# Patient Record
Sex: MALE | Race: WHITE | NOT HISPANIC OR LATINO | Employment: OTHER | URBAN - METROPOLITAN AREA
[De-identification: names, ages, dates, MRNs, and addresses within clinical notes are randomized per-mention and may not be internally consistent; named-entity substitution may affect disease eponyms.]

---

## 2019-09-10 ENCOUNTER — OFFICE VISIT (OUTPATIENT)
Dept: INTERNAL MEDICINE CLINIC | Facility: CLINIC | Age: 74
End: 2019-09-10
Payer: MEDICARE

## 2019-09-10 VITALS
TEMPERATURE: 98.2 F | DIASTOLIC BLOOD PRESSURE: 82 MMHG | WEIGHT: 247.6 LBS | RESPIRATION RATE: 16 BRPM | SYSTOLIC BLOOD PRESSURE: 120 MMHG | BODY MASS INDEX: 33.54 KG/M2 | HEART RATE: 79 BPM | HEIGHT: 72 IN

## 2019-09-10 DIAGNOSIS — M25.552 CHRONIC ARTHRALGIAS OF KNEES AND HIPS: ICD-10-CM

## 2019-09-10 DIAGNOSIS — M25.562 CHRONIC ARTHRALGIAS OF KNEES AND HIPS: ICD-10-CM

## 2019-09-10 DIAGNOSIS — M25.551 CHRONIC ARTHRALGIAS OF KNEES AND HIPS: ICD-10-CM

## 2019-09-10 DIAGNOSIS — N40.0 BPH (BENIGN PROSTATIC HYPERPLASIA): ICD-10-CM

## 2019-09-10 DIAGNOSIS — M25.561 CHRONIC ARTHRALGIAS OF KNEES AND HIPS: ICD-10-CM

## 2019-09-10 DIAGNOSIS — E55.9 HYPOVITAMINOSIS D: Primary | ICD-10-CM

## 2019-09-10 DIAGNOSIS — G89.29 CHRONIC ARTHRALGIAS OF KNEES AND HIPS: ICD-10-CM

## 2019-09-10 DIAGNOSIS — I25.10 CAD (CORONARY ARTERY DISEASE): ICD-10-CM

## 2019-09-10 LAB
CHOLEST SERPL-MCNC: 301 MG/DL (ref 50–200)
CK MB SERPL-MCNC: 1.2 % (ref 0–2.5)
CK MB SERPL-MCNC: 2.9 NG/ML (ref 0–5)
CK SERPL-CCNC: 239 U/L (ref 39–308)
ERYTHROCYTE [SEDIMENTATION RATE] IN BLOOD: 12 MM/HOUR (ref 0–10)
HDLC SERPL-MCNC: 40 MG/DL (ref 40–60)
NONHDLC SERPL-MCNC: 261 MG/DL
TRIGL SERPL-MCNC: 408 MG/DL

## 2019-09-10 PROCEDURE — 82553 CREATINE MB FRACTION: CPT | Performed by: INTERNAL MEDICINE

## 2019-09-10 PROCEDURE — 36415 COLL VENOUS BLD VENIPUNCTURE: CPT | Performed by: HOSPITALIST

## 2019-09-10 PROCEDURE — 85652 RBC SED RATE AUTOMATED: CPT | Performed by: INTERNAL MEDICINE

## 2019-09-10 PROCEDURE — 99204 OFFICE O/P NEW MOD 45 MIN: CPT | Performed by: HOSPITALIST

## 2019-09-10 PROCEDURE — 80061 LIPID PANEL: CPT | Performed by: INTERNAL MEDICINE

## 2019-09-10 PROCEDURE — 82550 ASSAY OF CK (CPK): CPT | Performed by: INTERNAL MEDICINE

## 2019-09-10 RX ORDER — EZETIMIBE 10 MG/1
10 TABLET ORAL DAILY
COMMUNITY
End: 2020-07-10 | Stop reason: SDUPTHER

## 2019-09-10 RX ORDER — SIMVASTATIN 40 MG
40 TABLET ORAL DAILY
COMMUNITY
End: 2021-03-23 | Stop reason: SDUPTHER

## 2019-09-10 RX ORDER — FINASTERIDE 5 MG/1
5 TABLET, FILM COATED ORAL DAILY
Qty: 30 TABLET | Refills: 0 | Status: SHIPPED | OUTPATIENT
Start: 2019-09-10 | End: 2019-11-21 | Stop reason: SDUPTHER

## 2019-09-10 RX ORDER — FINASTERIDE 5 MG/1
5 TABLET, FILM COATED ORAL DAILY
COMMUNITY
End: 2019-09-10 | Stop reason: SDUPTHER

## 2019-09-10 RX ORDER — ASPIRIN 81 MG/1
81 TABLET, CHEWABLE ORAL EVERY MORNING
COMMUNITY
End: 2022-04-14 | Stop reason: HOSPADM

## 2019-09-10 RX ORDER — GABAPENTIN 300 MG/1
300 CAPSULE ORAL 3 TIMES DAILY
COMMUNITY
End: 2020-07-10 | Stop reason: SDUPTHER

## 2019-09-10 RX ORDER — TRAZODONE HYDROCHLORIDE 150 MG/1
150 TABLET ORAL
COMMUNITY
End: 2020-07-10 | Stop reason: SDUPTHER

## 2019-09-10 RX ORDER — CLOPIDOGREL BISULFATE 75 MG/1
75 TABLET ORAL DAILY
COMMUNITY
End: 2022-03-21 | Stop reason: ALTCHOICE

## 2019-09-10 RX ORDER — METOPROLOL SUCCINATE 50 MG/1
50 TABLET, EXTENDED RELEASE ORAL DAILY
COMMUNITY
End: 2019-11-21 | Stop reason: SDUPTHER

## 2019-09-10 NOTE — ASSESSMENT & PLAN NOTE
Otilio complains of "aches and pains" over his body for the past month  He has a history of osteoarthritis  He states he inconsistently  takes vitamin D 400  He is also taking Simvastatin as part of his treatment for CAD  This symptoms could be caused by hypovitaminosis D, statin use, and osteoarthritis  Vitamin D panel and ESR have been ordered as part of the workup  This was discussed with Wing Crain who agrees to this treatment plan  He was also advised to start taking Vitamin D 1000 IU  daily

## 2019-09-10 NOTE — ASSESSMENT & PLAN NOTE
Rolin Apgar is a 69year old male who came to the clinic to establish care with a primary care physician after moving from [place]  He states his previous doctor has retired  Medical records will be obtained from his previous PCP  Rolin Apgar has a past medical history of Sacroiliitis, Spondylosis without myelopathy, CAD, osteoarthritis, BPH, Hip fracture and recent Diverticulitis of intestine with perforation without bleeding  Today, Rolin Apgar states he has been feeling "aches all over his body", these are mainly localized on the shoulders, hips and knees  He also states his left leg has been swelling since the hip fracture last year  Otilio's arthralgias and myalgias could be caused by statin use, hypovitaminosis D or osteoarthritis  These possible diagnosis have been discussed with him as well as the lab workup and treatment plan  He understands and agrees
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
## 2019-09-10 NOTE — ASSESSMENT & PLAN NOTE
Tracy Carrasquillo has a history of CAD and stent placement  His previous PCP in [place] has recently retired and Tracy Carrasquillo just moved to the [facility] area 2 weeks ago  The medical records are not available at this moment  He was recently admitted to the hospital due to diverticulitis with perforation  Lab workup at the hospital is within normal limits  There is no lipid panel on record  This has been ordered today  Tracy Carrasquillo is currently taking Simvastatin, ezetimibe, aspirin, clopidogrel and metoprolol  He was advised to continue his regular medication regimen

## 2019-09-11 ENCOUNTER — TELEPHONE (OUTPATIENT)
Dept: INTERNAL MEDICINE CLINIC | Facility: CLINIC | Age: 74
End: 2019-09-11

## 2019-09-11 PROCEDURE — 82306 VITAMIN D 25 HYDROXY: CPT | Performed by: INTERNAL MEDICINE

## 2019-09-11 NOTE — TELEPHONE ENCOUNTER
I called the patient to tell him he will have to get another tube of blood for his vitamin D panel, the lab could not get the test done with the tube we sent  He should get it done before his next appointment, or soon  He can come here or go to a lab to have it done

## 2019-09-14 ENCOUNTER — OFFICE VISIT (OUTPATIENT)
Dept: URGENT CARE | Facility: CLINIC | Age: 74
End: 2019-09-14
Payer: MEDICARE

## 2019-09-14 VITALS
TEMPERATURE: 97.9 F | HEART RATE: 70 BPM | RESPIRATION RATE: 16 BRPM | DIASTOLIC BLOOD PRESSURE: 75 MMHG | WEIGHT: 254.6 LBS | HEIGHT: 72 IN | BODY MASS INDEX: 34.48 KG/M2 | SYSTOLIC BLOOD PRESSURE: 158 MMHG | OXYGEN SATURATION: 96 %

## 2019-09-14 DIAGNOSIS — B35.1 ONYCHOMYCOSIS OF TOENAIL: ICD-10-CM

## 2019-09-14 DIAGNOSIS — L25.9 CONTACT DERMATITIS, UNSPECIFIED CONTACT DERMATITIS TYPE, UNSPECIFIED TRIGGER: Primary | ICD-10-CM

## 2019-09-14 PROCEDURE — G0463 HOSPITAL OUTPT CLINIC VISIT: HCPCS | Performed by: PHYSICIAN ASSISTANT

## 2019-09-14 PROCEDURE — 99214 OFFICE O/P EST MOD 30 MIN: CPT | Performed by: PHYSICIAN ASSISTANT

## 2019-09-14 NOTE — PATIENT INSTRUCTIONS
Contact dermatitis:  Prescription sent to the pharmacy for hydrocortisone ointment-use twice daily as directed  May also take Benadryl as needed for itching and rash  Nail fungus:  Prescription sent to the pharmacy for [facility] as directed  Keep area clean and dry  Follow up with a dermatologist if symptoms persist   Proceed to  ER if symptoms worsen

## 2019-09-14 NOTE — PROGRESS NOTES
[address]        NAME: Marichuy Blair is a 69 y o  male  : 1945    MRN: [id number]  DATE: 2019  TIME: 12:08 PM    Assessment and Plan   Contact dermatitis, unspecified contact dermatitis type, unspecified trigger [L25 9]  1  Contact dermatitis, unspecified contact dermatitis type, unspecified trigger  hydrocortisone 2 5 % ointment   2  Onychomycosis of toenail  ciclopirox (PENLAC) 8 % solution         Patient Instructions   Contact dermatitis:  Prescription sent to the pharmacy for hydrocortisone ointment-use twice daily as directed  May also take Benadryl as needed for itching and rash  Nail fungus:  Prescription sent to the pharmacy for [facility] as directed  Keep area clean and dry  Follow up with a dermatologist if symptoms persist   Proceed to  ER if symptoms worsen  Chief Complaint     Chief Complaint   Patient presents with    Rash     Pt presents with an itchy rash on his lower extremities  He stated that he noticed it approx 2 weeks ago-          History of Present Illness   The patient is a 45-year-old male who presents with multiple skin complaints  He states that for approximately 2 weeks he has had itching and a rash/blistering that started on his ankles and feet  The rash is urticarial   Negative drainage  Negative fever or shaking chills  Negative open wound  He has recently moved into a new house and is unsure if the rash may be secondary to bug bites  He denies seeing any types of bugs  He denies any changes in his soaps, detergents, lotions  He also presents with discoloration of his toenails  He states that he has had a fungal infection of his nails in the past   Again negative open wound or drainage  HPI    Review of Systems   Review of Systems   Constitutional: Negative for chills and fever  HENT: Negative for sore throat and trouble swallowing  Gastrointestinal: Negative for abdominal pain, diarrhea, nausea and vomiting  Musculoskeletal: Negative for arthralgias and myalgias  Skin: Positive for color change and rash  Negative for pallor and wound  Allergic/Immunologic: Negative for environmental allergies, food allergies and immunocompromised state  Neurological: Negative for dizziness, weakness, light-headedness and headaches  Hematological: Negative for adenopathy  Does not bruise/bleed easily  All other systems reviewed and are negative  Current Medications       Current Outpatient Medications:     aspirin 81 mg chewable tablet, Chew 81 mg daily, Disp: , Rfl:     ciclopirox (PENLAC) 8 % solution, Apply topically daily at bedtime Maximum 48 weeks, Disp: 6 6 mL, Rfl: 0    clopidogrel (PLAVIX) 75 mg tablet, Take 75 mg by mouth daily, Disp: , Rfl:     ezetimibe (ZETIA) 10 mg tablet, Take 10 mg by mouth daily, Disp: , Rfl:     finasteride (PROSCAR) 5 mg tablet, Take 1 tablet (5 mg total) by mouth daily, Disp: 30 tablet, Rfl: 0    gabapentin (NEURONTIN) 300 mg capsule, Take 300 mg by mouth 3 (three) times a day, Disp: , Rfl:     hydrocortisone 2 5 % ointment, Apply topically 2 (two) times a day, Disp: 30 g, Rfl: 0    metoprolol succinate (TOPROL-XL) 50 mg 24 hr tablet, Take 50 mg by mouth daily, Disp: , Rfl:     sertraline (ZOLOFT) 50 mg tablet, Take 50 mg by mouth daily, Disp: , Rfl:     simvastatin (ZOCOR) 40 mg tablet, Take 40 mg by mouth daily, Disp: , Rfl:     traZODone (DESYREL) 150 mg tablet, Take 150 mg by mouth daily at bedtime, Disp: , Rfl:     Current Allergies     Allergies as of 09/14/2019    (No Known Allergies)            The following portions of the patient's history were reviewed and updated as appropriate: allergies, current medications, past family history, past medical history, past social history, past surgical history and problem list      Past Medical History:   Diagnosis Date    Celiac disease     Diverticulosis        History reviewed  No pertinent surgical history      Family History   Problem Relation Age of Onset    Diabetes Mother     Coronary artery disease Father     Thyroid disease Sister          Medications have been verified  Objective   /75   Pulse 70   Temp 97 9 °F (36 6 °C)   Resp 16   Ht 6' (1 829 m)   Wt 115 kg (254 lb 9 6 oz)   SpO2 96%   BMI 34 53 kg/m²        Physical Exam     Physical Exam   Constitutional: He is oriented to person, place, and time  He appears well-developed and well-nourished  No distress  HENT:   Head: Normocephalic and atraumatic  Negative oropharyngeal edema  Negative stridor  Pulmonary/Chest: Breath sounds normal  No respiratory distress  Neurological: He is alert and oriented to person, place, and time  Skin: Skin is warm and dry  Capillary refill takes less than 2 seconds  Rash noted  Rash is urticarial  He is not diaphoretic  No erythema  No pallor  Psychiatric: He has a normal mood and affect  His behavior is normal    Nursing note and vitals reviewed

## 2019-09-16 LAB
25(OH)D2 SERPL-MCNC: <1 NG/ML
25(OH)D3 SERPL-MCNC: 41 NG/ML
25(OH)D3+25(OH)D2 SERPL-MCNC: 41 NG/ML

## 2019-09-24 ENCOUNTER — TELEPHONE (OUTPATIENT)
Dept: INTERNAL MEDICINE CLINIC | Facility: CLINIC | Age: 74
End: 2019-09-24

## 2019-09-24 NOTE — TELEPHONE ENCOUNTER
Kenney Bautista called and wants his results from his bloodwork he had done  His phone number is [phone]

## 2019-11-05 ENCOUNTER — OFFICE VISIT (OUTPATIENT)
Dept: INTERNAL MEDICINE CLINIC | Facility: CLINIC | Age: 74
End: 2019-11-05
Payer: MEDICARE

## 2019-11-05 VITALS
SYSTOLIC BLOOD PRESSURE: 120 MMHG | DIASTOLIC BLOOD PRESSURE: 76 MMHG | HEART RATE: 75 BPM | BODY MASS INDEX: 33.88 KG/M2 | WEIGHT: 249.8 LBS | OXYGEN SATURATION: 99 % | TEMPERATURE: 97.5 F

## 2019-11-05 DIAGNOSIS — E78.5 HYPERLIPIDEMIA, UNSPECIFIED HYPERLIPIDEMIA TYPE: ICD-10-CM

## 2019-11-05 DIAGNOSIS — M79.10 MUSCLE PAIN: Primary | ICD-10-CM

## 2019-11-05 DIAGNOSIS — Z11.59 NEED FOR HEPATITIS C SCREENING TEST: ICD-10-CM

## 2019-11-05 PROCEDURE — 99215 OFFICE O/P EST HI 40 MIN: CPT | Performed by: INTERNAL MEDICINE

## 2019-11-05 RX ORDER — PREDNISONE 20 MG/1
20 TABLET ORAL DAILY
Qty: 14 TABLET | Refills: 0 | Status: SHIPPED | OUTPATIENT
Start: 2019-11-05 | End: 2019-11-18

## 2019-11-05 NOTE — ASSESSMENT & PLAN NOTE
Health maintenance screening for hepatitis C, patient understand and agree with the test  ·  hepatitis C antibody test

## 2019-11-05 NOTE — ASSESSMENT & PLAN NOTE
Patient reported that he usually have low vitamin-D due to celiac disease, vitamin-D level was 41,  And reports taking vitamin-D at home, does not remember the dosage  ·  talked to the patient and told him to continue current vitamin D intake that he takes at home

## 2019-11-05 NOTE — ASSESSMENT & PLAN NOTE
Patient was complaining of aches and pains which mostly on his legs, knees, shoulder described as muscle aches with a pain level of 7/10, continues and is worse when getting up and with tenderness to touch,  This pain is worsened with exercise,  And with knee stiffness in the morning  Previous provider checked for vitamin-D which was normal, ESR was slightly elevated of 12,  CK and CK-MB was unremarkable  Patient also stop taking his simvastatin and ezetimibe for 1 month and a half to see if the pain is from this medications, unfortunately patient saw no improvement with pain  Patient reported that her previous rheumatologist in 2007 prescribed him  on short coarse of  Prednisone  Which helped  ·  will place patient on prednisone 20 mg p o   Daily for 2 weeks and patient is supposed to call within a week if no improvement  ·  Voltaren gel  4 times a day for pain  ·  ambulatory referral for physical therapy,  Told the patient to call us if any problems with the referral  ·  ambulatory referral to Rheumatology

## 2019-11-05 NOTE — ASSESSMENT & PLAN NOTE
Patient cholesterol was elevated, cholesterol of 301, triglyceride 408, HDL 40, non   Patient reported  he stop taking  His cholesterol medications due to  association with muscle pain with medication,  After stopping it for 1 month and half, patient did not see any improvement  With pain  ·  explained to the patient to resume his medication, Zetia 10 mg tablet p o  Daily, simvastatin 40 mg p o  Daily    ·  will recheck lipid panel in 3 months

## 2019-11-05 NOTE — PROGRESS NOTES
Assessment/Plan:    Chronic arthralgias of knees and hips  Patient was complaining of aches and pains which mostly on his legs, knees, shoulder described as muscle aches with a pain level of 7/10, continues and is worse when getting up and with tenderness to touch,  This pain is worsened with exercise,  And with knee stiffness in the morning  Previous provider checked for vitamin-D which was normal, ESR was slightly elevated of 12,  CK and CK-MB was unremarkable  Patient also stop taking his simvastatin and ezetimibe for 1 month and a half to see if the pain is from this medications, unfortunately patient saw no improvement with pain  Patient reported that her previous rheumatologist in 2007 prescribed him  on short coarse of  Prednisone  Which helped  ·  will place patient on prednisone 20 mg p o  Daily for 2 weeks and patient is supposed to call within a week if no improvement  ·  Voltaren gel  4 times a day for pain  ·  ambulatory referral for physical therapy,  Told the patient to call us if any problems with the referral  ·  ambulatory referral to Rheumatology    Muscle pain  see assessment above    Hypovitaminosis D   Patient reported that he usually have low vitamin-D due to celiac disease, vitamin-D level was 41,  And reports taking vitamin-D at home, does not remember the dosage  ·  talked to the patient and told him to continue current vitamin D intake that he takes at home    Hyperlipidemia   Patient cholesterol was elevated, cholesterol of 301, triglyceride 408, HDL 40, non   Patient reported  he stop taking  His cholesterol medications due to  association with muscle pain with medication,  After stopping it for 1 month and half, patient did not see any improvement  With pain  ·  explained to the patient to resume his medication, Zetia 10 mg tablet p o  Daily, simvastatin 40 mg p o  Daily    ·  will recheck lipid panel in 3 months    Need for hepatitis C screening test   Health maintenance screening for hepatitis C, patient understand and agree with the test  ·  hepatitis C antibody test         Problem List Items Addressed This Visit        Other    Muscle pain - Primary     see assessment above         Relevant Medications    diclofenac sodium (VOLTAREN) 1 %    predniSONE 20 mg tablet    Other Relevant Orders    Ambulatory referral to Rheumatology    Ambulatory referral to Physical Therapy    Hyperlipidemia      Patient cholesterol was elevated, cholesterol of 301, triglyceride 408, HDL 40, non   Patient reported  he stop taking  His cholesterol medications due to  association with muscle pain with medication,  After stopping it for 1 month and half, patient did not see any improvement  With pain  ·  explained to the patient to resume his medication, Zetia 10 mg tablet p o  Daily, simvastatin 40 mg p o  Daily  ·  will recheck lipid panel in 3 months         Relevant Orders    Lipid Panel with Direct LDL reflex    Need for hepatitis C screening test      Health maintenance screening for hepatitis C, patient understand and agree with the test  ·  hepatitis C antibody test         Relevant Orders    Hepatitis C antibody      Other Visit Diagnoses     BMI 33 0-33 9,adult                  BMI Counseling: Body mass index is 33 88 kg/m²  The BMI is above normal  Nutrition recommendations include reducing portion sizes, decreasing overall calorie intake, 3-5 servings of fruits/vegetables daily, reducing fast food intake and consuming healthier snacks  Exercise recommendations include moderate aerobic physical activity for 150 minutes/week  Subjective:      Patient ID: Michael Mcconnell is a 69 y o  male  Patient came in for a follow-up visit regarding his  Aches, pain and myalgia  Of both legs, knees, shoulder described as 7/ 10 in intensity, continuous, worse when getting up, and worsen with exercise, with an associated knee stiffness  This has been happening for about a year now   Patient reported that he used to follow a rheumatology, and he remembers the doctor giving him a short course of prednisone to help with the pain when it happens  He also tried to stop his statins for 1 month and a half to see if it is the one causing the muscle aches, unfortunately the pain persists even when with holding the statin and  As consequence his cholesterol went up  Previous provider checked for vitamin D, CK-MB, CK  Which was unremarkable, ESR slightly elevated 12  The following portions of the patient's history were reviewed and updated as appropriate: allergies, current medications, past family history, past medical history, past social history, past surgical history and problem list          Review of Systems   Constitutional: Positive for activity change  Negative for appetite change, chills, diaphoresis, fatigue, fever and unexpected weight change  HENT: Negative  Eyes: Negative  Respiratory: Negative  Cardiovascular: Negative  Gastrointestinal: Negative  Endocrine: Negative  Genitourinary: Negative  Musculoskeletal: Positive for arthralgias, back pain and myalgias  Negative for gait problem and joint swelling  Allergic/Immunologic: Negative  Neurological: Negative  Hematological: Negative  Psychiatric/Behavioral: Negative  Objective:      /76 (BP Location: Right arm, Patient Position: Sitting, Cuff Size: Large)   Pulse 75   Temp 97 5 °F (36 4 °C)   Wt 113 kg (249 lb 12 8 oz)   SpO2 99%   BMI 33 88 kg/m²          Physical Exam   Constitutional: He is oriented to person, place, and time  He appears well-developed and well-nourished  HENT:   Head: Normocephalic and atraumatic  Right Ear: External ear normal    Left Ear: External ear normal    Eyes: Pupils are equal, round, and reactive to light  EOM are normal    Neck: Normal range of motion  Neck supple  Cardiovascular: Normal rate, regular rhythm, normal heart sounds and intact distal pulses  Pulmonary/Chest: Effort normal and breath sounds normal    Musculoskeletal: Normal range of motion  He exhibits tenderness ( muscle tenderness in anterior femoral area,  no pain with passive range of motion of the knees)  Neurological: He is alert and oriented to person, place, and time  Skin: Skin is warm and dry  Vitals reviewed

## 2019-11-18 DIAGNOSIS — M79.10 MYALGIA: Primary | ICD-10-CM

## 2019-11-18 RX ORDER — PREDNISONE 1 MG/1
15 TABLET ORAL DAILY
Qty: 21 TABLET | Refills: 0 | Status: SHIPPED | OUTPATIENT
Start: 2019-11-18 | End: 2019-11-25

## 2019-11-18 NOTE — PROGRESS NOTES
Patient states he improved a lot with prednisone 20mg daily  He is going to schedule his rheumatologist dagoberto today, and I will prescribe him one more week of prednisone, down from 20mg to 15 mg and follow up with him in 3-4 days to see how he is doing

## 2019-11-21 ENCOUNTER — TELEPHONE (OUTPATIENT)
Dept: INTERNAL MEDICINE CLINIC | Facility: CLINIC | Age: 74
End: 2019-11-21

## 2019-11-21 DIAGNOSIS — N40.0 BPH (BENIGN PROSTATIC HYPERPLASIA): ICD-10-CM

## 2019-11-21 DIAGNOSIS — I10 ESSENTIAL HYPERTENSION: Primary | ICD-10-CM

## 2019-11-21 NOTE — TELEPHONE ENCOUNTER
I called the patient today to follow up for his myalgias  Patient states that  He is still doing well, I prescribed prednisone 15 mg daily for him for 1 week, but he states that he is only taking 2 5 mg,  And he is okay on the dose  He was not able to make an appointment for rheumatologist until February 2020  We were able to reach the office and schedule appointment for him for tomorrow  We appreciate Rheumatology evaluation for him and will follow up consultation

## 2019-11-22 ENCOUNTER — APPOINTMENT (OUTPATIENT)
Dept: LAB | Facility: CLINIC | Age: 74
End: 2019-11-22
Payer: MEDICARE

## 2019-11-22 ENCOUNTER — TRANSCRIBE ORDERS (OUTPATIENT)
Dept: LAB | Facility: CLINIC | Age: 74
End: 2019-11-22

## 2019-11-22 ENCOUNTER — OFFICE VISIT (OUTPATIENT)
Dept: RHEUMATOLOGY | Facility: CLINIC | Age: 74
End: 2019-11-22
Payer: MEDICARE

## 2019-11-22 ENCOUNTER — HOSPITAL ENCOUNTER (OUTPATIENT)
Dept: RADIOLOGY | Facility: HOSPITAL | Age: 74
Discharge: HOME | End: 2019-11-22
Payer: MEDICARE

## 2019-11-22 VITALS
SYSTOLIC BLOOD PRESSURE: 140 MMHG | WEIGHT: 255 LBS | BODY MASS INDEX: 34.54 KG/M2 | HEIGHT: 72 IN | HEART RATE: 74 BPM | DIASTOLIC BLOOD PRESSURE: 82 MMHG

## 2019-11-22 DIAGNOSIS — M79.10 MUSCLE PAIN: ICD-10-CM

## 2019-11-22 DIAGNOSIS — Z11.59 ENCOUNTER FOR SCREENING FOR OTHER VIRAL DISEASES: ICD-10-CM

## 2019-11-22 DIAGNOSIS — M25.50 POLYARTHRALGIA: ICD-10-CM

## 2019-11-22 DIAGNOSIS — M79.10 MYALGIA: ICD-10-CM

## 2019-11-22 DIAGNOSIS — M79.10 MYALGIA: Primary | ICD-10-CM

## 2019-11-22 DIAGNOSIS — M17.0 BILATERAL PRIMARY OSTEOARTHRITIS OF KNEE: ICD-10-CM

## 2019-11-22 LAB
ALBUMIN SERPL BCP-MCNC: 3.6 G/DL (ref 3.5–5)
ALP SERPL-CCNC: 80 U/L (ref 46–116)
ALT SERPL W P-5'-P-CCNC: 50 U/L (ref 12–78)
ANION GAP SERPL CALCULATED.3IONS-SCNC: 12 MMOL/L (ref 4–13)
AST SERPL W P-5'-P-CCNC: 30 U/L (ref 5–45)
BASOPHILS # BLD AUTO: 0.03 THOUSANDS/ΜL (ref 0–0.1)
BASOPHILS NFR BLD AUTO: 1 % (ref 0–1)
BILIRUB SERPL-MCNC: 0.6 MG/DL (ref 0.2–1)
BUN SERPL-MCNC: 14 MG/DL (ref 5–25)
CALCIUM SERPL-MCNC: 9.5 MG/DL (ref 8.3–10.1)
CHLORIDE SERPL-SCNC: 105 MMOL/L (ref 100–108)
CO2 SERPL-SCNC: 25 MMOL/L (ref 21–32)
CREAT SERPL-MCNC: 0.9 MG/DL (ref 0.6–1.3)
CRP SERPL QL: <3 MG/L
CRYOGLOB RF SER-ACNC: ABNORMAL [IU]/ML
EOSINOPHIL # BLD AUTO: 0.19 THOUSAND/ΜL (ref 0–0.61)
EOSINOPHIL NFR BLD AUTO: 3 % (ref 0–6)
ERYTHROCYTE [DISTWIDTH] IN BLOOD BY AUTOMATED COUNT: 13.2 % (ref 11.6–15.1)
ERYTHROCYTE [SEDIMENTATION RATE] IN BLOOD: 5 MM/HOUR (ref 0–10)
GFR SERPL CREATININE-BSD FRML MDRD: 85 ML/MIN/1.73SQ M
GLUCOSE SERPL-MCNC: 98 MG/DL (ref 65–140)
HBV CORE AB SER QL: REACTIVE
HBV CORE IGM SER QL: ABNORMAL
HBV SURFACE AG SER QL: ABNORMAL
HCT VFR BLD AUTO: 45.2 % (ref 36.5–49.3)
HCV AB SER QL: ABNORMAL
HGB BLD-MCNC: 15.2 G/DL (ref 12–17)
IMM GRANULOCYTES # BLD AUTO: 0.04 THOUSAND/UL (ref 0–0.2)
IMM GRANULOCYTES NFR BLD AUTO: 1 % (ref 0–2)
LYMPHOCYTES # BLD AUTO: 1.19 THOUSANDS/ΜL (ref 0.6–4.47)
LYMPHOCYTES NFR BLD AUTO: 18 % (ref 14–44)
MCH RBC QN AUTO: 30.7 PG (ref 26.8–34.3)
MCHC RBC AUTO-ENTMCNC: 33.6 G/DL (ref 31.4–37.4)
MCV RBC AUTO: 91 FL (ref 82–98)
MONOCYTES # BLD AUTO: 0.46 THOUSAND/ΜL (ref 0.17–1.22)
MONOCYTES NFR BLD AUTO: 7 % (ref 4–12)
NEUTROPHILS # BLD AUTO: 4.62 THOUSANDS/ΜL (ref 1.85–7.62)
NEUTS SEG NFR BLD AUTO: 70 % (ref 43–75)
NRBC BLD AUTO-RTO: 0 /100 WBCS
PLATELET # BLD AUTO: 220 THOUSANDS/UL (ref 149–390)
PMV BLD AUTO: 9.5 FL (ref 8.9–12.7)
POTASSIUM SERPL-SCNC: 4.3 MMOL/L (ref 3.5–5.3)
PROT SERPL-MCNC: 7 G/DL (ref 6.4–8.2)
RBC # BLD AUTO: 4.95 MILLION/UL (ref 3.88–5.62)
RHEUMATOID FACT SER QL LA: POSITIVE
SODIUM SERPL-SCNC: 142 MMOL/L (ref 136–145)
TSH SERPL DL<=0.05 MIU/L-ACNC: 1.02 UIU/ML (ref 0.36–3.74)
WBC # BLD AUTO: 6.53 THOUSAND/UL (ref 4.31–10.16)

## 2019-11-22 PROCEDURE — 99204 OFFICE O/P NEW MOD 45 MIN: CPT | Performed by: INTERNAL MEDICINE

## 2019-11-22 PROCEDURE — 36415 COLL VENOUS BLD VENIPUNCTURE: CPT | Performed by: INTERNAL MEDICINE

## 2019-11-22 PROCEDURE — 85652 RBC SED RATE AUTOMATED: CPT | Performed by: INTERNAL MEDICINE

## 2019-11-22 PROCEDURE — 85025 COMPLETE CBC W/AUTO DIFF WBC: CPT | Performed by: INTERNAL MEDICINE

## 2019-11-22 PROCEDURE — 86140 C-REACTIVE PROTEIN: CPT | Performed by: INTERNAL MEDICINE

## 2019-11-22 PROCEDURE — 80053 COMPREHEN METABOLIC PANEL: CPT | Performed by: INTERNAL MEDICINE

## 2019-11-22 PROCEDURE — 86200 CCP ANTIBODY: CPT | Performed by: INTERNAL MEDICINE

## 2019-11-22 PROCEDURE — 73562 X-RAY EXAM OF KNEE 3: CPT

## 2019-11-22 PROCEDURE — 86704 HEP B CORE ANTIBODY TOTAL: CPT | Performed by: INTERNAL MEDICINE

## 2019-11-22 PROCEDURE — 86430 RHEUMATOID FACTOR TEST QUAL: CPT | Performed by: INTERNAL MEDICINE

## 2019-11-22 PROCEDURE — 86705 HEP B CORE ANTIBODY IGM: CPT | Performed by: INTERNAL MEDICINE

## 2019-11-22 PROCEDURE — 87340 HEPATITIS B SURFACE AG IA: CPT | Performed by: INTERNAL MEDICINE

## 2019-11-22 PROCEDURE — 86235 NUCLEAR ANTIGEN ANTIBODY: CPT | Performed by: INTERNAL MEDICINE

## 2019-11-22 PROCEDURE — 86431 RHEUMATOID FACTOR QUANT: CPT | Performed by: INTERNAL MEDICINE

## 2019-11-22 PROCEDURE — 84443 ASSAY THYROID STIM HORMONE: CPT | Performed by: INTERNAL MEDICINE

## 2019-11-22 PROCEDURE — 86803 HEPATITIS C AB TEST: CPT | Performed by: INTERNAL MEDICINE

## 2019-11-22 RX ORDER — METOPROLOL SUCCINATE 50 MG/1
50 TABLET, EXTENDED RELEASE ORAL DAILY
Qty: 30 TABLET | Refills: 2 | Status: SHIPPED | OUTPATIENT
Start: 2019-11-22 | End: 2020-03-16 | Stop reason: SDUPTHER

## 2019-11-22 RX ORDER — FINASTERIDE 5 MG/1
5 TABLET, FILM COATED ORAL DAILY
Qty: 30 TABLET | Refills: 0 | Status: SHIPPED | OUTPATIENT
Start: 2019-11-22 | End: 2020-01-06 | Stop reason: SDUPTHER
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
## 2019-11-23 LAB
ENA SS-A AB SER-ACNC: <0.2 AI (ref 0–0.9)
ENA SS-B AB SER-ACNC: <0.2 AI (ref 0–0.9)

## 2019-11-25 ENCOUNTER — APPOINTMENT (OUTPATIENT)
Dept: LAB | Facility: CLINIC | Age: 74
End: 2019-11-25
Payer: MEDICARE

## 2019-11-25 DIAGNOSIS — R76.8 HEPATITIS B CORE ANTIBODY POSITIVE: Primary | ICD-10-CM

## 2019-11-25 LAB — CCP IGA+IGG SERPL IA-ACNC: 10 UNITS (ref 0–19)

## 2019-11-25 PROCEDURE — 36415 COLL VENOUS BLD VENIPUNCTURE: CPT | Performed by: INTERNAL MEDICINE

## 2019-11-25 PROCEDURE — 87517 HEPATITIS B DNA QUANT: CPT | Performed by: INTERNAL MEDICINE

## 2019-11-26 ENCOUNTER — CONSULT (OUTPATIENT)
Dept: OBGYN CLINIC | Facility: CLINIC | Age: 74
End: 2019-11-26
Payer: MEDICARE

## 2019-11-26 VITALS
DIASTOLIC BLOOD PRESSURE: 97 MMHG | HEART RATE: 63 BPM | SYSTOLIC BLOOD PRESSURE: 155 MMHG | HEIGHT: 72 IN | WEIGHT: 250 LBS | BODY MASS INDEX: 33.86 KG/M2

## 2019-11-26 DIAGNOSIS — M17.0 BILATERAL PRIMARY OSTEOARTHRITIS OF KNEE: Primary | ICD-10-CM

## 2019-11-26 PROCEDURE — 99203 OFFICE O/P NEW LOW 30 MIN: CPT | Performed by: ORTHOPAEDIC SURGERY

## 2019-11-26 NOTE — LETTER
November 26, 2019     [address]  [address]  [facility]    Patient: Светлана Harmon   YOB: 1945   Date of Visit: 11/26/2019       Dear Dr Artis Foreman: Thank you for referring Светлана Harmon to me for evaluation  Below are my notes for this consultation  If you have questions, please do not hesitate to call me  I look forward to following your patient along with you           Sincerely,        Genna Kilpatrick MD        CC: No Recipients
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
## 2019-11-27 LAB
HBV DNA SERPL NAA+PROBE-ACNC: NORMAL IU/ML
HBV DNA SERPL NAA+PROBE-LOG IU: NORMAL LOG10 IU/ML
REF LAB TEST REF RANGE: NORMAL

## 2019-12-26 ENCOUNTER — EVALUATION (OUTPATIENT)
Dept: PHYSICAL THERAPY | Facility: REHABILITATION | Age: 74
End: 2019-12-26
Payer: MEDICARE

## 2019-12-26 DIAGNOSIS — M25.562 BILATERAL CHRONIC KNEE PAIN: Primary | ICD-10-CM

## 2019-12-26 DIAGNOSIS — G89.29 BILATERAL CHRONIC KNEE PAIN: Primary | ICD-10-CM

## 2019-12-26 DIAGNOSIS — M25.561 BILATERAL CHRONIC KNEE PAIN: Primary | ICD-10-CM

## 2019-12-26 DIAGNOSIS — M17.0 BILATERAL PRIMARY OSTEOARTHRITIS OF KNEE: ICD-10-CM

## 2019-12-26 PROCEDURE — 97161 PT EVAL LOW COMPLEX 20 MIN: CPT | Performed by: PHYSICAL THERAPIST

## 2019-12-26 PROCEDURE — 97110 THERAPEUTIC EXERCISES: CPT | Performed by: PHYSICAL THERAPIST
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
## 2019-12-31 ENCOUNTER — OFFICE VISIT (OUTPATIENT)
Dept: PHYSICAL THERAPY | Facility: REHABILITATION | Age: 74
End: 2019-12-31
Payer: MEDICARE

## 2019-12-31 DIAGNOSIS — G89.29 BILATERAL CHRONIC KNEE PAIN: ICD-10-CM

## 2019-12-31 DIAGNOSIS — M25.562 BILATERAL CHRONIC KNEE PAIN: ICD-10-CM

## 2019-12-31 DIAGNOSIS — M25.561 BILATERAL CHRONIC KNEE PAIN: ICD-10-CM

## 2019-12-31 DIAGNOSIS — M17.0 BILATERAL PRIMARY OSTEOARTHRITIS OF KNEE: Primary | ICD-10-CM

## 2019-12-31 PROCEDURE — 97110 THERAPEUTIC EXERCISES: CPT | Performed by: PHYSICAL THERAPIST

## 2019-12-31 PROCEDURE — 97530 THERAPEUTIC ACTIVITIES: CPT | Performed by: PHYSICAL THERAPIST

## 2019-12-31 NOTE — PROGRESS NOTES
Daily Note     Today's date: 2019  Patient name: Yuliet Rodriguez  : 1945  MRN: [id number]  Referring provider: Willi Victoria MD  Dx:   Encounter Diagnosis     ICD-10-CM    1  Bilateral primary osteoarthritis of knee M17 0    2  Bilateral chronic knee pain M25 561     M25 562     G89 29        Start Time: 1230  Stop Time: 1320  Total time in clinic (min): 50 minutes    Subjective: Otilio reports that his knees are feeling stiff and sore from helping with a move in [place] over the weekend  Objective: See treatment diary below      Assessment: Tolerated treatment well  Patient demonstrated fatigue post treatment, exhibited good technique with therapeutic exercises and would benefit from continued PT      Plan: Continue per plan of care           Precautions: pelvic fx September, bilateral knee OA    Daily Treatment Diary    Manual 2019         IASTM L quad  nv                               Exercises           Bike nv 5' lvl 1         SLR flx ea nv 1# 3x15 1 5# 3x12         SLR abd* ea 3x12 bw 3x12 1 5#         Clams  3x12 GTB         Bridges* 3x12 3x15                                                     Flexibility           HS stretch* 3x30" 3x30"         Gastroc stretch* 3x30"          Soleus Stretch           Quad stretch* 3x30" 3x30"                               TA/Closed Chain           Heel raises nv 3x10         TG Squats nv L 22 3x12         Band walks           Step ups (involved LE up, uninvolved down) 6" nv 6'' 3x10         Lateral Step ups           Step downs           Step overs                      Wall slides           STS from chair/low mat                      Goblet squats                      Balance           FTEO           Tandem           SL           Skaters           Sharkies                      * = on hep

## 2020-01-02 ENCOUNTER — TELEPHONE (OUTPATIENT)
Dept: OBGYN CLINIC | Facility: HOSPITAL | Age: 75
End: 2020-01-02

## 2020-01-02 ENCOUNTER — OFFICE VISIT (OUTPATIENT)
Dept: PHYSICAL THERAPY | Facility: REHABILITATION | Age: 75
End: 2020-01-02
Payer: COMMERCIAL

## 2020-01-02 DIAGNOSIS — M17.0 BILATERAL PRIMARY OSTEOARTHRITIS OF KNEE: Primary | ICD-10-CM

## 2020-01-02 DIAGNOSIS — G89.29 BILATERAL CHRONIC KNEE PAIN: ICD-10-CM

## 2020-01-02 DIAGNOSIS — M25.561 BILATERAL CHRONIC KNEE PAIN: ICD-10-CM

## 2020-01-02 DIAGNOSIS — M25.562 BILATERAL CHRONIC KNEE PAIN: ICD-10-CM

## 2020-01-02 PROCEDURE — 97112 NEUROMUSCULAR REEDUCATION: CPT

## 2020-01-02 PROCEDURE — 97110 THERAPEUTIC EXERCISES: CPT

## 2020-01-02 NOTE — PROGRESS NOTES
Daily Note     Today's date: 2020  Patient name: Sue Mccall  : 1945  MRN: [id number]  Referring provider: Flower Hale MD  Dx:   Encounter Diagnosis     ICD-10-CM    1  Bilateral primary osteoarthritis of knee M17 0    2  Bilateral chronic knee pain M25 561     M25 562     G89 29        Start Time: 1205  Stop Time: 1300  Total time in clinic (min): 55 minutes    Subjective: Patient reports no knee pain prior to session today stating "little sore, they are creaking, a lot of creaking " He reports no complaints after previous session and states "muscles feel looser "       Objective: See treatment diary below      Assessment: Tolerated treatment well  Patient demonstrated fatigue post treatment, exhibited good technique with therapeutic exercises and would benefit from continued PT Cues required for proper form with completion of SLR abduction  Plan: Continue per plan of care  Progress treatment as tolerated         Precautions: pelvic fx September, bilateral knee OA    Daily Treatment Diary    Manual [id number]        IASTM L quad  nv                               Exercises           Bike nv 5' lvl 1 5' lvl 1        SLR flx ea nv 1# 3x15 1 5# 3x12 1 5# 3x15        SLR abd* ea 3x12 bw 3x12 1 5# 1 5# 3x15        Clams  3x12 GTB 3x15 GTB        Bridges* 3x12 3x15 3x15                                                    Flexibility           HS stretch* 3x30" 3x30" 3x30''        Gastroc stretch* 3x30"          Soleus Stretch           Quad stretch* 3x30" 3x30" 3x30''                              TA/Closed Chain           Heel raises nv 3x10 3x12        TG Squats nv L 22 3x12 L22 3x15        Band walks           Step ups (involved LE up, uninvolved down) 6" nv 6'' 3x10 6'' 3x12        Lateral Step ups           Step downs           Step overs                      Wall slides           STS from chair/low mat                      Goblet squats                      Balance           FTEO Tandem           SL           Skaters           Sharkies                      * = on hep

## 2020-01-02 NOTE — TELEPHONE ENCOUNTER
Javier Rico  [phone]    Dr Maximilian Lindsey    PT would like to start process of next series of gel injections as discussed on 11/26/19

## 2020-01-03 NOTE — TELEPHONE ENCOUNTER
Can you please check on status of injection prior auth ordered on 11/26/2019 by Dr Harvey Krabbe and let the patient know an update?  thanks

## 2020-01-06 ENCOUNTER — APPOINTMENT (OUTPATIENT)
Dept: PHYSICAL THERAPY | Facility: REHABILITATION | Age: 75
End: 2020-01-06
Payer: COMMERCIAL

## 2020-01-06 DIAGNOSIS — N40.0 BPH (BENIGN PROSTATIC HYPERPLASIA): ICD-10-CM

## 2020-01-06 RX ORDER — FINASTERIDE 5 MG/1
5 TABLET, FILM COATED ORAL DAILY
Qty: 30 TABLET | Refills: 2 | Status: SHIPPED | OUTPATIENT
Start: 2020-01-06 | End: 2020-02-24

## 2020-01-06 NOTE — TELEPHONE ENCOUNTER
Patient needs his finasteride sent to [facility]   Thank you Problem: Respiratory Impairment - Respiratory Therapy 253  Intervention: Inhaled medication delivery  Intervention Status  Done

## 2020-01-07 ENCOUNTER — TELEPHONE (OUTPATIENT)
Dept: OBGYN CLINIC | Facility: HOSPITAL | Age: 75
End: 2020-01-07

## 2020-01-07 NOTE — TELEPHONE ENCOUNTER
TO BE COMPLETED BY CENTRAL AUTH TEAM:     Physician: DR Yara Yusuf    Medication: Ale Freedman 3    Number of Injections in Series (Appointments scheduled 1 week apart from one another): 3    Schedule after this date: FEBRUARY    Billing Info: Buy and Bill/Specialty Pharmacy-Patient Supply>> BUY&BILL    Appointment Message Line: BEV KNEE Ale Freedman 3 #1,2,3 BUY&BILL  (please copy and paste appointment message line when scheduling appointment)    Additional Comments: PATIENT LAST RECEIVED GEL INJECTIONS IN AUGUST FROM PREVIOUS PROVIDER IN [place] - NEED TO SCHEDULE APPT IN February

## 2020-01-07 NOTE — TELEPHONE ENCOUNTER
Patient is calling in stating that he has missed a call from someone relating his injections, not showing any notes in patients chart  Forwarded info to [facility] who handles this  Patient is requesting a call back with the status of this    Call back# [phone]

## 2020-01-09 ENCOUNTER — APPOINTMENT (OUTPATIENT)
Dept: PHYSICAL THERAPY | Facility: REHABILITATION | Age: 75
End: 2020-01-09
Payer: COMMERCIAL

## 2020-01-17 NOTE — PROGRESS NOTES
PT Discharge    Today's date: 2020  Patient name: Anabella Pritchett  : 1945  MRN: [id number]  Referring provider: Annette Fernández MD  Dx:   Encounter Diagnosis     ICD-10-CM    1  Bilateral primary osteoarthritis of knee M17 0    2  Bilateral chronic knee pain M25 561     M25 562     G89 29        Start Time: 1205  Stop Time: 1300  Total time in clinic (min): 55 minutes    Assessment/Plan  Anabella Pritchett has not returned since last appointment, unable to perform objective measures since then  It is assumed they have returned to prior level of function and do not desire additional physical therapy  At this time, Anabella Pritchett will be discharged from physical therapy services      Subjective    Objective

## 2020-01-27 ENCOUNTER — APPOINTMENT (OUTPATIENT)
Dept: LAB | Facility: CLINIC | Age: 75
End: 2020-01-27
Payer: COMMERCIAL

## 2020-01-27 DIAGNOSIS — Z11.59 NEED FOR HEPATITIS C SCREENING TEST: ICD-10-CM

## 2020-01-27 DIAGNOSIS — E78.5 HYPERLIPIDEMIA, UNSPECIFIED HYPERLIPIDEMIA TYPE: ICD-10-CM

## 2020-01-27 LAB
CHOLEST SERPL-MCNC: 145 MG/DL (ref 50–200)
HCV AB SER QL: NORMAL
HDLC SERPL-MCNC: 37 MG/DL
LDLC SERPL CALC-MCNC: 33 MG/DL (ref 0–100)
TRIGL SERPL-MCNC: 373 MG/DL

## 2020-01-27 PROCEDURE — 36415 COLL VENOUS BLD VENIPUNCTURE: CPT

## 2020-01-27 PROCEDURE — 86803 HEPATITIS C AB TEST: CPT

## 2020-01-27 PROCEDURE — 80061 LIPID PANEL: CPT

## 2020-01-28 NOTE — PROGRESS NOTES
Updated the patient regarding his lipid panel result, inform about elevated triglycerides, discussed diet modification, patient would likely need to have a repeat lipid panel in 6 months    Stressed the importance of low carb diet avoid sweets and foods high in cholesterol

## 2020-02-04 ENCOUNTER — OFFICE VISIT (OUTPATIENT)
Dept: RHEUMATOLOGY | Facility: CLINIC | Age: 75
End: 2020-02-04
Payer: COMMERCIAL

## 2020-02-04 VITALS
BODY MASS INDEX: 35.49 KG/M2 | SYSTOLIC BLOOD PRESSURE: 144 MMHG | HEART RATE: 84 BPM | DIASTOLIC BLOOD PRESSURE: 90 MMHG | HEIGHT: 72 IN | WEIGHT: 262 LBS

## 2020-02-04 DIAGNOSIS — R76.8 RHEUMATOID FACTOR POSITIVE: ICD-10-CM

## 2020-02-04 DIAGNOSIS — M79.10 MUSCLE PAIN: ICD-10-CM

## 2020-02-04 DIAGNOSIS — M17.0 BILATERAL PRIMARY OSTEOARTHRITIS OF KNEE: Primary | ICD-10-CM

## 2020-02-04 PROCEDURE — 3008F BODY MASS INDEX DOCD: CPT | Performed by: INTERNAL MEDICINE

## 2020-02-04 PROCEDURE — 4040F PNEUMOC VAC/ADMIN/RCVD: CPT | Performed by: INTERNAL MEDICINE

## 2020-02-04 PROCEDURE — 1036F TOBACCO NON-USER: CPT | Performed by: INTERNAL MEDICINE

## 2020-02-04 PROCEDURE — 3077F SYST BP >= 140 MM HG: CPT | Performed by: INTERNAL MEDICINE

## 2020-02-04 PROCEDURE — 1160F RVW MEDS BY RX/DR IN RCRD: CPT | Performed by: INTERNAL MEDICINE

## 2020-02-04 PROCEDURE — 99214 OFFICE O/P EST MOD 30 MIN: CPT | Performed by: INTERNAL MEDICINE

## 2020-02-04 PROCEDURE — 3080F DIAST BP >= 90 MM HG: CPT | Performed by: INTERNAL MEDICINE

## 2020-02-04 RX ORDER — OMEGA-3-ACID ETHYL ESTERS 1 G/1
CAPSULE, LIQUID FILLED ORAL
COMMUNITY
Start: 2019-11-27 | End: 2021-09-23 | Stop reason: ALTCHOICE

## 2020-02-04 RX ORDER — BACLOFEN 10 MG/1
TABLET ORAL
COMMUNITY
Start: 2020-01-20 | End: 2020-07-10 | Stop reason: SDUPTHER
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
## 2020-02-07 DIAGNOSIS — M17.0 PRIMARY OSTEOARTHRITIS OF BOTH KNEES: Primary | ICD-10-CM

## 2020-02-10 ENCOUNTER — APPOINTMENT (OUTPATIENT)
Dept: PHYSICAL THERAPY | Facility: REHABILITATION | Age: 75
End: 2020-02-10
Payer: COMMERCIAL

## 2020-02-11 ENCOUNTER — OFFICE VISIT (OUTPATIENT)
Dept: INTERNAL MEDICINE CLINIC | Facility: CLINIC | Age: 75
End: 2020-02-11
Payer: COMMERCIAL

## 2020-02-11 VITALS
HEIGHT: 72 IN | TEMPERATURE: 97.3 F | DIASTOLIC BLOOD PRESSURE: 84 MMHG | SYSTOLIC BLOOD PRESSURE: 128 MMHG | WEIGHT: 255.8 LBS | HEART RATE: 86 BPM | BODY MASS INDEX: 34.65 KG/M2 | OXYGEN SATURATION: 99 %

## 2020-02-11 DIAGNOSIS — M17.10 OSTEOARTHRITIS OF KNEE: ICD-10-CM

## 2020-02-11 DIAGNOSIS — Z12.11 ENCOUNTER FOR SCREENING COLONOSCOPY: ICD-10-CM

## 2020-02-11 DIAGNOSIS — E78.5 HYPERLIPIDEMIA: Primary | ICD-10-CM

## 2020-02-11 PROBLEM — M17.9 OSTEOARTHRITIS OF KNEE: Status: ACTIVE | Noted: 2020-02-11

## 2020-02-11 PROCEDURE — 99213 OFFICE O/P EST LOW 20 MIN: CPT | Performed by: INTERNAL MEDICINE

## 2020-02-11 PROCEDURE — 3079F DIAST BP 80-89 MM HG: CPT | Performed by: INTERNAL MEDICINE

## 2020-02-11 PROCEDURE — 3074F SYST BP LT 130 MM HG: CPT | Performed by: INTERNAL MEDICINE

## 2020-02-11 PROCEDURE — 3008F BODY MASS INDEX DOCD: CPT | Performed by: INTERNAL MEDICINE

## 2020-02-11 PROCEDURE — 1160F RVW MEDS BY RX/DR IN RCRD: CPT | Performed by: INTERNAL MEDICINE

## 2020-02-11 PROCEDURE — 4040F PNEUMOC VAC/ADMIN/RCVD: CPT | Performed by: INTERNAL MEDICINE

## 2020-02-11 PROCEDURE — 1036F TOBACCO NON-USER: CPT | Performed by: INTERNAL MEDICINE

## 2020-02-11 NOTE — PROGRESS NOTES
Assessment/Plan:    Hyperlipidemia  · Patient comes for a follow-up for hyperlipidemia  · Latest lipid panel shows cholesterol level at 145, triglycerides 373 (decreased from 408 in September), HDL 37    · Patient had discontinued his statin medication due to myalgias and arthralgias  He was evaluated by rheumatology and was found to have osteoarthritis of the knees  He resumed statin treatment a few months ago  · Patient states he recently started Intermittent fasting  · Plan:  · Continue treatment with Ezetimibe 10 mg daily, Simvastatin 40 mg daily, and Lovaza daily  · Diet low in fat has been discussed with the patient  · Repeat Lipid Panel in 6 months  Osteoarthritis of knee  · Patient is following with Rheumatology and Orthopedic Surgery  · He has primary osteoarthritis in his knees and shoulders  He was advised to take Tylenol for the pain and to continue to follow-up with orthopedic surgery for bilateral knee arthrocentesis  Diagnoses and all orders for this visit:    Hyperlipidemia  -     Lipid panel; Future    Encounter for screening colonoscopy  -     Ambulatory referral to Gastroenterology; Future    Osteoarthritis of knee        Subjective: Patient seen and examined today  He has no major complaints  Patient ID: Carol Casey is a 69 y o  male  Patient is a 69year old male with a past medical history of sacroiliitis, spondylosis without myelopathy, CAD s/p stent placement, osteoarthritis, BPH, who comes to the clinic for a follow-up visit  He denies any visits to the emergency department since his last appointment  He has been following up with rhematology and orthopedic surgery for bilateral knee osteoarthritis  He is currently awaiting insurance approval for knee arthrocentesis  Patient does not have any major complaints at this visit  He is concerned about his lipid panel showing elevated tryglycerides   He had discontinued his statin medications due to concerns that it could have been causing myalgia, however this was ruled out and he restarted it a few months ago  He also states he has recently started to do intermittent fasting  Patient Denies CP, SOB, headache, dizziness, vision changes, N/V, abdominal pain, weakness or numbness  The following portions of the patient's history were reviewed and updated as appropriate: allergies, current medications, past family history, past medical history, past social history, past surgical history and problem list     Review of Systems   Respiratory: Negative for shortness of breath and wheezing  Cardiovascular: Negative for chest pain and palpitations  Musculoskeletal: Positive for arthralgias, joint swelling and myalgias  All other systems reviewed and are negative  Objective:      /84 (BP Location: Left arm, Patient Position: Sitting, Cuff Size: Large)   Pulse 86   Temp (!) 97 3 °F (36 3 °C)   Ht 6' (1 829 m)   Wt 116 kg (255 lb 12 8 oz)   SpO2 99%   BMI 34 69 kg/m²          Physical Exam   Constitutional: He is oriented to person, place, and time  He appears well-developed and well-nourished  Non-toxic appearance  No distress  HENT:   Head: Normocephalic and atraumatic  Mouth/Throat: Oropharynx is clear and moist  No oropharyngeal exudate  Eyes: Pupils are equal, round, and reactive to light  Conjunctivae and EOM are normal  Right eye exhibits no discharge  Left eye exhibits no discharge  No scleral icterus  Neck: Normal range of motion  Neck supple  No JVD present  No tracheal deviation present  Cardiovascular: Normal rate, regular rhythm, normal heart sounds and intact distal pulses  Exam reveals no gallop and no friction rub  No murmur heard  Pulmonary/Chest: Effort normal and breath sounds normal  No stridor  No respiratory distress  He has no wheezes  He has no rales  He exhibits no tenderness  Abdominal: Soft  Bowel sounds are normal  He exhibits no distension   There is no tenderness  There is no guarding  Musculoskeletal: He exhibits edema (Trace pitting edema in bilateral lower extremities L>R) and tenderness  Lymphadenopathy:     He has no cervical adenopathy  Neurological: He is alert and oriented to person, place, and time  He displays normal reflexes  No cranial nerve deficit or sensory deficit  He exhibits normal muscle tone  Coordination normal    Skin: Skin is warm and dry  Capillary refill takes less than 2 seconds  No rash noted  He is not diaphoretic  No erythema  No pallor  Psychiatric: He has a normal mood and affect  Nursing note and vitals reviewed  BMI Counseling: Body mass index is 34 69 kg/m²  The BMI is above normal  Nutrition recommendations include reducing portion sizes, decreasing overall calorie intake, 3-5 servings of fruits/vegetables daily, reducing fast food intake and consuming healthier snacks  Exercise recommendations include exercising 3-5 times per week  Pharmacotherapy was ordered for patient to aid in weight loss

## 2020-02-11 NOTE — ASSESSMENT & PLAN NOTE
· Patient is following with Rheumatology and Orthopedic Surgery  · He has primary osteoarthritis in his knees and shoulders  He was advised to take Tylenol for the pain and to continue to follow-up with orthopedic surgery for bilateral knee arthrocentesis

## 2020-02-11 NOTE — PATIENT INSTRUCTIONS
· Follow up with orthopedic surgery for knee injections  · Take Tylenol for knee pain  · Repeat a lipid panel and return to the clinic in 6 months  · We have given you a referral for screening colonoscopy with a gastroenterologist       Low Fat Diet   AMBULATORY CARE:   A low-fat diet  is an eating plan that is low in total fat, unhealthy fat, and cholesterol  You may need to follow a low-fat diet if you have trouble digesting or absorbing fat  You may also need to follow this diet if you have high cholesterol  You can also lower your cholesterol by increasing the amount of fiber in your diet  Soluble fiber is a type of fiber that helps to decrease cholesterol levels  Different types of fat in food:   · Limit unhealthy fats  A diet that is high in cholesterol, saturated fat, and trans fat may cause unhealthy cholesterol levels  Unhealthy cholesterol levels increase your risk of heart disease  ¨ Cholesterol:  Limit intake of cholesterol to less than 200 mg per day  Cholesterol is found in meat, eggs, and dairy  ¨ Saturated fat:  Limit saturated fat to less than 7% of your total daily calories  Ask your dietitian how many calories you need each day  Saturated fat is found in butter, cheese, ice cream, whole milk, and palm oil  Saturated fat is also found in meat, such as beef, pork, chicken skin, and processed meats  Processed meats include sausage, hot dogs, and bologna  ¨ Trans fat:  Avoid trans fat as much as possible  Trans fat is used in fried and baked foods  Foods that say trans fat free on the label may still have up to 0 5 grams of trans fat per serving  · Include healthy fats  Replace foods that are high in saturated and trans fat with foods high in healthy fats  This may help to decrease high cholesterol levels  ¨ Monounsaturated fats: These are found in avocados, nuts, and vegetable oils, such as olive, canola, and sunflower oil  ¨ Polyunsaturated fats:   These can be found in vegetable oils, such as soybean or corn oil  Omega-3 fats can help to decrease the risk of heart disease  Omega-3 fats are found in fish, such as salmon, herring, trout, and tuna  Omega-3 fats can also be found in plant foods, such as walnuts, flaxseed, soybeans, and canola oil    Foods to limit or avoid:   · Grains:      ¨ Snacks that are made with partially hydrogenated oils, such as chips, regular crackers, and butter-flavored popcorn    ¨ High-fat baked goods, such as biscuits, croissants, doughnuts, pies, cookies, and pastries    · Dairy:      ¨ Whole milk, 2% milk, and yogurt and ice cream made with whole milk    ¨ Half and half creamer, heavy cream, and whipping cream    ¨ Cheese, cream cheese, and sour cream    · Meats and proteins:      ¨ High-fat cuts of meat (T-bone steak, regular hamburger, and ribs)    ¨ Fried meat, poultry (turkey and chicken), and fish    ¨ Poultry (chicken and turkey) with skin    ¨ Cold cuts (salami or bologna), hot dogs, angulo, and sausage    ¨ Whole eggs and egg yolks    · Vegetables and fruits with added fat:      ¨ Fried vegetables or vegetables in butter or high-fat sauces, such as cream or cheese sauces    ¨ Fried fruit or fruit served with butter or cream    · Fats:      ¨ Butter, stick margarine, and shortening    ¨ Coconut, palm oil, and palm kernel oil  Foods to include:   · Grains:      ¨ Whole-grain breads, cereals, pasta, and brown rice    ¨ Low-fat crackers and pretzels    · Vegetables and fruits:      ¨ Fresh, frozen, or canned vegetables (no salt or low-sodium)    ¨ Fresh, frozen, dried, or canned fruit (canned in light syrup or fruit juice)    ¨ Avocado    · Low-fat dairy products:      ¨ Nonfat (skim) or 1% milk    ¨ Nonfat or low-fat cheese, yogurt, and cottage cheese    · Meats and proteins:      ¨ Chicken or turkey with no skin    ¨ Baked or broiled fish    ¨ Lean beef and pork (loin, round, extra lean hamburger)    ¨ Beans and peas, unsalted nuts, soy products    ¨ Egg whites and substitutes    ¨ Seeds and nuts    · Fats:      ¨ Unsaturated oil, such as canola, olive, peanut, soybean, or sunflower oil    ¨ Soft or liquid margarine and vegetable oil spread    ¨ Low-fat salad dressing  Other ways to decrease fat:   · Read food labels before you buy foods  Choose foods that have less than 30% of calories from fat  Choose low-fat or fat-free dairy products  Remember that fat free does not mean calorie free  These foods still contain calories, and too many calories can lead to weight gain  · Trim fat from meat and avoid fried food  Trim all visible fat from meat before you cook it  Remove the skin from poultry  Do not carrizales meat, fish, or poultry  Bake, roast, boil, or broil these foods instead  Avoid fried foods  Eat a baked potato instead of Western Judy fries  Steam vegetables instead of sautéing them in butter  · Add less fat to foods  Use imitation angulo bits on salads and baked potatoes instead of regular angulo bits  Use fat-free or low-fat salad dressings instead of regular dressings  Use low-fat or nonfat butter-flavored topping instead of regular butter or margarine on popcorn and other foods  Ways to decrease fat in recipes:  Replace high-fat ingredients with low-fat or nonfat ones  This may cause baked goods to be drier than usual  You may need to use nonfat cooking spray on pans to prevent food from sticking  You also may need to change the amount of other ingredients, such as water, in the recipe  Try the following:  · Use low-fat or light margarine instead of regular margarine or shortening  · Use lean ground turkey breast or chicken, or lean ground beef (less than 5% fat) instead of hamburger  · Add 1 teaspoon of canola oil to 8 ounces of skim milk instead of using cream or half and half  · Use grated zucchini, carrots, or apples in breads instead of coconut      · Use blenderized, low-fat cottage cheese, plain tofu, or low-fat ricotta cheese instead of cream cheese  · Use 1 egg white and 1 teaspoon of canola oil, or use ¼ cup (2 ounces) of fat-free egg substitute instead of a whole egg  · Replace half of the oil that is called for in a recipe with applesauce when you bake  Use 3 tablespoons of cocoa powder and 1 tablespoon of canola oil instead of a square of baking chocolate  How to increase fiber:  Eat enough high-fiber foods to get 20 to 30 grams of fiber every day  Slowly increase your fiber intake to avoid stomach cramps, gas, and other problems  · Eat 3 ounces of whole-grain foods each day  An ounce is about 1 slice of bread  Eat whole-grain breads, such as whole-wheat bread  Whole wheat, whole-wheat flour, or other whole grains should be listed as the first ingredient on the food label  Replace white flour with whole-grain flour or use half of each in recipes  Whole-grain flour is heavier than white flour, so you may have to add more yeast or baking powder  · Eat a high-fiber cereal for breakfast   Oatmeal is a good source of soluble fiber  Look for cereals that have bran or fiber in the name  Choose whole-grain products, such as brown rice, barley, and whole-wheat pasta  · Eat more beans, peas, and lentils  For example, add beans to soups or salads  Eat at least 5 cups of fruits and vegetables each day  Eat fruits and vegetables with the peel because the peel is high in fiber  © 2017 [employer]  Information is for End User's use only and may not be sold, redistributed or otherwise used for commercial purposes  All illustrations and images included in CareNotes® are the copyrighted property of [employer]  or [employer]  The above information is an  only  It is not intended as medical advice for individual conditions or treatments  Talk to your doctor, nurse or pharmacist before following any medical regimen to see if it is safe and effective for you

## 2020-02-11 NOTE — ASSESSMENT & PLAN NOTE
· Patient comes for a follow-up for hyperlipidemia  · Latest lipid panel shows cholesterol level at 145, triglycerides 373 (decreased from 408 in September), HDL 37    · Patient had discontinued his statin medication due to myalgias and arthralgias  He was evaluated by rheumatology and was found to have osteoarthritis of the knees  He resumed statin treatment a few months ago  · Patient states he recently started Intermittent fasting  · Plan:  · Continue treatment with Ezetimibe 10 mg daily, Simvastatin 40 mg daily, and Lovaza daily  · Diet low in fat has been discussed with the patient  · Repeat Lipid Panel in 6 months

## 2020-02-13 ENCOUNTER — EVALUATION (OUTPATIENT)
Dept: PHYSICAL THERAPY | Facility: REHABILITATION | Age: 75
End: 2020-02-13
Payer: COMMERCIAL

## 2020-02-13 DIAGNOSIS — M17.0 PRIMARY OSTEOARTHRITIS OF BOTH KNEES: Primary | ICD-10-CM

## 2020-02-13 PROCEDURE — 97110 THERAPEUTIC EXERCISES: CPT | Performed by: PHYSICAL THERAPIST

## 2020-02-13 PROCEDURE — 97161 PT EVAL LOW COMPLEX 20 MIN: CPT | Performed by: PHYSICAL THERAPIST

## 2020-02-13 NOTE — PROGRESS NOTES
PT Evaluation     Today's date: 2020  Patient name: Romana Art  : 1945  MRN: [id number]  Referring provider: Emily Santoro MD  Dx:   Encounter Diagnosis     ICD-10-CM    1  Primary osteoarthritis of both knees M17 0                   Assessment  Assessment details: Romana Art is a 69 y o  male who presents with pain, decreased strength, and decreased ROM  Due to these impairments, patient has difficulty performing a/iadls and recreational activities  Patient's clinical presentation is consistent with their referring diagnosis of primary osteoarthritis of both knees  Patient would benefit from skilled physical therapy to address their aforementioned impairments, improve their level of function and to improve their overall quality of life  Impairments: abnormal or restricted ROM, impaired physical strength, lacks appropriate home exercise program and pain with function    Symptom irritability: moderateUnderstanding of Dx/Px/POC: good   Prognosis: good    Goals  Short term goals - to be achieved in 4 weeks:     Decrease pain 20-50%  Increase strength by 1/2 grade  Improve range of motion by 25%  Long term goals - to be achieved by discharge:    Ambulation is improved to maximal level of function  Squatting is improved to maximal level of function  Stair climbing is improved to maximal level of function  IADL performance in related activities is improved to maximal level of function  Performance in related household activities is improved to maximal level of function       Plan  Planned therapy interventions: joint mobilization, manual therapy, neuromuscular re-education, patient education, strengthening, stretching, therapeutic activities, therapeutic exercise and home exercise program  Frequency: 2x week  Duration in visits: 12  Duration in weeks: 6  Plan of Care beginning date: 2020  Plan of Care expiration date: 3/26/2020  Treatment plan discussed with: patient        Subjective Evaluation    History of Present Illness  Mechanism of injury: Patient refers to PT with c/o bilateral knee pain (right greater than the left) which began over five years previous of insidious onset  Patient received three visits of PT for his bilateral knees at this office with last visit on 20  Patient states he received injections in bilateral knees in 2019; states no significant relief in pain from last injection  Patient received X-rays of bilateral knees in 2019 which revealed moderate OA of bilateral knee joints  Patient states no instability of bilateral knee joints  Patient states he has not sustained any falls in the past year      Pain  Current pain ratin  At best pain ratin  At worst pain ratin  Quality: dull ache  Relieving factors: heat  Aggravating factors: stair climbing (Squatting, transferring from a low chair )          Objective     Active Range of Motion   Left Knee   Flexion: 130 degrees   Extension: -4 degrees     Right Knee   Flexion: 125 degrees   Extension: -10 degrees     Passive Range of Motion   Left Knee   Flexion: 135 degrees   Extension: 0 degrees     Right Knee   Flexion: 130 degrees   Extension: -6 degrees     Strength/Myotome Testing     Left Hip   Planes of Motion   Flexion: 4-  Extension: 4-  Abduction: 4-  Adduction: 4-    Right Hip   Planes of Motion   Flexion: 4-  Extension: 4-  Abduction: 4-  Adduction: 4- (pain)    Left Knee   Flexion: 4+  Extension: 4+    Right Knee   Flexion: 4+  Extension: 4+    Left Ankle/Foot   Dorsiflexion: 5  Plantar flexion: 5    Right Ankle/Foot   Dorsiflexion: 5  Plantar flexion: 5      Flowsheet Rows      Most Recent Value   PT/OT G-Codes   Current Score  48   Projected Score  59             Precautions: Celiac disease, diverticulosis, MI - , Cardiac stent placement - , Pelvic fracture - 2018      Manual              Right knee flex/ext PROM Exercise Diary  2/13            Bike             Mini squats HEP            TB TKE             Leg press             Step ups             Lateral step ups             Stand knee flex             LAQ             SAQ             Heel slides             Quad sets HEP            SLR flex HEP            SLR abd             Bridges             Sitting knee ext str  w/OP HEP                                                                                 Modalities

## 2020-02-20 ENCOUNTER — OFFICE VISIT (OUTPATIENT)
Dept: PHYSICAL THERAPY | Facility: REHABILITATION | Age: 75
End: 2020-02-20
Payer: COMMERCIAL

## 2020-02-20 DIAGNOSIS — M17.0 PRIMARY OSTEOARTHRITIS OF BOTH KNEES: Primary | ICD-10-CM

## 2020-02-20 PROCEDURE — 97110 THERAPEUTIC EXERCISES: CPT | Performed by: PHYSICAL THERAPIST

## 2020-02-20 PROCEDURE — 97530 THERAPEUTIC ACTIVITIES: CPT | Performed by: PHYSICAL THERAPIST

## 2020-02-20 PROCEDURE — 97140 MANUAL THERAPY 1/> REGIONS: CPT | Performed by: PHYSICAL THERAPIST

## 2020-02-20 NOTE — PROGRESS NOTES
Daily Note     Today's date: 2020  Patient name: Francie Waggoner  : 1945  MRN: [id number]  Referring provider: Albin Bonner MD  Dx:   Encounter Diagnosis     ICD-10-CM    1  Primary osteoarthritis of both knees M17 0        Start Time: 1005  Stop Time: 1050  Total time in clinic (min): 45 minutes    Subjective: Kartik Delong reports that his knees are more stiff today due to driving to [place] over the weekend  Objective: See treatment diary below      Assessment: Tolerated treatment well  Patient demonstrated fatigue post treatment, exhibited good technique with therapeutic exercises and would benefit from continued PT  Plan: Continue per plan of care           Precautions: Celiac disease, diverticulosis, MI - , Cardiac stent placement - , Pelvic fracture - 2018      Daily Treatment Diary    Manual           IASTM L quad                                 Exercises           Bike 5' lvl 1          SLR flx ea 3x12 1 5#          SLR abd* ea 3x12 1 5#           Clams GTB 3x12          Bridges* 3x15                                                      Flexibility           HS stretch* 3x30"          Gastroc stretch* 3x30"          Soleus Stretch           Quad stretch* 3x30"                                TA/Closed Chain           Heel raises 3x12 bw          Leg press           Band walks           Step ups (involved LE up, uninvolved down) 6" nv          Lateral Step ups           Step downs           Step overs                      Wall slides           STS from chair/low mat nv                     Goblet squats                      Balance           FTEO           Tandem           SL           Skaters           Sharkies                      * = on hep

## 2020-02-21 ENCOUNTER — OFFICE VISIT (OUTPATIENT)
Dept: OBGYN CLINIC | Facility: CLINIC | Age: 75
End: 2020-02-21
Payer: COMMERCIAL

## 2020-02-21 VITALS
WEIGHT: 255 LBS | SYSTOLIC BLOOD PRESSURE: 173 MMHG | BODY MASS INDEX: 34.54 KG/M2 | HEART RATE: 70 BPM | HEIGHT: 72 IN | DIASTOLIC BLOOD PRESSURE: 103 MMHG

## 2020-02-21 DIAGNOSIS — N40.0 BPH (BENIGN PROSTATIC HYPERPLASIA): ICD-10-CM

## 2020-02-21 DIAGNOSIS — M17.0 PRIMARY OSTEOARTHRITIS OF BOTH KNEES: Primary | ICD-10-CM

## 2020-02-21 PROCEDURE — 20610 DRAIN/INJ JOINT/BURSA W/O US: CPT | Performed by: ORTHOPAEDIC SURGERY

## 2020-02-21 NOTE — PROGRESS NOTES
Patient Name:  Светлана Harmon  MR#:  [id number]    The patient presents for a hyaluronic acid injection      Large joint arthrocentesis: bilateral knee  Date/Time: 2/21/2020 12:07 PM  Consent given by: patient  Site marked: site marked  Supporting Documentation  Indications: pain   Procedure Details  Location: knee - bilateral knee  Needle size: 22 G  Approach: anterolateral    Medications (Right): 2 mL sodium hyaluronate 16 8 MG/2MLMedications (Left): 2 mL sodium hyaluronate 16 8 MG/2ML   Patient tolerance: patient tolerated the procedure well with no immediate complications  Dressing:  Sterile dressing applied

## 2020-02-24 RX ORDER — FINASTERIDE 5 MG/1
TABLET, FILM COATED ORAL
Qty: 90 TABLET | Refills: 0 | Status: SHIPPED | OUTPATIENT
Start: 2020-02-24 | End: 2020-04-13

## 2020-02-25 ENCOUNTER — OFFICE VISIT (OUTPATIENT)
Dept: PHYSICAL THERAPY | Facility: REHABILITATION | Age: 75
End: 2020-02-25
Payer: COMMERCIAL

## 2020-02-25 DIAGNOSIS — M17.0 PRIMARY OSTEOARTHRITIS OF BOTH KNEES: Primary | ICD-10-CM

## 2020-02-25 PROCEDURE — 97110 THERAPEUTIC EXERCISES: CPT | Performed by: PHYSICAL THERAPIST

## 2020-02-25 PROCEDURE — 97530 THERAPEUTIC ACTIVITIES: CPT | Performed by: PHYSICAL THERAPIST

## 2020-02-25 NOTE — PROGRESS NOTES
Daily Note     Today's date: 2020  Patient name: Britta Benites  : 1945  MRN: [id number]  Referring provider: Lisa Sutton MD  Dx:   Encounter Diagnosis     ICD-10-CM    1  Primary osteoarthritis of both knees M17 0                   Subjective: Patient reports his knees feel "creaky " Patient received injections on 2020 and states no improvement in knee pain since  Objective: See treatment diary below      Assessment: Tolerated treatment well  Patient requires minimal tactile cues to maintain proper form for hip strengthening therex  Progressed exercises in terms of increased repetitions with patient tolerating well  Patient would benefit from continued PT  Provided patient with updated HEP  Plan: Continue per plan of care         Precautions: Celiac disease, diverticulosis, MI - , Cardiac stent placement - , Pelvic fracture - 2018      Daily Treatment Diary    Manual           IASTM L quad                                 Exercises           Bike 5' lvl 1 5' lvl 1         SLR flx ea 3x12 1 5# 3x12 2#         SLR abd* ea 3x12 1 5#  3x12 2#         Clams GTB 3x12 GTB 3x15 supine         Bridges* 3x15 3x15 20#                                                     Flexibility           HS stretch* 3x30" :30x3         Gastroc stretch* 3x30"          Soleus Stretch           Quad stretch* 3x30" :30x3                               TA/Closed Chain           Heel raises 3x12 bw 3x12 bw 5"         Leg press           Band walks           Step ups (involved LE up, uninvolved down) 6" nv 6" 3x10 ea leg         Lateral Step ups           Step downs           Step overs                      Wall slides           STS from chair/low mat nv 3x10 low chair no BUE                    Goblet squats                      Balance           FTEO           Tandem           SL           Skaters           Sharkies                      * = on hep

## 2020-02-26 ENCOUNTER — OFFICE VISIT (OUTPATIENT)
Dept: INTERNAL MEDICINE CLINIC | Facility: CLINIC | Age: 75
End: 2020-02-26
Payer: COMMERCIAL

## 2020-02-26 VITALS
DIASTOLIC BLOOD PRESSURE: 72 MMHG | OXYGEN SATURATION: 96 % | HEIGHT: 72 IN | WEIGHT: 257 LBS | TEMPERATURE: 97.9 F | SYSTOLIC BLOOD PRESSURE: 122 MMHG | HEART RATE: 82 BPM | BODY MASS INDEX: 34.81 KG/M2

## 2020-02-26 DIAGNOSIS — I25.10 CORONARY ARTERY DISEASE DUE TO CALCIFIED CORONARY LESION: ICD-10-CM

## 2020-02-26 DIAGNOSIS — Z00.00 MEDICARE ANNUAL WELLNESS VISIT, SUBSEQUENT: Primary | ICD-10-CM

## 2020-02-26 DIAGNOSIS — I25.84 CORONARY ARTERY DISEASE DUE TO CALCIFIED CORONARY LESION: ICD-10-CM

## 2020-02-26 DIAGNOSIS — M46.1 SACROILIITIS, NOT ELSEWHERE CLASSIFIED (HCC): ICD-10-CM

## 2020-02-26 DIAGNOSIS — E78.5 HYPERLIPIDEMIA, UNSPECIFIED HYPERLIPIDEMIA TYPE: ICD-10-CM

## 2020-02-26 DIAGNOSIS — Z12.11 SCREENING FOR COLON CANCER: ICD-10-CM

## 2020-02-26 PROCEDURE — 1170F FXNL STATUS ASSESSED: CPT | Performed by: INTERNAL MEDICINE

## 2020-02-26 PROCEDURE — 3074F SYST BP LT 130 MM HG: CPT | Performed by: INTERNAL MEDICINE

## 2020-02-26 PROCEDURE — 3078F DIAST BP <80 MM HG: CPT | Performed by: INTERNAL MEDICINE

## 2020-02-26 PROCEDURE — 1125F AMNT PAIN NOTED PAIN PRSNT: CPT | Performed by: INTERNAL MEDICINE

## 2020-02-26 PROCEDURE — 3008F BODY MASS INDEX DOCD: CPT | Performed by: INTERNAL MEDICINE

## 2020-02-26 PROCEDURE — 1036F TOBACCO NON-USER: CPT | Performed by: INTERNAL MEDICINE

## 2020-02-26 PROCEDURE — 1160F RVW MEDS BY RX/DR IN RCRD: CPT | Performed by: INTERNAL MEDICINE

## 2020-02-26 PROCEDURE — G0439 PPPS, SUBSEQ VISIT: HCPCS | Performed by: INTERNAL MEDICINE

## 2020-02-26 PROCEDURE — 4040F PNEUMOC VAC/ADMIN/RCVD: CPT | Performed by: INTERNAL MEDICINE

## 2020-02-26 NOTE — ASSESSMENT & PLAN NOTE
·   Patient denies any chest pain, shortness of breath, orthopnea or PND  Or lower extremity edemaand he is reported  Good  medication compliance  · Patient reported that he is following up with cardiology regularly  ·  patient reporting quitting smoking over 30 years ago     · Continue monitor clinically

## 2020-02-26 NOTE — ASSESSMENT & PLAN NOTE
· Currently on simvastatin 40 mg daily, ezetimibe daily, Lovaza 1 g capsule still  ·  most recent lipid panel  Showed cholesterol 145, triglyceride of 373, HDL 37, LDL 33  ·  patient reporting modifying his diet to low-fat diet  Patient also reporting that he is trying to be active  By riding bicycle  ·  were repeat lipid panel in 3 months

## 2020-02-26 NOTE — PROGRESS NOTES
Assessment and Plan:     Problem List Items Addressed This Visit        Cardiovascular and Mediastinum    CAD (coronary artery disease)     ·   Patient denies any chest pain, shortness of breath, orthopnea or PND and he is reported  Good medical compliance, no known extremity or other symptoms     ·   Patient reported that he is following up with cardiology regularly  ·   Continue monitor clinically            Other    Hyperlipidemia     · Currently on simvastatin 40 mg daily, ezetimibe daily, Lovaza 1 g capsule still  ·  most recent lipid panel  Showed cholesterol 145, triglyceride of 373, HDL 37, LDL 33  ·  patient reporting modifying his diet to low-fat diet  Patient also reporting that he is trying to be active  By riding bicycle  ·  were repeat lipid panel in 3 months  Other Visit Diagnoses     Medicare annual wellness visit, subsequent    -  Primary    Relevant Orders    Ambulatory referral to Gastroenterology           Preventive health issues were discussed with patient, and age appropriate screening tests were ordered as noted in patient's After Visit Summary  Personalized health advice and appropriate referrals for health education or preventive services given if needed, as noted in patient's After Visit Summary  History of Present Illness:     Patient presents for Medicare Annual Wellness visit    Patient Care Team:  Marge Limon MD as PCP - General (Internal Medicine)  Adi Akins MD as PCP - [address] [place] [address] (RTE)     Problem List:     Patient Active Problem List   Diagnosis    Hypovitaminosis D    CAD (coronary artery disease)    Chronic arthralgias of knees and hips    Muscle pain    Hyperlipidemia    Need for hepatitis C screening test    Encounter for screening colonoscopy    Osteoarthritis of knee      Past Medical and Surgical History:     Past Medical History:   Diagnosis Date    Celiac disease     Diverticulosis      History reviewed   No pertinent surgical history  Family History:     Family History   Problem Relation Age of Onset    Diabetes Mother     Coronary artery disease Father     Thyroid disease Sister       Social History:        Social History     Socioeconomic History    Marital status: /Civil Union     Spouse name: None    Number of children: 3    Years of education: None    Highest education level: None   Occupational History    None   Social Needs    Financial resource strain: Not hard at all   Kelly-Romi insecurity:     Worry: Never true     Inability: Never true    Transportation needs:     Medical: No     Non-medical: No   Tobacco Use    Smoking status: Former Smoker     Last attempt to quit:      Years since quittin 1    Smokeless tobacco: Former User     Quit date:    Substance and Sexual Activity    Alcohol use: Not Currently    Drug use: Not Currently    Sexual activity: Yes   Lifestyle    Physical activity:     Days per week: 3 days     Minutes per session: 60 min    Stress:  To some extent   Relationships    Social connections:     Talks on phone: Twice a week     Gets together: Once a week     Attends Rastafarian service: Never     Active member of club or organization: No     Attends meetings of clubs or organizations: Never     Relationship status:     Intimate partner violence:     Fear of current or ex partner: No     Emotionally abused: No     Physically abused: No     Forced sexual activity: No   Other Topics Concern    None   Social History Narrative    None      Medications and Allergies:     Current Outpatient Medications   Medication Sig Dispense Refill    aspirin 81 mg chewable tablet Chew 81 mg daily      baclofen 10 mg tablet       clopidogrel (PLAVIX) 75 mg tablet Take 75 mg by mouth daily      diclofenac sodium (VOLTAREN) 1 % Apply 2 g topically 4 (four) times a day 1 Tube 1    ezetimibe (ZETIA) 10 mg tablet Take 10 mg by mouth daily      finasteride (PROSCAR) 5 mg tablet TAKE 1 TABLET BY MOUTH  DAILY 90 tablet 0    gabapentin (NEURONTIN) 300 mg capsule Take 300 mg by mouth 3 (three) times a day      metoprolol succinate (TOPROL-XL) 50 mg 24 hr tablet Take 1 tablet (50 mg total) by mouth daily 30 tablet 2    omega-3-acid ethyl esters (LOVAZA) 1 g capsule       sertraline (ZOLOFT) 50 mg tablet Take 50 mg by mouth daily      simvastatin (ZOCOR) 40 mg tablet Take 40 mg by mouth daily      traZODone (DESYREL) 150 mg tablet Take 150 mg by mouth daily at bedtime      ciclopirox (PENLAC) 8 % solution Apply topically daily at bedtime Maximum 48 weeks (Patient not taking: Reported on 2/11/2020) 6 6 mL 0     No current facility-administered medications for this visit  No Known Allergies   Immunizations:     Immunization History   Administered Date(s) Administered     Influenza (IM) Preservative Free 09/10/2019    INFLUENZA 09/10/2019    Influenza Split High Dose Preservative Free IM 09/10/2019    Pneumococcal Polysaccharide PPV23 09/10/2019    Zoster Vaccine Recombinant 05/25/2019, 09/10/2019      Health Maintenance:         Topic Date Due    CRC Screening: Colonoscopy  08/12/2024    Hepatitis C Screening  Completed     There are no preventive care reminders to display for this patient  Medicare Health Risk Assessment:     /72 (BP Location: Left arm, Patient Position: Sitting, Cuff Size: Large)   Pulse 82   Temp 97 9 °F (36 6 °C)   Ht 6' (1 829 m)   Wt 117 kg (257 lb)   SpO2 96%   BMI 34 86 kg/m²      Melerodriguez Stephen is here for his Subsequent Wellness visit  Last Medicare Wellness visit information reviewed, patient interviewed and updates made to the record today  Health Risk Assessment:   Patient rates overall health as very good  Patient feels that their physical health rating is much better  Eyesight was rated as same  Hearing was rated as same  Patient feels that their emotional and mental health rating is same  Pain experienced in the last 7 days has been some  Patient's pain rating has been 3/10  Patient states that he has experienced no weight loss or gain in last 6 months  Fall Risk Screening: In the past year, patient has experienced: no history of falling in past year      Home Safety:  Patient has trouble with stairs inside or outside of their home  Patient has working smoke alarms and has working carbon monoxide detector  Home safety hazards include: none  Nutrition:   Current diet is Low Carb, Limited junk food and Regular  Medications:   Patient is currently taking over-the-counter supplements  OTC medications include: BABY ASPRIN  Patient is able to manage medications  Activities of Daily Living (ADLs)/Instrumental Activities of Daily Living (IADLs):   Walk and transfer into and out of bed and chair?: Yes  Dress and groom yourself?: Yes    Bathe or shower yourself?: Yes    Feed yourself?  Yes  Do your laundry/housekeeping?: Yes  Manage your money, pay your bills and track your expenses?: Yes  Make your own meals?: Yes    Do your own shopping?: Yes    Previous Hospitalizations:   Any hospitalizations or ED visits within the last 12 months?: Yes    How many hospitalizations have you had in the last year?: 1-2    Hospitalization Comments: [address]:   Living will: No    Durable POA for healthcare: No    Five wishes given: No      Cognitive Screening:   Provider or family/friend/caregiver concerned regarding cognition?: No    PREVENTIVE SCREENINGS      Cardiovascular Screening:    General: Screening Not Indicated, History Lipid Disorder, Screening Current and Risks and Benefits Discussed      Diabetes Screening:     General: Screening Current      Colorectal Cancer Screening:     General: Screening Current    Due for: Colonoscopy - Low Risk      Prostate Cancer Screening:    General: Risks and Benefits Discussed and Screening Current      Osteoporosis Screening:    General: Risks and Benefits Discussed and Screening Not Indicated      Abdominal Aortic Aneurysm (AAA) Screening:    Risk factors include: age between 73-69 yo and tobacco use        General: Risks and Benefits Discussed and Screening Not Indicated      Lung Cancer Screening:     General: Risks and Benefits Discussed and Screening Not Indicated      Hepatitis C Screening:    General: Screening Current and Risks and Benefits Discussed    Other Counseling Topics:   Alcohol use counseling, car/seat belt/driving safety, skin self-exam, sunscreen and calcium and vitamin D intake and regular weightbearing exercise         David Estrada MD

## 2020-02-26 NOTE — PATIENT INSTRUCTIONS

## 2020-02-27 ENCOUNTER — OFFICE VISIT (OUTPATIENT)
Dept: PHYSICAL THERAPY | Facility: REHABILITATION | Age: 75
End: 2020-02-27
Payer: COMMERCIAL

## 2020-02-27 DIAGNOSIS — M17.0 PRIMARY OSTEOARTHRITIS OF BOTH KNEES: Primary | ICD-10-CM

## 2020-02-27 PROCEDURE — 97530 THERAPEUTIC ACTIVITIES: CPT | Performed by: PHYSICAL THERAPIST

## 2020-02-27 PROCEDURE — 97110 THERAPEUTIC EXERCISES: CPT | Performed by: PHYSICAL THERAPIST

## 2020-02-27 PROCEDURE — 97112 NEUROMUSCULAR REEDUCATION: CPT | Performed by: PHYSICAL THERAPIST

## 2020-02-27 NOTE — PROGRESS NOTES
Daily Note     Today's date: 2020  Patient name: Carin Cisneros  : 1945  MRN: [id number]  Referring provider: Love Mims MD  Dx:   Encounter Diagnosis     ICD-10-CM    1  Primary osteoarthritis of both knees M17 0                   Subjective: Berna Boo reports that his knees have been feeling better, "I think it's working "       Objective: See treatment diary below      Assessment: Tolerated treatment well  Patient demonstrated fatigue post treatment, exhibited good technique with therapeutic exercises and would benefit from continued PT  Plan: Continue per plan of care           Precautions: Celiac disease, diverticulosis, MI - , Cardiac stent placement - , Pelvic fracture - 2018      Daily Treatment Diary    Manual         IASTM L quad                                 Exercises           Bike 5' lvl 1 5' lvl 1 5' lvl 1        SLR flx ea 3x12 1 5# 3x12 2# 3x15 2#         SLR abd* ea 3x12 1 5#  3x12 2# 3x15 2#        Clams GTB 3x12 GTB 3x15 supine GTB 3x15        Bridges* 3x15 3x15 20# 3x15 25#                                                    Flexibility           HS stretch* 3x30" :30x3 3x30"        Gastroc stretch* 3x30"          Soleus Stretch           Quad stretch* 3x30" :30x3 3x30"                              TA/Closed Chain           Heel raises 3x12 bw 3x12 bw 5" 3x15         Leg press           Band walks           Step ups (involved LE up, uninvolved down) 6" nv 6" 3x10 ea leg 6" 3x15        Lateral Step ups   6" 3x10        Step downs           Step overs                      Wall slides           STS from chair/low mat nv 3x10 low chair no BUE 3x10 10#                   Goblet squats                      Balance           FTEO           Tandem           SL   10"x5        Skaters           Sharkies                      * = on hep      Treated by AD 1130-12

## 2020-02-28 ENCOUNTER — OFFICE VISIT (OUTPATIENT)
Dept: OBGYN CLINIC | Facility: CLINIC | Age: 75
End: 2020-02-28
Payer: COMMERCIAL

## 2020-02-28 VITALS
WEIGHT: 257 LBS | DIASTOLIC BLOOD PRESSURE: 99 MMHG | BODY MASS INDEX: 34.81 KG/M2 | SYSTOLIC BLOOD PRESSURE: 153 MMHG | HEIGHT: 72 IN | HEART RATE: 78 BPM

## 2020-02-28 DIAGNOSIS — M17.0 PRIMARY OSTEOARTHRITIS OF BOTH KNEES: Primary | ICD-10-CM

## 2020-02-28 PROCEDURE — 20610 DRAIN/INJ JOINT/BURSA W/O US: CPT | Performed by: ORTHOPAEDIC SURGERY

## 2020-02-28 NOTE — PROGRESS NOTES
Patient Name:  Zac Ndiaye  MR#:  [id number]    The patient presents for a hyaluronic acid injection      Large joint arthrocentesis: bilateral knee  Date/Time: 2/28/2020 11:06 AM  Consent given by: patient  Site marked: site marked  Supporting Documentation  Indications: pain   Procedure Details  Location: knee - bilateral knee  Needle size: 22 G  Approach: anterolateral    Medications (Right): 2 mL sodium hyaluronate 16 8 MG/2MLMedications (Left): 2 mL sodium hyaluronate 16 8 MG/2ML   Patient tolerance: patient tolerated the procedure well with no immediate complications  Dressing:  Sterile dressing applied

## 2020-03-03 ENCOUNTER — OFFICE VISIT (OUTPATIENT)
Dept: PHYSICAL THERAPY | Facility: REHABILITATION | Age: 75
End: 2020-03-03
Payer: COMMERCIAL

## 2020-03-03 DIAGNOSIS — M17.0 PRIMARY OSTEOARTHRITIS OF BOTH KNEES: Primary | ICD-10-CM

## 2020-03-03 PROCEDURE — 97112 NEUROMUSCULAR REEDUCATION: CPT

## 2020-03-03 PROCEDURE — 97110 THERAPEUTIC EXERCISES: CPT | Performed by: PHYSICAL THERAPIST

## 2020-03-03 NOTE — PROGRESS NOTES
Daily Note     Today's date: 3/3/2020  Patient name: David Zuleta  : 1945  MRN: [id number]  Referring provider: Ofe Martell MD  Dx:   Encounter Diagnosis     ICD-10-CM    1  Primary osteoarthritis of both knees M17 0        Start Time: 163  Stop Time:   Total time in clinic (min): 57 minutes    Subjective: The patient arrived to treatment 15 minutes late  He notes that he had injections in his knees on Friday  He does not feel that they helped his symptoms at all  He went for a bike ride yesterday for an hour and a half outside with his wife  Objective: See treatment diary below      Assessment: Tolerated treatment well  Patient had no complaints of pain throughout treatment  He had significant instability with SLS and cues for proper form througout treatment session  Plan: Continue per plan of care        Precautions: Celiac disease, diverticulosis, MI - , Cardiac stent placement - , Pelvic fracture - 2018      Daily Treatment Diary    Manual 2/20 2/25 2/27 3/3       IASTM L quad                                 Exercises           Bike 5' lvl 1 5' lvl 1 5' lvl 1 5' lvl 1       SLR flx ea 3x12 1 5# 3x12 2# 3x15 2#  3x15 2#       SLR abd* ea 3x12 1 5#  3x12 2# 3x15 2# 3x15 2#       Clams GTB 3x12 GTB 3x15 supine GTB 3x15        Bridges* 3x15 3x15 20# 3x15 25# 3x15 25#                                                   Flexibility           HS stretch* 3x30" :30x3 3x30"        Gastroc stretch* 3x30"          Soleus Stretch           Quad stretch* 3x30" :30x3 3x30" 3x30''                             TA/Closed Chain           Heel raises 3x12 bw 3x12 bw 5" 3x15  3x15        Leg press           Band walks           Step ups (involved LE up, uninvolved down) 6" nv 6" 3x10 ea leg 6" 3x15 6'' 3x15       Lateral Step ups   6" 3x10 6'' 3x12       Step downs           Step overs                      Wall slides           STS from chair/low mat nv 3x10 low chair no BUE 3x10 10# 3x12 10# Goblet squats                      Balance           FTEO           Tandem           SL   10"x5 10''x5       Skaters           Sharkies                      * = on hep

## 2020-03-05 ENCOUNTER — OFFICE VISIT (OUTPATIENT)
Dept: PHYSICAL THERAPY | Facility: REHABILITATION | Age: 75
End: 2020-03-05
Payer: COMMERCIAL

## 2020-03-05 DIAGNOSIS — M17.0 PRIMARY OSTEOARTHRITIS OF BOTH KNEES: Primary | ICD-10-CM

## 2020-03-05 PROCEDURE — 97112 NEUROMUSCULAR REEDUCATION: CPT | Performed by: PHYSICAL THERAPIST

## 2020-03-05 PROCEDURE — 97110 THERAPEUTIC EXERCISES: CPT | Performed by: PHYSICAL THERAPIST

## 2020-03-05 NOTE — PROGRESS NOTES
Daily Note     Today's date: 3/5/2020  Patient name: Sue Mccall  : 1945  MRN: [id number]  Referring provider: Flower Hale MD  Dx:   Encounter Diagnosis     ICD-10-CM    1  Primary osteoarthritis of both knees M17 0                   Subjective: Patient reports "my knees feel creaky " Patient received third round of injections in knees on Friday without much relief  Patient reports his knees feel better when he leaves therapy and is compliant to HEP with bike riding on his off days  Objective: See treatment diary below      Assessment: Tolerated treatment well  Progressed patient in terms of increased resistance with patient tolerating well, however requires moderate verbal and tactile cues for appropriate form with s/l hip abduction  Patient will benefit from increased focus on closed chain therex and functional strengthening  Patient would benefit from continued PT  Plan: Continue per plan of care        Precautions: Celiac disease, diverticulosis, MI - , Cardiac stent placement - , Pelvic fracture - 2018      Daily Treatment Diary    Manual 2/20 2/25 2/27 3/3 3/5      IASTM L quad                                 Exercises           Bike 5' lvl 1 5' lvl 1 5' lvl 1 5' lvl 1 5' lvl 4      SLR flex ea 3x12 1 5# 3x12 2# 3x15 2#  3x15 2# 3x15 2 5#      SLR abd* ea 3x12 1 5#  3x12 2# 3x15 2# 3x15 2# 3x15 2#      Clams GTB 3x12 GTB 3x15 supine GTB 3x15  GTB 3x15      Bridges* 3x15 3x15 20# 3x15 25# 3x15 25# 3x15 25#                                                  Flexibility           HS stretch* 3x30" :30x3 3x30"  :15x3      Gastroc stretch* 3x30"    :30x2 ea      Soleus Stretch           Quad stretch* 3x30" :30x3 3x30" 3x30'' :30x3 ea                            TA/Closed Chain           Heel raises 3x12 bw 3x12 bw 5" 3x15  3x15  3x15       Leg press           Band walks           Step ups (involved LE up, uninvolved down) 6" nv 6" 3x10 ea leg 6" 3x15 6'' 3x15 6" 3x15      Lateral Step ups   6" 3x10 6'' 3x12 6" 3x15      Step downs           Step overs                      Wall slides           STS from chair/low mat nv 3x10 low chair no BUE 3x10 10# 3x12 10#  3x10 10#                 Goblet squats                      Balance           FTEO           Tandem           SL   10"x5 10''x5 np      Skaters           Sharkies                      * = on hep

## 2020-03-06 ENCOUNTER — OFFICE VISIT (OUTPATIENT)
Dept: OBGYN CLINIC | Facility: CLINIC | Age: 75
End: 2020-03-06
Payer: COMMERCIAL

## 2020-03-06 VITALS
WEIGHT: 257 LBS | BODY MASS INDEX: 34.81 KG/M2 | SYSTOLIC BLOOD PRESSURE: 155 MMHG | HEIGHT: 72 IN | HEART RATE: 72 BPM | DIASTOLIC BLOOD PRESSURE: 87 MMHG

## 2020-03-06 DIAGNOSIS — M17.0 PRIMARY OSTEOARTHRITIS OF BOTH KNEES: Primary | ICD-10-CM

## 2020-03-06 PROCEDURE — 20610 DRAIN/INJ JOINT/BURSA W/O US: CPT | Performed by: ORTHOPAEDIC SURGERY

## 2020-03-06 NOTE — PROGRESS NOTES
Patient Name:  Yuliet Rodriguez  MR#:  [id number]    The patient presents for a hyaluronic acid injection      Large joint arthrocentesis: bilateral knee  Date/Time: 3/6/2020 11:12 AM  Consent given by: patient  Supporting Documentation  Indications: pain   Procedure Details  Location: knee - bilateral knee  Needle size: 22 G  Approach: anterolateral    Patient tolerance: patient tolerated the procedure well with no immediate complications  Dressing:  Sterile dressing applied

## 2020-03-09 DIAGNOSIS — N40.0 BPH (BENIGN PROSTATIC HYPERPLASIA): ICD-10-CM

## 2020-03-10 ENCOUNTER — OFFICE VISIT (OUTPATIENT)
Dept: PHYSICAL THERAPY | Facility: REHABILITATION | Age: 75
End: 2020-03-10
Payer: COMMERCIAL

## 2020-03-10 DIAGNOSIS — M17.0 PRIMARY OSTEOARTHRITIS OF BOTH KNEES: Primary | ICD-10-CM

## 2020-03-10 PROCEDURE — 97112 NEUROMUSCULAR REEDUCATION: CPT | Performed by: PHYSICAL THERAPIST

## 2020-03-10 PROCEDURE — 97110 THERAPEUTIC EXERCISES: CPT | Performed by: PHYSICAL THERAPIST

## 2020-03-10 NOTE — PROGRESS NOTES
Daily Note     Today's date: 3/10/2020  Patient name: Devin Snowden  : 1945  MRN: [id number]  Referring provider: Demetria Dunn MD  Dx:   Encounter Diagnosis     ICD-10-CM    1  Primary osteoarthritis of both knees M17 0                   Subjective: Patient with no new complaints  Patient states "I think the injections might be kicking in because the knees feel better  I just have some pain on the outsides " Patient states he went on an hour bike ride two days in a row without limitation  Objective: See treatment diary below    During Exercise  BP: 158/90 mm Hg RUE   HR: 118 bpm  O2 saturation: 96 %    Post-Treatment  BP: 138/80 mm Hg    Assessment: Tolerated treatment well  Progressed therex today in terms of increased resistance and repetitions with patient tolerating well  Demonstrates increased muscular and cardiovascular fatigue today  Reports of mild lightheadedness post-STS therefore monitored vitals at end of session  Decreased reports of knee pain with functional strengthening  Patient would benefit from continued PT  Plan: Continue per plan of care        Precautions: Celiac disease, diverticulosis, MI - , Cardiac stent placement - , Pelvic fracture - 2018      Daily Treatment Diary    Manual 2/20 2/25 2/27 3/3 3/5 3/10     IASTM L quad                                 Exercises           Bike 5' lvl 1 5' lvl 1 5' lvl 1 5' lvl 1 5' lvl 4 5' lvl 5     SLR flex ea 3x12 1 5# 3x12 2# 3x15 2#  3x15 2# 3x15 2 5# 3x12 3#     SLR abd* ea 3x12 1 5#  3x12 2# 3x15 2# 3x15 2# 3x15 2# 3x15 2#     Clams GTB 3x12 GTB 3x15 supine GTB 3x15  GTB 3x15 BTB   3x12     Bridges* 3x15 3x15 20# 3x15 25# 3x15 25# 3x15 25# 3x15 25#                                                 Flexibility           HS stretch* 3x30" :30x3 3x30"  :15x3 :30x3 ea     Gastroc stretch* 3x30"    :30x2 ea      Soleus Stretch           Quad stretch* 3x30" :30x3 3x30" 3x30'' :30x3 ea 2' ea TA/Closed Chain           Heel raises 3x12 bw 3x12 bw 5" 3x15  3x15  3x15  3x20     Leg press           Band walks           Step ups (involved LE up, uninvolved down) 6" nv 6" 3x10 ea leg 6" 3x15 6'' 3x15 6" 3x15 6" 3x15     Lateral Step ups   6" 3x10 6'' 3x12 6" 3x15      Step downs           Step overs                      Wall slides           STS from chair/low mat nv 3x10 low chair no BUE 3x10 10# 3x12 10#  3x10 10# 3x12 12#                Goblet squats                      Balance           FTEO           Tandem           SL   10"x5 10''x5 np      Skaters           Sharkies                      * = on hep

## 2020-03-12 ENCOUNTER — APPOINTMENT (OUTPATIENT)
Dept: PHYSICAL THERAPY | Facility: REHABILITATION | Age: 75
End: 2020-03-12
Payer: COMMERCIAL

## 2020-03-12 RX ORDER — FINASTERIDE 5 MG/1
5 TABLET, FILM COATED ORAL DAILY
Qty: 90 TABLET | Refills: 0 | OUTPATIENT
Start: 2020-03-12

## 2020-03-16 DIAGNOSIS — I10 ESSENTIAL HYPERTENSION: ICD-10-CM

## 2020-03-16 RX ORDER — METOPROLOL SUCCINATE 50 MG/1
50 TABLET, EXTENDED RELEASE ORAL DAILY
Qty: 30 TABLET | Refills: 2 | Status: SHIPPED | OUTPATIENT
Start: 2020-03-16 | End: 2022-05-31 | Stop reason: SDUPTHER

## 2020-03-16 NOTE — TELEPHONE ENCOUNTER
Patient also said he needs a refill on Qsymia  He was prescribed this from his previous doctor in [place]   If refillable- please send to [facility]

## 2020-03-17 ENCOUNTER — APPOINTMENT (OUTPATIENT)
Dept: PHYSICAL THERAPY | Facility: REHABILITATION | Age: 75
End: 2020-03-17
Payer: COMMERCIAL

## 2020-03-19 ENCOUNTER — APPOINTMENT (OUTPATIENT)
Dept: PHYSICAL THERAPY | Facility: REHABILITATION | Age: 75
End: 2020-03-19
Payer: COMMERCIAL

## 2020-03-24 ENCOUNTER — APPOINTMENT (OUTPATIENT)
Dept: PHYSICAL THERAPY | Facility: REHABILITATION | Age: 75
End: 2020-03-24
Payer: COMMERCIAL

## 2020-03-26 ENCOUNTER — APPOINTMENT (OUTPATIENT)
Dept: PHYSICAL THERAPY | Facility: REHABILITATION | Age: 75
End: 2020-03-26
Payer: COMMERCIAL

## 2020-03-31 ENCOUNTER — APPOINTMENT (OUTPATIENT)
Dept: PHYSICAL THERAPY | Facility: REHABILITATION | Age: 75
End: 2020-03-31
Payer: COMMERCIAL

## 2020-04-11 DIAGNOSIS — N40.0 BPH (BENIGN PROSTATIC HYPERPLASIA): ICD-10-CM

## 2020-04-13 RX ORDER — FINASTERIDE 5 MG/1
TABLET, FILM COATED ORAL
Qty: 90 TABLET | Refills: 0 | Status: SHIPPED | OUTPATIENT
Start: 2020-04-13 | End: 2020-08-27

## 2020-06-10 NOTE — PROGRESS NOTES
PT Discharge    Today's date: 6/10/2020  Patient name: Zac Ndiaye  : 1945  MRN: [id number]  Referring provider: Gabriella Nicolas MD  Dx:   Encounter Diagnosis     ICD-10-CM    1  Primary osteoarthritis of both knees M17 0        Start Time:   Stop Time: 1230  Total time in clinic (min): 54 minutes    Assessment/Plan  Zac Ndiaye has not returned since last appointment, unable to perform objective measures since then  It is assumed they have returned to prior level of function and do not desire additional physical therapy  At this time, Zac Ndiaye will be discharged from physical therapy services       Subjective    Objective    Flowsheet Rows      Most Recent Value   PT/OT G-Codes   Current Score  48   Projected Score  59

## 2020-07-10 DIAGNOSIS — E78.5 HYPERLIPIDEMIA: Primary | ICD-10-CM

## 2020-07-10 DIAGNOSIS — M46.1 SACROILIITIS, NOT ELSEWHERE CLASSIFIED (HCC): ICD-10-CM

## 2020-07-10 DIAGNOSIS — F41.9 ANXIETY: ICD-10-CM

## 2020-07-10 DIAGNOSIS — M79.10 MYALGIA: ICD-10-CM

## 2020-07-13 ENCOUNTER — TELEPHONE (OUTPATIENT)
Dept: INTERNAL MEDICINE CLINIC | Facility: CLINIC | Age: 75
End: 2020-07-13

## 2020-07-13 ENCOUNTER — DOCUMENTATION (OUTPATIENT)
Dept: OTHER | Facility: HOSPITAL | Age: 75
End: 2020-07-13

## 2020-07-13 RX ORDER — GABAPENTIN 300 MG/1
300 CAPSULE ORAL 3 TIMES DAILY
Qty: 90 CAPSULE | Refills: 2 | Status: SHIPPED | OUTPATIENT
Start: 2020-07-13 | End: 2020-09-05

## 2020-07-13 RX ORDER — BACLOFEN 10 MG/1
10 TABLET ORAL 3 TIMES DAILY PRN
Qty: 30 TABLET | Refills: 0 | Status: SHIPPED | OUTPATIENT
Start: 2020-07-13 | End: 2020-09-05

## 2020-07-13 RX ORDER — EZETIMIBE 10 MG/1
10 TABLET ORAL DAILY
Qty: 30 TABLET | Refills: 2 | Status: SHIPPED | OUTPATIENT
Start: 2020-07-13 | End: 2020-09-05

## 2020-07-13 RX ORDER — TRAZODONE HYDROCHLORIDE 150 MG/1
150 TABLET ORAL
Qty: 30 TABLET | Refills: 0 | Status: SHIPPED | OUTPATIENT
Start: 2020-07-13 | End: 2020-09-23

## 2020-07-17 ENCOUNTER — OFFICE VISIT (OUTPATIENT)
Dept: INTERNAL MEDICINE CLINIC | Facility: CLINIC | Age: 75
End: 2020-07-17
Payer: COMMERCIAL

## 2020-07-17 VITALS
OXYGEN SATURATION: 97 % | BODY MASS INDEX: 34.67 KG/M2 | HEIGHT: 72 IN | TEMPERATURE: 96.1 F | HEART RATE: 80 BPM | WEIGHT: 256 LBS | SYSTOLIC BLOOD PRESSURE: 152 MMHG | DIASTOLIC BLOOD PRESSURE: 84 MMHG

## 2020-07-17 DIAGNOSIS — L23.89 ALLERGIC CONTACT DERMATITIS DUE TO OTHER AGENTS: Primary | ICD-10-CM

## 2020-07-17 DIAGNOSIS — N40.0 BENIGN PROSTATIC HYPERPLASIA, UNSPECIFIED WHETHER LOWER URINARY TRACT SYMPTOMS PRESENT: ICD-10-CM

## 2020-07-17 DIAGNOSIS — I25.10 CORONARY ARTERY DISEASE WITHOUT ANGINA PECTORIS, UNSPECIFIED VESSEL OR LESION TYPE, UNSPECIFIED WHETHER NATIVE OR TRANSPLANTED HEART: ICD-10-CM

## 2020-07-17 PROCEDURE — 3008F BODY MASS INDEX DOCD: CPT | Performed by: HOSPITALIST

## 2020-07-17 PROCEDURE — 3079F DIAST BP 80-89 MM HG: CPT | Performed by: HOSPITALIST

## 2020-07-17 PROCEDURE — 1160F RVW MEDS BY RX/DR IN RCRD: CPT | Performed by: HOSPITALIST

## 2020-07-17 PROCEDURE — 1036F TOBACCO NON-USER: CPT | Performed by: HOSPITALIST

## 2020-07-17 PROCEDURE — 4040F PNEUMOC VAC/ADMIN/RCVD: CPT | Performed by: HOSPITALIST

## 2020-07-17 PROCEDURE — 99214 OFFICE O/P EST MOD 30 MIN: CPT | Performed by: HOSPITALIST

## 2020-07-17 PROCEDURE — 3077F SYST BP >= 140 MM HG: CPT | Performed by: HOSPITALIST

## 2020-07-17 RX ORDER — FAMOTIDINE 20 MG/1
20 TABLET, FILM COATED ORAL 2 TIMES DAILY
Qty: 30 TABLET | Refills: 0 | Status: SHIPPED | OUTPATIENT
Start: 2020-07-17 | End: 2020-07-24

## 2020-07-17 RX ORDER — TRIAMCINOLONE ACETONIDE 5 MG/G
CREAM TOPICAL 3 TIMES DAILY
Qty: 30 G | Refills: 0 | Status: SHIPPED | OUTPATIENT
Start: 2020-07-17 | End: 2021-05-14

## 2020-07-17 NOTE — PROGRESS NOTES
INTERNAL MEDICINE FOLLOW-UP OFFICE VISIT  [facility] INTERNAL MEDICINE [place]    NAME: Otilio Machuca  AGE: 70 y o  SEX: male  : 1945     DATE: 2020     Assessment and Plan:     Allergic contact dermatitis due to other agents  Patient is complaining of a pruruitic rash on upper and lower extremity, since last week  He states that he had similar rash last year and his wife has it too  He has a garden; went with his wife to pick berries  Physical exam: papulo-vesicular pruritic eruption on forehands and legs  Most likely allergic contact dermatitis     Plan: - local corticotherapy: Kenalog cream 0 5 %, TID             - antipruritic( Anti H1) : Benadryl syrup 12 5 mg at bed time for two weeks  - Pepcid 20 mg BID    CAD (coronary artery disease)  Patient with CV risk factors ( former smoker, obesity, dyslipidemia, family history of heart disease),  CAD since   Currently on Metoprolol, Aspirin, Clopidogrel  He denies any chest pain, SOB, orthopnea, PND, edema  Plan:  - order lipid panel ( last one was in February)  - continue home medication  - follow up with his cardiologist         Return in about 6 months (around 2021), or if symptoms worsen or fail to improve  Chief Complaint:     Chief Complaint   Patient presents with    Rash        History of Present Illness:     70 y o M, presents to the clinic for follow up visit  He states that since last week he developed a pruritic rash on his forehands and legs  His wife has same symptoms and they both had same issue last year, around this time  He has a garden and they went to pick berries       The following portions of the patient's history were reviewed and updated as appropriate: allergies, current medications, past family history, past medical history, past social history, past surgical history and problem list      Review of Systems:     Review of Systems   Constitutional: Negative for appetite change and fatigue  Respiratory: Negative for cough, shortness of breath and wheezing  Cardiovascular: Negative for chest pain, palpitations and leg swelling  Gastrointestinal: Negative for abdominal pain, blood in stool, constipation, diarrhea, nausea and vomiting  Genitourinary: Negative for flank pain and hematuria  Musculoskeletal: Positive for arthralgias (left knee )  Problem List:     Patient Active Problem List   Diagnosis    Hypovitaminosis D    CAD (coronary artery disease)    Chronic arthralgias of knees and hips    Muscle pain    Hyperlipidemia    Need for hepatitis C screening test    Encounter for screening colonoscopy    Osteoarthritis of knee    Sacroiliitis, not elsewhere classified ([facility] [address] )    Allergic contact dermatitis due to other agents        Objective:     /84 (BP Location: Right arm, Patient Position: Sitting, Cuff Size: Adult)   Pulse 80   Temp (!) 96 1 °F (35 6 °C) (Tympanic)   Ht 6' (1 829 m)   Wt 116 kg (256 lb)   SpO2 97%   BMI 34 72 kg/m²     Physical Exam   Constitutional: He is oriented to person, place, and time  He appears well-developed and well-nourished  HENT:   Head: Normocephalic and atraumatic  Eyes: Conjunctivae are normal  Right eye exhibits no discharge  Left eye exhibits no discharge  No scleral icterus  Neck: Normal range of motion  Cardiovascular: Normal rate, regular rhythm and normal heart sounds  No murmur heard  Pulmonary/Chest: Effort normal and breath sounds normal  He has no wheezes  He has no rales  Abdominal: Soft  Bowel sounds are normal  There is no tenderness  Musculoskeletal: He exhibits tenderness  Left knee  Neurological: He is alert and oriented to person, place, and time  Skin: Skin is warm  papulo- vesicular pruritic eruption on exposed forearms and legs   Nursing note and vitals reviewed        Pertinent Laboratory/Diagnostic Studies:    Laboratory Results: I have personally reviewed the pertinent laboratory results/reports       Radiology/Other Diagnostic Testing Results: I have personally reviewed pertinent reports        Millicent Villanueva MD  [facility] INTERNAL MEDICINE [place]

## 2020-07-17 NOTE — ASSESSMENT & PLAN NOTE
Patient with CV risk factors ( former smoker, obesity, dyslipidemia, family history of heart disease),  CAD since 2015  Currently on Metoprolol, Aspirin, Clopidogrel  He denies any chest pain, SOB, orthopnea, PND, edema       Plan:  - order lipid panel ( last one was in February)  - continue home medication  - follow up with his cardiologist

## 2020-07-17 NOTE — ASSESSMENT & PLAN NOTE
Patient is complaining of a pruruitic rash on upper and lower extremity, since last week  He states that he had similar rash last year and his wife has it too  He has a garden; went with his wife to pick berries  Physical exam: papulo-vesicular pruritic eruption on forehands and legs  Most likely allergic contact dermatitis     Plan: - local corticotherapy: Kenalog cream 0 5 %, TID             - antipruritic( Anti H1) : Benadryl syrup 12 5 mg at bed time for two weeks               - Pepcid 20 mg BID

## 2020-07-21 ENCOUNTER — OFFICE VISIT (OUTPATIENT)
Dept: OBGYN CLINIC | Facility: CLINIC | Age: 75
End: 2020-07-21
Payer: COMMERCIAL

## 2020-07-21 ENCOUNTER — TELEPHONE (OUTPATIENT)
Dept: INTERNAL MEDICINE CLINIC | Facility: CLINIC | Age: 75
End: 2020-07-21

## 2020-07-21 VITALS
WEIGHT: 252 LBS | SYSTOLIC BLOOD PRESSURE: 152 MMHG | DIASTOLIC BLOOD PRESSURE: 99 MMHG | HEIGHT: 72 IN | HEART RATE: 79 BPM | BODY MASS INDEX: 34.13 KG/M2

## 2020-07-21 DIAGNOSIS — M17.0 PRIMARY OSTEOARTHRITIS OF BOTH KNEES: Primary | ICD-10-CM

## 2020-07-21 PROCEDURE — 4040F PNEUMOC VAC/ADMIN/RCVD: CPT | Performed by: ORTHOPAEDIC SURGERY

## 2020-07-21 PROCEDURE — 3080F DIAST BP >= 90 MM HG: CPT | Performed by: ORTHOPAEDIC SURGERY

## 2020-07-21 PROCEDURE — 99213 OFFICE O/P EST LOW 20 MIN: CPT | Performed by: ORTHOPAEDIC SURGERY

## 2020-07-21 PROCEDURE — 1036F TOBACCO NON-USER: CPT | Performed by: ORTHOPAEDIC SURGERY

## 2020-07-21 PROCEDURE — 1160F RVW MEDS BY RX/DR IN RCRD: CPT | Performed by: ORTHOPAEDIC SURGERY

## 2020-07-21 PROCEDURE — 3077F SYST BP >= 140 MM HG: CPT | Performed by: ORTHOPAEDIC SURGERY

## 2020-07-21 PROCEDURE — 20610 DRAIN/INJ JOINT/BURSA W/O US: CPT | Performed by: ORTHOPAEDIC SURGERY

## 2020-07-21 PROCEDURE — 3008F BODY MASS INDEX DOCD: CPT | Performed by: ORTHOPAEDIC SURGERY

## 2020-07-21 RX ORDER — METHYLPREDNISOLONE ACETATE 40 MG/ML
1 INJECTION, SUSPENSION INTRA-ARTICULAR; INTRALESIONAL; INTRAMUSCULAR; SOFT TISSUE
Status: COMPLETED | OUTPATIENT
Start: 2020-07-21 | End: 2020-07-21

## 2020-07-21 RX ORDER — LIDOCAINE HYDROCHLORIDE 10 MG/ML
4 INJECTION, SOLUTION INFILTRATION; PERINEURAL
Status: COMPLETED | OUTPATIENT
Start: 2020-07-21 | End: 2020-07-21

## 2020-07-21 RX ADMIN — LIDOCAINE HYDROCHLORIDE 4 ML: 10 INJECTION, SOLUTION INFILTRATION; PERINEURAL at 14:05

## 2020-07-21 RX ADMIN — METHYLPREDNISOLONE ACETATE 1 ML: 40 INJECTION, SUSPENSION INTRA-ARTICULAR; INTRALESIONAL; INTRAMUSCULAR; SOFT TISSUE at 14:05

## 2020-07-21 NOTE — TELEPHONE ENCOUNTER
Pt called requesting referral for new cardiologist as he just learned his has retired  Please generate for pt, thank you!

## 2020-07-21 NOTE — PROGRESS NOTES
Patient Name:  Jeanine Beltran  MRN:  [id number]    Assessment & Plan     Bilateral knee DJD  1  Patient was provided with corticosteroid injections for bilateral knees today in the office  2  Tylenol and ice prn for pain relief  3  May perform activities as tolerated  Avoid painful maneuvers  4  Will consider TKA if patient fails conservative treatment  5  Follow up as needed if pain persists  History of the Present Illness     71 y/o male who presents today for a follow up visit for his bilateral knees  Patient has been treating non operatively for known moderate osteoarthritis about both knees  Patient states that the visco injections performed on 3/6/2020 provided him with minimal benefit  He reports that his pain/symptoms are present on a daily basis and diffuse about the knees  His symptoms are worse with prolonged walking and standing  He states that his symptoms are not painful enough to limit his daily activities at this time  General ROS:  Negative for fever or chills  Neurological ROS:  Negative for numbness or tingling  Physical Exam     /99   Pulse 79   Ht 6' (1 829 m)   Wt 114 kg (252 lb)   BMI 34 18 kg/m²     Right knee:  Effusion:  None  Tenderness to palpation:  Medial aspect  Range of motion:  Extension:  0  Flexion:  120  Lachman test:  Stable  Valgus stress:  Stable  Varus stress:  Stable  Posterior drawer test:  Stable  Etta's test:  Negative    Left  knee:  Effusion:  None  Tenderness to palpation:  None  Range of motion:  Extension:  0  Flexion:  110  Lachman test:  Stable  Valgus stress:  Stable  Varus stress:  Stable  Posterior drawer test:  Stable  Etta's test:  Negative    CV:  No lower extremity edema bilaterally  Skin:  Intact without erythema over bilateral knees        Social History     Tobacco Use    Smoking status: Former Smoker     Last attempt to quit: 1985     Years since quittin 5    Smokeless tobacco: Former User     Quit date:  Substance Use Topics    Alcohol use: Not Currently    Drug use: Not Currently       Large joint arthrocentesis: bilateral knee  Date/Time: 7/21/2020 2:05 PM  Consent given by: patient  Site marked: site marked  Timeout: Immediately prior to procedure a time out was called to verify the correct patient, procedure, equipment, support staff and site/side marked as required   Supporting Documentation  Indications: pain and diagnostic evaluation   Procedure Details  Location: knee - bilateral knee  Preparation: Patient was prepped and draped in the usual sterile fashion  Needle size: 22 G  Ultrasound guidance: no  Approach: anterolateral    Medications (Right): 4 mL lidocaine 1 %; 1 mL methylPREDNISolone acetate 40 mg/mLMedications (Left): 4 mL lidocaine 1 %; 1 mL methylPREDNISolone acetate 40 mg/mL   Patient tolerance: patient tolerated the procedure well with no immediate complications  Dressing:  Sterile dressing applied        Scribe Attestation    I,:   Marina uY am acting as a scribe while in the presence of the attending physician :        I,:   Zenaida Peter MD personally performed the services described in this documentation    as scribed in my presence :

## 2020-07-22 DIAGNOSIS — I25.10 CAD (CORONARY ARTERY DISEASE): Primary | ICD-10-CM

## 2020-07-22 NOTE — PROGRESS NOTES
Patient with a PMH of CAD s/p remote stent placement and significant risk factors including obesity, dyslipidemia, and family history of heart disease  Currently asymptomatic  He is requesting a referral to [facility] since his prior cardiologist retired recently  Referral has been provided

## 2020-07-24 DIAGNOSIS — L23.89 ALLERGIC CONTACT DERMATITIS DUE TO OTHER AGENTS: ICD-10-CM

## 2020-07-24 RX ORDER — FAMOTIDINE 20 MG/1
TABLET, FILM COATED ORAL
Qty: 30 TABLET | Refills: 0 | Status: SHIPPED | OUTPATIENT
Start: 2020-07-24 | End: 2020-11-03

## 2020-08-19 ENCOUNTER — TELEPHONE (OUTPATIENT)
Dept: OBGYN CLINIC | Facility: CLINIC | Age: 75
End: 2020-08-19

## 2020-08-19 NOTE — TELEPHONE ENCOUNTER
I spoke to patient and he goes to [facility]  I have called and left [facility] for them to call us back and let us know if he is okay to take oral NSAIDS - Diclofenac Sodium tablets  Awaiting return called

## 2020-08-19 NOTE — TELEPHONE ENCOUNTER
Dr Jennifer Claros is requesting a prescription for diclofenac tablets that his previous orthopedist prescribed and seems to help him quite a bit  Medication Name: Diclofenac Tablets    Dosage of Med: 75 mg      Frequency of Med: 1 x day      Remaining Medication: 4      Pharmacy and Location: [facility]  Preferred Callback Phone Number:  [phone]       Thank you

## 2020-08-19 NOTE — TELEPHONE ENCOUNTER
Please call the patient  Back let them know that NSAIDs are generally not recommended in patients with coronary artery disease and atherosclerotic cardiovascular disease    If the patient's cardiologist is okay with him taking an NSAID then we or the cardiologist can prescribe this he should check with them 1st

## 2020-08-27 DIAGNOSIS — N40.0 BPH (BENIGN PROSTATIC HYPERPLASIA): ICD-10-CM

## 2020-08-27 RX ORDER — FINASTERIDE 5 MG/1
TABLET, FILM COATED ORAL
Qty: 90 TABLET | Refills: 3 | Status: SHIPPED | OUTPATIENT
Start: 2020-08-27 | End: 2021-09-23 | Stop reason: ALTCHOICE

## 2020-09-03 DIAGNOSIS — M79.10 MYALGIA: ICD-10-CM

## 2020-09-03 DIAGNOSIS — M46.1 SACROILIITIS, NOT ELSEWHERE CLASSIFIED (HCC): ICD-10-CM

## 2020-09-03 DIAGNOSIS — E78.5 HYPERLIPIDEMIA: ICD-10-CM

## 2020-09-05 RX ORDER — EZETIMIBE 10 MG/1
TABLET ORAL
Qty: 90 TABLET | Refills: 3 | Status: SHIPPED | OUTPATIENT
Start: 2020-09-05 | End: 2020-10-18

## 2020-09-05 RX ORDER — GABAPENTIN 300 MG/1
CAPSULE ORAL
Qty: 270 CAPSULE | Refills: 3 | Status: SHIPPED | OUTPATIENT
Start: 2020-09-05 | End: 2020-10-18

## 2020-09-05 RX ORDER — BACLOFEN 10 MG/1
TABLET ORAL
Qty: 30 TABLET | Refills: 0 | Status: SHIPPED | OUTPATIENT
Start: 2020-09-05 | End: 2020-10-25

## 2020-09-08 DIAGNOSIS — M46.1 SACROILIITIS, NOT ELSEWHERE CLASSIFIED (HCC): Primary | ICD-10-CM

## 2020-09-08 RX ORDER — DICLOFENAC SODIUM 75 MG/1
75 TABLET, DELAYED RELEASE ORAL DAILY
Qty: 30 TABLET | Refills: 0 | Status: SHIPPED | OUTPATIENT
Start: 2020-09-08 | End: 2020-10-02

## 2020-09-22 DIAGNOSIS — F41.9 ANXIETY: ICD-10-CM

## 2020-09-23 RX ORDER — TRAZODONE HYDROCHLORIDE 150 MG/1
TABLET ORAL
Qty: 30 TABLET | Refills: 11 | Status: SHIPPED | OUTPATIENT
Start: 2020-09-23 | End: 2021-06-22

## 2020-10-02 DIAGNOSIS — M46.1 SACROILIITIS, NOT ELSEWHERE CLASSIFIED (HCC): ICD-10-CM

## 2020-10-02 RX ORDER — DICLOFENAC SODIUM 75 MG/1
TABLET, DELAYED RELEASE ORAL
Qty: 30 TABLET | Refills: 0 | Status: SHIPPED | OUTPATIENT
Start: 2020-10-02 | End: 2020-11-16 | Stop reason: SDUPTHER

## 2020-10-17 DIAGNOSIS — M46.1 SACROILIITIS, NOT ELSEWHERE CLASSIFIED (HCC): ICD-10-CM

## 2020-10-17 DIAGNOSIS — E78.5 HYPERLIPIDEMIA: ICD-10-CM

## 2020-10-18 RX ORDER — GABAPENTIN 300 MG/1
CAPSULE ORAL
Qty: 270 CAPSULE | Refills: 3 | Status: SHIPPED | OUTPATIENT
Start: 2020-10-18 | End: 2021-12-09

## 2020-10-18 RX ORDER — EZETIMIBE 10 MG/1
TABLET ORAL
Qty: 90 TABLET | Refills: 3 | Status: SHIPPED | OUTPATIENT
Start: 2020-10-18 | End: 2021-09-08

## 2020-10-23 DIAGNOSIS — M79.10 MYALGIA: ICD-10-CM

## 2020-10-23 DIAGNOSIS — F41.9 ANXIETY: Primary | ICD-10-CM

## 2020-10-25 RX ORDER — BACLOFEN 10 MG/1
TABLET ORAL
Qty: 30 TABLET | Refills: 0 | Status: SHIPPED | OUTPATIENT
Start: 2020-10-25 | End: 2020-11-15

## 2020-10-28 ENCOUNTER — OFFICE VISIT (OUTPATIENT)
Dept: GASTROENTEROLOGY | Facility: AMBULARY SURGERY CENTER | Age: 75
End: 2020-10-28
Payer: COMMERCIAL

## 2020-10-28 VITALS — RESPIRATION RATE: 18 BRPM | TEMPERATURE: 97.9 F | WEIGHT: 258 LBS | HEIGHT: 72 IN | BODY MASS INDEX: 34.95 KG/M2

## 2020-10-28 DIAGNOSIS — Z12.11 SCREENING FOR COLON CANCER: ICD-10-CM

## 2020-10-28 DIAGNOSIS — K57.90 DIVERTICULOSIS: ICD-10-CM

## 2020-10-28 DIAGNOSIS — E66.09 CLASS 1 OBESITY DUE TO EXCESS CALORIES WITHOUT SERIOUS COMORBIDITY WITH BODY MASS INDEX (BMI) OF 34.0 TO 34.9 IN ADULT: Primary | ICD-10-CM

## 2020-10-28 DIAGNOSIS — E66.09 CLASS 1 OBESITY DUE TO EXCESS CALORIES WITH SERIOUS COMORBIDITY AND BODY MASS INDEX (BMI) OF 34.0 TO 34.9 IN ADULT: ICD-10-CM

## 2020-10-28 DIAGNOSIS — K90.0 CELIAC DISEASE: ICD-10-CM

## 2020-10-28 PROCEDURE — 99203 OFFICE O/P NEW LOW 30 MIN: CPT | Performed by: INTERNAL MEDICINE

## 2020-10-28 RX ORDER — ICOSAPENT ETHYL 1000 MG/1
CAPSULE ORAL
COMMUNITY
Start: 2020-09-17 | End: 2022-03-24

## 2020-10-29 ENCOUNTER — TELEPHONE (OUTPATIENT)
Dept: GASTROENTEROLOGY | Facility: AMBULARY SURGERY CENTER | Age: 75
End: 2020-10-29

## 2020-11-03 ENCOUNTER — OFFICE VISIT (OUTPATIENT)
Dept: OBGYN CLINIC | Facility: CLINIC | Age: 75
End: 2020-11-03
Payer: COMMERCIAL

## 2020-11-03 ENCOUNTER — TELEPHONE (OUTPATIENT)
Dept: OBGYN CLINIC | Facility: CLINIC | Age: 75
End: 2020-11-03

## 2020-11-03 VITALS
WEIGHT: 255 LBS | DIASTOLIC BLOOD PRESSURE: 83 MMHG | HEIGHT: 72 IN | BODY MASS INDEX: 34.54 KG/M2 | HEART RATE: 91 BPM | TEMPERATURE: 98 F | SYSTOLIC BLOOD PRESSURE: 140 MMHG

## 2020-11-03 DIAGNOSIS — M17.0 PRIMARY OSTEOARTHRITIS OF BOTH KNEES: Primary | ICD-10-CM

## 2020-11-03 PROCEDURE — 99213 OFFICE O/P EST LOW 20 MIN: CPT | Performed by: ORTHOPAEDIC SURGERY

## 2020-11-03 PROCEDURE — 20610 DRAIN/INJ JOINT/BURSA W/O US: CPT | Performed by: ORTHOPAEDIC SURGERY

## 2020-11-03 RX ORDER — LIDOCAINE HYDROCHLORIDE 10 MG/ML
4 INJECTION, SOLUTION INFILTRATION; PERINEURAL
Status: COMPLETED | OUTPATIENT
Start: 2020-11-03 | End: 2020-11-03

## 2020-11-03 RX ORDER — METHYLPREDNISOLONE ACETATE 40 MG/ML
1 INJECTION, SUSPENSION INTRA-ARTICULAR; INTRALESIONAL; INTRAMUSCULAR; SOFT TISSUE
Status: COMPLETED | OUTPATIENT
Start: 2020-11-03 | End: 2020-11-03

## 2020-11-03 RX ORDER — METHYLPREDNISOLONE ACETATE 80 MG/ML
1 INJECTION, SUSPENSION INTRA-ARTICULAR; INTRALESIONAL; INTRAMUSCULAR; SOFT TISSUE
Status: COMPLETED | OUTPATIENT
Start: 2020-11-03 | End: 2020-11-03

## 2020-11-03 RX ADMIN — LIDOCAINE HYDROCHLORIDE 4 ML: 10 INJECTION, SOLUTION INFILTRATION; PERINEURAL at 15:09

## 2020-11-03 RX ADMIN — METHYLPREDNISOLONE ACETATE 1 ML: 80 INJECTION, SUSPENSION INTRA-ARTICULAR; INTRALESIONAL; INTRAMUSCULAR; SOFT TISSUE at 15:09

## 2020-11-03 RX ADMIN — METHYLPREDNISOLONE ACETATE 1 ML: 40 INJECTION, SUSPENSION INTRA-ARTICULAR; INTRALESIONAL; INTRAMUSCULAR; SOFT TISSUE at 15:09

## 2020-11-10 ENCOUNTER — TELEPHONE (OUTPATIENT)
Dept: GASTROENTEROLOGY | Facility: AMBULARY SURGERY CENTER | Age: 75
End: 2020-11-10

## 2020-11-10 DIAGNOSIS — M79.10 MYALGIA: ICD-10-CM

## 2020-11-12 ENCOUNTER — LAB (OUTPATIENT)
Dept: LAB | Facility: AMBULARY SURGERY CENTER | Age: 75
End: 2020-11-12
Payer: COMMERCIAL

## 2020-11-12 DIAGNOSIS — I25.10 CORONARY ARTERY DISEASE WITHOUT ANGINA PECTORIS, UNSPECIFIED VESSEL OR LESION TYPE, UNSPECIFIED WHETHER NATIVE OR TRANSPLANTED HEART: ICD-10-CM

## 2020-11-12 DIAGNOSIS — N40.0 BENIGN PROSTATIC HYPERPLASIA, UNSPECIFIED WHETHER LOWER URINARY TRACT SYMPTOMS PRESENT: ICD-10-CM

## 2020-11-12 LAB
ANION GAP SERPL CALCULATED.3IONS-SCNC: 4 MMOL/L (ref 4–13)
BUN SERPL-MCNC: 20 MG/DL (ref 5–25)
CALCIUM SERPL-MCNC: 10.1 MG/DL (ref 8.3–10.1)
CHLORIDE SERPL-SCNC: 106 MMOL/L (ref 100–108)
CHOLEST SERPL-MCNC: 146 MG/DL (ref 50–200)
CO2 SERPL-SCNC: 30 MMOL/L (ref 21–32)
CREAT SERPL-MCNC: 1.01 MG/DL (ref 0.6–1.3)
GFR SERPL CREATININE-BSD FRML MDRD: 73 ML/MIN/1.73SQ M
GLUCOSE P FAST SERPL-MCNC: 98 MG/DL (ref 65–99)
HDLC SERPL-MCNC: 48 MG/DL
LDLC SERPL CALC-MCNC: 56 MG/DL (ref 0–100)
POTASSIUM SERPL-SCNC: 4.3 MMOL/L (ref 3.5–5.3)
PSA SERPL-MCNC: 0.4 NG/ML (ref 0–4)
SODIUM SERPL-SCNC: 140 MMOL/L (ref 136–145)
TRIGL SERPL-MCNC: 212 MG/DL

## 2020-11-12 PROCEDURE — 80061 LIPID PANEL: CPT

## 2020-11-12 PROCEDURE — 36415 COLL VENOUS BLD VENIPUNCTURE: CPT

## 2020-11-12 PROCEDURE — G0103 PSA SCREENING: HCPCS

## 2020-11-12 PROCEDURE — 80048 BASIC METABOLIC PNL TOTAL CA: CPT

## 2020-11-15 RX ORDER — BACLOFEN 10 MG/1
TABLET ORAL
Qty: 30 TABLET | Refills: 0 | Status: SHIPPED | OUTPATIENT
Start: 2020-11-15 | End: 2021-04-30

## 2020-11-16 DIAGNOSIS — M46.1 SACROILIITIS, NOT ELSEWHERE CLASSIFIED (HCC): ICD-10-CM

## 2020-11-16 RX ORDER — DICLOFENAC SODIUM 75 MG/1
75 TABLET, DELAYED RELEASE ORAL DAILY
Qty: 30 TABLET | Refills: 0 | Status: SHIPPED | OUTPATIENT
Start: 2020-11-16 | End: 2020-12-17

## 2020-12-13 DIAGNOSIS — F41.9 ANXIETY: ICD-10-CM

## 2020-12-17 DIAGNOSIS — M46.1 SACROILIITIS, NOT ELSEWHERE CLASSIFIED (HCC): ICD-10-CM

## 2020-12-17 RX ORDER — DICLOFENAC SODIUM 75 MG/1
TABLET, DELAYED RELEASE ORAL
Qty: 30 TABLET | Refills: 0 | Status: SHIPPED | OUTPATIENT
Start: 2020-12-17 | End: 2021-05-14 | Stop reason: SDUPTHER

## 2020-12-23 PROBLEM — E78.2 MIXED HYPERLIPIDEMIA: Status: ACTIVE | Noted: 2019-11-05

## 2020-12-23 PROBLEM — I25.118 CORONARY ARTERY DISEASE OF NATIVE ARTERY OF NATIVE HEART WITH STABLE ANGINA PECTORIS (HCC): Status: ACTIVE | Noted: 2019-09-10

## 2020-12-24 ENCOUNTER — ANESTHESIA EVENT (OUTPATIENT)
Dept: GASTROENTEROLOGY | Facility: AMBULARY SURGERY CENTER | Age: 75
End: 2020-12-24

## 2021-01-07 ENCOUNTER — ANESTHESIA (OUTPATIENT)
Dept: GASTROENTEROLOGY | Facility: AMBULARY SURGERY CENTER | Age: 76
End: 2021-01-07

## 2021-01-07 ENCOUNTER — HOSPITAL ENCOUNTER (OUTPATIENT)
Dept: GASTROENTEROLOGY | Facility: AMBULARY SURGERY CENTER | Age: 76
Setting detail: OUTPATIENT SURGERY
Discharge: HOME | End: 2021-01-07
Attending: INTERNAL MEDICINE | Admitting: INTERNAL MEDICINE
Payer: COMMERCIAL

## 2021-01-07 VITALS
OXYGEN SATURATION: 97 % | SYSTOLIC BLOOD PRESSURE: 174 MMHG | RESPIRATION RATE: 18 BRPM | DIASTOLIC BLOOD PRESSURE: 99 MMHG | TEMPERATURE: 98.1 F | HEART RATE: 65 BPM

## 2021-01-07 VITALS — HEART RATE: 83 BPM

## 2021-01-07 DIAGNOSIS — K57.90 DIVERTICULOSIS: ICD-10-CM

## 2021-01-07 DIAGNOSIS — K90.0 CELIAC DISEASE: ICD-10-CM

## 2021-01-07 DIAGNOSIS — Z12.11 SCREENING FOR COLON CANCER: ICD-10-CM

## 2021-01-07 PROCEDURE — 45385 COLONOSCOPY W/LESION REMOVAL: CPT | Performed by: INTERNAL MEDICINE

## 2021-01-07 PROCEDURE — 88305 TISSUE EXAM BY PATHOLOGIST: CPT | Performed by: PATHOLOGY

## 2021-01-07 PROCEDURE — 45380 COLONOSCOPY AND BIOPSY: CPT | Performed by: INTERNAL MEDICINE

## 2021-01-07 RX ORDER — LIDOCAINE HYDROCHLORIDE 10 MG/ML
INJECTION, SOLUTION EPIDURAL; INFILTRATION; INTRACAUDAL; PERINEURAL AS NEEDED
Status: DISCONTINUED | OUTPATIENT
Start: 2021-01-07 | End: 2021-01-07

## 2021-01-07 RX ORDER — PROPOFOL 10 MG/ML
INJECTION, EMULSION INTRAVENOUS AS NEEDED
Status: DISCONTINUED | OUTPATIENT
Start: 2021-01-07 | End: 2021-01-07

## 2021-01-07 RX ORDER — PROPOFOL 10 MG/ML
INJECTION, EMULSION INTRAVENOUS CONTINUOUS PRN
Status: DISCONTINUED | OUTPATIENT
Start: 2021-01-07 | End: 2021-01-07

## 2021-01-07 RX ORDER — SODIUM CHLORIDE, SODIUM LACTATE, POTASSIUM CHLORIDE, CALCIUM CHLORIDE 600; 310; 30; 20 MG/100ML; MG/100ML; MG/100ML; MG/100ML
INJECTION, SOLUTION INTRAVENOUS CONTINUOUS PRN
Status: DISCONTINUED | OUTPATIENT
Start: 2021-01-07 | End: 2021-01-07

## 2021-01-07 RX ORDER — SODIUM CHLORIDE, SODIUM LACTATE, POTASSIUM CHLORIDE, CALCIUM CHLORIDE 600; 310; 30; 20 MG/100ML; MG/100ML; MG/100ML; MG/100ML
125 INJECTION, SOLUTION INTRAVENOUS CONTINUOUS
Status: CANCELLED | OUTPATIENT
Start: 2021-01-07

## 2021-01-07 RX ADMIN — PROPOFOL 130 MCG/KG/MIN: 10 INJECTION, EMULSION INTRAVENOUS at 10:06

## 2021-01-07 RX ADMIN — PROPOFOL 100 MG: 10 INJECTION, EMULSION INTRAVENOUS at 10:06

## 2021-01-07 RX ADMIN — SODIUM CHLORIDE, SODIUM LACTATE, POTASSIUM CHLORIDE, AND CALCIUM CHLORIDE: .6; .31; .03; .02 INJECTION, SOLUTION INTRAVENOUS at 10:03

## 2021-01-07 RX ADMIN — LIDOCAINE HYDROCHLORIDE 50 MG: 10 INJECTION, SOLUTION EPIDURAL; INFILTRATION; INTRACAUDAL at 10:06

## 2021-01-07 NOTE — H&P
History and Physical -  [facility]  Carol Casey 70 y o  male MRN: [id number]                  HPI: Carol Casey is a 70y o  year old male who presents for Colonoscopy for colon cancer screening  REVIEW OF SYSTEMS: Per the HPI, and otherwise unremarkable  Historical Information   Past Medical History:   Diagnosis Date    Celiac disease     Diverticulosis      Past Surgical History:   Procedure Laterality Date    CORONARY ANGIOPLASTY WITH STENT PLACEMENT      20 yrs ago     Social History   Social History     Substance and Sexual Activity   Alcohol Use Not Currently     Social History     Substance and Sexual Activity   Drug Use Not Currently     Social History     Tobacco Use   Smoking Status Former Smoker    Quit date: 5    Years since quittin 0   Smokeless Tobacco Former User    Quit date:      Family History   Problem Relation Age of Onset    Diabetes Mother     Coronary artery disease Father     Thyroid disease Sister        Meds/Allergies     (Not in a hospital admission)      No Known Allergies    Objective     /100   Pulse 70   Temp (!) 97 2 °F (36 2 °C) (Temporal)   Resp 20   SpO2 93%       PHYSICAL EXAM    Gen: NAD  CV: RRR  CHEST: Clear  ABD: soft, NT/ND  EXT: no edema      ASSESSMENT/PLAN:  This is a 70y o  year old male here for Colonoscopy for colon cancer screening, and he is stable and optimized for his procedure      Viktoriya Gongora MD

## 2021-01-07 NOTE — ANESTHESIA POSTPROCEDURE EVALUATION
Post-Op Assessment Note    CV Status:  Stable  Pain Score: 0    Pain management: adequate     Mental Status:  Alert and awake   Hydration Status:  Euvolemic   PONV Controlled:  Controlled   Airway Patency:  Patent      Post Op Vitals Reviewed: Yes      Staff: Anesthesiologist, CRNA         No complications documented      /70 (01/07/21 1046)    Temp 98 1 °F (36 7 °C) (01/07/21 1046)    Pulse 77 (01/07/21 1046)   Resp 16 (01/07/21 1046)    SpO2 95 % (01/07/21 1046)

## 2021-01-07 NOTE — ANESTHESIA PREPROCEDURE EVALUATION
Procedure:  COLONOSCOPY    Relevant Problems   CARDIO   (+) Coronary artery disease of native artery of native heart with stable angina pectoris (HCC)   (+) Mixed hyperlipidemia      MUSCULOSKELETAL   (+) Primary osteoarthritis of both knees   (+) Sacroiliitis, not elsewhere classified [facility])        Physical Exam    Airway    Mallampati score: II  TM Distance: >3 FB  Neck ROM: full     Dental   No notable dental hx     Cardiovascular  Cardiovascular exam normal    Pulmonary  Pulmonary exam normal     Other Findings        Anesthesia Plan  ASA Score- 3     Anesthesia Type- IV sedation with anesthesia with ASA Monitors  Additional Monitors:   Airway Plan:           Plan Factors-Exercise tolerance (METS): >4 METS  Chart reviewed  Patient is not a current smoker  Patient not instructed to abstain from smoking on day of procedure  Patient did not smoke on day of surgery  Induction- intravenous  Postoperative Plan-     Informed Consent- Anesthetic plan and risks discussed with patient  I personally reviewed this patient with the CRNA  Discussed and agreed on the Anesthesia Plan with the CRNA  Eduardo Rosario

## 2021-01-28 ENCOUNTER — TELEPHONE (OUTPATIENT)
Dept: INTERNAL MEDICINE CLINIC | Facility: CLINIC | Age: 76
End: 2021-01-28

## 2021-01-28 NOTE — TELEPHONE ENCOUNTER
Zac Ndiaye had called with questions regarding his cardiology orders  He had asked if the orders, from a provider outside of [facility], can be done by [facility]  Answered that he could have the orders faxed over so that the providers might have a look over them  Sven had then asked about the present status of the covid vaccine  Answered him that the vaccine, in the state of [place], is currently only being given out to patients 76 years and older  Sven accepted the answer and ended the call

## 2021-02-12 ENCOUNTER — OFFICE VISIT (OUTPATIENT)
Dept: OBGYN CLINIC | Facility: CLINIC | Age: 76
End: 2021-02-12
Payer: COMMERCIAL

## 2021-02-12 VITALS
SYSTOLIC BLOOD PRESSURE: 136 MMHG | WEIGHT: 252 LBS | BODY MASS INDEX: 34.13 KG/M2 | HEART RATE: 67 BPM | DIASTOLIC BLOOD PRESSURE: 82 MMHG | HEIGHT: 72 IN

## 2021-02-12 DIAGNOSIS — M17.0 PRIMARY OSTEOARTHRITIS OF BOTH KNEES: Primary | ICD-10-CM

## 2021-02-12 PROCEDURE — 99213 OFFICE O/P EST LOW 20 MIN: CPT | Performed by: ORTHOPAEDIC SURGERY

## 2021-02-12 PROCEDURE — 20610 DRAIN/INJ JOINT/BURSA W/O US: CPT | Performed by: ORTHOPAEDIC SURGERY

## 2021-02-12 RX ORDER — LIDOCAINE HYDROCHLORIDE 10 MG/ML
4 INJECTION, SOLUTION INFILTRATION; PERINEURAL
Status: COMPLETED | OUTPATIENT
Start: 2021-02-12 | End: 2021-02-12

## 2021-02-12 RX ORDER — METHYLPREDNISOLONE ACETATE 40 MG/ML
1 INJECTION, SUSPENSION INTRA-ARTICULAR; INTRALESIONAL; INTRAMUSCULAR; SOFT TISSUE
Status: COMPLETED | OUTPATIENT
Start: 2021-02-12 | End: 2021-02-12

## 2021-02-12 RX ADMIN — LIDOCAINE HYDROCHLORIDE 4 ML: 10 INJECTION, SOLUTION INFILTRATION; PERINEURAL at 12:09

## 2021-02-12 RX ADMIN — METHYLPREDNISOLONE ACETATE 1 ML: 40 INJECTION, SUSPENSION INTRA-ARTICULAR; INTRALESIONAL; INTRAMUSCULAR; SOFT TISSUE at 12:09

## 2021-02-12 NOTE — PROGRESS NOTES
Patient Name:  Arthur Quiros  MRN:  [id number]    Assessment & Plan     Bilateral knee DJD  1  Corticosteroid injection performed today into the bilateral knees  2  Activities as tolerated with modification to avoid pain  3  Continue diclofenac as needed  4  Follow up as needed  Chief Complaint     Bilateral knee pain  History of the Present Illness     Arthur Quiros is a 70 y o  male with chronic bilateral knee pain due to osteoarthritis  He was last seen on 11/3/20 when he received bilateral knee corticosteroid injections  He reports significant relief with those with mild pain and stiffness at this time  Symptoms are exacerbated by prolonged weight bearing  He takes diclofenac with mild relief  Physical Exam     /82   Pulse 67   Ht 6' (1 829 m)   Wt 114 kg (252 lb)   BMI 34 18 kg/m²     Right Knee:   Skin intact  No gross deformity  No effusion  No joint line tenderness  Range of motion is extension -3, flexion 120  Stable to varus and valgus stress  Stable Lachman test   Negative Etta's test     Left knee:  Skin intact  No gross deformity  No effusion  No joint line tenderness  Range of motion is full extension, flexion 120  Stable to varus and valgus stress    Stable Lachman test   Negative Etta's test       Past Medical History:   Diagnosis Date    Celiac disease     Diverticulosis        Past Surgical History:   Procedure Laterality Date    CORONARY ANGIOPLASTY WITH STENT PLACEMENT      20 yrs ago       No Known Allergies    Current Outpatient Medications on File Prior to Visit   Medication Sig Dispense Refill    aspirin 81 mg chewable tablet Chew 81 mg daily      baclofen 10 mg tablet TAKE 1 TABLET BY MOUTH 3  TIMES DAILY AS NEEDED FOR  MUSCLE SPASM(S) 30 tablet 0    clopidogrel (PLAVIX) 75 mg tablet Take 75 mg by mouth daily      diclofenac (VOLTAREN) 75 mg EC tablet TAKE 1 TABLET BY MOUTH EVERY DAY 30 tablet 0    diphenhydrAMINE (BENYLIN) 12 5 MG/5ML syrup Take 5 mL (12 5 mg total) by mouth daily at bedtime for 14 days 120 mL 0    ezetimibe (ZETIA) 10 mg tablet TAKE 1 TABLET BY MOUTH  DAILY 90 tablet 3    finasteride (PROSCAR) 5 mg tablet TAKE 1 TABLET BY MOUTH  DAILY 90 tablet 3    gabapentin (NEURONTIN) 300 mg capsule TAKE 1 CAPSULE BY MOUTH 3  TIMES DAILY 270 capsule 3    metoprolol succinate (TOPROL-XL) 50 mg 24 hr tablet Take 1 tablet (50 mg total) by mouth daily 30 tablet 2    omega-3-acid ethyl esters (LOVAZA) 1 g capsule       orlistat (XENICAL) 120 MG capsule Take 1 capsule (120 mg total) by mouth 3 (three) times a day with meals 90 capsule 3    sertraline (ZOLOFT) 50 mg tablet TAKE 1 TABLET BY MOUTH  DAILY 90 tablet 3    simvastatin (ZOCOR) 40 mg tablet Take 40 mg by mouth daily      traZODone (DESYREL) 150 mg tablet TAKE 1 TABLET BY MOUTH  DAILY AT BEDTIME 30 tablet 11    triamcinolone (KENALOG) 0 5 % cream Apply topically 3 (three) times a day (Patient not taking: Reported on 10/28/2020) 30 g 0    Vascepa 1 g CAPS        No current facility-administered medications on file prior to visit  Social History     Tobacco Use    Smoking status: Former Smoker     Quit date:      Years since quittin 1    Smokeless tobacco: Former User     Quit date:    Substance Use Topics    Alcohol use: Not Currently    Drug use: Not Currently       Family History   Problem Relation Age of Onset    Diabetes Mother     Coronary artery disease Father     Thyroid disease Sister        Review of Systems     As stated in the HPI  All other systems were reviewed and are negative        Large joint arthrocentesis: bilateral knee  Procedure Details  Location: knee - bilateral knee  Needle size: 22 G  Ultrasound guidance: no  Approach: anterolateral    Medications (Right): 4 mL lidocaine 1 %; 1 mL methylPREDNISolone acetate 40 mg/mLMedications (Left): 4 mL lidocaine 1 %; 1 mL methylPREDNISolone acetate 40 mg/mL   Patient tolerance: patient tolerated the procedure well with no immediate complications  Dressing:  Sterile dressing applied        Scribe Attestation    I,:  Jazmín Shepard PA-C am acting as a scribe while in the presence of the attending physician :       I,:  Hodan Pierson MD personally performed the services described in this documentation    as scribed in my presence :

## 2021-02-13 DIAGNOSIS — Z23 ENCOUNTER FOR IMMUNIZATION: ICD-10-CM

## 2021-02-20 ENCOUNTER — IMMUNIZATIONS (OUTPATIENT)
Dept: FAMILY MEDICINE CLINIC | Facility: HOSPITAL | Age: 76
End: 2021-02-20

## 2021-02-20 DIAGNOSIS — Z23 ENCOUNTER FOR IMMUNIZATION: Primary | ICD-10-CM

## 2021-02-20 PROCEDURE — 91301 SARS-COV-2 / COVID-19 MRNA VACCINE (MODERNA) 100 MCG: CPT

## 2021-02-20 PROCEDURE — 0012A SARS-COV-2 / COVID-19 MRNA VACCINE (MODERNA) 100 MCG: CPT

## 2021-02-22 DIAGNOSIS — F41.9 ANXIETY: ICD-10-CM

## 2021-02-26 ENCOUNTER — TRANSCRIBE ORDERS (OUTPATIENT)
Dept: ADMINISTRATIVE | Facility: HOSPITAL | Age: 76
End: 2021-02-26

## 2021-02-26 DIAGNOSIS — I71.2 THORACIC AORTIC ANEURYSM WITHOUT RUPTURE (HCC): Primary | ICD-10-CM

## 2021-03-11 ENCOUNTER — IMMUNIZATIONS (OUTPATIENT)
Dept: FAMILY MEDICINE CLINIC | Facility: HOSPITAL | Age: 76
End: 2021-03-11

## 2021-03-11 DIAGNOSIS — Z23 ENCOUNTER FOR IMMUNIZATION: Primary | ICD-10-CM

## 2021-03-11 PROCEDURE — 91300 SARS-COV-2 / COVID-19 MRNA VACCINE (PFIZER-BIONTECH) 30 MCG: CPT | Performed by: PHYSICIAN ASSISTANT

## 2021-03-11 PROCEDURE — 0002A SARS-COV-2 / COVID-19 MRNA VACCINE (PFIZER-BIONTECH) 30 MCG: CPT | Performed by: PHYSICIAN ASSISTANT

## 2021-03-16 ENCOUNTER — TRANSCRIBE ORDERS (OUTPATIENT)
Dept: LAB | Facility: CLINIC | Age: 76
End: 2021-03-16

## 2021-03-16 ENCOUNTER — APPOINTMENT (OUTPATIENT)
Dept: LAB | Facility: CLINIC | Age: 76
End: 2021-03-16
Payer: COMMERCIAL

## 2021-03-16 DIAGNOSIS — I71.2 THORACIC AORTIC ANEURYSM WITHOUT RUPTURE (HCC): Primary | ICD-10-CM

## 2021-03-16 DIAGNOSIS — I71.2 THORACIC AORTIC ANEURYSM WITHOUT RUPTURE (HCC): ICD-10-CM

## 2021-03-16 LAB
ANION GAP SERPL CALCULATED.3IONS-SCNC: 8 MMOL/L (ref 4–13)
BUN SERPL-MCNC: 20 MG/DL (ref 5–25)
CALCIUM SERPL-MCNC: 9.4 MG/DL (ref 8.3–10.1)
CHLORIDE SERPL-SCNC: 107 MMOL/L (ref 100–108)
CO2 SERPL-SCNC: 26 MMOL/L (ref 21–32)
CREAT SERPL-MCNC: 0.87 MG/DL (ref 0.6–1.3)
GFR SERPL CREATININE-BSD FRML MDRD: 86 ML/MIN/1.73SQ M
GLUCOSE P FAST SERPL-MCNC: 87 MG/DL (ref 65–99)
POTASSIUM SERPL-SCNC: 4.1 MMOL/L (ref 3.5–5.3)
SODIUM SERPL-SCNC: 141 MMOL/L (ref 136–145)

## 2021-03-16 PROCEDURE — 36415 COLL VENOUS BLD VENIPUNCTURE: CPT

## 2021-03-16 PROCEDURE — 80048 BASIC METABOLIC PNL TOTAL CA: CPT

## 2021-03-17 ENCOUNTER — HOSPITAL ENCOUNTER (OUTPATIENT)
Dept: CT IMAGING | Facility: HOSPITAL | Age: 76
Discharge: HOME | End: 2021-03-17
Payer: COMMERCIAL

## 2021-03-17 DIAGNOSIS — I71.2 THORACIC AORTIC ANEURYSM WITHOUT RUPTURE (HCC): ICD-10-CM

## 2021-03-17 PROCEDURE — G1004 CDSM NDSC: HCPCS

## 2021-03-17 PROCEDURE — 71275 CT ANGIOGRAPHY CHEST: CPT

## 2021-03-17 RX ADMIN — IOHEXOL 85 ML: 350 INJECTION, SOLUTION INTRAVENOUS at 21:03

## 2021-03-23 DIAGNOSIS — E78.5 HYPERLIPIDEMIA, UNSPECIFIED HYPERLIPIDEMIA TYPE: Primary | ICD-10-CM

## 2021-03-23 RX ORDER — SIMVASTATIN 40 MG
40 TABLET ORAL DAILY
Qty: 90 TABLET | Refills: 1 | Status: SHIPPED | OUTPATIENT
Start: 2021-03-23 | End: 2021-10-12

## 2021-04-30 DIAGNOSIS — M79.10 MYALGIA: ICD-10-CM

## 2021-04-30 RX ORDER — BACLOFEN 10 MG/1
TABLET ORAL
Qty: 30 TABLET | Refills: 0 | Status: SHIPPED | OUTPATIENT
Start: 2021-04-30 | End: 2021-08-18 | Stop reason: SDUPTHER

## 2021-05-14 ENCOUNTER — OFFICE VISIT (OUTPATIENT)
Dept: OBGYN CLINIC | Facility: CLINIC | Age: 76
End: 2021-05-14
Payer: COMMERCIAL

## 2021-05-14 VITALS
DIASTOLIC BLOOD PRESSURE: 82 MMHG | WEIGHT: 247 LBS | HEIGHT: 72 IN | HEART RATE: 79 BPM | BODY MASS INDEX: 33.46 KG/M2 | SYSTOLIC BLOOD PRESSURE: 122 MMHG

## 2021-05-14 DIAGNOSIS — M17.0 PRIMARY OSTEOARTHRITIS OF BOTH KNEES: ICD-10-CM

## 2021-05-14 PROCEDURE — 99213 OFFICE O/P EST LOW 20 MIN: CPT | Performed by: ORTHOPAEDIC SURGERY

## 2021-05-14 PROCEDURE — 20610 DRAIN/INJ JOINT/BURSA W/O US: CPT | Performed by: ORTHOPAEDIC SURGERY

## 2021-05-14 RX ORDER — METHYLPREDNISOLONE ACETATE 40 MG/ML
1 INJECTION, SUSPENSION INTRA-ARTICULAR; INTRALESIONAL; INTRAMUSCULAR; SOFT TISSUE
Status: COMPLETED | OUTPATIENT
Start: 2021-05-14 | End: 2021-05-14

## 2021-05-14 RX ORDER — LIDOCAINE HYDROCHLORIDE 10 MG/ML
4 INJECTION, SOLUTION INFILTRATION; PERINEURAL
Status: COMPLETED | OUTPATIENT
Start: 2021-05-14 | End: 2021-05-14

## 2021-05-14 RX ORDER — DICLOFENAC SODIUM 75 MG/1
75 TABLET, DELAYED RELEASE ORAL DAILY
Qty: 30 TABLET | Refills: 2 | Status: SHIPPED | OUTPATIENT
Start: 2021-05-14 | End: 2021-08-06

## 2021-05-14 RX ADMIN — METHYLPREDNISOLONE ACETATE 1 ML: 40 INJECTION, SUSPENSION INTRA-ARTICULAR; INTRALESIONAL; INTRAMUSCULAR; SOFT TISSUE at 13:33

## 2021-05-14 RX ADMIN — METHYLPREDNISOLONE ACETATE 1 ML: 40 INJECTION, SUSPENSION INTRA-ARTICULAR; INTRALESIONAL; INTRAMUSCULAR; SOFT TISSUE at 13:32

## 2021-05-14 RX ADMIN — LIDOCAINE HYDROCHLORIDE 4 ML: 10 INJECTION, SOLUTION INFILTRATION; PERINEURAL at 13:33

## 2021-05-14 RX ADMIN — LIDOCAINE HYDROCHLORIDE 4 ML: 10 INJECTION, SOLUTION INFILTRATION; PERINEURAL at 13:32

## 2021-05-14 NOTE — PROGRESS NOTES
Patient Name:  Daphnie Dinh  MRN:  [id number]    Assessment & Plan     Primary osteoarthritis bilateral knees  1  Patient was given bilateral cortisone injections for primary osteoarthritis  He tolerated well  2  Diclofenac was refilled for the patient sent to his pharmacy   3  Patient was advised to continue activities as tolerated and modified to avoid pain   4  As per discussion, he would like to try 1 more cortisone injection for both of his knees and then proceed with total knee arthroplasty around November 5  He will follow up in 3 months for re-evaluation    History of the Present Illness     Patient is a 72-year-old male that presents today for 3 month follow-up in regard to bilateral primary osteoarthritis  Patient has been receiving cortisone injections bilaterally every 3 months as needed for the pain  He  thinks that come this winter time he would like to proceed with a total knee arthroplasty  He would like to proceed with 1 more round of cortisone injections at this time  He has been taking diclofenac mild relief of his symptoms  He offers no other complaints  General ROS:  Negative for fever or chills  Neurological ROS:  Negative for numbness or tingling      Physical Exam     /82   Pulse 79   Ht 6' (1 829 m)   Wt 112 kg (247 lb)   BMI 33 50 kg/m²     Right knee:  Effusion:  None  Tenderness:  Medial joint line  Range of motion:  Extension:  0  Flexion:  120  Lachman test:  Stable  Valgus stress:  Stable  Varus stress:  Stable  Posterior drawer test:  Stable  Etta's test:  Negative  CV:  No lower extremity edema    Left  knee:  Effusion:  None  Tenderness:  Medial joint line  Range of motion:  Extension:  0  Flexion:  120  Lachman test:  Stable  Valgus stress:  Stable  Varus stress:  Stable  Posterior drawer test:  Stable  Etta's test:  Negative  CV:  No lower extremity edema          Social History     Tobacco Use    Smoking status: Former Smoker     Quit date: 1985     Years since quittin 3    Smokeless tobacco: Former User     Quit date:    Substance Use Topics    Alcohol use: Not Currently    Drug use: Not Currently         Large joint arthrocentesis: bilateral knee  Universal Protocol:  Consent: Verbal consent obtained    Risks and benefits: risks, benefits and alternatives were discussed  Consent given by: patient  Patient understanding: patient states understanding of the procedure being performed  Patient consent: the patient's understanding of the procedure matches consent given  Site marked: the operative site was marked  Patient identity confirmed: verbally with patient    Supporting Documentation  Indications: pain   Procedure Details  Location: knee - bilateral knee  Preparation: Patient was prepped and draped in the usual sterile fashion  Needle size: 22 G  Ultrasound guidance: no  Approach: anterolateral    Medications (Right): 4 mL lidocaine 1 %; 1 mL methylPREDNISolone acetate 40 mg/mLMedications (Left): 4 mL lidocaine 1 %; 1 mL methylPREDNISolone acetate 40 mg/mL   Patient tolerance: patient tolerated the procedure well with no immediate complications  Dressing:  Sterile dressing applied          Scribe Attestation    I,:  Gillian Canada PA-C am acting as a scribe while in the presence of the attending physician :       I,:  Charleen Zeng MD personally performed the services described in this documentation    as scribed in my presence :

## 2021-06-05 DIAGNOSIS — E66.09 CLASS 1 OBESITY DUE TO EXCESS CALORIES WITHOUT SERIOUS COMORBIDITY WITH BODY MASS INDEX (BMI) OF 34.0 TO 34.9 IN ADULT: ICD-10-CM

## 2021-06-19 DIAGNOSIS — F41.9 ANXIETY: ICD-10-CM

## 2021-06-22 RX ORDER — TRAZODONE HYDROCHLORIDE 150 MG/1
TABLET ORAL
Qty: 30 TABLET | Refills: 11 | Status: SHIPPED | OUTPATIENT
Start: 2021-06-22 | End: 2022-03-14

## 2021-08-06 DIAGNOSIS — M17.0 PRIMARY OSTEOARTHRITIS OF BOTH KNEES: ICD-10-CM

## 2021-08-06 RX ORDER — DICLOFENAC SODIUM 75 MG/1
TABLET, DELAYED RELEASE ORAL
Qty: 30 TABLET | Refills: 2 | Status: SHIPPED | OUTPATIENT
Start: 2021-08-06 | End: 2021-09-03 | Stop reason: SDUPTHER

## 2021-08-17 ENCOUNTER — OFFICE VISIT (OUTPATIENT)
Dept: OBGYN CLINIC | Facility: CLINIC | Age: 76
End: 2021-08-17
Payer: COMMERCIAL

## 2021-08-17 VITALS
WEIGHT: 250 LBS | DIASTOLIC BLOOD PRESSURE: 86 MMHG | BODY MASS INDEX: 33.86 KG/M2 | SYSTOLIC BLOOD PRESSURE: 136 MMHG | HEIGHT: 72 IN | HEART RATE: 82 BPM

## 2021-08-17 DIAGNOSIS — M17.0 PRIMARY OSTEOARTHRITIS OF BOTH KNEES: Primary | ICD-10-CM

## 2021-08-17 PROCEDURE — 99213 OFFICE O/P EST LOW 20 MIN: CPT | Performed by: ORTHOPAEDIC SURGERY

## 2021-08-17 PROCEDURE — 20610 DRAIN/INJ JOINT/BURSA W/O US: CPT | Performed by: ORTHOPAEDIC SURGERY

## 2021-08-17 RX ORDER — LIDOCAINE HYDROCHLORIDE 10 MG/ML
4 INJECTION, SOLUTION INFILTRATION; PERINEURAL
Status: COMPLETED | OUTPATIENT
Start: 2021-08-17 | End: 2021-08-17

## 2021-08-17 RX ORDER — METHYLPREDNISOLONE ACETATE 40 MG/ML
1 INJECTION, SUSPENSION INTRA-ARTICULAR; INTRALESIONAL; INTRAMUSCULAR; SOFT TISSUE
Status: COMPLETED | OUTPATIENT
Start: 2021-08-17 | End: 2021-08-17

## 2021-08-17 RX ADMIN — METHYLPREDNISOLONE ACETATE 1 ML: 40 INJECTION, SUSPENSION INTRA-ARTICULAR; INTRALESIONAL; INTRAMUSCULAR; SOFT TISSUE at 13:36

## 2021-08-17 RX ADMIN — LIDOCAINE HYDROCHLORIDE 4 ML: 10 INJECTION, SOLUTION INFILTRATION; PERINEURAL at 13:36

## 2021-08-17 NOTE — PROGRESS NOTES
Patient Name:  Cesia Choi  MRN:  [id number]    Assessment & Plan     Bilateral Knee DJD  1  Corticosteroid injection performed today into the bilateral knees  2  Continue diclofenac and activity modification as needed  3  Follow-up in three months for repeat injection as indicated    History of the Present Illness     79-year-old male returns to the office today for follow-up regarding his bilateral knee DJD  He was last seen on 5/14/21  At that time he received intra-articular corticosteroid injections into the bilateral knees  He notes significant improvement after undergoing the injections  He still notes some persistent pain in the bilateral knees which is generalized in nature  Pain is worse with walking down an incline or down steps  He notes associated swelling and crepitus  He takes diclofenac with improvement as well  He is interested in repeat corticosteroid injections today  General ROS:  Negative for fever or chills  Neurological ROS:  Negative for numbness or tingling  Physical Exam     /86   Pulse 82   Ht 6' (1 829 m)   Wt 113 kg (250 lb)   BMI 33 91 kg/m²     Bilateral knees:  No gross deformity  Skin intact  No erythema ecchymosis or swelling  Trace effusions bilaterally  No joint line tenderness  ROM 0-120 bilaterally  Stable to varus and valgus stress  Sensation intact distally  Skin warm and well perfused    Appropriate mood and affect    Large joint arthrocentesis: bilateral knee  Procedure Details  Location: knee - bilateral knee  Needle size: 22 G  Approach: anterolateral    Medications (Right): 4 mL lidocaine 1 %; 1 mL methylPREDNISolone acetate 40 mg/mLMedications (Left): 4 mL lidocaine 1 %; 1 mL methylPREDNISolone acetate 40 mg/mL   Patient tolerance: patient tolerated the procedure well with no immediate complications  Dressing:  Sterile dressing applied            Scribe Attestation    I,:  Mao Perez PA-C am acting as a scribe while in the presence of the attending physician :       I,:  Sheila Victoria MD personally performed the services described in this documentation    as scribed in my presence :

## 2021-08-18 DIAGNOSIS — M79.10 MYALGIA: ICD-10-CM

## 2021-08-18 RX ORDER — BACLOFEN 10 MG/1
10 TABLET ORAL EVERY 8 HOURS PRN
Qty: 30 TABLET | Refills: 0 | Status: SHIPPED | OUTPATIENT
Start: 2021-08-18 | End: 2021-09-23 | Stop reason: SDUPTHER

## 2021-09-02 ENCOUNTER — TELEPHONE (OUTPATIENT)
Dept: OBGYN CLINIC | Facility: CLINIC | Age: 76
End: 2021-09-02

## 2021-09-03 DIAGNOSIS — M17.0 PRIMARY OSTEOARTHRITIS OF BOTH KNEES: ICD-10-CM

## 2021-09-03 RX ORDER — DICLOFENAC SODIUM 75 MG/1
75 TABLET, DELAYED RELEASE ORAL DAILY
Qty: 30 TABLET | Refills: 2 | Status: SHIPPED | OUTPATIENT
Start: 2021-09-03 | End: 2022-01-21

## 2021-09-13 ENCOUNTER — TELEPHONE (OUTPATIENT)
Dept: SURGERY | Facility: CLINIC | Age: 76
End: 2021-09-13

## 2021-09-13 DIAGNOSIS — E78.5 DYSLIPIDEMIA: Primary | ICD-10-CM

## 2021-09-13 NOTE — TELEPHONE ENCOUNTER
Pt called and would like a script to get blood work done including lipid panel prior to his appt on 9/23

## 2021-09-22 ENCOUNTER — APPOINTMENT (OUTPATIENT)
Dept: LAB | Facility: CLINIC | Age: 76
End: 2021-09-22
Payer: COMMERCIAL

## 2021-09-22 DIAGNOSIS — E78.5 DYSLIPIDEMIA: ICD-10-CM

## 2021-09-22 DIAGNOSIS — N40.0 BPH (BENIGN PROSTATIC HYPERPLASIA): ICD-10-CM

## 2021-09-22 LAB
CHOLEST SERPL-MCNC: 260 MG/DL (ref 50–200)
HDLC SERPL-MCNC: 41 MG/DL
LDLC SERPL CALC-MCNC: 144 MG/DL (ref 0–100)
TRIGL SERPL-MCNC: 375 MG/DL

## 2021-09-22 PROCEDURE — 80061 LIPID PANEL: CPT

## 2021-09-22 PROCEDURE — 36415 COLL VENOUS BLD VENIPUNCTURE: CPT

## 2021-09-22 RX ORDER — FINASTERIDE 5 MG/1
TABLET, FILM COATED ORAL
Qty: 90 TABLET | Refills: 3 | OUTPATIENT
Start: 2021-09-22

## 2021-09-22 NOTE — TELEPHONE ENCOUNTER
Pt needs an appointment with the IM office before refill can be given  Pt has an appointment tomorrow, 09/23/21, with Dr Christnia Solitario

## 2021-09-23 ENCOUNTER — OFFICE VISIT (OUTPATIENT)
Dept: INTERNAL MEDICINE CLINIC | Facility: CLINIC | Age: 76
End: 2021-09-23
Payer: COMMERCIAL

## 2021-09-23 VITALS
HEART RATE: 81 BPM | OXYGEN SATURATION: 95 % | HEIGHT: 72 IN | SYSTOLIC BLOOD PRESSURE: 128 MMHG | BODY MASS INDEX: 34.13 KG/M2 | TEMPERATURE: 97.5 F | DIASTOLIC BLOOD PRESSURE: 80 MMHG | WEIGHT: 252 LBS

## 2021-09-23 DIAGNOSIS — M25.552 CHRONIC ARTHRALGIAS OF KNEES AND HIPS: ICD-10-CM

## 2021-09-23 DIAGNOSIS — E55.9 HYPOVITAMINOSIS D: ICD-10-CM

## 2021-09-23 DIAGNOSIS — E66.09 CLASS 1 OBESITY DUE TO EXCESS CALORIES WITHOUT SERIOUS COMORBIDITY WITH BODY MASS INDEX (BMI) OF 34.0 TO 34.9 IN ADULT: ICD-10-CM

## 2021-09-23 DIAGNOSIS — Z23 NEED FOR INFLUENZA VACCINATION: ICD-10-CM

## 2021-09-23 DIAGNOSIS — Z00.00 MEDICARE ANNUAL WELLNESS VISIT, SUBSEQUENT: Primary | ICD-10-CM

## 2021-09-23 DIAGNOSIS — I25.118 CORONARY ARTERY DISEASE OF NATIVE ARTERY OF NATIVE HEART WITH STABLE ANGINA PECTORIS (HCC): ICD-10-CM

## 2021-09-23 DIAGNOSIS — L57.0 ACTINIC KERATOSIS: ICD-10-CM

## 2021-09-23 DIAGNOSIS — M25.562 CHRONIC ARTHRALGIAS OF KNEES AND HIPS: ICD-10-CM

## 2021-09-23 DIAGNOSIS — M79.10 MYALGIA: ICD-10-CM

## 2021-09-23 DIAGNOSIS — N40.1 BENIGN PROSTATIC HYPERPLASIA WITH LOWER URINARY TRACT SYMPTOMS, SYMPTOM DETAILS UNSPECIFIED: ICD-10-CM

## 2021-09-23 DIAGNOSIS — F41.9 ANXIETY: ICD-10-CM

## 2021-09-23 DIAGNOSIS — G89.29 CHRONIC ARTHRALGIAS OF KNEES AND HIPS: ICD-10-CM

## 2021-09-23 DIAGNOSIS — E78.2 MIXED HYPERLIPIDEMIA: ICD-10-CM

## 2021-09-23 DIAGNOSIS — M25.551 CHRONIC ARTHRALGIAS OF KNEES AND HIPS: ICD-10-CM

## 2021-09-23 DIAGNOSIS — M25.561 CHRONIC ARTHRALGIAS OF KNEES AND HIPS: ICD-10-CM

## 2021-09-23 PROCEDURE — G0439 PPPS, SUBSEQ VISIT: HCPCS | Performed by: HOSPITALIST

## 2021-09-23 PROCEDURE — G0008 ADMIN INFLUENZA VIRUS VAC: HCPCS | Performed by: HOSPITALIST

## 2021-09-23 PROCEDURE — 90662 IIV NO PRSV INCREASED AG IM: CPT | Performed by: HOSPITALIST

## 2021-09-23 RX ORDER — TAMSULOSIN HYDROCHLORIDE 0.4 MG/1
0.4 CAPSULE ORAL
Qty: 30 CAPSULE | Refills: 5 | Status: SHIPPED | OUTPATIENT
Start: 2021-09-23 | End: 2022-01-26 | Stop reason: SDUPTHER

## 2021-09-23 RX ORDER — BACLOFEN 10 MG/1
10 TABLET ORAL EVERY 8 HOURS PRN
Qty: 30 TABLET | Refills: 0 | Status: SHIPPED | OUTPATIENT
Start: 2021-09-23 | End: 2021-11-24

## 2021-09-23 RX ORDER — KETOCONAZOLE 20 MG/ML
10 SHAMPOO TOPICAL 2 TIMES WEEKLY
Qty: 120 ML | Refills: 0 | Status: SHIPPED | OUTPATIENT
Start: 2021-09-23 | End: 2021-10-06

## 2021-09-23 NOTE — ASSESSMENT & PLAN NOTE
· Up to date with screening except for osteoporosis  He would like to wait  · Up to date with immunizations, will be getting his influenza vaccination today  · Discussed about other preventative care including skin cancer screening, wearing seat belt, wearing sunscreen  · BMI Counseling: Body mass index is 34 18 kg/m²  The BMI is above normal  Nutrition recommendations include decreasing overall calorie intake, 3-5 servings of fruits/vegetables daily, reducing fast food intake and consuming healthier snacks  Pharmacotherapy was ordered for patient to aid in weight loss

## 2021-09-23 NOTE — PROGRESS NOTES
Assessment and Plan:     Problem List Items Addressed This Visit        Cardiovascular and Mediastinum    Coronary artery disease of native artery of native heart with stable angina pectoris (HCC)       Musculoskeletal and Integument    Actinic keratosis     · Noted on the scalp  · No evidence of malignant lesions  · Will trial Nizoral shampoo  · Advised on regular examination, and to inform if he notices rapid growth of lesions, bleeding, color change  · counseled on wearing sun protection         Relevant Medications    ketoconazole (NIZORAL) 2 % shampoo       Other    Hypovitaminosis D    Relevant Orders    Vitamin D 25 hydroxy    Chronic arthralgias of knees and hips    Mixed hyperlipidemia     · Most recent lipid panel was not fasting, therefore LDL increased to 144 with hypertryglyceridemia  · Did admit to having had poor dietary habits  · Will hold off on adjusting medications today  · Discussed about lifestyle modifications  · Orlistat should aid with this as well  · Continue Vascepa, Simvastatin, Ezetimibe         Relevant Orders    Lipid Panel with Direct LDL reflex    Medicare annual wellness visit, subsequent - Primary     · Up to date with screening except for osteoporosis  He would like to wait  · Up to date with immunizations, will be getting his influenza vaccination today  · Discussed about other preventative care including skin cancer screening, wearing seat belt, wearing sunscreen  · BMI Counseling: Body mass index is 34 18 kg/m²  The BMI is above normal  Nutrition recommendations include decreasing overall calorie intake, 3-5 servings of fruits/vegetables daily, reducing fast food intake and consuming healthier snacks  Pharmacotherapy was ordered for patient to aid in weight loss              Other Visit Diagnoses     Class 1 obesity due to excess calories without serious comorbidity with body mass index (BMI) of 34 0 to 34 9 in adult        Relevant Medications    orlistat (XENICAL) 120 MG capsule    Benign prostatic hyperplasia with lower urinary tract symptoms, symptom details unspecified        Relevant Medications    tamsulosin (FLOMAX) 0 4 mg    Myalgia        Relevant Medications    baclofen 10 mg tablet    Need for influenza vaccination        Relevant Orders    influenza vaccine, high-dose, PF 0 7 mL (FLUZONE HIGH-DOSE) (Completed)           Preventive health issues were discussed with patient, and age appropriate screening tests were ordered as noted in patient's After Visit Summary  Personalized health advice and appropriate referrals for health education or preventive services given if needed, as noted in patient's After Visit Summary  History of Present Illness:     Patient presents for Medicare Annual Wellness visit  He has no health concerns today  He is upto date with his screening tests except DEXA scan, and is up to date with his immunizations  ROS was unremarkable except for scaly scalp lesions and mild B/L knee pain for which he is awaiting knee replacement   See above for more details    Patient Care Team:  Nicholas Browning MD as PCP - General (Internal Medicine)  Gurdeep Julio MD as PCP - [address] [place] [address] (RTE)     Problem List:     Patient Active Problem List   Diagnosis    Hypovitaminosis D    Coronary artery disease of native artery of native heart with stable angina pectoris (HCC)    Chronic arthralgias of knees and hips    Muscle pain    Mixed hyperlipidemia    Need for hepatitis C screening test    Encounter for screening colonoscopy    Primary osteoarthritis of both knees    Sacroiliitis, not elsewhere classified ([facility] [address] )    Allergic contact dermatitis due to other agents    Medicare annual wellness visit, subsequent    Actinic keratosis      Past Medical and Surgical History:     Past Medical History:   Diagnosis Date    Celiac disease     Diverticulosis      Past Surgical History:   Procedure Laterality Date    CORONARY ANGIOPLASTY WITH STENT PLACEMENT      20 yrs ago      Family History:     Family History   Problem Relation Age of Onset    Diabetes Mother     Coronary artery disease Father     Thyroid disease Sister       Social History:     Social History     Socioeconomic History    Marital status: /Civil Union     Spouse name: None    Number of children: 3    Years of education: None    Highest education level: None   Occupational History    None   Tobacco Use    Smoking status: Former Smoker     Quit date:      Years since quittin 7    Smokeless tobacco: Former User     Quit date:    Vaping Use    Vaping Use: Never used   Substance and Sexual Activity    Alcohol use: Not Currently    Drug use: Not Currently    Sexual activity: Yes   Other Topics Concern    None   Social History Narrative    None     Social Determinants of Health     Financial Resource Strain:     Difficulty of Paying Living Expenses:    Food Insecurity:     Worried About Running Out of Food in the Last Year:     Ran Out of Food in the Last Year:    Transportation Needs:     Lack of Transportation (Medical):      Lack of Transportation (Non-Medical):    Physical Activity:     Days of Exercise per Week:     Minutes of Exercise per Session:    Stress:     Feeling of Stress :    Social Connections:     Frequency of Communication with Friends and Family:     Frequency of Social Gatherings with Friends and Family:     Attends Rastafari Services:     Active Member of Clubs or Organizations:     Attends Club or Organization Meetings:     Marital Status:    Intimate Partner Violence:     Fear of Current or Ex-Partner:     Emotionally Abused:     Physically Abused:     Sexually Abused:       Medications and Allergies:     Current Outpatient Medications   Medication Sig Dispense Refill    aspirin 81 mg chewable tablet Chew 81 mg daily      baclofen 10 mg tablet Take 1 tablet (10 mg total) by mouth every 8 (eight) hours as needed for muscle spasms 30 tablet 0    clopidogrel (PLAVIX) 75 mg tablet Take 75 mg by mouth daily      diclofenac (VOLTAREN) 75 mg EC tablet Take 1 tablet (75 mg total) by mouth daily 30 tablet 2    ezetimibe (ZETIA) 10 mg tablet TAKE 1 TABLET BY MOUTH  DAILY 30 tablet 0    gabapentin (NEURONTIN) 300 mg capsule TAKE 1 CAPSULE BY MOUTH 3  TIMES DAILY 270 capsule 3    metoprolol succinate (TOPROL-XL) 50 mg 24 hr tablet Take 1 tablet (50 mg total) by mouth daily 30 tablet 2    sertraline (ZOLOFT) 50 mg tablet TAKE 1 TABLET BY MOUTH EVERY DAY 30 tablet 0    simvastatin (ZOCOR) 40 mg tablet Take 1 tablet (40 mg total) by mouth daily 90 tablet 1    traZODone (DESYREL) 150 mg tablet TAKE 1 TABLET BY MOUTH  DAILY AT BEDTIME 30 tablet 11    Vascepa 1 g CAPS       ketoconazole (NIZORAL) 2 % shampoo Apply 10 application topically 2 (two) times a week 120 mL 0    orlistat (XENICAL) 120 MG capsule Take 1 capsule (120 mg total) by mouth 3 (three) times a day with meals 90 capsule 2    tamsulosin (FLOMAX) 0 4 mg Take 1 capsule (0 4 mg total) by mouth daily with dinner 30 capsule 5     No current facility-administered medications for this visit       No Known Allergies   Immunizations:     Immunization History   Administered Date(s) Administered    INFLUENZA 09/10/2019, 08/16/2020    Influenza Split High Dose Preservative Free IM 09/10/2019    Influenza, high dose seasonal 0 7 mL 08/16/2020, 09/23/2021    Influenza, seasonal, injectable, preservative free 09/10/2019    Pneumococcal Polysaccharide PPV23 09/10/2019    SARS-CoV-2 / COVID-19 mRNA IM (Moderna) 02/20/2021    SARS-CoV-2 / COVID-19 mRNA IM (Pfizer-BioNTech) 03/11/2021    Zoster Vaccine Recombinant 05/25/2019, 09/10/2019      Health Maintenance:         Topic Date Due    Colorectal Cancer Screening  01/07/2024    Hepatitis C Screening  Completed         Topic Date Due    DTaP,Tdap,and Td Vaccines (1 - Tdap) Never done      Medicare Health Risk Assessment: /80 (BP Location: Left arm, Patient Position: Sitting, Cuff Size: Large)   Pulse 81   Temp 97 5 °F (36 4 °C)   Ht 6' (1 829 m)   Wt 114 kg (252 lb)   SpO2 95%   BMI 34 18 kg/m²          Health Risk Assessment:   Patient rates overall health as good  Patient feels that their physical health rating is same  Patient is satisfied with their life  Eyesight was rated as slightly worse  Hearing was rated as same  Patient feels that their emotional and mental health rating is same  Patients states they are sometimes angry  Patient states they are never, rarely unusually tired/fatigued  Pain experienced in the last 7 days has been some  Patient's pain rating has been 6/10  Patient states that he has experienced no weight loss or gain in last 6 months  Depression Screening:   PHQ-2 Score: 0      Fall Risk Screening: In the past year, patient has experienced: no history of falling in past year      Home Safety:  Patient has trouble with stairs inside or outside of their home  Patient has working smoke alarms and has working carbon monoxide detector  Home safety hazards include: none  Nutrition:   Current diet is Regular  Medications:   Patient is currently taking over-the-counter supplements  OTC medications include: VitMin b complex  Vitamin 3, fish oil  Patient is able to manage medications  Activities of Daily Living (ADLs)/Instrumental Activities of Daily Living (IADLs):   Walk and transfer into and out of bed and chair?: Yes  Dress and groom yourself?: Yes    Bathe or shower yourself?: Yes    Feed yourself? Yes  Do your laundry/housekeeping?: Yes  Manage your money, pay your bills and track your expenses?: Yes  Make your own meals?: Yes    Do your own shopping?: Yes    Durable Medical Equipment Suppliers  None    Previous Hospitalizations:   Any hospitalizations or ED visits within the last 12 months?: No      Advance Care Planning:   Living will: No    Durable POA for healthcare:  No Advanced directive: No    Advanced directive counseling given: Yes    End of Life Decisions reviewed with patient: Yes      PREVENTIVE SCREENINGS      Cardiovascular Screening:    General: Screening Not Indicated and History Lipid Disorder      Diabetes Screening:     General: Screening Current      Colorectal Cancer Screening:     General: Screening Current      Prostate Cancer Screening:    General: Screening Current      Osteoporosis Screening:    General: Patient Declines      Abdominal Aortic Aneurysm (AAA) Screening:    Risk factors include: age between 73-67 yo and tobacco use        General: Screening Not Indicated      Lung Cancer Screening:     General: Screening Not Indicated      Hepatitis C Screening:    General: Screening Current    Screening, Brief Intervention, and Referral to Treatment (SBIRT)    Screening  Typical number of drinks in a day: 0  Typical number of drinks in a week: 0  Interpretation: Low risk drinking behavior  AUDIT-C Screenin) How often did you have a drink containing alcohol in the past year? never  2) How many drinks did you have on a typical day when you were drinking in the past year? never  3) How often did you have 6 or more drinks on one occasion in the past year? never    AUDIT-C Score: 0  Interpretation: Score 0-3 (male): Negative screen for alcohol misuse    Single Item Drug Screening:  How often have you used an illegal drug (including marijuana) or a prescription medication for non-medical reasons in the past year? never    Single Item Drug Screen Score: 0  Interpretation: Negative screen for possible drug use disorder    Other Counseling Topics:   Car/seat belt/driving safety, skin self-exam, sunscreen and regular weightbearing exercise         Tamar Fitzgerald MD

## 2021-09-23 NOTE — PATIENT INSTRUCTIONS

## 2021-09-23 NOTE — ASSESSMENT & PLAN NOTE
· Most recent lipid panel was not fasting, therefore LDL increased to 144 with hypertryglyceridemia  · Did admit to having had poor dietary habits  · Will hold off on adjusting medications today  · Discussed about lifestyle modifications  · Orlistat should aid with this as well  · Continue Vascepa, Simvastatin, Ezetimibe

## 2021-09-23 NOTE — ASSESSMENT & PLAN NOTE
· Noted on the scalp  · No evidence of malignant lesions  · Will trial Nizoral shampoo  · Advised on regular examination, and to inform if he notices rapid growth of lesions, bleeding, color change  · counseled on wearing sun protection

## 2021-09-28 DIAGNOSIS — L57.0 ACTINIC KERATOSIS: ICD-10-CM

## 2021-10-06 RX ORDER — KETOCONAZOLE 20 MG/ML
SHAMPOO TOPICAL
Qty: 120 ML | Refills: 0 | Status: SHIPPED | OUTPATIENT
Start: 2021-10-06 | End: 2021-11-10

## 2021-10-11 DIAGNOSIS — E78.5 HYPERLIPIDEMIA, UNSPECIFIED HYPERLIPIDEMIA TYPE: ICD-10-CM

## 2021-10-12 RX ORDER — SIMVASTATIN 40 MG
TABLET ORAL
Qty: 90 TABLET | Refills: 3 | Status: SHIPPED | OUTPATIENT
Start: 2021-10-12 | End: 2022-03-29 | Stop reason: ALTCHOICE

## 2021-10-16 ENCOUNTER — IMMUNIZATIONS (OUTPATIENT)
Dept: FAMILY MEDICINE CLINIC | Facility: HOSPITAL | Age: 76
End: 2021-10-16

## 2021-10-16 DIAGNOSIS — Z23 ENCOUNTER FOR IMMUNIZATION: Primary | ICD-10-CM

## 2021-10-16 PROCEDURE — 0001A SARS-COV-2 / COVID-19 MRNA VACCINE (PFIZER-BIONTECH) 30 MCG: CPT

## 2021-10-16 PROCEDURE — 91300 SARS-COV-2 / COVID-19 MRNA VACCINE (PFIZER-BIONTECH) 30 MCG: CPT

## 2021-11-07 DIAGNOSIS — L57.0 ACTINIC KERATOSIS: ICD-10-CM

## 2021-11-10 RX ORDER — KETOCONAZOLE 20 MG/ML
SHAMPOO TOPICAL
Qty: 120 ML | Refills: 0 | Status: SHIPPED | OUTPATIENT
Start: 2021-11-10 | End: 2022-02-03 | Stop reason: SDUPTHER

## 2021-11-17 ENCOUNTER — OFFICE VISIT (OUTPATIENT)
Dept: OBGYN CLINIC | Facility: CLINIC | Age: 76
End: 2021-11-17
Payer: COMMERCIAL

## 2021-11-17 VITALS
HEART RATE: 70 BPM | SYSTOLIC BLOOD PRESSURE: 153 MMHG | DIASTOLIC BLOOD PRESSURE: 82 MMHG | WEIGHT: 250 LBS | HEIGHT: 72 IN | BODY MASS INDEX: 33.86 KG/M2

## 2021-11-17 DIAGNOSIS — M17.0 PRIMARY OSTEOARTHRITIS OF BOTH KNEES: Primary | ICD-10-CM

## 2021-11-17 PROCEDURE — 99212 OFFICE O/P EST SF 10 MIN: CPT | Performed by: ORTHOPAEDIC SURGERY

## 2021-11-17 PROCEDURE — 20610 DRAIN/INJ JOINT/BURSA W/O US: CPT | Performed by: ORTHOPAEDIC SURGERY

## 2021-11-17 RX ORDER — METHYLPREDNISOLONE ACETATE 40 MG/ML
1 INJECTION, SUSPENSION INTRA-ARTICULAR; INTRALESIONAL; INTRAMUSCULAR; SOFT TISSUE
Status: COMPLETED | OUTPATIENT
Start: 2021-11-17 | End: 2021-11-17

## 2021-11-17 RX ORDER — LIDOCAINE HYDROCHLORIDE 10 MG/ML
4 INJECTION, SOLUTION INFILTRATION; PERINEURAL
Status: COMPLETED | OUTPATIENT
Start: 2021-11-17 | End: 2021-11-17

## 2021-11-17 RX ADMIN — METHYLPREDNISOLONE ACETATE 1 ML: 40 INJECTION, SUSPENSION INTRA-ARTICULAR; INTRALESIONAL; INTRAMUSCULAR; SOFT TISSUE at 12:56

## 2021-11-17 RX ADMIN — LIDOCAINE HYDROCHLORIDE 4 ML: 10 INJECTION, SOLUTION INFILTRATION; PERINEURAL at 12:56

## 2021-11-24 DIAGNOSIS — M79.10 MYALGIA: ICD-10-CM

## 2021-11-24 RX ORDER — BACLOFEN 10 MG/1
TABLET ORAL
Qty: 30 TABLET | Refills: 0 | Status: SHIPPED | OUTPATIENT
Start: 2021-11-24 | End: 2022-01-03

## 2021-12-06 ENCOUNTER — VBI (OUTPATIENT)
Dept: ADMINISTRATIVE | Facility: OTHER | Age: 76
End: 2021-12-06

## 2021-12-08 DIAGNOSIS — M46.1 SACROILIITIS, NOT ELSEWHERE CLASSIFIED (HCC): ICD-10-CM

## 2021-12-09 DIAGNOSIS — F41.9 ANXIETY: ICD-10-CM

## 2021-12-09 RX ORDER — GABAPENTIN 300 MG/1
CAPSULE ORAL
Qty: 270 CAPSULE | Refills: 3 | Status: SHIPPED | OUTPATIENT
Start: 2021-12-09

## 2021-12-21 DIAGNOSIS — M79.10 MYALGIA: ICD-10-CM

## 2022-01-03 RX ORDER — BACLOFEN 10 MG/1
TABLET ORAL
Qty: 30 TABLET | Refills: 0 | Status: SHIPPED | OUTPATIENT
Start: 2022-01-03 | End: 2022-02-28

## 2022-01-03 NOTE — TELEPHONE ENCOUNTER
Cathy Kennedy has requested a refill of baclofen 10 mg tablet  Pt does not meet the requirements placed by [facility]  Is a refill appropriate?

## 2022-01-11 DIAGNOSIS — F41.9 ANXIETY: ICD-10-CM

## 2022-01-19 DIAGNOSIS — M17.0 PRIMARY OSTEOARTHRITIS OF BOTH KNEES: ICD-10-CM

## 2022-01-21 ENCOUNTER — TELEPHONE (OUTPATIENT)
Dept: OBGYN CLINIC | Facility: HOSPITAL | Age: 77
End: 2022-01-21

## 2022-01-21 RX ORDER — DICLOFENAC SODIUM 75 MG/1
TABLET, DELAYED RELEASE ORAL
Qty: 30 TABLET | Refills: 2 | Status: SHIPPED | OUTPATIENT
Start: 2022-01-21 | End: 2022-01-25 | Stop reason: SDUPTHER

## 2022-01-21 NOTE — TELEPHONE ENCOUNTER
Pt contacted Call Center requested refill of their medication  Patient saw Dr Marcos Kaufman      Medication Name: diclofenac (VOLTAREN) diclofenac (VOLTAREN)       Dosage of Med: 75 mg EC tablet      Frequency of Med: Take 1 tablet (75 mg total) by mouth daily      Pharmacy and Location: [facility], [address] [facility], [address] [place] [id number]         Pt   Preferred Callback Phone Number:  [phone]

## 2022-01-25 NOTE — TELEPHONE ENCOUNTER
Are you willing to refill this medication for a former patient of Dr Nichelle Thornton? Patient is scheduled to see Dr Gara Hodgkins on Feb 18   Thanks

## 2022-01-26 DIAGNOSIS — L57.0 ACTINIC KERATOSIS: ICD-10-CM

## 2022-01-26 DIAGNOSIS — N40.1 BENIGN PROSTATIC HYPERPLASIA WITH LOWER URINARY TRACT SYMPTOMS, SYMPTOM DETAILS UNSPECIFIED: ICD-10-CM

## 2022-01-26 NOTE — TELEPHONE ENCOUNTER
[facility] has faxed a medication refill request for [employer]  Requesting refill of ketoconazole (NIZORAL) 2% shampoo and tamsulosin (FLOMAX) 0 4 mg  Refills to be sent to [facility] mail service

## 2022-01-27 RX ORDER — KETOCONAZOLE 20 MG/ML
SHAMPOO TOPICAL
Qty: 120 ML | Refills: 0 | OUTPATIENT
Start: 2022-01-27

## 2022-01-27 RX ORDER — TAMSULOSIN HYDROCHLORIDE 0.4 MG/1
0.4 CAPSULE ORAL
Qty: 90 CAPSULE | Refills: 2 | Status: SHIPPED | OUTPATIENT
Start: 2022-01-27

## 2022-02-03 DIAGNOSIS — L57.0 ACTINIC KERATOSIS: ICD-10-CM

## 2022-02-03 RX ORDER — KETOCONAZOLE 20 MG/ML
1 SHAMPOO TOPICAL 2 TIMES WEEKLY
Qty: 120 ML | Refills: 0 | Status: SHIPPED | OUTPATIENT
Start: 2022-02-03 | End: 2022-03-01 | Stop reason: SDUPTHER

## 2022-02-03 NOTE — TELEPHONE ENCOUNTER
[facility] has sent a fax requesting a refill on [place] ketocanzole (NIZORAL) 2% shampoo   Refill to be sent to the [facility] on [address] [facility] [facility] in [place], [place]

## 2022-02-22 ENCOUNTER — HOSPITAL ENCOUNTER (OUTPATIENT)
Dept: RADIOLOGY | Facility: HOSPITAL | Age: 77
Discharge: HOME | End: 2022-02-22
Attending: ORTHOPAEDIC SURGERY
Payer: COMMERCIAL

## 2022-02-22 ENCOUNTER — APPOINTMENT (OUTPATIENT)
Dept: LAB | Facility: CLINIC | Age: 77
End: 2022-02-22
Payer: COMMERCIAL

## 2022-02-22 ENCOUNTER — PREP FOR PROCEDURE (OUTPATIENT)
Dept: OBGYN CLINIC | Facility: CLINIC | Age: 77
End: 2022-02-22

## 2022-02-22 ENCOUNTER — OFFICE VISIT (OUTPATIENT)
Dept: OBGYN CLINIC | Facility: CLINIC | Age: 77
End: 2022-02-22
Payer: COMMERCIAL

## 2022-02-22 VITALS
HEIGHT: 72 IN | DIASTOLIC BLOOD PRESSURE: 80 MMHG | SYSTOLIC BLOOD PRESSURE: 134 MMHG | HEART RATE: 73 BPM | WEIGHT: 255 LBS | BODY MASS INDEX: 34.54 KG/M2

## 2022-02-22 DIAGNOSIS — Z01.818 PREOPERATIVE TESTING: ICD-10-CM

## 2022-02-22 DIAGNOSIS — M17.12 PRIMARY OSTEOARTHRITIS OF LEFT KNEE: ICD-10-CM

## 2022-02-22 DIAGNOSIS — M17.11 PRIMARY OSTEOARTHRITIS OF RIGHT KNEE: ICD-10-CM

## 2022-02-22 DIAGNOSIS — M17.0 PRIMARY OSTEOARTHRITIS OF BOTH KNEES: ICD-10-CM

## 2022-02-22 DIAGNOSIS — M17.12 PRIMARY OSTEOARTHRITIS OF LEFT KNEE: Primary | ICD-10-CM

## 2022-02-22 DIAGNOSIS — E78.00 PURE HYPERCHOLESTEROLEMIA: ICD-10-CM

## 2022-02-22 DIAGNOSIS — E78.2 MIXED HYPERLIPIDEMIA: ICD-10-CM

## 2022-02-22 DIAGNOSIS — E55.9 HYPOVITAMINOSIS D: ICD-10-CM

## 2022-02-22 LAB
25(OH)D3 SERPL-MCNC: 28.2 NG/ML (ref 30–100)
ALBUMIN SERPL BCP-MCNC: 3.6 G/DL (ref 3.5–5)
ALP SERPL-CCNC: 68 U/L (ref 46–116)
ALT SERPL W P-5'-P-CCNC: 50 U/L (ref 12–78)
AST SERPL W P-5'-P-CCNC: 41 U/L (ref 5–45)
BILIRUB DIRECT SERPL-MCNC: 0.05 MG/DL (ref 0–0.2)
BILIRUB SERPL-MCNC: 0.52 MG/DL (ref 0.2–1)
CHOLEST SERPL-MCNC: 168 MG/DL
HDLC SERPL-MCNC: 32 MG/DL
LDLC SERPL DIRECT ASSAY-MCNC: 36 MG/DL (ref 0–100)
PROT SERPL-MCNC: 6.8 G/DL (ref 6.4–8.2)
TRIGL SERPL-MCNC: 568 MG/DL

## 2022-02-22 PROCEDURE — 82306 VITAMIN D 25 HYDROXY: CPT

## 2022-02-22 PROCEDURE — 73562 X-RAY EXAM OF KNEE 3: CPT

## 2022-02-22 PROCEDURE — 36415 COLL VENOUS BLD VENIPUNCTURE: CPT

## 2022-02-22 PROCEDURE — 99214 OFFICE O/P EST MOD 30 MIN: CPT | Performed by: ORTHOPAEDIC SURGERY

## 2022-02-22 PROCEDURE — 80076 HEPATIC FUNCTION PANEL: CPT

## 2022-02-22 PROCEDURE — 80061 LIPID PANEL: CPT

## 2022-02-22 PROCEDURE — 83721 ASSAY OF BLOOD LIPOPROTEIN: CPT

## 2022-02-22 PROCEDURE — 20610 DRAIN/INJ JOINT/BURSA W/O US: CPT | Performed by: ORTHOPAEDIC SURGERY

## 2022-02-22 RX ORDER — ASCORBIC ACID 500 MG
500 TABLET ORAL 2 TIMES DAILY
Qty: 30 TABLET | Refills: 0 | Status: SHIPPED | OUTPATIENT
Start: 2022-02-22 | End: 2022-05-23 | Stop reason: ALTCHOICE

## 2022-02-22 RX ORDER — CHLORHEXIDINE GLUCONATE 0.12 MG/ML
15 RINSE ORAL ONCE
Status: CANCELLED | OUTPATIENT
Start: 2022-02-22 | End: 2022-02-22

## 2022-02-22 RX ORDER — BUPIVACAINE HYDROCHLORIDE 2.5 MG/ML
2 INJECTION, SOLUTION INFILTRATION; PERINEURAL
Status: COMPLETED | OUTPATIENT
Start: 2022-02-22 | End: 2022-02-22

## 2022-02-22 RX ORDER — CEFAZOLIN SODIUM 2 G/50ML
2000 SOLUTION INTRAVENOUS ONCE
Status: CANCELLED | OUTPATIENT
Start: 2022-04-13 | End: 2022-02-22

## 2022-02-22 RX ORDER — METHYLPREDNISOLONE ACETATE 80 MG/ML
1 INJECTION, SUSPENSION INTRA-ARTICULAR; INTRALESIONAL; INTRAMUSCULAR; SOFT TISSUE
Status: COMPLETED | OUTPATIENT
Start: 2022-02-22 | End: 2022-02-22

## 2022-02-22 RX ORDER — MULTIVIT-MIN/IRON FUM/FOLIC AC 7.5 MG-4
1 TABLET ORAL DAILY
Qty: 30 TABLET | Refills: 0 | Status: SHIPPED | OUTPATIENT
Start: 2022-02-22 | End: 2022-03-25

## 2022-02-22 RX ORDER — FOLIC ACID 1 MG/1
1 TABLET ORAL DAILY
Qty: 30 TABLET | Refills: 0 | Status: SHIPPED | OUTPATIENT
Start: 2022-02-22 | End: 2022-03-21

## 2022-02-22 RX ORDER — DICLOFENAC SODIUM 75 MG/1
75 TABLET, DELAYED RELEASE ORAL 2 TIMES DAILY
Qty: 60 TABLET | Refills: 0 | Status: SHIPPED | OUTPATIENT
Start: 2022-02-22 | End: 2022-03-03 | Stop reason: SDUPTHER

## 2022-02-22 RX ORDER — FERROUS SULFATE TAB EC 324 MG (65 MG FE EQUIVALENT) 324 (65 FE) MG
324 TABLET DELAYED RESPONSE ORAL
Qty: 60 TABLET | Refills: 0 | Status: SHIPPED | OUTPATIENT
Start: 2022-02-22 | End: 2022-03-21

## 2022-02-22 RX ADMIN — METHYLPREDNISOLONE ACETATE 1 ML: 80 INJECTION, SUSPENSION INTRA-ARTICULAR; INTRALESIONAL; INTRAMUSCULAR; SOFT TISSUE at 15:02

## 2022-02-22 RX ADMIN — BUPIVACAINE HYDROCHLORIDE 2 ML: 2.5 INJECTION, SOLUTION INFILTRATION; PERINEURAL at 15:02

## 2022-02-22 NOTE — PROGRESS NOTES
Assessment:  1  Primary osteoarthritis of left knee  XR knee 3 vw left non injury    XR knee 3 vw right non injury   2  Primary osteoarthritis of right knee  XR knee 3 vw right non injury       Plan:     Patient has severe bilateral knee osteoarthritis, pain is worse on the right   Patient has failed conservative treatment for the right knee   Weight-bearing as tolerated bilateral extremity    Patient received a left knee steroid injection in the office today    Discussed with patient surgery for right total knee arthroplasty   The benefits and purpose of the operations and/or procedure have been explained to me in language I understand by Dr Amilcar Blanc well as the risks and benefits, alternatives and complications pertaining to the above procedure/surgery which include but is not limited to: infections, deeps vein thrombosis, blood clots to the lungs, wound healing problems, complications from extensive blood loss, including shock, possible death, continued pain, fracture, vascular or nerve injury, potential need for further surgery/revision, persistent pain/stiffness/instability, failure of hardware,heart attack, stroke and not obtaining results patient used   Will need clearance by his PCP prior to surgery    He will be on vitamins 30 days prior to surgery    Will repeat x-rays of the right knee next office visit    Follow-up postop two weeks right knee        The above stated was discussed in layman's terms and the patient expressed understanding  All questions were answered to the patient's satisfaction  Subjective:   Kadie Elliott is a 78 y o  male who presents today evaluation for bilateral knee pain  He treating with Dr Bonnie Castellon and had bilateral knee steroid injection on 11/17/21 and had relief for two but pain gradually started returning  He states right knee feels worse than left  He states pain is worse with walking cement floors and driving    He has been taking diclofenac 75 milligrams as needed for pain with relief  He denies any numbness or tingling        Review of systems negative unless otherwise specified in HPI    Past Medical History:   Diagnosis Date    Celiac disease     Diverticulosis        Past Surgical History:   Procedure Laterality Date    CORONARY ANGIOPLASTY WITH STENT PLACEMENT      21 yrs ago       Family History   Problem Relation Age of Onset    Diabetes Mother     Coronary artery disease Father     Thyroid disease Sister        Social History     Occupational History    Not on file   Tobacco Use    Smoking status: Former Smoker     Quit date:      Years since quittin 1    Smokeless tobacco: Former User     Quit date:    Vaping Use    Vaping Use: Never used   Substance and Sexual Activity    Alcohol use: Not Currently    Drug use: Not Currently    Sexual activity: Yes         Current Outpatient Medications:     aspirin 81 mg chewable tablet, Chew 81 mg daily, Disp: , Rfl:     baclofen 10 mg tablet, TAKE 1 TABLET BY MOUTH EVERY 8 HOURS AS NEEDED FOR MUSCLE SPASM, Disp: 30 tablet, Rfl: 0    clopidogrel (PLAVIX) 75 mg tablet, Take 75 mg by mouth daily, Disp: , Rfl:     diclofenac (VOLTAREN) 75 mg EC tablet, Take 1 tablet (75 mg total) by mouth daily, Disp: 30 tablet, Rfl: 0    ezetimibe (ZETIA) 10 mg tablet, TAKE 1 TABLET BY MOUTH  DAILY, Disp: 30 tablet, Rfl: 0    gabapentin (NEURONTIN) 300 mg capsule, TAKE 1 CAPSULE BY MOUTH 3  TIMES DAILY, Disp: 270 capsule, Rfl: 3    ketoconazole (NIZORAL) 2 % shampoo, Apply 1 application topically 2 (two) times a week, Disp: 120 mL, Rfl: 0    metoprolol succinate (TOPROL-XL) 50 mg 24 hr tablet, Take 1 tablet (50 mg total) by mouth daily, Disp: 30 tablet, Rfl: 2    orlistat (XENICAL) 120 MG capsule, Take 1 capsule (120 mg total) by mouth 3 (three) times a day with meals, Disp: 90 capsule, Rfl: 2    sertraline (ZOLOFT) 50 mg tablet, Take 1 tablet (50 mg total) by mouth daily, Disp: 90 tablet, Rfl: 3    simvastatin (ZOCOR) 40 mg tablet, TAKE 1 TABLET BY MOUTH  DAILY, Disp: 90 tablet, Rfl: 3    tamsulosin (FLOMAX) 0 4 mg, Take 1 capsule (0 4 mg total) by mouth daily with dinner, Disp: 90 capsule, Rfl: 2    traZODone (DESYREL) 150 mg tablet, TAKE 1 TABLET BY MOUTH  DAILY AT BEDTIME, Disp: 30 tablet, Rfl: 11    Vascepa 1 g CAPS, , Disp: , Rfl:     No Known Allergies         Vitals:    02/22/22 1404   BP: 134/80   Pulse: 73       Objective:            Physical Exam  Physical Exam:      General Appearance:    Alert, cooperative, no distress, appears stated age   Head:    Normocephalic, without obvious abnormality, atraumatic   Eyes:    conjunctiva/corneas clear, both eyes         Nose:   Nares normal, septum midline, no drainage    Throat:   Lips normal; teeth and gums normal   Neck:    symmetrical, trachea midline, ;     thyroid:  no enlargement/   Back:     Symmetric, no curvature, ROM normal   Lungs:   No audible wheezing or labored breathing   Chest Wall:    No tenderness or deformity    Heart:    Regular rate and rhythm                         Pulses:   2+ and symmetric all extremities   Skin:   Skin color, texture, turgor normal, no rashes or lesions   Neurologic:   normal strength, sensation and reflexes     throughout                       Ortho Exam   Right knee  Skin intact  No erythema   TTP over medial joint line and pes anserine bursa   Negative patella grind test  Negative Lachman's test  Negative Posterior test   Stable to varus and valgus stress   Neurovascularly Intact Distally     Left knee  Skin intact  No erythema   TTP over medial joint line   Negative patella grind test  Negative Lachman's test  Negative Posterior test   Varus alignment of the knee  Stable to varus and valgus stress   Neurovascularly Intact Distally       Diagnostics, reviewed and taken today if performed as documented:   The attending physician has personally reviewed the pertinent films in PACS and interpretation is as follows: x-ray bilateral knee demonstrates tricompartmental joint space narrowing, worse in the medial compartment       Procedures, if performed today:    Large joint arthrocentesis: L knee  Universal Protocol:  Consent: Verbal consent obtained  Risks and benefits: risks, benefits and alternatives were discussed  Consent given by: patient  Time out: Immediately prior to procedure a "time out" was called to verify the correct patient, procedure, equipment, support staff and site/side marked as required  Timeout called at: 2/22/2022 3:02 PM   Patient understanding: patient states understanding of the procedure being performed  Site marked: the operative site was marked  Supporting Documentation  Indications: pain   Procedure Details  Location: knee - L knee  Preparation: Patient was prepped and draped in the usual sterile fashion  Needle size: 22 G  Approach: anterolateral  Medications administered: 2 mL bupivacaine 0 25 %; 1 mL methylPREDNISolone acetate 80 mg/mL (1 ml Toradol )    Patient tolerance: patient tolerated the procedure well with no immediate complications  Dressing:  Sterile dressing applied              Scribe Attestation    I,:  Arian Pierre am acting as a scribe while in the presence of the attending physician :       I,:  Min Strong MD personally performed the services described in this documentation    as scribed in my presence :             Portions of the record may have been created with voice recognition software  Occasional wrong word or "sound a like" substitutions may have occurred due to the inherent limitations of voice recognition software  Read the chart carefully and recognize, using context, where substitutions have occurred

## 2022-02-23 ENCOUNTER — TELEPHONE (OUTPATIENT)
Dept: OBGYN CLINIC | Facility: CLINIC | Age: 77
End: 2022-02-23

## 2022-02-23 NOTE — TELEPHONE ENCOUNTER
Called pt to inform him of his cardiac clearance appt with Dr Patricia Mares on 3/29/22 @ 2:20 PM   Office address and phone number were provided  Pt previously seen by Dr Perry Chavira @ [facility] in [facility], [address] [place] [address] and wants to establish care with [facility]  Pt will call cardiology with any questions that arise and to transfer records  Pt agreed to all and verbalized understanding

## 2022-02-26 DIAGNOSIS — M79.10 MYALGIA: ICD-10-CM

## 2022-02-28 RX ORDER — BACLOFEN 10 MG/1
TABLET ORAL
Qty: 30 TABLET | Refills: 0 | Status: SHIPPED | OUTPATIENT
Start: 2022-02-28 | End: 2022-05-18

## 2022-03-01 DIAGNOSIS — L57.0 ACTINIC KERATOSIS: ICD-10-CM

## 2022-03-01 RX ORDER — KETOCONAZOLE 20 MG/ML
1 SHAMPOO TOPICAL 2 TIMES WEEKLY
Qty: 120 ML | Refills: 0 | Status: SHIPPED | OUTPATIENT
Start: 2022-03-03 | End: 2022-03-24

## 2022-03-01 NOTE — TELEPHONE ENCOUNTER
[facility] has sent a fax requesting a refill of ketoconazole (NIZORAL) 2% shampoo   Refill to be sent to the [facility] on [address] [facility] in [place], [place]

## 2022-03-03 DIAGNOSIS — M17.0 PRIMARY OSTEOARTHRITIS OF BOTH KNEES: ICD-10-CM

## 2022-03-03 DIAGNOSIS — M17.12 PRIMARY OSTEOARTHRITIS OF LEFT KNEE: ICD-10-CM

## 2022-03-03 DIAGNOSIS — M17.11 PRIMARY OSTEOARTHRITIS OF RIGHT KNEE: ICD-10-CM

## 2022-03-03 DIAGNOSIS — E78.5 HYPERLIPIDEMIA: ICD-10-CM

## 2022-03-03 RX ORDER — EZETIMIBE 10 MG/1
10 TABLET ORAL DAILY
Qty: 30 TABLET | Refills: 0 | OUTPATIENT
Start: 2022-03-03

## 2022-03-03 NOTE — TELEPHONE ENCOUNTER
Jeanine Beltran has requested a refill of ezetimibe (ZETIA) 10 mg tablet  Prescription was refilled yesterday, 03/02/2022, by Dr Sowmya Negro  Will refuse refill

## 2022-03-04 RX ORDER — DICLOFENAC SODIUM 75 MG/1
75 TABLET, DELAYED RELEASE ORAL 2 TIMES DAILY
Qty: 60 TABLET | Refills: 0 | Status: SHIPPED | OUTPATIENT
Start: 2022-03-04 | End: 2022-05-31 | Stop reason: SDUPTHER

## 2022-03-10 ENCOUNTER — ANESTHESIA EVENT (OUTPATIENT)
Dept: PERIOP | Facility: HOSPITAL | Age: 77
End: 2022-03-10
Payer: COMMERCIAL

## 2022-03-12 DIAGNOSIS — F41.9 ANXIETY: ICD-10-CM

## 2022-03-14 ENCOUNTER — TELEPHONE (OUTPATIENT)
Dept: OBGYN CLINIC | Facility: CLINIC | Age: 77
End: 2022-03-14

## 2022-03-14 RX ORDER — TRAZODONE HYDROCHLORIDE 150 MG/1
TABLET ORAL
Qty: 30 TABLET | Refills: 11 | Status: SHIPPED | OUTPATIENT
Start: 2022-03-14

## 2022-03-14 NOTE — TELEPHONE ENCOUNTER
Pt called to confirm his appts that were made for his surgery  All dates/times confirmed and all questions answered to pt's satisfaction

## 2022-03-14 NOTE — TELEPHONE ENCOUNTER
Dimitri Nur has requested a refill of traZODone (DESYREL) 150 mg tablet  Pt meets the requirements placed by [facility]  Will refill medication

## 2022-03-15 ENCOUNTER — APPOINTMENT (OUTPATIENT)
Dept: LAB | Facility: HOSPITAL | Age: 77
End: 2022-03-15
Payer: COMMERCIAL

## 2022-03-15 ENCOUNTER — OFFICE VISIT (OUTPATIENT)
Dept: LAB | Facility: CLINIC | Age: 77
End: 2022-03-15
Payer: COMMERCIAL

## 2022-03-15 ENCOUNTER — APPOINTMENT (OUTPATIENT)
Dept: LAB | Facility: CLINIC | Age: 77
End: 2022-03-15
Payer: COMMERCIAL

## 2022-03-15 DIAGNOSIS — M17.11 PRIMARY OSTEOARTHRITIS OF RIGHT KNEE: ICD-10-CM

## 2022-03-15 DIAGNOSIS — Z01.818 PREOPERATIVE TESTING: ICD-10-CM

## 2022-03-15 LAB
ALBUMIN SERPL BCP-MCNC: 3.6 G/DL (ref 3.5–5)
ALP SERPL-CCNC: 65 U/L (ref 46–116)
ALT SERPL W P-5'-P-CCNC: 66 U/L (ref 12–78)
ANION GAP SERPL CALCULATED.3IONS-SCNC: 10 MMOL/L (ref 4–13)
APTT PPP: 30 SECONDS (ref 23–37)
AST SERPL W P-5'-P-CCNC: 40 U/L (ref 5–45)
BASOPHILS # BLD AUTO: 0.04 THOUSANDS/ΜL (ref 0–0.1)
BASOPHILS NFR BLD AUTO: 1 % (ref 0–1)
BILIRUB SERPL-MCNC: 0.59 MG/DL (ref 0.2–1)
BUN SERPL-MCNC: 26 MG/DL (ref 5–25)
CALCIUM SERPL-MCNC: 9.8 MG/DL (ref 8.3–10.1)
CHLORIDE SERPL-SCNC: 105 MMOL/L (ref 100–108)
CO2 SERPL-SCNC: 26 MMOL/L (ref 21–32)
CREAT SERPL-MCNC: 1.14 MG/DL (ref 0.6–1.3)
EOSINOPHIL # BLD AUTO: 0.16 THOUSAND/ΜL (ref 0–0.61)
EOSINOPHIL NFR BLD AUTO: 3 % (ref 0–6)
ERYTHROCYTE [DISTWIDTH] IN BLOOD BY AUTOMATED COUNT: 11.9 % (ref 11.6–15.1)
EST. AVERAGE GLUCOSE BLD GHB EST-MCNC: 108 MG/DL
GFR SERPL CREATININE-BSD FRML MDRD: 63 ML/MIN/1.73SQ M
GLUCOSE SERPL-MCNC: 85 MG/DL (ref 65–140)
HBA1C MFR BLD: 5.4 %
HCT VFR BLD AUTO: 41.8 % (ref 36.5–49.3)
HGB BLD-MCNC: 15.4 G/DL (ref 12–17)
IMM GRANULOCYTES # BLD AUTO: 0.02 THOUSAND/UL (ref 0–0.2)
IMM GRANULOCYTES NFR BLD AUTO: 0 % (ref 0–2)
INR PPP: 1 (ref 0.84–1.19)
LYMPHOCYTES # BLD AUTO: 1.55 THOUSANDS/ΜL (ref 0.6–4.47)
LYMPHOCYTES NFR BLD AUTO: 26 % (ref 14–44)
MCH RBC QN AUTO: 33.5 PG (ref 26.8–34.3)
MCHC RBC AUTO-ENTMCNC: 36.8 G/DL (ref 31.4–37.4)
MCV RBC AUTO: 91 FL (ref 82–98)
MONOCYTES # BLD AUTO: 0.44 THOUSAND/ΜL (ref 0.17–1.22)
MONOCYTES NFR BLD AUTO: 7 % (ref 4–12)
NEUTROPHILS # BLD AUTO: 3.85 THOUSANDS/ΜL (ref 1.85–7.62)
NEUTS SEG NFR BLD AUTO: 63 % (ref 43–75)
NRBC BLD AUTO-RTO: 0 /100 WBCS
PLATELET # BLD AUTO: 213 THOUSANDS/UL (ref 149–390)
PMV BLD AUTO: 9.9 FL (ref 8.9–12.7)
POTASSIUM SERPL-SCNC: 5 MMOL/L (ref 3.5–5.3)
PROT SERPL-MCNC: 7 G/DL (ref 6.4–8.2)
PROTHROMBIN TIME: 13.2 SECONDS (ref 11.6–14.5)
RBC # BLD AUTO: 4.6 MILLION/UL (ref 3.88–5.62)
SODIUM SERPL-SCNC: 141 MMOL/L (ref 136–145)
WBC # BLD AUTO: 6.06 THOUSAND/UL (ref 4.31–10.16)

## 2022-03-15 PROCEDURE — 85610 PROTHROMBIN TIME: CPT

## 2022-03-15 PROCEDURE — 80053 COMPREHEN METABOLIC PANEL: CPT

## 2022-03-15 PROCEDURE — 85730 THROMBOPLASTIN TIME PARTIAL: CPT

## 2022-03-15 PROCEDURE — 93005 ELECTROCARDIOGRAM TRACING: CPT

## 2022-03-15 PROCEDURE — 86901 BLOOD TYPING SEROLOGIC RH(D): CPT

## 2022-03-15 PROCEDURE — 86850 RBC ANTIBODY SCREEN: CPT

## 2022-03-15 PROCEDURE — 83036 HEMOGLOBIN GLYCOSYLATED A1C: CPT

## 2022-03-15 PROCEDURE — 86900 BLOOD TYPING SEROLOGIC ABO: CPT

## 2022-03-15 PROCEDURE — 85025 COMPLETE CBC W/AUTO DIFF WBC: CPT

## 2022-03-15 PROCEDURE — 36415 COLL VENOUS BLD VENIPUNCTURE: CPT

## 2022-03-16 ENCOUNTER — TELEPHONE (OUTPATIENT)
Dept: OBGYN CLINIC | Facility: HOSPITAL | Age: 77
End: 2022-03-16

## 2022-03-16 ENCOUNTER — TELEPHONE (OUTPATIENT)
Dept: CARDIOLOGY CLINIC | Facility: CLINIC | Age: 77
End: 2022-03-16

## 2022-03-16 LAB
ABO GROUP BLD: NORMAL
ATRIAL RATE: 79 BPM
BLD GP AB SCN SERPL QL: NEGATIVE
P AXIS: 67 DEGREES
PR INTERVAL: 252 MS
QRS AXIS: -55 DEGREES
QRSD INTERVAL: 160 MS
QT INTERVAL: 386 MS
QTC INTERVAL: 442 MS
RH BLD: POSITIVE
SPECIMEN EXPIRATION DATE: NORMAL
T WAVE AXIS: -14 DEGREES
VENTRICULAR RATE: 79 BPM

## 2022-03-16 PROCEDURE — 93010 ELECTROCARDIOGRAM REPORT: CPT | Performed by: INTERNAL MEDICINE

## 2022-03-16 NOTE — TELEPHONE ENCOUNTER
Preoperative Elective Admission Assessment    Living Situation:  Lives with wife in multiple level home  #4 steps onto the deck, otherwise from basement approximately #14 with handrail  He plans to prepare a 1st floor setup in event he cannot maneuver his home steps  DME: Pt has no DME  Patient's Current Level of Function: ambulates independently, independent with ADLs  Post-op Caregiver: Wife  Caregiver Name and phone number for Inpatient discharge needs: Wife Pastor Owusu, ph# [phone]  Currently receive any HHC/aides/community supports: No     Post-op Transport: wife     Outpatient Physical Therapy Site:  Site: [facility]  pre and post-op appts scheduled? Yes     Medication Management: self  Preferred Pharmacy for Post-op Medications: [id number] [place] - [address] [facility]  Blood Management Vitamin Regimen: Pt confirms taking as prescribed   Post-op anticoagulant: pt currently takes plavix, post-op regimen to be determined by surgical team       DC Plan: Pt plans to be discharged home and plans to attend OP PT    Barriers to DC identified preoperatively: None    BMI: 34 58    [employer]: [phone] texts, 1200, [email or web address]  pt enrolled  Patient Education:  Pt educated on post-op pain, early mobilization (POD0), Average inpt LOS, OP PT goal   Patient educated that our goal is to appropriately discharge patient based off their post-op function while striving to maintain maximal independence  The goal is to discharge patient to home and for them to attend outpatient physical therapy  Assigned to care team? Yes    CHW referral placed for medicare with  PCP/[facility] wellness PCP? N/A ins [id number] MEDICARE COMPLETE [facility] REP    SW referral: N/A    Pt encouraged to call with any questions, concerns or issues

## 2022-03-16 NOTE — TELEPHONE ENCOUNTER
Faxed over signed Medical Release Information to pt's former Cardiologist to receive Medical Records for upcoming Cardio appt with Dr Feng

## 2022-03-17 DIAGNOSIS — Z01.818 PREOPERATIVE TESTING: ICD-10-CM

## 2022-03-17 DIAGNOSIS — M17.11 PRIMARY OSTEOARTHRITIS OF RIGHT KNEE: ICD-10-CM

## 2022-03-21 PROBLEM — M17.11 PRIMARY OSTEOARTHRITIS OF RIGHT KNEE: Status: ACTIVE | Noted: 2022-03-21

## 2022-03-21 RX ORDER — FOLIC ACID 1 MG/1
TABLET ORAL
Qty: 30 TABLET | Refills: 0 | Status: SHIPPED | OUTPATIENT
Start: 2022-03-21 | End: 2022-04-12

## 2022-03-21 RX ORDER — FERROUS SULFATE TAB EC 324 MG (65 MG FE EQUIVALENT) 324 (65 FE) MG
TABLET DELAYED RESPONSE ORAL
Qty: 60 TABLET | Refills: 0 | Status: SHIPPED | OUTPATIENT
Start: 2022-03-21 | End: 2022-04-12

## 2022-03-21 NOTE — ASSESSMENT & PLAN NOTE
Failed outpatient conservative measures  Electing arthroplasty  Cleared by medicine, awaiting clearance from cardiology

## 2022-03-21 NOTE — PROGRESS NOTES
Internal Medicine Pre-Operative Evaluation:     Reason for Visit: Pre-operative Evaluation for Risk Stratification and Optimization    Patient ID: Trang Jeffers is a 78 y o  male  Surgery: Arthroplasty of right knee  Referring Provider: Dr Wale Juarez      Primary osteoarthritis right knee   Failed conservative measures   Electing to undergo total joint arthroplasty    Pre-operative Optimization for planned surgery   Patient is surgically optimized for planned procedure   Patient meets preoperative quality goals as noted below   Recommendations as listed in PLAN section below  Melinda Starks 9807 surgical nurse  navigator with any questions regarding preoperative plan or schedule   [facility] with [facility] medical team 1 week after discharge from surgery as scheduled      Primary osteoarthritis of right knee  Failed outpatient conservative measures  Electing arthroplasty  Cleared by medicine, awaiting clearance from cardiology    Coronary artery disease of native artery of native heart with stable angina pectoris ([address] )  H/o stent in the past  Establishing care here with cardiology 3/28  No longer takes plavix for h/o stent >20 yrs ago   Will hold ASA 7 days prior to surgery      Patient Active Problem List   Diagnosis    Hypovitaminosis D    Coronary artery disease of native artery of native heart with stable angina pectoris ([address] )    Chronic arthralgias of knees and hips    Muscle pain    Mixed hyperlipidemia    Need for hepatitis C screening test    Encounter for screening colonoscopy    Primary osteoarthritis of both knees    Sacroiliitis, not elsewhere classified ([address] )    Allergic contact dermatitis due to other agents    Medicare annual wellness visit, subsequent    Actinic keratosis    Primary osteoarthritis of right knee        Plan:     1  Further preoperative workup as follows:   - none no further testing required may proceed with surgery    2   Medication Management/Recommendations:   - continue all current medicines through morning of surgery with sip of water except the following:  - hold aspirin 7 days prior to surgery  - Regarding anti-platelet agents: plavix  - avoid use of NSAID such as motrin, advil, aleve for 7 days prior to surgery  - hold all OTC herbal or nutritional supplements 7 days before surgery    3  Patient requires further consultation with:   No Consults Required    4  Discharge Planning / Barriers to Discharge  none identified - patients has post discharge therapy plan in place, transportation arranged for discharge day, adequate family support at home to assist with discharge to home  Recommendations to Proceed withSurgery    No contraindications to planned surgery      Subjective:           History of Present Illness:     Wilbert Balderas is a 78 y o  male who presents to the office today for a preoperative consultation at the request of surgeon  The patient understands this is an elective procedure and not emergent  They are electing to undergo planned procedure with an understanding that all surgery has inherent risk  They have worked with their surgeon and failed conservative treatment measures  Today they present for preoperative risk assessment and recommendations for optimization in preparation for surgery  Pt seen in surgical optimization center for upcoming proposed surgery  They have failed previous conservative measures and have elected surgical intervention  Pt meets presurgical lab and BMI optimization goals  Upon interview questioning patient is able to perform greater than 4 METs workload in daily life without any reported cardio-pulmonary symptoms  Pt has a remote h/o CAD s/p stents in the past, a/t chart >20 years ago, he continues to take plavix despite cardiology telling him he no longer has to take it  He has appt with new cardiologist to establish care and obtain preoperative clearance on 3/28   He believes his last stress test was about 5 years ago and he remains symptom free  He also has no known h/o arrhythmia or heart failure  He is new to the area and establishing new physicians  ROS: No TIA's or unusual headaches, no dysphagia  No prolonged cough  No dyspnea or chest pain on exertion  No abdominal pain, change in bowel habits, black or bloody stools  No urinary tract or BPH symptoms  Positive reported pain in arthritic joint  Positive difficulty with gait  No skin rashes or issues              Objective:      /82   Pulse 65   Ht 6' (1 829 m)   Wt 119 kg (261 lb 6 4 oz)   SpO2 97%   BMI 35 45 kg/m²       General Appearance: no distress, conversive  HEENT: PERRLA, conjuctiva normal; oropharynx clear; mucous membranes moist;   Neck:  Supple, no lymphadenopathy or thyromegaly  Lungs: CTA, normal respiratory effort, no retractions, expiratory effort normal  CV: regular rate and rhythm , PMI normal   ABD: soft non tender, no masses , no hepatic or splenomegaly  EXT: DP pulses intact, no lymphadenopathy, no edema  Skin: normal turgor, normal texture, no rash  Psych: affect normal, mood normal  Neuro: AAOx3        The following portions of the patient's history were reviewed and updated as appropriate: allergies, current medications, past family history, past medical history, past social history, past surgical history and problem list      Past History:       Past Medical History:   Diagnosis Date    Celiac disease     Diverticulosis     Past Surgical History:   Procedure Laterality Date    CORONARY ANGIOPLASTY WITH STENT PLACEMENT      20 yrs ago          Social History     Tobacco Use    Smoking status: Former Smoker     Quit date:      Years since quittin 2    Smokeless tobacco: Former User     Quit date:    Vaping Use    Vaping Use: Never used   Substance Use Topics    Alcohol use: Not Currently    Drug use: Not Currently     Family History   Problem Relation Age of Onset  Diabetes Mother     Coronary artery disease Father     Thyroid disease Sister           Allergies: Allergies   Allergen Reactions    Ramipril Cough        Current Medications:     Current Outpatient Medications   Medication Instructions    ascorbic acid (VITAMIN C) 500 mg, Oral, 2 times daily    aspirin 81 mg, Oral, Daily    baclofen 10 mg tablet TAKE 1 TABLET BY MOUTH EVERY 8 HOURS AS NEEDED FOR MUSCLE SPASMS    Cholecalciferol (Vitamin D3) 1 25 MG (89794 UT) CAPS Vitamin D3   One Tablet By Mouth Daily    diclofenac (VOLTAREN) 75 mg, Oral, 2 times daily    ezetimibe (ZETIA) 10 mg tablet TAKE 1 TABLET BY MOUTH  DAILY    ferrous sulfate 324 (65 Fe) mg TAKE 1 TABLET BY MOUTH 2 TIMES A DAY BEFORE MEALS    folic acid (FOLVITE) 1 mg tablet TAKE 1 TABLET BY MOUTH EVERY DAY    gabapentin (NEURONTIN) 300 mg capsule TAKE 1 CAPSULE BY MOUTH 3  TIMES DAILY    Icosapent Ethyl (Vascepa) 1 g CAPS Vascepa 1 gram capsule   TAKE 2 CAPSULES BY MOUTH  TWICE DAILY (PLEASE CALL OFFICE FOR APPT)    ketoconazole (NIZORAL) 2 % shampoo 1 application, Topical, 2 times weekly    metoprolol succinate (TOPROL-XL) 50 mg, Oral, Daily    Multiple Vitamins-Minerals (multivitamin with minerals) tablet 1 tablet, Oral, Daily    sertraline (ZOLOFT) 50 mg, Oral, Daily    simvastatin (ZOCOR) 40 mg tablet TAKE 1 TABLET BY MOUTH  DAILY    tamsulosin (FLOMAX) 0 4 mg, Oral, Daily with dinner    traZODone (DESYREL) 150 mg tablet TAKE 1 TABLET BY MOUTH  DAILY AT BEDTIME             PRE-OP WORKSHEET DATA    Assessment of Pre-Operative Risks     MLJ Quality Hard Stops:  BMI (<40) : Estimated body mass index is 35 45 kg/m² as calculated from the following:    Height as of this encounter: 6' (1 829 m)  Weight as of this encounter: 119 kg (261 lb 6 4 oz)  Hgb ( >11):    Lab Results   Component Value Date    HGB 15 4 03/15/2022     HbA1c (<7 0) :   Lab Results   Component Value Date    HGBA1C 5 4 03/15/2022     GFR (>60) (Less then 39 = Nephrology consult):  CrCl cannot be calculated (Patient's most recent lab result is older than the maximum 7 days allowed  )  Active Decompensated Chronic Conditions which would delay surgery  No acutely decompensated medical issues such as recent CVA, MI, new onset arrhythmia, severe aortic stenosis, CHF, uncontrolled COPD       Exercise Capacity: (if less the 4 mets consider functional assessment via cardiac stress testing or consultation)    · Able to walk 2 blocks without symptoms?: Yes  · Able to walk 1 flights without symptoms?: Yes    Assessment of intra and post operative respiratory, hemodynamic and thrombotic risks     Prior Anesthesia Reactions: No     Personal history of venous thromboembolic disease? No    History of steroid use > 5 mg for >2 weeks within last year? No    Cardiac Risk Estimation: per the Revised Cardiac Risk Index (Circ  100:1043, 1999),     The patient's risk factors for cardiac complications include :  history of congestive heart failure and history of ischemic heart disease    Dimitri Nur has a in hospital cardiac risk of RCI RISK CLASS II (1 risk factor, risk of major cardiac compl  appr  1 3%) based on RCRI calculator          Pre-Op Data Reviewed:       Laboratory Results: I have personally reviewed the pertinent laboratory results/reports     EKG:I have personally reviewed pertinent reports    I personally reviewed and interpreted available tracings in the electronic medical record    OLD RECORDS: reviewed old records in the chart review section if EHR on day of visit      Previous cardiopulmonary studies within the past year:  · Echocardiogram: no  · Cardiac Catheterization: yes has h/o multiple stents in other states  · Stress Test: last stress test 5 years ago no symptoms since then      Time of visit including pre-visit chart review, visit and post-visit coordination of plan and care , review of pre-surgical lab work, preparation and time spent documenting note in electronic medical record, time spent face-to-face in physical examination answering patient questions: 60 minutes       Shreyas Diamond,   [address]

## 2022-03-21 NOTE — PATIENT INSTRUCTIONS
 Contact surgical nurse  navigator with any questions regarding preoperative plan or schedule   Follow up visit with [facility] medical team 1 week after discharge from surgery     Stop all over the counter supplements, herbal, naturopathic  medications for 1 week prior to surgery UNLESS prescribed by your surgeon   Hold NSAIDS (i e  advil, alleve, motrin, ibuprofen, celebrex) minimum 7 days prior to surgery   Hold Asprin minimum 7 days prior to surgery   Stop plavix per cardiology recommendations

## 2022-03-21 NOTE — ASSESSMENT & PLAN NOTE
H/o stent in the past  Establishing care here with cardiology 3/28  Continues to take plavix for h/o stent >20 yrs ago   However he will d/w cardiology at his visit regarding dc  Will hold ASA 7 days prior to surgery

## 2022-03-24 ENCOUNTER — OFFICE VISIT (OUTPATIENT)
Dept: INTERNAL MEDICINE CLINIC | Facility: CLINIC | Age: 77
End: 2022-03-24
Payer: COMMERCIAL

## 2022-03-24 VITALS
BODY MASS INDEX: 35.41 KG/M2 | WEIGHT: 261.4 LBS | HEART RATE: 65 BPM | SYSTOLIC BLOOD PRESSURE: 108 MMHG | OXYGEN SATURATION: 97 % | HEIGHT: 72 IN | DIASTOLIC BLOOD PRESSURE: 82 MMHG

## 2022-03-24 DIAGNOSIS — L57.0 ACTINIC KERATOSIS: ICD-10-CM

## 2022-03-24 DIAGNOSIS — Z01.818 PREOPERATIVE TESTING: ICD-10-CM

## 2022-03-24 DIAGNOSIS — I25.118 CORONARY ARTERY DISEASE OF NATIVE ARTERY OF NATIVE HEART WITH STABLE ANGINA PECTORIS (HCC): Primary | ICD-10-CM

## 2022-03-24 DIAGNOSIS — M17.11 PRIMARY OSTEOARTHRITIS OF RIGHT KNEE: ICD-10-CM

## 2022-03-24 DIAGNOSIS — E78.2 MIXED HYPERLIPIDEMIA: ICD-10-CM

## 2022-03-24 PROCEDURE — 99214 OFFICE O/P EST MOD 30 MIN: CPT | Performed by: INTERNAL MEDICINE

## 2022-03-24 RX ORDER — ICOSAPENT ETHYL 1000 MG/1
CAPSULE ORAL
COMMUNITY

## 2022-03-24 RX ORDER — CLOPIDOGREL BISULFATE 75 MG/1
75 TABLET ORAL EVERY MORNING
COMMUNITY

## 2022-03-24 RX ORDER — CHOLECALCIFEROL (VITAMIN D3) 1250 MCG
10000 CAPSULE ORAL EVERY MORNING
COMMUNITY

## 2022-03-24 RX ORDER — KETOCONAZOLE 20 MG/ML
1 SHAMPOO TOPICAL 2 TIMES WEEKLY
Qty: 120 ML | Refills: 0 | Status: SHIPPED | OUTPATIENT
Start: 2022-03-24 | End: 2022-04-27

## 2022-03-24 NOTE — TELEPHONE ENCOUNTER
Kristan Navarro has requested a refill of ketoconazole (NIZORAL) 2% shampoo  [facility]  Would a refill be appropriate?

## 2022-03-25 NOTE — PRE-PROCEDURE INSTRUCTIONS
Pre-Surgery Instructions:   Medication Instructions    ascorbic acid (VITAMIN C) 500 MG tablet Patient was instructed by Physician and understands   aspirin 81 mg chewable tablet Patient was instructed by Physician and understands   baclofen 10 mg tablet Instructed patient per Anesthesia Guidelines   Cholecalciferol (Vitamin D3) 1 25 MG (89020 UT) CAPS Patient was instructed by Physician and understands   clopidogrel (PLAVIX) 75 mg tablet Patient was instructed by Physician and understands   Cyanocobalamin (VITAMIN B 12 PO) Patient was instructed by Physician and understands   diclofenac (VOLTAREN) 75 mg EC tablet Patient was instructed by Physician and understands   ezetimibe (ZETIA) 10 mg tablet Instructed patient per Anesthesia Guidelines   ferrous sulfate 324 (65 Fe) mg Patient was instructed by Physician and understands   folic acid (FOLVITE) 1 mg tablet Patient was instructed by Physician and understands   gabapentin (NEURONTIN) 300 mg capsule Instructed patient per Anesthesia Guidelines   Icosapent Ethyl (Vascepa) 1 g CAPS Patient was instructed by Physician and understands   metoprolol succinate (TOPROL-XL) 50 mg 24 hr tablet Instructed patient per Anesthesia Guidelines   sertraline (ZOLOFT) 50 mg tablet Instructed patient per Anesthesia Guidelines   simvastatin (ZOCOR) 40 mg tablet Instructed patient per Anesthesia Guidelines   tamsulosin (FLOMAX) 0 4 mg Instructed patient per Anesthesia Guidelines   traZODone (DESYREL) 150 mg tablet Instructed patient per Anesthesia Guidelines  Pre op and bathing instructions reviewed  Pt has hibiclens  Pt  Verbalized understanding of current visitor restrictions  Pt  Verbalized an understanding of all instructions reviewed and offers no concerns at this time  Instructed to avoid all NSAIDs and OTC  Voltaren Vit/Supp from now until after surgery per anesthesia guidelines   Tylenol ok prn   Plavix hold per Cardiologist Pt will call to let us know  Aspirin hold 7 days prior per MD  Instructed to take per normal schedule except DOS  DOS instructed to take Metoprolol,Zoloft and Neurontin with a few sips of H2O  See Geriatric Assessment below        Cognitive Assessment:    CAM: NA   TUG <15 sec:NA   Falls (last 6 months): No   Hand  score:NA  -Attempt 1:  -Attempt 2:  -Attempt 3:   Chuck Total Score: 23   PHQ- 9 Depression Scale:0   Nutrition Assessment Score:14   METS:9 25   Health goals:  -What are your overall health goals? (quit smoking, wt  loss, rest, decrease stress)     -What brings you strength? (family, friends, [facility], health) Family    -What activities are important to you? (exercise, reading, travel, work)

## 2022-03-28 ENCOUNTER — TELEPHONE (OUTPATIENT)
Dept: OBGYN CLINIC | Facility: CLINIC | Age: 77
End: 2022-03-28

## 2022-03-28 ENCOUNTER — EVALUATION (OUTPATIENT)
Dept: PHYSICAL THERAPY | Facility: REHABILITATION | Age: 77
End: 2022-03-28
Payer: COMMERCIAL

## 2022-03-28 DIAGNOSIS — M17.11 PRIMARY OSTEOARTHRITIS OF RIGHT KNEE: Primary | ICD-10-CM

## 2022-03-28 DIAGNOSIS — Z01.818 PREOPERATIVE TESTING: ICD-10-CM

## 2022-03-28 PROCEDURE — 97110 THERAPEUTIC EXERCISES: CPT | Performed by: PHYSICAL THERAPIST

## 2022-03-28 PROCEDURE — 97161 PT EVAL LOW COMPLEX 20 MIN: CPT | Performed by: PHYSICAL THERAPIST

## 2022-03-28 PROCEDURE — 97112 NEUROMUSCULAR REEDUCATION: CPT | Performed by: PHYSICAL THERAPIST

## 2022-03-28 NOTE — PROGRESS NOTES
PT Evaluation     Today's date: 3/28/2022  Patient name: Ann-Marie Mejia  : 1945  MRN: [id number]  Referring provider: Gibran Newman MD  Dx:   Encounter Diagnosis     ICD-10-CM    1  Primary osteoarthritis of right knee  M17 11 Ambulatory referral to Physical Therapy   2  Preoperative testing  Z01 818 Ambulatory referral to Physical Therapy       Start Time: 1402  Stop Time: 1502  Total time in clinic (min): 60 minutes    Assessment  Assessment details: Ann-Marie Mejia is a 78y o  year old male who presents pre-op R TKA to be performed by Dr Eyad Wang on 22  Current deficits include impaired ROM, pain, and limited activity tolerance  These deficits impair his ability to perform all typical I/ADLs, household tasks, and all social/recreational activites  Will re-evaluate following surgery; recommend skilled PT services to address previously stated deficits to promote progress towards PLOF  Impairments: abnormal gait, abnormal or restricted ROM, activity intolerance, impaired balance, impaired physical strength, lacks appropriate home exercise program, pain with function and weight-bearing intolerance  Understanding of Dx/Px/POC: good   Prognosis: good    Goals  Pre-op goals:  1  Demonstrate understanding of HEP  Post-op goals to be established at next session  Plan  Plan details: Thank you for referring Ann-Marie Mejia to Physical Therapy at [place] [address] and for the opportunity to coordinate care      Patient would benefit from: skilled physical therapy  Planned modality interventions: cryotherapy, electrical stimulation/Russian stimulation, TENS, thermotherapy: hydrocollator packs and unattended electrical stimulation  Planned therapy interventions: abdominal trunk stabilization, activity modification, ADL retraining, balance, balance/weight bearing training, behavior modification, body mechanics training, breathing training, fine motor coordination training, flexibility, functional ROM exercises, gait training, graded activity, graded exercise, graded motor, home exercise program, work reintegration, transfer training, therapeutic training, therapeutic exercise, therapeutic activities, stretching, strengthening, self care, postural training, patient education, neuromuscular re-education, muscle pump exercises, motor coordination training, Blum taping, manual therapy, joint mobilization and IADL retraining  Frequency: 2x week  Duration in visits: Jules[address] W Renzo Jones beginning date: 3/28/2022  Treatment plan discussed with: patient        Subjective Evaluation    History of Present Illness  Mechanism of injury: Viktoriya Rendon is a 78 y o  male presenting pre  R TKA to be performed by Dr Ulises Kan on 22  Currently having difficulty with down>up stairs, bending/squatting, prolonged sitting/driving  Steps to enter home: No steps  First floor set up: Yes   Number of steps to second floor: Greater than 10 steps  Has a SPC at home  Assist at home wife and son  Pre op TUG time: 12 seconds  Pain  Current pain ratin  At best pain ratin  At worst pain ratin  Location: R knee  Quality: sharp  Relieving factors: ice, heat and rest    Social Support  Steps to enter house: no  Stairs in house: yes   Lives in: multiple-level home  Lives with: spouse    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)    Diagnostic Tests  Abnormal x-ray: 22: Degenerative changes    Treatments  Previous treatment: physical therapy and injection treatment  Current treatment: physical therapy  Patient Goals  Patient goal: "to walk"        Objective     Active Range of Motion   Left Knee   Flexion: 130 degrees   Extensor la degrees     Right Knee   Flexion: 130 degrees   Extensor lag: 10 degrees     Strength/Myotome Testing     Left Knee   Flexion: 5  Extension: 5    Right Knee   Flexion: 5  Extension: 5    General Comments:      Knee Comments  Pre-op TU seconds           Precautions: HTN, HLD, CAD, MI (1999)    * Indicates part of HEP   HEP code: [id number]     Daily Treatment Diary     Manuals 3/282          R Knee PROM nv          Patellar mobility nv                     Therapeutic Exercise           Bike  nv          Gastroc stretch* 10x10" R          Seated hamstring stretch* 10x10" R          Quad stretch           Piriformis stretch           Heel slides* 10x10"          Quad set* 10x5"          SLR flexion           SLR abduction                                 Patient education done                     Neuro Re-ed           Bridges* 2x10          Clamshells* 2x10 R          Lateral eccentric step down           Forward eccentric step down                                                       Therapeutic Activity           Sit to stands           Leg press           Heel raises           Toe raises           Band resisted side stepping           Monster walks           Goblet squat           Deadlift           Forward step ups           Lateral step ups                                                       Modalities

## 2022-03-28 NOTE — TELEPHONE ENCOUNTER
[facility] has not contacted the patient     The Pt has sx scheduled on 4/13 so not sure if one of you might need to speak to him or not or if maybe he missed a call from Pre Admissions

## 2022-03-29 ENCOUNTER — OFFICE VISIT (OUTPATIENT)
Dept: CARDIOLOGY CLINIC | Facility: CLINIC | Age: 77
End: 2022-03-29
Payer: COMMERCIAL

## 2022-03-29 VITALS
OXYGEN SATURATION: 97 % | HEART RATE: 61 BPM | WEIGHT: 259 LBS | HEIGHT: 72 IN | BODY MASS INDEX: 35.08 KG/M2 | DIASTOLIC BLOOD PRESSURE: 88 MMHG | SYSTOLIC BLOOD PRESSURE: 140 MMHG

## 2022-03-29 DIAGNOSIS — Z98.61 CAD S/P PERCUTANEOUS CORONARY ANGIOPLASTY: ICD-10-CM

## 2022-03-29 DIAGNOSIS — I25.10 CAD S/P PERCUTANEOUS CORONARY ANGIOPLASTY: ICD-10-CM

## 2022-03-29 DIAGNOSIS — Z01.818 PREOPERATIVE TESTING: ICD-10-CM

## 2022-03-29 DIAGNOSIS — E78.2 MIXED HYPERLIPIDEMIA: ICD-10-CM

## 2022-03-29 DIAGNOSIS — Z01.810 PREOP CARDIOVASCULAR EXAM: Primary | ICD-10-CM

## 2022-03-29 DIAGNOSIS — E66.09 CLASS 2 OBESITY DUE TO EXCESS CALORIES WITHOUT SERIOUS COMORBIDITY WITH BODY MASS INDEX (BMI) OF 35.0 TO 35.9 IN ADULT: ICD-10-CM

## 2022-03-29 PROCEDURE — 99204 OFFICE O/P NEW MOD 45 MIN: CPT | Performed by: INTERNAL MEDICINE

## 2022-03-29 PROCEDURE — 93000 ELECTROCARDIOGRAM COMPLETE: CPT | Performed by: INTERNAL MEDICINE

## 2022-03-29 RX ORDER — ROSUVASTATIN CALCIUM 20 MG/1
20 TABLET, COATED ORAL DAILY
Qty: 30 TABLET | Refills: 2 | Status: SHIPPED | OUTPATIENT
Start: 2022-03-29 | End: 2022-05-23 | Stop reason: SINTOL

## 2022-03-29 NOTE — PROGRESS NOTES
[facility] [address] [place]  [place] [address]  Phone#  [phone]  Fax#  [phone]                                               Cardiology Office Consult  Manjit 78 y o  male  YOB: 1945  MRN: [id number] Encounter: [id number]      PCP - Sasha Weir MD  Referring Provider - Cori Sousa MD    Chief Complaint   Patient presents with   Rudy Abt Consult     pt is here for cc for right knee replacement       Assessment  1  Pre-operative cardiovascular examination  2  CAD s/p multiple PCIs  · Originally POBA in early 1990s, LAD & RCA stent in 2003, and then multiple repeat stents to RCA for multiple episodes of in-stent stenosis, last stent in 2010  3  Mixed hyperlipidemia  4  RBBB  5  Obesity, Body mass index is 35 13 kg/m²  Prior cardiac testing (in [place])  · Nuclear Rx stress - 2/14/2019 ([address] [place] [address]) - moderate, predominantly fixed defect of entire inferior/inferolateral wall - some apical inferior reversibility - possible diaphragmatic attenuation +/- small ischemia, LVEF 81%  · PCI -  · LAD - Cypher LAD stent 2/03  · RCA - stents x6 (2/2003)   KRYSTAL to dRCA (8/2006)   DESx3 to RCA (restenosis) (2010)  · LHC - 9/2010 - patent LAD stent, D1 WNL, LCx WNL, OM1 - non obsructive, pRA 99%, mRCA in-stent stenosis, dRCA 90% in-stent stenosis, RPDA & RPLB - non-obstructive  S/p PCI with KRYSTAL to [address]  Pre-operative cardiovascular evaluation  · Planned procedure:  Right total knee arthoplasty (4/13/22, Shiv Pittman)  · Active cardiac complaints: None  · Cardiac co-morbidities: s/p multiple PCIs to LAD and RCA  But last PCI 10+ yrs ago  · Functional status:  Active, able to walk on level ground as well as go up 1-2 flights of stairs without any chest pain or shortness of breath  · Vitals - reviewed and stable  · ECG - NSR, LAD, RBBB  · No recent echocardiogram available for review    Will obtain an updated echocardiogram prior to proceeding with surgery  · As long as EF normal, and no severe valve abnormalities, okay to proceed with planned surgery as moderate cardiac risk  · he is medically as optimized as able from the cardiac standpoint  · Siddhartha-operative cardiac medication management  · Anticoagulation/anti-platelets  · He has been on aspirin + Plavix for 10+ years, due to his extensive stenting in his RCA and multiple restenoses in the past  · No recent stenting --> okay to hold plavix for 5 days preoperatively  · If necessary to hold aspirin as well, then can hold for up to 7 days preoperatively and resume postoperatively 6 optimal time  · Continue beta-blockers siddhartha-operatively    CAD s/p PCI  · No current symptoms of chest pain or shortness of breath  · Although he did have extensive stenting, his last PCI was in 2010 and he has been doing well over the last 10+ years without any further issues  · Continue aspirin, metoprolol succinate 50 mg daily  · Will switch simvastatin to rosuvastatin 20 mg daily  · Continue ezetimibe, vascepa/fish oil  · Need further optimization of triglycerides    Hyperlipidemia   9/10/2019 14:18 1/27/2020 11:10 11/12/2020 12:33 9/22/2021 15:30 2/22/2022 10:58   Cholesterol 301 (H) 145 146 260 (H) 168   Triglycerides 408 (H) 373 (H) 212 (H) 375 (H) 568 (H)   HDL 40 37 (L) 48 41 32 (L)   Non-HDL Cholesterol 261       LDL Calculated See Comment 33 56 144 (H) See Comment   LDL CHOLESTEROL DIRECT     36   · Triglyceride levels continue to be very elevated, but cholesterol and LDL levels are otherwise well controlled  · He is supposed to be on Vascepa as well, but cost seems to have been the issue previously, and he believes he is taking fish oil?   · Currently on simvastatin 40 mg, ezetimibe 10 mg daily  · Considering his extensive CAD and stenting, suggest at least switching to rosuvastatin 20 mg daily    RBBB  · Appears to be new, was not previously reported on ECG at cardiologist in [place]  · Remains asymptomatic were any dizziness  · Possibly age-related  · Follow-up echocardiogram and monitor clinically    No results found for this visit on 03/29/22  Orders Placed This Encounter   Procedures    POCT ECG    Echo complete w/ contrast if indicated     Return in about 2 months (around 5/29/2022), or if symptoms worsen or fail to improve  History of Present Illness   78 y o  male comes in as a new patient for preoperative consultation prior to upcoming knee replacement surgery in a few weeks  He is originally from [place], and moved to this area a few years ago  He has not had any major issues with chest pain, shortness of breath, palpitations, dizziness, near-syncope or syncope recently  He does have an extensive cardiac history and has had several stents dating back to early 1990s  Most of his stenting took place between 2003 to 2010, where he has had multiple episodes of repeat InStent stenosis to RCA needing stenting  But over the last decade he has been doing well from cardiac standpoint  He still needs to go to [place] for other things and has a result has been following with his cardiologist in [place], but was asked to set up care closer to home as well  As a result he is now here today to establish care with me        Historical Information   Past Medical History:   Diagnosis Date    Celiac disease     Coronary artery disease     Diverticulosis     Hyperlipidemia     Hypertension     Myocardial infarction ([facility] [address] ) 1999     Past Surgical History:   Procedure Laterality Date    COLONOSCOPY      CORONARY ANGIOPLASTY WITH STENT PLACEMENT      21 yrs ago     Family History   Problem Relation Age of Onset    Diabetes Mother     Coronary artery disease Father     Thyroid disease Sister      Current Outpatient Medications on File Prior to Visit   Medication Sig Dispense Refill    ascorbic acid (VITAMIN C) 500 MG tablet Take 1 tablet (500 mg total) by mouth 2 (two) times a day 30 tablet 0    aspirin 81 mg chewable tablet Chew 81 mg every morning        baclofen 10 mg tablet TAKE 1 TABLET BY MOUTH EVERY 8 HOURS AS NEEDED FOR MUSCLE SPASMS 30 tablet 0    Cholecalciferol (Vitamin D3) 1 25 MG (62949 UT) CAPS Take 10,000 Units by mouth every morning        clopidogrel (PLAVIX) 75 mg tablet Take 75 mg by mouth every morning        Cyanocobalamin (VITAMIN B 12 PO) Take by mouth      diclofenac (VOLTAREN) 75 mg EC tablet Take 1 tablet (75 mg total) by mouth 2 (two) times a day (Patient taking differently: Take 75 mg by mouth every morning  ) 60 tablet 0    ezetimibe (ZETIA) 10 mg tablet TAKE 1 TABLET BY MOUTH  DAILY (Patient taking differently: Take 10 mg by mouth daily at bedtime  ) 30 tablet 11    ferrous sulfate 324 (65 Fe) mg TAKE 1 TABLET BY MOUTH 2 TIMES A DAY BEFORE MEALS 60 tablet 0    folic acid (FOLVITE) 1 mg tablet TAKE 1 TABLET BY MOUTH EVERY DAY 30 tablet 0    gabapentin (NEURONTIN) 300 mg capsule TAKE 1 CAPSULE BY MOUTH 3  TIMES DAILY (Patient taking differently: Take 600 mg by mouth daily at bedtime  ) 270 capsule 3    Icosapent Ethyl (Vascepa) 1 g CAPS Vascepa 1 gram capsule   TAKE 2 CAPSULES BY MOUTH  TWICE DAILY (PLEASE CALL OFFICE FOR APPT)      ketoconazole (NIZORAL) 2 % shampoo APPLY 1 APPLICATION TOPICALLY 2 (TWO) TIMES A WEEK 120 mL 0    metoprolol succinate (TOPROL-XL) 50 mg 24 hr tablet Take 1 tablet (50 mg total) by mouth daily (Patient taking differently: Take 50 mg by mouth every morning  ) 30 tablet 2    sertraline (ZOLOFT) 50 mg tablet Take 1 tablet (50 mg total) by mouth daily (Patient taking differently: Take 50 mg by mouth every morning  ) 90 tablet 3    tamsulosin (FLOMAX) 0 4 mg Take 1 capsule (0 4 mg total) by mouth daily with dinner (Patient taking differently: Take 0 4 mg by mouth every morning  ) 90 capsule 2    traZODone (DESYREL) 150 mg tablet TAKE 1 TABLET BY MOUTH  DAILY AT BEDTIME 30 tablet 11    [DISCONTINUED] simvastatin (ZOCOR) 40 mg tablet TAKE 1 TABLET BY MOUTH  DAILY (Patient taking differently: Take 40 mg by mouth daily at bedtime  ) 90 tablet 3     No current facility-administered medications on file prior to visit  Allergies   Allergen Reactions    Ramipril Cough     Social History     Socioeconomic History    Marital status: /Civil Union     Spouse name: None    Number of children: 3    Years of education: None    Highest education level: None   Occupational History    None   Tobacco Use    Smoking status: Former Smoker     Quit date:      Years since quittin 2    Smokeless tobacco: Never Used   Vaping Use    Vaping Use: Never used   Substance and Sexual Activity    Alcohol use: Not Currently    Drug use: Never    Sexual activity: Yes   Other Topics Concern    None   Social History Narrative    None     Social Determinants of Health     Financial Resource Strain: Not on file   Food Insecurity: Not on file   Transportation Needs: Not on file   Physical Activity: Not on file   Stress: Not on file   Social Connections: Not on file   Intimate Partner Violence: Not on file   Housing Stability: Not on file        Review of Systems   All other systems reviewed and are negative  Vitals:  Vitals:    22 1422   BP: 140/88   BP Location: Left arm   Patient Position: Sitting   Cuff Size: Large   Pulse: 61   SpO2: 97%   Weight: 117 kg (259 lb)   Height: 6' (1 829 m)     BMI - Body mass index is 35 13 kg/m²  Wt Readings from Last 7 Encounters:   22 117 kg (259 lb)   22 119 kg (261 lb 6 4 oz)   22 116 kg (255 lb)   21 113 kg (250 lb)   21 114 kg (252 lb)   21 113 kg (250 lb)   21 112 kg (247 lb)       Physical Exam  Vitals and nursing note reviewed  Constitutional:       General: He is not in acute distress  Appearance: He is well-developed  HENT:      Head: Normocephalic and atraumatic  Nose: No congestion  Eyes:      General: No scleral icterus  Conjunctiva/sclera: Conjunctivae normal    Neck:      Vascular: No carotid bruit or JVD  Cardiovascular:      Rate and Rhythm: Normal rate and regular rhythm  Heart sounds: Normal heart sounds  No murmur heard  No friction rub  No gallop  Pulmonary:      Effort: Pulmonary effort is normal  No respiratory distress  Breath sounds: Normal breath sounds  No wheezing or rales  Chest:      Chest wall: No tenderness  Abdominal:      General: There is no distension  Palpations: Abdomen is soft  Tenderness: There is no abdominal tenderness  Musculoskeletal:         General: No swelling, tenderness or deformity  Cervical back: Neck supple  No muscular tenderness  Right lower leg: No edema  Left lower leg: No edema  Skin:     General: Skin is warm  Neurological:      General: No focal deficit present  Mental Status: He is alert and oriented to person, place, and time  Mental status is at baseline  Psychiatric:         Mood and Affect: Mood normal          Behavior: Behavior normal          Thought Content:  Thought content normal            Labs:  CBC:   Lab Results   Component Value Date    WBC 6 06 03/15/2022    RBC 4 60 03/15/2022    HGB 15 4 03/15/2022    HCT 41 8 03/15/2022    MCV 91 03/15/2022     03/15/2022    RDW 11 9 03/15/2022       CMP:   Lab Results   Component Value Date    K 5 0 03/15/2022     03/15/2022    CO2 26 03/15/2022    BUN 26 (H) 03/15/2022    CREATININE 1 14 03/15/2022    EGFR 63 03/15/2022    CALCIUM 9 8 03/15/2022    AST 40 03/15/2022    ALT 66 03/15/2022    ALKPHOS 65 03/15/2022       Magnesium:  No results found for: MG    Lipid Profile:   Lab Results   Component Value Date    HDL 32 (L) 02/22/2022    TRIG 568 (H) 02/22/2022    [facility]  02/22/2022      Comment:      Calculated LDL invalid, triglycerides >400 mg/dl       Thyroid Studies:   Lab Results   Component Value Date    [id number] 1 018 11/22/2019       A1c:  No components found for: HGA1C    INR:  Lab Results   Component Value Date    INR 1 00 03/15/2022   5    Imaging: No results found  Cardiac testing:   No results found for this or any previous visit  No results found for this or any previous visit  No results found for this or any previous visit  No results found for this or any previous visit  XR knee 3 vw left non injury  Narrative: LEFT KNEE    INDICATION:   M17 12: Unilateral primary osteoarthritis, left knee  COMPARISON:  11/22/2019    VIEWS:  XR KNEE 3 VW LEFT NON INJURY     FINDINGS:    There is no acute fracture or dislocation  There is a small joint effusion  Severe tricompartmental osteoarthritis as evidenced by joint space narrowing, osteophyte formation and subchondral sclerosis  No lytic or blastic osseous lesion  There are atherosclerotic calcifications  Soft tissues are otherwise unremarkable  Impression: No acute osseous abnormality  Degenerative changes as described  Workstation performed: [id number]  XR knee 3 vw right non injury  Narrative: RIGHT KNEE    INDICATION:   M17 12: Unilateral primary osteoarthritis, left knee  M17 11: Unilateral primary osteoarthritis, right knee  COMPARISON:  11/22/2019    VIEWS:  XR KNEE 3 VW RIGHT NON INJURY     FINDINGS:    There is no acute fracture or dislocation  There is no joint effusion  Severe tricompartmental osteoarthritis as evidenced by joint space narrowing, osteophyte formation and subchondral sclerosis  Intra-articular body formation noted  No lytic or blastic osseous lesion  There are atherosclerotic calcifications  Soft tissues are otherwise unremarkable  Impression: No acute osseous abnormality  Degenerative changes as described      Workstation performed: [id number]

## 2022-03-29 NOTE — H&P (VIEW-ONLY)
[facility] [place] [address] [place] [address]  Phone#  [phone]  Fax#  [phone]                                               Cardiology Office Consult  Manjit 78 y o  male  YOB: 1945  MRN: [id number] Encounter: [id number]      PCP - Miki Flores MD  Referring Provider - Ovi Muniz MD    Chief Complaint   Patient presents with   Jojo Splinter Consult     pt is here for cc for right knee replacement       Assessment  1  Pre-operative cardiovascular examination  2  CAD s/p multiple PCIs  · Originally POBA in early 1990s, LAD & RCA stent in 2003, and then multiple repeat stents to RCA for multiple episodes of in-stent stenosis, last stent in 2010  3  Mixed hyperlipidemia  4  RBBB  5  Obesity, Body mass index is 35 13 kg/m²  Prior cardiac testing (in [place])  · Nuclear Rx stress - 2/14/2019 ([facility] [address]) - moderate, predominantly fixed defect of entire inferior/inferolateral wall - some apical inferior reversibility - possible diaphragmatic attenuation +/- small ischemia, LVEF 81%  · PCI -  · LAD - Cypher LAD stent 2/03  · RCA - stents x6 (2/2003)   KRYSTAL to dRCA (8/2006)   DESx3 to RCA (restenosis) (2010)  · [facility] - 9/2010 - patent LAD stent, D1 WNL, LCx WNL, OM1 - non obsructive, pRA 99%, mRCA in-stent stenosis, dRCA 90% in-stent stenosis, RPDA & RPLB - non-obstructive  S/p PCI with KRYSTAL to [address]  Pre-operative cardiovascular evaluation  · Planned procedure:  Right total knee arthoplasty (4/13/22, Shiv Pittman)  · Active cardiac complaints: None  · Cardiac co-morbidities: s/p multiple PCIs to LAD and RCA  But last PCI 10+ yrs ago  · Functional status:  Active, able to walk on level ground as well as go up 1-2 flights of stairs without any chest pain or shortness of breath  · Vitals - reviewed and stable  · ECG - NSR, LAD, RBBB  · No recent echocardiogram available for review    Will obtain an updated echocardiogram prior to proceeding with surgery  · As long as EF normal, and no severe valve abnormalities, okay to proceed with planned surgery as moderate cardiac risk  · he is medically as optimized as able from the cardiac standpoint  · Siddhartha-operative cardiac medication management  · Anticoagulation/anti-platelets  · He has been on aspirin + Plavix for 10+ years, due to his extensive stenting in his RCA and multiple restenoses in the past  · No recent stenting --> okay to hold plavix for 5 days preoperatively  · If necessary to hold aspirin as well, then can hold for up to 7 days preoperatively and resume postoperatively 6 optimal time  · Continue beta-blockers siddhartha-operatively    CAD s/p PCI  · No current symptoms of chest pain or shortness of breath  · Although he did have extensive stenting, his last PCI was in 2010 and he has been doing well over the last 10+ years without any further issues  · Continue aspirin, metoprolol succinate 50 mg daily  · Will switch simvastatin to rosuvastatin 20 mg daily  · Continue ezetimibe, vascepa/fish oil  · Need further optimization of triglycerides    Hyperlipidemia   9/10/2019 14:18 1/27/2020 11:10 11/12/2020 12:33 9/22/2021 15:30 2/22/2022 10:58   Cholesterol 301 (H) 145 146 260 (H) 168   Triglycerides 408 (H) 373 (H) 212 (H) 375 (H) 568 (H)   HDL 40 37 (L) 48 41 32 (L)   Non-HDL Cholesterol 261       LDL Calculated See Comment 33 56 144 (H) See Comment   LDL CHOLESTEROL DIRECT     36   · Triglyceride levels continue to be very elevated, but cholesterol and LDL levels are otherwise well controlled  · He is supposed to be on Vascepa as well, but cost seems to have been the issue previously, and he believes he is taking fish oil?   · Currently on simvastatin 40 mg, ezetimibe 10 mg daily  · Considering his extensive CAD and stenting, suggest at least switching to rosuvastatin 20 mg daily    RBBB  · Appears to be new, was not previously reported on ECG at cardiologist in [place]  · Remains asymptomatic were any dizziness  · Possibly age-related  · Follow-up echocardiogram and monitor clinically    No results found for this visit on 03/29/22  Orders Placed This Encounter   Procedures    POCT ECG    Echo complete w/ contrast if indicated     Return in about 2 months (around 5/29/2022), or if symptoms worsen or fail to improve  History of Present Illness   78 y o  male comes in as a new patient for preoperative consultation prior to upcoming knee replacement surgery in a few weeks  He is originally from [place], and moved to this area a few years ago  He has not had any major issues with chest pain, shortness of breath, palpitations, dizziness, near-syncope or syncope recently  He does have an extensive cardiac history and has had several stents dating back to early 1990s  Most of his stenting took place between 2003 to 2010, where he has had multiple episodes of repeat InStent stenosis to RCA needing stenting  But over the last decade he has been doing well from cardiac standpoint  He still needs to go to [place] for other things and has a result has been following with his cardiologist in [place], but was asked to set up care closer to home as well  As a result he is now here today to establish care with me        Historical Information   Past Medical History:   Diagnosis Date    Celiac disease     Coronary artery disease     Diverticulosis     Hyperlipidemia     Hypertension     Myocardial infarction ([facility] [address] ) 1999     Past Surgical History:   Procedure Laterality Date    COLONOSCOPY      CORONARY ANGIOPLASTY WITH STENT PLACEMENT      21 yrs ago     Family History   Problem Relation Age of Onset    Diabetes Mother     Coronary artery disease Father     Thyroid disease Sister      Current Outpatient Medications on File Prior to Visit   Medication Sig Dispense Refill    ascorbic acid (VITAMIN C) 500 MG tablet Take 1 tablet (500 mg total) by mouth 2 (two) times a day 30 tablet 0    aspirin 81 mg chewable tablet Chew 81 mg every morning        baclofen 10 mg tablet TAKE 1 TABLET BY MOUTH EVERY 8 HOURS AS NEEDED FOR MUSCLE SPASMS 30 tablet 0    Cholecalciferol (Vitamin D3) 1 25 MG (37900 UT) CAPS Take 10,000 Units by mouth every morning        clopidogrel (PLAVIX) 75 mg tablet Take 75 mg by mouth every morning        Cyanocobalamin (VITAMIN B 12 PO) Take by mouth      diclofenac (VOLTAREN) 75 mg EC tablet Take 1 tablet (75 mg total) by mouth 2 (two) times a day (Patient taking differently: Take 75 mg by mouth every morning  ) 60 tablet 0    ezetimibe (ZETIA) 10 mg tablet TAKE 1 TABLET BY MOUTH  DAILY (Patient taking differently: Take 10 mg by mouth daily at bedtime  ) 30 tablet 11    ferrous sulfate 324 (65 Fe) mg TAKE 1 TABLET BY MOUTH 2 TIMES A DAY BEFORE MEALS 60 tablet 0    folic acid (FOLVITE) 1 mg tablet TAKE 1 TABLET BY MOUTH EVERY DAY 30 tablet 0    gabapentin (NEURONTIN) 300 mg capsule TAKE 1 CAPSULE BY MOUTH 3  TIMES DAILY (Patient taking differently: Take 600 mg by mouth daily at bedtime  ) 270 capsule 3    Icosapent Ethyl (Vascepa) 1 g CAPS Vascepa 1 gram capsule   TAKE 2 CAPSULES BY MOUTH  TWICE DAILY (PLEASE CALL OFFICE FOR APPT)      ketoconazole (NIZORAL) 2 % shampoo APPLY 1 APPLICATION TOPICALLY 2 (TWO) TIMES A WEEK 120 mL 0    metoprolol succinate (TOPROL-XL) 50 mg 24 hr tablet Take 1 tablet (50 mg total) by mouth daily (Patient taking differently: Take 50 mg by mouth every morning  ) 30 tablet 2    sertraline (ZOLOFT) 50 mg tablet Take 1 tablet (50 mg total) by mouth daily (Patient taking differently: Take 50 mg by mouth every morning  ) 90 tablet 3    tamsulosin (FLOMAX) 0 4 mg Take 1 capsule (0 4 mg total) by mouth daily with dinner (Patient taking differently: Take 0 4 mg by mouth every morning  ) 90 capsule 2    traZODone (DESYREL) 150 mg tablet TAKE 1 TABLET BY MOUTH  DAILY AT BEDTIME 30 tablet 11    [DISCONTINUED] simvastatin (ZOCOR) 40 mg tablet TAKE 1 TABLET BY MOUTH  DAILY (Patient taking differently: Take 40 mg by mouth daily at bedtime  ) 90 tablet 3     No current facility-administered medications on file prior to visit  Allergies   Allergen Reactions    Ramipril Cough     Social History     Socioeconomic History    Marital status: /Civil Union     Spouse name: None    Number of children: 3    Years of education: None    Highest education level: None   Occupational History    None   Tobacco Use    Smoking status: Former Smoker     Quit date:      Years since quittin 2    Smokeless tobacco: Never Used   Vaping Use    Vaping Use: Never used   Substance and Sexual Activity    Alcohol use: Not Currently    Drug use: Never    Sexual activity: Yes   Other Topics Concern    None   Social History Narrative    None     Social Determinants of Health     Financial Resource Strain: Not on file   Food Insecurity: Not on file   Transportation Needs: Not on file   Physical Activity: Not on file   Stress: Not on file   Social Connections: Not on file   Intimate Partner Violence: Not on file   Housing Stability: Not on file        Review of Systems   All other systems reviewed and are negative  Vitals:  Vitals:    22 1422   BP: 140/88   BP Location: Left arm   Patient Position: Sitting   Cuff Size: Large   Pulse: 61   SpO2: 97%   Weight: 117 kg (259 lb)   Height: 6' (1 829 m)     BMI - Body mass index is 35 13 kg/m²  Wt Readings from Last 7 Encounters:   22 117 kg (259 lb)   22 119 kg (261 lb 6 4 oz)   22 116 kg (255 lb)   21 113 kg (250 lb)   21 114 kg (252 lb)   21 113 kg (250 lb)   21 112 kg (247 lb)       Physical Exam  Vitals and nursing note reviewed  Constitutional:       General: He is not in acute distress  Appearance: He is well-developed  HENT:      Head: Normocephalic and atraumatic  Nose: No congestion  Eyes:      General: No scleral icterus  Conjunctiva/sclera: Conjunctivae normal    Neck:      Vascular: No carotid bruit or JVD  Cardiovascular:      Rate and Rhythm: Normal rate and regular rhythm  Heart sounds: Normal heart sounds  No murmur heard  No friction rub  No gallop  Pulmonary:      Effort: Pulmonary effort is normal  No respiratory distress  Breath sounds: Normal breath sounds  No wheezing or rales  Chest:      Chest wall: No tenderness  Abdominal:      General: There is no distension  Palpations: Abdomen is soft  Tenderness: There is no abdominal tenderness  Musculoskeletal:         General: No swelling, tenderness or deformity  Cervical back: Neck supple  No muscular tenderness  Right lower leg: No edema  Left lower leg: No edema  Skin:     General: Skin is warm  Neurological:      General: No focal deficit present  Mental Status: He is alert and oriented to person, place, and time  Mental status is at baseline  Psychiatric:         Mood and Affect: Mood normal          Behavior: Behavior normal          Thought Content:  Thought content normal            Labs:  CBC:   Lab Results   Component Value Date    WBC 6 06 03/15/2022    RBC 4 60 03/15/2022    HGB 15 4 03/15/2022    HCT 41 8 03/15/2022    MCV 91 03/15/2022     03/15/2022    RDW 11 9 03/15/2022       CMP:   Lab Results   Component Value Date    K 5 0 03/15/2022     03/15/2022    CO2 26 03/15/2022    BUN 26 (H) 03/15/2022    CREATININE 1 14 03/15/2022    EGFR 63 03/15/2022    CALCIUM 9 8 03/15/2022    AST 40 03/15/2022    ALT 66 03/15/2022    ALKPHOS 65 03/15/2022       Magnesium:  No results found for: MG    Lipid Profile:   Lab Results   Component Value Date    HDL 32 (L) 02/22/2022    TRIG 568 (H) 02/22/2022    [address]  02/22/2022      Comment:      Calculated LDL invalid, triglycerides >400 mg/dl       Thyroid Studies:   Lab Results   Component Value Date    [id number] 1 018 11/22/2019       A1c:  No components found for: HGA1C    INR:  Lab Results   Component Value Date    INR 1 00 03/15/2022   5    Imaging: No results found  Cardiac testing:   No results found for this or any previous visit  No results found for this or any previous visit  No results found for this or any previous visit  No results found for this or any previous visit  XR knee 3 vw left non injury  Narrative: LEFT KNEE    INDICATION:   M17 12: Unilateral primary osteoarthritis, left knee  COMPARISON:  11/22/2019    VIEWS:  XR KNEE 3 VW LEFT NON INJURY     FINDINGS:    There is no acute fracture or dislocation  There is a small joint effusion  Severe tricompartmental osteoarthritis as evidenced by joint space narrowing, osteophyte formation and subchondral sclerosis  No lytic or blastic osseous lesion  There are atherosclerotic calcifications  Soft tissues are otherwise unremarkable  Impression: No acute osseous abnormality  Degenerative changes as described  Workstation performed: [id number]  XR knee 3 vw right non injury  Narrative: RIGHT KNEE    INDICATION:   M17 12: Unilateral primary osteoarthritis, left knee  M17 11: Unilateral primary osteoarthritis, right knee  COMPARISON:  11/22/2019    VIEWS:  XR KNEE 3 VW RIGHT NON INJURY     FINDINGS:    There is no acute fracture or dislocation  There is no joint effusion  Severe tricompartmental osteoarthritis as evidenced by joint space narrowing, osteophyte formation and subchondral sclerosis  Intra-articular body formation noted  No lytic or blastic osseous lesion  There are atherosclerotic calcifications  Soft tissues are otherwise unremarkable  Impression: No acute osseous abnormality  Degenerative changes as described      Workstation performed: [id number]

## 2022-04-05 ENCOUNTER — HOSPITAL ENCOUNTER (OUTPATIENT)
Dept: NON INVASIVE DIAGNOSTICS | Age: 77
Discharge: HOME | End: 2022-04-05
Payer: COMMERCIAL

## 2022-04-05 VITALS
SYSTOLIC BLOOD PRESSURE: 140 MMHG | WEIGHT: 259 LBS | BODY MASS INDEX: 35.08 KG/M2 | HEIGHT: 72 IN | DIASTOLIC BLOOD PRESSURE: 88 MMHG | HEART RATE: 70 BPM

## 2022-04-05 DIAGNOSIS — Z01.810 PREOP CARDIOVASCULAR EXAM: ICD-10-CM

## 2022-04-05 DIAGNOSIS — Z98.61 CAD S/P PERCUTANEOUS CORONARY ANGIOPLASTY: ICD-10-CM

## 2022-04-05 DIAGNOSIS — I25.10 CAD S/P PERCUTANEOUS CORONARY ANGIOPLASTY: ICD-10-CM

## 2022-04-05 LAB
AORTIC ROOT: 4.5 CM
AORTIC VALVE MEAN VELOCITY: 13.4 M/S
APICAL FOUR CHAMBER EJECTION FRACTION: 66 %
ASCENDING AORTA: 3.7 CM (ref 2.26–3.38)
AV AREA BY CONTINUOUS VTI: 1.9 CM2
AV AREA PEAK VELOCITY: 1.8 CM2
AV LVOT MEAN GRADIENT: 1 MMHG
AV LVOT PEAK GRADIENT: 2 MMHG
AV MEAN GRADIENT: 8 MMHG
AV PEAK GRADIENT: 15 MMHG
AV VALVE AREA INDEX: 0.8 CM2/M2
AV VALVE AREA: 1.94 CM2
AV VELOCITY RATIO: 0.41
AVA (PLAN): 1.7 CM2
DOP CALC AO PEAK VEL: 1.93 M/S
DOP CALC AO VTI: 38.07 CM
DOP CALC LVOT AREA: 4.52 CM2
DOP CALC LVOT DIAMETER: 2.4 CM
DOP CALC LVOT PEAK VEL VTI: 16.34 CM
DOP CALC LVOT PEAK VEL: 0.8 M/S
DOP CALC LVOT STROKE INDEX: 30.7 ML/M2
DOP CALC LVOT STROKE VOLUME: 73.88 CM3
DOP CALC MV VTI: 20.03 CM
E WAVE DECELERATION TIME: 278 MS
FRACTIONAL SHORTENING: 43 % (ref 28–44)
INTERVENTRICULAR SEPTUM IN DIASTOLE (PARASTERNAL SHORT AXIS VIEW): 1 CM
INTERVENTRICULAR SEPTUM: 1 CM (ref 0.58–1.09)
LAAS-AP2: 30.4 CM2
LAAS-AP4: 28.5 CM2
LEFT ATRIUM SIZE: 4.6 CM
LEFT INTERNAL DIMENSION IN SYSTOLE: 2.8 CM (ref 6.39–9.68)
LEFT VENTRICLE DIASTOLIC VOLUME (MOD BIPLANE): 128 ML (ref 124.22–279.75)
LEFT VENTRICLE SYSTOLIC VOLUME (MOD BIPLANE): 47 ML
LEFT VENTRICULAR INTERNAL DIMENSION IN DIASTOLE: 4.9 CM (ref 10.83–16.15)
LEFT VENTRICULAR POSTERIOR WALL IN END DIASTOLE: 0.9 CM (ref 0.57–1.07)
LEFT VENTRICULAR STROKE VOLUME: 81 ML
LV EF: 63 %
LVSV (TEICH): 81 ML
MV E'TISSUE VEL-LAT: 14 CM/S
MV E'TISSUE VEL-SEP: 6 CM/S
MV MEAN GRADIENT: 1 MMHG
MV PEAK A VEL: 0.81 M/S
MV PEAK E VEL: 52 CM/S
MV PEAK GRADIENT: 4 MMHG
MV STENOSIS PRESSURE HALF TIME: 81 MS
MV VALVE AREA BY CONTINUITY EQUATION: 3.69 CM2
MV VALVE AREA P 1/2 METHOD: 2.72 CM2
RA PRESSURE ESTIMATED: 3 MMHG
RIGHT ATRIAL 2D VOLUME: 90 ML
RIGHT ATRIUM AREA SYSTOLE A4C: 25.8 CM2
RIGHT VENTRICLE ID DIMENSION: 4.1 CM
SL CV ECHO AV MEAN VELOCITY RETROGRADE: 8 M/S
SL CV LEFT ATRIUM LENGTH A2C: 7.1 CM
SL CV LV DIAS VOL ENDO Z SCORE: -1.9
SL CV LV EF: 65
SL CV PED ECHO LEFT VENTRICLE DIASTOLIC VOLUME (MOD BIPLANE) 2D: 111 ML
SL CV PED ECHO LEFT VENTRICLE SYSTOLIC VOLUME (MOD BIPLANE) 2D: 31 ML
Z-SCORE OF ASCENDING AORTA: 3.11
Z-SCORE OF INTERVENTRICULAR SEPTUM IN END DIASTOLE: 1.28
Z-SCORE OF LEFT VENTRICULAR DIMENSION IN END DIASTOLE: -9.93
Z-SCORE OF LEFT VENTRICULAR DIMENSION IN END SYSTOLE: -8.16
Z-SCORE OF LEFT VENTRICULAR POSTERIOR WALL IN END DIASTOLE: 0.62

## 2022-04-05 PROCEDURE — 93306 TTE W/DOPPLER COMPLETE: CPT

## 2022-04-05 PROCEDURE — 93306 TTE W/DOPPLER COMPLETE: CPT | Performed by: INTERNAL MEDICINE

## 2022-04-07 ENCOUNTER — TELEPHONE (OUTPATIENT)
Dept: CARDIOLOGY CLINIC | Facility: CLINIC | Age: 77
End: 2022-04-07

## 2022-04-07 NOTE — TELEPHONE ENCOUNTER
----- Message from Tobias Pritchett MD sent at 4/6/2022  1:34 PM EDT -----  Cardiac function is normal at 65%  He is okay to proceed with planned surgery  He does have additional mild abnormalities including mild aortic stenosis and mildly dilated aortic root  Both of these will need to be monitored and followed up outpatient

## 2022-04-07 NOTE — TELEPHONE ENCOUNTER
Called pt and left message re: Echo test (Cardiac function is normal at 65%   He is okay to proceed with planned surgery  He does have additional mild abnormalities including mild aortic stenosis and mildly dilated aortic root   Both of these will need to be monitored and followed up outpatient ) Left call back number to Nurse's station at [facility] Cardiology office if pt had any questions

## 2022-04-12 DIAGNOSIS — Z01.818 PREOPERATIVE TESTING: ICD-10-CM

## 2022-04-12 DIAGNOSIS — M17.11 PRIMARY OSTEOARTHRITIS OF RIGHT KNEE: ICD-10-CM

## 2022-04-12 RX ORDER — FOLIC ACID 1 MG/1
TABLET ORAL
Qty: 30 TABLET | Refills: 0 | Status: SHIPPED | OUTPATIENT
Start: 2022-04-12 | End: 2022-05-05

## 2022-04-12 RX ORDER — FERROUS SULFATE TAB EC 324 MG (65 MG FE EQUIVALENT) 324 (65 FE) MG
TABLET DELAYED RESPONSE ORAL
Qty: 60 TABLET | Refills: 0 | Status: SHIPPED | OUTPATIENT
Start: 2022-04-12 | End: 2022-04-29

## 2022-04-12 NOTE — ANESTHESIA PREPROCEDURE EVALUATION
Procedure:  ARTHROPLASTY KNEE TOTAL and all associated procedures (Right Knee)    CAD s/p multiple stents    EF 65%, DD1, mild AS    Relevant Problems   CARDIO   (+) Coronary artery disease of native artery of native heart with stable angina pectoris (HCC)   (+) Mixed hyperlipidemia      MUSCULOSKELETAL   (+) Primary osteoarthritis of both knees   (+) Primary osteoarthritis of right knee   (+) Sacroiliitis, not elsewhere classified [facility])        Physical Exam    Airway    Mallampati score: III  TM Distance: >3 FB  Neck ROM: full     Dental   No notable dental hx     Cardiovascular  Rhythm: regular, Rate: normal, Cardiovascular exam normal    Pulmonary  Pulmonary exam normal Breath sounds clear to auscultation,     Other Findings        Anesthesia Plan  ASA Score- 3     Anesthesia Type- spinal with ASA Monitors  Additional Monitors:   Airway Plan: ETT  Comment: Discussed the risks/benefits of neuraxial anesthesia, including risk of bleeding/infection/damage to underlying structures, the likely side effect of nausea, and unlikely complication of spinal headache  Plan to maintain native airway with EtCO2 monitoring under spinal anesthesia, general anesthesia with OETT discussed as backup          Plan Factors-Exercise tolerance (METS): >4 METS  Patient summary reviewed  Patient instructed to abstain from smoking on day of procedure  Patient did not smoke on day of surgery  Induction-     Postoperative Plan- Plan for postoperative opioid use  Informed Consent- Anesthetic plan and risks discussed with patient  I personally reviewed this patient with the CRNA  Discussed and agreed on the Anesthesia Plan with the CRNA  Tristen Mccarthy

## 2022-04-13 ENCOUNTER — HOSPITAL ENCOUNTER (OUTPATIENT)
Facility: HOSPITAL | Age: 77
Setting detail: OUTPATIENT SURGERY
Discharge: HOME | End: 2022-04-14
Attending: ORTHOPAEDIC SURGERY | Admitting: ORTHOPAEDIC SURGERY
Payer: COMMERCIAL

## 2022-04-13 ENCOUNTER — ANESTHESIA (OUTPATIENT)
Dept: PERIOP | Facility: HOSPITAL | Age: 77
End: 2022-04-13
Payer: COMMERCIAL

## 2022-04-13 DIAGNOSIS — Z96.651 STATUS POST TOTAL KNEE REPLACEMENT USING CEMENT, RIGHT: Primary | ICD-10-CM

## 2022-04-13 DIAGNOSIS — Z01.818 PREOPERATIVE TESTING: ICD-10-CM

## 2022-04-13 DIAGNOSIS — Z96.651 STATUS POST TOTAL RIGHT KNEE REPLACEMENT: ICD-10-CM

## 2022-04-13 DIAGNOSIS — M17.11 PRIMARY OSTEOARTHRITIS OF RIGHT KNEE: ICD-10-CM

## 2022-04-13 DIAGNOSIS — I25.118 CORONARY ARTERY DISEASE OF NATIVE ARTERY OF NATIVE HEART WITH STABLE ANGINA PECTORIS (HCC): ICD-10-CM

## 2022-04-13 LAB
ABO GROUP BLD: NORMAL
BLD GP AB SCN SERPL QL: NEGATIVE
RH BLD: POSITIVE
SPECIMEN EXPIRATION DATE: NORMAL

## 2022-04-13 PROCEDURE — 99214 OFFICE O/P EST MOD 30 MIN: CPT | Performed by: INTERNAL MEDICINE

## 2022-04-13 PROCEDURE — 27447 TOTAL KNEE ARTHROPLASTY: CPT | Performed by: PHYSICIAN ASSISTANT

## 2022-04-13 PROCEDURE — C1776 JOINT DEVICE (IMPLANTABLE): HCPCS | Performed by: ORTHOPAEDIC SURGERY

## 2022-04-13 PROCEDURE — 27447 TOTAL KNEE ARTHROPLASTY: CPT | Performed by: ORTHOPAEDIC SURGERY

## 2022-04-13 PROCEDURE — 86901 BLOOD TYPING SEROLOGIC RH(D): CPT | Performed by: ORTHOPAEDIC SURGERY

## 2022-04-13 PROCEDURE — 97110 THERAPEUTIC EXERCISES: CPT

## 2022-04-13 PROCEDURE — 86850 RBC ANTIBODY SCREEN: CPT | Performed by: ORTHOPAEDIC SURGERY

## 2022-04-13 PROCEDURE — 97116 GAIT TRAINING THERAPY: CPT

## 2022-04-13 PROCEDURE — 86900 BLOOD TYPING SEROLOGIC ABO: CPT | Performed by: ORTHOPAEDIC SURGERY

## 2022-04-13 PROCEDURE — 97163 PT EVAL HIGH COMPLEX 45 MIN: CPT

## 2022-04-13 PROCEDURE — C1713 ANCHOR/SCREW BN/BN,TIS/BN: HCPCS | Performed by: ORTHOPAEDIC SURGERY

## 2022-04-13 PROCEDURE — 97530 THERAPEUTIC ACTIVITIES: CPT

## 2022-04-13 PROCEDURE — C9290 INJ, BUPIVACAINE LIPOSOME: HCPCS | Performed by: STUDENT IN AN ORGANIZED HEALTH CARE EDUCATION/TRAINING PROGRAM

## 2022-04-13 DEVICE — ATTUNE KNEE SYSTEM TIBIAL INSERT ROTATING PLATFORM POSTERIOR STABILIZED 7 10MM AOX
Type: IMPLANTABLE DEVICE | Site: KNEE | Status: FUNCTIONAL
Brand: ATTUNE

## 2022-04-13 DEVICE — SMARTSET HV HIGH VISCOSITY BONE CEMENT 40G
Type: IMPLANTABLE DEVICE | Status: FUNCTIONAL
Brand: SMARTSET

## 2022-04-13 DEVICE — ATTUNE KNEE SYSTEM FEMORAL POSTERIOR STABILIZED SIZE 7 RIGHT CEMENTED
Type: IMPLANTABLE DEVICE | Site: KNEE | Status: FUNCTIONAL
Brand: ATTUNE

## 2022-04-13 DEVICE — ATTUNE PATELLA MEDIALIZED DOME 41MM CEMENTED AOX
Type: IMPLANTABLE DEVICE | Site: KNEE | Status: FUNCTIONAL
Brand: ATTUNE

## 2022-04-13 DEVICE — ATTUNE KNEE SYSTEM TIBIAL BASE ROTATING PLATFORM SIZE 8 CEMENTED
Type: IMPLANTABLE DEVICE | Site: KNEE | Status: FUNCTIONAL
Brand: ATTUNE

## 2022-04-13 RX ORDER — HYDROMORPHONE HCL/PF 1 MG/ML
SYRINGE (ML) INJECTION AS NEEDED
Status: DISCONTINUED | OUTPATIENT
Start: 2022-04-13 | End: 2022-04-13

## 2022-04-13 RX ORDER — LABETALOL 20 MG/4 ML (5 MG/ML) INTRAVENOUS SYRINGE
10
Status: CANCELLED | OUTPATIENT
Start: 2022-04-13

## 2022-04-13 RX ORDER — CEFAZOLIN SODIUM 2 G/50ML
2000 SOLUTION INTRAVENOUS ONCE
Status: DISCONTINUED | OUTPATIENT
Start: 2022-04-13 | End: 2022-04-13

## 2022-04-13 RX ORDER — ASPIRIN 81 MG/1
81 TABLET, CHEWABLE ORAL EVERY MORNING
Status: DISCONTINUED | OUTPATIENT
Start: 2022-04-14 | End: 2022-04-14 | Stop reason: HOSPADM

## 2022-04-13 RX ORDER — MAGNESIUM HYDROXIDE 1200 MG/15ML
LIQUID ORAL AS NEEDED
Status: DISCONTINUED | OUTPATIENT
Start: 2022-04-13 | End: 2022-04-13 | Stop reason: HOSPADM

## 2022-04-13 RX ORDER — SODIUM CHLORIDE, SODIUM LACTATE, POTASSIUM CHLORIDE, CALCIUM CHLORIDE 600; 310; 30; 20 MG/100ML; MG/100ML; MG/100ML; MG/100ML
INJECTION, SOLUTION INTRAVENOUS CONTINUOUS PRN
Status: DISCONTINUED | OUTPATIENT
Start: 2022-04-13 | End: 2022-04-13

## 2022-04-13 RX ORDER — GLYCOPYRROLATE 0.2 MG/ML
INJECTION INTRAMUSCULAR; INTRAVENOUS AS NEEDED
Status: DISCONTINUED | OUTPATIENT
Start: 2022-04-13 | End: 2022-04-13

## 2022-04-13 RX ORDER — PROPOFOL 10 MG/ML
INJECTION, EMULSION INTRAVENOUS AS NEEDED
Status: DISCONTINUED | OUTPATIENT
Start: 2022-04-13 | End: 2022-04-13

## 2022-04-13 RX ORDER — ALBUTEROL SULFATE 2.5 MG/3ML
2.5 SOLUTION RESPIRATORY (INHALATION) ONCE AS NEEDED
Status: DISCONTINUED | OUTPATIENT
Start: 2022-04-13 | End: 2022-04-13 | Stop reason: HOSPADM

## 2022-04-13 RX ORDER — FENTANYL CITRATE/PF 50 MCG/ML
25 SYRINGE (ML) INJECTION
Status: DISCONTINUED | OUTPATIENT
Start: 2022-04-13 | End: 2022-04-13 | Stop reason: HOSPADM

## 2022-04-13 RX ORDER — OXYCODONE HYDROCHLORIDE 5 MG/1
TABLET ORAL
Qty: 30 TABLET | Refills: 0 | Status: SHIPPED | OUTPATIENT
Start: 2022-04-13 | End: 2022-05-23 | Stop reason: ALTCHOICE

## 2022-04-13 RX ORDER — ROPIVACAINE HYDROCHLORIDE 2 MG/ML
INJECTION, SOLUTION EPIDURAL; INFILTRATION; PERINEURAL
Status: COMPLETED | OUTPATIENT
Start: 2022-04-13 | End: 2022-04-13

## 2022-04-13 RX ORDER — ROCURONIUM BROMIDE 10 MG/ML
INJECTION, SOLUTION INTRAVENOUS AS NEEDED
Status: DISCONTINUED | OUTPATIENT
Start: 2022-04-13 | End: 2022-04-13

## 2022-04-13 RX ORDER — CLOPIDOGREL BISULFATE 75 MG/1
75 TABLET ORAL EVERY MORNING
Status: DISCONTINUED | OUTPATIENT
Start: 2022-04-14 | End: 2022-04-14 | Stop reason: HOSPADM

## 2022-04-13 RX ORDER — HYDROMORPHONE HCL/PF 1 MG/ML
0.25 SYRINGE (ML) INJECTION
Status: DISCONTINUED | OUTPATIENT
Start: 2022-04-13 | End: 2022-04-13 | Stop reason: HOSPADM

## 2022-04-13 RX ORDER — KETAMINE HYDROCHLORIDE 50 MG/ML
INJECTION, SOLUTION, CONCENTRATE INTRAMUSCULAR; INTRAVENOUS AS NEEDED
Status: DISCONTINUED | OUTPATIENT
Start: 2022-04-13 | End: 2022-04-13

## 2022-04-13 RX ORDER — EPHEDRINE SULFATE 50 MG/ML
INJECTION INTRAVENOUS AS NEEDED
Status: DISCONTINUED | OUTPATIENT
Start: 2022-04-13 | End: 2022-04-13

## 2022-04-13 RX ORDER — DOCUSATE SODIUM 100 MG/1
100 CAPSULE, LIQUID FILLED ORAL 2 TIMES DAILY
Status: DISCONTINUED | OUTPATIENT
Start: 2022-04-13 | End: 2022-04-14 | Stop reason: HOSPADM

## 2022-04-13 RX ORDER — FENTANYL CITRATE 50 UG/ML
INJECTION, SOLUTION INTRAMUSCULAR; INTRAVENOUS AS NEEDED
Status: DISCONTINUED | OUTPATIENT
Start: 2022-04-13 | End: 2022-04-13

## 2022-04-13 RX ORDER — GABAPENTIN 300 MG/1
600 CAPSULE ORAL
Status: DISCONTINUED | OUTPATIENT
Start: 2022-04-13 | End: 2022-04-14 | Stop reason: HOSPADM

## 2022-04-13 RX ORDER — BUPIVACAINE HYDROCHLORIDE 5 MG/ML
INJECTION, SOLUTION PERINEURAL
Status: COMPLETED | OUTPATIENT
Start: 2022-04-13 | End: 2022-04-13

## 2022-04-13 RX ORDER — OXYCODONE HYDROCHLORIDE 5 MG/1
5 TABLET ORAL EVERY 4 HOURS PRN
Status: DISCONTINUED | OUTPATIENT
Start: 2022-04-13 | End: 2022-04-14 | Stop reason: HOSPADM

## 2022-04-13 RX ORDER — DOCUSATE SODIUM 100 MG/1
100 CAPSULE, LIQUID FILLED ORAL 2 TIMES DAILY
Qty: 10 CAPSULE | Refills: 0 | Status: SHIPPED | OUTPATIENT
Start: 2022-04-13 | End: 2022-05-23 | Stop reason: ALTCHOICE

## 2022-04-13 RX ORDER — CALCIUM CARBONATE 200(500)MG
1000 TABLET,CHEWABLE ORAL DAILY PRN
Status: DISCONTINUED | OUTPATIENT
Start: 2022-04-13 | End: 2022-04-14 | Stop reason: HOSPADM

## 2022-04-13 RX ORDER — ONDANSETRON 2 MG/ML
4 INJECTION INTRAMUSCULAR; INTRAVENOUS ONCE AS NEEDED
Status: DISCONTINUED | OUTPATIENT
Start: 2022-04-13 | End: 2022-04-13 | Stop reason: HOSPADM

## 2022-04-13 RX ORDER — CHLORHEXIDINE GLUCONATE 0.12 MG/ML
15 RINSE ORAL ONCE
Status: COMPLETED | OUTPATIENT
Start: 2022-04-13 | End: 2022-04-13

## 2022-04-13 RX ORDER — SODIUM CHLORIDE, SODIUM LACTATE, POTASSIUM CHLORIDE, CALCIUM CHLORIDE 600; 310; 30; 20 MG/100ML; MG/100ML; MG/100ML; MG/100ML
75 INJECTION, SOLUTION INTRAVENOUS CONTINUOUS
Status: DISCONTINUED | OUTPATIENT
Start: 2022-04-13 | End: 2022-04-14 | Stop reason: HOSPADM

## 2022-04-13 RX ORDER — DEXAMETHASONE SODIUM PHOSPHATE 10 MG/ML
INJECTION, SOLUTION INTRAMUSCULAR; INTRAVENOUS AS NEEDED
Status: DISCONTINUED | OUTPATIENT
Start: 2022-04-13 | End: 2022-04-13

## 2022-04-13 RX ORDER — CEFAZOLIN SODIUM 1 G/50ML
1000 SOLUTION INTRAVENOUS EVERY 8 HOURS
Status: COMPLETED | OUTPATIENT
Start: 2022-04-13 | End: 2022-04-14

## 2022-04-13 RX ORDER — CEFAZOLIN SODIUM 2 G/50ML
SOLUTION INTRAVENOUS AS NEEDED
Status: DISCONTINUED | OUTPATIENT
Start: 2022-04-13 | End: 2022-04-13

## 2022-04-13 RX ORDER — ASCORBIC ACID 500 MG
500 TABLET ORAL 2 TIMES DAILY
Status: DISCONTINUED | OUTPATIENT
Start: 2022-04-13 | End: 2022-04-14 | Stop reason: HOSPADM

## 2022-04-13 RX ORDER — FOLIC ACID 1 MG/1
1000 TABLET ORAL DAILY
Status: DISCONTINUED | OUTPATIENT
Start: 2022-04-13 | End: 2022-04-14 | Stop reason: HOSPADM

## 2022-04-13 RX ORDER — MIDAZOLAM HYDROCHLORIDE 2 MG/2ML
INJECTION, SOLUTION INTRAMUSCULAR; INTRAVENOUS AS NEEDED
Status: DISCONTINUED | OUTPATIENT
Start: 2022-04-13 | End: 2022-04-13

## 2022-04-13 RX ORDER — BUPIVACAINE HYDROCHLORIDE AND EPINEPHRINE 5; 5 MG/ML; UG/ML
INJECTION, SOLUTION PERINEURAL AS NEEDED
Status: DISCONTINUED | OUTPATIENT
Start: 2022-04-13 | End: 2022-04-13 | Stop reason: HOSPADM

## 2022-04-13 RX ORDER — NEOSTIGMINE METHYLSULFATE 1 MG/ML
INJECTION INTRAVENOUS AS NEEDED
Status: DISCONTINUED | OUTPATIENT
Start: 2022-04-13 | End: 2022-04-13

## 2022-04-13 RX ORDER — HYDROMORPHONE HCL/PF 1 MG/ML
0.5 SYRINGE (ML) INJECTION EVERY 2 HOUR PRN
Status: DISCONTINUED | OUTPATIENT
Start: 2022-04-13 | End: 2022-04-14 | Stop reason: HOSPADM

## 2022-04-13 RX ORDER — TAMSULOSIN HYDROCHLORIDE 0.4 MG/1
0.4 CAPSULE ORAL
Status: DISCONTINUED | OUTPATIENT
Start: 2022-04-13 | End: 2022-04-14 | Stop reason: HOSPADM

## 2022-04-13 RX ORDER — ONDANSETRON 2 MG/ML
INJECTION INTRAMUSCULAR; INTRAVENOUS AS NEEDED
Status: DISCONTINUED | OUTPATIENT
Start: 2022-04-13 | End: 2022-04-13

## 2022-04-13 RX ORDER — METOPROLOL SUCCINATE 50 MG/1
50 TABLET, EXTENDED RELEASE ORAL DAILY
Status: DISCONTINUED | OUTPATIENT
Start: 2022-04-13 | End: 2022-04-14 | Stop reason: HOSPADM

## 2022-04-13 RX ORDER — ASPIRIN 81 MG/1
81 TABLET ORAL 2 TIMES DAILY
Qty: 70 TABLET | Refills: 0 | Status: SHIPPED | OUTPATIENT
Start: 2022-04-13 | End: 2022-05-23

## 2022-04-13 RX ORDER — ACETAMINOPHEN 325 MG/1
975 TABLET ORAL EVERY 8 HOURS
Status: DISCONTINUED | OUTPATIENT
Start: 2022-04-13 | End: 2022-04-14 | Stop reason: HOSPADM

## 2022-04-13 RX ORDER — SENNOSIDES 8.6 MG
650 CAPSULE ORAL EVERY 8 HOURS PRN
Qty: 30 TABLET | Refills: 0 | Status: SHIPPED | OUTPATIENT
Start: 2022-04-13 | End: 2022-05-13

## 2022-04-13 RX ORDER — SODIUM CHLORIDE, SODIUM LACTATE, POTASSIUM CHLORIDE, CALCIUM CHLORIDE 600; 310; 30; 20 MG/100ML; MG/100ML; MG/100ML; MG/100ML
125 INJECTION, SOLUTION INTRAVENOUS CONTINUOUS
Status: DISCONTINUED | OUTPATIENT
Start: 2022-04-13 | End: 2022-04-14 | Stop reason: HOSPADM

## 2022-04-13 RX ORDER — SIMVASTATIN 80 MG
75 TABLET ORAL
COMMUNITY
End: 2022-05-23 | Stop reason: SDUPTHER

## 2022-04-13 RX ORDER — OXYCODONE HYDROCHLORIDE 5 MG/1
10 TABLET ORAL EVERY 4 HOURS PRN
Status: DISCONTINUED | OUTPATIENT
Start: 2022-04-13 | End: 2022-04-14 | Stop reason: HOSPADM

## 2022-04-13 RX ORDER — TRANEXAMIC ACID 100 MG/ML
INJECTION, SOLUTION INTRAVENOUS AS NEEDED
Status: DISCONTINUED | OUTPATIENT
Start: 2022-04-13 | End: 2022-04-13 | Stop reason: HOSPADM

## 2022-04-13 RX ORDER — KETOROLAC TROMETHAMINE 30 MG/ML
INJECTION, SOLUTION INTRAMUSCULAR; INTRAVENOUS AS NEEDED
Status: DISCONTINUED | OUTPATIENT
Start: 2022-04-13 | End: 2022-04-13 | Stop reason: HOSPADM

## 2022-04-13 RX ADMIN — ROCURONIUM BROMIDE 10 MG: 10 INJECTION, SOLUTION INTRAVENOUS at 09:12

## 2022-04-13 RX ADMIN — DOCUSATE SODIUM 100 MG: 100 CAPSULE ORAL at 12:21

## 2022-04-13 RX ADMIN — ACETAMINOPHEN 975 MG: 325 TABLET ORAL at 20:01

## 2022-04-13 RX ADMIN — CHLORHEXIDINE GLUCONATE 0.12% ORAL RINSE 15 ML: 1.2 LIQUID ORAL at 07:16

## 2022-04-13 RX ADMIN — DEXAMETHASONE SODIUM PHOSPHATE 10 MG: 10 INJECTION, SOLUTION INTRAMUSCULAR; INTRAVENOUS at 08:59

## 2022-04-13 RX ADMIN — SERTRALINE HYDROCHLORIDE 50 MG: 50 TABLET ORAL at 12:21

## 2022-04-13 RX ADMIN — EPHEDRINE SULFATE 10 MG: 50 INJECTION, SOLUTION INTRAVENOUS at 09:06

## 2022-04-13 RX ADMIN — CEFAZOLIN SODIUM 2000 MG: 2 SOLUTION INTRAVENOUS at 08:23

## 2022-04-13 RX ADMIN — BUPIVACAINE 20 ML: 13.3 INJECTION, SUSPENSION, LIPOSOMAL INFILTRATION at 08:19

## 2022-04-13 RX ADMIN — OXYCODONE HYDROCHLORIDE AND ACETAMINOPHEN 500 MG: 500 TABLET ORAL at 18:03

## 2022-04-13 RX ADMIN — FENTANYL CITRATE 25 MCG: 50 INJECTION, SOLUTION INTRAMUSCULAR; INTRAVENOUS at 07:55

## 2022-04-13 RX ADMIN — GABAPENTIN 600 MG: 300 CAPSULE ORAL at 22:02

## 2022-04-13 RX ADMIN — HYDROMORPHONE HYDROCHLORIDE 1 MG: 1 INJECTION, SOLUTION INTRAMUSCULAR; INTRAVENOUS; SUBCUTANEOUS at 09:29

## 2022-04-13 RX ADMIN — KETAMINE HYDROCHLORIDE 10 MG: 50 INJECTION INTRAMUSCULAR; INTRAVENOUS at 08:52

## 2022-04-13 RX ADMIN — ENOXAPARIN SODIUM 40 MG: 40 INJECTION SUBCUTANEOUS at 22:02

## 2022-04-13 RX ADMIN — PHENYLEPHRINE HYDROCHLORIDE 20 MCG/MIN: 10 INJECTION INTRAVENOUS at 08:49

## 2022-04-13 RX ADMIN — CEFAZOLIN SODIUM 1000 MG: 1 SOLUTION INTRAVENOUS at 23:12

## 2022-04-13 RX ADMIN — GLYCOPYRROLATE 0.4 MG: 0.2 INJECTION, SOLUTION INTRAMUSCULAR; INTRAVENOUS at 09:52

## 2022-04-13 RX ADMIN — BUPIVACAINE HYDROCHLORIDE 10 ML: 5 INJECTION, SOLUTION PERINEURAL at 08:19

## 2022-04-13 RX ADMIN — CEFAZOLIN SODIUM 1000 MG: 1 SOLUTION INTRAVENOUS at 15:16

## 2022-04-13 RX ADMIN — ROPIVACAINE HYDROCHLORIDE 20 ML: 2 INJECTION, SOLUTION EPIDURAL; INFILTRATION at 08:14

## 2022-04-13 RX ADMIN — MIDAZOLAM HYDROCHLORIDE 2 MG: 1 INJECTION, SOLUTION INTRAMUSCULAR; INTRAVENOUS at 07:55

## 2022-04-13 RX ADMIN — FOLIC ACID 1000 MCG: 1 TABLET ORAL at 18:03

## 2022-04-13 RX ADMIN — TRANEXAMIC ACID 1000 MG: 1 INJECTION, SOLUTION INTRAVENOUS at 08:40

## 2022-04-13 RX ADMIN — SODIUM CHLORIDE, SODIUM LACTATE, POTASSIUM CHLORIDE, AND CALCIUM CHLORIDE 75 ML/HR: .6; .31; .03; .02 INJECTION, SOLUTION INTRAVENOUS at 13:39

## 2022-04-13 RX ADMIN — SODIUM CHLORIDE, SODIUM LACTATE, POTASSIUM CHLORIDE, AND CALCIUM CHLORIDE 125 ML/HR: .6; .31; .03; .02 INJECTION, SOLUTION INTRAVENOUS at 12:01

## 2022-04-13 RX ADMIN — METOPROLOL SUCCINATE 50 MG: 50 TABLET, EXTENDED RELEASE ORAL at 12:21

## 2022-04-13 RX ADMIN — DOCUSATE SODIUM 100 MG: 100 CAPSULE ORAL at 18:03

## 2022-04-13 RX ADMIN — TAMSULOSIN HYDROCHLORIDE 0.4 MG: 0.4 CAPSULE ORAL at 15:16

## 2022-04-13 RX ADMIN — ROCURONIUM BROMIDE 40 MG: 10 INJECTION, SOLUTION INTRAVENOUS at 08:29

## 2022-04-13 RX ADMIN — ACETAMINOPHEN 975 MG: 325 TABLET ORAL at 12:21

## 2022-04-13 RX ADMIN — ONDANSETRON 4 MG: 2 INJECTION INTRAMUSCULAR; INTRAVENOUS at 09:55

## 2022-04-13 RX ADMIN — TRAZODONE HYDROCHLORIDE 150 MG: 100 TABLET ORAL at 22:02

## 2022-04-13 RX ADMIN — NEOSTIGMINE METHYLSULFATE 3 MG: 1 INJECTION, SOLUTION INTRAVENOUS at 09:52

## 2022-04-13 RX ADMIN — SODIUM CHLORIDE, SODIUM LACTATE, POTASSIUM CHLORIDE, AND CALCIUM CHLORIDE: .6; .31; .03; .02 INJECTION, SOLUTION INTRAVENOUS at 07:42

## 2022-04-13 RX ADMIN — FENTANYL CITRATE 75 MCG: 50 INJECTION, SOLUTION INTRAMUSCULAR; INTRAVENOUS at 08:29

## 2022-04-13 RX ADMIN — HYDROMORPHONE HYDROCHLORIDE 1 MG: 1 INJECTION, SOLUTION INTRAMUSCULAR; INTRAVENOUS; SUBCUTANEOUS at 08:56

## 2022-04-13 RX ADMIN — PROPOFOL 200 MG: 10 INJECTION, EMULSION INTRAVENOUS at 08:29

## 2022-04-13 RX ADMIN — KETAMINE HYDROCHLORIDE 10 MG: 50 INJECTION INTRAMUSCULAR; INTRAVENOUS at 09:12

## 2022-04-13 NOTE — INTERVAL H&P NOTE
H&P reviewed  After examining the patient I find no changes in the patients condition since the H&P had been written      Vitals:    04/13/22 0716   BP: 153/69   Pulse: 81   Resp: 15   Temp: (!) 97 °F (36 1 °C)   SpO2: 93%   Patient seen and examined  To the OR for right TKA   Pros and cons of op and non-op intervention discussed with patient  We will follow up postoperatively

## 2022-04-13 NOTE — ANESTHESIA PROCEDURE NOTES
Peripheral Block    Patient location during procedure: holding area  Start time: 4/13/2022 8:14 AM  Reason for block: at surgeon's request and post-op pain management  Staffing  Performed: Anesthesiologist   Anesthesiologist: Billy Dickinson MD  Preanesthetic Checklist  Completed: patient identified, IV checked, site marked, risks and benefits discussed, surgical consent, monitors and equipment checked, pre-op evaluation and timeout performed  Peripheral Block  Patient position: sitting  Prep: ChloraPrep  Patient monitoring: continuous pulse ox and frequent blood pressure checks  Block type: ipack block  Laterality: right  Injection technique: single-shot  Procedures: ultrasound guided, Ultrasound guidance required for the procedure to increase accuracy and safety of medication placement and decrease risk of complications  Ultrasound permanent image savedropivacaine (NAROPIN) 0 2% perineural infiltration, 20 mL  Needle  Needle type: pencil-tip   Needle gauge: 22 G  Needle length: 10 cm  Needle localization: ultrasound guidance  Test dose: negative  Assessment  Injection assessment: incremental injection, local visualized surrounding nerve on ultrasound, no paresthesia on injection and negative aspiration for heme  Paresthesia pain: none  Heart rate change: no  Slow fractionated injection: yes  Post-procedure:  site cleaned  patient tolerated the procedure well with no immediate complications  Additional Notes  Very challenging IPACK block  Deep target >6cm  Initially unable to get proper needle pathing 2/2 acute ankle required for needle to pass between popliteal artery and knee capsule  New area of skin localized more anteriorly and slightly better needle visualization, but similar difficulty with needle path  Proper spread in the space between popliteal artery and knee visualized under ultrasound after much difficulty

## 2022-04-13 NOTE — PLAN OF CARE
Problem: PHYSICAL THERAPY ADULT  Goal: Performs mobility at highest level of function for planned discharge setting  See evaluation for individualized goals  Description: Treatment/Interventions: Functional transfer training,LE strengthening/ROM,Elevations,Therapeutic exercise,Endurance training,Patient/family training,Equipment eval/education,Bed mobility,Gait training,Spoke to nursing,Spoke to case management,OT  Equipment Recommended: Amy Mcconnell     See flowsheet documentation for full assessment, interventions and recommendations  Outcome: Progressing  Note: Prognosis: Good  Problem List: Decreased strength,Decreased range of motion,Impaired balance,Decreased mobility,Decreased skin integrity,Orthopedic restrictions,Pain  Assessment: Pt seen for PT evaluation POD #0 s/p elective R TKA by Dr Lino Mendez on 4/13/2022  Pt cleared by SHE Mcclain for evaluation  Comorbidities affecting pt's fnxl performance include: arthritis, CAD, HLD, celiac disease, diverticulosis, MI, HTN  PTA, pt was independent with functional mobility without assistive device, independent with ADLS, independent with IADLS, living with his spouse in a 2 level home with 4 steps to enter  Pt demonstrates impairments in R LE strength & ROM, pain, gait  deviations, endurance, edema  Pt was educated on importance of frequent mobility & exercises, paper handout provided  Pt verbalizes good understanding, will continue to assess carry over  Pt scores 18/24 on AM-PAC objective tool w/ a standardized score of 41 05, indicating pt demonstrates functional capacity for return to home self care vs with increased supports upon d/c  Barthel Index outcome tool was used & pt scores 60 indicating severe limitations of fnxl mobility/ADLS  Pt is at risk of falls d/t multiple comorbidities, impaired balance, use of ambulatory aid, varying levels of pain , acuity of medical illness and ongoing medical treatment of primary dx   Pt will benefit from continued BID PT treatment in order to address impairments, decrease risk of falls, maximize independence w/ fnxl mobility, & ensure safety w/ mobility for transition to next level of care  Pt would most appropriately benefit from outpatient PT services  Barriers to Discharge: None        PT Discharge Recommendation: Home with outpatient rehabilitation          See flowsheet documentation for full assessment

## 2022-04-13 NOTE — PERIOPERATIVE NURSING NOTE
1030  In PACU, report given  VSS  Sleepy and arousable to sternal rub and name  Left leg cooling device hooked up as well as SCD  PEERLA  Dressing dry and intact  Pt spontaneously crosses leg  Repositioned and ice pack also placed posterior left knee  Dr Deya Mclaughlin to bedside to see and examine patient  1045 Pt awake and alert  HOB up and sat adequate  Denies SOB  With sharp stimulation pt states right leg with more feeling than prior  Left intact

## 2022-04-13 NOTE — PLAN OF CARE
Problem: PAIN - ADULT  Goal: Verbalizes/displays adequate comfort level or baseline comfort level  Description: Interventions:  - Encourage patient to monitor pain and request assistance  - Assess pain using appropriate pain scale  - Administer analgesics based on type and severity of pain and evaluate response  - Implement non-pharmacological measures as appropriate and evaluate response  - Consider cultural and social influences on pain and pain management  - Notify physician/advanced practitioner if interventions unsuccessful or patient reports new pain  Outcome: Progressing     Problem: INFECTION - ADULT  Goal: Absence or prevention of progression during hospitalization  Description: INTERVENTIONS:  - Assess and monitor for signs and symptoms of infection  - Monitor lab/diagnostic results  - Monitor all insertion sites, i e  indwelling lines, tubes, and drains  - Monitor endotracheal if appropriate and nasal secretions for changes in amount and color  - [place]bins appropriate cooling/warming therapies per order  - Administer medications as ordered  - Instruct and encourage patient and family to use good hand hygiene technique  - Identify and instruct in appropriate isolation precautions for identified infection/condition  Outcome: Progressing  Goal: Absence of fever/infection during neutropenic period  Description: INTERVENTIONS:  - Monitor WBC    Outcome: Progressing     Problem: SAFETY ADULT  Goal: Patient will remain free of falls  Description: INTERVENTIONS:  - Educate patient/family on patient safety including physical limitations  - Instruct patient to call for assistance with activity   - Consult OT/PT to assist with strengthening/mobility   - Keep Call bell within reach  - Keep bed low and locked with side rails adjusted as appropriate  - Keep care items and personal belongings within reach  - Initiate and maintain comfort rounds  - Make Fall Risk Sign visible to staff  - Apply yellow socks and bracelet for high fall risk patients  - Consider moving patient to room near nurses station  Outcome: Progressing  Goal: Maintain or return to baseline ADL function  Description: INTERVENTIONS:  -  Assess patient's ability to carry out ADLs; assess patient's baseline for ADL function and identify physical deficits which impact ability to perform ADLs (bathing, care of mouth/teeth, toileting, grooming, dressing, etc )  - Assess/evaluate cause of self-care deficits   - Assess range of motion  - Assess patient's mobility; develop plan if impaired  - Assess patient's need for assistive devices and provide as appropriate  - Encourage maximum independence but intervene and supervise when necessary  - Involve family in performance of ADLs  - Assess for home care needs following discharge   - Consider OT consult to assist with ADL evaluation and planning for discharge  - Provide patient education as appropriate  Outcome: Progressing  Goal: Maintains/Returns to pre admission functional level  Description: INTERVENTIONS:  - Perform BMAT or MOVE assessment daily    - Set and communicate daily mobility goal to care team and patient/family/caregiver     - Collaborate with rehabilitation services on mobility goals if consulted  - Out of bed for toileting  - Record patient progress and toleration of activity level   Outcome: Progressing     Problem: DISCHARGE PLANNING  Goal: Discharge to home or other facility with appropriate resources  Description: INTERVENTIONS:  - Identify barriers to discharge w/patient and caregiver  - Arrange for needed discharge resources and transportation as appropriate  - Identify discharge learning needs (meds, wound care, etc )  - Arrange for interpretive services to assist at discharge as needed  - Refer to Case Management Department for coordinating discharge planning if the patient needs post-hospital services based on physician/advanced practitioner order or complex needs related to functional status, cognitive ability, or social support system  Outcome: Progressing     Problem: Knowledge Deficit  Goal: Patient/family/caregiver demonstrates understanding of disease process, treatment plan, medications, and discharge instructions  Description: Complete learning assessment and assess knowledge base    Interventions:  - Provide teaching at level of understanding  - Provide teaching via preferred learning methods  Outcome: Progressing

## 2022-04-13 NOTE — CONSULTS
[address] 1945, 78 y o  male MRN: [id number]  Unit/Bed#: -01 Encounter: [id number]  Primary Care Provider: Maximus Boyd MD   Date and time admitted to hospital: 4/13/2022  6:29 AM    Consults    Mixed hyperlipidemia  Assessment & Plan  Not on statin due to muscle pain  Coronary artery disease of native artery of native heart with stable angina pectoris [facility])  Assessment & Plan  Patient recently had preoperative cardiology evaluation with history of CAD status post multiple PCIs in the early 90s and LAD and RCA stent in 2003 and multiple repeat stents to RCA for multiple episodes of InStent stenosis in 2010  Patient has been on aspirin and Plavix for more than 10 years due to extensive stenting in the RCA and multiple restenoses in the past   Will resume aspirin and Plavix in a m  Abena Arbour Continue beta-blocker  * Status post total right knee replacement  Assessment & Plan  Continue pain management and PT/OT as tolerated    the plan is per orthopedic team        VTE Prophylaxis: VTE Score: 7 Moderate Risk (Score 3-4) - Pharmacological DVT Prophylaxis Ordered: enoxaparin (Lovenox)  Recommendations for Discharge:  · Resume aspirin and Plavix in a m   · DVT prophylaxis with Lovenox to be started this evening  · Continue PT/OT and pain management    Counseling / Coordination of Care Time: 45 minutes Greater than 50% of total time spent on patient counseling and coordination of care  Collaboration of Care: Were Recommendations Directly Discussed with Primary Treatment Team? Yes    History of Present Illness:  Devin Snowden is a 78 y o  male who is originally admitted to the orthopedic service due to right total knee arthroplasty    We are being consulted for medical management, patient has history of CAD status post multiple stents in the past with InStent stenosis history, right knee osteoarthritis underwent right total knee arthroplasty today  Patient denies of any chest pain or shortness of breath  Patient denies of any headache or nausea or vomiting  Patient denies of any fever chills or cough  Patient has minimal pain in the right knee  Review of Systems:  Review of Systems   Constitutional: Negative  HENT: Negative  Eyes: Negative  Respiratory: Negative  Cardiovascular: Negative  Gastrointestinal: Negative  Endocrine: Negative  Genitourinary: Negative  Musculoskeletal: Negative  Skin: Negative  Allergic/Immunologic: Negative  Neurological: Negative  Hematological: Negative  Psychiatric/Behavioral: Negative  Past Medical and Surgical History:   Past Medical History:   Diagnosis Date    Celiac disease     Coronary artery disease     Diverticulosis     Hyperlipidemia     Hypertension     Myocardial infarction ([facility] [address] )        Past Surgical History:   Procedure Laterality Date    COLONOSCOPY      CORONARY ANGIOPLASTY WITH STENT PLACEMENT      20 yrs ago       Meds/Allergies:  all medications and allergies reviewed    Allergies:    Allergies   Allergen Reactions    Crestor [Rosuvastatin] Myalgia    Ramipril Cough       Social History:  Marital Status: /Civil Union  Substance Use History:   Social History     Substance and Sexual Activity   Alcohol Use Not Currently     Social History     Tobacco Use   Smoking Status Former Smoker    Quit date: 5    Years since quittin 3   Smokeless Tobacco Never Used   Tobacco Comment    Per pt, quit over 36 years ago     Social History     Substance and Sexual Activity   Drug Use Never       Family History:  Family History   Problem Relation Age of Onset    Diabetes Mother     Coronary artery disease Father     Thyroid disease Sister        Physical Exam:   Vitals:   Blood Pressure: 113/58 (22 1459)  Pulse: 61 (22 1459)  Temperature: 99 2 °F (37 3 °C) (22 1459)  Temp Source: Tympanic (22 1459)  Respirations: 18 (04/13/22 3948)  Height: 6' (182 9 cm) (04/13/22 1134)  Weight - Scale: 114 kg (251 lb) (04/13/22 5316)  SpO2: 90 % (04/13/22 1459)    Physical Exam   HEENT-PERRLA, moist oral mucosa  Neck-supple, no JVD elevation   Respiratory-equal air entry bilaterally, no rales or rhonchi  Cardiovascular system-S1, S2 heard, no murmur or gallops or rubs  Abdomen-soft, nontender, no guarding or rigidity, bowel sounds heard  Extremities-no pedal edema  Peripheral pulses palpable  Musculoskeletal-no contractures, right knee is bandaged  Central nervous system-no acute focal neurological deficit ,no sensory or motor deficit noted  Skin-no rash noted        Additional Data:   Lab Results:                    Lab Results   Component Value Date/Time    HGBA1C 5 4 03/15/2022 03:57 PM               Imaging: Reviewed radiology reports from this admission including: xray(s)  No orders to display       EKG, Pathology, and Other Studies Reviewed on Admission:   · EKG: No EKG obtained  ** Please Note: This note may have been constructed using a voice recognition system   **

## 2022-04-13 NOTE — ASSESSMENT & PLAN NOTE
Patient recently had preoperative cardiology evaluation with history of CAD status post multiple PCIs in the early 90s and LAD and RCA stent in 2003 and multiple repeat stents to RCA for multiple episodes of InStent stenosis in 2010  Patient has been on aspirin and Plavix for more than 10 years due to extensive stenting in the RCA and multiple restenoses in the past   Will resume aspirin and Plavix in a [facility] Continue beta-blocker

## 2022-04-13 NOTE — PHYSICAL THERAPY NOTE
PHYSICAL THERAPY EVALUATION & TREATMENT    NAME:  Светлана Harmon  DATE: 04/13/22    AGE:   78 y o  Mrn:   [id number]  Length Of Stay: 0    ADMIT DX:  Primary osteoarthritis of right knee [M17 11]  Preoperative testing [Z01 818]    Past Medical History:   Diagnosis Date    Celiac disease     Coronary artery disease     Diverticulosis     Hyperlipidemia     Hypertension     Myocardial infarction ([facility] [address] ) 1999     Past Surgical History:   Procedure Laterality Date    COLONOSCOPY      CORONARY ANGIOPLASTY WITH STENT PLACEMENT      20 yrs ago       Performed at least 2 patient identifiers during session: Name, Dani Belts and ID bracelet        04/13/22 1245   PT Last Visit   PT Visit Date 04/13/22   Note Type   Note type Evaluation  (& treatment)   Pain Assessment   Pain Assessment Tool 0-10   Pain Score 2   Pain Location/Orientation Orientation: Right;Location: Knee   Pain Radiating Towards R calf   Pain Onset/Description Onset: Ongoing; Descriptor: Sore   Effect of Pain on Daily Activities limits tolerance of functional mobility, limits gait mechanics   Patient's Stated Pain Goal No pain   Hospital Pain Intervention(s) Cold applied;Repositioned; Ambulation/increased activity; Elevated; Emotional support   Multiple Pain Sites No   Restrictions/Precautions   Weight Bearing Precautions Per Order Yes   RLE Weight Bearing Per Order WBAT   LLE Weight Bearing Per Order WBAT   Other Precautions WBS; Multiple lines; Fall Risk;Pain  (R TKA)   Home Living   Type of [address] [place] [address] Two level;Stairs to enter with rails  (3-4 JULIANA w/ HR from deck, then FFSU w/ full bathroom)   Bathroom Shower/Tub Tub/shower unit   Bathroom Toilet Standard   Bathroom Equipment Commode;Grab bars in shower;Hand-held shower  (commode over toilet)   [address] [facility] Can;Crutches  (does not use at baseline)   Prior Function   Level of [place] Independent with ADLs and functional mobility; Needs assistance with IADLs   Lives With Spouse   Receives Help From Family   ADL Assistance Independent   IADLs Independent   Falls in the last 6 months 0   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)   Comments Pt reports ability to have [address] after 4 JULIANA w/ HR  Pt ambulates at baseline with no device, is fully independent with all ADLs and IADLs  +drives  Denies h/o falls  General   Additional Pertinent History Pt presents POD0 s/p R TKA by Dr Lino Mendez  WBAT BLE  Family/Caregiver Present No   Cognition   Overall Cognitive Status WFL   Arousal/Participation Cooperative   Orientation Level Oriented X4   Memory Within functional limits   Following Commands Follows all commands and directions without difficulty   Subjective   Subjective "Now that I'm moving I don't feel the pain as much "    RUE Assessment   RUE Assessment WFL   LUE Assessment   LUE Assessment WFL   RLE Assessment   RLE Assessment X   Strength RLE   RLE Overall Strength 3/5   LLE Assessment   LLE Assessment WFL   Vision-Basic Assessment   Current Vision Wears glasses all the time   Coordination   Movements are Fluid and Coordinated 1   Sensation WFL   Light Touch   RLE Light Touch Grossly intact  (slight numbness from nerve block remains)   LLE Light Touch Grossly intact   Proprioception   RLE Proprioception Grossly intact   LLE Proprioception Grossly Intact   Bed Mobility   Supine to Sit 5  Supervision   Additional items Assist x 1;HOB elevated; Bedrails; Increased time required;Verbal cues   Sit to Supine Unable to assess   Additional Comments Pt was left seated OOB in recliner chair at end of session, needs within reach, lines intact, wife in room  Transfers   Sit to Stand 4  Minimal assistance   Additional items Assist x 1; Increased time required;Verbal cues   Stand to Sit 5  Supervision   Additional items Assist x 1; Armrests; Increased time required;Verbal cues   Ambulation/Elevation   Gait pattern Antalgic; Wide RAMONE; Decreased foot clearance;Decreased R stance; Short stride; Inconsistent ashish   Gait Assistance 5  Supervision  (intermittent CGA to supervision)   Additional items Assist x 1;Verbal cues   Assistive Device Rolling walker   Distance 80ft x1   Stair Management Assistance Not tested   Balance   Static Sitting Good   Dynamic Sitting Fair +   Static Standing Fair +  (w/ UE support on RW)   Dynamic Standing Fair +  (w/ UE support on RW)   Ambulatory Fair +  (w/ UE support on RW)   Activity Tolerance   Activity Tolerance Patient tolerated treatment well;Patient limited by pain   Medical Staff Made Aware Spoke with CM, OT   Nurse Made Aware Spoke with SHE Acuña pre/post session   Assessment   Prognosis Good   Problem List Decreased strength;Decreased range of motion; Impaired balance;Decreased mobility; Decreased skin integrity;Orthopedic restrictions;Pain   Assessment Pt seen for PT evaluation POD #0 s/p elective R TKA by Dr Jesus Alberto Lanza on 4/13/2022  Pt cleared by SHE Acuña for evaluation  Comorbidities affecting pt's fnxl performance include: arthritis, CAD, HLD, celiac disease, diverticulosis, MI, HTN  PTA, pt was independent with functional mobility without assistive device, independent with ADLS, independent with IADLS, living with his spouse in a 2 level home with 4 steps to enter  Pt demonstrates impairments in R LE strength & ROM, pain, gait  deviations, endurance, edema  Pt was educated on importance of frequent mobility & exercises, paper handout provided  Pt verbalizes good understanding, will continue to assess carry over  Pt scores 18/24 on AM-PAC objective tool w/ a standardized score of 41 05, indicating pt demonstrates functional capacity for return to home self care vs with increased supports upon d/c  Barthel Index outcome tool was used & pt scores 60 indicating severe limitations of fnxl mobility/ADLS   Pt is at risk of falls d/t multiple comorbidities, impaired balance, use of ambulatory aid, varying levels of pain , acuity of medical illness and ongoing medical treatment of primary dx  Pt will benefit from continued BID PT treatment in order to address impairments, decrease risk of falls, maximize independence w/ fnxl mobility, & ensure safety w/ mobility for transition to next level of care  Pt would most appropriately benefit from outpatient PT services  Barriers to Discharge None   Goals   Patient Goals "to first walk normally, then to get back on my bicycle"   [facility] Expiration Date 04/18/22   Short Term Goal #1 Patient PT goals established in order to maximize mobility & safety, and progress to next level of PT care  Pt will: complete all bed mobility independently in order to promote independence & simulate a home environment; complete all transfers w/ RW at [facility] level in order to increase safety &  independence; ambulate >150ft with RW at [facility] level in order to increase safety with household and short community functional mobility; negotiate 4 stairs with HR assist, SPC, and S in order to access his home; improve R LE strength to >/= 4/5 MMT t/o in order to increase safety & decrease risk of falls; demonstrate independence w/ 3-5 LE strengthening exercises to facilitate independence w/ HEP; improve AM-PAC score to >/= 23/24 in order to increase independence with mobility and decrease burden of care; improve Barthel Index score to >/= 70/100 in order to increase independence and decrease risk of falls  PT Treatment Day 1   Plan   Treatment/Interventions Functional transfer training;LE strengthening/ROM; Elevations; Therapeutic exercise; Endurance training;Patient/family training;Equipment eval/education; Bed mobility;Gait training;Spoke to nursing;Spoke to case management;OT   PT Frequency Twice a day   Recommendation   PT Discharge Recommendation Home with outpatient rehabilitation   Equipment Recommended [address] [facility] Recommended Wheeled walker   Change/add to [employer]?  No   AM-PAC Basic Mobility Inpatient   Turning in Bed Without Bedrails 3   Lying on Back to Sitting on Edge of Flat Bed 3   Moving Bed to Chair 3   Standing Up From Chair 3   Walk in Room 3   Climb 3-5 Stairs 3   Basic Mobility Inpatient Raw Score 18   Basic Mobility Standardized Score 41 05   Highest Level Of Mobility   JH-HL Goal 6: Walk 10 steps or more   JH-HLM Highest Level of Mobility 7: Walk 25 feet or more   JH-HLM Goal Achieved Yes   Modified Sidney Scale   Modified Vitaliy Scale 4   Barthel Index   Feeding 10   Bathing 0   Grooming Score 5   Dressing Score 5   Bladder Score 10   Bowels Score 10   Toilet Use Score 5   Transfers (Bed/Chair) Score 10   Mobility (Level Surface) Score 0   Stairs Score 5   Barthel Index Score 60   Additional Treatment Session   Start Time 1300   End Time 1345   Treatment Assessment Pt is agreeable to participate in additional mobility and LE exercise training post IE  Pt completes transfers with S, cues for hand and foot placement; then ambulates another 75ft with RW and Supervision (impulsive at times, cues for safe turning w/ RW)  Upon return to room, pt completes below outlined R LE exercises with verbal & tactile cues from therapist, paper handout provided  Vitals monitored and stable pre/post session  At end of session pt was left seated in bedside recliner chair with all needs in reach and lines intact, RN aware of pt status, spouse in room  Continue to recommend outpatient PT services once medically cleared for d/c from the acute care setting  Will continue skilled PT POC as able and appropriate to address functional impairments and progress towards therapy goals  Equipment Use Recommend pt use of RW for all aspects of functional mobility during remainder of his stay  Additional Treatment Day 1   Exercises   Quad Sets 10 reps;AROM; Right  (reclined position)   Heelslides 10 reps;AROM; Right  (reclined position)   Glute Sets 10 reps;AROM; Right  (reclined position)   Hip Flexion 10 reps;AROM; Right  (reclined position (SLR))   Knee AROM Long Arc Quad Sitting;10 reps;AROM; Right   End of Consult   Patient Position at End of Consult Bedside chair; All needs within reach  (spouse in room)   End of Consult Comments Based on patient's [facility] Highest Level of Mobility scores today, patient currently has a goal of -[facility] Levels: 7: WALK 25 FEET OR MORE, to be completed with RN staffing each shift, in order to improve overall activity tolerance and mobility, combat hospital related deconditioning, and maximize outcomes for d/c from the acute care setting  The patient's AM-PAC Basic Mobility Inpatient Short Form Raw Score is 18  A Raw score of greater than 16 suggests the patient may benefit from discharge to home  Please also refer to the recommendation of the Physical Therapist for safe discharge planning          Enriqueta Miller PT, DPT   Available via Sling[employer]  NPI # [id number]  [place] License - [id number]  7/19/6032

## 2022-04-13 NOTE — PERIOPERATIVE NURSING NOTE
1100 Right ankle and foot with more feeling as reported by patient  Pain remains at a 2   Report given to Catrina -1

## 2022-04-13 NOTE — ANESTHESIA PROCEDURE NOTES
Peripheral Block    Patient location during procedure: holding area  Start time: 4/13/2022 8:19 AM  Reason for block: at surgeon's request and post-op pain management  Staffing  Performed: Anesthesiologist   Anesthesiologist: Khai Thomas MD  Preanesthetic Checklist  Completed: patient identified, IV checked, site marked, risks and benefits discussed, surgical consent, monitors and equipment checked, pre-op evaluation and timeout performed  Peripheral Block  Patient position: sitting  Prep: ChloraPrep  Patient monitoring: continuous pulse ox and frequent blood pressure checks  Block type: adductor canal block  Laterality: right  Injection technique: single-shot  Procedures: ultrasound guided, Ultrasound guidance required for the procedure to increase accuracy and safety of medication placement and decrease risk of complications    Ultrasound permanent image savedbupivacaine (MARCAINE) 0 5 % perineural infiltration, 10 mL  Needle  Needle type: pencil-tip   Needle gauge: 22 G  Needle length: 10 cm  Test dose: negative  Assessment  Injection assessment: incremental injection, local visualized surrounding nerve on ultrasound and no paresthesia on injection  Paresthesia pain: none  Heart rate change: no  Slow fractionated injection: yes  patient tolerated the procedure well with no immediate complications  Additional Notes  Unremarkable adductor canal nerve block

## 2022-04-13 NOTE — OP NOTE
Brief Op Note  David Zuleta  MRN: [id number]      Unit/Bed#:  OR MAIN    PreOp Dx: right knee DJD      Postop Dx: right knee DJD      Procedure: right total knee arthroplasty      Surgeon: Kia Najera MD      Assistants:Clint Chen PA-C    No Qualified Resident Available for this Case  The physician assistant was needed for all portions of this case including prepping and draping the patient as well as prepping and final implantation of the femoral tibial and patellar components    Fluids:       EBL:       Drains:  None      Specimens: No       Complications: No       Condition: stable transferred to postanesthesia care unit      Implants: Depuy Attune RP  Femoral, size: 7  Tibial tray: 8  Polyethylene: 10  Patellar button: 69      41 y o male, presents at this time, secondary to treatment of right knee DJD with varus deformity and flexion contracture of about 15 to 20° degrees which has failed conservative treatment  The patient was told of all the pros and cons of operative and nonoperative intervention  Some of the complications of operative intervention include infection, bleeding, scarring, nerve injury, vascular injury, fracture, continued pain, decreased range of motion, DVT, PE death, loss of limb, need for further surgery, not obtain an results  The patient wished  Patient did consent for operative intervention for this pathology  Patient was brought to the operating room  Patient was anesthetized as anesthesia team  Patient was prepped and draped in normal sterile fashion  After this was done, we did conduct a time out to make sure correct  Patient was in the room, prepped marked and draped  Implants were in the room, Rep  For the implants were present, DVT prophylaxis and antibiotics were addressed  Midline incision was made over the anterior knee after going through skin, fatty tissue, fascia was identified  Flaps were created both medially and laterally   Medial parapatellar incision was made  Excision of Hoffa fat pad was conducted  At this time because this was a varus knee, we did release over the medial aspect including medial osteophytes and deep MCL  We're able to excise the fatty tissue from the anterior aspect of the distal femur  We were able to at this time, flex the knee with the patella everted  Intramedullary reamer was used  Intramedullary guide for the femur was then placed to determine how much of the distal femur to cut and also, valgus cut angle  Pins were then placed  Intramedullary guide was removed  Distal femoral cut was made  Attention was now to the proximal tibia  Extramedullary guide was used  After making sure that we took the appropriate amount from the medial and lateral side with the aid of a measuring device, the jig was held in place with pins  Protection of the medial and lateral ligaments, as well as the popliteal region, was done  Proximal tibial cut was made  Extension gaps were evaluated  Size of the femur was then determined with the aid of a guide  After this was done, we placed the appropriate sized block  These were held down with pins  Anterior and posterior cuts were made  Anterior, posterior, chamfer cuts were made  We did check our flexion gap and was noted to be appropriate  This was a PCL sacrificing device being used Therefore a box cut was made  Tibial tray size was determined  This was held down with 2 small screws  The reamer and the punch was then used with the appropriate guide to make sure that these were all done, the appropriate manner  Guide was removed  Trial femoral component was placed  Trial tibial polyethylene was placed  Patient was noted to be stable  On coronal and sagittal planes  Patellar button size was determined after the articular surface of the patella was excised  The patella button was placed on the medial aspect of the patella  Holes were drilled for our true patella button to be cemented on   We tracked the knee, and we noted that the patella was tracking appropriately over the trochlear of the trial implants  After this was done we did drill holes in our trial femoral component  We then removed  All trial components  Copious irrigation was used at the operative site  We did place some bone within the intramedullary canal of the femur  High viscocity cement was used and all components were placed, including the femur, tibia, and patella button  A trial tibial Polyethylene was placed  After cement had dried, removed the trial polyethylene and removed any excess cement that was in the popliteal region  Copious irrigation was used  The tibial polyethylene was then placed  Patient was placed in the appropriate ranges of motion and patient's Knee was noted to be stable  Tourniquet was discontinued  Hemostasis was obtained  #1 Vicryl sutures were used to reapproximate the parapatellar incision  2-0 Vicryl sutures for the subcutaneous closure  This was reinforced with staples  Wounds were cleaned and dried  Acticoat, 4 x 4, ABDs and web roll was placed prior to Ace bandage be placed from the foot  All the way to the mid thigh region    Patient was awakened as anesthesia team noted to be a stable condition and transferred to postanesthesia care unit

## 2022-04-13 NOTE — ANESTHESIA POSTPROCEDURE EVALUATION
Post-Op Assessment Note    CV Status:  Stable  Pain Score: 0    Pain management: adequate     Mental Status:  Alert and awake   Hydration Status:  Euvolemic   PONV Controlled:  Controlled   Airway Patency:  Patent      Post Op Vitals Reviewed: Yes      Staff: CRNA, Anesthesiologist   Comments: vss        No complications documented      BP   132/69   Temp  98   Pulse  67   Resp   15   SpO2   99

## 2022-04-14 VITALS
HEIGHT: 72 IN | BODY MASS INDEX: 34 KG/M2 | WEIGHT: 251 LBS | DIASTOLIC BLOOD PRESSURE: 71 MMHG | SYSTOLIC BLOOD PRESSURE: 117 MMHG | TEMPERATURE: 98.3 F | RESPIRATION RATE: 20 BRPM | OXYGEN SATURATION: 91 % | HEART RATE: 77 BPM

## 2022-04-14 LAB
ANION GAP SERPL CALCULATED.3IONS-SCNC: 9 MMOL/L (ref 4–13)
BUN SERPL-MCNC: 22 MG/DL (ref 5–25)
CALCIUM SERPL-MCNC: 9.4 MG/DL (ref 8.4–10.2)
CHLORIDE SERPL-SCNC: 98 MMOL/L (ref 96–108)
CO2 SERPL-SCNC: 25 MMOL/L (ref 21–32)
CREAT SERPL-MCNC: 1.16 MG/DL (ref 0.6–1.3)
ERYTHROCYTE [DISTWIDTH] IN BLOOD BY AUTOMATED COUNT: 12.4 % (ref 11.6–15.1)
GFR SERPL CREATININE-BSD FRML MDRD: 61 ML/MIN/1.73SQ M
GLUCOSE SERPL-MCNC: 106 MG/DL (ref 65–140)
HCT VFR BLD AUTO: 37.8 % (ref 36.5–49.3)
HGB BLD-MCNC: 12.9 G/DL (ref 12–17)
MCH RBC QN AUTO: 32.8 PG (ref 26.8–34.3)
MCHC RBC AUTO-ENTMCNC: 34.1 G/DL (ref 31.4–37.4)
MCV RBC AUTO: 96 FL (ref 82–98)
PLATELET # BLD AUTO: 204 THOUSANDS/UL (ref 149–390)
PMV BLD AUTO: 10.6 FL (ref 8.9–12.7)
POTASSIUM SERPL-SCNC: 4.7 MMOL/L (ref 3.5–5.3)
RBC # BLD AUTO: 3.93 MILLION/UL (ref 3.88–5.62)
SODIUM SERPL-SCNC: 132 MMOL/L (ref 135–147)
WBC # BLD AUTO: 12.63 THOUSAND/UL (ref 4.31–10.16)

## 2022-04-14 PROCEDURE — 97530 THERAPEUTIC ACTIVITIES: CPT

## 2022-04-14 PROCEDURE — 80048 BASIC METABOLIC PNL TOTAL CA: CPT | Performed by: ORTHOPAEDIC SURGERY

## 2022-04-14 PROCEDURE — 97166 OT EVAL MOD COMPLEX 45 MIN: CPT

## 2022-04-14 PROCEDURE — 97116 GAIT TRAINING THERAPY: CPT

## 2022-04-14 PROCEDURE — 85027 COMPLETE CBC AUTOMATED: CPT | Performed by: ORTHOPAEDIC SURGERY

## 2022-04-14 PROCEDURE — 99225 PR SBSQ OBSERVATION CARE/DAY 25 MINUTES: CPT | Performed by: PHYSICIAN ASSISTANT

## 2022-04-14 PROCEDURE — 99024 POSTOP FOLLOW-UP VISIT: CPT | Performed by: PHYSICIAN ASSISTANT

## 2022-04-14 RX ADMIN — METOPROLOL SUCCINATE 50 MG: 50 TABLET, EXTENDED RELEASE ORAL at 08:25

## 2022-04-14 RX ADMIN — ACETAMINOPHEN 975 MG: 325 TABLET ORAL at 04:08

## 2022-04-14 RX ADMIN — OXYCODONE HYDROCHLORIDE AND ACETAMINOPHEN 500 MG: 500 TABLET ORAL at 08:25

## 2022-04-14 RX ADMIN — CLOPIDOGREL BISULFATE 75 MG: 75 TABLET ORAL at 08:25

## 2022-04-14 RX ADMIN — FOLIC ACID 1000 MCG: 1 TABLET ORAL at 08:25

## 2022-04-14 RX ADMIN — ASPIRIN 81 MG: 81 TABLET, CHEWABLE ORAL at 08:25

## 2022-04-14 RX ADMIN — DOCUSATE SODIUM 100 MG: 100 CAPSULE ORAL at 08:25

## 2022-04-14 RX ADMIN — ACETAMINOPHEN 975 MG: 325 TABLET ORAL at 11:33

## 2022-04-14 RX ADMIN — SERTRALINE HYDROCHLORIDE 50 MG: 50 TABLET ORAL at 08:25

## 2022-04-14 NOTE — DISCHARGE SUMMARY
ORTHOPEDICS DISCHARGE SUMMARY  Demetria Dunn 78 y o  male MRN: [id number]  Unit/Bed#: EA PACU    Attending Physician: Birgit Morrow    Admitting diagnosis: Primary osteoarthritis of right knee [M17 11]  Preoperative testing [Z01 818]    Discharge diagnosis: Primary osteoarthritis of right knee [M17 11]  Preoperative testing [Z01 818]    Date of admission: 4/13/2022    Date of discharge: 04/14/22         Procedure:  Right total knee arthroplasty    HPI:  This is a 78y o  year old male that presented to the office with signs and symptoms of right knee osteoarthritis  They tried and failed conservative treatment measures and wished to proceed with surgical intervention  The risks, benefits, and complications of the procedure were discussed with the patient and informed consent was obtained  Hospital Course: The patient was admitted to the hospital on 4/13/2022 and underwent an uncomplicated right total knee arthroplasty  They were transferred to the floor after a brief stay in the post-anesthesia care unit  Their pain was well managed with IV and oral pain medications  They began therapy on post operative day #1  Lovenox was also started for DVT prophylaxis 12 hours post operatively  Hemovac drain was removed on POD1  On discharge date pt was cleared by PT and the medicine team and determined to be safe for discharge  Daily discussion was had with the patient, nursing staff, orthopaedic team, and family members if present  All questions were answered to the patients satisifaction  0   Lab Value Date/Time    HGB 12 9 04/14/2022 0432    HGB 15 4 03/15/2022 1557    HGB 15 2 11/22/2019 0905       Greater than 2 gram drop which qualifies for diagnosis of acute blood loss anemia  Vital signs remained stable and pt was resuscitated with IVF as needed   Body mass index is 34 04 kg/m²  mildly obese  Recommend behavior modifications, nutrition and physical activity  Discharge Instructions:   The patient was discharged weight bearing as tolerated to the right lower extremity  Aspirin will be continued for 35 days  Continue PT/OT  Take pain medications as instructed  Discharge Medications: For the complete list of discharge medications, please refer to the patient's medication reconciliation

## 2022-04-14 NOTE — PLAN OF CARE
Problem: PHYSICAL THERAPY ADULT  Goal: Performs mobility at highest level of function for planned discharge setting  See evaluation for individualized goals  Description: Treatment/Interventions: Functional transfer training,LE strengthening/ROM,Elevations,Therapeutic exercise,Endurance training,Patient/family training,Equipment eval/education,Bed mobility,Gait training,Spoke to nursing,Spoke to case management,OT  Equipment Recommended: Melina Arzate     See flowsheet documentation for full assessment, interventions and recommendations  Outcome: Adequate for Discharge  Note: Prognosis: Good  Problem List: Decreased strength,Decreased range of motion,Decreased skin integrity,Orthopedic restrictions,Pain,Impaired balance,Decreased mobility  Assessment: Pt seen for PT treatment today with focus on gait training, elevations training, and education for planned d/c today  Pt presents seated OOB in recliner chair, reports ongoing 2/10 pain and is motivated for d/c today  Pt requests re-ace wrapping of R LE as he reports it feels too loose  Therapist educated pt on fluctuation of edema at joint and within leg post op  Pt displays ongoing impulsivity with aspects of functional mobility however is able to complete all transfers, ambulation, and stair negotiation training without physical assistance  Pt questions therapist on methods to achieve standing position after a fall  Therapist educated pt on extensive methods for fall risk reduction, and demonstrates appropriate method for fall recovery, pt verbalizes understanding  Pt demonstrates understanding of LE exercises and incentive spirometry  Pt denies additional questions at this time  Pt cleared for d/c home on this date, recommend use of RW for all level surface mobility and SPC on stairs  At end of session, pt was left seated in bedside recliner chair with cold pack to R knee, needs within reach, RN aware of pt status   Continue to recommend return to home with OPPT upon d/c    Barriers to Discharge: None        PT Discharge Recommendation: Home with outpatient rehabilitation          See flowsheet documentation for full assessment

## 2022-04-14 NOTE — ASSESSMENT & PLAN NOTE
· Patient recently had preoperative cardiology evaluation with history of CAD status post multiple PCIs in the early 90s and LAD and RCA stent in 2003 and multiple repeat stents to RCA for multiple episodes of InStent stenosis in 2010  · Patient has been on aspirin and Plavix for more than 10 years due to extensive stenting in the RCA and multiple restenoses in the past    · ASA/Plavix initially held, resumed today   Continue DAPT and Toprol on discharge

## 2022-04-14 NOTE — OCCUPATIONAL THERAPY NOTE
Occupational Therapy Evaluation     Patient Name: Tom Goins  JJESSICA Date: 4/14/2022  Problem List  Principal Problem:    Status post total right knee replacement  Active Problems:    Coronary artery disease of native artery of native heart with stable angina pectoris (HCC)    Mixed hyperlipidemia    Primary osteoarthritis of right knee    Past Medical History  Past Medical History:   Diagnosis Date    Celiac disease     Coronary artery disease     Diverticulosis     Hyperlipidemia     Hypertension     Myocardial infarction ([address] ) 1999     Past Surgical History  Past Surgical History:   Procedure Laterality Date    COLONOSCOPY      CORONARY ANGIOPLASTY WITH STENT PLACEMENT      20 yrs ago        04/14/22 0811   OT Last Visit   OT Visit Date 04/14/22  (Thursday)   Note Type   Note type Evaluation   Restrictions/Precautions   Weight Bearing Precautions Per Order Yes   RLE Weight Bearing Per Order WBAT   Braces or Orthoses   (ace bandage R LE)   Other Precautions WBS;Pain   Pain Assessment   Pain Assessment Tool 0-10   Pain Score No Pain   Home Living   Type of [address] [place] [address] Two level; Other (Comment)  (4 JULIANA from deck w/ hand rail)   Bathroom Shower/Tub Tub/shower unit   Bathroom Toilet Standard   Bathroom Equipment Grab bars in shower; Toilet raiser;Grab bars around toilet   [address] Cane;Crutches;Grab bars   Additional Comments Pt reports living w/ wife in [facility] w/ 4 [address] from deck PTA  Son comes and goes between here and [employer]   Prior Function   Level of [address] with ADLs and functional mobility   Lives With Spouse; Other (Comment)  (son assist w/ heavy IADL as needed)   Receives Help From Family   ADL Assistance Independent   IADLs Independent  (+ drive)   Falls in the last 6 months 0   Vocational Full time employment  (works from home)   Comments Pt reports I w/ ADL/ IADL PTA including driving and active lifestyle   Pt able to stay on first floor at [facility] if unable to manage stairs   Lifestyle   Autonomy Pt reports I w/ ADL PTA    Reciprocal Relationships Pt reports living w/ wife in [address] [place] [address] and son assists as needed w/ IADLs   Service to Others Pt reports working from home and goes to [employer] in [place] few times a year   Intrinsic Gratification Pt reports enjoying their dog and bike riding  Subjective   Subjective "I slept a few hours w/ my eye mask on"   ADL   Where Assessed Chair   Eating Assistance 7  Independent  (OOB In chair post eval w/ eating breakfast)   Eating Deficit Setup   Grooming Assistance 6  Modified Independent   Grooming Deficit Standing with assistive device; Setup   UB Bathing Assistance Unable to assess  (anticipate mod I standing for set- up)   LB Bathing Assistance Unable to assess  (anticipate mod I for + time)   UB Dressing Assistance 6  Modified independent   UB Dressing Deficit Setup; Increased time to complete  (due to L UE IV)   LB Dressing Assistance 6  Modified independent   LB Dressing Deficit Verbal cueing;Setup; Increased time to complete  (educated pt on threading R LE first)   Toileting Assistance  6  Modified independent   Toileting Deficit Use of bedpan/urinal setup   Additional Comments Educated pt on tech to complete LBD, tub transfer and car transfer  Verbalized understanding   Bed Mobility   Supine to Sit Unable to assess   Sit to Supine Unable to assess   Additional Comments Pt seated OOB in chair upon arrival and post eval w/ needs met, call bell in reach   Transfers   Sit to Stand 5  Supervision   Additional items Armrests; Increased time required;Assist x 1   Stand to Sit 6  Modified independent   Additional items Armrests;Assist x 1   Functional Mobility   Functional Mobility 6  Modified independent   Additional Comments w/ in room   Additional items Rolling walker   Balance   Static Sitting Good   Dynamic Sitting Fair +   Static Standing Fair +   Ambulatory Fair +   Activity Tolerance   Activity Tolerance Patient tolerated treatment well   Medical Staff Made Aware spoke to PT, Malu Shore   Nurse Made Aware per RN, Lexus Lott appropriate to see pt   RUE Assessment   RUE Assessment WFL   RUE Strength   RUE Overall Strength Within Functional Limits - able to perform ADL tasks with strength   LUE Assessment   LUE Assessment WFL   LUE Strength   LUE Overall Strength Within Functional Limits - able to perform ADL tasks with strength   Hand Function   Gross Motor Coordination Functional   Fine Motor Coordination Functional   Sensation   Light Touch Not tested   Sharp/Dull Not tested   Vision-Basic Assessment   Current Vision Wears glasses all the time   Cognition   Overall Cognitive Status [facility]   Arousal/Participation Alert; Cooperative   Attention Within functional limits   Orientation Level Oriented X4   Memory Within functional limits   Following Commands Follows all commands and directions without difficulty   Comments Identified pt by full name and birthdate  Alert and oriented  Able to follow directions and communicate wants / needs  Motivated and agreeable to participate   Assessment   Limitation Decreased ADL status; Decreased high-level ADLs; Decreased endurance   Assessment Pt is a 68yo male admitted to [facility] on 4/13/22  Pt presents s/p elective R TKA On 4/13/22 and per orthopedics is WBAT R LE  Significant PMH Impacting his occupational performance includes R knee osteoarthritis, CAD s/p multiple stents, HTN, hx MI, Celiac disease  Pt w/ active OR Orders and activity orders  Personal factors impacting performance includes multi level home environment, difficulty managing stairs  Pt reports living w/ wife in [address] [place] w/ 4 [address] from deck  Pt reports I w/ ADL/ IADL w/ out use of AD or DME  Pt w/ active OT orders and activity orders  Upon eval, pt alert and oriented  Able to follow directions and communicate wants / needs   Pt required set- up and cues to complete UBD/LBD w/ mod I  Pt complete sit to stand w/ S and engaged in functional mobility w/ mod I  Therapist educated pt on tech to complete tub transfer using grab bars and car transfer  Pt presents w/ exepcted R LE ROM / strength deficits, increased pain impacting his I w/ dressing, bathing, activity engagement, community mobility, functional mobility, and functional transfers  Recommend DC home w/ OPPT services when medically stable using RW, commode vs toilet riser, long shoe horn and shower chair when medically stable for DC from acute care  No additional acute OT Needs at this time  DC OT   Goals   Patient Goals Pt stated that he would like to get back on his bike   Recommendation   OT Discharge Recommendation Home with outpatient rehabilitation  (OPPT; no additional OT Needs)   Equipment Recommended Bedside commode; Shower/Tub chair with back ($)  (pt reports having long shoehorn)   Commode Type Standard  (pt reports having toilet riser w/ hand rails)   AM-PAC Daily Activity Inpatient   Lower Body Dressing 3   Bathing 3   Toileting 4   Upper Body Dressing 4   Grooming 4   Eating 4   Daily Activity Raw Score 22   Daily Activity Standardized Score (Calc for Raw Score >=11) 47  1   AM-PAC Applied Cognition Inpatient   Following a Speech/Presentation 4   Understanding Ordinary Conversation 4   Taking Medications 4   Remembering Where Things Are Placed or Put Away 4   Remembering List of 4-5 Errands 4   Taking Care of Complicated Tasks 4   Applied Cognition Raw Score 24   Applied Cognition Standardized Score 62 21   Barthel Index   Feeding 10   Bathing 5   Grooming Score 5   Dressing Score 10   Bladder Score 10   Bowels Score 10   Toilet Use Score 5   Transfers (Bed/Chair) Score 10   Mobility (Level Surface) Score 0   Stairs Score 5   Barthel Index Score 70   Modified N[place] Scale   Modified Nyssa Scale 3   The patient's raw score on the AM-PAC Daily Activity inpatient short form is 22, standardized score is 47 1, greater than 39 4   Patients at this level are likely to benefit from discharge to home  Please refer to the recommendation of the Occupational Therapist for safe discharge planning         Radha Cancino, OTR/L

## 2022-04-14 NOTE — PROGRESS NOTES
Orthopedics   Michael Less 78 y o  male MRN: [id number]  Unit/Bed#: EA PACU      Subjective:  76 y o male post operative day 1 right total knee arthroplasty  Pt doing well  Pain controlled with Tylenol  Patient denies any chest pain shortness of breath numbness tingling or calf pain    Patient states he has been able to ambulate comfortably denies any issues with right lower extremity at this time    Labs:  0   Lab Value Date/Time    HCT 37 8 04/14/2022 0432    HCT 41 8 03/15/2022 1557    HCT 45 2 11/22/2019 0905    HGB 12 9 04/14/2022 0432    HGB 15 4 03/15/2022 1557    HGB 15 2 11/22/2019 0905    INR 1 00 03/15/2022 1557    WBC 12 63 (H) 04/14/2022 0432    WBC 6 06 03/15/2022 1557    WBC 6 53 11/22/2019 0905    ESR 5 11/22/2019 0905    CRP <3 0 11/22/2019 0905       Meds:    Current Facility-Administered Medications:     acetaminophen (TYLENOL) tablet 975 mg, 975 mg, Oral, Q8H, Neeta Chaney MD, 975 mg at 04/14/22 0408    ascorbic acid (VITAMIN C) tablet 500 mg, 500 mg, Oral, BID, Keira Camarillo MD, 500 mg at 04/13/22 1803    aspirin chewable tablet 81 mg, 81 mg, Oral, Keira BAUTISTA MD    calcium carbonate (TUMS) chewable tablet 1,000 mg, 1,000 mg, Oral, Daily PRN, Sandra Carrion MD    clopidogrel (PLAVIX) tablet 75 mg, 75 mg, Oral, Keira BAUTISTA MD    docusate sodium (COLACE) capsule 100 mg, 100 mg, Oral, BID, Neeta Chaney MD, 100 mg at 04/13/22 1803    enoxaparin (LOVENOX) subcutaneous injection 40 mg, 40 mg, Subcutaneous, Daily, Neeta Chaney MD, 40 mg at 84/93/51 1601    folic acid (FOLVITE) tablet 1,000 mcg, 1,000 mcg, Oral, Daily, Keira Camarillo MD, 1,000 mcg at 04/13/22 1803    gabapentin (NEURONTIN) capsule 600 mg, 600 mg, Oral, HS, Anil Mcdaniel PA-C, 600 mg at 04/13/22 2202    HYDROmorphone (DILAUDID) injection 0 5 mg, 0 5 mg, Intravenous, Q2H PRN, Sandra Carrion MD    lactated ringers bolus 1,000 mL, 1,000 mL, Intravenous, Once PRN **AND** lactated ringers bolus 1,000 mL, 1,000 mL, Intravenous, Once PRN, Mary Oates MD    lactated ringers infusion, 125 mL/hr, Intravenous, Continuous, Alexys Agosto MD, Last Rate: 125 mL/hr at 04/13/22 1201, 125 mL/hr at 04/13/22 1201    lactated ringers infusion, 75 mL/hr, Intravenous, Continuous, Neeta Chaney MD, Last Rate: 75 mL/hr at 04/13/22 1339, 75 mL/hr at 04/13/22 1339    metoprolol succinate (TOPROL-XL) 24 hr tablet 50 mg, 50 mg, Oral, Daily, Neeta Chaney MD, 50 mg at 04/13/22 1221    oxyCODONE (ROXICODONE) IR tablet 10 mg, 10 mg, Oral, Q4H PRN, Mary Oates MD    oxyCODONE (ROXICODONE) IR tablet 5 mg, 5 mg, Oral, Q4H PRN, Mary Oates MD    sertraline (ZOLOFT) tablet 50 mg, 50 mg, Oral, QAM, Neeta Chaney MD, 50 mg at 04/13/22 1221    sodium chloride 0 9 % bolus 1,000 mL, 1,000 mL, Intravenous, Once PRN **AND** sodium chloride 0 9 % bolus 1,000 mL, 1,000 mL, Intravenous, Once PRN, Mary Oates MD    tamsulosin (FLOMAX) capsule 0 4 mg, 0 4 mg, Oral, Daily With Dinner, Mary Oates MD, 0 4 mg at 04/13/22 1516    traZODone (DESYREL) tablet 150 mg, 150 mg, Oral, HS, Seven Christianson PA-C, 150 mg at 04/13/22 2202    Blood Culture:   No results found for: BLOODCX    Wound Culture:   No results found for: WOUNDCULT    Ins and Outs:  I/O last 24 hours: In: 3080 [P O :480; I V :2600]  Out: 770 [Urine:750; Blood:20]          Physical:  Vitals:    04/14/22 0701   BP: 117/71   Pulse: 77   Resp: 20   Temp: 98 3 °F (36 8 °C)   SpO2: 91%     right lower extremity:  · Dressings C/D/I  · Toes warm and well perfused  · Active ankle dorsi plantar flexion calf nontender palpation sensation intact distal pulses present    _*_*_*_*_*_*_*_*_*_*_*_*_*_*_*_*_*_*_*_*_*_*_*_*_*_*_*_*_*_*_*_*_*_*_*_*_*_*_*_*_*    Assessment: 76 y o male post operative day 1 right total knee arthroplasty   Doing well    Plan:  · Weight Bearing as tolerated  · Up and out of bed  · DVT prophylaxis  · Analgesics  · PT/OT  · Patient noted to have acute blood loss anemia due to a drop in Hbg of > 2 0g from preop levels, will monitor vital signs and resuscitate with IV fluids as needed    Reyes Craft PA-C

## 2022-04-14 NOTE — CASE MANAGEMENT
Case Management Discharge Planning Note    Patient name Thor Calvin  Location /-01 MRN [id number]  : 1945 Date 2022       Current Admission Date: 2022  Current Admission Diagnosis:Status post total right knee replacement   Patient Active Problem List    Diagnosis Date Noted    Status post total right knee replacement 2022    Primary osteoarthritis of right knee 2022    Medicare annual wellness visit, subsequent 2021    Actinic keratosis 2021    Allergic contact dermatitis due to other agents 2020    Sacroiliitis, not elsewhere classified [facility] ) 2020    Encounter for screening colonoscopy 2020    Primary osteoarthritis of both knees 2020    Muscle pain 2019    Mixed hyperlipidemia 2019    Need for hepatitis C screening test 2019    Hypovitaminosis D 09/10/2019    Coronary artery disease of native artery of native heart with stable angina pectoris ([facility] ) 09/10/2019    Chronic arthralgias of knees and hips 09/10/2019      LOS (days): 0  Geometric Mean LOS (GMLOS) (days):   Days to GMLOS:     OBJECTIVE:            Current admission status: Outpatient Surgery   Preferred Pharmacy:   [facility]/pharmacy #[id number]- [place] - [address] [place] [address]  Phone: [phone] Fax: [phone] [address]  [address] [facility] [address]  [address] [address]  Phone: [phone] Fax: [phone]    Primary Care Provider: Jimmy Cast MD    Primary Insurance: [facility]  Secondary Insurance:     DISCHARGE DETAILS:    Patient cleared for discharge  CM made aware patient requiring SPC and RW for D/C  RW ordered via parachute, SPC obtained via supply, both provided bedside  No additional needs at this time  CM available for any additional DCP needs

## 2022-04-14 NOTE — PROGRESS NOTES
[address]  Progress Note Shannan [facility] 1945, 78 y o  male MRN: [id number]  Unit/Bed#: -Blanca Encounter: [id number]  Primary Care Provider: Sarath Ruiz MD   Date and time admitted to hospital: 4/13/2022  6:29 AM    * Status post total right knee replacement  Assessment & Plan  · Right total knee replacement on 04/13  POD 1  · Continue pain management and PT/OT as tolerated, pain managed well  · Mild leukocytosis and hyponatremia likely reactive after procedure, encourage hydration discharge  No medical concerns for discharge today  · See further plan per primary team from Orthopedics    Mixed hyperlipidemia  Assessment & Plan  · Not on statin due to muscle pain  Coronary artery disease of native artery of native heart with stable angina pectoris [facility])  Assessment & Plan  · Patient recently had preoperative cardiology evaluation with history of CAD status post multiple PCIs in the early 90s and LAD and RCA stent in 2003 and multiple repeat stents to RCA for multiple episodes of InStent stenosis in 2010  · Patient has been on aspirin and Plavix for more than 10 years due to extensive stenting in the RCA and multiple restenoses in the past    · ASA/Plavix initially held, resumed today  Continue DAPT and Toprol on discharge      VTE Pharmacologic Prophylaxis: VTE Score: 7 High Risk (Score >/= 5) - Pharmacological DVT Prophylaxis Ordered: enoxaparin (Lovenox)  Sequential Compression Devices Ordered  Patient Centered Rounds: I performed bedside rounds with nursing staff today  Discussions with Specialists or Other Care Team Provider:  Case management    Education and Discussions with Family / Patient: Patient declined call to   Time Spent for Care: 30 minutes  More than 50% of total time spent on counseling and coordination of care as described above      Current Length of Stay: 0 day(s)  Current Patient Status: Outpatient Surgery   Certification Statement: Patient is seen as a consult, primary team under Orthopedic surgery  Discharge Plan: Anticipate discharge later today or tomorrow to home  Code Status: Level 1 - Full Code    Subjective:   Patient is seen at bedside this a m , reports that he has very minimal pain in right lower extremity, has been able to ambulate well  States he feels ready for discharge home  Objective:     Vitals:   Temp (24hrs), Av °F (36 7 °C), Min:96 8 °F (36 °C), Max:99 2 °F (37 3 °C)    Temp:  [96 8 °F (36 °C)-99 2 °F (37 3 °C)] 98 3 °F (36 8 °C)  HR:  [61-87] 77  Resp:  [16-20] 20  BP: ()/(55-76) 117/71  SpO2:  [90 %-96 %] 91 %  Body mass index is 34 04 kg/m²  Input and Output Summary (last 24 hours): Intake/Output Summary (Last 24 hours) at 2022 1128  Last data filed at 2022 0901  Gross per 24 hour   Intake 2740 ml   Output 750 ml   Net 1990 ml       Physical Exam:   Physical Exam  Constitutional:       General: He is not in acute distress  Appearance: Normal appearance  He is not ill-appearing, toxic-appearing or diaphoretic  Cardiovascular:      Rate and Rhythm: Normal rate and regular rhythm  Pulses: Normal pulses  Heart sounds: Normal heart sounds  Pulmonary:      Effort: Pulmonary effort is normal  No respiratory distress  Breath sounds: Normal breath sounds  Abdominal:      General: Bowel sounds are normal  There is no distension  Palpations: Abdomen is soft  Tenderness: There is no abdominal tenderness  Musculoskeletal:         General: No swelling or tenderness  Comments: Ace bandage on right lower extremity clean, dry, intact  Incision under gauze/Ace without surrounding erythema/edema or active bleeding, staples in place  Skin:     General: Skin is warm  Coloration: Skin is not pale  Findings: No erythema  Neurological:      General: No focal deficit present        Mental Status: He is alert and oriented to person, place, and time    Psychiatric:         Mood and Affect: Mood normal          Behavior: Behavior normal          Additional Data:     Labs:  Results from last 7 days   Lab Units 04/14/22  0432   WBC Thousand/uL 12 63*   HEMOGLOBIN g/dL 12 9   HEMATOCRIT % 37 8   PLATELETS Thousands/uL 204     Results from last 7 days   Lab Units 04/14/22  0432   SODIUM mmol/L 132*   POTASSIUM mmol/L 4 7   CHLORIDE mmol/L 98   CO2 mmol/L 25   BUN mg/dL 22   CREATININE mg/dL 1 16   ANION GAP mmol/L 9   CALCIUM mg/dL 9 4   GLUCOSE RANDOM mg/dL 106                       Lines/Drains:  Invasive Devices  Report    None                       Imaging: No pertinent imaging reviewed      Recent Cultures (last 7 days):         Last 24 Hours Medication List:   Current Facility-Administered Medications   Medication Dose Route Frequency Provider Last Rate    acetaminophen  975 mg Oral Q8H Neeta Chaney MD      ascorbic acid  500 mg Oral BID Bhavani Stahl MD      aspirin  81 mg Oral QAM Bhavani Stahl MD      calcium carbonate  1,000 mg Oral Daily PRN Max Blanca MD      clopidogrel  75 mg Oral QALYLY Stahl MD      docusate sodium  100 mg Oral BID Max Blanca MD      enoxaparin  40 mg Subcutaneous Daily Max Blanca MD      folic acid  3,722 mcg Oral Daily Bhavani Stahl MD      gabapentin  600 mg Oral HS Elena Garcia PA-C      HYDROmorphone  0 5 mg Intravenous Q2H PRN Max Blanca MD      lactated ringers  125 mL/hr Intravenous Continuous Nasreen Murrell MD Stopped (04/14/22 0827)    lactated ringers  75 mL/hr Intravenous Continuous Max Blanca MD Stopped (04/14/22 0827)    metoprolol succinate  50 mg Oral Daily Max Blanca MD      oxyCODONE  10 mg Oral Q4H PRN Max Blanca MD      oxyCODONE  5 mg Oral Q4H PRN Max Blanca MD      sertraline  50 mg Oral QALYLY Blanca MD      tamsulosin  0 4 mg Oral Daily With Dinner MD Iza Harrison traZODone  150 mg Oral HS Ness Strickland PA-C          Today, Patient Was Seen By: Beka Bonilla PA-C    **Please Note: This note may have been constructed using a voice recognition system  **

## 2022-04-14 NOTE — ASSESSMENT & PLAN NOTE
· Right total knee replacement on 04/13  POD 1  · Continue pain management and PT/OT as tolerated, pain managed well  · Mild leukocytosis and hyponatremia likely reactive after procedure, encourage hydration discharge  No medical concerns for discharge today    · See further plan per primary team from Orthopedics

## 2022-04-14 NOTE — PLAN OF CARE
Problem: PAIN - ADULT  Goal: Verbalizes/displays adequate comfort level or baseline comfort level  Description: Interventions:  - Encourage patient to monitor pain and request assistance  - Assess pain using appropriate pain scale  - Administer analgesics based on type and severity of pain and evaluate response  - Implement non-pharmacological measures as appropriate and evaluate response  - Consider cultural and social influences on pain and pain management  - Notify physician/advanced practitioner if interventions unsuccessful or patient reports new pain  Outcome: Not Progressing     Problem: SAFETY ADULT  Goal: Patient will remain free of falls  Description: INTERVENTIONS:  - Educate patient/family on patient safety including physical limitations  - Instruct patient to call for assistance with activity   - Consult OT/PT to assist with strengthening/mobility   - Keep Call bell within reach  - Keep bed low and locked with side rails adjusted as appropriate  - Keep care items and personal belongings within reach  - Initiate and maintain comfort rounds  - Make Fall Risk Sign visible to staff  - Offer Toileting every 2 Hours, in advance of need  - Initiate/Maintain  bed alarm  - Obtain necessary fall risk management equipment: n/a  - Apply yellow socks and bracelet for high fall risk patients  - Consider moving patient to room near nurses station  Outcome: Not Progressing  Goal: Maintain or return to baseline ADL function  Description: INTERVENTIONS:  -  Assess patient's ability to carry out ADLs; assess patient's baseline for ADL function and identify physical deficits which impact ability to perform ADLs (bathing, care of mouth/teeth, toileting, grooming, dressing, etc )  - Assess/evaluate cause of self-care deficits   - Assess range of motion  - Assess patient's mobility; develop plan if impaired  - Assess patient's need for assistive devices and provide as appropriate  - Encourage maximum independence but intervene and supervise when necessary  - Involve family in performance of ADLs  - Assess for home care needs following discharge   - Consider OT consult to assist with ADL evaluation and planning for discharge  - Provide patient education as appropriate  Outcome: Not Progressing  Goal: Maintains/Returns to pre admission functional level  Description: INTERVENTIONS:  - Perform BMAT or MOVE assessment daily    - Set and communicate daily mobility goal to care team and patient/family/caregiver  - Collaborate with rehabilitation services on mobility goals if consulted  - Perform Range of Motion prn times a day  - Reposition patient every self hours  - Dangle patient prn times a day  - Stand patient prn times a day  - Ambulate patient prn times a day  - Out of bed to chair prn times a day   - Out of bed for meals prn times a day  - Out of bed for toileting  - Record patient progress and toleration of activity level   Outcome: Not Progressing     Problem: DISCHARGE PLANNING  Goal: Discharge to home or other facility with appropriate resources  Description: INTERVENTIONS:  - Identify barriers to discharge w/patient and caregiver  - Arrange for needed discharge resources and transportation as appropriate  - Identify discharge learning needs (meds, wound care, etc )  - Arrange for interpretive services to assist at discharge as needed  - Refer to Case Management Department for coordinating discharge planning if the patient needs post-hospital services based on physician/advanced practitioner order or complex needs related to functional status, cognitive ability, or social support system  Outcome: Not Progressing     Problem: Knowledge Deficit  Goal: Patient/family/caregiver demonstrates understanding of disease process, treatment plan, medications, and discharge instructions  Description: Complete learning assessment and assess knowledge base    Interventions:  - Provide teaching at level of understanding  - Provide teaching via preferred learning methods  Outcome: Not Progressing     Problem: MOBILITY - ADULT  Goal: Maintain or return to baseline ADL function  Description: INTERVENTIONS:  -  Assess patient's ability to carry out ADLs; assess patient's baseline for ADL function and identify physical deficits which impact ability to perform ADLs (bathing, care of mouth/teeth, toileting, grooming, dressing, etc )  - Assess/evaluate cause of self-care deficits   - Assess range of motion  - Assess patient's mobility; develop plan if impaired  - Assess patient's need for assistive devices and provide as appropriate  - Encourage maximum independence but intervene and supervise when necessary  - Involve family in performance of ADLs  - Assess for home care needs following discharge   - Consider OT consult to assist with ADL evaluation and planning for discharge  - Provide patient education as appropriate  Outcome: Not Progressing  Goal: Maintains/Returns to pre admission functional level  Description: INTERVENTIONS:  - Perform BMAT or MOVE assessment daily    - Set and communicate daily mobility goal to care team and patient/family/caregiver  - Collaborate with rehabilitation services on mobility goals if consulted  - Perform Range of Motion prn times a day  - Reposition patient every self hours    - Dangle patient prn times a day  - Stand patient prn times a day  - Ambulate patient prn times a day  - Out of bed to chair prn times a day   - Out of bed for meals prn times a day  - Out of bed for toileting  - Record patient progress and toleration of activity level   Outcome: Not Progressing

## 2022-04-14 NOTE — PLAN OF CARE
Problem: PAIN - ADULT  Goal: Verbalizes/displays adequate comfort level or baseline comfort level  Description: Interventions:  - Encourage patient to monitor pain and request assistance  - Assess pain using appropriate pain scale  - Administer analgesics based on type and severity of pain and evaluate response  - Implement non-pharmacological measures as appropriate and evaluate response  - Consider cultural and social influences on pain and pain management  - Notify physician/advanced practitioner if interventions unsuccessful or patient reports new pain  Outcome: Adequate for Discharge     Problem: SAFETY ADULT  Goal: Maintain or return to baseline ADL function  Description: INTERVENTIONS:  -  Assess patient's ability to carry out ADLs; assess patient's baseline for ADL function and identify physical deficits which impact ability to perform ADLs (bathing, care of mouth/teeth, toileting, grooming, dressing, etc )  - Assess/evaluate cause of self-care deficits   - Assess range of motion  - Assess patient's mobility; develop plan if impaired  - Assess patient's need for assistive devices and provide as appropriate  - Encourage maximum independence but intervene and supervise when necessary  - Involve family in performance of ADLs  - Assess for home care needs following discharge   - Consider OT consult to assist with ADL evaluation and planning for discharge  - Provide patient education as appropriate  -wbat as ordered  Outcome: Adequate for Discharge     Problem: DISCHARGE PLANNING  Goal: Discharge to home or other facility with appropriate resources  Description: INTERVENTIONS:  - Identify barriers to discharge w/patient and caregiver  - Arrange for needed discharge resources and transportation as appropriate  - Identify discharge learning needs (meds, wound care, etc )  - Refer to Case Management Department for coordinating discharge planning if the patient needs post-hospital services based on physician/advanced practitioner order or complex needs related to functional status, cognitive ability, or social support system  Outcome: Adequate for Discharge

## 2022-04-15 ENCOUNTER — TELEPHONE (OUTPATIENT)
Dept: OBGYN CLINIC | Facility: HOSPITAL | Age: 77
End: 2022-04-15

## 2022-04-18 ENCOUNTER — OFFICE VISIT (OUTPATIENT)
Dept: PHYSICAL THERAPY | Facility: REHABILITATION | Age: 77
End: 2022-04-18
Payer: COMMERCIAL

## 2022-04-18 DIAGNOSIS — Z96.651 STATUS POST RIGHT KNEE REPLACEMENT: ICD-10-CM

## 2022-04-18 DIAGNOSIS — M17.11 PRIMARY OSTEOARTHRITIS OF RIGHT KNEE: Primary | ICD-10-CM

## 2022-04-18 PROCEDURE — 97110 THERAPEUTIC EXERCISES: CPT | Performed by: PHYSICAL THERAPIST

## 2022-04-18 PROCEDURE — 97164 PT RE-EVAL EST PLAN CARE: CPT | Performed by: PHYSICAL THERAPIST

## 2022-04-18 NOTE — PROGRESS NOTES
PT Re-Evaluation     Today's date: 2022  Patient name: Chilo Wiggins  : 1945  MRN: [id number]  Referring provider: Haroon Molina MD  Dx:   Encounter Diagnosis     ICD-10-CM    1  Primary osteoarthritis of right knee  M17 11    2  Status post right knee replacement  [id number]        Start Time: 1017  Stop Time: 1114  Total time in clinic (min): 57 minutes    Assessment  Assessment details: Chilo Wiggins is a 78y o  year old male who presents with s/p R TKA performed by Dr Belen Emerson on 22  Current deficits include impaired P/AROM, impaired LE strength, edema, impaired gait, and pain  These deficits impair his ability to perform all typical I/ADLs, household tasks, and social/recreational activities  Signs and symptoms are as anticipated following surgery  Recommend skilled PT services to address previously stated deficits to promote progress towards PLOF  Redressed incision with camilla and ACE wrap prior to end of session  Reviewed proper ambulation with SPC on L however, patient preferred to use SPC on R  Discussed biomechanics of off loading involved side by placing AD on contralateral side however, patient continued to prefer SPC use on R  Impairments: abnormal gait, abnormal or restricted ROM, activity intolerance, impaired balance, impaired physical strength, lacks appropriate home exercise program, pain with function and weight-bearing intolerance  Understanding of Dx/Px/POC: good   Prognosis: good    Goals  STG (4 weeks):  1  R knee extension ROM WFL to promote improved gait mechanics  2  Increase R knee extension strength to at least 4/5 for improved activity tolerance  3  Able to don shoes without increased R knee pain for improved ADL performance  LTG (8 weeks):  1  Increased global R knee strength to at least 4+/5 to promote improved activity tolerance    2  Able to sit for at least 1 hour without symptom exacerbation to promote improved positional tolerance for activities such as driving  3  Able to walk for at least 30 minutes without symptom exacerbation to promote improved community ambulation  4  Able to sleep through the night without waking secondary to pain to promote improved sleep quality  5  Able to ascend/descend 1 flight of stairs without compensation to promote improved household and community ambulation  6  Independent with HEP  Plan  Plan details: Thank you for referring Chilo Wiggins to Physical Therapy at [place] [address] and for the opportunity to coordinate care  Patient would benefit from: skilled physical therapy  Planned modality interventions: cryotherapy, electrical stimulation/Russian stimulation, TENS, thermotherapy: hydrocollator packs and unattended electrical stimulation  Planned therapy interventions: abdominal trunk stabilization, activity modification, ADL retraining, balance, balance/weight bearing training, behavior modification, body mechanics training, breathing training, fine motor coordination training, flexibility, functional ROM exercises, gait training, graded activity, graded exercise, graded motor, home exercise program, work reintegration, transfer training, therapeutic training, therapeutic exercise, therapeutic activities, stretching, strengthening, self care, postural training, patient education, neuromuscular re-education, muscle pump exercises, motor coordination training, Blum taping, manual therapy, joint mobilization and IADL retraining  Frequency: 2x week  Duration in visits: 10  Plan of Care beginning date: 4/18/2022  Treatment plan discussed with: patient        Subjective Evaluation    History of Present Illness  Date of surgery: 4/13/2022  Mechanism of injury: surgery  Mechanism of injury:   04/18/22:   Chilo Wiggins is a 78 y o  male patient presenting with s/p r TKA performed by Dr Belen Emerson on 4/13/22   Cjtaj Wall currently having difficulty with donning shoes, walking, lifting the R leg, laying in bed, sleeping  Pain is relieved or reduced with Tylenol, prescription pain medication and ice  Pt would like to return to being able to riding his bike, walking, and all typical activities  States he was doing leg lifts when his leg was still numb and feels that his quad muscle is strained  Next follow up with the physician is 4/20/22       3/28/22:  Carin Cisneros is a 78 y o  male presenting pre  R TKA to be performed by Dr Abi Ramsey on 22  Currently having difficulty with down>up stairs, bending/squatting, prolonged sitting/driving  Steps to enter home: No steps  First floor set up: Yes   Number of steps to second floor: Greater than 10 steps  Has a SPC at home  Assist at home wife and son  Pre op TUG time: 12 seconds  Pain  Current pain ratin  At best pain ratin  At worst pain ratin  Location: R knee  Quality: sharp  Relieving factors: ice, heat and rest    Social Support  Steps to enter house: no  Stairs in house: yes   Lives in: multiple-level home  Lives with: spouse    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)    Diagnostic Tests  Abnormal x-ray: 22: Degenerative changes  Treatments  Previous treatment: physical therapy and injection treatment  Current treatment: physical therapy  Patient Goals  Patient goal: "to walk"        Objective     Observations     Right Knee   Positive for edema and incision  Additional Observation Details  Post-op dressings removed secondary to dressings sliding down the leg leaving the proximal quarter of incision exposed under the ACE wrap  Incision appears to be healing well with no bleeding or drainage  Small dried yellow/green scabs along with dried blood around staples  No excessive redness or warmth  Reviewed signs and symptoms of infection and DVT with patient who verbalized understanding  Edema around R knee and distal to where ACE wrap was sitting       Gait: ambulates with RW, decreased stance time on R compared to L, decreased heel strike and toe off on R, decreased knee flexion during swing phase on R           Active Range of Motion   Left Knee   Flexion: 130 degrees   Extensor la degrees     Right Knee   Flexion: 100 degrees   Extensor lag: 10 degrees     Passive Range of Motion     Right Knee   Flexion: 113 degrees with pain    Mobility   Patellar Mobility:     Right Knee   WFL: medial, lateral, superior and inferior    Strength/Myotome Testing     Left Knee   Flexion: 5  Extension: 5    Right Knee   Flexion: 3-  Extension: 3-  Quadriceps contraction: fair    General Comments:      Knee Comments  22:  Post-op TU seconds with use of arm rests on chair and RW    3/28/22:  Pre-op TU seconds           Precautions: HTN, HLD, CAD, MI ()    * Indicates part of HEP   HEP code: [facility]     Daily Treatment Diary     Manuals 3/282 4/18         R Knee PROM nv nv         Patellar mobility nv Done- assessment                    Therapeutic Exercise           Bike  nv 5' lvl 0         Gastroc stretch* 10x10" R 15x10" R         Seated hamstring stretch* 10x10" R 15x10" R         Quad stretch           Piriformis stretch           Heel slides* 10x10" 20x5"         Quad set* 10x5" 20x5         SLR flexion           SLR abduction                                 Patient education done done                    Neuro Re-ed           Bridges* 2x10 nv         Clamshells* 2x10 R nv         Lateral eccentric step down           Forward eccentric step down                                                       Therapeutic Activity           Sit to stands  nv         Leg press           Heel raises  nv         Toe raises  nv         Band resisted side stepping           Monster walks           Goblet squat           Deadlift           Forward step ups           Lateral step ups                                                       Modalities

## 2022-04-18 NOTE — TELEPHONE ENCOUNTER
Pt contacted this AM for a postoperative follow up call assessment  He reports doing "ok" and current 1/10 pain  He has outpatient PT this AM, and reports swelling is "a bit" and he is icing  He reports his dressing is dry with no drainage  We reviewed his AVS for dressing instructions  We also reviewed AVS medication list  He reports taking tylenol 650 TID, and oxycodone 5mg at bedtime for pain control  He is also taking ASA 81mg BID, and colace 100mg BID (having had a BM)  He denies any chest pain, SOB, dizziness, fever or calf pain, or any other concerning symptoms  He denies any questions or issues  pt encouraged to call me with questions, concerns or issues

## 2022-04-20 ENCOUNTER — OFFICE VISIT (OUTPATIENT)
Dept: INTERNAL MEDICINE CLINIC | Facility: CLINIC | Age: 77
End: 2022-04-20
Payer: COMMERCIAL

## 2022-04-20 VITALS
HEIGHT: 72 IN | SYSTOLIC BLOOD PRESSURE: 120 MMHG | BODY MASS INDEX: 34 KG/M2 | WEIGHT: 251 LBS | DIASTOLIC BLOOD PRESSURE: 82 MMHG

## 2022-04-20 DIAGNOSIS — I10 PRIMARY HYPERTENSION: ICD-10-CM

## 2022-04-20 DIAGNOSIS — E78.2 MIXED HYPERLIPIDEMIA: ICD-10-CM

## 2022-04-20 DIAGNOSIS — Z96.651 STATUS POST TOTAL RIGHT KNEE REPLACEMENT: Primary | ICD-10-CM

## 2022-04-20 PROCEDURE — 99214 OFFICE O/P EST MOD 30 MIN: CPT | Performed by: INTERNAL MEDICINE

## 2022-04-20 NOTE — PROGRESS NOTES
SOC-INTERNAL MEDICINE POST-OPERATIVE VISIT      NAME: Otilio Machuca  AGE: 78 y o  SEX: male  : 1945     DATE: 2022     Internal Medicine Pre-Operative Evaluation:     Reason for Visit: Post-operative follow up SOC-IM     Surgery: Arthroplasty of right knee  Surgeon: Dr Birgit Morrow  Primary Care  Provider: Daren Cruz MD    Interval History     Patient seen in medical clinic in 1 week follow-up of right knee arthroplasty  Doing well  Adequate pain control  Following postoperative DVT prophylaxis as recommended by surgery  Patient eating and drinking without issue  No constipation issues  Patient denies any lightheadedness or other concerns  From a medical standpoint patient doing well and they follow up with primary care physician moving forward however we remain available for any assistance as necessary  The incision site was observed it is clean clear without discharge or erythema    Patient will have follow-up next week with her surgeon for staple removal            Assessment  Plan:     Status Post Total Joint Arthroplasty  · doing well post right knee  arthroplasty  · Following surgeon recommended DVT prophylaxis  · attending physical therapy sessions and progressing  · current pain scale reported 3/10  · follow up appointment scheduled for incision check and surgical follow up with orthopedics  · medication reconciliation performed  · patient to resume follow up care with their PCP for ongoing care of their pre-surgical co-morbidities    Hyperlipidemia   Stressed importance of following low cholesterol diet   Continue current medical therapy   Follow up with PCP for ongoing monitoring and management    Essential Hypertension   BP has been well controlled post surgery   Continue current medicines   Follow up with PCP for ongoing monitoring and management    CAD Hx  · No reported angina or equivalent post op  · Doing well; follow up with cards as scheduled    Patient's additional co morbidities as below which will have ongoing medical management per the patients PCP:    Patient Active Problem List   Diagnosis    Hypovitaminosis D    Coronary artery disease of native artery of native heart with stable angina pectoris (HCC)    Chronic arthralgias of knees and hips    Muscle pain    Mixed hyperlipidemia    Need for hepatitis C screening test    Encounter for screening colonoscopy    Primary osteoarthritis of both knees    Sacroiliitis, not elsewhere classified ([facility] [address] )    Allergic contact dermatitis due to other agents    Medicare annual wellness visit, subsequent    Actinic keratosis    Primary osteoarthritis of right knee    Status post total right knee replacement         Review of Systems:      No TIA's or unusual headaches, no dysphagia  No prolonged cough  No dyspnea or chest pain on exertion  No abdominal pain, change in bowel habits, black or bloody stools  No urinary tract or BPH symptoms  Pt with mild pain in surgical joint  Current Medications:     Current Outpatient Medications   Medication Instructions    acetaminophen (TYLENOL) 650 mg, Oral, Every 8 hours PRN    ascorbic acid (VITAMIN C) 500 mg, Oral, 2 times daily    aspirin (ECOTRIN LOW STRENGTH) 81 mg, Oral, 2 times daily, Please do not start taking until after total joint arthroplasty    baclofen 10 mg tablet TAKE 1 TABLET BY MOUTH EVERY 8 HOURS AS NEEDED FOR MUSCLE SPASMS    clopidogrel (PLAVIX) 75 mg, Oral, Every morning    Cyanocobalamin (VITAMIN B 12 PO) Oral    diclofenac (VOLTAREN) 75 mg, Oral, 2 times daily    docusate sodium (COLACE) 100 mg, Oral, 2 times daily    ezetimibe (ZETIA) 10 mg tablet TAKE 1 TABLET BY MOUTH  DAILY    ferrous sulfate 324 (65 Fe) mg TAKE 1 TABLET BY MOUTH 2 TIMES A DAY BEFORE MEALS      folic acid (FOLVITE) 1 mg tablet TAKE 1 TABLET BY MOUTH EVERY DAY    gabapentin (NEURONTIN) 300 mg capsule TAKE 1 CAPSULE BY MOUTH 3  TIMES DAILY    Icosapent Ethyl (Vascepa) 1 g CAPS Vascepa 1 gram capsule   TAKE 2 CAPSULES BY MOUTH  TWICE DAILY (PLEASE CALL OFFICE FOR APPT)    ketoconazole (NIZORAL) 2 % shampoo 1 application, Topical, 2 times weekly    metoprolol succinate (TOPROL-XL) 50 mg, Oral, Daily    oxyCODONE (ROXICODONE) 5 immediate release tablet Take 1 tablets every 6 hrs as needed for pain control    rosuvastatin (CRESTOR) 20 mg, Oral, Daily    sertraline (ZOLOFT) 50 mg, Oral, Daily    simvastatin (ZOCOR) 75 mg, Oral, Daily at bedtime    tamsulosin (FLOMAX) 0 4 mg, Oral, Daily with dinner    traZODone (DESYREL) 150 mg tablet TAKE 1 TABLET BY MOUTH  DAILY AT BEDTIME    Vitamin D3 10,000 Units, Oral, Every morning          Past History:       Past Medical History:   Diagnosis Date    Celiac disease     Coronary artery disease     Diverticulosis     Hyperlipidemia     Hypertension     Myocardial infarction ([facility] [address] ) 1999    Past Surgical History:   Procedure Laterality Date    COLONOSCOPY      CORONARY ANGIOPLASTY WITH STENT PLACEMENT      20 yrs ago    WA TOTAL KNEE ARTHROPLASTY Right 4/13/2022    Procedure: ARTHROPLASTY KNEE TOTAL and all associated procedures;  Surgeon: Stephanie Vallejo MD;  Location: [facility];  Service: Orthopedics         Physical Exam:      There were no vitals taken for this visit      General Appearance: no distress, conversive  HEENT: PERRLA, conjuctiva normal; oropharynx clear; mucous membranes moist;   Neck:  Supple, no lymphadenopathy or thyromegaly  Lungs: CTA, normal respiratory effort, no retractions, expiratory effort normal  CV: regular rate and rhythm , PMI normal   ABD: soft non tender, no masses , no hepatic or splenomegaly  EXT: DP pulses intact, no lymphadenopathy, no edema ;  right KNEE dressing in place  Skin: normal turgor, normal texture, no rash  Psych: affect normal, mood normal  Neuro: AAOx3      Time of visit including pre-visit chart review, visit and post-visit coordination of plan and care , review of pre-surgical lab work, preparation and time spent documenting note in [id number] [facility] record, time spent face-to-face in physical examination answering patient questions: 30 minutes       Jesus Alberto Matt, [address]

## 2022-04-22 ENCOUNTER — OFFICE VISIT (OUTPATIENT)
Dept: PHYSICAL THERAPY | Facility: REHABILITATION | Age: 77
End: 2022-04-22
Payer: COMMERCIAL

## 2022-04-22 DIAGNOSIS — Z96.651 STATUS POST RIGHT KNEE REPLACEMENT: ICD-10-CM

## 2022-04-22 DIAGNOSIS — M17.11 PRIMARY OSTEOARTHRITIS OF RIGHT KNEE: Primary | ICD-10-CM

## 2022-04-22 PROCEDURE — 97110 THERAPEUTIC EXERCISES: CPT | Performed by: PHYSICAL THERAPIST

## 2022-04-22 PROCEDURE — 97112 NEUROMUSCULAR REEDUCATION: CPT | Performed by: PHYSICAL THERAPIST

## 2022-04-22 PROCEDURE — 97530 THERAPEUTIC ACTIVITIES: CPT | Performed by: PHYSICAL THERAPIST

## 2022-04-22 NOTE — PROGRESS NOTES
Daily Note     Today's date: 2022  Patient name: Mima Elizabeth  : 1945  MRN: [id number]  Referring provider: Shin Courtney MD  Dx:   Encounter Diagnosis     ICD-10-CM    1  Primary osteoarthritis of right knee  M17 11    2  Status post right knee replacement  Z96 [id number]        Start Time: 1106  Stop Time: 1200  Total time in clinic (min): 54 minutes    Subjective: Otilio reports soreness is slowly dissipating from the R quad  Notes some medial R knee pain at start of session  Objective: See treatment diary below      Assessment: Tolerated treatment well  Demonstrates good PROM of the R knee with manual this session  Demonstrates good ability to engage R quads and able to perform flexion SLR without quad lag however, does not yet have full knee extension available  Patient demonstrated appropriate level of challenge and muscular fatigue throughout session and noted good status at end of visit  Patient demonstrated fatigue post treatment, exhibited good technique with therapeutic exercises and would benefit from continued PT  Plan: Continue per plan of care  Progress treatment as tolerated         Precautions: HTN, HLD, CAD, MI ()    * Indicates part of HEP   HEP code: [facility]     Daily Treatment Diary     Manuals 3/282 4/18 4/22        R Knee PROM nv nv Done supine and EOB        Patellar mobility nv Done- assessment                    Therapeutic Exercise           Bike  nv 5' lvl 0 5' lvl 0        Gastroc stretch* 10x10" R 15x10" R         Seated hamstring stretch* 10x10" R 15x10" R         Quad stretch           Piriformis stretch           Heel slides* 10x10" 20x5" 20x5"        Quad set* 10x5" 20x5 20x5"        SLR flexion   2x8        SLR abduction                                 Patient education done done                    Neuro Re-ed           Bridges* 2x10 nv 3x10        Clamshells* 2x10 R nv 3x10        Lateral eccentric step down           Forward eccentric step down Therapeutic Activity           Sit to stands  nv nv        Leg press           Heel raises  nv 2x10        Toe raises  nv 2x10        Band resisted side stepping           Monster walks           Goblet squat           Deadlift           Forward step ups   4" step up/back 2x10 R        Lateral step ups                                                       Modalities

## 2022-04-24 DIAGNOSIS — L57.0 ACTINIC KERATOSIS: ICD-10-CM

## 2022-04-26 ENCOUNTER — OFFICE VISIT (OUTPATIENT)
Dept: PHYSICAL THERAPY | Facility: REHABILITATION | Age: 77
End: 2022-04-26
Payer: COMMERCIAL

## 2022-04-26 DIAGNOSIS — Z96.651 STATUS POST RIGHT KNEE REPLACEMENT: ICD-10-CM

## 2022-04-26 DIAGNOSIS — M17.11 PRIMARY OSTEOARTHRITIS OF RIGHT KNEE: Primary | ICD-10-CM

## 2022-04-26 PROCEDURE — 97110 THERAPEUTIC EXERCISES: CPT | Performed by: PHYSICAL THERAPIST

## 2022-04-26 PROCEDURE — 97140 MANUAL THERAPY 1/> REGIONS: CPT | Performed by: PHYSICAL THERAPIST

## 2022-04-26 PROCEDURE — 97112 NEUROMUSCULAR REEDUCATION: CPT | Performed by: PHYSICAL THERAPIST

## 2022-04-26 NOTE — PROGRESS NOTES
Daily Note     Today's date: 2022  Patient name: Chilo Wiggins  : 1945  MRN: [id number]  Referring provider: Alisha Batres MD  Dx:   Encounter Diagnosis     ICD-10-CM    1  Primary osteoarthritis of right knee  M17 11    2  Status post right knee replacement  [id number] [id number]        Start Time: 1102  Stop Time: 1200  Total time in clinic (min): 58 minutes    Subjective: Otilio reports he's doing well at start of session  States he does a lot of bending his knee at home  Objective: See treatment diary below       Assessment: Tolerated treatment well  Demonstrates good R knee flexion A/PROM but does lack approx  10* R TKE; fair tolerance to extension overpressure but some guarding noted  Challenged with sit to stands demonstrating compensatory weight shift to L  Patient demonstrated appropriate level of challenge and muscular fatigue throughout session and noted good status at end of visit  Patient demonstrated fatigue post treatment, exhibited good technique with therapeutic exercises and would benefit from continued PT  Plan: Continue per plan of care  Progress treatment as tolerated         Precautions: HTN, HLD, CAD, MI ()    * Indicates part of HEP   HEP code: [facility]     Daily Treatment Diary     Manuals 3/282 4/18 4/22 4/26       R Knee PROM nv nv Done supine and EOB Done supine       Patellar mobility nv Done- assessment                    Therapeutic Exercise           Bike  nv 5' lvl 0 5' lvl 0 5' lvl 2       Gastroc stretch* 10x10" R 15x10" R         Seated hamstring stretch* 10x10" R 15x10" R  20x5" R       Quad stretch           Piriformis stretch           Heel slides* 10x10" 20x5" 20x5" 20x5"R        Quad set* 10x5" 20x5 20x5" 2x10x5"       SLR flexion   2x8 3x10 R       SLR abduction                                 Patient education done done                    Neuro Re-ed           Bridges* 2x10 nv 3x10 3x10       Clamshells* 2x10 R nv 3x10 3x10 R       Lateral eccentric step down           Forward eccentric step down                                                       Therapeutic Activity           Sit to stands  nv nv 2x10 from 24 5" high low table       Leg press           Heel raises  nv 2x10 3x12       Toe raises  nv 2x10 3x12       Band resisted side stepping           Monster walks           Goblet squat           Deadlift           Forward step ups   4" step up/back 2x10 R        Lateral step ups                                                       Modalities           CP R knee    5' long sit, post

## 2022-04-27 RX ORDER — KETOCONAZOLE 20 MG/ML
1 SHAMPOO TOPICAL 2 TIMES WEEKLY
Qty: 120 ML | Refills: 0 | Status: SHIPPED | OUTPATIENT
Start: 2022-04-28 | End: 2022-06-27

## 2022-04-28 DIAGNOSIS — Z01.818 PREOPERATIVE TESTING: ICD-10-CM

## 2022-04-28 DIAGNOSIS — M17.11 PRIMARY OSTEOARTHRITIS OF RIGHT KNEE: ICD-10-CM

## 2022-04-29 ENCOUNTER — APPOINTMENT (OUTPATIENT)
Dept: RADIOLOGY | Facility: AMBULARY SURGERY CENTER | Age: 77
End: 2022-04-29
Attending: ORTHOPAEDIC SURGERY
Payer: COMMERCIAL

## 2022-04-29 ENCOUNTER — OFFICE VISIT (OUTPATIENT)
Dept: OBGYN CLINIC | Facility: CLINIC | Age: 77
End: 2022-04-29

## 2022-04-29 ENCOUNTER — OFFICE VISIT (OUTPATIENT)
Dept: PHYSICAL THERAPY | Facility: REHABILITATION | Age: 77
End: 2022-04-29
Payer: COMMERCIAL

## 2022-04-29 VITALS
WEIGHT: 251 LBS | HEART RATE: 72 BPM | SYSTOLIC BLOOD PRESSURE: 120 MMHG | HEIGHT: 72 IN | BODY MASS INDEX: 34 KG/M2 | DIASTOLIC BLOOD PRESSURE: 74 MMHG

## 2022-04-29 DIAGNOSIS — Z96.651 S/P TOTAL KNEE REPLACEMENT, RIGHT: Primary | ICD-10-CM

## 2022-04-29 DIAGNOSIS — Z96.651 STATUS POST RIGHT KNEE REPLACEMENT: ICD-10-CM

## 2022-04-29 DIAGNOSIS — Z96.651 S/P TOTAL KNEE REPLACEMENT, RIGHT: ICD-10-CM

## 2022-04-29 DIAGNOSIS — M17.11 PRIMARY OSTEOARTHRITIS OF RIGHT KNEE: Primary | ICD-10-CM

## 2022-04-29 PROCEDURE — 97110 THERAPEUTIC EXERCISES: CPT | Performed by: PHYSICAL THERAPIST

## 2022-04-29 PROCEDURE — 99024 POSTOP FOLLOW-UP VISIT: CPT | Performed by: ORTHOPAEDIC SURGERY

## 2022-04-29 PROCEDURE — 73562 X-RAY EXAM OF KNEE 3: CPT

## 2022-04-29 PROCEDURE — 97530 THERAPEUTIC ACTIVITIES: CPT | Performed by: PHYSICAL THERAPIST

## 2022-04-29 RX ORDER — FERROUS SULFATE TAB EC 324 MG (65 MG FE EQUIVALENT) 324 (65 FE) MG
TABLET DELAYED RESPONSE ORAL
Qty: 180 TABLET | Refills: 1 | Status: SHIPPED | OUTPATIENT
Start: 2022-04-29 | End: 2022-05-23 | Stop reason: ALTCHOICE

## 2022-04-29 RX ORDER — METHOCARBAMOL 500 MG/1
500 TABLET, FILM COATED ORAL 4 TIMES DAILY
Qty: 30 TABLET | Refills: 1 | Status: SHIPPED | OUTPATIENT
Start: 2022-04-29 | End: 2022-05-23 | Stop reason: ALTCHOICE

## 2022-04-29 RX ORDER — MELOXICAM 15 MG/1
15 TABLET ORAL DAILY
Qty: 30 TABLET | Refills: 1 | Status: SHIPPED | OUTPATIENT
Start: 2022-04-29 | End: 2022-05-23 | Stop reason: ALTCHOICE

## 2022-04-29 NOTE — PROGRESS NOTES
Assessment/Plan:   Diagnoses and all orders for this visit:    S/P total knee replacement, right  -     XR knee 3 vw right non injury; Future    Reviewed today's physical exam findings and x-ray findings with patient at time of visit  Discussed that current postoperatively effusion will resolve with time  He can continue formal physical therapy at home exercise program as outlined by protocol  He is being provided with prescriptions for meloxicam as an anti-inflammatory, and methocarbamol to help with thigh pain and quadriceps spasms  He can return to driving activities when he feels confident in applying heavy pressure to the break  He will be seen for follow-up in 9 weeks for re-evaluation, strength, and range of motion check  Patient expresses understanding is in agreement with treatment plan  Subjective:   Patient ID: Sue Mccall  1945     HPI  Patient is a 78 y o  male who presents for 2 week postop evaluation s/p right TKA performed 4/13/2022  Patient states that he feels he is progressing well postoperatively  He has initiated formal PT as per protocol  He complains of pain, soreness, and bruising of the thigh  He states that he has already transitioned to using a cane for weight-bearing  He has difficulty going up and down stairs, but he has started a try  He denies any new onset bruising, numbness, tingling, feelings of instability  He is currently taking Tylenol as needed for pain and soreness      The following portions of the patient's history were reviewed and updated as appropriate:  Past medical history, past surgical history, Family history, social history, current medications and allergies    Past Medical History:   Diagnosis Date    Celiac disease     Coronary artery disease     Diverticulosis     Hyperlipidemia     Hypertension     Myocardial infarction ([facility] [address] ) 1999       Past Surgical History:   Procedure Laterality Date    COLONOSCOPY      CORONARY ANGIOPLASTY WITH STENT PLACEMENT      20 yrs ago    OR TOTAL KNEE ARTHROPLASTY Right 2022    Procedure: ARTHROPLASTY KNEE TOTAL and all associated procedures;  Surgeon: Cyndy Jung MD;  Location:  MAIN OR;  Service: Orthopedics       Family History   Problem Relation Age of Onset    Diabetes Mother     Coronary artery disease Father     Thyroid disease Sister        Social History     Socioeconomic History    Marital status: /Civil Union     Spouse name: None    Number of children: 3    Years of education: None    Highest education level: None   Occupational History    None   Tobacco Use    Smoking status: Former Smoker     Quit date:      Years since quittin 3    Smokeless tobacco: Never Used    Tobacco comment: Per pt, quit over 39 years ago   Vaping Use    Vaping Use: Never used   Substance and Sexual Activity    Alcohol use: Not Currently    Drug use: Never    Sexual activity: Yes   Other Topics Concern    None   Social History Narrative    None     Social Determinants of Health     Financial Resource Strain: Not on file   Food Insecurity: Not on file   Transportation Needs: Not on file   Physical Activity: Not on file   Stress: Not on file   Social Connections: Not on file   Intimate Partner Violence: Not on file   Housing Stability: Not on file         Current Outpatient Medications:     ascorbic acid (VITAMIN C) 500 MG tablet, Take 1 tablet (500 mg total) by mouth 2 (two) times a day, Disp: 30 tablet, Rfl: 0    aspirin (ECOTRIN LOW STRENGTH) 81 mg EC tablet, Take 1 tablet (81 mg total) by mouth 2 (two) times a day Please do not start taking until after total joint arthroplasty, Disp: 70 tablet, Rfl: 0    baclofen 10 mg tablet, TAKE 1 TABLET BY MOUTH EVERY 8 HOURS AS NEEDED FOR MUSCLE SPASMS, Disp: 30 tablet, Rfl: 0    Cholecalciferol (Vitamin D3) 1 25 MG (05248 UT) CAPS, Take 10,000 Units by mouth every morning  , Disp: , Rfl:     clopidogrel (PLAVIX) 75 mg tablet, Take 75 mg by mouth every morning  , Disp: , Rfl:     Cyanocobalamin (VITAMIN B 12 PO), Take by mouth, Disp: , Rfl:     diclofenac (VOLTAREN) 75 mg EC tablet, Take 1 tablet (75 mg total) by mouth 2 (two) times a day (Patient taking differently: Take 75 mg by mouth every morning  ), Disp: 60 tablet, Rfl: 0    docusate sodium (COLACE) 100 mg capsule, Take 1 capsule (100 mg total) by mouth 2 (two) times a day, Disp: 10 capsule, Rfl: 0    ezetimibe (ZETIA) 10 mg tablet, TAKE 1 TABLET BY MOUTH  DAILY (Patient taking differently: Take 10 mg by mouth daily at bedtime  ), Disp: 30 tablet, Rfl: 11    folic acid (FOLVITE) 1 mg tablet, TAKE 1 TABLET BY MOUTH EVERY DAY, Disp: 30 tablet, Rfl: 0    gabapentin (NEURONTIN) 300 mg capsule, TAKE 1 CAPSULE BY MOUTH 3  TIMES DAILY (Patient taking differently: Take 600 mg by mouth daily at bedtime  ), Disp: 270 capsule, Rfl: 3    Icosapent Ethyl (Vascepa) 1 g CAPS, Vascepa 1 gram capsule  TAKE 2 CAPSULES BY MOUTH  TWICE DAILY (PLEASE CALL OFFICE FOR APPT), Disp: , Rfl:     ketoconazole (NIZORAL) 2 % shampoo, APPLY 1 APPLICATION TOPICALLY 2 (TWO) TIMES A WEEK, Disp: 120 mL, Rfl: 0    metoprolol succinate (TOPROL-XL) 50 mg 24 hr tablet, Take 1 tablet (50 mg total) by mouth daily (Patient taking differently: Take 50 mg by mouth every morning  ), Disp: 30 tablet, Rfl: 2    sertraline (ZOLOFT) 50 mg tablet, Take 1 tablet (50 mg total) by mouth daily (Patient taking differently: Take 50 mg by mouth every morning  ), Disp: 90 tablet, Rfl: 3    simvastatin (ZOCOR) 80 mg tablet, Take 75 mg by mouth daily at bedtime, Disp: , Rfl:     tamsulosin (FLOMAX) 0 4 mg, Take 1 capsule (0 4 mg total) by mouth daily with dinner (Patient taking differently: Take 0 4 mg by mouth every morning  ), Disp: 90 capsule, Rfl: 2    traZODone (DESYREL) 150 mg tablet, TAKE 1 TABLET BY MOUTH  DAILY AT BEDTIME, Disp: 30 tablet, Rfl: 11    acetaminophen (TYLENOL) 650 mg CR tablet, Take 1 tablet (650 mg total) by mouth every 8 (eight) hours as needed for mild pain (Patient not taking: Reported on 4/29/2022 ), Disp: 30 tablet, Rfl: 0    ferrous sulfate 324 (65 Fe) mg, TAKE 1 TABLET BY MOUTH 2 TIMES A DAY BEFORE MEALS  (Patient not taking: Reported on 4/29/2022), Disp: 60 tablet, Rfl: 0    oxyCODONE (ROXICODONE) 5 immediate release tablet, Take 1 tablets every 6 hrs as needed for pain control (Patient not taking: Reported on 4/29/2022 ), Disp: 30 tablet, Rfl: 0    rosuvastatin (CRESTOR) 20 MG tablet, Take 1 tablet (20 mg total) by mouth daily (Patient not taking: Reported on 4/18/2022 ), Disp: 30 tablet, Rfl: 2    Allergies   Allergen Reactions    Crestor [Rosuvastatin] Myalgia       Review of Systems   Constitutional: Negative for chills, fever and unexpected weight change  HENT: Negative for hearing loss, nosebleeds and sore throat  Eyes: Negative for pain, redness and visual disturbance  Respiratory: Negative for cough, shortness of breath and wheezing  Cardiovascular: Negative for chest pain, palpitations and leg swelling  Gastrointestinal: Negative for abdominal pain, nausea and vomiting  Endocrine: Negative for polydipsia and polyuria  Genitourinary: Negative for dysuria and hematuria  Musculoskeletal:        As noted in HPI   Skin: Negative for rash and wound  Neurological: Negative for dizziness, numbness and headaches  Psychiatric/Behavioral: Negative for decreased concentration and suicidal ideas  The patient is not nervous/anxious           Objective:  /74   Pulse 72   Ht 6' (1 829 m)   Wt 114 kg (251 lb)   BMI 34 04 kg/m²     Ortho Exam  Right knee -   Weight-bearing status:  Full WBAT, uses cane as assistive device  Incision(s):  Clean, dry, edges well approximated with sutures - sutures removed at time of visit without difficulty - no signs of drainage or dehiscence  Skin is warm and dry with no signs of erythema or infection  Mild soft tissue swelling of the thigh with associated ecchymosis, moderate to large effusion press  ROM:  0°-125°  Strength:  5-/5 throughout  Knee is stable to varus and valgus stress  Calf compartments are soft  - Kati's sign  2+ TP and DP pulses with brisk capillary refill to the toes  Sural, saphenous, tibial, superficial and deep peroneal motor and sensory distributions intact  Sensation light touch intact distally    Physical Exam  Vitals reviewed  Constitutional:       Appearance: He is well-developed  HENT:      Head: Normocephalic and atraumatic  Nose: Nose normal    Eyes:      Conjunctiva/sclera: Conjunctivae normal    Cardiovascular:      Rate and Rhythm: Normal rate  Pulmonary:      Effort: Pulmonary effort is normal    Musculoskeletal:      Cervical back: Neck supple  Skin:     General: Skin is warm and dry  Capillary Refill: Capillary refill takes less than 2 seconds  Neurological:      Mental Status: He is alert and oriented to person, place, and time     Psychiatric:         Mood and Affect: Mood normal          Behavior: Behavior normal           Diagnostic Test Review:  Attending Physician has personally reviewed pertinent imaging in PACS, impression is as follows:    Review of radiographic series taken 4/29/2022 of the right knee shows well-seated total knee prosthesis with maintained alignment and no sign of loosening    Procedures   Sutures out -  Steri-Strips applied    Scribe Attestation    I,:   am acting as a scribe while in the presence of the attending physician :       I,:   personally performed the services described in this documentation    as scribed in my presence :

## 2022-04-29 NOTE — PROGRESS NOTES
Daily Note     Today's date: 2022  Patient name: Arlene Reyna  : 1945  MRN: [id number]  Referring provider: Radhames Borges MD  Dx:   Encounter Diagnosis     ICD-10-CM    1  Primary osteoarthritis of right knee  M17 11    2  Status post right knee replacement  Z96 651        Start Time: 1100  Stop Time: 1155  Total time in clinic (min): 55 minutes    Subjective: Marene Goodell reports his follow up appointment with the surgeon went well earlier today; had staples removed at [address] [place] [address] appointment  Feels he's doing well overall  Objective: See treatment diary below      Assessment: Tolerated treatment well  Replace most distal steristrip at start of session secondary to steristrip being peeled up when patient presented to session  Serosanguious drainage from distal 1/4 of incision; unable to determine if drainage was from incision or from puncture wounds from staple removal  Discussed with patient to monitor drainage from incision and notify surgeon's office if amount of drainage does not decrease  Able to perform forward step ups with 8" step this session without compensation but fatigue with increased reps  Patient demonstrated appropriate level of challenge and muscular fatigue throughout session and noted good status at end of visit  Patient demonstrated fatigue post treatment, exhibited good technique with therapeutic exercises and would benefit from continued PT  Plan: Continue per plan of care  Progress treatment as tolerated         Precautions: HTN, HLD, CAD, MI ()    * Indicates part of HEP   HEP code: [facility]     Daily Treatment Diary     Manuals 3/282 4/18 4/22 4/26 4/29      R Knee PROM nv nv Done supine and EOB Done supine Done supine      Patellar mobility nv Done- assessment                    Therapeutic Exercise           Bike  nv 5' lvl 0 5' lvl 0 5' lvl 2 5' lvl 0      Gastroc stretch* 10x10" R 15x10" R         Seated hamstring stretch* 10x10" R 15x10" R  20x5" R Quad stretch           Piriformis stretch           Heel slides* 10x10" 20x5" 20x5" 20x5" R        Quad set* 10x5" 20x5 20x5" 2x10x5" 3x10x5"      SLR flexion   2x8 3x10 R 3x10 R      SLR abduction                                 Patient education done done                    Neuro Re-ed           Bridges* 2x10 nv 3x10 3x10 3x12      Clamshells* 2x10 R nv 3x10 3x10 R       Lateral eccentric step down           Forward eccentric step down                                                       Therapeutic Activity           Sit to stands  nv nv 2x10 from 24 5" high low table Chair + 2 foam boost 3x10      Leg press           Heel raises  nv 2x10 3x12       Toe raises  nv 2x10 3x12       Band resisted side stepping           Monster walks           Goblet squat           Deadlift           Forward step ups   4" step up/back 2x10 R  8" step up/back 3x8 R w/ CGA      Lateral step ups                                                       Modalities           CP R knee    5' long sit, post Seated, 5' post

## 2022-05-03 ENCOUNTER — OFFICE VISIT (OUTPATIENT)
Dept: PHYSICAL THERAPY | Facility: REHABILITATION | Age: 77
End: 2022-05-03
Payer: COMMERCIAL

## 2022-05-03 DIAGNOSIS — M17.11 PRIMARY OSTEOARTHRITIS OF RIGHT KNEE: Primary | ICD-10-CM

## 2022-05-03 DIAGNOSIS — Z96.651 STATUS POST RIGHT KNEE REPLACEMENT: ICD-10-CM

## 2022-05-03 PROCEDURE — 97110 THERAPEUTIC EXERCISES: CPT

## 2022-05-03 PROCEDURE — 97530 THERAPEUTIC ACTIVITIES: CPT

## 2022-05-03 NOTE — PROGRESS NOTES
Daily Note     Today's date: 5/3/2022  Patient name: Jesús Recinos  : 1945  MRN: [id number]  Referring provider: Odell Balbuena MD  Dx:   Encounter Diagnosis     ICD-10-CM    1  Primary osteoarthritis of right knee  M17 11    2  Status post right knee replacement  Z96 651        Start Time: 1100  Stop Time: 1155  Total time in clinic (min): 55 minutes    Subjective: Patient reporting improvements with drainage, stating he has not had any since Friday  He reports knee as "good" reporting more discomfort with left knee versus right  He does report some low back soreness as well  Objective: See treatment diary below      Assessment: Tolerated treatment well  Patient demonstrated fatigue post treatment, exhibited good technique with therapeutic exercises and would benefit from continued PT Posterior LOB noted with significant antalgic gait  with completion of step ups on 8'', trialed 6'' with improved form and challenge noted  Plan: Continue per plan of care  Progress treatment as tolerated         Precautions: HTN, HLD, CAD, MI ()    * Indicates part of HEP   HEP code: [facility]     Daily Treatment Diary     Manuals 3/282 4/18 4/22 4/26 4/29 5/3     R Knee PROM nv nv Done supine and EOB Done supine Done supine Done supine     Patellar mobility nv Done- assessment                    Therapeutic Exercise           Bike  nv 5' lvl 0 5' lvl 0 5' lvl 2 5' lvl 0 5' lvl 1      Gastroc stretch* 10x10" R 15x10" R         Seated hamstring stretch* 10x10" R 15x10" R  20x5" R  20x5'' R     Quad stretch           Piriformis stretch           Heel slides* 10x10" 20x5" 20x5" 20x5" R   20x5''     Quad set* 10x5" 20x5 20x5" 2x10x5" 3x10x5" 3x10x5''     SLR flexion   2x8 3x10 R 3x10 R 3x10 R      SLR abduction                                 Patient education done done                    Neuro Re-ed           Bridges* 2x10 nv 3x10 3x10 3x12 3x12     Clamshells* 2x10 R nv 3x10 3x10 R       Lateral eccentric step down           Forward eccentric step down                                                       Therapeutic Activity           Sit to stands  nv nv 2x10 from 24 5" high low table Chair + 2 foam boost 3x10 Chair + foam boost 3x10     Leg press           Heel raises  nv 2x10 3x12       Toe raises  nv 2x10 3x12       Band resisted side stepping           Monster walks           Goblet squat           Deadlift           Forward step ups   4" step up/back 2x10 R  8" step up/back 3x8 R w/ CGA 8'' form  6'' 3x10      Lateral step ups                                                       Modalities           CP R knee    5' long sit, post Seated, 5' post 10' post

## 2022-05-05 DIAGNOSIS — Z01.818 PREOPERATIVE TESTING: ICD-10-CM

## 2022-05-05 DIAGNOSIS — M17.11 PRIMARY OSTEOARTHRITIS OF RIGHT KNEE: ICD-10-CM

## 2022-05-05 RX ORDER — FOLIC ACID 1 MG/1
TABLET ORAL
Qty: 30 TABLET | Refills: 0 | Status: SHIPPED | OUTPATIENT
Start: 2022-05-05 | End: 2022-05-23 | Stop reason: ALTCHOICE

## 2022-05-06 ENCOUNTER — OFFICE VISIT (OUTPATIENT)
Dept: PHYSICAL THERAPY | Facility: REHABILITATION | Age: 77
End: 2022-05-06
Payer: COMMERCIAL

## 2022-05-06 DIAGNOSIS — M17.11 PRIMARY OSTEOARTHRITIS OF RIGHT KNEE: Primary | ICD-10-CM

## 2022-05-06 DIAGNOSIS — Z96.651 STATUS POST RIGHT KNEE REPLACEMENT: ICD-10-CM

## 2022-05-06 PROCEDURE — 97112 NEUROMUSCULAR REEDUCATION: CPT

## 2022-05-06 PROCEDURE — 97530 THERAPEUTIC ACTIVITIES: CPT

## 2022-05-06 PROCEDURE — 97110 THERAPEUTIC EXERCISES: CPT

## 2022-05-06 NOTE — PROGRESS NOTES
Daily Note     Today's date: 2022  Patient name: Mima Elizabeth  : 1945  MRN: [id number]  Referring provider: Shin Courtney MD  Dx:   Encounter Diagnosis     ICD-10-CM    1  Primary osteoarthritis of right knee  M17 11    2  Status post right knee replacement  Z96 651        Start Time: 1058  Stop Time: 1144  Total time in clinic (min): 46 minutes    Subjective: Patient reporting knee as "good" at start of session, he reports "some clicking " He reports clicking sensation is not painful  He reports no complaints after previous session  Objective: See treatment diary below      Assessment: Tolerated treatment well  Patient demonstrated fatigue post treatment, exhibited good technique with therapeutic exercises and would benefit from continued PTTrialed leg extension machine this session with patient tolerating well  No knee pain reported with any TE performed, only reports of muscle fatigue  Plan: Continue per plan of care  Progress treatment as tolerated         Precautions: HTN, HLD, CAD, MI ()    * Indicates part of HEP   HEP code: [facility]     Daily Treatment Diary     Manuals 3/282 4/18 4/22 4/26 4/29 5/3 56    R Knee PROM nv nv Done supine and EOB Done supine Done supine Done supine Done supine    Patellar mobility nv Done- assessment                    Therapeutic Exercise           Bike  nv 5' lvl 0 5' lvl 0 5' lvl 2 5' lvl 0 5' lvl 1  5' lvl 1    Gastroc stretch* 10x10" R 15x10" R         Seated hamstring stretch* 10x10" R 15x10" R  20x5" R  20x5'' R     Quad stretch           Piriformis stretch           Heel slides* 10x10" 20x5" 20x5" 20x5" R   20x5'' 20x5''    Quad set* 10x5" 20x5 20x5" 2x10x5" 3x10x5" 3x10x5'' 3x10x5''     SLR flexion   2x8 3x10 R 3x10 R 3x10 R  3x12 R    SLR abduction                                 Patient education done done                    Neuro Re-ed           Bridges* 2x10 nv 3x10 3x10 3x12 3x12 3x15    Clamshells* 2x10 R nv 3x10 3x10 R   OTB 3x10    Lateral eccentric step down           Forward eccentric step down                                                       Therapeutic Activity           Sit to stands  nv nv 2x10 from 24 5" high low table Chair + 2 foam boost 3x10 Chair + foam boost 3x10 Chair + foam boost 3x10     Leg press           Heel raises  nv 2x10 3x12       Toe raises  nv 2x10 3x12       Band resisted side stepping           Monster walks           Goblet squat           Deadlift           Forward step ups   4" step up/back 2x10 R  8" step up/back 3x8 R w/ CGA 8'' form  6'' 3x10  6'' 3x10     Lateral step ups           Leg extension       45# 3x10                                      Modalities           CP R knee    5' long sit, post Seated, 5' post 10' post

## 2022-05-10 ENCOUNTER — OFFICE VISIT (OUTPATIENT)
Dept: PHYSICAL THERAPY | Facility: REHABILITATION | Age: 77
End: 2022-05-10
Payer: COMMERCIAL

## 2022-05-10 DIAGNOSIS — M17.11 PRIMARY OSTEOARTHRITIS OF RIGHT KNEE: Primary | ICD-10-CM

## 2022-05-10 DIAGNOSIS — Z96.651 STATUS POST RIGHT KNEE REPLACEMENT: ICD-10-CM

## 2022-05-10 PROCEDURE — 97110 THERAPEUTIC EXERCISES: CPT | Performed by: PHYSICAL THERAPIST

## 2022-05-10 PROCEDURE — 97530 THERAPEUTIC ACTIVITIES: CPT | Performed by: PHYSICAL THERAPIST

## 2022-05-10 NOTE — PROGRESS NOTES
Daily Note     Today's date: 5/10/2022  Patient name: Obinna Rosas  : 1945  MRN: [id number]  Referring provider: Becki Quiles MD  Dx:   Encounter Diagnosis     ICD-10-CM    1  Primary osteoarthritis of right knee  M17 11    2  Status post right knee replacement  Z96 651        Start Time: 1105  Stop Time: 1210  Total time in clinic (min): 65 minutes    Subjective: Jessica Runner reports he's doing well at start of session  Notes good compliance with HEP  Objective: See treatment diary below      Assessment: Tolerated treatment well  Introduced leg press and band resisted side stepping for improved proximal strength with good tolerance  Verbal cuing required to minimize compensatory hip ER during side stepping with good ability to incorporate feedback into performance  Noted improvement in sit to stands this session with ability to perform without foam boost; improved ability to control last few inches of eccentric phase with increased reps  Patient demonstrated appropriate level of challenge and muscular fatigue throughout session and noted good status at end of visit  Patient demonstrated fatigue post treatment, exhibited good technique with therapeutic exercises and would benefit from continued PT  Plan: Continue per plan of care  Progress treatment as tolerated         Precautions: HTN, HLD, CAD, MI ()    * Indicates part of HEP   HEP code: [facility]     Daily Treatment Diary     Manuals 3/282 4/18 4/22 4/26 4/29 5/3 5/6 5/10   R Knee PROM nv nv Done supine and EOB Done supine Done supine Done supine Done supine Ext overpressure supine   Patellar mobility nv Done- assessment                    Therapeutic Exercise           Bike  nv 5' lvl 0 5' lvl 0 5' lvl 2 5' lvl 0 5' lvl 1  5' lvl 1 5' lvl 2   Gastroc stretch* 10x10" R 15x10" R      standing 15x5" R   Seated hamstring stretch* 10x10" R 15x10" R  20x5" R  20x5'' R  20x5" R   Quad stretch           Piriformis stretch           Heel slides* 10x10" 20x5" 20x5" 20x5" R   20x5'' 20x5''    Quad set* 10x5" 20x5 20x5" 2x10x5" 3x10x5" 3x10x5'' 3x10x5''  [id number]"   SLR flexion   2x8 3x10 R 3x10 R 3x10 R  3x12 R 3x12 R   SLR abduction        3x8 R                         Patient education done done                    Neuro Re-ed           Bridges* 2x10 nv 3x10 3x10 3x12 3x12 3x15 3x15   Clamshells* 2x10 R nv 3x10 3x10 R   OTB 3x10    Lateral eccentric step down           Forward eccentric step down                                                       Therapeutic Activity           Sit to stands  nv nv 2x10 from 24 5" high low table Chair + 2 foam boost 3x10 Chair + foam boost 3x10 Chair + foam boost 3x10  No boost 3x8   Leg press        125# 3x10 P9S3   Heel raises  nv 2x10 3x12    nv   Toe raises  nv 2x10 3x12    nv   Band resisted side stepping        OTB 2 laps   Monster walks           Goblet squat           Deadlift           Forward step ups   4" step up/back 2x10 R  8" step up/back 3x8 R w/ CGA 8'' form  6'' 3x10  6'' 3x10     Lateral step ups           Leg extension       45# 3x10  45# 3x12                                    Modalities           CP R knee    5' long sit, post Seated, 5' post 10' post  6' post long sit

## 2022-05-13 ENCOUNTER — OFFICE VISIT (OUTPATIENT)
Dept: PHYSICAL THERAPY | Facility: REHABILITATION | Age: 77
End: 2022-05-13
Payer: COMMERCIAL

## 2022-05-13 DIAGNOSIS — M17.11 PRIMARY OSTEOARTHRITIS OF RIGHT KNEE: Primary | ICD-10-CM

## 2022-05-13 DIAGNOSIS — Z96.651 STATUS POST RIGHT KNEE REPLACEMENT: ICD-10-CM

## 2022-05-13 PROCEDURE — 97112 NEUROMUSCULAR REEDUCATION: CPT

## 2022-05-13 PROCEDURE — 97110 THERAPEUTIC EXERCISES: CPT

## 2022-05-13 NOTE — PROGRESS NOTES
Daily Note     Today's date: 2022  Patient name: Joseph Dodson  : 1945  MRN: [id number]  Referring provider: Mark Haque MD  Dx:   Encounter Diagnosis     ICD-10-CM    1  Primary osteoarthritis of right knee  M17 11    2  Status post right knee replacement  Z96 651                   Subjective:Pt states his back is bothering him today form sitting on the couch for the past 4 weeks  Pt states he has no new complaints since his last PT session about his knee  Objective: See treatment diary below      Assessment: Tolerated treatment well with no increase in pain  Pt was able to increase step height today with good form and no increase in pain  Pt continues to be limited with ext seen with self stretching and manual PT  Pt required VC with STS to decrease forward flexion of his lumbar  Patient would benefit from continued PT  Plan: Continue per plan of care  Progress treatment as tolerated         Precautions: HTN, HLD, CAD, MI ()    * Indicates part of HEP   HEP code: [facility]     Daily Treatment Diary     Manuals 4/26 4/29 5/3 5/6 5/10 5/13   R Knee PROM Done supine Done supine Done supine Done supine Ext overpressure supine CF   Patellar mobility                  Therapeutic Exercise         Bike  5' lvl 2 5' lvl 0 5' lvl 1  5' lvl 1 5' lvl 2 5' lvl 2    Gastroc stretch*     standing 15x5" R Standing 15 x 5" R   Seated hamstring stretch* 20x5" R  20x5'' R  20x5" R 20 x 5" R    Quad stretch         Piriformis stretch         Heel slides* 20x5" R   20x5'' 20x5''     Quad set* 2x10x5" 3x10x5" 3x10x5'' 3x10x5''  [id number]" 2  x10 x 10"    SLR flexion 3x10 R 3x10 R 3x10 R  3x12 R 3x12 R 3 x 12 R    SLR abduction     3x8 R 3 x 8 R                     Patient education                  Neuro Re-ed         Bridges* 3x10 3x12 3x12 3x15 3x15 3 x 15    Clamshells* 3x10 R   OTB 3x10     Lateral eccentric step down         Forward eccentric step down Therapeutic Activity         Sit to stands 2x10 from 24 5" high low table Chair + 2 foam boost 3x10 Chair + foam boost 3x10 Chair + foam boost 3x10  No boost 3x8 No boost 3x8   Leg press     125# 3x10 P9S3 125# 3x10 P9S3   Heel raises 3x12    nv 3 x 12    Toe raises 3x12    nv 3 x 12    Band resisted side stepping     OTB 2 laps OTB 2 laps    Monster walks         Goblet squat         Deadlift         Forward step ups  8" step up/back 3x8 R w/ CGA 8'' form  6'' 3x10  6'' 3x10   8" 3 x 10    Lateral step ups         Leg extension    45# 3x10  45# 3x12 45# 3 x 10                               Modalities         CP R knee 5' long sit, post Seated, 5' post 10' post  6' post long sit 5' supine heel prop

## 2022-05-16 LAB
DME PARACHUTE DELIVERY DATE ACTUAL: NORMAL
DME PARACHUTE DELIVERY DATE REQUESTED: NORMAL
DME PARACHUTE ITEM DESCRIPTION: NORMAL
DME PARACHUTE ORDER STATUS: NORMAL
DME PARACHUTE SUPPLIER NAME: NORMAL
DME PARACHUTE SUPPLIER PHONE: NORMAL

## 2022-05-17 ENCOUNTER — EVALUATION (OUTPATIENT)
Dept: PHYSICAL THERAPY | Facility: REHABILITATION | Age: 77
End: 2022-05-17
Payer: COMMERCIAL

## 2022-05-17 DIAGNOSIS — M17.11 PRIMARY OSTEOARTHRITIS OF RIGHT KNEE: Primary | ICD-10-CM

## 2022-05-17 DIAGNOSIS — Z96.651 STATUS POST RIGHT KNEE REPLACEMENT: ICD-10-CM

## 2022-05-17 PROCEDURE — 97110 THERAPEUTIC EXERCISES: CPT | Performed by: PHYSICAL THERAPIST

## 2022-05-17 PROCEDURE — 97530 THERAPEUTIC ACTIVITIES: CPT | Performed by: PHYSICAL THERAPIST

## 2022-05-17 PROCEDURE — 97140 MANUAL THERAPY 1/> REGIONS: CPT | Performed by: PHYSICAL THERAPIST

## 2022-05-17 NOTE — PROGRESS NOTES
PT Re-Evaluation     Today's date: 2022  Patient name: Malik Edwards  : 1945  MRN: [id number]  Referring provider: Audrey Chapman MD  Dx:   Encounter Diagnosis     ICD-10-CM    1  Primary osteoarthritis of right knee  M17 11    2  Status post right knee replacement  Z96 651        Start Time: 5  Stop Time: 1150  Total time in clinic (min): 48 minutes    Assessment  Assessment details: Per patient report and clinical findings, Malik Edwards has made good progress since starting skilled physical therapy services  Malik Edwards has been compliant with PT POC and HEP since initial evaluation  To this date, Malik Edwards has completed 9 visits of skilled physical therapy post-operatively  Malik Edwards demonstrates improvements in objective data however, continues to be limited compared to his prior level of function  Remaining deficits include decreased R knee extension ROM and decreased RLE strength  These deficits continue to impair gait pattern and stair negotiation  Recommend continued skilled physical therapy services to address remaining deficits to promote progress towards his prior level of function  Impairments: abnormal gait, abnormal or restricted ROM, activity intolerance, impaired balance, impaired physical strength, lacks appropriate home exercise program, pain with function and weight-bearing intolerance  Understanding of Dx/Px/POC: good   Prognosis: good    Goals  STG (4 weeks):  1  R knee extension ROM WFL to promote improved gait mechanics  - progressing  2  Increase R knee extension strength to at least 4/5 for improved activity tolerance  - met  3  Able to don shoes without increased R knee pain for improved ADL performance  - met    LTG (8 weeks):  1  Increased global R knee strength to at least 4+/5 to promote improved activity tolerance  - met  2   Able to sit for at least 1 hour without symptom exacerbation to promote improved positional tolerance for activities such as driving  - met   3  Able to walk for at least 30 minutes without symptom exacerbation to promote improved community ambulation  - progressing  4  Able to sleep through the night without waking secondary to pain to promote improved sleep quality  - progressing  5  Able to ascend/descend 1 flight of stairs without compensation to promote improved household and community ambulation  - met  6  Independent with HEP  - progressing      Plan  Plan details: Thank you for referring Jesús Recinos to Physical Therapy at [place] [address] and for the opportunity to coordinate care  Patient would benefit from: skilled physical therapy  Planned modality interventions: cryotherapy, electrical stimulation/Russian stimulation, TENS, thermotherapy: hydrocollator packs and unattended electrical stimulation  Planned therapy interventions: abdominal trunk stabilization, activity modification, ADL retraining, balance, balance/weight bearing training, behavior modification, body mechanics training, breathing training, fine motor coordination training, flexibility, functional ROM exercises, gait training, graded activity, graded exercise, graded motor, home exercise program, work reintegration, transfer training, therapeutic training, therapeutic exercise, therapeutic activities, stretching, strengthening, self care, postural training, patient education, neuromuscular re-education, muscle pump exercises, motor coordination training, Blum taping, manual therapy, joint mobilization and IADL retraining  Frequency: 2x week  Duration in visits: 10  Plan of Care beginning date: 5/17/2022  Treatment plan discussed with: patient        Subjective Evaluation    History of Present Illness  Date of surgery: 4/13/2022  Mechanism of injury: surgery  Mechanism of injury:   05/17/22:  Jesús Recinos reports feeling he has made 80% progress since surgery  Reports improvement in pain and swelling, ability to WB   Reports improvement in stair negotiation but does have some shakiness when going down the steps  Feels he needs to continue to build strength in the legs to progress closer to 100%  22:   Thong Briscoe is a 78 y o  male patient presenting with s/p r TKA performed by Dr James Rogers on 22  Gisela Boyer currently having difficulty with donning shoes, walking, lifting the R leg, laying in bed, sleeping  Pain is relieved or reduced with Tylenol, prescription pain medication and ice  Pt would like to return to being able to riding his bike, walking, and all typical activities  States he was doing leg lifts when his leg was still numb and feels that his quad muscle is strained  Next follow up with the physician is 4/20/22       3/28/22:  Thong Briscoe is a 78 y o  male presenting pre  R TKA to be performed by Dr James Rogers on 22  Currently having difficulty with down>up stairs, bending/squatting, prolonged sitting/driving  Steps to enter home: No steps  First floor set up: Yes   Number of steps to second floor: Greater than 10 steps  Has a SPC at home  Assist at home wife and son  Pre op TUG time: 12 seconds  Pain  Current pain ratin  At best pain ratin  At worst pain ratin  Location: R knee  Quality: sharp  Relieving factors: ice, heat and rest    Social Support  Steps to enter house: no  Stairs in house: yes   Lives in: multiple-level home  Lives with: spouse    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)    Diagnostic Tests  Abnormal x-ray: 22: Degenerative changes  Treatments  Previous treatment: physical therapy and injection treatment  Current treatment: physical therapy  Patient Goals  Patient goal: "to walk"        Objective     Observations     Right Knee   Positive for edema and incision       Additional Observation Details  Gait: ambulates without AD, lacks TKE during R stance phase    Active Range of Motion   Left Knee   Flexion: 130 degrees Extensor la degrees     Right Knee   Flexion: 130 degrees   Extensor la degrees     Mobility   Patellar Mobility:     Right Knee   WFL: medial, lateral, superior and inferior    Strength/Myotome Testing     Left Knee   Flexion: 5  Extension: 5    Right Knee   Flexion: 4+  Extension: 4+  Quadriceps contraction: fair    General Comments:      Knee Comments  22:  Post-op TU seconds    22:  Post-op TU seconds with use of arm rests on chair and RW    3/28/22:  Pre-op TU seconds      Flowsheet Rows    Flowsheet Row Most Recent Value   PT/OT G-Codes    Current Score 82   Projected Score 78           Precautions: HTN, HLD, CAD, MI ()    * Indicates part of HEP   HEP code: [facility]     Daily Treatment Diary     Manuals 4/26 4/29 5/3 5/6 5/10 5/13 5/17    R Knee PROM Done supine Done supine Done supine Done supine Ext overpressure supine CF Ext overpressure in supine    Patellar mobility                      Therapeutic Exercise           Bike  5' lvl 2 5' lvl 0 5' lvl 1  5' lvl 1 5' lvl 2 5' lvl 2  5' lvl 3    Gastroc stretch*     standing 15x5" R Standing 15 x 5" R     Seated hamstring stretch* 20x5" R  20x5'' R  20x5" R 20 x 5" R      Quad stretch           Piriformis stretch           Heel slides* 20x5" R   20x5'' 20x5''       Quad set* 2x10x5" 3x10x5" 3x10x5'' 3x10x5''  [id number]" 2  x10 x 10"      SLR flexion 3x10 R 3x10 R 3x10 R  3x12 R 3x12 R 3 x 12 R  3x15 R    SLR abduction     3x8 R 3 x 8 R 3x10 R                          Patient education                      Neuro Re-ed           Bridges* 3x10 3x12 3x12 3x15 3x15 3 x 15  3x12 12#    Clamshells* 3x10 R   OTB 3x10       Lateral eccentric step down           Forward eccentric step down                                                       Therapeutic Activity           Sit to stands 2x10 from 24 5" high low table Chair + 2 foam boost 3x10 Chair + foam boost 3x10 Chair + foam boost 3x10  No boost 3x8 No boost 3x8     Leg press 125# 3x10 P9S3 125# 3x10 P9S3 155# 3x10 P9S3    Heel raises 3x12    nv 3 x 12      Toe raises 3x12    nv 3 x 12      Band resisted side stepping     OTB 2 laps OTB 2 laps      Monster walks           Goblet squat           Deadlift           Forward step ups  8" step up/back 3x8 R w/ CGA 8'' form  6'' 3x10  6'' 3x10   8" 3 x 10  8" step up/back 3x10 ea    Lateral step ups           Leg extension    45# 3x10  45# 3x12 45# 3 x 10  45# 3x15    Leg curl       55# 3x12                          Modalities           CP R knee 5' long sit, post Seated, 5' post 10' post  6' post long sit 5' supine heel prop

## 2022-05-20 ENCOUNTER — OFFICE VISIT (OUTPATIENT)
Dept: PHYSICAL THERAPY | Facility: REHABILITATION | Age: 77
End: 2022-05-20
Payer: COMMERCIAL

## 2022-05-20 DIAGNOSIS — Z96.651 STATUS POST RIGHT KNEE REPLACEMENT: ICD-10-CM

## 2022-05-20 DIAGNOSIS — M17.11 PRIMARY OSTEOARTHRITIS OF RIGHT KNEE: Primary | ICD-10-CM

## 2022-05-20 PROCEDURE — 97140 MANUAL THERAPY 1/> REGIONS: CPT | Performed by: PHYSICAL THERAPIST

## 2022-05-20 PROCEDURE — 97110 THERAPEUTIC EXERCISES: CPT | Performed by: PHYSICAL THERAPIST

## 2022-05-20 PROCEDURE — 97530 THERAPEUTIC ACTIVITIES: CPT | Performed by: PHYSICAL THERAPIST

## 2022-05-20 NOTE — PROGRESS NOTES
Daily Note     Today's date: 2022  Patient name: Obinna Rosas  : 1945  MRN: [id number]  Referring provider: Delmy Graves MD  Dx:   Encounter Diagnosis     ICD-10-CM    1  Primary osteoarthritis of right knee  M17 11    2  Status post right knee replacement  Z96 651        Start Time: 1105  Stop Time: 1155  Total time in clinic (min): 50 minutes    Subjective: Jessica Runner reports his R knee has been doing well  States there's times he forgets about his R knee  Objective: See treatment diary below      Assessment: Tolerated treatment well  Good tolerance to progressions as indicated below  Discusses performing gentle scar massage as part of HEP with patient verbalizing and demonstrating understanding  Patient demonstrated appropriate level of challenge and muscular fatigue throughout session and noted good status at end of visit  Patient demonstrated fatigue post treatment, exhibited good technique with therapeutic exercises and would benefit from continued PT  Plan: Continue per plan of care  Progress treatment as tolerated         Precautions: HTN, HLD, CAD, MI ()    * Indicates part of HEP   HEP code: [facility]     Daily Treatment Diary     Manuals 4/26 4/29 5/3 5/6 5/10 5/13 5/17 5/20   R Knee PROM Done supine Done supine Done supine Done supine Ext overpressure supine CF Ext overpressure in supine Ext overpressure in supine   Gentle scar mobilization        Done - edu for  HEP                         Therapeutic Exercise           Bike  5' lvl 2 5' lvl 0 5' lvl 1  5' lvl 1 5' lvl 2 5' lvl 2  5' lvl 3 5' lvl 3   Gastroc stretch*     standing 15x5" R Standing 15 x 5" R  Standing 10x5" ea   Seated hamstring stretch* 20x5" R  20x5'' R  20x5" R 20 x 5" R   10x10" R   Quad stretch           Piriformis stretch           Heel slides* 20x5" R   20x5'' 20x5''       Quad set* 2x10x5" 3x10x5" 3x10x5'' 3x10x5''  [id number]" 2  x10 x 10"      SLR flexion 3x10 R 3x10 R 3x10 R  3x12 R 3x12 R 3 x 12 R  3x15 R 3x15 R   SLR abduction     3x8 R 3 x 8 R 3x10 R 3x10 R                         Patient education                      Neuro Re-ed           Bridges* 3x10 3x12 3x12 3x15 3x15 3 x 15  3x12 12# 3x12 12#   Clamshells* 3x10 R   OTB 3x10       Lateral eccentric step down           Forward eccentric step down                                                       Therapeutic Activity           Sit to stands 2x10 from 24 5" high low table Chair + 2 foam boost 3x10 Chair + foam boost 3x10 Chair + foam boost 3x10  No boost 3x8 No boost 3x8  3x10   Leg press     125# 3x10 P9S3 125# 3x10 P9S3 155# 3x10 P9S3 165# 3x10 P9S3   Heel raises 3x12    nv 3 x 12      Toe raises 3x12    nv 3 x 12      Band resisted side stepping     OTB 2 laps OTB 2 laps      Monster walks           Goblet squat           Deadlift           Forward step ups  8" step up/back 3x8 R w/ CGA 8'' form  6'' 3x10  6'' 3x10   8" 3 x 10  8" step up/back 3x10 ea    Lateral step ups           Leg extension    45# 3x10  45# 3x12 45# 3 x 10  45# 3x15 55# 3x10   Leg curl       55# 3x12 65# 3x12                         Modalities           CP R knee 5' long sit, post Seated, 5' post 10' post  6' post long sit 5' supine heel prop

## 2022-05-23 ENCOUNTER — OFFICE VISIT (OUTPATIENT)
Dept: PHYSICAL THERAPY | Facility: REHABILITATION | Age: 77
End: 2022-05-23
Payer: COMMERCIAL

## 2022-05-23 ENCOUNTER — OFFICE VISIT (OUTPATIENT)
Dept: CARDIOLOGY CLINIC | Facility: CLINIC | Age: 77
End: 2022-05-23
Payer: COMMERCIAL

## 2022-05-23 VITALS
DIASTOLIC BLOOD PRESSURE: 80 MMHG | RESPIRATION RATE: 18 BRPM | OXYGEN SATURATION: 96 % | BODY MASS INDEX: 34 KG/M2 | WEIGHT: 251 LBS | SYSTOLIC BLOOD PRESSURE: 124 MMHG | HEIGHT: 72 IN | HEART RATE: 64 BPM

## 2022-05-23 DIAGNOSIS — E78.2 MIXED HYPERLIPIDEMIA: ICD-10-CM

## 2022-05-23 DIAGNOSIS — I25.10 CAD S/P PERCUTANEOUS CORONARY ANGIOPLASTY: Primary | ICD-10-CM

## 2022-05-23 DIAGNOSIS — M17.11 PRIMARY OSTEOARTHRITIS OF RIGHT KNEE: Primary | ICD-10-CM

## 2022-05-23 DIAGNOSIS — I35.0 NONRHEUMATIC AORTIC VALVE STENOSIS: ICD-10-CM

## 2022-05-23 DIAGNOSIS — I71.2 THORACIC AORTIC ANEURYSM WITHOUT RUPTURE (HCC): ICD-10-CM

## 2022-05-23 DIAGNOSIS — Z96.651 STATUS POST RIGHT KNEE REPLACEMENT: ICD-10-CM

## 2022-05-23 DIAGNOSIS — Z98.61 CAD S/P PERCUTANEOUS CORONARY ANGIOPLASTY: Primary | ICD-10-CM

## 2022-05-23 PROBLEM — I71.20 THORACIC AORTIC ANEURYSM WITHOUT RUPTURE: Status: ACTIVE | Noted: 2022-05-23

## 2022-05-23 PROCEDURE — 97112 NEUROMUSCULAR REEDUCATION: CPT | Performed by: PHYSICAL THERAPIST

## 2022-05-23 PROCEDURE — 97110 THERAPEUTIC EXERCISES: CPT | Performed by: PHYSICAL THERAPIST

## 2022-05-23 PROCEDURE — 97530 THERAPEUTIC ACTIVITIES: CPT | Performed by: PHYSICAL THERAPIST

## 2022-05-23 PROCEDURE — 99213 OFFICE O/P EST LOW 20 MIN: CPT | Performed by: INTERNAL MEDICINE

## 2022-05-23 RX ORDER — SIMVASTATIN 40 MG
40 TABLET ORAL
Qty: 90 TABLET | Refills: 1 | Status: SHIPPED | OUTPATIENT
Start: 2022-05-23

## 2022-05-23 NOTE — PROGRESS NOTES
[facility] [address] [facility] [address]  Phone#  [phone]  Fax#  [phone]                                               Cardiology Office Follow up  Dagoberto Lorenzana, 78 y o  male  YOB: 1945  MRN: [id number] Encounter: [id number]      PCP - Fartun Vaughan MD  Referring Provider - No ref  provider found    Chief Complaint   Patient presents with    Follow-up       Assessment  CAD s/p multiple PCIs  Originally POBA in early 1990s, LAD & RCA stent in 2003, and then multiple repeat stents to RCA for multiple episodes of in-stent stenosis, last stent in 2010  Mixed hyperlipidemia  Dilated aortic root, 4 5 cm  Aortic stenosis, mild  Echo 04/05/2022 showed LVEF 77%, grade 1 diastolic dysfunction, mildly dilated RV, moderate related LA/RD, mild aortic stenosis, mildly dilated aortic root  RBBB  Obesity, Body mass index is 34 04 kg/m²  Prior cardiac testing (in [place])  Nuclear Rx stress - 2/14/2019 ([address] [place] [address]) - moderate, predominantly fixed defect of entire inferior/inferolateral wall - some apical inferior reversibility - possible diaphragmatic attenuation +/- small ischemia, LVEF 81%  PCI -  LAD - Cypher LAD stent 2/03  RCA - stents x6 (2/2003)   KRYSTAL to dRCA (8/2006)   DESx3 to RCA (restenosis) (2010)  [facility] - 9/2010 - patent LAD stent, D1 WNL, LCx WNL, OM1 - non obsructive, pRA 99%, mRCA in-stent stenosis, dRCA 90% in-stent stenosis, RPDA & RPLB - non-obstructive   S/p PCI with KRYSTAL to [address]  CAD s/p multiple PCI  Multiple prior PCIs with to LAD and extensive stenting to RCA, but last stenting was in 2010  No current symptoms of chest pain or shortness of breath  Continue aspirin, metoprolol succinate 50 mg daily  We attempted to switch from simvastatin to rosuvastatin, but he reports having had a lot of side effects with it and as a result is back on simvastatin  Continue simvastatin 40 mg daily, ezetimibe 10 mg daily, Vascepa 2 g b i d  Hyperlipidemia   9/10/2019 14:18 1/27/2020 11:10 11/12/2020 12:33 9/22/2021 15:30 2/22/2022 10:58   Cholesterol 301 (H) 145 146 260 (H) 168   Triglycerides 408 (H) 373 (H) 212 (H) 375 (H) 568 (H)   HDL 40 37 (L) 48 41 32 (L)   Non-HDL Cholesterol 261       LDL Calculated See Comment 33 56 144 (H) See Comment   LDL CHOLESTEROL DIRECT     36   On simvastatin 40 mg, ezetimibe 10 mg daily, Vascepa 2 g bid  I attempted to switch him from simvastatin to rosuvastatin, but he reported having a lot of side effects, and as a result switched back to simvastatin  He believes that his drinking a lot of apple juice seems to be contributing to it  Repeat lipid panel prior to next office visit    Aortic root dilation, Aortic stenosis  Echo 04/05/2022 showed LVEF 02%, grade 1 diastolic dysfunction, mildly dilated RV, moderate related LA/RD, mild aortic stenosis, mildly dilated aortic root  No current symptoms of shortness of breath or heart failure  Will monitor on follow-up echocardiogram at 1 year    RBBB  Appears to be new, was not previously reported on ECG at cardiologist in [address] [place] [address] asymptomatic  Possibly age-related    No results found for this visit on 05/23/22  Orders Placed This Encounter   Procedures    Lipid Panel with Direct LDL reflex     No follow-ups on file  History of Present Illness   78 y o  male comes in as a new patient for preoperative consultation prior to upcoming knee replacement surgery in a few weeks  He is originally from [place], and moved to this area a few years ago  He has not had any major issues with chest pain, shortness of breath, palpitations, dizziness, near-syncope or syncope recently  He does have an extensive cardiac history and has had several stents dating back to early 1990s  Most of his stenting took place between 2003 to 2010, where he has had multiple episodes of repeat InStent stenosis to RCA needing stenting    But over the last decade he has been doing well from cardiac standpoint  He still needs to go to [place] for other things and has a result has been following with his cardiologist in [place], but was asked to set up care closer to home as well  As a result he is now here today to establish care with me  Interval history - 5/23/2022  He comes back for follow-up after 2 months to discuss the results of his echocardiogram   He has no active complains of chest pain or shortness of breath  He continues to do well without any major problems  He tried to make the switch from simvastatin to rosuvastatin, but reports having had a lot of side effects with it and as a result is now back on his old simvastatin          Historical Information   Past Medical History:   Diagnosis Date    Celiac disease     Coronary artery disease     Diverticulosis     Hyperlipidemia     Hypertension     Myocardial infarction ([facility] [address] ) 1999     Past Surgical History:   Procedure Laterality Date    COLONOSCOPY      CORONARY ANGIOPLASTY WITH STENT PLACEMENT      20 yrs ago    DE TOTAL KNEE ARTHROPLASTY Right 4/13/2022    Procedure: ARTHROPLASTY KNEE TOTAL and all associated procedures;  Surgeon: Cinthia Guzman MD;  Location:  MAIN OR;  Service: Orthopedics     Family History   Problem Relation Age of Onset    Diabetes Mother     Coronary artery disease Father     Thyroid disease Sister      Current Outpatient Medications on File Prior to Visit   Medication Sig Dispense Refill    aspirin (ECOTRIN LOW STRENGTH) 81 mg EC tablet Take 1 tablet (81 mg total) by mouth 2 (two) times a day Please do not start taking until after total joint arthroplasty 70 tablet 0    baclofen 10 mg tablet TAKE 1 TABLET BY MOUTH EVERY 8 HOURS AS NEEDED FOR MUSCLE SPASM 90 tablet 0    Cholecalciferol (Vitamin D3) 1 25 MG (47208 UT) CAPS Take 10,000 Units by mouth every morning        clopidogrel (PLAVIX) 75 mg tablet Take 75 mg by mouth every morning        Cyanocobalamin (VITAMIN B 12 PO) Take by mouth      diclofenac (VOLTAREN) 75 mg EC tablet Take 1 tablet (75 mg total) by mouth 2 (two) times a day (Patient taking differently: Take 75 mg by mouth every morning) 60 tablet 0    ezetimibe (ZETIA) 10 mg tablet TAKE 1 TABLET BY MOUTH  DAILY (Patient taking differently: Take 10 mg by mouth daily at bedtime) 30 tablet 11    gabapentin (NEURONTIN) 300 mg capsule TAKE 1 CAPSULE BY MOUTH 3  TIMES DAILY (Patient taking differently: Take 600 mg by mouth daily at bedtime) 270 capsule 3    Icosapent Ethyl 1 g CAPS Vascepa 1 gram capsule   TAKE 2 CAPSULES BY MOUTH  TWICE DAILY (PLEASE CALL OFFICE FOR APPT)      ketoconazole (NIZORAL) 2 % shampoo APPLY 1 APPLICATION TOPICALLY 2 (TWO) TIMES A WEEK 120 mL 0    tamsulosin (FLOMAX) 0 4 mg Take 1 capsule (0 4 mg total) by mouth daily with dinner (Patient taking differently: Take 0 4 mg by mouth every morning) 90 capsule 2    traZODone (DESYREL) 150 mg tablet TAKE 1 TABLET BY MOUTH  DAILY AT BEDTIME 30 tablet 11    [DISCONTINUED] simvastatin (ZOCOR) 80 mg tablet Take 75 mg by mouth daily at bedtime      metoprolol succinate (TOPROL-XL) 50 mg 24 hr tablet Take 1 tablet (50 mg total) by mouth daily (Patient taking differently: Take 50 mg by mouth every morning  ) 30 tablet 2    sertraline (ZOLOFT) 50 mg tablet Take 1 tablet (50 mg total) by mouth daily (Patient taking differently: Take 50 mg by mouth every morning  ) 90 tablet 3    [DISCONTINUED] ascorbic acid (VITAMIN C) 500 MG tablet Take 1 tablet (500 mg total) by mouth 2 (two) times a day (Patient not taking: Reported on 5/23/2022) 30 tablet 0    [DISCONTINUED] docusate sodium (COLACE) 100 mg capsule Take 1 capsule (100 mg total) by mouth 2 (two) times a day (Patient not taking: Reported on 5/23/2022) 10 capsule 0    [DISCONTINUED] ferrous sulfate 324 (65 Fe) mg TAKE 1 TABLET BY MOUTH 2 TIMES A DAY BEFORE MEALS   (Patient not taking: Reported on 5/23/2022) 180 tablet 1    [DISCONTINUED] folic acid (FOLVITE) 1 mg tablet TAKE 1 TABLET BY MOUTH EVERY DAY (Patient not taking: Reported on 2022) 30 tablet 0    [DISCONTINUED] meloxicam (Mobic) 15 mg tablet Take 1 tablet (15 mg total) by mouth daily (Patient not taking: Reported on 2022) 30 tablet 1    [DISCONTINUED] methocarbamol (ROBAXIN) 500 mg tablet Take 1 tablet (500 mg total) by mouth 4 (four) times a day (Patient not taking: Reported on 2022) 30 tablet 1    [DISCONTINUED] oxyCODONE (ROXICODONE) 5 immediate release tablet Take 1 tablets every 6 hrs as needed for pain control (Patient not taking: Reported on 2022 ) 30 tablet 0    [DISCONTINUED] rosuvastatin (CRESTOR) 20 MG tablet Take 1 tablet (20 mg total) by mouth daily (Patient not taking: No sig reported) 30 tablet 2     No current facility-administered medications on file prior to visit  Allergies   Allergen Reactions    Crestor [Rosuvastatin] Myalgia     Social History     Socioeconomic History    Marital status: /Civil Union     Spouse name: None    Number of children: 3    Years of education: None    Highest education level: None   Occupational History    None   Tobacco Use    Smoking status: Former Smoker     Quit date:      Years since quittin 4    Smokeless tobacco: Never Used    Tobacco comment: Per pt, quit over 39 years ago   Vaping Use    Vaping Use: Never used   Substance and Sexual Activity    Alcohol use: Not Currently    Drug use: Never    Sexual activity: Yes   Other Topics Concern    None   Social History Narrative    None     Social Determinants of Health     Financial Resource Strain: Not on file   Food Insecurity: Not on file   Transportation Needs: Not on file   Physical Activity: Not on file   Stress: Not on file   Social Connections: Not on file   Intimate Partner Violence: Not on file   Housing Stability: Not on file        Review of Systems   All other systems reviewed and are negative        Vitals:  Vitals: 05/23/22 1046   BP: 124/80   BP Location: Left arm   Patient Position: Sitting   Cuff Size: Standard   Pulse: 64   Resp: 18   SpO2: 96%   Weight: 114 kg (251 lb)   Height: 6' (1 829 m)     BMI - Body mass index is 34 04 kg/m²  Wt Readings from Last 7 Encounters:   05/23/22 114 kg (251 lb)   04/29/22 114 kg (251 lb)   04/20/22 114 kg (251 lb)   04/13/22 114 kg (251 lb)   04/05/22 117 kg (259 lb)   03/29/22 117 kg (259 lb)   03/24/22 119 kg (261 lb 6 4 oz)       Physical Exam  Vitals and nursing note reviewed  Constitutional:       General: He is not in acute distress  Appearance: He is well-developed  HENT:      Head: Normocephalic and atraumatic  Nose: No congestion  Eyes:      General: No scleral icterus  Conjunctiva/sclera: Conjunctivae normal    Neck:      Vascular: No carotid bruit or JVD  Cardiovascular:      Rate and Rhythm: Normal rate and regular rhythm  Heart sounds: Normal heart sounds  No murmur heard  No friction rub  No gallop  Pulmonary:      Effort: Pulmonary effort is normal  No respiratory distress  Breath sounds: Normal breath sounds  No wheezing or rales  Chest:      Chest wall: No tenderness  Abdominal:      General: There is no distension  Palpations: Abdomen is soft  Tenderness: There is no abdominal tenderness  Musculoskeletal:         General: No swelling, tenderness or deformity  Cervical back: Neck supple  No muscular tenderness  Right lower leg: No edema  Left lower leg: No edema  Skin:     General: Skin is warm  Neurological:      General: No focal deficit present  Mental Status: He is alert and oriented to person, place, and time  Mental status is at baseline  Psychiatric:         Mood and Affect: Mood normal          Behavior: Behavior normal          Thought Content:  Thought content normal            Labs:  CBC:   Lab Results   Component Value Date    WBC 12 63 (H) 04/14/2022    RBC 3 93 04/14/2022    HGB 12 9 04/14/2022    HCT 37 8 04/14/2022    MCV 96 04/14/2022     04/14/2022    RDW 12 4 04/14/2022       CMP:   Lab Results   Component Value Date    K 4 7 04/14/2022    CL 98 04/14/2022    CO2 25 04/14/2022    BUN 22 04/14/2022    CREATININE 1 16 04/14/2022    EGFR 61 04/14/2022    CALCIUM 9 4 04/14/2022    AST 40 03/15/2022    ALT 66 03/15/2022    ALKPHOS 65 03/15/2022       Magnesium:  No results found for: MG    Lipid Profile:   Lab Results   Component Value Date    HDL 32 (L) 02/22/2022    TRIG 568 (H) 02/22/2022    [facility]  02/22/2022      Comment:      Calculated LDL invalid, triglycerides >400 mg/dl       Thyroid Studies:   Lab Results   Component Value Date    [id number] 1 018 11/22/2019       A1c:  No components found for: HGA1C    INR:  Lab Results   Component Value Date    INR 1 00 03/15/2022   5    Imaging: No results found  Cardiac testing:   No results found for this or any previous visit  No results found for this or any previous visit  No results found for this or any previous visit  No results found for this or any previous visit  XR knee 3 vw right non injury  Narrative: RIGHT KNEE    INDICATION:   Z96 651: Presence of right artificial knee joint  COMPARISON:  Plain films of the right knee February 2022    VIEWS:  XR KNEE 3 VW RIGHT NON INJURY   Images: 2    FINDINGS:    Patient is status post total right knee replacement  Suprapatellar joint effusion is seen  No significant degenerative changes  No lytic or blastic osseous lesion  Soft tissue swelling is demonstrated  Impression: Total knee replacement, soft tissue swelling and joint effusion      Workstation performed: [id number]

## 2022-05-23 NOTE — PROGRESS NOTES
Daily Note     Today's date: 2022  Patient name: Bonnie Parnell  : 1945  MRN: [id number]  Referring provider: Warren Boeck, MD  Dx:   Encounter Diagnosis     ICD-10-CM    1  Primary osteoarthritis of right knee  M17 11    2  Status post right knee replacement  Z96 651        Start Time: 935  Stop Time: 1018  Total time in clinic (min): 43 minutes    Subjective: States his R knee is doing well at start of session  Objective: See treatment diary below       Assessment: Tolerated treatment well  Good tolerance to all progressions as indicated below with no reports of pain or discomfort  Occasional verbal cuing required during first few reps of forward eccentric step down with good ability to incorporate feedback into performance  Patient demonstrated appropriate level of challenge and muscular fatigue throughout session and noted good status at end of visit  Patient demonstrated fatigue post treatment, exhibited good technique with therapeutic exercises and would benefit from continued PT  Plan: Continue per plan of care  Progress treatment as tolerated         Precautions: HTN, HLD, CAD, MI ()    * Indicates part of HEP   HEP code: [facility]     Daily Treatment Diary     Manuals 5/23  5/3 5/6 5/10 5/13 5/17 5/20   R Knee PROM   Done supine Done supine Ext overpressure supine CF Ext overpressure in supine Ext overpressure in supine   Gentle scar mobilization        Done - edu for  HEP                         Therapeutic Exercise           Bike  5' lvl 3  5' lvl 1  5' lvl 1 5' lvl 2 5' lvl 2  5' lvl 3 5' lvl 3   Gastroc stretch*     standing 15x5" R Standing 15 x 5" R  Standing 10x5" ea   Seated hamstring stretch* 10x10" R  20x5'' R  20x5" R 20 x 5" R   10x10" R   Quad stretch           Piriformis stretch           Heel slides*   20x5'' 20x5''       Quad set*   3x10x5'' 3x10x5''  [id number]" 2  x10 x 10"      SLR flexion 3x10 1#  3x10 R  3x12 R 3x12 R 3 x 12 R  3x15 R 3x15 R   SLR abduction 3x12    3x8 R 3 x 8 R 3x10 R 3x10 R                         Patient education                      Neuro Re-ed           Bridges* 3x12 15#  3x12 3x15 3x15 3 x 15  3x12 12# 3x12 12#   Clamshells*    OTB 3x10       Lateral eccentric step down           Forward eccentric step down 6" step 2x10                                                      Therapeutic Activity           Sit to stands 3x10  Chair + foam boost 3x10 Chair + foam boost 3x10  No boost 3x8 No boost 3x8  3x10   Leg press 165# 3x12 P9S3    125# 3x10 P9S3 125# 3x10 P9S3 155# 3x10 P9S3 165# 3x10 P9S3   Heel raises 3x15    nv 3 x 12      Toe raises 3x15    nv 3 x 12      Band resisted side stepping     OTB 2 laps OTB 2 laps      Monster walks           Goblet squat           Deadlift           Forward step ups   8'' form  6'' 3x10  6'' 3x10   8" 3 x 10  8" step up/back 3x10 ea    Lateral step ups           Leg extension 55# 3x15   45# 3x10  45# 3x12 45# 3 x 10  45# 3x15 55# 3x10   Leg curl 65# 3x12      55# 3x12 65# 3x12                         Modalities           CP R knee   10' post  6' post long sit 5' supine heel prop

## 2022-05-26 ENCOUNTER — OFFICE VISIT (OUTPATIENT)
Dept: PHYSICAL THERAPY | Facility: REHABILITATION | Age: 77
End: 2022-05-26
Payer: COMMERCIAL

## 2022-05-26 DIAGNOSIS — Z96.651 STATUS POST RIGHT KNEE REPLACEMENT: ICD-10-CM

## 2022-05-26 DIAGNOSIS — M17.11 PRIMARY OSTEOARTHRITIS OF RIGHT KNEE: Primary | ICD-10-CM

## 2022-05-26 PROCEDURE — 97112 NEUROMUSCULAR REEDUCATION: CPT | Performed by: PHYSICAL THERAPIST

## 2022-05-26 PROCEDURE — 97530 THERAPEUTIC ACTIVITIES: CPT | Performed by: PHYSICAL THERAPIST

## 2022-05-26 PROCEDURE — 97110 THERAPEUTIC EXERCISES: CPT | Performed by: PHYSICAL THERAPIST

## 2022-05-26 NOTE — PROGRESS NOTES
Daily Note     Today's date: 2022  Patient name: Sue Mccall  : 1945  MRN: [id number]  Referring provider: Dianna Hanley MD  Dx:   Encounter Diagnosis     ICD-10-CM    1  Primary osteoarthritis of right knee  M17 11    2  Status post right knee replacement  Z96 651        Start Time: 1021  Stop Time: 1125  Total time in clinic (min): 64 minutes    Subjective: Juve Smith reports his back is bothersome at start of session; denies complaints regarding his R knee  Objective: See treatment diary below      Assessment: Tolerated treatment well  Able to achieve full extension ROM with manual overpressure  Verbal cuing and demonstration required to minimize contralateral hip drop during forward eccentric step downs with good ability to incorporate feedback into performance; evident fatigue with increased sets of eccentric forward step downs  Patient demonstrated appropriate level of challenge and muscular fatigue throughout session and noted good status at end of visit  Patient demonstrated fatigue post treatment, exhibited good technique with therapeutic exercises and would benefit from continued PT  Plan: Continue per plan of care  Progress treatment as tolerated         Precautions: HTN, HLD, CAD, MI ()    * Indicates part of HEP   HEP code: [facility]     Daily Treatment Diary     Manuals 5/23 5/26  5/6 5/10 5/13 5/17 5/20   R Knee PROM  Ext overpressure in supine  Done supine Ext overpressure supine CF Ext overpressure in supine Ext overpressure in supine   Gentle scar mobilization        Done - edu for  HEP                         Therapeutic Exercise           Bike  5' lvl 3 5' lvl 2  5' lvl 1 5' lvl 2 5' lvl 2  5' lvl 3 5' lvl 3   Gastroc stretch*     standing 15x5" R Standing 15 x 5" R  Standing 10x5" ea   Seated hamstring stretch* 10x10" R 10x10" R   20x5" R 20 x 5" R   10x10" R   Quad stretch           Piriformis stretch           Heel slides*    20x5''       Quad set* 3x10x5''  [id number]" 2  x10 x 10"      SLR flexion 3x10 1# 3x10 1#  3x12 R 3x12 R 3 x 12 R  3x15 R 3x15 R   SLR abduction 3x12 3x10 1#   3x8 R 3 x 8 R 3x10 R 3x10 R                         Patient education                      Neuro Re-ed           Bridges* 3x12 15# 3x15 15#  3x15 3x15 3 x 15  3x12 12# 3x12 12#   Clamshells*    OTB 3x10       Lateral eccentric step down           Forward eccentric step down 6" step 2x10 6" step 3x10                                                     Therapeutic Activity           Sit to stands 3x10 3x12  Chair + foam boost 3x10  No boost 3x8 No boost 3x8  3x10   Leg press 165# 3x12 P9S3 165# 3x12 P9S3   125# 3x10 P9S3 125# 3x10 P9S3 155# 3x10 P9S3 165# 3x10 P9S3   Heel raises 3x15 3x15   nv 3 x 12      Toe raises 3x15 3x15   nv 3 x 12      Band resisted side stepping     OTB 2 laps OTB 2 laps      Monster walks           Goblet squat           Deadlift           Forward step ups    6'' 3x10   8" 3 x 10  8" step up/back 3x10 ea    Lateral step ups           Leg extension 55# 3x15 55# 3x15  45# 3x10  45# 3x12 45# 3 x 10  45# 3x15 55# 3x10   Leg curl 65# 3x12 65# 3x15     55# 3x12 65# 3x12                         Modalities           CP R knee     6' post long sit 5' supine heel prop

## 2022-05-28 NOTE — TELEPHONE ENCOUNTER
LM on VM advising 
Landy Caicedo,         Patient called in requesting a call  He sated he picked up a prescription yesterday of diclofenac (VOLTAREN) 75 mg but he lost the medication  He would like to know if  can send in a new prescription?        Please adviseFelipe#: [phone]
Refill sent electronically
No

## 2022-05-31 ENCOUNTER — TELEPHONE (OUTPATIENT)
Dept: OBGYN CLINIC | Facility: MEDICAL CENTER | Age: 77
End: 2022-05-31

## 2022-05-31 DIAGNOSIS — M79.10 MYALGIA: ICD-10-CM

## 2022-05-31 DIAGNOSIS — M17.0 PRIMARY OSTEOARTHRITIS OF BOTH KNEES: ICD-10-CM

## 2022-05-31 DIAGNOSIS — I10 ESSENTIAL HYPERTENSION: ICD-10-CM

## 2022-05-31 DIAGNOSIS — M17.11 PRIMARY OSTEOARTHRITIS OF RIGHT KNEE: ICD-10-CM

## 2022-05-31 DIAGNOSIS — M17.12 PRIMARY OSTEOARTHRITIS OF LEFT KNEE: ICD-10-CM

## 2022-05-31 RX ORDER — BACLOFEN 10 MG/1
TABLET ORAL
Qty: 90 TABLET | Refills: 0 | OUTPATIENT
Start: 2022-05-31

## 2022-05-31 RX ORDER — DICLOFENAC SODIUM 75 MG/1
75 TABLET, DELAYED RELEASE ORAL EVERY MORNING
Qty: 30 TABLET | Refills: 0 | Status: SHIPPED | OUTPATIENT
Start: 2022-05-31 | End: 2022-06-27

## 2022-05-31 NOTE — TELEPHONE ENCOUNTER
Patient sees Dr Lowery  Patient requesting refill on Diclofenac 75 mg, uses [facility] in [place] on file        CB # [phone]

## 2022-05-31 NOTE — TELEPHONE ENCOUNTER
Pt called stating he needs Metoprolol sent to [facility]- Pt previous cardiologist Dr Lizz Whitaker Rx'd this   Please Advise

## 2022-05-31 NOTE — TELEPHONE ENCOUNTER
Jane Sheriff has requested a refill of baclofen 10 mg tablet  Pt has requested refill too soon  Last refilled 1 week ago by Dr Solomon Swain   Will refuse refill request

## 2022-06-01 ENCOUNTER — OFFICE VISIT (OUTPATIENT)
Dept: PHYSICAL THERAPY | Facility: REHABILITATION | Age: 77
End: 2022-06-01
Payer: COMMERCIAL

## 2022-06-01 DIAGNOSIS — M17.11 PRIMARY OSTEOARTHRITIS OF RIGHT KNEE: Primary | ICD-10-CM

## 2022-06-01 DIAGNOSIS — Z96.651 STATUS POST RIGHT KNEE REPLACEMENT: ICD-10-CM

## 2022-06-01 PROCEDURE — 97110 THERAPEUTIC EXERCISES: CPT | Performed by: PHYSICAL THERAPIST

## 2022-06-01 PROCEDURE — 97530 THERAPEUTIC ACTIVITIES: CPT | Performed by: PHYSICAL THERAPIST

## 2022-06-01 NOTE — PROGRESS NOTES
Daily Note     Today's date: 2022  Patient name: Romana Art  : 1945  MRN: [id number]  Referring provider: Emily Santoro MD  Dx:   Encounter Diagnosis     ICD-10-CM    1  Primary osteoarthritis of right knee  M17 11    2  Status post right knee replacement  [id number]        Start Time: 1530  Stop Time: 1615  Total time in clinic (min): 45 minutes    Subjective: Olive Corado reports he was hiking over the weekend and did not have any trouble due to his knee  States he did fall when his dog pulled him unexpectedly and he landed on the L hip; reports bruising and sciatic pain but no other injuries  Objective: See treatment diary below      Assessment: Tolerated treatment well  Challenged with increased weight for flexion and abduction SLR but able to compelte with good form  Held steps this session secondary to reports of sciatic pain  Patient demonstrated appropriate level of challenge and muscular fatigue throughout session and noted good status at end of visit  Patient demonstrated fatigue post treatment, exhibited good technique with therapeutic exercises and would benefit from continued PT  Plan: Continue per plan of care  Progress treatment as tolerated         Precautions: HTN, HLD, CAD, MI ()    * Indicates part of HEP   HEP code: [facility]     Daily Treatment Diary     Manuals 5/23 5/26 6/1  5/10 5/13 5/17 5/20   R Knee PROM  Ext overpressure in supine   Ext overpressure supine CF Ext overpressure in supine Ext overpressure in supine   Gentle scar mobilization        Done - edu for  HEP                         Therapeutic Exercise           Bike  5' lvl 3 5' lvl 2 5' lvl 3  5' lvl 2 5' lvl 2  5' lvl 3 5' lvl 3   Gastroc stretch*     standing 15x5" R Standing 15 x 5" R  Standing 10x5" ea   Seated hamstring stretch* 10x10" R 10x10" R 10x10" R  20x5" R 20 x 5" R   10x10" R   Quad stretch           Piriformis stretch           Heel slides*           Quad set*     [id number]" 2  x10 x 10" SLR flexion 3x10 1# 3x10 1# 3x10 1 5#  3x12 R 3 x 12 R  3x15 R 3x15 R   SLR abduction 3x12 3x10 1# 3x10 1 5#  3x8 R 3 x 8 R 3x10 R 3x10 R                         Patient education                      Neuro Re-ed           Bridges* 3x12 15# 3x15 15# 3x15 15#  3x15 3 x 15  3x12 12# 3x12 12#   Clamshells*           Lateral eccentric step down           Forward eccentric step down 6" step 2x10 6" step 3x10                                                     Therapeutic Activity           Sit to stands 3x10 3x12 3x12  No boost 3x8 No boost 3x8  3x10   Leg press 165# 3x12 P9S3 165# 3x12 P9S3 165# 3x15 P9S3  125# 3x10 P9S3 125# 3x10 P9S3 155# 3x10 P9S3 165# 3x10 P9S3   Heel raises 3x15 3x15 3x15  nv 3 x 12      Toe raises 3x15 3x15 3x15  nv 3 x 12      Band resisted side stepping     OTB 2 laps OTB 2 laps      Monster walks           Goblet squat           Deadlift           Forward step ups      8" 3 x 10  8" step up/back 3x10 ea    Lateral step ups           Leg extension 55# 3x15 55# 3x15 65# 3x10  45# 3x12 45# 3 x 10  45# 3x15 55# 3x10   Leg curl 65# 3x12 65# 3x15 75# 2x10, 65# 1x15    55# 3x12 65# 3x12                         Modalities           CP R knee     6' post long sit 5' supine heel prop

## 2022-06-03 ENCOUNTER — OFFICE VISIT (OUTPATIENT)
Dept: PHYSICAL THERAPY | Facility: REHABILITATION | Age: 77
End: 2022-06-03
Payer: COMMERCIAL

## 2022-06-03 DIAGNOSIS — Z96.651 STATUS POST RIGHT KNEE REPLACEMENT: ICD-10-CM

## 2022-06-03 DIAGNOSIS — M17.11 PRIMARY OSTEOARTHRITIS OF RIGHT KNEE: Primary | ICD-10-CM

## 2022-06-03 PROCEDURE — 97530 THERAPEUTIC ACTIVITIES: CPT | Performed by: PHYSICAL THERAPIST

## 2022-06-03 PROCEDURE — 97110 THERAPEUTIC EXERCISES: CPT | Performed by: PHYSICAL THERAPIST

## 2022-06-03 RX ORDER — BACLOFEN 10 MG/1
10 TABLET ORAL 3 TIMES DAILY PRN
Qty: 90 TABLET | Refills: 0 | Status: SHIPPED | OUTPATIENT
Start: 2022-06-03

## 2022-06-03 NOTE — PROGRESS NOTES
PT Re-Evaluation  and PT Discharge    Today's date: 6/3/2022  Patient name: Gini Lorenzana  : 1945  MRN: [id number]  Referring provider: Linn Espinal MD  Dx:   Encounter Diagnosis     ICD-10-CM    1  Primary osteoarthritis of right knee  M17 11    2  Status post right knee replacement  [id number]        Start Time: 1220  Stop Time: 1304  Total time in clinic (min): 44 minutes    Assessment  Assessment details: Per patient report and clinical findings, Gini Lorenzana has made good progress since starting skilled physical therapy services  To this date, Gini Lorenzana has completed 15 visits of skilled physical therapy  Noted improvements include improved ROM, improved LE strength, ability to perform all I/ADLs, and return to PLOF  After discussion and mutual agreement with patient, recommend discharge to independent HEP at this time secondary to continued good status, return to PLOF, and achieving goals set at IE  Updated and reviewed HEP with patient; patient verbalized and demonstrated understanding of all exercises  Understanding of Dx/Px/POC: good   Prognosis: good    Goals  STG (4 weeks):  1  R knee extension ROM WFL to promote improved gait mechanics  - met  2  Increase R knee extension strength to at least 4/5 for improved activity tolerance  - met  3  Able to don shoes without increased R knee pain for improved ADL performance  - met    LTG (8 weeks):  1  Increased global R knee strength to at least 4+/5 to promote improved activity tolerance  - met  2  Able to sit for at least 1 hour without symptom exacerbation to promote improved positional tolerance for activities such as driving  - met   3  Able to walk for at least 30 minutes without symptom exacerbation to promote improved community ambulation  - met  4  Able to sleep through the night without waking secondary to pain to promote improved sleep quality  - met  5   Able to ascend/descend 1 flight of stairs without compensation to promote improved household and community ambulation  - met  6  Independent with HEP  - met      Plan  Plan details: Thank you for referring Malik Edwards to Physical Therapy at [place] [address] and for the opportunity to coordinate care  Planned therapy interventions: home exercise program  Duration in visits: 1  Plan of Care beginning date: 6/3/2022  Treatment plan discussed with: patient        Subjective Evaluation    History of Present Illness  Date of surgery: 4/13/2022  Mechanism of injury: surgery  Mechanism of injury:   06/03/22:  Malik Edwards reports feeling he has made at least 95% improvement since IE  Notes improvement in ability to go up/down stairs, walking, bending, getting up/down from a chair  Reports confidence in maintaining HEP      05/17/22:  Malik Edwards reports feeling he has made 80% progress since surgery  Reports improvement in pain and swelling, ability to WB  Reports improvement in stair negotiation but does have some shakiness when going down the steps  Feels he needs to continue to build strength in the legs to progress closer to 100%  04/18/22:   Malik Edwards is a 78 y o  male patient presenting with s/p r TKA performed by Dr Xu Narvaez on 4/13/22  Almer Poles currently having difficulty with donning shoes, walking, lifting the R leg, laying in bed, sleeping  Pain is relieved or reduced with Tylenol, prescription pain medication and ice  Pt would like to return to being able to riding his bike, walking, and all typical activities  States he was doing leg lifts when his leg was still numb and feels that his quad muscle is strained  Next follow up with the physician is 4/20/22       3/28/22:  Malik Edwards is a 78 y o  male presenting pre  R TKA to be performed by Dr Xu Narvaez on 4/13/22  Currently having difficulty with down>up stairs, bending/squatting, prolonged sitting/driving  Steps to enter home: No steps  First floor set up: Yes      Number of steps to second floor: Greater than 10 steps  Has a SPC at home  Assist at home wife and son  Pre op TUG time: 12 seconds  Pain  Current pain ratin  At best pain ratin  At worst pain ratin  Location: R knee  Quality: sharp  Relieving factors: ice, heat and rest    Social Support  Steps to enter house: no  Stairs in house: yes   Lives in: multiple-level home  Lives with: spouse    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)    Diagnostic Tests  Abnormal x-ray: 22: Degenerative changes  Treatments  Previous treatment: physical therapy and injection treatment  Current treatment: physical therapy  Patient Goals  Patient goal: "to walk"        Objective     Observations     Right Knee   Positive for edema and incision       Active Range of Motion   Left Knee   Flexion: 130 degrees   Extensor la degrees     Right Knee   Flexion: 130 degrees   Extensor la degrees     Mobility   Patellar Mobility:     Right Knee   WFL: medial, lateral, superior and inferior    Strength/Myotome Testing     Left Knee   Flexion: 5  Extension: 5    Right Knee   Flexion: 5  Extension: 5    General Comments:      Knee Comments  22:  Post-op TU seconds    22:  Post-op TU seconds    22:  Post-op TU seconds with use of arm rests on chair and RW    3/28/22:  Pre-op TU seconds      Flowsheet Rows    Flowsheet Row Most Recent Value   PT/OT G-Codes    Current Score 85   Projected Score 78           Precautions: HTN, HLD, CAD, MI ()    * Indicates part of HEP   HEP code: [id number]     Daily Treatment Diary     Manuals  6 6/3  5/13 5/17 5/20   R Knee PROM  Ext overpressure in supine    CF Ext overpressure in supine Ext overpressure in supine   Gentle scar mobilization        Done - edu for  HEP                         Therapeutic Exercise           Bike  5' lvl 3 5' lvl 2 5' lvl 3 5' lvl 3  5' lvl 2  5' lvl 3 5' lvl 3   Gastroc stretch      Standing 15 x 5" R  Standing 10x5" ea   Seated hamstring stretch* 10x10" R 10x10" R 10x10" R 10x10" R  20 x 5" R   10x10" R   Quad stretch           Piriformis stretch           Heel slides*           Quad set*      2  x10 x 10"      SLR flexion 3x10 1# 3x10 1# 3x10 1 5# 3x10 1 5#  3 x 12 R  3x15 R 3x15 R   SLR abduction 3x12 3x10 1# 3x10 1 5# 3x10 1 5#  3 x 8 R 3x10 R 3x10 R                         Patient education                      Neuro Re-ed           Bridges* 3x12 15# 3x15 15# 3x15 15# 3x12 20#  3 x 15  3x12 12# 3x12 12#   Clamshells*           Lateral eccentric step down           Forward eccentric step down 6" step 2x10 6" step 3x10                                                     Therapeutic Activity           Sit to stands* 3x10 3x12 3x12 3x12  No boost 3x8  3x10   Leg press 165# 3x12 P9S3 165# 3x12 P9S3 165# 3x15 P9S3   125# 3x10 P9S3 155# 3x10 P9S3 165# 3x10 P9S3   Heel raises* 3x15 3x15 3x15 3x15  3 x 12      Toe raises* 3x15 3x15 3x15 3x15  3 x 12      Band resisted side stepping      OTB 2 laps      Monster walks           Goblet squat           Deadlift           Forward step ups      8" 3 x 10  8" step up/back 3x10 ea    Lateral step ups           Leg extension 55# 3x15 55# 3x15 65# 3x10   45# 3 x 10  45# 3x15 55# 3x10   Leg curl 65# 3x12 65# 3x15 75# 2x10, 65# 1x15    55# 3x12 65# 3x12                         Modalities           CP R knee      5' supine heel prop

## 2022-06-06 RX ORDER — METOPROLOL SUCCINATE 50 MG/1
50 TABLET, EXTENDED RELEASE ORAL DAILY
Qty: 90 TABLET | Refills: 3 | Status: SHIPPED | OUTPATIENT
Start: 2022-06-06

## 2022-06-26 DIAGNOSIS — M17.12 PRIMARY OSTEOARTHRITIS OF LEFT KNEE: ICD-10-CM

## 2022-06-26 DIAGNOSIS — M17.11 PRIMARY OSTEOARTHRITIS OF RIGHT KNEE: ICD-10-CM

## 2022-06-26 DIAGNOSIS — M17.0 PRIMARY OSTEOARTHRITIS OF BOTH KNEES: ICD-10-CM

## 2022-06-26 DIAGNOSIS — L57.0 ACTINIC KERATOSIS: ICD-10-CM

## 2022-06-27 RX ORDER — DICLOFENAC SODIUM 75 MG/1
TABLET, DELAYED RELEASE ORAL
Qty: 30 TABLET | Refills: 0 | Status: SHIPPED | OUTPATIENT
Start: 2022-06-27

## 2022-06-27 RX ORDER — KETOCONAZOLE 20 MG/ML
SHAMPOO TOPICAL
Qty: 120 ML | Refills: 0 | Status: SHIPPED | OUTPATIENT
Start: 2022-06-27 | End: 2022-08-04

## 2022-07-01 ENCOUNTER — APPOINTMENT (OUTPATIENT)
Dept: RADIOLOGY | Facility: AMBULARY SURGERY CENTER | Age: 77
End: 2022-07-01
Attending: ORTHOPAEDIC SURGERY
Payer: COMMERCIAL

## 2022-07-01 ENCOUNTER — OFFICE VISIT (OUTPATIENT)
Dept: OBGYN CLINIC | Facility: CLINIC | Age: 77
End: 2022-07-01
Payer: COMMERCIAL

## 2022-07-01 VITALS
SYSTOLIC BLOOD PRESSURE: 119 MMHG | HEIGHT: 72 IN | DIASTOLIC BLOOD PRESSURE: 80 MMHG | BODY MASS INDEX: 33.26 KG/M2 | HEART RATE: 73 BPM | WEIGHT: 245.6 LBS

## 2022-07-01 DIAGNOSIS — M17.12 ARTHRITIS OF LEFT KNEE: ICD-10-CM

## 2022-07-01 DIAGNOSIS — M46.1 SACROILIITIS, NOT ELSEWHERE CLASSIFIED (HCC): ICD-10-CM

## 2022-07-01 DIAGNOSIS — Z01.818 PRE-OP TESTING: ICD-10-CM

## 2022-07-01 DIAGNOSIS — Z01.818 PREOP TESTING: ICD-10-CM

## 2022-07-01 DIAGNOSIS — Z96.651 S/P TOTAL KNEE REPLACEMENT, RIGHT: Primary | ICD-10-CM

## 2022-07-01 DIAGNOSIS — Z96.651 S/P TOTAL KNEE REPLACEMENT, RIGHT: ICD-10-CM

## 2022-07-01 PROCEDURE — 99024 POSTOP FOLLOW-UP VISIT: CPT | Performed by: PHYSICIAN ASSISTANT

## 2022-07-01 PROCEDURE — 20610 DRAIN/INJ JOINT/BURSA W/O US: CPT | Performed by: PHYSICIAN ASSISTANT

## 2022-07-01 PROCEDURE — 73562 X-RAY EXAM OF KNEE 3: CPT

## 2022-07-01 RX ORDER — KETOROLAC TROMETHAMINE 30 MG/ML
30 INJECTION, SOLUTION INTRAMUSCULAR; INTRAVENOUS
Status: COMPLETED | OUTPATIENT
Start: 2022-07-01 | End: 2022-07-01

## 2022-07-01 RX ORDER — CHLORHEXIDINE GLUCONATE 0.12 MG/ML
15 RINSE ORAL ONCE
Status: CANCELLED | OUTPATIENT
Start: 2022-07-01 | End: 2022-07-01

## 2022-07-01 RX ORDER — SODIUM CHLORIDE 9 MG/ML
125 INJECTION, SOLUTION INTRAVENOUS CONTINUOUS
Status: CANCELLED | OUTPATIENT
Start: 2022-07-01

## 2022-07-01 RX ORDER — METHYLPREDNISOLONE ACETATE 40 MG/ML
2 INJECTION, SUSPENSION INTRA-ARTICULAR; INTRALESIONAL; INTRAMUSCULAR; SOFT TISSUE
Status: COMPLETED | OUTPATIENT
Start: 2022-07-01 | End: 2022-07-01

## 2022-07-01 RX ORDER — ACETAMINOPHEN 325 MG/1
975 TABLET ORAL ONCE
Status: CANCELLED | OUTPATIENT
Start: 2022-07-01 | End: 2022-07-01

## 2022-07-01 RX ORDER — BUPIVACAINE HYDROCHLORIDE 2.5 MG/ML
2 INJECTION, SOLUTION INFILTRATION; PERINEURAL
Status: COMPLETED | OUTPATIENT
Start: 2022-07-01 | End: 2022-07-01

## 2022-07-01 RX ORDER — CHLORHEXIDINE GLUCONATE 4 G/100ML
SOLUTION TOPICAL DAILY PRN
Status: CANCELLED | OUTPATIENT
Start: 2022-07-01

## 2022-07-01 RX ADMIN — METHYLPREDNISOLONE ACETATE 2 ML: 40 INJECTION, SUSPENSION INTRA-ARTICULAR; INTRALESIONAL; INTRAMUSCULAR; SOFT TISSUE at 13:02

## 2022-07-01 RX ADMIN — KETOROLAC TROMETHAMINE 30 MG: 30 INJECTION, SOLUTION INTRAMUSCULAR; INTRAVENOUS at 13:02

## 2022-07-01 RX ADMIN — BUPIVACAINE HYDROCHLORIDE 2 ML: 2.5 INJECTION, SOLUTION INFILTRATION; PERINEURAL at 13:02

## 2022-07-01 NOTE — PROGRESS NOTES
78 y o male presents for 11 weeks postoperative visit status post right TKA  Patient states he is doing very well regarding his right total knee denies any issues with his right knee states he is doing very well and has completed formal physical therapy  He has no issues with regarding his right knee  Patient has been diagnosed with left knee pain due to osteoarthritis he has had injections the past significant pain relief however pain since returned  Patient states over-the-counter anti-inflammatories did not significantly reduce his pain is left knee  Patient states the pain is aching nature worse weight-bearing mildly relieved with rest   He denies any instability of his left knee  Patient complains of pain in his right lower back he has been diagnosed with SI joint pain in the past   Patient has not had any injections or therapy for this issue at this time denies any numbness tingling right lower extremity      Review of Systems  Review of systems negative unless otherwise specified in HPI    Past Medical History  Past Medical History:   Diagnosis Date    Celiac disease     Coronary artery disease     Diverticulosis     Hyperlipidemia     Hypertension     Myocardial infarction ([facility] [address] ) 1999       Past Surgical History  Past Surgical History:   Procedure Laterality Date    COLONOSCOPY      CORONARY ANGIOPLASTY WITH STENT PLACEMENT      20 yrs ago    GA TOTAL KNEE ARTHROPLASTY Right 4/13/2022    Procedure: ARTHROPLASTY KNEE TOTAL and all associated procedures;  Surgeon: Buck Kim MD;  Location:  MAIN OR;  Service: Orthopedics       Current Medications  Current Outpatient Medications on File Prior to Visit   Medication Sig Dispense Refill    baclofen 10 mg tablet Take 1 tablet (10 mg total) by mouth 3 (three) times a day as needed for muscle spasms 90 tablet 0    Cholecalciferol (Vitamin D3) 1 25 MG (04301 UT) CAPS Take 10,000 Units by mouth every morning        clopidogrel (PLAVIX) 75 mg tablet Take 75 mg by mouth every morning        Cyanocobalamin (VITAMIN B 12 PO) Take by mouth      diclofenac (VOLTAREN) 75 mg EC tablet TAKE 1 TABLET BY MOUTH EVERY MORNING  30 tablet 0    ezetimibe (ZETIA) 10 mg tablet TAKE 1 TABLET BY MOUTH  DAILY (Patient taking differently: Take 10 mg by mouth daily at bedtime) 30 tablet 11    gabapentin (NEURONTIN) 300 mg capsule TAKE 1 CAPSULE BY MOUTH 3  TIMES DAILY (Patient taking differently: Take 600 mg by mouth daily at bedtime) 270 capsule 3    Icosapent Ethyl 1 g CAPS Vascepa 1 gram capsule   TAKE 2 CAPSULES BY MOUTH  TWICE DAILY (PLEASE CALL OFFICE FOR APPT)      ketoconazole (NIZORAL) 2 % shampoo APPLY TOPICALLY 2 (TWO) TIMES A WEEK 120 mL 0    metoprolol succinate (TOPROL-XL) 50 mg 24 hr tablet Take 1 tablet (50 mg total) by mouth daily 90 tablet 3    sertraline (ZOLOFT) 50 mg tablet Take 1 tablet (50 mg total) by mouth daily (Patient taking differently: Take 50 mg by mouth every morning) 90 tablet 3    simvastatin (ZOCOR) 40 mg tablet Take 1 tablet (40 mg total) by mouth daily at bedtime 90 tablet 1    tamsulosin (FLOMAX) 0 4 mg Take 1 capsule (0 4 mg total) by mouth daily with dinner (Patient taking differently: Take 0 4 mg by mouth every morning) 90 capsule 2    traZODone (DESYREL) 150 mg tablet TAKE 1 TABLET BY MOUTH  DAILY AT BEDTIME 30 tablet 11    aspirin (ECOTRIN LOW STRENGTH) 81 mg EC tablet Take 1 tablet (81 mg total) by mouth 2 (two) times a day Please do not start taking until after total joint arthroplasty 70 tablet 0     No current facility-administered medications on file prior to visit         Recent Labs [facility])  0   Lab Value Date/Time    HCT 37 8 04/14/2022 0432    HGB 12 9 04/14/2022 0432    WBC 12 63 (H) 04/14/2022 0432    INR 1 00 03/15/2022 1557    ESR 5 11/22/2019 0905    CRP <3 0 11/22/2019 0905    HGBA1C 5 4 03/15/2022 1557         Physical exam  · General: Awake, Alert, Oriented  · Eyes: Pupils equal, round and reactive to light  · Heart: regular rate and rhythm  · Lungs: No audible wheezing    bilateral Knee exam  · On examination of the left knee there is no effusion, no erythema  Range of motion to full active extension and flexion to greater than 120°  Pain on palpation medial and lateral joint lines  There is crepitus with range of motion, no warmth to palpation, bony enlargement noted  No pain on palpation pes anserine bursa region or distal iliotibial band  Stable to varus and valgus stress without pain or gapping  Negative anterior and posterior drawer testing  Sensation intact distal pulses present  · Examination patient's right knee well-healed anterior incision full active extension flexion greater than 120° stable varus valgus stress full extension mid flexion quadriceps hamstring strength 5/5 active ankle dorsi plantar flexion sensation intact distal pulses present    · Examination patient's lower back no pain palpation midline lumbar spine no pain palpation right si joint pain palpation of the leftt SI joint positive Logan's test on the right, positive Gaenslen's test, positive SI compression test negative on the right negative straight leg raise sensation intact pulses present bilateral lower extremities    Imaging  X-ray imaging left knee reviewed x-rays reveal 'bone on bone" arthritis with significant medial joint space narrowing osteophyte formation subchondral sclerosis and deformity of the left knee  X-rays of the right knee to include review excellent right total knee arthroplasty without evidence of loosening  Large joint arthrocentesis: L knee  Universal Protocol:  Consent given by: patient  Patient understanding: patient states understanding of the procedure being performed  Site marked: the operative site was marked  Patient identity confirmed: verbally with patient    Supporting Documentation  Indications: pain   Procedure Details  Location: knee - L knee  Needle size: 22 G  Approach: superior  Medications administered: 2 mL bupivacaine 0 25 %; 2 mL methylPREDNISolone acetate 40 mg/mL; 30 mg ketorolac 30 mg/mL    Patient tolerance: patient tolerated the procedure well with no immediate complications          Assessment:     Status post 11 weeks right total knee arthroplasty doing well   Left knee pain due to osteoarthritis she has failed conservative management    Plan:     Weightbearing as tolerated bilateral lower extremities  Left knee intra-articular corticosteroid Toradol injection  administered as noted above    Patient advised should they develop any increasing pain, redness, drainage, numbness, tingling or swelling surrounding the injection sight, they are to contact our office or present to the emergency department  Patient is a candidate for right CT-guided SI joint injection    Patient is a candidate for left total knee arthroplasty  The benefits and purpose of the operations and/or procedure have been explained to me in language I understand by Dr Zi Kaufman well as the risks and benefits, alternatives and complications pertaining to the above procedure/surgery which include but is not limited to: infections, deeps vein thrombosis, blood clots to the lungs, wound healing problems, complications from extensive blood loss, including shock, possible death, continued pain, fracture, vascular or nerve injury, potential need for further surgery/revision, persistent pain/stiffness/instability, failure of hardware,heart attack, stroke and not obtaining results patient used    Aspirin to be prescribed for DVT prophylaxis postoperatively

## 2022-07-11 ENCOUNTER — EVALUATION (OUTPATIENT)
Dept: PHYSICAL THERAPY | Facility: REHABILITATION | Age: 77
End: 2022-07-11
Payer: COMMERCIAL

## 2022-07-11 DIAGNOSIS — M17.12 ARTHRITIS OF LEFT KNEE: Primary | ICD-10-CM

## 2022-07-11 DIAGNOSIS — G89.29 CHRONIC PAIN OF LEFT KNEE: ICD-10-CM

## 2022-07-11 DIAGNOSIS — M25.562 CHRONIC PAIN OF LEFT KNEE: ICD-10-CM

## 2022-07-11 DIAGNOSIS — Z01.818 PREOP TESTING: ICD-10-CM

## 2022-07-11 PROCEDURE — 97112 NEUROMUSCULAR REEDUCATION: CPT | Performed by: PHYSICAL THERAPIST

## 2022-07-11 PROCEDURE — 97161 PT EVAL LOW COMPLEX 20 MIN: CPT | Performed by: PHYSICAL THERAPIST

## 2022-07-11 PROCEDURE — 97110 THERAPEUTIC EXERCISES: CPT | Performed by: PHYSICAL THERAPIST

## 2022-07-11 NOTE — PROGRESS NOTES
PT Evaluation     Today's date: 2022  Patient name: Adelina Reaves  : 1945  MRN: [id number]  Referring provider: Abhijeet Harris PA-C  Dx:   Encounter Diagnosis     ICD-10-CM    1  Arthritis of left knee  M17 12 Ambulatory referral to Physical Therapy   2  Chronic pain of left knee  M25 562     G89 29    3  Preop testing  Z01 818 Ambulatory referral to Physical Therapy       Start Time: 4939  Stop Time: 1525  Total time in clinic (min): 40 minutes    Assessment  Assessment details: Adelina Reaves is a 78y o  year old male who presents to PT today for a L knee pre-op evaluation  Current deficits include pain when descending stairs, difficulty standing/walking for extended period of time, and discomfort with standing and sitting  These deficits impair his ability to perform all typical daily activities, household activities, and recreational tasks without pain  Recommend skilled PT services to address previously stated deficits to promote progress towards PLOF  Impairments: abnormal or restricted ROM, activity intolerance, lacks appropriate home exercise program, pain with function and poor body mechanics     Prognosis: good    Goals  STG:  Independent with pre-op HEP  LTG:  Will be determined at first post-op appointment  Plan  Plan details: Thank you for referring Adelina Reaves to Physical Therapy at [place] [address] and for the opportunity to coordinate care      Patient would benefit from: skilled physical therapy  Planned modality interventions: electrical stimulation/Russian stimulation, TENS, unattended electrical stimulation and thermotherapy: hydrocollator packs  Planned therapy interventions: abdominal trunk stabilization, activity modification, ADL training, ADL retraining, balance, body mechanics training, breathing training, fine motor coordination training, flexibility, functional ROM exercises, gait training, graded activity, graded exercise, graded motor, home exercise program, IADL retraining, Blum taping, motor coordination training, muscle pump exercises, patient education, postural training, self care, strengthening, stretching, therapeutic activities, therapeutic exercise, therapeutic training, transfer training, balance/weight bearing training, sensory integrative techniques, neuromuscular re-education, manual therapy and joint mobilization  Frequency: 1 visit preop; postop frequency TBD next visit  Duration in visits: 10  Plan of Care beginning date: 2022  Treatment plan discussed with: patient        Subjective Evaluation    History of Present Illness  Mechanism of injury: Dimitri Nur is a 78 y o  male presenting with L knee pain and is pre TKA to be performed by Dr Chung Carroll on 22  Currently, Gilma Quan is having difficulty with descending stairs, discomfort with standing/sitting, and pain with extended walking/standing  Steps to enter home: 4 steps  First floor set up: Yes  Number of steps to second floor: Greater than 10 steps  Assistive devices available: Rolling [employer] and [employer]  Assist at home wife and son (son is close by)  Pre op TUG time: 8"  Quality of life: good    Pain  Current pain ratin  At best pain ratin  At worst pain ratin  Location: L knee  Quality: grinding  Aggravating factors: stair climbing and walking    Social Support  Steps to enter house: yes  Stairs in house: yes   Lives in: multiple-level home  Lives with: spouse    Employment status: not working  Treatments  Previous treatment: physical therapy  Current treatment: physical therapy  Patient Goals  Patient goals for therapy: decreased pain, increased motion, increased strength and independence with ADLs/IADLs          Objective     Active Range of Motion   Left Hip   Flexion: [facility]  External rotation (90/90): [facility]  Internal rotation (90/90): WFL    Right Hip   Flexion: [facility] [place]  External rotation (90/90):  [facility] [place]  Internal rotation (90/90): WFL  Left Knee   Flexion: 130 degrees   Extensor lag: 3 degrees     Right Knee   Flexion: 125 degrees   Extensor lag: 10 degrees     Strength/Myotome Testing     Left Hip   Planes of Motion   Flexion: 5  External rotation: 4+  Internal rotation: 5    Right Hip   Planes of Motion   Flexion: 5  External rotation: 5  Internal rotation: 5    Left Knee   Flexion: 5  Extension: 5    Right Knee   Flexion: 5  Extension: 5           Precautions: HTN, HLD, CAD, MI (1999), R TKA 4/13/22    * Indicates part of HEP  HEP code: [id number]     Daily Treatment Diary     Manuals 7/11          L Knee PROM nv          L patellar mobility nv                     Therapeutic Exercise           Bike            Heel slides* 5x5"          Hamstring stretch           Quad stretch           Piriformis stretch           Quad set* 5x10"          SLR flexion* 1x12          SLR abduction                                                       Neuro Re-ed           Bridges* 3x10          Clamshells* 3x10          Lateral eccentric step down           Forward eccentric step down                                                       Therapeutic Activity           Sit to stands           Leg press           Heel raises* 3x10          Toe raises* 3x10          Band resisted side stepping           Monster walks           Goblet squat           Deadlift           Forward step ups           Lateral step ups                                                       Modalities

## 2022-07-12 NOTE — NURSING NOTE
Call placed to patient to discuss upcoming appointment at [facility] radiology department and complete consultation with patient  Patient is having SI CSI  utilizing  CT guidance  Reviewed patient's allergies, current anticoagulant medication of ASA 81 mg and Plavix present per patient but not required to stop per periprocedural management of coagulation status and hemostasis risk in percutaneous image guided procedure guidelines, also discussed the pre and post procedure expectations  Reminded patient of location and time expected for procedure, Patient expressed understanding by verbalizing and repeating instructions

## 2022-07-22 ENCOUNTER — HOSPITAL ENCOUNTER (OUTPATIENT)
Dept: RADIOLOGY | Facility: HOSPITAL | Age: 77
Discharge: HOME | End: 2022-07-22
Attending: PHYSICIAN ASSISTANT
Payer: COMMERCIAL

## 2022-07-22 DIAGNOSIS — M46.1 SACROILIITIS, NOT ELSEWHERE CLASSIFIED (HCC): ICD-10-CM

## 2022-07-22 PROCEDURE — G0260 INJ FOR SACROILIAC JT ANESTH: HCPCS

## 2022-07-22 RX ORDER — BUPIVACAINE HYDROCHLORIDE 2.5 MG/ML
10 INJECTION, SOLUTION EPIDURAL; INFILTRATION; INTRACAUDAL
Status: COMPLETED | OUTPATIENT
Start: 2022-07-22 | End: 2022-07-22

## 2022-07-22 RX ORDER — LIDOCAINE HYDROCHLORIDE 10 MG/ML
5 INJECTION, SOLUTION EPIDURAL; INFILTRATION; INTRACAUDAL; PERINEURAL ONCE
Status: DISCONTINUED | OUTPATIENT
Start: 2022-07-22 | End: 2022-07-22

## 2022-07-22 RX ORDER — METHYLPREDNISOLONE ACETATE 80 MG/ML
80 INJECTION, SUSPENSION INTRA-ARTICULAR; INTRALESIONAL; INTRAMUSCULAR; SOFT TISSUE ONCE
Status: COMPLETED | OUTPATIENT
Start: 2022-07-22 | End: 2022-07-22

## 2022-07-22 RX ORDER — BUPIVACAINE HYDROCHLORIDE 2.5 MG/ML
3 INJECTION, SOLUTION EPIDURAL; INFILTRATION; INTRACAUDAL ONCE
Status: DISCONTINUED | OUTPATIENT
Start: 2022-07-22 | End: 2022-07-22

## 2022-07-22 RX ADMIN — METHYLPREDNISOLONE ACETATE 80 MG: 80 INJECTION, SUSPENSION INTRA-ARTICULAR; INTRALESIONAL; INTRAMUSCULAR; SOFT TISSUE at 09:17

## 2022-07-22 RX ADMIN — BUPIVACAINE HYDROCHLORIDE 10 ML: 2.5 INJECTION, SOLUTION EPIDURAL; INFILTRATION; INTRACAUDAL; PERINEURAL at 09:17

## 2022-07-25 ENCOUNTER — TELEPHONE (OUTPATIENT)
Dept: CARDIOLOGY CLINIC | Facility: CLINIC | Age: 77
End: 2022-07-25

## 2022-08-01 DIAGNOSIS — L57.0 ACTINIC KERATOSIS: ICD-10-CM

## 2022-08-04 RX ORDER — KETOCONAZOLE 20 MG/ML
SHAMPOO TOPICAL
Qty: 120 ML | Refills: 0 | Status: SHIPPED | OUTPATIENT
Start: 2022-08-04

## 2022-08-11 ENCOUNTER — APPOINTMENT (OUTPATIENT)
Dept: LAB | Facility: CLINIC | Age: 77
End: 2022-08-11
Payer: COMMERCIAL

## 2022-08-11 DIAGNOSIS — E78.2 MIXED HYPERLIPIDEMIA: ICD-10-CM

## 2022-08-11 LAB
CHOLEST SERPL-MCNC: 200 MG/DL
HDLC SERPL-MCNC: 29 MG/DL
LDLC SERPL CALC-MCNC: 104 MG/DL (ref 0–100)
TRIGL SERPL-MCNC: 333 MG/DL

## 2022-08-11 PROCEDURE — 36415 COLL VENOUS BLD VENIPUNCTURE: CPT

## 2022-08-11 PROCEDURE — 80061 LIPID PANEL: CPT

## 2022-08-12 ENCOUNTER — TELEPHONE (OUTPATIENT)
Dept: OBGYN CLINIC | Facility: CLINIC | Age: 77
End: 2022-08-12

## 2022-08-12 ENCOUNTER — OFFICE VISIT (OUTPATIENT)
Dept: INTERNAL MEDICINE CLINIC | Facility: CLINIC | Age: 77
End: 2022-08-12
Payer: COMMERCIAL

## 2022-08-12 VITALS
BODY MASS INDEX: 31.53 KG/M2 | HEIGHT: 72 IN | OXYGEN SATURATION: 95 % | DIASTOLIC BLOOD PRESSURE: 80 MMHG | HEART RATE: 70 BPM | SYSTOLIC BLOOD PRESSURE: 128 MMHG | WEIGHT: 232.8 LBS | TEMPERATURE: 98.2 F

## 2022-08-12 DIAGNOSIS — M17.0 PRIMARY OSTEOARTHRITIS OF BOTH KNEES: ICD-10-CM

## 2022-08-12 DIAGNOSIS — E78.5 DYSLIPIDEMIA: ICD-10-CM

## 2022-08-12 DIAGNOSIS — M25.562 CHRONIC ARTHRALGIAS OF KNEES AND HIPS: ICD-10-CM

## 2022-08-12 DIAGNOSIS — M25.561 CHRONIC ARTHRALGIAS OF KNEES AND HIPS: ICD-10-CM

## 2022-08-12 DIAGNOSIS — G89.29 CHRONIC ARTHRALGIAS OF KNEES AND HIPS: ICD-10-CM

## 2022-08-12 DIAGNOSIS — Z23 NEED FOR PNEUMOCOCCAL VACCINATION: Primary | ICD-10-CM

## 2022-08-12 DIAGNOSIS — M25.552 CHRONIC ARTHRALGIAS OF KNEES AND HIPS: ICD-10-CM

## 2022-08-12 DIAGNOSIS — M25.551 CHRONIC ARTHRALGIAS OF KNEES AND HIPS: ICD-10-CM

## 2022-08-12 DIAGNOSIS — Z00.00 ENCOUNTER FOR ANNUAL WELLNESS VISIT (AWV) IN MEDICARE PATIENT: ICD-10-CM

## 2022-08-12 PROCEDURE — G0009 ADMIN PNEUMOCOCCAL VACCINE: HCPCS | Performed by: HOSPITALIST

## 2022-08-12 PROCEDURE — 99214 OFFICE O/P EST MOD 30 MIN: CPT | Performed by: HOSPITALIST

## 2022-08-12 PROCEDURE — 90677 PCV20 VACCINE IM: CPT | Performed by: HOSPITALIST

## 2022-08-12 NOTE — PROGRESS NOTES
INTERNAL MEDICINE HEALTH MAINTENANCE OFFICE VISIT  [facility] - [facility] INTERNAL MEDICINE DIPTI    NAME: Otilio Machuca  AGE: 78 y o  SEX: male  : 1945     DATE: 2022     Assessment and Plan:     1  Encounter for annual wellness visit (AWV) in Medicare patient      2  Need for pneumococcal vaccination    - Pneumococcal Conjugate Vaccine 20-valent (Pcv20)    3  Chronic arthralgias of knees and hips  Complaining of right-sided sacral pain with radiation down the right leg  He status post TKR right knee which is doing well  He will have a left TKA later this month  Requesting PT eval    4  Primary osteoarthritis of left knee    5-dyslipidemia-elevated cholesterol, triglycerides  Patient on Zetia and Vascepa  Has an appointment with cardiology later this month for adjustment of his medications P      1  Patient Counseling:  · Nutrition: Stressed importance of moderation in sodium/caffeine intake, saturated fat, trans fat and cholesterol  Discussed portion control as well as increasing intake of fruits, vegetables, lean protein, and fish  · Exercise: Stressed the importance of regular exercise at least 150 mins/week  · Sexuality: Discussed sexually transmitted diseases, partner selection, use of condoms, avoidance of unintended pregnancy  and contraceptive alternatives  · Injury prevention: Discussed safety belts, safety helmets, smoke detector, smoking near bedding or upholstery  · Dental health: Discussed importance of regular tooth brushing, flossing, and dental visits  · Immunizations reviewed  Will give PCV 20 today  · Discussed benefits of screening up-to-date on screening  · Education was printed for patient    2  Discussed the patient's BMI with him    The BMI is above average; BMI management plan is completed         Chief Complaint:     Chief Complaint   Patient presents with    Annual Exam     Pneumonia vaccine, pt referral        History of Present Illness: Well Adult Physical   Patient here for a comprehensive physical exam The patient reports no problems    Diet and Physical Activity  Diet: well balanced diet  Weight concerns: Patient has class 1 obesity (BMI 30-34  9)  Exercise: frequently      Depression Screen  Negative for depression with PHQ2 score of 0  General Health  Hearing: Normal:  bilateral  Vision: vision problems:  Decreased vision in both eyes  Will eventually need cataract surgery      The following portions of the patient's history were reviewed and updated as appropriate: allergies, current medications, past family history, past medical history, past social history, past surgical history and problem list      Review of Systems:     Review of Systems of a right sacral pain with some radiation down the thigh    All other review of symptoms negative unless specified otherwise     Past Medical History:     Past Medical History:   Diagnosis Date    Celiac disease     Coronary artery disease     Diverticulosis     Hyperlipidemia     Hypertension     Myocardial infarction ([facility] [address] )         Past Surgical History:     Past Surgical History:   Procedure Laterality Date    COLONOSCOPY      CORONARY ANGIOPLASTY WITH STENT PLACEMENT      20 yrs ago    DC TOTAL KNEE ARTHROPLASTY Right 2022    Procedure: ARTHROPLASTY KNEE TOTAL and all associated procedures;  Surgeon: Janis Campbell MD;  Location: [facility];  Service: Orthopedics        Social History:     Social History     Socioeconomic History    Marital status: /Civil Union     Spouse name: None    Number of children: 3    Years of education: None    Highest education level: None   Occupational History    None   Tobacco Use    Smoking status: Former Smoker     Quit date:      Years since quittin 6    Smokeless tobacco: Never Used    Tobacco comment: Per pt, quit over 36 years ago   Vaping Use    Vaping Use: Never used   Substance and Sexual Activity    Alcohol use: Not Currently    Drug use: Never    Sexual activity: Yes   Other Topics Concern    None   Social History Narrative    None     Social Determinants of Health     Financial Resource Strain: Not on file   Food Insecurity: Not on file   Transportation Needs: Not on file   Physical Activity: Not on file   Stress: Not on file   Social Connections: Not on file   Intimate Partner Violence: Not on file   Housing Stability: Not on file        Family History:     Family History   Problem Relation Age of Onset    Diabetes Mother     Coronary artery disease Father     Thyroid disease Sister         Current Medications:     Outpatient Medications Prior to Visit   Medication Sig Dispense Refill    baclofen 10 mg tablet Take 1 tablet (10 mg total) by mouth 3 (three) times a day as needed for muscle spasms 90 tablet 0    Cholecalciferol (Vitamin D3) 1 25 MG (43892 UT) CAPS Take 10,000 Units by mouth every morning        clopidogrel (PLAVIX) 75 mg tablet Take 75 mg by mouth every morning        Cyanocobalamin (VITAMIN B 12 PO) Take by mouth      diclofenac (VOLTAREN) 75 mg EC tablet TAKE 1 TABLET BY MOUTH EVERY MORNING   30 tablet 0    ezetimibe (ZETIA) 10 mg tablet TAKE 1 TABLET BY MOUTH  DAILY (Patient taking differently: Take 10 mg by mouth daily at bedtime) 30 tablet 11    gabapentin (NEURONTIN) 300 mg capsule TAKE 1 CAPSULE BY MOUTH 3  TIMES DAILY (Patient taking differently: Take 600 mg by mouth daily at bedtime) 270 capsule 3    Icosapent Ethyl 1 g CAPS       ketoconazole (NIZORAL) 2 % shampoo APPLY TOPICALLY 2 TIMES A WEEK 120 mL 0    metoprolol succinate (TOPROL-XL) 50 mg 24 hr tablet Take 1 tablet (50 mg total) by mouth daily 90 tablet 3    sertraline (ZOLOFT) 50 mg tablet Take 1 tablet (50 mg total) by mouth daily (Patient taking differently: Take 50 mg by mouth every morning) 90 tablet 3    simvastatin (ZOCOR) 40 mg tablet Take 1 tablet (40 mg total) by mouth daily at bedtime 90 tablet 1    tamsulosin (FLOMAX) 0 4 mg Take 1 capsule (0 4 mg total) by mouth daily with dinner (Patient taking differently: Take 0 4 mg by mouth every morning) 90 capsule 2    traZODone (DESYREL) 150 mg tablet TAKE 1 TABLET BY MOUTH  DAILY AT BEDTIME 30 tablet 11    aspirin (ECOTRIN LOW STRENGTH) 81 mg EC tablet Take 1 tablet (81 mg total) by mouth 2 (two) times a day Please do not start taking until after total joint arthroplasty 70 tablet 0     No facility-administered medications prior to visit  Allergies:      Allergies   Allergen Reactions    Crestor [Rosuvastatin] Myalgia        Objective:     Physical Exam:  /80 (BP Location: Left arm, Patient Position: Sitting, Cuff Size: Standard)   Pulse 70   Temp 98 2 °F (36 8 °C) (Tympanic)   Ht 6' (1 829 m)   Wt 106 kg (232 lb 12 8 oz)   SpO2 95%   BMI 31 57 kg/m²     General Appearance:    Alert, cooperative, no distress, appears stated age   Head:    Normocephalic, without obvious abnormality, atraumatic   Eyes:    PERRL, conjunctiva/corneas clear, EOM's intact, fundi     benign, both eyes        Ears:    Normal TM's and external ear canals, both ears   Nose:   Nares normal, septum midline, mucosa normal, no drainage    or sinus tenderness   Throat:   Lips, mucosa, and tongue normal; teeth and gums normal   Neck:   Supple, symmetrical, trachea midline, no adenopathy;        thyroid:  No enlargement/tenderness/nodules; no carotid    bruit or JVD   Back:     Symmetric, no curvature, ROM normal, no CVA tenderness   Lungs:     Clear to auscultation bilaterally, respirations unlabored   Chest wall:    No tenderness or deformity   Heart:    Regular rate and rhythm, S1 and S2 normal, no murmur, rub   or gallop   Abdomen:     Soft, non-tender, bowel sounds active all four quadrants,     no masses, no organomegaly           Extremities:   Extremities normal, atraumatic, no cyanosis or edema   Pulses:   2+ and symmetric all extremities   Skin:   Skin color, texture, turgor normal, no rashes or lesions   Lymph nodes:   Cervical, supraclavicular, and axillary nodes normal   Neurologic:   CNII-XII intact  Normal strength, sensation and reflexes       throughout       Data:    Laboratory Results: I have personally reviewed the pertinent laboratory results/reports   Radiology/Other Diagnostic Testing Results: I have personally reviewed pertinent reports         Health Maintenance:     Health Maintenance   Topic Date Due    Pneumococcal Vaccine: 65+ Years (2 - PCV) 09/10/2020    COVID-19 Vaccine (4 - Booster) 02/16/2022    PT PLAN OF CARE  08/10/2022    Influenza Vaccine (1) 09/01/2022    Medicare Annual Wellness Visit (AWV)  09/23/2022    BMI: Followup Plan  09/23/2022    Depression Screening  03/25/2023    Fall Risk  07/11/2023    BMI: Adult  08/12/2023    Colorectal Cancer Screening  01/07/2024    Hepatitis C Screening  Completed    HIB Vaccine  Aged Out    Hepatitis B Vaccine  Aged Out    IPV Vaccine  Aged Out    Hepatitis A Vaccine  Aged Out    Meningococcal ACWY Vaccine  Aged Out    HPV Vaccine  Aged Out     Immunization History   Administered Date(s) Administered    COVID-19 MODERNA VACC 0 5 ML IM 02/20/2021    COVID-19 PFIZER VACCINE 0 3 ML IM 03/11/2021, 10/16/2021    INFLUENZA 09/10/2019, 08/16/2020    Influenza Split High Dose Preservative Free IM 09/10/2019    Influenza, high dose seasonal 0 7 mL 08/16/2020, 09/23/2021    Influenza, seasonal, injectable, preservative free 09/10/2019    Pneumococcal Polysaccharide PPV23 09/10/2019    Zoster Vaccine Recombinant 05/25/2019, 09/10/2019       Cristi Meng MD  [facility] INTERNAL MEDICINE [place]

## 2022-08-12 NOTE — TELEPHONE ENCOUNTER
Pt contacted Call Center requested refill of their medication  Medication Name: Diclofenac       Dosage of Med: 75 mg       Frequency of Med: once a day       Remaining Medication: 1 week       Pharmacy and Location:  [facility]/pharmacy #[id number] [address]  [address] [address]        Pt  Preferred Callback Phone Number: [phone]      Thank you

## 2022-08-15 ENCOUNTER — EVALUATION (OUTPATIENT)
Dept: PHYSICAL THERAPY | Facility: REHABILITATION | Age: 77
End: 2022-08-15
Payer: COMMERCIAL

## 2022-08-15 DIAGNOSIS — M25.562 CHRONIC ARTHRALGIAS OF KNEES AND HIPS: ICD-10-CM

## 2022-08-15 DIAGNOSIS — M25.551 CHRONIC ARTHRALGIAS OF KNEES AND HIPS: ICD-10-CM

## 2022-08-15 DIAGNOSIS — M17.0 PRIMARY OSTEOARTHRITIS OF BOTH KNEES: ICD-10-CM

## 2022-08-15 DIAGNOSIS — M17.11 PRIMARY OSTEOARTHRITIS OF RIGHT KNEE: ICD-10-CM

## 2022-08-15 DIAGNOSIS — M25.561 CHRONIC ARTHRALGIAS OF KNEES AND HIPS: ICD-10-CM

## 2022-08-15 DIAGNOSIS — M25.552 CHRONIC ARTHRALGIAS OF KNEES AND HIPS: ICD-10-CM

## 2022-08-15 DIAGNOSIS — G89.29 CHRONIC ARTHRALGIAS OF KNEES AND HIPS: ICD-10-CM

## 2022-08-15 DIAGNOSIS — M54.50 LUMBOSACRAL PAIN: Primary | ICD-10-CM

## 2022-08-15 DIAGNOSIS — M54.16 LUMBAR RADICULOPATHY: ICD-10-CM

## 2022-08-15 DIAGNOSIS — M17.12 PRIMARY OSTEOARTHRITIS OF LEFT KNEE: ICD-10-CM

## 2022-08-15 PROCEDURE — 97140 MANUAL THERAPY 1/> REGIONS: CPT | Performed by: PHYSICAL THERAPIST

## 2022-08-15 PROCEDURE — 97161 PT EVAL LOW COMPLEX 20 MIN: CPT | Performed by: PHYSICAL THERAPIST

## 2022-08-15 RX ORDER — DICLOFENAC SODIUM 75 MG/1
75 TABLET, DELAYED RELEASE ORAL EVERY MORNING
Qty: 30 TABLET | Refills: 0 | Status: SHIPPED | OUTPATIENT
Start: 2022-08-15 | End: 2022-09-13

## 2022-08-15 NOTE — PROGRESS NOTES
PT Evaluation     Today's date: 8/15/2022  Patient name: Kadie Elliott  : 1945  MRN: [id number]  Referring provider: Suzanne Villagran MD  Dx:   Encounter Diagnosis     ICD-10-CM    1  Lumbosacral pain  M54 50    2  Lumbar radiculopathy  M54 16    3  Chronic arthralgias of knees and hips  M25 551     G89 29     M25 561     M25 552     M25 562    4  Primary osteoarthritis of both knees  M17 0        Start Time: 1620  Stop Time: 1705  Total time in clinic (min): 45 minutes    Assessment  Assessment details: Kadie Elliott is a 78y o  year old male who presents with chronic L sided low back pain with neural symptoms into the LLE  Current deficits include impaired ROM, decreased joint mobility, pelvic malalignment, and pain  These deficits impair his ability to ambulate and perform all typical I/ADLs, household tasks, social/recreational activities  Signs and symptoms are consistent with a L innominate upslip  Recommend skilled PT services to address previously stated deficits to promote progress towards PLOF  Ruthann De La Rosa reported improvement in symptoms when ambulating after session compared to start of session  Impairments: abnormal muscle firing, abnormal or restricted ROM, activity intolerance, impaired physical strength, lacks appropriate home exercise program, pain with function and weight-bearing intolerance  Understanding of Dx/Px/POC: good   Prognosis: good    Goals  STG (3 weeks):  1  Increase L hamstring strength to at least 4+/5 for improved activity tolerance  2  Decrease pain at worst from 5/10 to 3/10 or less for improved QoL  LTG (6 weeks):  1  Increased global hip girdle strength to at least 4+/5 to promote improved activity tolerance  2  Able to walk at least 30 minutes without symptom exacerbation to promote improved community ambulation  3  Able to ascend/descend 1 flight of stairs without compensation to promote improved household and community ambulation    4  Independent with HEP  Plan  Plan details: Thank you for referring Grayson Oliver to Physical Therapy at [place] [address] and for the opportunity to coordinate care  Patient would benefit from: skilled physical therapy  Planned modality interventions: cryotherapy, electrical stimulation/Russian stimulation, TENS, thermotherapy: hydrocollator packs and unattended electrical stimulation  Planned therapy interventions: abdominal trunk stabilization, activity modification, ADL retraining, balance, balance/weight bearing training, behavior modification, body mechanics training, breathing training, fine motor coordination training, flexibility, functional ROM exercises, gait training, graded activity, graded exercise, graded motor, home exercise program, work reintegration, transfer training, therapeutic training, therapeutic exercise, therapeutic activities, stretching, strengthening, self care, postural training, patient education, neuromuscular re-education, muscle pump exercises, motor coordination training, Blum taping, manual therapy, IADL retraining and joint mobilization  Frequency: 2x week  Duration in visits: 10  Plan of Care beginning date: 8/15/2022  Treatment plan discussed with: patient        Subjective Evaluation    History of Present Illness  Mechanism of injury:   08/15/22:  Grayson Oliver presents to skilled physical therapy with complaints of L sided low back pain with L sided radicular symptoms about 2-3 months ago  Rekha Fernandez does recall falling while hiking when his dog pulled him and landed on his L hip  Rekha Fernandez reports pain located in the L side of the low back that radiates down the L posterior thigh, stopping about mid thigh, with numbness/tingling in the L toes  Since onset, symptoms have stayed the same  Currently, Rekha Fernandez is having difficulty with walking on flat surfaces, going up stairs, and WB on L leg  Patient denies difficulty sleeping secondary to L sided low back and radicular pain  States he had an injection in the L SIJ but denies relief  States he will be seeing a chiropractor on 22  Pain  Current pain ratin  At best pain ratin  At worst pain ratin  Location: L low back and leg  Quality: sharp (numb/tingle, constant)    Social Support  Steps to enter house: no  Stairs in house: yes   Lives in: multiple-level home  Lives with: spouse and adult children    Employment status: working    Diagnostic Tests  No diagnostic tests performed  Abnormal CT scan: CT for injection  Treatments  Current treatment: chiropractic and physical therapy  Patient Goals  Patient goals for therapy: decreased pain          Objective     Palpation     Additional Palpation Details  TTP through L glute min and piriformis    Tenderness     Lumbar Spine  No tenderness in the spinous process, left transverse process and right transverse process  Left Hip   Tenderness in the PSIS  No tenderness in the ASIS  Right Hip   No tenderness in the ASIS and PSIS  Active Range of Motion     Lumbar   Flexion:  WFL  Extension:  Restriction level: moderate  Left lateral flexion:  WFL  Right lateral flexion:  WFL and with pain  Left Hip   Flexion: [facility]  External rotation (90/90): [facility]  Internal rotation (90/90): [facility]    Strength/Myotome Testing     Left Hip   Planes of Motion   Flexion: 4+  External rotation: 5  Internal rotation: 5    Isolated Muscles   Gluteus vinod: 4+    Right Hip   Planes of Motion   Flexion: 4+  External rotation: 5  Internal rotation: 5    Isolated Muscles   Gluteus maximums: 4+    Additional Strength Details  L hamstring MMT: 4/5  R hamstring MMT: 4+/5    Tests     Lumbar     Left   Positive slump test    Negative passive SLR  Right   Positive slump test      Left Pelvic Girdle/Sacrum   Negative: active SLR test      Left Hip   Positive scour  Negative [place]       Additional Tests Details  RLE functionally longer than LLE  L ASIS elevated compared to R ASIS  L PSIS elevated compared to R PSIS    No symptom provocation with PA glides or UPA glides bilaterally throughout the lumbar spine  No symptom provocation with sacral PA glides                Precautions: HTN, HLD, CAD, MI (1999), R TKA 4/13/22    * Indicates part of HEP     Daily Treatment Diary     Manuals 8/15          Prone LAD in CPP L done                                Therapeutic Exercise           Bike  nv          Hamstring stretch           Quad stretch           Seated ball roll out           Clamshells           SLR flexion           SLR abduction           Paloff press           Supine hip extension bridge on ball           Supine lateral ball roll                      Patient education done                     Neuro Re-ed           Slump slider nv          ADIM* 2x10x5"          ADIM with marches           ADIM with BKFO           Bridges* 5x          Supine ball crush           Standing ball crush           Standing ball crush marches           Bird dogs           Bear holds           Dead bug           Plank shoulder taps                                                       Therapeutic Activity           Sit to stands           Leg press           Band resisted side stepping           Monster walks           Goblet squat           Bent over row           Deadlift           Farmer's carry                                            Modalities

## 2022-08-18 ENCOUNTER — OFFICE VISIT (OUTPATIENT)
Dept: PHYSICAL THERAPY | Facility: REHABILITATION | Age: 77
End: 2022-08-18
Payer: COMMERCIAL

## 2022-08-18 DIAGNOSIS — M54.50 LUMBOSACRAL PAIN: Primary | ICD-10-CM

## 2022-08-18 DIAGNOSIS — M54.16 LUMBAR RADICULOPATHY: ICD-10-CM

## 2022-08-18 DIAGNOSIS — M25.561 CHRONIC ARTHRALGIAS OF KNEES AND HIPS: ICD-10-CM

## 2022-08-18 DIAGNOSIS — M25.552 CHRONIC ARTHRALGIAS OF KNEES AND HIPS: ICD-10-CM

## 2022-08-18 DIAGNOSIS — M25.562 CHRONIC ARTHRALGIAS OF KNEES AND HIPS: ICD-10-CM

## 2022-08-18 DIAGNOSIS — G89.29 CHRONIC ARTHRALGIAS OF KNEES AND HIPS: ICD-10-CM

## 2022-08-18 DIAGNOSIS — M25.551 CHRONIC ARTHRALGIAS OF KNEES AND HIPS: ICD-10-CM

## 2022-08-18 PROCEDURE — 97112 NEUROMUSCULAR REEDUCATION: CPT | Performed by: PHYSICAL THERAPIST

## 2022-08-18 PROCEDURE — 97140 MANUAL THERAPY 1/> REGIONS: CPT | Performed by: PHYSICAL THERAPIST

## 2022-08-18 NOTE — PROGRESS NOTES
Daily Note     Today's date: 2022  Patient name: Leroy Gill  : 1945  MRN: [id number]  Referring provider: Trent Hawley MD  Dx:   Encounter Diagnosis     ICD-10-CM    1  Lumbosacral pain  M54 50    2  Lumbar radiculopathy  M54 16    3  Chronic arthralgias of knees and hips  M25 551     G89 29     M25 561     M25 552     M25 562        Start Time: 1147  Stop Time: 1230  Total time in clinic (min): 43 minutes    Subjective: Virginia Paul reports not being able to determine if the pain has gotten better or stayed the same but he does say it is "definitely not worse"  Reports seeing chiropractic yesterday but no improvement noted so far  Objective: See treatment diary below      Assessment: Tolerated treatment well  Otilio admitted to some relief with increased reps bridges indicating improved neuromuscular control  He denies any increase in pain with the exercises performed today  Patient demonstrated appropriate level of challenge and muscular fatigue throughout session and noted good status at end of visit  Patient demonstrated fatigue post treatment, exhibited good technique with therapeutic exercises and would benefit from continued PT  Plan: Continue per plan of care  Progress treatment as tolerated         Precautions: HTN, HLD, CAD, MI (), R TKA 22    * Indicates part of HEP     Daily Treatment Diary     Manuals 8/15 8/18         Prone LAD in CPP L done Done grade III-V L                               Therapeutic Exercise           Bike  nv 5' lvl 2         Hamstring stretch           Quad stretch           Seated ball roll out           SLR flexion           SLR abduction           Paloff press           Supine hip extension bridge on ball           Supine lateral ball roll                      Patient education done                     Neuro Re-ed           Slump slider nv 15x3" ea         ADIM* 2x10x5" 2x10x5"         ADIM with rk           ADIM with BKFO  2x10 Bridges* 5x 3x10         Clamshells  hooklying GTB 3x10         Supine ball crush           Standing ball crush           Standing ball crush marches           Bird dogs           Bear holds           Dead bug           Plank shoulder taps                                                       Therapeutic Activity           Sit to stands           Leg press  3x10 135# P8S3         Band resisted side stepping           Monster walks           Goblet squat           Bent over row           Deadlift           Farmer's carry                                            Modalities

## 2022-08-22 ENCOUNTER — APPOINTMENT (OUTPATIENT)
Dept: PHYSICAL THERAPY | Facility: REHABILITATION | Age: 77
End: 2022-08-22
Payer: COMMERCIAL

## 2022-08-23 ENCOUNTER — ANESTHESIA EVENT (OUTPATIENT)
Dept: PERIOP | Facility: HOSPITAL | Age: 77
End: 2022-08-23
Payer: COMMERCIAL

## 2022-08-25 ENCOUNTER — APPOINTMENT (OUTPATIENT)
Dept: PHYSICAL THERAPY | Facility: REHABILITATION | Age: 77
End: 2022-08-25
Payer: COMMERCIAL

## 2022-08-26 ENCOUNTER — OFFICE VISIT (OUTPATIENT)
Dept: PHYSICAL THERAPY | Facility: REHABILITATION | Age: 77
End: 2022-08-26
Payer: COMMERCIAL

## 2022-08-26 DIAGNOSIS — G89.29 CHRONIC ARTHRALGIAS OF KNEES AND HIPS: ICD-10-CM

## 2022-08-26 DIAGNOSIS — M25.562 CHRONIC ARTHRALGIAS OF KNEES AND HIPS: ICD-10-CM

## 2022-08-26 DIAGNOSIS — M25.552 CHRONIC ARTHRALGIAS OF KNEES AND HIPS: ICD-10-CM

## 2022-08-26 DIAGNOSIS — M25.561 CHRONIC ARTHRALGIAS OF KNEES AND HIPS: ICD-10-CM

## 2022-08-26 DIAGNOSIS — M25.551 CHRONIC ARTHRALGIAS OF KNEES AND HIPS: ICD-10-CM

## 2022-08-26 DIAGNOSIS — M17.0 PRIMARY OSTEOARTHRITIS OF BOTH KNEES: ICD-10-CM

## 2022-08-26 DIAGNOSIS — M54.16 LUMBAR RADICULOPATHY: ICD-10-CM

## 2022-08-26 DIAGNOSIS — M54.50 LUMBOSACRAL PAIN: Primary | ICD-10-CM

## 2022-08-26 PROCEDURE — 97140 MANUAL THERAPY 1/> REGIONS: CPT | Performed by: PHYSICAL THERAPIST

## 2022-08-26 PROCEDURE — 97112 NEUROMUSCULAR REEDUCATION: CPT | Performed by: PHYSICAL THERAPIST

## 2022-08-26 NOTE — PROGRESS NOTES
Daily Note     Today's date: 2022  Patient name: Qasim Wynne  : 1945  MRN: [id number]  Referring provider: Luz Velarde MD  Dx:   Encounter Diagnosis     ICD-10-CM    1  Lumbosacral pain  M54 50    2  Lumbar radiculopathy  M54 16    3  Chronic arthralgias of knees and hips  M25 551     G89 29     M25 561     M25 552     M25 562    4  Primary osteoarthritis of both knees  M17 0        Start Time: 1106  Stop Time: 1150  Total time in clinic (min): 44 minutes    Subjective: Otilio reports improvement in pain overall since starting PT and going to chiropractor  States he put an extra insole in his shoe which also makes his back feel better  Notes he gets less tingling in the leg and the pain in his back seems slightly higher now compared to when IE was done  Objective: See treatment diary below      Assessment: Tolerated treatment well  Good tolerance to all exercises preformed during session with no reports of pain  Noted cavitation with manually resisted hip adduction; continued to demonstrate L upslip compared to R following manual therapy  Did report improvement in discomfort as session progressed and improved status at end of session  Patient demonstrated fatigue post treatment, exhibited good technique with therapeutic exercises and would benefit from continued PT  Plan: Continue per plan of care  Progress treatment as tolerated         Precautions: HTN, HLD, CAD, MI (), R TKA 22    * Indicates part of HEP     Daily Treatment Diary     Manuals 8/15 8/18 8/26        Prone LAD in CPP L done Done grade III-V L Done grade III-V L        Manual resisted hip ab/dduction   done                   Therapeutic Exercise           Bike  nv 5' lvl 2 5' lvl 3        Hamstring stretch           Quad stretch           Seated ball roll out           SLR flexion           SLR abduction           Paloff press           Supine hip extension bridge on ball           Supine lateral ball roll                      Patient education done                     Neuro Re-ed           Slump slider nv 15x3" ea 15x3" ea        ADIM* 2x10x5" 2x10x5" 3x10x5"        ADIM with rk           ADIM with BKFO  2x10 2x10        Bridges* 5x 3x10 3x12x3"        Clamshells  hooklying GTB 3x10 hooklying GTN 3x12        Supine ball crush           Standing ball crush           Standing ball crush marches           Bird dogs           Bear holds           Dead bug           Plank shoulder taps                                                       Therapeutic Activity           Sit to stands           Leg press  3x10 135# P8S3 3x10 125# P8S3        Band resisted side stepping           Monster walks           Goblet squat           Bent over row           Deadlift           Farmer's carry                                            Modalities

## 2022-08-29 ENCOUNTER — APPOINTMENT (OUTPATIENT)
Dept: PHYSICAL THERAPY | Facility: REHABILITATION | Age: 77
End: 2022-08-29
Payer: COMMERCIAL

## 2022-08-30 ENCOUNTER — OFFICE VISIT (OUTPATIENT)
Dept: PHYSICAL THERAPY | Facility: REHABILITATION | Age: 77
End: 2022-08-30
Payer: COMMERCIAL

## 2022-08-30 ENCOUNTER — APPOINTMENT (OUTPATIENT)
Dept: LAB | Facility: CLINIC | Age: 77
End: 2022-08-30
Payer: COMMERCIAL

## 2022-08-30 ENCOUNTER — TELEPHONE (OUTPATIENT)
Dept: OBGYN CLINIC | Facility: HOSPITAL | Age: 77
End: 2022-08-30

## 2022-08-30 DIAGNOSIS — M17.12 PRIMARY OSTEOARTHRITIS OF LEFT KNEE: Primary | ICD-10-CM

## 2022-08-30 DIAGNOSIS — G89.29 CHRONIC ARTHRALGIAS OF KNEES AND HIPS: ICD-10-CM

## 2022-08-30 DIAGNOSIS — M25.552 CHRONIC ARTHRALGIAS OF KNEES AND HIPS: ICD-10-CM

## 2022-08-30 DIAGNOSIS — M17.11 PRIMARY OSTEOARTHRITIS OF RIGHT KNEE: ICD-10-CM

## 2022-08-30 DIAGNOSIS — M54.16 LUMBAR RADICULOPATHY: ICD-10-CM

## 2022-08-30 DIAGNOSIS — M54.50 LUMBOSACRAL PAIN: Primary | ICD-10-CM

## 2022-08-30 DIAGNOSIS — M25.551 CHRONIC ARTHRALGIAS OF KNEES AND HIPS: ICD-10-CM

## 2022-08-30 DIAGNOSIS — Z01.818 PREOP TESTING: ICD-10-CM

## 2022-08-30 DIAGNOSIS — M17.0 PRIMARY OSTEOARTHRITIS OF BOTH KNEES: ICD-10-CM

## 2022-08-30 DIAGNOSIS — M17.12 ARTHRITIS OF LEFT KNEE: ICD-10-CM

## 2022-08-30 DIAGNOSIS — M25.561 CHRONIC ARTHRALGIAS OF KNEES AND HIPS: ICD-10-CM

## 2022-08-30 DIAGNOSIS — M25.562 CHRONIC ARTHRALGIAS OF KNEES AND HIPS: ICD-10-CM

## 2022-08-30 LAB
ALBUMIN SERPL BCP-MCNC: 4.1 G/DL (ref 3.5–5)
ALP SERPL-CCNC: 74 U/L (ref 34–104)
ALT SERPL W P-5'-P-CCNC: 26 U/L (ref 7–52)
ANION GAP SERPL CALCULATED.3IONS-SCNC: 6 MMOL/L (ref 4–13)
APTT PPP: 30 SECONDS (ref 23–37)
AST SERPL W P-5'-P-CCNC: 30 U/L (ref 13–39)
BASOPHILS # BLD AUTO: 0.04 THOUSANDS/ΜL (ref 0–0.1)
BASOPHILS NFR BLD AUTO: 1 % (ref 0–1)
BILIRUB SERPL-MCNC: 0.94 MG/DL (ref 0.2–1)
BUN SERPL-MCNC: 20 MG/DL (ref 5–25)
CALCIUM SERPL-MCNC: 10.2 MG/DL (ref 8.4–10.2)
CHLORIDE SERPL-SCNC: 106 MMOL/L (ref 96–108)
CO2 SERPL-SCNC: 28 MMOL/L (ref 21–32)
CREAT SERPL-MCNC: 1.05 MG/DL (ref 0.6–1.3)
CRP SERPL QL: <1 MG/L
EOSINOPHIL # BLD AUTO: 0.27 THOUSAND/ΜL (ref 0–0.61)
EOSINOPHIL NFR BLD AUTO: 5 % (ref 0–6)
ERYTHROCYTE [DISTWIDTH] IN BLOOD BY AUTOMATED COUNT: 13 % (ref 11.6–15.1)
EST. AVERAGE GLUCOSE BLD GHB EST-MCNC: 108 MG/DL
FERRITIN SERPL-MCNC: 57 NG/ML (ref 8–388)
GFR SERPL CREATININE-BSD FRML MDRD: 69 ML/MIN/1.73SQ M
GLUCOSE SERPL-MCNC: 78 MG/DL (ref 65–140)
HBA1C MFR BLD: 5.4 %
HCT VFR BLD AUTO: 43.1 % (ref 36.5–49.3)
HGB BLD-MCNC: 15 G/DL (ref 12–17)
IMM GRANULOCYTES # BLD AUTO: 0.02 THOUSAND/UL (ref 0–0.2)
IMM GRANULOCYTES NFR BLD AUTO: 0 % (ref 0–2)
INR PPP: 1.07 (ref 0.84–1.19)
IRON SATN MFR SERPL: 48 % (ref 20–50)
IRON SERPL-MCNC: 161 UG/DL (ref 65–175)
LYMPHOCYTES # BLD AUTO: 1.73 THOUSANDS/ΜL (ref 0.6–4.47)
LYMPHOCYTES NFR BLD AUTO: 30 % (ref 14–44)
MCH RBC QN AUTO: 32.3 PG (ref 26.8–34.3)
MCHC RBC AUTO-ENTMCNC: 34.8 G/DL (ref 31.4–37.4)
MCV RBC AUTO: 93 FL (ref 82–98)
MONOCYTES # BLD AUTO: 0.43 THOUSAND/ΜL (ref 0.17–1.22)
MONOCYTES NFR BLD AUTO: 8 % (ref 4–12)
NEUTROPHILS # BLD AUTO: 3.28 THOUSANDS/ΜL (ref 1.85–7.62)
NEUTS SEG NFR BLD AUTO: 56 % (ref 43–75)
NRBC BLD AUTO-RTO: 0 /100 WBCS
PLATELET # BLD AUTO: 249 THOUSANDS/UL (ref 149–390)
PMV BLD AUTO: 10.6 FL (ref 8.9–12.7)
POTASSIUM SERPL-SCNC: 4.9 MMOL/L (ref 3.5–5.3)
PROT SERPL-MCNC: 6.7 G/DL (ref 6.4–8.4)
PROTHROMBIN TIME: 14.1 SECONDS (ref 11.6–14.5)
RBC # BLD AUTO: 4.64 MILLION/UL (ref 3.88–5.62)
SODIUM SERPL-SCNC: 140 MMOL/L (ref 135–147)
TIBC SERPL-MCNC: 332 UG/DL (ref 250–450)
WBC # BLD AUTO: 5.77 THOUSAND/UL (ref 4.31–10.16)

## 2022-08-30 PROCEDURE — 85025 COMPLETE CBC W/AUTO DIFF WBC: CPT

## 2022-08-30 PROCEDURE — 83540 ASSAY OF IRON: CPT

## 2022-08-30 PROCEDURE — 80053 COMPREHEN METABOLIC PANEL: CPT

## 2022-08-30 PROCEDURE — 86140 C-REACTIVE PROTEIN: CPT

## 2022-08-30 PROCEDURE — 86850 RBC ANTIBODY SCREEN: CPT

## 2022-08-30 PROCEDURE — 83550 IRON BINDING TEST: CPT

## 2022-08-30 PROCEDURE — 82728 ASSAY OF FERRITIN: CPT

## 2022-08-30 PROCEDURE — 85610 PROTHROMBIN TIME: CPT

## 2022-08-30 PROCEDURE — 36415 COLL VENOUS BLD VENIPUNCTURE: CPT

## 2022-08-30 PROCEDURE — 97112 NEUROMUSCULAR REEDUCATION: CPT | Performed by: PHYSICAL THERAPIST

## 2022-08-30 PROCEDURE — 86901 BLOOD TYPING SEROLOGIC RH(D): CPT

## 2022-08-30 PROCEDURE — 85730 THROMBOPLASTIN TIME PARTIAL: CPT

## 2022-08-30 PROCEDURE — 86900 BLOOD TYPING SEROLOGIC ABO: CPT

## 2022-08-30 PROCEDURE — 83036 HEMOGLOBIN GLYCOSYLATED A1C: CPT

## 2022-08-30 RX ORDER — FERROUS SULFATE TAB EC 324 MG (65 MG FE EQUIVALENT) 324 (65 FE) MG
324 TABLET DELAYED RESPONSE ORAL
Qty: 60 TABLET | Refills: 0 | Status: SHIPPED | OUTPATIENT
Start: 2022-08-30 | End: 2022-11-02 | Stop reason: ALTCHOICE

## 2022-08-30 RX ORDER — ASCORBIC ACID 500 MG
500 TABLET ORAL DAILY
Qty: 60 TABLET | Refills: 0 | Status: SHIPPED | OUTPATIENT
Start: 2022-08-30 | End: 2023-04-20

## 2022-08-30 RX ORDER — FOLIC ACID 1 MG/1
1 TABLET ORAL DAILY
Qty: 30 TABLET | Refills: 0 | Status: SHIPPED | OUTPATIENT
Start: 2022-08-30 | End: 2022-09-22

## 2022-08-30 NOTE — PROGRESS NOTES
Daily Note     Today's date: 2022  Patient name: Isaac Pelaez  : 1945  MRN: [id number]  Referring provider: Malu Magdaleno MD  Dx:   Encounter Diagnosis     ICD-10-CM    1  Lumbosacral pain  M54 50    2  Lumbar radiculopathy  M54 16    3  Chronic arthralgias of knees and hips  M25 551     G89 29     M25 561     M25 552     M25 562    4  Primary osteoarthritis of both knees  M17 0        Start Time: 1405  Stop Time: 1450  Total time in clinic (min): 45 minutes    Subjective: Elda Romo reports he continues to use an extra insole in his shoe but reports improvements overall  Objective: See treatment diary below      Assessment: Tolerated treatment well  Held manual therapy this session in favor of increased inner unit and hip girdle strengthening with good tolerance  Elda Romo reported improvement in pain throughout and at end of session however, did take additional insole out of shoe and noted slight increase in discomfort  Updated and reviewed HEP with patient; patient verbalized and demonstrated understanding of all exercises  Patient demonstrated appropriate level of challenge and muscular fatigue throughout session and noted good status at end of visit  Patient demonstrated fatigue post treatment, exhibited good technique with therapeutic exercises and would benefit from continued PT  Plan: Continue per plan of care  Progress treatment as tolerated         Precautions: HTN, HLD, CAD, MI (), R TKA 22    * Indicates part of HEP   HEP code: [facility]     Daily Treatment Diary     Manuals 8/15 8/18 8/26 8/30       Prone LAD in CPP L done Done grade III-V L Done grade III-V L        Manual resisted hip ab/dduction   done                   Therapeutic Exercise           Bike  nv 5' lvl 2 5' lvl 3 5' lvl 3       Hamstring stretch           Quad stretch           Seated ball roll out           SLR flexion           SLR abduction           Paloff press           Supine hip extension bridge on ball           Supine lateral ball roll                      Patient education done                     Neuro Re-ed           Slump slider nv 15x3" ea 15x3" ea 15x3" ea       ADIM* 2x10x5" 2x10x5" 3x10x5" 2x10x5"       ADIM with marches    10x       ADIM with BKFO  2x10 2x10 2x10       ADIM heel taps           Bridges* 5x 3x10 3x12x3" 3x15x3"       Clamshells  hooklying GTB 3x10 hooklying GTN 3x12 hooklying BTB 3x10       Supine ball crush           Standing ball crush    10x10"       Standing ball crush marches           Bird dogs           Bear holds           Dead bug           Plank shoulder taps                                                       Therapeutic Activity           Sit to stands           Leg press  3x10 135# P8S3 3x10 125# P8S3 3x10 135# P8S3       Band resisted side stepping           Monster walks           Goblet squat           Bent over row           Deadlift           Farmer's carry                                            Modalities

## 2022-08-30 NOTE — TELEPHONE ENCOUNTER
Preoperative Elective Admission Assessment- s/w pt  Pt is willing to have a same day surgery if safe and able to  Carly Vazquez      Needs MC- sent msg to surg coordinator as not currently noted un Epic  Per surg coordinator , [facility] not ordered  NN advised all MLJs need MC, pt should be scheduled for pre-op [facility] with SOC IM team      Living Situation:  Lives with wife in multiple level home  #4 steps onto the deck, otherwise from basement approximately #14 with handrail  He plans to prepare a 1st floor setup in event he cannot maneuver his home steps  DME: Pt has a RW and cane  Patient's Current Level of Function: ambulates independently,is biking often  Independent with ADLs     Post-op Caregiver: Wife  Caregiver Name and phone number for Inpatient discharge needs: Wife Nick Davis, ph# [phone]  Currently receive any HHC/aides/community supports: No     Post-op Transport: wife     Outpatient Physical Therapy Site:  Site: [facility]  pre and post-op appts scheduled? Yes     Medication Management: self  Preferred Pharmacy for Post-op Medications: [id number]- [facility] - [address] [place] [address]  Blood Management Vitamin Regimen: Needs rxs for vits, [facility] [address]; addendum 8/31- vits sent by surgeon's team 8/30  Left VM that RXs were [id number] [id number]   left NN direct call back # to discuss   pharmacy added to sticky  Note  Post-op anticoagulant:post-op regimen to be determined by surgical team       DC Plan: Pt plans to be discharged home and plans to attend OP PT     Barriers to DC identified preoperatively: None     BMI: 31 57     [employer]: [phone] texts, 1200, [email or web address]  pt enrolled      Patient Education:  Pt educated on post-op pain, early mobilization (POD0), potential same day DC, OP PT goal   Patient educated that our goal is to appropriately discharge patient based off their post-op function while striving to maintain maximal independence  The goal is to discharge patient to home and for them to attend outpatient physical therapy      Assigned to care team? Yes     CHW referral placed for medicare with [facility] PCP/[place] [facility] PCP? N/A ins MEDICARE COMPLETE MC REP     SW referral: N/A     Pt encouraged to call with any questions, concerns or issues

## 2022-09-01 ENCOUNTER — OFFICE VISIT (OUTPATIENT)
Dept: PHYSICAL THERAPY | Facility: REHABILITATION | Age: 77
End: 2022-09-01
Payer: COMMERCIAL

## 2022-09-01 ENCOUNTER — OFFICE VISIT (OUTPATIENT)
Dept: LAB | Facility: CLINIC | Age: 77
End: 2022-09-01
Payer: COMMERCIAL

## 2022-09-01 DIAGNOSIS — M54.50 LUMBOSACRAL PAIN: Primary | ICD-10-CM

## 2022-09-01 DIAGNOSIS — M17.0 PRIMARY OSTEOARTHRITIS OF BOTH KNEES: ICD-10-CM

## 2022-09-01 DIAGNOSIS — M54.16 LUMBAR RADICULOPATHY: ICD-10-CM

## 2022-09-01 DIAGNOSIS — G89.29 CHRONIC ARTHRALGIAS OF KNEES AND HIPS: ICD-10-CM

## 2022-09-01 DIAGNOSIS — M25.561 CHRONIC ARTHRALGIAS OF KNEES AND HIPS: ICD-10-CM

## 2022-09-01 DIAGNOSIS — M25.551 CHRONIC ARTHRALGIAS OF KNEES AND HIPS: ICD-10-CM

## 2022-09-01 DIAGNOSIS — M25.562 CHRONIC ARTHRALGIAS OF KNEES AND HIPS: ICD-10-CM

## 2022-09-01 DIAGNOSIS — M25.552 CHRONIC ARTHRALGIAS OF KNEES AND HIPS: ICD-10-CM

## 2022-09-01 DIAGNOSIS — Z01.818 PREOP EXAMINATION: ICD-10-CM

## 2022-09-01 PROCEDURE — 97112 NEUROMUSCULAR REEDUCATION: CPT | Performed by: PHYSICAL THERAPIST

## 2022-09-01 PROCEDURE — 93005 ELECTROCARDIOGRAM TRACING: CPT

## 2022-09-01 NOTE — PROGRESS NOTES
Daily Note     Today's date: 2022  Patient name: Lokesh Gerard  : 1945  MRN: [id number]  Referring provider: Baron Juanjo MD  Dx:   Encounter Diagnosis     ICD-10-CM    1  Lumbosacral pain  M54 50    2  Lumbar radiculopathy  M54 16    3  Chronic arthralgias of knees and hips  M25 551     G89 29     M25 561     M25 552     M25 562    4  Primary osteoarthritis of both knees  M17 0        Start Time:   Stop Time:   Total time in clinic (min): 51 minutes    Subjective: Kumar Ty reports he's been trying to spend more time without the additional insole in his shoe since last session  Objective: See treatment diary below      Assessment: Tolerated treatment well  Session continuing to focus on improved inner unit engagement and strength with good tolerance  Occasional verbal cuing required to maintain TA engagement between leg movements with good ability to incorporate feedback into performance  Patient demonstrated appropriate level of challenge and muscular fatigue throughout session and noted good status at end of visit  Patient demonstrated fatigue post treatment, exhibited good technique with therapeutic exercises and would benefit from continued PT  Plan: Continue per plan of care  Progress treatment as tolerated         Precautions: HTN, HLD, CAD, MI (), R TKA 22    * Indicates part of HEP   HEP code: [facility]     Daily Treatment Diary     Manuals 8/15 8/18 8/26 8/30 9/1      Prone LAD in CPP L done Done grade III-V L Done grade III-V L        Manual resisted hip ab/dduction   done                   Therapeutic Exercise           Bike  nv 5' lvl 2 5' lvl 3 5' lvl 3 5' lvl 3      Hamstring stretch           Quad stretch           Seated ball roll out           SLR flexion           SLR abduction           Paloff press           Supine hip extension bridge on ball           Supine lateral ball roll                      Patient education done                     Neuro Re-ed           Slump slider nv 15x3" ea 15x3" ea 15x3" ea 30x3" ea      ADIM* 2x10x5" 2x10x5" 3x10x5" 2x10x5" 10x10"      ADIM with marches    10x 10x      ADIM with BKFO  2x10 2x10 2x10 10x      ADIM heel taps           Bridges* 5x 3x10 3x12x3" 3x15x3" 3x12x3" 10#      Clamshells  hooklying GTB 3x10 hooklying GTN 3x12 hooklying BTB 3x10 hooklying BTB 3x12x3"      Supine ball crush           Standing ball crush    10x10" 20x10"      Standing ball crush marches           Bird dogs           Bear holds           Dead bug           Plank shoulder taps                                                       Therapeutic Activity           Sit to stands           Leg press  3x10 135# P8S3 3x10 125# P8S3 3x10 135# P8S3 3x12 135# P8S3      Band resisted side stepping           Monster walks           Goblet squat           Bent over row           Deadlift           Farmer's carry                                            Modalities

## 2022-09-02 LAB
ATRIAL RATE: 71 BPM
P AXIS: 52 DEGREES
PR INTERVAL: 236 MS
QRS AXIS: -53 DEGREES
QRSD INTERVAL: 104 MS
QT INTERVAL: 388 MS
QTC INTERVAL: 421 MS
T WAVE AXIS: -5 DEGREES
VENTRICULAR RATE: 71 BPM

## 2022-09-02 PROCEDURE — 93010 ELECTROCARDIOGRAM REPORT: CPT | Performed by: INTERNAL MEDICINE

## 2022-09-06 ENCOUNTER — OFFICE VISIT (OUTPATIENT)
Dept: PHYSICAL THERAPY | Facility: REHABILITATION | Age: 77
End: 2022-09-06
Payer: COMMERCIAL

## 2022-09-06 DIAGNOSIS — M25.551 CHRONIC ARTHRALGIAS OF KNEES AND HIPS: ICD-10-CM

## 2022-09-06 DIAGNOSIS — G89.29 CHRONIC ARTHRALGIAS OF KNEES AND HIPS: ICD-10-CM

## 2022-09-06 DIAGNOSIS — M25.552 CHRONIC ARTHRALGIAS OF KNEES AND HIPS: ICD-10-CM

## 2022-09-06 DIAGNOSIS — M25.561 CHRONIC ARTHRALGIAS OF KNEES AND HIPS: ICD-10-CM

## 2022-09-06 DIAGNOSIS — M54.50 LUMBOSACRAL PAIN: Primary | ICD-10-CM

## 2022-09-06 DIAGNOSIS — M54.16 LUMBAR RADICULOPATHY: ICD-10-CM

## 2022-09-06 DIAGNOSIS — M25.562 CHRONIC ARTHRALGIAS OF KNEES AND HIPS: ICD-10-CM

## 2022-09-06 DIAGNOSIS — M17.0 PRIMARY OSTEOARTHRITIS OF BOTH KNEES: ICD-10-CM

## 2022-09-06 PROCEDURE — 97112 NEUROMUSCULAR REEDUCATION: CPT | Performed by: PHYSICAL THERAPIST

## 2022-09-06 PROCEDURE — 97110 THERAPEUTIC EXERCISES: CPT | Performed by: PHYSICAL THERAPIST

## 2022-09-06 NOTE — PROGRESS NOTES
Daily Note     Today's date: 2022  Patient name: Willi Bermudez  : 1945  MRN: [id number]  Referring provider: Maureen Pizarro MD  Dx:   Encounter Diagnosis     ICD-10-CM    1  Lumbosacral pain  M54 50    2  Lumbar radiculopathy  M54 16    3  Chronic arthralgias of knees and hips  M25 551     G89 29     M25 561     M25 552     M25 562    4  Primary osteoarthritis of both knees  M17 0        Start Time:   Stop Time: 1533  Total time in clinic (min): 48 minutes    Subjective: Tl Vogt reports he did some gardening over the weekend with no significant changes in his back pain  Objective: See treatment diary below      Assessment: Tolerated treatment well  Challenged with introduction of ADIM heel taps this session with evident fatigue by end or sets  Introduced [employer] press this session for improved inner unit stability however, noted increased L sided low back pain after first set; some improvement in pain during second set after cuing to decrease tension on band  Tl Vogt reported improvement in pain following an additional set of slump sliders followed by standing ball crush marches  Tl Vogt reported improved status at end of session compared to start of session  Patient demonstrated appropriate level of challenge and muscular fatigue throughout session and noted good status at end of visit  Patient demonstrated fatigue post treatment, exhibited good technique with therapeutic exercises and would benefit from continued PT  Plan: Continue per plan of care  Progress treatment as tolerated         Precautions: HTN, HLD, CAD, MI (), R TKA 22    * Indicates part of HEP   HEP code: [facility]     Daily Treatment Diary     Manuals 8/15 8/18 8/26 8/30 9/1 9/6     Prone LAD in CPP L done Done grade III-V L Done grade III-V L        Manual resisted hip ab/dduction   done                   Therapeutic Exercise           Bike  nv 5' lvl 2 5' lvl 3 5' lvl 3 5' lvl 3 5' lvl 2     Hamstring stretch           Quad stretch           Seated ball roll out           SLR flexion           SLR abduction           Paloff press      2x10 GTB     Supine hip extension bridge on ball           Supine lateral ball roll                      Patient education done                     Neuro Re-ed           Slump slider nv 15x3" ea 15x3" ea 15x3" ea 30x3" ea 2x15x3" ea     ADIM* 2x10x5" 2x10x5" 3x10x5" 2x10x5" 10x10"      ADIM with marches    10x 10x 2x10     ADIM with BKFO  2x10 2x10 2x10 10x 2x10     ADIM heel taps      2x6     Bridges* 5x 3x10 3x12x3" 3x15x3" 3x12x3" 10# 3x15x3" 10#     Clamshells  hooklying GTB 3x10 hooklying GTN 3x12 hooklying BTB 3x10 hooklying BTB 3x12x3" hooklying BTB 3x12x3"     Supine ball crush           Standing ball crush    10x10" 20x10" 3x10x5"     Standing ball crush marches           Bird dogs           Bear holds           Dead bug           Plank shoulder taps                                                       Therapeutic Activity           Sit to stands           Leg press  3x10 135# P8S3 3x10 125# P8S3 3x10 135# P8S3 3x12 135# P8S3 3x10 145# P6S3     Band resisted side stepping           Monster walks           Goblet squat           Bent over row           Deadlift           Farmer's carry                                            Modalities

## 2022-09-08 ENCOUNTER — TELEPHONE (OUTPATIENT)
Dept: OBGYN CLINIC | Facility: HOSPITAL | Age: 77
End: 2022-09-08

## 2022-09-08 ENCOUNTER — OFFICE VISIT (OUTPATIENT)
Dept: PHYSICAL THERAPY | Facility: REHABILITATION | Age: 77
End: 2022-09-08
Payer: COMMERCIAL

## 2022-09-08 DIAGNOSIS — M25.561 CHRONIC ARTHRALGIAS OF KNEES AND HIPS: ICD-10-CM

## 2022-09-08 DIAGNOSIS — M17.0 PRIMARY OSTEOARTHRITIS OF BOTH KNEES: ICD-10-CM

## 2022-09-08 DIAGNOSIS — M25.552 CHRONIC ARTHRALGIAS OF KNEES AND HIPS: ICD-10-CM

## 2022-09-08 DIAGNOSIS — M54.16 LUMBAR RADICULOPATHY: ICD-10-CM

## 2022-09-08 DIAGNOSIS — M25.551 CHRONIC ARTHRALGIAS OF KNEES AND HIPS: ICD-10-CM

## 2022-09-08 DIAGNOSIS — M25.562 CHRONIC ARTHRALGIAS OF KNEES AND HIPS: ICD-10-CM

## 2022-09-08 DIAGNOSIS — M54.50 LUMBOSACRAL PAIN: Primary | ICD-10-CM

## 2022-09-08 DIAGNOSIS — G89.29 CHRONIC ARTHRALGIAS OF KNEES AND HIPS: ICD-10-CM

## 2022-09-08 PROCEDURE — 97140 MANUAL THERAPY 1/> REGIONS: CPT | Performed by: PHYSICAL THERAPIST

## 2022-09-08 PROCEDURE — 97112 NEUROMUSCULAR REEDUCATION: CPT | Performed by: PHYSICAL THERAPIST

## 2022-09-08 NOTE — PROGRESS NOTES
Daily Note     Today's date: 2022  Patient name: Grayson Oliver  : 1945  MRN: [id number]  Referring provider: Jax Bradford MD  Dx:   Encounter Diagnosis     ICD-10-CM    1  Lumbosacral pain  M54 50    2  Lumbar radiculopathy  M54 16    3  Chronic arthralgias of knees and hips  M25 551     G89 29     M25 561     M25 552     M25 562    4  Primary osteoarthritis of both knees  M17 0        Start Time: 1450  Stop Time: 1548  Total time in clinic (min): 58 minutes    Subjective: Rekha Fernandez reports his back was "terrible" yesterday but is feeling better today  Objective: See treatment diary below      Assessment: Tolerated treatment well  Challenged with introduction of dead bugs this session but able to maintain good form after cuing for initial reps  Patient demonstrated appropriate level of challenge and muscular fatigue throughout session and noted imiproved status at end of visit compared to start of visit  Patient demonstrated fatigue post treatment, exhibited good technique with therapeutic exercises and would benefit from continued PT  Plan: Continue per plan of care  Progress treatment as tolerated         Precautions: HTN, HLD, CAD, MI (), R TKA 22    * Indicates part of HEP   HEP code: [facility]     Daily Treatment Diary     Manuals 8/15 8/18 8/26 8/30 9/1 9/6 9/8    Prone LAD in CPP L done Done grade III-V L Done grade III-V L    Done grade IV-V    Manual resisted hip ab/dduction   done                   Therapeutic Exercise           Bike  nv 5' lvl 2 5' lvl 3 5' lvl 3 5' lvl 3 5' lvl 2 5' lvl 3    Supine piriformis stretch       10x10" L    Hamstring stretch           Quad stretch           Seated ball roll out           SLR flexion           SLR abduction           Paloff press      2x10 GTB np    Supine hip extension bridge on ball           Supine lateral ball roll                      Patient education done                     Neuro Re-ed           Slump slider nv 15x3" ea 15x3" ea 15x3" ea 30x3" ea 2x15x3" ea 15x3" ea    ADIM* 2x10x5" 2x10x5" 3x10x5" 2x10x5" 10x10"      ADIM with marches    10x 10x 2x10 2x12    ADIM with BKFO  2x10 2x10 2x10 10x 2x10 2x12    ADIM heel taps      2x6     Bridges* 5x 3x10 3x12x3" 3x15x3" 3x12x3" 10# 3x15x3" 10# 3x15x3" 10#    Clamshells  hooklying GTB 3x10 hooklying GTN 3x12 hooklying BTB 3x10 hooklying BTB 3x12x3" hooklying BTB 3x12x3" hooklying BTB 3x12x5"    Supine ball crush           Standing ball crush    10x10" 20x10" 3x10x5"     Standing ball crush marches           Bird dogs           Bear holds           Dead bug       W/ ball, single UE/LE lift 2x5    Plank shoulder taps                                                       Therapeutic Activity           Sit to stands           Leg press  3x10 135# P8S3 3x10 125# P8S3 3x10 135# P8S3 3x12 135# P8S3 3x10 145# P6S3 3x12 145# P6S3    Band resisted side stepping           Monster walks           Goblet squat           Bent over row           Deadlift           Farmer's carry                                            Modalities

## 2022-09-08 NOTE — TELEPHONE ENCOUNTER
Patient calling in stating that he cannot tolerate the iron  He states it is giving him diarrhea  Please advise  Also, the patient has a pre surgical survey he is trying to complete and it will not let him submit his answers  Please advise       cb # [phone]

## 2022-09-09 ENCOUNTER — TELEPHONE (OUTPATIENT)
Dept: OBGYN CLINIC | Facility: HOSPITAL | Age: 77
End: 2022-09-09

## 2022-09-12 ENCOUNTER — OFFICE VISIT (OUTPATIENT)
Dept: PHYSICAL THERAPY | Facility: REHABILITATION | Age: 77
End: 2022-09-12
Payer: COMMERCIAL

## 2022-09-12 DIAGNOSIS — M54.50 LUMBOSACRAL PAIN: Primary | ICD-10-CM

## 2022-09-12 DIAGNOSIS — G89.29 CHRONIC ARTHRALGIAS OF KNEES AND HIPS: ICD-10-CM

## 2022-09-12 DIAGNOSIS — M25.562 CHRONIC ARTHRALGIAS OF KNEES AND HIPS: ICD-10-CM

## 2022-09-12 DIAGNOSIS — M25.551 CHRONIC ARTHRALGIAS OF KNEES AND HIPS: ICD-10-CM

## 2022-09-12 DIAGNOSIS — M54.16 LUMBAR RADICULOPATHY: ICD-10-CM

## 2022-09-12 DIAGNOSIS — M25.561 CHRONIC ARTHRALGIAS OF KNEES AND HIPS: ICD-10-CM

## 2022-09-12 DIAGNOSIS — M17.0 PRIMARY OSTEOARTHRITIS OF BOTH KNEES: ICD-10-CM

## 2022-09-12 DIAGNOSIS — M25.552 CHRONIC ARTHRALGIAS OF KNEES AND HIPS: ICD-10-CM

## 2022-09-12 PROBLEM — M17.12 PRIMARY OSTEOARTHRITIS OF LEFT KNEE: Status: ACTIVE | Noted: 2022-09-12

## 2022-09-12 PROCEDURE — 97112 NEUROMUSCULAR REEDUCATION: CPT

## 2022-09-12 PROCEDURE — 97110 THERAPEUTIC EXERCISES: CPT

## 2022-09-12 NOTE — H&P (VIEW-ONLY)
Internal Medicine Pre-Operative Evaluation:     Reason for Visit: Pre-operative Evaluation for Risk Stratification and Optimization    Patient ID: Yelitza Yun is a 78 y o  male  Surgery: Arthroplasty of left knee  Referring Provider: Dr Shadi Vallejo      Primary osteoarthritis left knee   Failed conservative measures   Electing to undergo total joint arthroplasty    Pre-operative Optimization for planned surgery   Patient is surgically optimized for planned procedure   Patient meets preoperative quality goals as noted below   Recommendations as listed in PLAN section below  Melinda Starks 6419 surgical nurse  navigator with any questions regarding preoperative plan or schedule   Follow up visit with [facility] medical team 1 week after discharge from surgery as scheduled    HTN   Stable   Cont current medications as directed   Monitor post operative BP     CAD s/p multiple stents  · Cardiology cleared patient for his surgery back in April  He did well with that surgery with no issues   Nothing has substantially changed with his cardiac history/condition since that time  · Hold plavix 5 days prior to surgery  · Hold ASA 3 days prior to surgery    Previous R TKA  · 4/13/22  · No issues        Patient Active Problem List   Diagnosis    Hypovitaminosis D    Coronary artery disease of native artery of native heart with stable angina pectoris (HCC)    Chronic arthralgias of knees and hips    Muscle pain    Mixed hyperlipidemia    Need for hepatitis C screening test    Encounter for screening colonoscopy    Primary osteoarthritis of both knees    Sacroiliitis, not elsewhere classified ([facility] [address]    Allergic contact dermatitis due to other agents    Medicare annual wellness visit, subsequent    Actinic keratosis    Primary osteoarthritis of right knee    Status post total right knee replacement    Thoracic aortic aneurysm without rupture ([facility] [address] [facility]    Primary osteoarthritis of left knee        Plan: 1  Further preoperative workup as follows:   - none no further testing required may proceed with surgery    2  Medication Management/Recommendations:   - continue all current medicines through morning of surgery with sip of water except the following:  - hold aspirin 7 days prior to surgery  - avoid use of NSAID such as motrin, advil, aleve for 7 days prior to surgery  - hold all OTC herbal or nutritional supplements 7 days before surgery    3  Patient requires further consultation with:   No Consults Required    4  Discharge Planning / Barriers to Discharge  none identified - patients has post discharge therapy plan in place, transportation arranged for discharge day, adequate family support at home to assist with discharge to home  Recommendations to Proceed withSurgery    No contraindications to planned surgery      Subjective:           History of Present Illness:     Cathy Kennedy is a 78 y o  male who presents to the office today for a preoperative consultation at the request of surgeon  The patient understands this is an elective procedure and not emergent  They are electing to undergo planned procedure with an understanding that all surgery has inherent risk  They have worked with their surgeon and failed conservative treatment measures  Today they present for preoperative risk assessment and recommendations for optimization in preparation for surgery  Pt seen in surgical optimization center for upcoming proposed surgery  They have failed previous conservative measures and have elected surgical intervention  Pt meets presurgical lab and BMI optimization goals  Upon interview questioning patient is able to perform greater than 4 METs workload in daily life without any reported cardio-pulmonary symptoms  Pt had R TKA 4/22 without any post operative issues  He has a h/o CAD in the past, he received cardiac clearance prior to his other knee back in March           ROS: No TIA's or unusual headaches, no dysphagia  No prolonged cough  No dyspnea or chest pain on exertion  No abdominal pain, change in bowel habits, black or bloody stools  No urinary tract or BPH symptoms  Positive reported pain in arthritic joint  Positive difficulty with gait  No skin rashes or issues  Objective: There were no vitals taken for this visit        General Appearance: no distress, conversive  HEENT: PERRLA, conjuctiva normal; oropharynx clear; mucous membranes moist;   Neck:  Supple, no lymphadenopathy or thyromegaly  Lungs: CTA, normal respiratory effort, no retractions, expiratory effort normal  CV: regular rate and rhythm , PMI normal   ABD: soft non tender, no masses , no hepatic or splenomegaly  EXT: DP pulses intact, no lymphadenopathy, no edema  Skin: normal turgor, normal texture, no rash  Psych: affect normal, mood normal  Neuro: AAOx3        The following portions of the patient's history were reviewed and updated as appropriate: allergies, current medications, past family history, past medical history, past social history, past surgical history and problem list      Past History:       Past Medical History:   Diagnosis Date    Celiac disease     Coronary artery disease     Diverticulosis     Hyperlipidemia     Hypertension     Myocardial infarction ([facility] [address] )     Past Surgical History:   Procedure Laterality Date    COLONOSCOPY      CORONARY ANGIOPLASTY WITH STENT PLACEMENT      20 yrs ago    TX TOTAL KNEE ARTHROPLASTY Right 2022    Procedure: ARTHROPLASTY KNEE TOTAL and all associated procedures;  Surgeon: Robbin Lopez MD;  Location:  MAIN OR;  Service: Orthopedics          Social History     Tobacco Use    Smoking status: Former Smoker     Quit date:      Years since quittin 7    Smokeless tobacco: Never Used    Tobacco comment: Per pt, quit over 36 years ago   Vaping Use    Vaping Use: Never used   Substance Use Topics    Alcohol use: Not Currently    Drug use: Never     Family History   Problem Relation Age of Onset    Diabetes Mother     Coronary artery disease Father     Thyroid disease Sister           Allergies: Allergies   Allergen Reactions    Crestor [Rosuvastatin] Myalgia        Current Medications:     Current Outpatient Medications   Medication Instructions    ascorbic acid (VITAMIN C) 500 mg, Oral, Daily    aspirin (ECOTRIN LOW STRENGTH) 81 mg, Oral, 2 times daily, Please do not start taking until after total joint arthroplasty    baclofen 10 mg, Oral, 3 times daily PRN    clopidogrel (PLAVIX) 75 mg, Oral, Every morning    Cyanocobalamin (VITAMIN B 12 PO) Oral    diclofenac (VOLTAREN) 75 mg, Oral, Every morning    ezetimibe (ZETIA) 10 mg tablet TAKE 1 TABLET BY MOUTH  DAILY    ferrous sulfate 324 mg, Oral, 2 times daily before meals    folic acid (FOLVITE) 1 mg, Oral, Daily    gabapentin (NEURONTIN) 300 mg capsule TAKE 1 CAPSULE BY MOUTH 3  TIMES DAILY    Icosapent Ethyl 1 g CAPS     ketoconazole (NIZORAL) 2 % shampoo APPLY TOPICALLY 2 TIMES A WEEK    metoprolol succinate (TOPROL-XL) 50 mg, Oral, Daily    sertraline (ZOLOFT) 50 mg, Oral, Daily    simvastatin (ZOCOR) 40 mg, Oral, Daily at bedtime    tamsulosin (FLOMAX) 0 4 mg, Oral, Daily with dinner    traZODone (DESYREL) 150 mg tablet TAKE 1 TABLET BY MOUTH  DAILY AT BEDTIME    Vitamin D3 10,000 Units, Oral, Every morning             PRE-OP WORKSHEET DATA    Assessment of Pre-Operative Risks     MLJ Quality Hard Stops:  BMI (<40) : Estimated body mass index is 31 57 kg/m² as calculated from the following:    Height as of 8/12/22: 6' (1 829 m)  Weight as of 8/12/22: 106 kg (232 lb 12 8 oz)  Hgb ( >11):    Lab Results   Component Value Date    HGB 15 0 08/30/2022     HbA1c (<7 0) :   Lab Results   Component Value Date    HGBA1C 5 4 08/30/2022     GFR (>60) (Less then 45 = Nephrology consult):  CrCl cannot be calculated (Patient's most recent lab result is older than the maximum 7 days allowed  )  Active Decompensated Chronic Conditions which would delay surgery  No acutely decompensated medical issues such as recent CVA, MI, new onset arrhythmia, severe aortic stenosis, CHF, uncontrolled COPD       Exercise Capacity: (if less the 4 mets consider functional assessment via cardiac stress testing or consultation)    · Able to walk 2 blocks without symptoms?: Yes  · Able to walk 1 flights without symptoms?: Yes    Assessment of intra and post operative respiratory, hemodynamic and thrombotic risks     Prior Anesthesia Reactions: No     Personal history of venous thromboembolic disease? No    History of steroid use > 5 mg for >2 weeks within last year? No    Cardiac Risk Estimation: per the Revised Cardiac Risk Index (Circ  100:1043, 1999),     The patient's risk factors for cardiac complications include :  history of ischemic heart disease    Ashley Neff has a in hospital cardiac risk of RCI RISK CLASS II (1 risk factor, risk of major cardiac compl  appr  1 3%) based on RCRI calculator          Pre-Op Data Reviewed:       Laboratory Results: I have personally reviewed the pertinent laboratory results/reports     EKG:I have personally reviewed pertinent reports    I personally reviewed and interpreted available tracings in the electronic medical record    OLD RECORDS: reviewed old records in the chart review section if EHR on day of visit      Previous cardiopulmonary studies within the past year:  · Echocardiogram: yes, ef 66% on 4/2022  · Cardiac Catheterization: yes  · Stress Test: no      Time of visit including pre-visit chart review, visit and post-visit coordination of plan and care , review of pre-surgical lab work, preparation and time spent documenting note in electronic medical record, time spent face-to-face in physical examination answering patient questions: 60 minutes             [address]

## 2022-09-12 NOTE — PROGRESS NOTES
Internal Medicine Pre-Operative Evaluation:     Reason for Visit: Pre-operative Evaluation for Risk Stratification and Optimization    Patient ID: Oralia Smith is a 78 y o  male  Surgery: Arthroplasty of left knee  Referring Provider: Dr Gabe Yeboah      Primary osteoarthritis left knee   Failed conservative measures   Electing to undergo total joint arthroplasty    Pre-operative Optimization for planned surgery   Patient is surgically optimized for planned procedure   Patient meets preoperative quality goals as noted below   Recommendations as listed in PLAN section below  Melinda Starks 0599 surgical nurse  navigator with any questions regarding preoperative plan or schedule   Follow up visit with [facility] medical team 1 week after discharge from surgery as scheduled    HTN   Stable   Cont current medications as directed   Monitor post operative BP     CAD s/p multiple stents  · Cardiology cleared patient for his surgery back in April  He did well with that surgery with no issues   Nothing has substantially changed with his cardiac history/condition since that time  · Hold plavix 5 days prior to surgery  · Hold ASA 3 days prior to surgery    Previous R TKA  · 4/13/22  · No issues        Patient Active Problem List   Diagnosis    Hypovitaminosis D    Coronary artery disease of native artery of native heart with stable angina pectoris (HCC)    Chronic arthralgias of knees and hips    Muscle pain    Mixed hyperlipidemia    Need for hepatitis C screening test    Encounter for screening colonoscopy    Primary osteoarthritis of both knees    Sacroiliitis, not elsewhere classified ([facility] [address] )    Allergic contact dermatitis due to other agents    Medicare annual wellness visit, subsequent    Actinic keratosis    Primary osteoarthritis of right knee    Status post total right knee replacement    Thoracic aortic aneurysm without rupture ([facility] [address] )    Primary osteoarthritis of left knee        Plan: 1  Further preoperative workup as follows:   - none no further testing required may proceed with surgery    2  Medication Management/Recommendations:   - continue all current medicines through morning of surgery with sip of water except the following:  - hold aspirin 7 days prior to surgery  - avoid use of NSAID such as motrin, advil, aleve for 7 days prior to surgery  - hold all OTC herbal or nutritional supplements 7 days before surgery    3  Patient requires further consultation with:   No Consults Required    4  Discharge Planning / Barriers to Discharge  none identified - patients has post discharge therapy plan in place, transportation arranged for discharge day, adequate family support at home to assist with discharge to home  Recommendations to Proceed withSurgery    No contraindications to planned surgery      Subjective:           History of Present Illness:     Willi Bermudez is a 78 y o  male who presents to the office today for a preoperative consultation at the request of surgeon  The patient understands this is an elective procedure and not emergent  They are electing to undergo planned procedure with an understanding that all surgery has inherent risk  They have worked with their surgeon and failed conservative treatment measures  Today they present for preoperative risk assessment and recommendations for optimization in preparation for surgery  Pt seen in surgical optimization center for upcoming proposed surgery  They have failed previous conservative measures and have elected surgical intervention  Pt meets presurgical lab and BMI optimization goals  Upon interview questioning patient is able to perform greater than 4 METs workload in daily life without any reported cardio-pulmonary symptoms  Pt had R TKA 4/22 without any post operative issues  He has a h/o CAD in the past, he received cardiac clearance prior to his other knee back in March           ROS: No TIA's or unusual headaches, no dysphagia  No prolonged cough  No dyspnea or chest pain on exertion  No abdominal pain, change in bowel habits, black or bloody stools  No urinary tract or BPH symptoms  Positive reported pain in arthritic joint  Positive difficulty with gait  No skin rashes or issues  Objective: There were no vitals taken for this visit        General Appearance: no distress, conversive  HEENT: PERRLA, conjuctiva normal; oropharynx clear; mucous membranes moist;   Neck:  Supple, no lymphadenopathy or thyromegaly  Lungs: CTA, normal respiratory effort, no retractions, expiratory effort normal  CV: regular rate and rhythm , PMI normal   ABD: soft non tender, no masses , no hepatic or splenomegaly  EXT: DP pulses intact, no lymphadenopathy, no edema  Skin: normal turgor, normal texture, no rash  Psych: affect normal, mood normal  Neuro: AAOx3        The following portions of the patient's history were reviewed and updated as appropriate: allergies, current medications, past family history, past medical history, past social history, past surgical history and problem list      Past History:       Past Medical History:   Diagnosis Date    Celiac disease     Coronary artery disease     Diverticulosis     Hyperlipidemia     Hypertension     Myocardial infarction ([facility] [address] )     Past Surgical History:   Procedure Laterality Date    COLONOSCOPY      CORONARY ANGIOPLASTY WITH STENT PLACEMENT      20 yrs ago    AR TOTAL KNEE ARTHROPLASTY Right 2022    Procedure: ARTHROPLASTY KNEE TOTAL and all associated procedures;  Surgeon: Randy Marks MD;  Location:  MAIN OR;  Service: Orthopedics          Social History     Tobacco Use    Smoking status: Former Smoker     Quit date:      Years since quittin 7    Smokeless tobacco: Never Used    Tobacco comment: Per pt, quit over 36 years ago   Vaping Use    Vaping Use: Never used   Substance Use Topics    Alcohol use: Not Currently    Drug use: Never     Family History   Problem Relation Age of Onset    Diabetes Mother     Coronary artery disease Father     Thyroid disease Sister           Allergies: Allergies   Allergen Reactions    Crestor [Rosuvastatin] Myalgia        Current Medications:     Current Outpatient Medications   Medication Instructions    ascorbic acid (VITAMIN C) 500 mg, Oral, Daily    aspirin (ECOTRIN LOW STRENGTH) 81 mg, Oral, 2 times daily, Please do not start taking until after total joint arthroplasty    baclofen 10 mg, Oral, 3 times daily PRN    clopidogrel (PLAVIX) 75 mg, Oral, Every morning    Cyanocobalamin (VITAMIN B 12 PO) Oral    diclofenac (VOLTAREN) 75 mg, Oral, Every morning    ezetimibe (ZETIA) 10 mg tablet TAKE 1 TABLET BY MOUTH  DAILY    ferrous sulfate 324 mg, Oral, 2 times daily before meals    folic acid (FOLVITE) 1 mg, Oral, Daily    gabapentin (NEURONTIN) 300 mg capsule TAKE 1 CAPSULE BY MOUTH 3  TIMES DAILY    Icosapent Ethyl 1 g CAPS     ketoconazole (NIZORAL) 2 % shampoo APPLY TOPICALLY 2 TIMES A WEEK    metoprolol succinate (TOPROL-XL) 50 mg, Oral, Daily    sertraline (ZOLOFT) 50 mg, Oral, Daily    simvastatin (ZOCOR) 40 mg, Oral, Daily at bedtime    tamsulosin (FLOMAX) 0 4 mg, Oral, Daily with dinner    traZODone (DESYREL) 150 mg tablet TAKE 1 TABLET BY MOUTH  DAILY AT BEDTIME    Vitamin D3 10,000 Units, Oral, Every morning             PRE-OP WORKSHEET DATA    Assessment of Pre-Operative Risks     MLJ Quality Hard Stops:  BMI (<40) : Estimated body mass index is 31 57 kg/m² as calculated from the following:    Height as of 8/12/22: 6' (1 829 m)  Weight as of 8/12/22: 106 kg (232 lb 12 8 oz)  Hgb ( >11):    Lab Results   Component Value Date    HGB 15 0 08/30/2022     HbA1c (<7 0) :   Lab Results   Component Value Date    HGBA1C 5 4 08/30/2022     GFR (>60) (Less then 45 = Nephrology consult):  CrCl cannot be calculated (Patient's most recent lab result is older than the maximum 7 days allowed  )  Active Decompensated Chronic Conditions which would delay surgery  No acutely decompensated medical issues such as recent CVA, MI, new onset arrhythmia, severe aortic stenosis, CHF, uncontrolled COPD       Exercise Capacity: (if less the 4 mets consider functional assessment via cardiac stress testing or consultation)    · Able to walk 2 blocks without symptoms?: Yes  · Able to walk 1 flights without symptoms?: Yes    Assessment of intra and post operative respiratory, hemodynamic and thrombotic risks     Prior Anesthesia Reactions: No     Personal history of venous thromboembolic disease? No    History of steroid use > 5 mg for >2 weeks within last year? No    Cardiac Risk Estimation: per the Revised Cardiac Risk Index (Circ  100:1043, 1999),     The patient's risk factors for cardiac complications include :  history of ischemic heart disease    Adama Krishnamurthy has a in hospital cardiac risk of RCI RISK CLASS II (1 risk factor, risk of major cardiac compl  appr  1 3%) based on RCRI calculator          Pre-Op Data Reviewed:       Laboratory Results: I have personally reviewed the pertinent laboratory results/reports     EKG:I have personally reviewed pertinent reports    I personally reviewed and interpreted available tracings in the electronic medical record    OLD RECORDS: reviewed old records in the chart review section if EHR on day of visit      Previous cardiopulmonary studies within the past year:  · Echocardiogram: yes, ef 66% on 4/2022  · Cardiac Catheterization: yes  · Stress Test: no      Time of visit including pre-visit chart review, visit and post-visit coordination of plan and care , review of pre-surgical lab work, preparation and time spent documenting note in electronic medical record, time spent face-to-face in physical examination answering patient questions: 60 minutes             [address] [place] [address]

## 2022-09-12 NOTE — PATIENT INSTRUCTIONS
Contact surgical nurse  navigator with any questions regarding preoperative plan or schedule  Follow up visit with [facility] medical team 1 week after discharge from surgery    Stop all over the counter supplements, herbal, naturopathic  medications for 1 week prior to surgery UNLESS prescribed by your surgeon  Hold NSAIDS (i e  advil, alleve, motrin, ibuprofen, celebrex) minimum 7 days prior to surgery  Hold Asprin minimum 7 days prior to surgery  Recommend using Tylenol ( acetaminophen ) 500mg every eight hours during the first week post discharge in conjunction with any additional pain medicine prescribed by your surgeon  Use bowel medicines prescribed by your surgeon ( colace) daily post op during the first 1-2 weeks to avoid post operative constipation issues  Call [phone] with any post discharge concerns or medical issues  Stop plavix  and Asprin 5 days prior to surgery last day to take is 9/24/2022  Stop fish oil  1 week prior to surgery  Morning of surgery take only metoprolol with small sip of water

## 2022-09-12 NOTE — PROGRESS NOTES
Daily Note     Today's date: 2022  Patient name: Wilbert Balderas  : 1945  MRN: [id number]  Referring provider: Cristino Thomas MD  Dx:   Encounter Diagnosis     ICD-10-CM    1  Lumbosacral pain  M54 50    2  Lumbar radiculopathy  M54 16    3  Primary osteoarthritis of both knees  M17 0    4  Chronic arthralgias of knees and hips  M25 551     G89 29     M25 561     M25 552     M25 562                   Subjective: Patient stated the his back and knees (L>R) are bothering him from long walks in the hospital, and from the car to the car<>hospital        Objective: See treatment diary below      Assessment: Tolerated treatment well  Patient is responding well to piriformis stretching and slump sliders  Good core stability noted, but he's challenged with coordinating maintaining brace and breathing  Dead bugs continue to be challenging, minimal improvement with reps  Patient was limited only by his L knee today  Continued PT would be beneficial to improve function           Plan: Continue per plan of care         Precautions: HTN, HLD, CAD, MI (), R TKA 22    * Indicates part of HEP   HEP code: [facility]     Daily Treatment Diary     Manuals 8/15 8/18 8/26 8/30 9/1 9/6 9/8 9/12   Prone LAD in CPP L done Done grade III-V L Done grade III-V L    Done grade IV-V    Manual resisted hip ab/dduction   done                   Therapeutic Exercise           Bike  nv 5' lvl 2 5' lvl 3 5' lvl 3 5' lvl 3 5' lvl 2 5' lvl 3 5' lvl 3   Supine piriformis stretch       10x10" L 10x10" L   Hamstring stretch           Quad stretch           Seated ball roll out           SLR flexion           SLR abduction           Paloff press      2x10 GTB np    Supine hip extension bridge on ball           Supine lateral ball roll                      Patient education done                     Neuro Re-ed           Slump slider nv 15x3" ea 15x3" ea 15x3" ea 30x3" ea 2x15x3" ea 15x3" ea 15x3"   ADIM* 2x10x5" 2x10x5" 3x10x5" 2x10x5" 10x10"      ADIM with marches    10x 10x 2x10 2x12 2x12   ADIM with BKFO  2x10 2x10 2x10 10x 2x10 2x12 2x12   ADIM heel taps      2x6     Bridges* 5x 3x10 3x12x3" 3x15x3" 3x12x3" 10# 3x15x3" 10# 3x15x3" 10# 3x15x3" 10#   Clamshells  hooklying GTB 3x10 hooklying GTN 3x12 hooklying BTB 3x10 hooklying BTB 3x12x3" hooklying BTB 3x12x3" hooklying BTB 3x12x5" hooklying BTB 3x12x5"   Supine ball crush           Standing ball crush    10x10" 20x10" 3x10x5"     Standing ball crush marches           Bird dogs           Bear holds           Dead bug       W/ ball, single UE/LE lift 2x5 W/ ball, single UE/LE lift 2x5   Plank shoulder taps                                                       Therapeutic Activity           Sit to stands           Leg press  3x10 135# P8S3 3x10 125# P8S3 3x10 135# P8S3 3x12 135# P8S3 3x10 145# P6S3 3x12 145# P6S3 3x12 145# P6S3   Band resisted side stepping           Monster walks           Goblet squat           Bent over row           Deadlift           Farmer's carry                                            Modalities

## 2022-09-13 DIAGNOSIS — M17.11 PRIMARY OSTEOARTHRITIS OF RIGHT KNEE: ICD-10-CM

## 2022-09-13 DIAGNOSIS — M17.0 PRIMARY OSTEOARTHRITIS OF BOTH KNEES: ICD-10-CM

## 2022-09-13 DIAGNOSIS — M17.12 PRIMARY OSTEOARTHRITIS OF LEFT KNEE: ICD-10-CM

## 2022-09-13 RX ORDER — DICLOFENAC SODIUM 75 MG/1
TABLET, DELAYED RELEASE ORAL
Qty: 30 TABLET | Refills: 0 | Status: SHIPPED | OUTPATIENT
Start: 2022-09-13 | End: 2022-10-11

## 2022-09-14 ENCOUNTER — OFFICE VISIT (OUTPATIENT)
Dept: INTERNAL MEDICINE CLINIC | Facility: CLINIC | Age: 77
End: 2022-09-14
Payer: COMMERCIAL

## 2022-09-14 VITALS
DIASTOLIC BLOOD PRESSURE: 80 MMHG | HEIGHT: 72 IN | SYSTOLIC BLOOD PRESSURE: 110 MMHG | BODY MASS INDEX: 31.18 KG/M2 | HEART RATE: 66 BPM | OXYGEN SATURATION: 94 % | WEIGHT: 230.2 LBS

## 2022-09-14 DIAGNOSIS — Z96.651 STATUS POST TOTAL RIGHT KNEE REPLACEMENT: ICD-10-CM

## 2022-09-14 DIAGNOSIS — I25.118 CORONARY ARTERY DISEASE OF NATIVE ARTERY OF NATIVE HEART WITH STABLE ANGINA PECTORIS (HCC): Primary | ICD-10-CM

## 2022-09-14 DIAGNOSIS — M17.12 PRIMARY OSTEOARTHRITIS OF LEFT KNEE: ICD-10-CM

## 2022-09-14 DIAGNOSIS — E78.2 MIXED HYPERLIPIDEMIA: ICD-10-CM

## 2022-09-14 PROCEDURE — 99215 OFFICE O/P EST HI 40 MIN: CPT | Performed by: INTERNAL MEDICINE

## 2022-09-15 ENCOUNTER — OFFICE VISIT (OUTPATIENT)
Dept: PHYSICAL THERAPY | Facility: REHABILITATION | Age: 77
End: 2022-09-15
Payer: COMMERCIAL

## 2022-09-15 DIAGNOSIS — M25.551 CHRONIC ARTHRALGIAS OF KNEES AND HIPS: ICD-10-CM

## 2022-09-15 DIAGNOSIS — M25.561 CHRONIC ARTHRALGIAS OF KNEES AND HIPS: ICD-10-CM

## 2022-09-15 DIAGNOSIS — M25.562 CHRONIC ARTHRALGIAS OF KNEES AND HIPS: ICD-10-CM

## 2022-09-15 DIAGNOSIS — G89.29 CHRONIC ARTHRALGIAS OF KNEES AND HIPS: ICD-10-CM

## 2022-09-15 DIAGNOSIS — M54.50 LUMBOSACRAL PAIN: Primary | ICD-10-CM

## 2022-09-15 DIAGNOSIS — M25.552 CHRONIC ARTHRALGIAS OF KNEES AND HIPS: ICD-10-CM

## 2022-09-15 DIAGNOSIS — M54.16 LUMBAR RADICULOPATHY: ICD-10-CM

## 2022-09-15 DIAGNOSIS — M17.0 PRIMARY OSTEOARTHRITIS OF BOTH KNEES: ICD-10-CM

## 2022-09-15 PROCEDURE — 97112 NEUROMUSCULAR REEDUCATION: CPT | Performed by: PHYSICAL THERAPIST

## 2022-09-15 PROCEDURE — 97530 THERAPEUTIC ACTIVITIES: CPT | Performed by: PHYSICAL THERAPIST

## 2022-09-15 NOTE — PROGRESS NOTES
Daily Note     Today's date: 9/15/2022  Patient name: Kadie Elliott  : 1945  MRN: [id number]  Referring provider: Suzanne Villagran MD  Dx:   Encounter Diagnosis     ICD-10-CM    1  Lumbosacral pain  M54 50    2  Lumbar radiculopathy  M54 16    3  Primary osteoarthritis of both knees  M17 0    4  Chronic arthralgias of knees and hips  M25 551     G89 29     M25 561     M25 552     M25 562        Start Time: 1448  Stop Time: 1531  Total time in clinic (min): 43 minutes    Subjective: Ruthann De La Rosa reports having a lot of LBP Tuesday and yesterday but some improvement today  Objective: See treatment diary below      Assessment: Tolerated treatment well  Good response to all exercises performed this session  Session continued to focus on hip girdle and inner unit stability  Occasional cuing required for proper for during bent over rows and for ADIM heel taps with fair ability to incorporate feedback into performance  Patient demonstrated fatigue post treatment, exhibited good technique with therapeutic exercises and would benefit from continued PT  Plan: Continue per plan of care  Progress treatment as tolerated         Precautions: HTN, HLD, CAD, MI (), R TKA 22    * Indicates part of HEP   HEP code: [facility]     Daily Treatment Diary     Manuals 9/15  8/26 8/30 9/1 9/6 9/8 9/12   Prone LAD in CPP   Done grade III-V L    Done grade IV-V    Manual resisted hip ab/dduction   done                   Therapeutic Exercise           Bike  5' lvl 2  5' lvl 3 5' lvl 3 5' lvl 3 5' lvl 2 5' lvl 3 5' lvl 3   Supine piriformis stretch 10x10" ea      10x10" L 10x10" L   Hamstring stretch           Quad stretch           Seated ball roll out           SLR flexion           SLR abduction           Paloff press      2x10 GTB np    Supine hip extension bridge on ball           Supine lateral ball roll                      Patient education                      Neuro Re-ed           Slump slider 15x3"  15x3" ea 15x3" ea 30x3" ea 2x15x3" ea 15x3" ea 15x3"   ADIM*   3x10x5" 2x10x5" 10x10"      ADIM with marches    10x 10x 2x10 2x12 2x12   ADIM with BKFO   2x10 2x10 10x 2x10 2x12 2x12   ADIM heel taps 3x6     2x6     Bridges* 15# 3x12  3x12x3" 3x15x3" 3x12x3" 10# 3x15x3" 10# 3x15x3" 10# 3x15x3" 10#   Clamshells   hooklying GTN 3x12 hooklying BTB 3x10 hooklying BTB 3x12x3" hooklying BTB 3x12x3" hooklying BTB 3x12x5" hooklying BTB 3x12x5"   Supine ball crush           Standing ball crush    10x10" 20x10" 3x10x5"     Standing ball crush with alternating marches 2x10x3"          Bird dogs           Bear holds           Dead bug       W/ ball, single UE/LE lift 2x5 W/ ball, single UE/LE lift 2x5   Plank shoulder taps 2x10 leaning on table                                                      Therapeutic Activity           Sit to stands           Leg press 3x12 155# P8S3  3x10 125# P8S3 3x10 135# P8S3 3x12 135# P8S3 3x10 145# P6S3 3x12 145# P6S3 3x12 145# P6S3   Band resisted side stepping           Monster walks           Goblet squat           Bent over row GTB 3x10          Deadlift           Farmer's carry                                            Modalities

## 2022-09-16 DIAGNOSIS — N40.1 BENIGN PROSTATIC HYPERPLASIA WITH LOWER URINARY TRACT SYMPTOMS, SYMPTOM DETAILS UNSPECIFIED: ICD-10-CM

## 2022-09-16 RX ORDER — TAMSULOSIN HYDROCHLORIDE 0.4 MG/1
CAPSULE ORAL
Qty: 90 CAPSULE | Refills: 3 | Status: SHIPPED | OUTPATIENT
Start: 2022-09-16

## 2022-09-16 NOTE — TELEPHONE ENCOUNTER
[facility] a [facility] (FLOMAX) 0 4 mg tablet  Pt meets the requirements placed by [facility]  Will refill medication

## 2022-09-16 NOTE — ASSESSMENT & PLAN NOTE
58 YO M with PMHx of former smoker (2 "black and mild" cigars daily for 8-9 years and quit 6 months ago) and s/p right open thoracotomy for cardiac mass removal 20 years ago who presented to [facility] on 9/7 with SOB, Orthopnea, BL LE edema and unintentional weight loss and found with large left pleural effusion and RLL subsegmental/ segmental PE. s/p thoracentesis (9/7) and course complicated left hydropneumothorax with rapid reaccumulation Now transferred to [facility] 9/10 for further management.     # NEUROLOGY  - AOx3 with no current issues.   - Left lateral chest wall pain and continued on lidoderm patch.     # CARDIOVASCULAR  - Concern noted for RH strain at [facility] second to cardiomegaly on CTA CHEST (9/7).   - ECHO (9/7) with normal LVSF, moderate LVH, but poorly visualized right side.   - BP remains stable with no concerns for right strain.  - Complaining of chest pain on left, likely second to PTX, EKG/Trop negative  - Monitor HR/ BP     # RESPIRATORY  - Presented to [facility] with SOB, orthopnea and BL LE edema and found with AHRF likely second to large L pleural effusion c/b left hydroPTX and RLL subsegmental/ segmental PE  - CTA CHEST (9/7) with segmental and subsegmental RLL PE, no central PEs noted, SAULO mass like opacity (3.3 x 2 cm) and RUL nodule (1.4 x 1.3 cm) and LARGE L pleural effusion extending from apex with associated complete LLL and partial SAULO atelectasis  - s/p Thoracentesis (9/7) at [facility] and (+) lights criteria.   - Continue on Heparin GTT   - Continue RA and NC PRN   - Continue on Albuterol PRN   - Cytopathology of pleural fluid showing atypical cells (9/7)  - S/P Pleuroscopy w/ pleurovac and biopsy-> f.u results    # GI  - Continue on PO diet   - Constipated and Miralax/ Senna started.     # RENAL  - No acute renal issues.   - Monitor renal function and UOP.     # INFECTIOUS DISEASE  - COVID PCR (9/7) NGTD   - MRSA PCR pending   - No acute infectious concerns.     # HEME  - CTA CHEST with RLL segmental and subsegmental PEs   - Continue on FULL DOSE Heparin GTT  - Pt still deciding on oral AC due to insurance and high deductible. Per patient him and sister are working on obtaining medicaid to help pay for medication.    # ONC   - Hx of open right thoracotomy for posterior cardiac mass removal greater than 20 years ago. Patient reports noted to be benign.  - May need CT C/A/P (+/-) repeat thora pending cytopath results  - Holding off on CTAP for now due to uptrending creat.    # ENDOCRINE  - No acute issues noted.     # ETHICS/ GOC    - FULL CODE     # DISPO - TBD   · Not on statin due to muscle pain  58 YO M with PMHx of former smoker (2 "black and mild" cigars daily for 8-9 years and quit 6 months ago) and s/p right open thoracotomy for cardiac mass removal 20 years ago who presented to [facility] on 9/7 with SOB, Orthopnea, BL LE edema and unintentional weight loss and found with large left pleural effusion and RLL subsegmental/ segmental PE. s/p thoracentesis (9/7) and course complicated left hydropneumothorax with rapid reaccumulation Now transferred to [facility] 9/10 for further management.     # NEUROLOGY  - AOx3 with no current issues.   - Left lateral chest wall pain and continued on lidoderm patch.   Tylenol prn     # CARDIOVASCULAR  - Concern noted for RH strain at [facility] second to cardiomegaly on CTA CHEST (9/7).   - ECHO (9/7) with normal LVSF, moderate LVH, but poorly visualized right side.   - BP remains stable with no concerns for right strain.  - Complaining of chest pain on left, likely second to PTX, EKG/Trop negative  - Monitor HR/ BP     # RESPIRATORY  - Presented to [facility] with SOB, orthopnea and BL LE edema and found with AHRF likely second to large L pleural effusion c/b left hydroPTX and RLL subsegmental/ segmental PE  - CTA CHEST (9/7) with segmental and subsegmental RLL PE, no central PEs noted, SAULO mass like opacity (3.3 x 2 cm) and RUL nodule (1.4 x 1.3 cm) and LARGE L pleural effusion extending from apex with associated complete LLL and partial SAULO atelectasis  - s/p Thoracentesis (9/7) at [facility] and (+) lights criteria.   - Continue on Heparin GTT   - Continue RA and NC PRN   - Continue on Albuterol PRN   - Cytopathology of pleural fluid showing atypical cells (9/7)  - S/P Pleuroscopy w/ pleurovac and biopsy-> f.u results  - Pt has fu with Dr Ortega 10/3   - VNS to follow patient     # GI  - Continue on PO diet   - Constipated and Miralax/ Senna started.     # RENAL  - No acute renal issues.   - Monitor renal function and UOP.     # INFECTIOUS DISEASE  - COVID PCR (9/7) NGTD   - MRSA PCR pending   - No acute infectious concerns.     # HEME  - CTA CHEST with RLL segmental and subsegmental PEs   - Continue on FULL DOSE Heparin GTT  - Pt still deciding on oral AC due to insurance and high deductible. Per patient him and sister are working on obtaining medicaid to help pay for medication.  - PT qualified for [employer] program with $10 copay card Instructions given to patient     # ONC   - Hx of open right thoracotomy for posterior cardiac mass removal greater than 20 years ago. Patient reports noted to be benign.  - May need CT C/A/P (+/-) repeat thora pending cytopath results  - Holding off on CTAP for now due to uptrending creat.    # ENDOCRINE  - No acute issues noted.     # ETHICS/ GOC    - FULL CODE     # DISPO - Home with VNS

## 2022-09-19 ENCOUNTER — EVALUATION (OUTPATIENT)
Dept: PHYSICAL THERAPY | Facility: REHABILITATION | Age: 77
End: 2022-09-19
Payer: COMMERCIAL

## 2022-09-19 DIAGNOSIS — M25.561 CHRONIC ARTHRALGIAS OF KNEES AND HIPS: ICD-10-CM

## 2022-09-19 DIAGNOSIS — M25.562 CHRONIC ARTHRALGIAS OF KNEES AND HIPS: ICD-10-CM

## 2022-09-19 DIAGNOSIS — G89.29 CHRONIC ARTHRALGIAS OF KNEES AND HIPS: ICD-10-CM

## 2022-09-19 DIAGNOSIS — M25.552 CHRONIC ARTHRALGIAS OF KNEES AND HIPS: ICD-10-CM

## 2022-09-19 DIAGNOSIS — M54.50 LUMBOSACRAL PAIN: Primary | ICD-10-CM

## 2022-09-19 DIAGNOSIS — M54.16 LUMBAR RADICULOPATHY: ICD-10-CM

## 2022-09-19 DIAGNOSIS — M17.0 PRIMARY OSTEOARTHRITIS OF BOTH KNEES: ICD-10-CM

## 2022-09-19 DIAGNOSIS — M25.551 CHRONIC ARTHRALGIAS OF KNEES AND HIPS: ICD-10-CM

## 2022-09-19 PROCEDURE — 97530 THERAPEUTIC ACTIVITIES: CPT | Performed by: PHYSICAL THERAPIST

## 2022-09-19 PROCEDURE — 97112 NEUROMUSCULAR REEDUCATION: CPT | Performed by: PHYSICAL THERAPIST

## 2022-09-19 NOTE — PROGRESS NOTES
PT Re-Evaluation     Today's date: 2022  Patient name: Kadie Elliott  : 1945  MRN: [id number]  Referring provider: Suzanne Villagran MD  Dx:   Encounter Diagnosis     ICD-10-CM    1  Lumbosacral pain  M54 50    2  Lumbar radiculopathy  M54 16    3  Primary osteoarthritis of both knees  M17 0    4  Chronic arthralgias of knees and hips  M25 551     G89 29     M25 561     M25 552     M25 562        Start Time: 1449  Stop Time: 1535  Total time in clinic (min): 46 minutes    Assessment  Assessment details: Per patient report and clinical findings, Kadie Elliott has made good progress since starting skilled physical therapy services  Kadie Elliott has been compliant with PT POC and HEP since initial evaluation  To this date, Kadie Elliott has completed 10 visits of skilled physical therapy  Kadie Elliott demonstrates improvements in objective data however, continues to be limited compared to his prior level of function  Remaining deficits include pain while walking, navigating stairs, standing, and lifting/reaching overhead  Recommend continued skilled physical therapy services to address remaining deficits to promote progress towards his prior level of function  Impairments: abnormal muscle firing, abnormal or restricted ROM, activity intolerance, impaired physical strength, lacks appropriate home exercise program, pain with function and weight-bearing intolerance  Understanding of Dx/Px/POC: good   Prognosis: good    Goals  STG (3 weeks):  1  Increase L hamstring strength to at least 4+/5 for improved activity tolerance  - progressing  2  Decrease pain at worst from 5/10 to 3/10 or less for improved QoL  - progressing    LTG (6 weeks):  1  Increased global hip girdle strength to at least 4+/5 to promote improved activity tolerance  - progressing  2  Able to walk at least 30 minutes without symptom exacerbation to promote improved community ambulation  - progressing  3   Able to ascend/descend 1 flight of stairs without compensation to promote improved household and community ambulation  - progressing  4  Independent with HEP  - progressing    Plan  Plan details: Thank you for referring Trang Jeffers to Physical Therapy at [place] [address] and for the opportunity to coordinate care  Patient would benefit from: skilled physical therapy  Planned modality interventions: cryotherapy, electrical stimulation/Russian stimulation, TENS, thermotherapy: hydrocollator packs and unattended electrical stimulation  Planned therapy interventions: abdominal trunk stabilization, activity modification, ADL retraining, balance, balance/weight bearing training, behavior modification, body mechanics training, breathing training, fine motor coordination training, flexibility, functional ROM exercises, gait training, graded activity, graded exercise, graded motor, home exercise program, work reintegration, transfer training, therapeutic training, therapeutic exercise, therapeutic activities, stretching, strengthening, self care, postural training, patient education, neuromuscular re-education, muscle pump exercises, motor coordination training, Blum taping, manual therapy, IADL retraining and joint mobilization  Frequency: 2x week  Duration in weeks: 2  Plan of Care beginning date: 9/19/2022  Plan of Care expiration date: 10/3/2022  Treatment plan discussed with: patient        Subjective Evaluation    History of Present Illness  Mechanism of injury:   09/19/22:  Trang Jeffers reports feeling he has made 70% progress since IE  Adele Jenkins reports improvements in pain frequency and intensity, walking, and going up stairs  Despite improvements, Adele Jenkins reports continued difficulty with overhead reaching/lifting, walking on flat surfaces, and prolonged standing   Adele Jenkins states his back felt very good over the weekend and was able to bend to do some yard work without problems but did have some pain later in the evening  08/15/22:  Antoine Jama presents to skilled physical therapy with complaints of L sided low back pain with L sided radicular symptoms about 2-3 months ago  Gian Lopez does recall falling while hiking when his dog pulled him and landed on his L hip  Gian Lopez reports pain located in the L side of the low back that radiates down the L posterior thigh, stopping about mid thigh, with numbness/tingling in the L toes  Since onset, symptoms have stayed the same  Currently, Gian Lopez is having difficulty with walking on flat surfaces, going up stairs, and WB on L leg  Patient denies difficulty sleeping secondary to L sided low back and radicular pain  States he had an injection in the L SIJ but denies relief  States he will be seeing a chiropractor on 22  Pain  Current pain ratin  At best pain ratin  At worst pain ratin  Location: L low back and leg  Quality: sharp (numb/tingle, constant)    Social Support  Steps to enter house: no  Stairs in house: yes   Lives in: multiple-level home  Lives with: spouse and adult children    Employment status: working    Diagnostic Tests  No diagnostic tests performed  Abnormal CT scan: CT for injection  Treatments  Current treatment: chiropractic and physical therapy  Patient Goals  Patient goals for therapy: decreased pain          Objective     Palpation     Additional Palpation Details  TTP through L glute min and piriformis    Tenderness     Lumbar Spine  No tenderness in the spinous process, left transverse process and right transverse process  Left Hip   Tenderness in the PSIS  No tenderness in the ASIS  Right Hip   No tenderness in the ASIS and PSIS  Active Range of Motion     Lumbar   Flexion:  WFL  Extension:  WFL  Left lateral flexion:  WFL and with pain  Right lateral flexion:  WFL  Left Hip   Flexion: [facility]  External rotation (90/90): [facility]  Internal rotation (90/90):  WFL    Right Hip   Flexion: [facility]  External rotation (90/90): [facility]  [facility]/90): [facility]    Strength/Myotome Testing     Left Hip   Planes of Motion   Flexion: 4+  External rotation: 5  Internal rotation: 5    Right Hip   Planes of Motion   Flexion: 4+  External rotation: 5  Internal rotation: 5    Tests     Lumbar     Left   Positive slump test    Negative passive SLR  Right   Positive slump test      Left Pelvic Girdle/Sacrum   Negative: active SLR test      Left Hip   Positive scour  Negative [place]  Additional Tests Details  From IE:  RLE functionally longer than LLE  L ASIS elevated compared to R ASIS  L PSIS elevated compared to R PSIS    No symptom provocation with PA glides or UPA glides bilaterally throughout the lumbar spine  No symptom provocation with sacral PA glides              Precautions: HTN, HLD, CAD, MI (1999), R TKA 4/13/22    * Indicates part of HEP   HEP code: [facility]     Daily Treatment Diary     Manuals 9/15 9/19  8/30 9/1 9/6 9/8 9/12   Prone LAD in CPP       Done grade IV-V    Manual resisted hip ab/dduction                      Therapeutic Exercise           Bike  5' lvl 2 5' lvl 3  5' lvl 3 5' lvl 3 5' lvl 2 5' lvl 3 5' lvl 3   Supine piriformis stretch 10x10" ea 10x10" ea     10x10" L 10x10" L   Hamstring stretch           Quad stretch           Seated ball roll out           SLR flexion           SLR abduction           Paloff press      2x10 GTB np    Supine hip extension bridge on ball           Supine lateral ball roll                      Patient education                      Neuro Re-ed           Slump slider 15x3" 15x3" ea  15x3" ea 30x3" ea 2x15x3" ea 15x3" ea 15x3"   ADIM*    2x10x5" 10x10"      ADIM with marches    10x 10x 2x10 2x12 2x12   ADIM with BKFO    2x10 10x 2x10 2x12 2x12   ADIM heel taps 3x6 3x8    2x6     Bridges* 15# 3x12 15# 3x15  3x15x3" 3x12x3" 10# 3x15x3" 10# 3x15x3" 10# 3x15x3" 10#   Clamshells    hooklying BTB 3x10 hooklying BTB 3x12x3" hooklying BTB 3x12x3" hooklying BTB 3x12x5" hooklying BTB 3x12x5"   Supine ball crush           Standing ball crush    10x10" 20x10" 3x10x5"     Standing ball crush with alternating marches 2x10x3"          Bird dogs           Bear holds           Dead bug       W/ ball, single UE/LE lift 2x5 W/ ball, single UE/LE lift 2x5   Plank shoulder taps 2x10 leaning on table 3x10 leaning on table                                                     Therapeutic Activity           Sit to stands           Leg press 3x12 155# P8S3 3x10 165# P8S3  3x10 135# P8S3 3x12 135# P8S3 3x10 145# P6S3 3x12 145# P6S3 3x12 145# P6S3   Band resisted side stepping           Monster walks           Goblet squat           Bent over row GTB 3x10          Deadlift           Farmer's carry                                            Modalities

## 2022-09-22 ENCOUNTER — APPOINTMENT (OUTPATIENT)
Dept: PHYSICAL THERAPY | Facility: REHABILITATION | Age: 77
End: 2022-09-22
Payer: COMMERCIAL

## 2022-09-22 DIAGNOSIS — M17.11 PRIMARY OSTEOARTHRITIS OF RIGHT KNEE: ICD-10-CM

## 2022-09-22 DIAGNOSIS — M17.12 PRIMARY OSTEOARTHRITIS OF LEFT KNEE: ICD-10-CM

## 2022-09-22 RX ORDER — FOLIC ACID 1 MG/1
TABLET ORAL
Qty: 90 TABLET | Refills: 1 | Status: SHIPPED | OUTPATIENT
Start: 2022-09-22

## 2022-09-23 ENCOUNTER — OFFICE VISIT (OUTPATIENT)
Dept: PHYSICAL THERAPY | Facility: REHABILITATION | Age: 77
End: 2022-09-23
Payer: COMMERCIAL

## 2022-09-23 DIAGNOSIS — M54.16 LUMBAR RADICULOPATHY: ICD-10-CM

## 2022-09-23 DIAGNOSIS — M25.562 CHRONIC ARTHRALGIAS OF KNEES AND HIPS: ICD-10-CM

## 2022-09-23 DIAGNOSIS — M25.552 CHRONIC ARTHRALGIAS OF KNEES AND HIPS: ICD-10-CM

## 2022-09-23 DIAGNOSIS — M17.0 PRIMARY OSTEOARTHRITIS OF BOTH KNEES: ICD-10-CM

## 2022-09-23 DIAGNOSIS — M25.551 CHRONIC ARTHRALGIAS OF KNEES AND HIPS: ICD-10-CM

## 2022-09-23 DIAGNOSIS — M25.561 CHRONIC ARTHRALGIAS OF KNEES AND HIPS: ICD-10-CM

## 2022-09-23 DIAGNOSIS — M54.50 LUMBOSACRAL PAIN: Primary | ICD-10-CM

## 2022-09-23 DIAGNOSIS — G89.29 CHRONIC ARTHRALGIAS OF KNEES AND HIPS: ICD-10-CM

## 2022-09-23 PROCEDURE — 97530 THERAPEUTIC ACTIVITIES: CPT | Performed by: PHYSICAL THERAPIST

## 2022-09-23 PROCEDURE — 97112 NEUROMUSCULAR REEDUCATION: CPT | Performed by: PHYSICAL THERAPIST

## 2022-09-23 PROCEDURE — 97110 THERAPEUTIC EXERCISES: CPT | Performed by: PHYSICAL THERAPIST

## 2022-09-23 RX ORDER — MULTIVITAMIN
1 CAPSULE ORAL DAILY
COMMUNITY

## 2022-09-23 NOTE — PROGRESS NOTES
Daily Note     Today's date: 2022  Patient name: Dean Witt  : 1945  MRN: [id number]  Referring provider: Karuna Aragon MD  Dx:   Encounter Diagnosis     ICD-10-CM    1  Lumbosacral pain  M54 50    2  Lumbar radiculopathy  M54 16    3  Primary osteoarthritis of both knees  M17 0    4  Chronic arthralgias of knees and hips  M25 551     G89 29     M25 561     M25 552     M25 562        Start Time: 851  Stop Time: 936  Total time in clinic (min): 45 minutes    Subjective: Otilio reports walking has been feeling okay but does shill have pain when reaching/working overhead  Objective: See treatment diary below      Assessment: Tolerated treatment well  Increased fatigue with increased reps and weight for exercises as indicated below  Patient demonstrated appropriate level of challenge and muscular fatigue throughout session and noted good status at end of visit  Patient demonstrated fatigue post treatment, exhibited good technique with therapeutic exercises and would benefit from continued PT  Plan: Continue per plan of care  Potential discharge next visit  Progress treatment as tolerated         Precautions: HTN, HLD, CAD, MI (), R TKA 22    * Indicates part of HEP   HEP code: [facility]     Daily Treatment Diary     Manuals 9/15 9/19 9/23  9/1 9/6 9/8 9/12   Prone LAD in CPP       Done grade IV-V    Manual resisted hip ab/dduction                      Therapeutic Exercise           Bike  5' lvl 2 5' lvl 3 5' lvl 2  5' lvl 3 5' lvl 2 5' lvl 3 5' lvl 3   Supine piriformis stretch 10x10" ea 10x10" ea 10x10" ea    10x10" L 10x10" L   Hamstring stretch           Quad stretch           Seated ball roll out           SLR flexion           SLR abduction           Paloff press      2x10 GTB np    Supine hip extension bridge on ball           Supine lateral ball roll           Hamstring curl   75# 3x10                   Patient education                      Neuro Re-ed Slump slider 15x3" 15x3" ea 15x3" ea  30x3" ea 2x15x3" ea 15x3" ea 15x3"   ADIM*     10x10"      ADIM with marches     10x 2x10 2x12 2x12   ADIM with BKFO     10x 2x10 2x12 2x12   ADIM heel taps 3x6 3x8 3x10   2x6     Bridges* 15# 3x12 15# 3x15 30# 3x10  3x12x3" 10# 3x15x3" 10# 3x15x3" 10# 3x15x3" 10#   Clamshells     hooklying BTB 3x12x3" hooklying BTB 3x12x3" hooklying BTB 3x12x5" hooklying BTB 3x12x5"   Supine ball crush           Standing ball crush     20x10" 3x10x5"     Standing ball crush with alternating marches 2x10x3"  2x10x3"        Bird dogs           Bear holds           Dead bug       W/ ball, single UE/LE lift 2x5 W/ ball, single UE/LE lift 2x5   Plank shoulder taps 2x10 leaning on table 3x10 leaning on table 3x10 leaning on table                                                    Therapeutic Activity           Sit to stands           Leg press 3x12 155# P8S3 3x10 165# P8S3 3x12 165# P8S3   3x12 135# P8S3 3x10 145# P6S3 3x12 145# P6S3 3x12 145# P6S3   Band resisted side stepping           Monster walks           Goblet squat           Bent over row GTB 3x10          Deadlift           Farmer's carry           Forward step ups   8" step up/back 2x10 ea                              Modalities

## 2022-09-23 NOTE — PRE-PROCEDURE INSTRUCTIONS
Pre-Surgery Instructions:   Medication Instructions    ascorbic acid (VITAMIN C) 500 MG tablet Stop taking 7 days prior to surgery   aspirin (ECOTRIN LOW STRENGTH) 81 mg EC tablet Stop taking 7 days prior to surgery   baclofen 10 mg tablet Hold day of surgery   Cholecalciferol (Vitamin D3) 1 25 MG (82603 UT) CAPS Stop taking 7 days prior to surgery   clopidogrel (PLAVIX) 75 mg tablet Stop taking 5 days prior to surgery   Cyanocobalamin (VITAMIN B 12 PO) Stop taking 7 days prior to surgery   diclofenac (VOLTAREN) 75 mg EC tablet Stop taking 7 days prior to surgery   folic acid (FOLVITE) 1 mg tablet Stop taking 7 days prior to surgery   gabapentin (NEURONTIN) 300 mg capsule Take night before surgery    Icosapent Ethyl 1 g CAPS Instructions provided by MD   [address] [place] [address] ketoconazole (NIZORAL) 2 % shampoo Hold day of surgery   metoprolol succinate (TOPROL-XL) 50 mg 24 hr tablet Take day of surgery   Multiple Vitamin (multivitamin) capsule Stop taking 7 days prior to surgery   sertraline (ZOLOFT) 50 mg tablet Take day of surgery   simvastatin (ZOCOR) 40 mg tablet Hold day of surgery   tamsulosin (FLOMAX) 0 4 mg Hold day of surgery   traZODone (DESYREL) 150 mg tablet Take night before surgery        NPO after midnight, meds in am with sips of water  Understands when to expect preop phone call  Remove all jewelry  Advised of visitation policy  Before your operation, you play an important role in decreasing your risk for infection by washing with special antiseptic soap  This is an effective way to reduce bacteria on the skin which may help to prevent infections at the surgical site  Please read the following directions in advance  1  In the week before your operation purchase a 4 ounce bottle of antiseptic soap containing chlorhexidine gluconate 4%  Some brand names include: Aplicare, Endure, and Hibiclens    The cost is usually less than $5 00  · For your convenience, the [place]  [place] carries the soap  · It may also be available at your doctor's office or pre-admission testing center, and at most retail pharmacies  · If you are allergic or sensitive to soaps containing chlorhexidine gluconate (CHG), please let your doctor know so another antiseptic soap can be suggested  · CHG antiseptic soap is for external use only  2      The day before your operation follow these directions carefully to get ready  · Place clean lines (sheets) on your bed; you should sleep on clean sheets after your evening shower  · Get clean towels and washcloths ready - you need enough for 2 showers  · Set aside clean underwear, pajamas, and clothing to wear after the shower  Reminders:  · DO NOT use any other soap or body rinse on your skin during or after the antiseptic showers  · DO NOT use lotion , powder, deodorant, or perfume/aftershave of any kind on your skin after your antiseptic shower  · DO NOT shave any body parts in the 24 hours/the day before your operation  · DO NOT get the antiseptic soap in your eyes, ears, nose, mouth, or vaginal area  3      You will need to shower the night before AND the morning of your Surgery  Shower 1:  · The evening before your operation, take the fist shower  · First, shampoo your hair with regular shampoo and rinse it completely before you use the anitseptic soap  After washing and rinsing your hair, rinse your body  · Next, use a clean wash cloth to apply the antiseptic soap and wash your body from the neck down to your toes using 1/2 bottle of the antiseptic soap  You will use the other 1/2 bottle for the second shower  · Clean the area where your incision will be; later this area well for about 2 minutes  · If you ar having head or neck surgery, wash areas with the antiseptic soap  · Rinse yourself completely with running water  · Use a clean towel to dry off  · Wear clean underwear and clothing/pajamas    Shower 2:  · The Morning of your operation, take the second shower following the same steps as Shower 1 using the second 1/2 of the bottle of antiseptic soap  · Use clean cloths and towels to was and dry yourself off  · Wear clean underwear and clothing  See Geriatric Assessment below        Cognitive Assessment:      CAM: 3 for recall      TUG <15 sec: yes     Falls (last 6 months): yes     Hand  score: N/A  -Attempt 1:  -Attempt 2:  -Attempt 3:     Chuck Total Score: 21     PHQ- 9 Depression Scale: 0     Nutrition Assessment Score: 11     METS: 9 8     Health goals:  -What are your overall health goals? (quit smoking, wt  loss, rest, decrease stress)  For my knee to feel better    -What brings you strength? (family, friends, Yarsanism, health)  Family    -What activities are important to you? (exercise, reading, travel, work)  Exercise, walking the dog, vacation house

## 2022-09-26 ENCOUNTER — OFFICE VISIT (OUTPATIENT)
Dept: PHYSICAL THERAPY | Facility: REHABILITATION | Age: 77
End: 2022-09-26
Payer: COMMERCIAL

## 2022-09-26 DIAGNOSIS — M25.552 CHRONIC ARTHRALGIAS OF KNEES AND HIPS: ICD-10-CM

## 2022-09-26 DIAGNOSIS — M25.561 CHRONIC ARTHRALGIAS OF KNEES AND HIPS: ICD-10-CM

## 2022-09-26 DIAGNOSIS — M25.551 CHRONIC ARTHRALGIAS OF KNEES AND HIPS: ICD-10-CM

## 2022-09-26 DIAGNOSIS — M17.0 PRIMARY OSTEOARTHRITIS OF BOTH KNEES: ICD-10-CM

## 2022-09-26 DIAGNOSIS — G89.29 CHRONIC ARTHRALGIAS OF KNEES AND HIPS: ICD-10-CM

## 2022-09-26 DIAGNOSIS — M54.50 LUMBOSACRAL PAIN: Primary | ICD-10-CM

## 2022-09-26 DIAGNOSIS — M25.562 CHRONIC ARTHRALGIAS OF KNEES AND HIPS: ICD-10-CM

## 2022-09-26 DIAGNOSIS — M54.16 LUMBAR RADICULOPATHY: ICD-10-CM

## 2022-09-26 PROCEDURE — 97110 THERAPEUTIC EXERCISES: CPT | Performed by: PHYSICAL THERAPIST

## 2022-09-26 PROCEDURE — 97112 NEUROMUSCULAR REEDUCATION: CPT | Performed by: PHYSICAL THERAPIST

## 2022-09-26 NOTE — PROGRESS NOTES
Daily Note     Today's date: 2022  Patient name: Dimitri Nur  : 1945  MRN: [id number]  Referring provider: Enrique Doll MD  Dx:   Encounter Diagnosis     ICD-10-CM    1  Lumbosacral pain  M54 50    2  Lumbar radiculopathy  M54 16    3  Primary osteoarthritis of both knees  M17 0    4  Chronic arthralgias of knees and hips  M25 551     G89 29     M25 561     M25 552     M25 562        Start Time: 1455  Stop Time: 1531  Total time in clinic (min): 36 minutes    Subjective: Otilio reports getting relief from stretching out his back/L hip over the weekend  Objective: See treatment diary below      Assessment: Tolerated treatment well  Demonstrates good understanding of exercises performed today and included in HEP  Patient demonstrated fatigue post treatment and exhibited good technique with therapeutic exercises  Plan: Discharge to independent HEP  Updated and reviewed HEP with patient; patient verbalized and demonstrated understanding of all exercises       Precautions: HTN, HLD, CAD, MI (), R TKA 22    * Indicates part of HEP   HEP code: [facility]     Daily Treatment Diary     Manuals 9/15 9/19 9/23 9/26  9/6 9/8 9/12   Prone LAD in CPP       Done grade IV-V    Manual resisted hip ab/dduction                      Therapeutic Exercise           Bike  5' lvl 2 5' lvl 3 5' lvl 2 np  5' lvl 2 5' lvl 3 5' lvl 3   Supine piriformis stretch 10x10" ea 10x10" ea 10x10" ea 10x10" ea   10x10" L 10x10" L   Hamstring stretch    10x5"       Quad stretch           Seated ball roll out    L and R 10x5"       SLR flexion           SLR abduction           Paloff press      2x10 GTB np    Supine hip extension bridge on ball           Supine lateral ball roll           Hamstring curl   75# 3x10                   Patient education                      Neuro Re-ed           Slump slider 15x3" 15x3" ea 15x3" ea 15x3" ea  2x15x3" ea 15x3" ea 15x3"   ADIM*           ADIM with marches      2x10 2x12 2x12   ADIM with BKFO      2x10 2x12 2x12   ADIM heel taps 3x6 3x8 3x10 3x10  2x6     Bridges* 15# 3x12 15# 3x15 30# 3x10 30# 3x10  3x15x3" 10# 3x15x3" 10# 3x15x3" 10#   Clamshells      hooklying BTB 3x12x3" hooklying BTB 3x12x5" hooklying BTB 3x12x5"   Supine ball crush           Standing ball crush      3x10x5"     Standing ball crush with alternating marches 2x10x3"  2x10x3" 3x10x5"       Bird dogs           Bear holds           Dead bug       W/ ball, single UE/LE lift 2x5 W/ ball, single UE/LE lift 2x5   Plank shoulder taps 2x10 leaning on table 3x10 leaning on table 3x10 leaning on table                                                    Therapeutic Activity           Sit to stands           Leg press 3x12 155# P8S3 3x10 165# P8S3 3x12 165# P8S3    3x10 145# P6S3 3x12 145# P6S3 3x12 145# P6S3   Band resisted side stepping           Monster walks           Goblet squat           Bent over row GTB 3x10          Deadlift           Farmer's carry           Forward step ups   8" step up/back 2x10 ea                              Modalities

## 2022-09-29 ENCOUNTER — ANESTHESIA (OUTPATIENT)
Dept: PERIOP | Facility: HOSPITAL | Age: 77
End: 2022-09-29
Payer: COMMERCIAL

## 2022-09-29 ENCOUNTER — HOSPITAL ENCOUNTER (OUTPATIENT)
Facility: HOSPITAL | Age: 77
Setting detail: OUTPATIENT SURGERY
Discharge: HOME | End: 2022-09-30
Attending: ORTHOPAEDIC SURGERY | Admitting: ORTHOPAEDIC SURGERY
Payer: COMMERCIAL

## 2022-09-29 DIAGNOSIS — Z96.652 S/P TOTAL KNEE ARTHROPLASTY, LEFT: ICD-10-CM

## 2022-09-29 DIAGNOSIS — Z96.652 STATUS POST TOTAL KNEE REPLACEMENT, LEFT: Primary | ICD-10-CM

## 2022-09-29 PROCEDURE — C9290 INJ, BUPIVACAINE LIPOSOME: HCPCS | Performed by: STUDENT IN AN ORGANIZED HEALTH CARE EDUCATION/TRAINING PROGRAM

## 2022-09-29 PROCEDURE — 27447 TOTAL KNEE ARTHROPLASTY: CPT | Performed by: PHYSICIAN ASSISTANT

## 2022-09-29 PROCEDURE — 27447 TOTAL KNEE ARTHROPLASTY: CPT | Performed by: ORTHOPAEDIC SURGERY

## 2022-09-29 PROCEDURE — C1776 JOINT DEVICE (IMPLANTABLE): HCPCS | Performed by: ORTHOPAEDIC SURGERY

## 2022-09-29 PROCEDURE — 86900 BLOOD TYPING SEROLOGIC ABO: CPT | Performed by: ORTHOPAEDIC SURGERY

## 2022-09-29 PROCEDURE — 97167 OT EVAL HIGH COMPLEX 60 MIN: CPT

## 2022-09-29 PROCEDURE — 86850 RBC ANTIBODY SCREEN: CPT | Performed by: ORTHOPAEDIC SURGERY

## 2022-09-29 PROCEDURE — C1713 ANCHOR/SCREW BN/BN,TIS/BN: HCPCS | Performed by: ORTHOPAEDIC SURGERY

## 2022-09-29 PROCEDURE — 86901 BLOOD TYPING SEROLOGIC RH(D): CPT | Performed by: ORTHOPAEDIC SURGERY

## 2022-09-29 PROCEDURE — 97163 PT EVAL HIGH COMPLEX 45 MIN: CPT

## 2022-09-29 PROCEDURE — 51702 INSERT TEMP BLADDER CATH: CPT | Performed by: UROLOGY

## 2022-09-29 DEVICE — SMARTSET HV HIGH VISCOSITY BONE CEMENT 40G
Type: IMPLANTABLE DEVICE | Site: KNEE | Status: FUNCTIONAL
Brand: SMARTSET

## 2022-09-29 DEVICE — ATTUNE PATELLA MEDIALIZED DOME 41MM CEMENTED AOX
Type: IMPLANTABLE DEVICE | Site: KNEE | Status: FUNCTIONAL
Brand: ATTUNE

## 2022-09-29 DEVICE — ATTUNE KNEE SYSTEM FEMORAL POSTERIOR STABILIZED SIZE 8 LEFT CEMENTED
Type: IMPLANTABLE DEVICE | Site: KNEE | Status: FUNCTIONAL
Brand: ATTUNE

## 2022-09-29 DEVICE — ATTUNE KNEE SYSTEM TIBIAL INSERT ROTATING PLATFORM POSTERIOR STABILIZED 8 7MM AOX
Type: IMPLANTABLE DEVICE | Site: KNEE | Status: FUNCTIONAL
Brand: ATTUNE

## 2022-09-29 DEVICE — ATTUNE KNEE SYSTEM TIBIAL BASE ROTATING PLATFORM SIZE 8 CEMENTED
Type: IMPLANTABLE DEVICE | Site: KNEE | Status: FUNCTIONAL
Brand: ATTUNE

## 2022-09-29 RX ORDER — OXYCODONE HYDROCHLORIDE 5 MG/1
TABLET ORAL
Qty: 30 TABLET | Refills: 0 | Status: SHIPPED | OUTPATIENT
Start: 2022-09-29

## 2022-09-29 RX ORDER — PROMETHAZINE HYDROCHLORIDE 25 MG/ML
25 INJECTION, SOLUTION INTRAMUSCULAR; INTRAVENOUS ONCE AS NEEDED
Status: DISCONTINUED | OUTPATIENT
Start: 2022-09-29 | End: 2022-09-29 | Stop reason: HOSPADM

## 2022-09-29 RX ORDER — MAGNESIUM HYDROXIDE 1200 MG/15ML
LIQUID ORAL AS NEEDED
Status: DISCONTINUED | OUTPATIENT
Start: 2022-09-29 | End: 2022-09-29 | Stop reason: HOSPADM

## 2022-09-29 RX ORDER — BUPIVACAINE HYDROCHLORIDE 7.5 MG/ML
INJECTION, SOLUTION INTRASPINAL AS NEEDED
Status: DISCONTINUED | OUTPATIENT
Start: 2022-09-29 | End: 2022-09-29

## 2022-09-29 RX ORDER — HYDROMORPHONE HCL/PF 1 MG/ML
0.5 SYRINGE (ML) INJECTION
Status: DISCONTINUED | OUTPATIENT
Start: 2022-09-29 | End: 2022-09-29 | Stop reason: HOSPADM

## 2022-09-29 RX ORDER — CLOPIDOGREL BISULFATE 75 MG/1
75 TABLET ORAL EVERY MORNING
Status: DISCONTINUED | OUTPATIENT
Start: 2022-09-30 | End: 2022-09-30 | Stop reason: HOSPADM

## 2022-09-29 RX ORDER — CEFAZOLIN SODIUM 2 G/50ML
2000 SOLUTION INTRAVENOUS ONCE
Status: COMPLETED | OUTPATIENT
Start: 2022-09-29 | End: 2022-09-29

## 2022-09-29 RX ORDER — BUPIVACAINE HYDROCHLORIDE AND EPINEPHRINE 5; 5 MG/ML; UG/ML
INJECTION, SOLUTION PERINEURAL AS NEEDED
Status: DISCONTINUED | OUTPATIENT
Start: 2022-09-29 | End: 2022-09-29 | Stop reason: HOSPADM

## 2022-09-29 RX ORDER — DOCUSATE SODIUM 100 MG/1
100 CAPSULE, LIQUID FILLED ORAL 2 TIMES DAILY
Status: DISCONTINUED | OUTPATIENT
Start: 2022-09-29 | End: 2022-09-30 | Stop reason: HOSPADM

## 2022-09-29 RX ORDER — CHLORHEXIDINE GLUCONATE 0.12 MG/ML
15 RINSE ORAL ONCE
Status: COMPLETED | OUTPATIENT
Start: 2022-09-29 | End: 2022-09-29

## 2022-09-29 RX ORDER — HYDROMORPHONE HCL/PF 1 MG/ML
0.5 SYRINGE (ML) INJECTION EVERY 2 HOUR PRN
Status: DISCONTINUED | OUTPATIENT
Start: 2022-09-29 | End: 2022-09-30 | Stop reason: HOSPADM

## 2022-09-29 RX ORDER — ONDANSETRON 2 MG/ML
INJECTION INTRAMUSCULAR; INTRAVENOUS AS NEEDED
Status: DISCONTINUED | OUTPATIENT
Start: 2022-09-29 | End: 2022-09-29

## 2022-09-29 RX ORDER — SODIUM CHLORIDE 9 MG/ML
125 INJECTION, SOLUTION INTRAVENOUS CONTINUOUS
Status: DISCONTINUED | OUTPATIENT
Start: 2022-09-29 | End: 2022-09-30 | Stop reason: HOSPADM

## 2022-09-29 RX ORDER — CALCIUM CARBONATE 200(500)MG
1000 TABLET,CHEWABLE ORAL DAILY PRN
Status: DISCONTINUED | OUTPATIENT
Start: 2022-09-29 | End: 2022-09-30 | Stop reason: HOSPADM

## 2022-09-29 RX ORDER — BUPIVACAINE HYDROCHLORIDE 2.5 MG/ML
INJECTION, SOLUTION EPIDURAL; INFILTRATION; INTRACAUDAL AS NEEDED
Status: DISCONTINUED | OUTPATIENT
Start: 2022-09-29 | End: 2022-09-29

## 2022-09-29 RX ORDER — FENTANYL CITRATE 50 UG/ML
INJECTION, SOLUTION INTRAMUSCULAR; INTRAVENOUS AS NEEDED
Status: DISCONTINUED | OUTPATIENT
Start: 2022-09-29 | End: 2022-09-29

## 2022-09-29 RX ORDER — SIMVASTATIN 40 MG
40 TABLET ORAL
Status: DISCONTINUED | OUTPATIENT
Start: 2022-09-29 | End: 2022-09-29

## 2022-09-29 RX ORDER — LIDOCAINE HYDROCHLORIDE 10 MG/ML
INJECTION, SOLUTION EPIDURAL; INFILTRATION; INTRACAUDAL; PERINEURAL AS NEEDED
Status: DISCONTINUED | OUTPATIENT
Start: 2022-09-29 | End: 2022-09-29

## 2022-09-29 RX ORDER — DEXAMETHASONE SODIUM PHOSPHATE 10 MG/ML
INJECTION, SOLUTION INTRAMUSCULAR; INTRAVENOUS AS NEEDED
Status: DISCONTINUED | OUTPATIENT
Start: 2022-09-29 | End: 2022-09-29

## 2022-09-29 RX ORDER — PROPOFOL 10 MG/ML
INJECTION, EMULSION INTRAVENOUS CONTINUOUS PRN
Status: DISCONTINUED | OUTPATIENT
Start: 2022-09-29 | End: 2022-09-29

## 2022-09-29 RX ORDER — OXYCODONE HYDROCHLORIDE 5 MG/1
5 TABLET ORAL EVERY 4 HOURS PRN
Status: DISCONTINUED | OUTPATIENT
Start: 2022-09-29 | End: 2022-09-30 | Stop reason: HOSPADM

## 2022-09-29 RX ORDER — KETOROLAC TROMETHAMINE 30 MG/ML
INJECTION, SOLUTION INTRAMUSCULAR; INTRAVENOUS AS NEEDED
Status: DISCONTINUED | OUTPATIENT
Start: 2022-09-29 | End: 2022-09-29 | Stop reason: HOSPADM

## 2022-09-29 RX ORDER — ALBUTEROL SULFATE 2.5 MG/3ML
2.5 SOLUTION RESPIRATORY (INHALATION) ONCE AS NEEDED
Status: DISCONTINUED | OUTPATIENT
Start: 2022-09-29 | End: 2022-09-29 | Stop reason: HOSPADM

## 2022-09-29 RX ORDER — SODIUM CHLORIDE, SODIUM LACTATE, POTASSIUM CHLORIDE, CALCIUM CHLORIDE 600; 310; 30; 20 MG/100ML; MG/100ML; MG/100ML; MG/100ML
75 INJECTION, SOLUTION INTRAVENOUS CONTINUOUS
Status: DISCONTINUED | OUTPATIENT
Start: 2022-09-29 | End: 2022-09-30 | Stop reason: HOSPADM

## 2022-09-29 RX ORDER — CHLORHEXIDINE GLUCONATE 4 G/100ML
SOLUTION TOPICAL DAILY PRN
Status: DISCONTINUED | OUTPATIENT
Start: 2022-09-29 | End: 2022-09-29

## 2022-09-29 RX ORDER — CEFAZOLIN SODIUM 1 G/50ML
1000 SOLUTION INTRAVENOUS EVERY 8 HOURS
Status: COMPLETED | OUTPATIENT
Start: 2022-09-29 | End: 2022-09-30

## 2022-09-29 RX ORDER — ENOXAPARIN SODIUM 100 MG/ML
40 INJECTION SUBCUTANEOUS DAILY
Status: DISCONTINUED | OUTPATIENT
Start: 2022-09-30 | End: 2022-09-30 | Stop reason: HOSPADM

## 2022-09-29 RX ORDER — ACETAMINOPHEN 325 MG/1
975 TABLET ORAL EVERY 8 HOURS
Status: DISCONTINUED | OUTPATIENT
Start: 2022-09-29 | End: 2022-09-30 | Stop reason: HOSPADM

## 2022-09-29 RX ORDER — EZETIMIBE 10 MG/1
10 TABLET ORAL DAILY
Status: DISCONTINUED | OUTPATIENT
Start: 2022-09-29 | End: 2022-09-30 | Stop reason: HOSPADM

## 2022-09-29 RX ORDER — SODIUM CHLORIDE, SODIUM LACTATE, POTASSIUM CHLORIDE, CALCIUM CHLORIDE 600; 310; 30; 20 MG/100ML; MG/100ML; MG/100ML; MG/100ML
INJECTION, SOLUTION INTRAVENOUS CONTINUOUS PRN
Status: DISCONTINUED | OUTPATIENT
Start: 2022-09-29 | End: 2022-09-29

## 2022-09-29 RX ORDER — METOPROLOL SUCCINATE 50 MG/1
50 TABLET, EXTENDED RELEASE ORAL DAILY
Status: DISCONTINUED | OUTPATIENT
Start: 2022-09-29 | End: 2022-09-30 | Stop reason: HOSPADM

## 2022-09-29 RX ORDER — METOCLOPRAMIDE HYDROCHLORIDE 5 MG/ML
10 INJECTION INTRAMUSCULAR; INTRAVENOUS ONCE AS NEEDED
Status: DISCONTINUED | OUTPATIENT
Start: 2022-09-29 | End: 2022-09-29 | Stop reason: HOSPADM

## 2022-09-29 RX ORDER — ACETAMINOPHEN 325 MG/1
975 TABLET ORAL ONCE
Status: COMPLETED | OUTPATIENT
Start: 2022-09-29 | End: 2022-09-29

## 2022-09-29 RX ORDER — FENTANYL CITRATE/PF 50 MCG/ML
25 SYRINGE (ML) INJECTION
Status: DISCONTINUED | OUTPATIENT
Start: 2022-09-29 | End: 2022-09-29 | Stop reason: HOSPADM

## 2022-09-29 RX ORDER — TRANEXAMIC ACID 100 MG/ML
INJECTION, SOLUTION INTRAVENOUS AS NEEDED
Status: DISCONTINUED | OUTPATIENT
Start: 2022-09-29 | End: 2022-09-29 | Stop reason: HOSPADM

## 2022-09-29 RX ORDER — OXYCODONE HYDROCHLORIDE 10 MG/1
10 TABLET ORAL EVERY 4 HOURS PRN
Status: DISCONTINUED | OUTPATIENT
Start: 2022-09-29 | End: 2022-09-30 | Stop reason: HOSPADM

## 2022-09-29 RX ORDER — SENNOSIDES 8.6 MG
650 CAPSULE ORAL EVERY 8 HOURS PRN
Qty: 30 TABLET | Refills: 0 | Status: SHIPPED | OUTPATIENT
Start: 2022-09-29 | End: 2022-10-29

## 2022-09-29 RX ORDER — PROPOFOL 10 MG/ML
INJECTION, EMULSION INTRAVENOUS AS NEEDED
Status: DISCONTINUED | OUTPATIENT
Start: 2022-09-29 | End: 2022-09-29

## 2022-09-29 RX ORDER — TAMSULOSIN HYDROCHLORIDE 0.4 MG/1
0.4 CAPSULE ORAL
Status: DISCONTINUED | OUTPATIENT
Start: 2022-09-29 | End: 2022-09-30 | Stop reason: HOSPADM

## 2022-09-29 RX ADMIN — DEXAMETHASONE SODIUM PHOSPHATE 10 MG: 10 INJECTION, SOLUTION INTRAMUSCULAR; INTRAVENOUS at 12:40

## 2022-09-29 RX ADMIN — SERTRALINE HYDROCHLORIDE 50 MG: 50 TABLET ORAL at 16:52

## 2022-09-29 RX ADMIN — TAMSULOSIN HYDROCHLORIDE 0.4 MG: 0.4 CAPSULE ORAL at 16:52

## 2022-09-29 RX ADMIN — BUPIVACAINE HYDROCHLORIDE 10 ML: 2.5 INJECTION, SOLUTION EPIDURAL; INFILTRATION; INTRACAUDAL at 11:30

## 2022-09-29 RX ADMIN — TRANEXAMIC ACID 1000 MG: 1 INJECTION, SOLUTION INTRAVENOUS at 12:30

## 2022-09-29 RX ADMIN — CHLORHEXIDINE GLUCONATE 0.12% ORAL RINSE 15 ML: 1.2 LIQUID ORAL at 11:11

## 2022-09-29 RX ADMIN — FENTANYL CITRATE 12.5 MCG: 50 INJECTION, SOLUTION INTRAMUSCULAR; INTRAVENOUS at 13:51

## 2022-09-29 RX ADMIN — FENTANYL CITRATE 12.5 MCG: 50 INJECTION, SOLUTION INTRAMUSCULAR; INTRAVENOUS at 13:00

## 2022-09-29 RX ADMIN — FENTANYL CITRATE 12.5 MCG: 50 INJECTION, SOLUTION INTRAMUSCULAR; INTRAVENOUS at 13:09

## 2022-09-29 RX ADMIN — DOCUSATE SODIUM 100 MG: 100 CAPSULE, LIQUID FILLED ORAL at 17:56

## 2022-09-29 RX ADMIN — FENTANYL CITRATE 25 MCG: 50 INJECTION, SOLUTION INTRAMUSCULAR; INTRAVENOUS at 12:50

## 2022-09-29 RX ADMIN — LIDOCAINE HYDROCHLORIDE 50 ML: 10 INJECTION, SOLUTION EPIDURAL; INFILTRATION; INTRACAUDAL at 12:21

## 2022-09-29 RX ADMIN — PROPOFOL 70 MCG/KG/MIN: 10 INJECTION, EMULSION INTRAVENOUS at 12:21

## 2022-09-29 RX ADMIN — ACETAMINOPHEN 975 MG: 325 TABLET ORAL at 11:11

## 2022-09-29 RX ADMIN — EZETIMIBE 10 MG: 10 TABLET ORAL at 16:52

## 2022-09-29 RX ADMIN — SODIUM CHLORIDE, SODIUM LACTATE, POTASSIUM CHLORIDE, AND CALCIUM CHLORIDE: .6; .31; .03; .02 INJECTION, SOLUTION INTRAVENOUS at 12:10

## 2022-09-29 RX ADMIN — SODIUM CHLORIDE, SODIUM LACTATE, POTASSIUM CHLORIDE, AND CALCIUM CHLORIDE 75 ML/HR: .6; .31; .03; .02 INJECTION, SOLUTION INTRAVENOUS at 16:52

## 2022-09-29 RX ADMIN — TRAZODONE HYDROCHLORIDE 150 MG: 100 TABLET ORAL at 23:49

## 2022-09-29 RX ADMIN — OXYCODONE HYDROCHLORIDE 10 MG: 10 TABLET ORAL at 23:49

## 2022-09-29 RX ADMIN — ACETAMINOPHEN 975 MG: 325 TABLET, FILM COATED ORAL at 16:52

## 2022-09-29 RX ADMIN — ONDANSETRON 4 MG: 2 INJECTION INTRAMUSCULAR; INTRAVENOUS at 13:49

## 2022-09-29 RX ADMIN — BUPIVACAINE 20 ML: 13.3 INJECTION, SUSPENSION, LIPOSOMAL INFILTRATION at 11:30

## 2022-09-29 RX ADMIN — BUPIVACAINE HYDROCHLORIDE IN DEXTROSE 1.6 ML: 7.5 INJECTION, SOLUTION SUBARACHNOID at 12:20

## 2022-09-29 RX ADMIN — FENTANYL CITRATE 25 MCG: 50 INJECTION, SOLUTION INTRAMUSCULAR; INTRAVENOUS at 12:44

## 2022-09-29 RX ADMIN — FENTANYL CITRATE 12.5 MCG: 50 INJECTION, SOLUTION INTRAMUSCULAR; INTRAVENOUS at 13:14

## 2022-09-29 RX ADMIN — CEFAZOLIN SODIUM 2000 MG: 2 SOLUTION INTRAVENOUS at 12:20

## 2022-09-29 RX ADMIN — BUPIVACAINE HYDROCHLORIDE 20 ML: 2.5 INJECTION, SOLUTION EPIDURAL; INFILTRATION; INTRACAUDAL at 11:35

## 2022-09-29 RX ADMIN — PROPOFOL 50 MG: 10 INJECTION, EMULSION INTRAVENOUS at 12:21

## 2022-09-29 RX ADMIN — CEFAZOLIN SODIUM 1000 MG: 1 SOLUTION INTRAVENOUS at 23:50

## 2022-09-29 RX ADMIN — CEFAZOLIN SODIUM 1000 MG: 1 SOLUTION INTRAVENOUS at 16:51

## 2022-09-29 NOTE — OCCUPATIONAL THERAPY NOTE
Occupational Therapy Evaluation     Patient Name: Wilbert Balderas  Today's Date: 9/29/2022  Problem List  Principal Problem:    Status post total knee replacement, left  Active Problems:    Hypovitaminosis D    Coronary artery disease of native artery of native heart with stable angina pectoris (HCC)    Chronic arthralgias of knees and hips    Mixed hyperlipidemia    Primary osteoarthritis of both knees    Primary osteoarthritis of left knee    Past Medical History  Past Medical History:   Diagnosis Date    Celiac disease     Coronary artery disease     Diverticulitis     Diverticulosis     Hyperlipidemia     Hypertension     Myocardial infarction ([address] ) 1990     Past Surgical History  Past Surgical History:   Procedure Laterality Date    COLONOSCOPY      CORONARY ANGIOPLASTY WITH STENT PLACEMENT      Multiple times    AL TOTAL KNEE ARTHROPLASTY Right 04/13/2022    Procedure: ARTHROPLASTY KNEE TOTAL and all associated procedures;  Surgeon: Trang Guzman MD;  Location:  MAIN OR;  Service: Orthopedics        09/29/22 1716   OT Last Visit   OT Visit Date 09/29/22  (Thursday)   Note Type   Note type Evaluation   Restrictions/Precautions   Weight Bearing Precautions Per Order Yes   LLE Weight Bearing Per Order WBAT  (s/p L TKA)   Braces or Orthoses   (L ace bandage; electricool)   Other Precautions WBS; Fall Risk;Pain;Multiple lines  (IV and tijerina catheter)   Pain Assessment   Pain Assessment Tool 0-10   Pain Score 4   Pain Location/Orientation Orientation: Left; Location: Knee; Location: Leg   Effect of Pain on Daily Activities limits activity tolerance   Patient's Stated Pain Goal No pain   Home Living   Type of Home House   Home Layout Multi-level; Other (Comment); Able to live on main level with bedroom/bathroom  (4 [address])   Bathroom Shower/Tub Tub/shower unit   Bathroom Toilet Standard   Bathroom Equipment Grab bars in shower; Toilet raiser;Commode   [address] Walker;Cane;Crutches;Grab bars  (pt reports using leg  following R TKA)   Additional Comments Pt reports living w/ wife in [facility] w/ 4 [address] and is able to have 1st floor set-up   Prior Function   Level of [place] Independent with ADLs and functional mobility   Lives With Spouse   ADL Assistance Independent   IADLs Independent   Falls in the last 6 months 0   Vocational Part time employment   Comments Pt reports I w/ ADL w/ out use of AD   Lifestyle   Autonomy Pt reports I w/ ADL PTA w/ out use of AD   Reciprocal Relationships Pt reports living w/ supportive wife and their dog   Service to Others 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; occasionally goes to [employer] in [address] Pt reports enjoying biking, walking, and hiking   Subjective   Subjective "I want to be able to walk" "I still can't feel my bladder muscles but I can feel my legs"   ADL   Where Assessed Edge of bed  (vs OOB In chair)   Eating Assistance 7  [address] 6  Modified Independent  (seated)   Grooming Deficit Setup   UB Bathing Assistance Unable to assess   LB Bathing Assistance Unable to assess   UB Dressing Assistance 6  Modified independent   [facility] Unable to assess  (anticipate + time due to L LE ROM deficits, pain)   [address]    (tijerina catheter in place)   Bed Mobility   Supine to Sit 5  Supervision   Additional items Assist x 1; Increased time required;Verbal cues;HOB elevated  (to pt's L)   Sit to Supine Unable to assess   Additional Comments Pt seated OOB in chair post eval w/ needs met, call bell in reach and chair alarm activated   Transfers   Sit to Stand 5  Supervision   Additional items Assist x 1;Verbal cues; Impulsive   Stand to Sit 5  Supervision   Additional items Assist x 1; Armrests; Increased time required;Verbal cues   Additional Comments /80's following functional mobility   Pt reported feeling a "Little dizzy"   Functional Mobility   Functional Mobility 5  Supervision   Additional Comments w/ in room   Additional items Rolling walker   Balance   Static Sitting Good   Static Standing Fair -   Ambulatory Fair -   Activity Tolerance   Activity Tolerance Patient limited by fatigue;Patient limited by pain   Medical Staff Made Aware care coordination w/ PT, Renée Gonzalez   Nurse Made Aware per RN appropriate to see pt   RUE Assessment   RUE Assessment WFL   RUE Strength   RUE Overall Strength Within Functional Limits - able to perform ADL tasks with strength   LUE Assessment   LUE Assessment WFL   LUE Strength   LUE Overall Strength Within Functional Limits - able to perform ADL tasks with strength   Hand Function   Gross Motor Coordination Functional   Fine Motor Coordination Functional   Cognition   Overall Cognitive Status WFL   Arousal/Participation Arousable; Cooperative   Attention Attends with cues to redirect   Orientation Level Oriented X4   Memory Within functional limits   Following Commands Follows multistep commands without difficulty   Comments Identified pt by full name and birthdate  Alert and oriented  Able to follow directions and communicate wants / needs  Cues for pacing/ tech during sit <>stand   Assessment   Limitation Decreased ADL status; Decreased endurance;Decreased self-care trans;Decreased high-level ADLs   Assessment Pt is a 79yo male admitted to [facility] on 09/29/22 s/p elective L TKA  Per orthopedics, pt is WBAT L LE  Significant PMH impacting his occupational performance includes R knee osteoarthritis, L knee DJD, CAD s/p multiple stents, HTN, hx MI, Celiac disease, R TKA (04/13/22)  Pt w/ active OT orders and activity orders  Personal factors impacting performance includes multi level home environment, difficulty managing stairs  Pt reports living w/ wife in [address] [facility] [place] w/ [address] from [facility]  Pt reports I w/ ADL/ IADL w/ out use of AD or DME  Upon eval, pt alert and generally oriented   Pt required S to complete bed mobility, S to stand from EOB w/ + LOB  Pt engaged in functional mobility w/ S using  Pt completed UBD w/ mod I seated  Pt presents w/ expected L LE ROM / strength deficits, increased pain, and decreased activity tolerance impacting his I w/ dressing, bathing, oral hygiene, functional mobility, functional transfers, pet care, community mobility  Pt completing ADL below baseline level of I and would benefit from OT while in acute care to address deficits  Based on Barthel Index and AMPAC 6 clicks recommend DC home w/ OPPT using RW at this time  Will continue to follow   Goals   Patient Goals Pt stated that he would like to return home and be able to walk   Plan   Treatment Interventions ADL retraining;Functional transfer training; Endurance training;Patient/family training;Equipment evaluation/education;Continued evaluation; Energy conservation; Activityengagement   Goal Expiration Date 10/06/22   OT Frequency 2-3x/wk   Recommendation   OT Discharge Recommendation Home with outpatient rehabilitation  (OPPT)   AM-PAC Daily Activity Inpatient   Lower Body Dressing 3   Bathing 3   Toileting 3   Upper Body Dressing 4   Grooming 4   Eating 4   Daily Activity Raw Score 21   Daily Activity Standardized Score (Calc for Raw Score >=11) 44 27   AM-PAC Applied Cognition Inpatient   Following a Speech/Presentation 4   Understanding Ordinary Conversation 4   Taking Medications 4   Remembering Where Things Are Placed or Put Away 4   Remembering List of 4-5 Errands 4   Taking Care of Complicated Tasks 4   Applied Cognition Raw Score 24   Applied Cognition Standardized Score 62 21   Barthel Index   Feeding 10   Bathing 0   Grooming Score 5   Dressing Score 5   Bladder Score 0   Bowels Score 10   Toilet Use Score 5   Transfers (Bed/Chair) Score 10   Mobility (Level Surface) Score 0   Stairs Score 0   Barthel Index Score 45   Modified Vitaliy Scale   Modified [place] Scale 4      The patient's raw score on the AM-PAC Daily Activity inpatient short form is 21, standardized score is 44 27, greater than 39 4  Patients at this level are likely to benefit from discharge to home  Please refer to the recommendation of the Occupational Therapist for safe discharge planning  Pt goals to be met by 10/6/22 to max I w/ ADLs and improve engagement to return home and walk his dog includes:    -Pt will complete LBD w/ mod I after set- up using Pierce Stagger as needed    -Pt will complete bed mobility supine <> sit w/ mod I     -Pt will complete functional transfers to all surfaces using AD, DME as needed w/ mod I to max I w/ ADLs    -Pt will consistently engage in functional mobility using AD household distances w/ mod I to max I w/ ADLs     -Pt will demonstrate improved functional standing tolerance for at least 10 minutes w/ fair + balance to participate in grooming / UB bathing standing at sink to max I w/ clothing mgmt and light IADLs to return home      Rashaad Julio, OTR/L

## 2022-09-29 NOTE — ANESTHESIA PROCEDURE NOTES
Spinal Block    Patient location during procedure: OR  Start time: 9/29/2022 12:20 PM  Reason for block: procedure for pain, at surgeon's request, post-op pain management and primary anesthetic  Staffing  Performed: CRNA   Anesthesiologist: Tejal Chester MD  Resident/CRNA: Marti Brooke CRNA  Preanesthetic Checklist  Completed: patient identified, IV checked, site marked, risks and benefits discussed, surgical consent, monitors and equipment checked, pre-op evaluation and timeout performed  Spinal Block  Patient position: sitting  Prep: ChloraPrep and site prepped and draped  Patient monitoring: heart rate, cardiac monitor, continuous pulse ox and frequent blood pressure checks  Approach: midline  Location: L2-3  Injection technique: single-shot  Needle  Needle type: pencil-tip   Needle gauge: 24 G  Needle length: 13 cm  Assessment  Sensory level: T10  Events: cerebrospinal fluid  Injection Assessment:  negative aspiration for heme, no paresthesia on injection and positive aspiration for clear CSF    Post-procedure:  pressure dressing applied, site cleaned, sterile dressing applied and secured with tape

## 2022-09-29 NOTE — OP NOTE
Brief Op Note  Ebony Green  MRN: [id number]      Unit/Bed#:  OR MAIN    PreOp Dx: left knee DJD      Postop Dx: left knee DJD      Procedure: left total knee arthroplasty      Surgeon: Pamela Morocho PA-C       No Qualified Resident Available for this Case  The physician assistant was needed for all portions of this case including prepping and draping the patient as well as prepping and final implantation of the femoral, tibial, and patellar components    Fluids:       EBL:       Drains: none      Specimens: No       Complications: No       Condition: stable transferred to postanesthesia care unit      Implants: Depuy Attune RP  Femoral, size: 8  Tibial tray: 8  Polyethylene: 7  Patellar button: 39      78 y  o male, presents at this time, secondary to treatment of left knee DJD with varus deformity which has failed conservative treatment  The patient was told of all the pros and cons of operative and nonoperative intervention  Some of the complications of operative intervention include infection, bleeding, scarring, nerve injury, vascular injury, fracture, continued pain, decreased range of motion, DVT, PE death, loss of limb, need for further surgery, not obtain an results  The patient wished  Patient did consent for operative intervention for this pathology  Patient was brought to the operating room  Patient was anesthetized as anesthesia team  Patient was prepped and draped in normal sterile fashion  After this was done, we did conduct a time out to make sure correct  Patient was in the room, prepped marked and draped  Implants were in the room, Rep  For the implants were present, DVT prophylaxis and antibiotics were addressed  Midline incision was made over the anterior knee after going through skin, fatty tissue, fascia was identified  Flaps were created both medially and laterally  Medial parapatellar incision was made   Excision of Hoffa fat pad was conducted  At this time because this was a varus knee, we did release over the medial aspect including medial osteophytes and deep MCL  We're able to excise the fatty tissue from the anterior aspect of the distal femur  We were able to at this time, flex the knee with the patella everted  Intramedullary reamer was used  Intramedullary guide for the femur was then placed to determine how much of the distal femur to cut and also, valgus cut angle  Pins were then placed  Intramedullary guide was removed  Distal femoral cut was made  Attention was now to the proximal tibia  Extramedullary guide was used  After making sure that we took the appropriate amount from the medial and lateral side with the aid of a measuring device, the jig was held in place with pins  Protection of the medial and lateral ligaments, as well as the popliteal region, was done  Proximal tibial cut was made  Extension gaps were evaluated  Size of the femur was then determined with the aid of a guide  After this was done, we placed the appropriate sized block  These were held down with pins  Anterior and posterior cuts were made  Anterior, posterior, chamfer cuts were made  We did check our flexion gap and was noted to be appropriate  This was a PCL sacrificing device being used Therefore a box cut was made  Tibial tray size was determined  This was held down with 2 small screws  The reamer and the punch was then used with the appropriate guide to make sure that these were all done, the appropriate manner  Guide was removed  Trial femoral component was placed  Trial tibial polyethylene was placed  Patient was noted to be stable  On coronal and sagittal planes  Patellar button size was determined after the articular surface of the patella was excised  The patella button was placed on the medial aspect of the patella  Holes were drilled for our true patella button to be cemented on   We tracked the knee, and we noted that the patella was tracking appropriately over the trochlear of the trial implants  After this was done we did drill holes in our trial femoral component  We then removed  All trial components  Copious irrigation was used at the operative site  We did place some bone within the intramedullary canal of the femur  High viscocity cement was used and all components were placed, including the femur, tibia, and patella button  A trial tibial Polyethylene was placed  After cement had dried, removed the trial polyethylene and removed any excess cement that was in the popliteal region  Copious irrigation was used  The tibial polyethylene was then placed  Patient was placed in the appropriate ranges of motion and patient's Knee was noted to be stable  Tourniquet was discontinued  Hemostasis was obtained  #1 Vicryl sutures were used to reapproximate the parapatellar incision  2-0 Vicryl sutures for the subcutaneous closure  This was reinforced with staples  Wounds were cleaned and dried  Acticoat, 4 x 4, ABDs and web roll was placed prior to Ace bandage be placed from the foot  All the way to the mid thigh region    Patient was awakened as anesthesia team noted to be a stable condition and transferred to postanesthesia care unit

## 2022-09-29 NOTE — ANESTHESIA PREPROCEDURE EVALUATION
Procedure:  ARTHROPLASTY KNEE TOTAL (Left Knee)    Relevant Problems   CARDIO   (+) Coronary artery disease of native artery of native heart with stable angina pectoris (HCC)   (+) Mixed hyperlipidemia   (+) Thoracic aortic aneurysm without rupture      MUSCULOSKELETAL   (+) Primary osteoarthritis of both knees   (+) Primary osteoarthritis of left knee   (+) Primary osteoarthritis of right knee   (+) Sacroiliitis, not elsewhere classified (HCC)        Physical Exam    Airway    Mallampati score: II  TM Distance: >3 FB  Neck ROM: full     Dental       Cardiovascular      Pulmonary      Other Findings        Anesthesia Plan  ASA Score- 3     Anesthesia Type- spinal with ASA Monitors  Additional Monitors:   Airway Plan:     Comment: Plavix last dose 9/21 @ 1000  > 7 days since last dose  No other AC  Previous knee replacement listed as spinal, but unclear if this was performed and patient does not remember          Plan Factors-Exercise tolerance (METS): >4 METS  Chart reviewed  Patient is not a current smoker  Obstructive sleep apnea risk education given perioperatively  Induction- intravenous  Postoperative Plan-     Informed Consent- Anesthetic plan and risks discussed with patient  I personally reviewed this patient with the CRNA  Discussed and agreed on the Anesthesia Plan with the CRNA           NPO appropriate  Discussed benefits/risks of monitored anesthetic care and discussed providing a dynamic level of mild to deep sedation  Risks include awareness, airway obstruction, aspiration which may necessitate conversion to general anesthesia  All questions answered  Patient understands and wishes to proceed  Anesthesia plan and consent discussed with Layman Miles who expressed understanding and agreement  Risks/benefits and alternatives discussed with patient including possible PONV, sore throat, damage to teeth/lips/gums and possibility of rare anesthetic and surgical emergencies

## 2022-09-29 NOTE — ANESTHESIA PROCEDURE NOTES
Peripheral Block    Patient location during procedure: holding area  Start time: 9/29/2022 11:35 AM  Reason for block: at surgeon's request and post-op pain management  Staffing  Performed: Anesthesiologist   Preanesthetic Checklist  Completed: patient identified, IV checked, site marked, risks and benefits discussed, surgical consent, monitors and equipment checked, pre-op evaluation and timeout performed  Peripheral Block  Patient position: supine  Prep: ChloraPrep  Patient monitoring: continuous pulse ox and frequent blood pressure checks  Block type: adductor canal block  Laterality: left  Injection technique: single-shot  Procedures: ultrasound guided, Ultrasound guidance required for the procedure to increase accuracy and safety of medication placement and decrease risk of complications  Ultrasound permanent image saved  Needle  Needle type: pencil-tip   Needle gauge: 22 G  Needle length: 10 cm  Needle localization: anatomical landmarks and ultrasound guidance  Test dose: negative  Assessment  Injection assessment: incremental injection and local visualized surrounding nerve on ultrasound  Paresthesia pain: none  Heart rate change: no  Slow fractionated injection: yes  Post-procedure:  site cleaned  patient tolerated the procedure well with no immediate complications  Additional Notes  Uncomplicated single shot adductor canal block with Exparel 20 mL and bupi 0 25% 10 mL

## 2022-09-29 NOTE — ANESTHESIA PROCEDURE NOTES
Peripheral Block    Patient location during procedure: holding area  Start time: 9/29/2022 11:35 AM  Reason for block: at surgeon's request and post-op pain management  Staffing  Performed: Anesthesiologist   Preanesthetic Checklist  Completed: patient identified, IV checked, site marked, risks and benefits discussed, surgical consent, monitors and equipment checked, pre-op evaluation and timeout performed  Peripheral Block  Patient position: right lateral decubitus  Prep: ChloraPrep  Patient monitoring: continuous pulse ox and frequent blood pressure checks  Block type: ipack block  Laterality: left  Injection technique: single-shot  Procedures: ultrasound guided, Ultrasound guidance required for the procedure to increase accuracy and safety of medication placement and decrease risk of complications  Ultrasound permanent image saved  Needle  Needle type: pencil-tip   Needle gauge: 22 G  Needle length: 10 cm  Needle localization: ultrasound guidance and anatomical landmarks  Test dose: negative  Assessment  Injection assessment: incremental injection and local visualized surrounding nerve on ultrasound  Paresthesia pain: none  Heart rate change: no  Slow fractionated injection: yes  Post-procedure:  site cleaned  patient tolerated the procedure well with no immediate complications  Additional Notes  Uncomplicated single shot IPACK with bupi 0 25% 20 mL

## 2022-09-29 NOTE — ANESTHESIA POSTPROCEDURE EVALUATION
Post-Op Assessment Note    CV Status:  Stable  Pain Score: 0    Pain management: adequate     Mental Status:  Alert and awake   Hydration Status:  Euvolemic and stable   PONV Controlled:  Controlled   Airway Patency:  Patent and adequate      Post Op Vitals Reviewed: Yes      Staff: CRNA         No complications documented      BP  96/52    Temp     Pulse 79   Resp 16   SpO2 98%

## 2022-09-29 NOTE — PHYSICAL THERAPY NOTE
PHYSICAL THERAPY EVALUATION  DATE: 09/29/22  TIME: 0575-6894    NAME:  Brook Zee  AGE:   78 y o  Mrn:   [id number]  Length Of Stay: 0    ADMIT DX:  Arthritis of left knee [M17 12]    Past Medical History:   Diagnosis Date    Celiac disease     Coronary artery disease     Diverticulitis     Diverticulosis     Hyperlipidemia     Hypertension     Myocardial infarction ([facility] [address] ) 1990     Past Surgical History:   Procedure Laterality Date    COLONOSCOPY      CORONARY ANGIOPLASTY WITH STENT PLACEMENT      Multiple times    [place] TOTAL KNEE ARTHROPLASTY Right 04/13/2022    Procedure: ARTHROPLASTY KNEE TOTAL and all associated procedures;  Surgeon: Buck Kim MD;  Location:  MAIN OR;  Service: Orthopedics       Performed at least 2 patient identifiers during session: Name, Edmond Laws, and ID bracelet     09/29/22 1715   PT Last Visit   PT Visit Date 09/29/22   Note Type   Note type Evaluation   Pain Assessment   Pain Assessment Tool 0-10   Pain Score 4   Pain Location/Orientation Orientation: Left; Location: Knee   Pain Radiating Towards t/o knee joint   Pain Onset/Description Onset: Ongoing; Descriptor: Aching   Effect of Pain on Daily Activities limits ambulation tolerance, limits gait mechanics   Patient's Stated Pain Goal No pain   Hospital Pain Intervention(s) Cold applied;Repositioned; Ambulation/increased activity; Elevated   Multiple Pain Sites No   Restrictions/Precautions   Weight Bearing Precautions Per Order Yes   LLE Weight Bearing Per Order WBAT  (s/p L TKA 9/29/2022)   Braces or Orthoses Other (Comment)  (L knee ACE wrap)   Other Precautions Chair Alarm; Bed Alarm;WBS;Fall Risk;Pain;Multiple lines   Home Living   Type of [address] [place] [address] Av Multi-level;Stairs to enter with rails; Performs ADLs on one level; Able to live on main level with bedroom/bathroom  ([address] with HR from deck, then [address] with full bathroom)   Bathroom Shower/Tub Tub/shower unit   Bathroom Toilet Standard   Bathroom Equipment Commode;Grab bars in shower;Hand-held shower  (BSC over toilet)   Bathroom Accessibility Accessible via walker   Home Equipment Walker;Cane;Crutches  (does not use any AD/DME at baseline)   Prior Function   Level of [place] Independent with ADLs and functional mobility   Lives With Spouse   Receives Help From Family; Outpatient therapy  (OPPT)   ADL Assistance Independent   IADLs Independent   Falls in the last 6 months 1 to 4  (1 fall while hiking with dog)   Vocational Full time employment  (works from home in [employer], occasionally will have to travel to [place] from [place])   Comments Pt reports ability to have [address] after 4 [address] w/ HR  Pt ambulates at baseline with no device, is fully independent with all ADLs and IADLs  +drives  Of note, pt with R TKA completed 4/2022 with good healing and no complications  General   Additional Pertinent History Pt presents POD0 s/p L TKA by Dr Katharine Crawford 9/29/2022  WBAT L LE  Family/Caregiver Present No   Cognition   Overall Cognitive Status WFL   Arousal/Participation Alert   Orientation Level Oriented X4   Memory Within functional limits   Following Commands Follows all commands and directions without difficulty   Comments Slightly impulsive during IE, improves with cues     Subjective   Subjective "I want to be able to live my life "   RUE Assessment   RUE Assessment WFL   LUE Assessment   LUE Assessment WFL   RLE Assessment   RLE Assessment WFL   LLE Assessment   LLE Assessment X  (at least 3/5 MMT throughout, observed functionally, s/p L TKA)   Vision-Basic Assessment   Current Vision Wears glasses all the time   Coordination   Movements are Fluid and Coordinated 1   Sensation WFL   Light Touch   RLE Light Touch Grossly intact   LLE Light Touch Grossly intact  (slight tingling from nerve block remains)   Proprioception   RLE Proprioception Grossly intact   LLE Proprioception Grossly Intact   Bed Mobility   Supine to Sit 5  Supervision   Additional items Assist x 1;HOB elevated; Bedrails;Verbal cues   Sit to Supine Unable to assess   Additional Comments Pt was left seated OOB in recliner chair with alarm engaged and needs in reach, RN aware of pt status  Transfers   Sit to Stand 5  Supervision   Additional items Assist x 1; Impulsive;Verbal cues   Stand to Sit 5  Supervision   Additional items Assist x 1; Armrests; Increased time required;Verbal cues   Stand pivot 5  Supervision   Additional items Assist x 1; Increased time required;Verbal cues  (RW)   Additional Comments Pt initially completing STS transfer with no physical assistance, good placement of hands, however upon upright stand pt with significant anterior LOB requiring MODA to correct balance  Onset corrected, pt able to maintain supported balance with S    Ambulation/Elevation   Gait pattern Antalgic;Decreased foot clearance; Short stride   Gait Assistance 5  Supervision  (CGA quickly progressing to S)   Additional items Assist x 1;Verbal cues   Assistive Device Rolling walker   Distance 40ft x1 - limited due to lightheadeness (/86), resolves with functional seated rest   Stair Management Assistance Not tested   Balance   Static Sitting Good   Dynamic Sitting Fair +   Static Standing Fair  (w/ RW support)   Dynamic Standing Fair  (w/ RW support)   Ambulatory Fair  (w/ RW support)   Endurance Deficit   Endurance Deficit Yes   Activity Tolerance   Activity Tolerance Patient limited by fatigue;Patient limited by pain   Medical Staff Made Aware Spoke with CM, coordinated care with OT   Nurse Made Aware Spoke with RN Thelma pre/post session   Assessment   Prognosis Good   Problem List Decreased strength;Decreased endurance; Impaired balance;Decreased mobility; Decreased safety awareness;Decreased skin integrity;Orthopedic restrictions;Pain   Assessment Pt seen for PT evaluation POD #0 s/p elective L TKA by Dr Amna Josue on 9/29/2022   Comorbidities affecting pt's fnxl performance include: arthritis, CAD, HLD, celiac disease, diverticulosis, MI, HTN  Pt demonstrates impairments in L LE strength and ROM, pain, gait mechanics, balance, HEP, independence with mobility  Pt was educated on importance of frequent mobility & exercises, paper HEP handout provided  Pt verbalizes good understanding  Pt scores 18/24 on AM-PAC objective tool w/ a standardized score of 41 05, indicating pt demonstrates functional capacity for return to home self care vs with increased supports upon d/c  Barthel Index outcome tool was used & pt scores 50 indicating severe limitations of fnxl mobility/ADLS  Pt is at risk of falls d/t multiple comorbidities, impaired balance, use of ambulatory aid, varying levels of pain, acuity of medical illness, impulsivity  Pt will benefit from continued PT treatment in order to address impairments, decrease risk of falls, maximize independence w/ fnxl mobility, & ensure safety w/ mobility for transition to next level of care  Pt would most appropriately benefit from outpatient PT services  Barriers to Discharge None   Goals   Patient Goals "to live my life again"   [facility] Expiration Date 10/04/22   Short Term Goal #1 Patient PT goals established in order to maximize mobility & safety, and progress to next level of PT care   Pt will: complete all bed mobility independently in order to promote independence & simulate a home environment; complete all transfers w/ RW at [facility] level in order to increase safety &  independence; ambulate >250ft with RW at [facility] level in order to increase safety with household and short community functional mobility; negotiate 4 stairs with HR assist, SPC, and S in order to access his home; improve L LE strength to >/= 4/5 MMT t/o in order to increase safety & decrease risk of falls; demonstrate independence w/ 3-5 LE strengthening exercises to facilitate independence w/ HEP; improve AM-PAC score to >/= 23/24 in order to increase independence with mobility and decrease burden of care; improve Barthel Index score to >/= 60/100 in order to increase independence and decrease risk of falls  PT Treatment Day 0   Plan   Treatment/Interventions Functional transfer training;LE strengthening/ROM; Elevations; Therapeutic exercise;Patient/family training;Equipment eval/education; Bed mobility;Gait training;Spoke to nursing;Spoke to case management   PT Frequency Twice a day   Recommendation   PT Discharge Recommendation Home with outpatient rehabilitation   AM-PAC Basic Mobility Inpatient   Turning in Bed Without Bedrails 4   Lying on Back to Sitting on Edge of Flat Bed 3   Moving Bed to Chair 3   Standing Up From Chair 3   Walk in Room 3   Climb 3-5 Stairs 2   Basic Mobility Inpatient Raw Score 18   Basic Mobility Standardized Score 41 05   Highest Level Of Mobility   JH-HLM Goal 6: Walk 10 steps or more   JH-HLM Achieved 7: Walk 25 feet or more   Modified Vitaliy Scale   Modified Vitaliy Scale 4   Barthel Index   Feeding 10   Bathing 0   Grooming Score 5   Dressing Score 5   Bladder Score 0   Bowels Score 10   Toilet Use Score 5   Transfers (Bed/Chair) Score 10   Mobility (Level Surface) Score 0   Stairs Score 5   Barthel Index Score 50   Exercises   TKR Left  (PT provided pt w/ post op TKA HEP for strengthening, edema management, & improved circulation  Pt verbalizes understanding of exercises, states he is familiar with HEP from previous R TKA  Will formally complete with pt next visit and assess carry over )   End of Consult   Patient Position at End of Consult Bedside chair;Bed/Chair alarm activated; All needs within reach   End of Consult Comments Based on patient's [facility] Level of Mobility scores today, patient currently has a goal of JH-HLM Levels: 7: WALK 25 FEET OR MORE, to be completed with RN staffing each shift, in order to improve overall activity tolerance and mobility, combat hospital related deconditioning, and maximize outcomes for d/c from the acute care setting       The patient's AM-PAC Basic Mobility Inpatient Short Form Raw Score is 18  A Raw score of greater than 16 suggests the patient may benefit from discharge to home  Please also refer to the recommendation of the Physical Therapist for safe discharge planning        Isa Padilla PT, DPT   Available via [email or web address]  [facility] # [id number]  [place] License - [id number]  3/36/[id number]

## 2022-09-29 NOTE — PLAN OF CARE
Problem: OCCUPATIONAL THERAPY ADULT  Goal: Performs self-care activities at highest level of function for planned discharge setting  See evaluation for individualized goals  Description: Treatment Interventions: ADL retraining, Functional transfer training, Endurance training, Patient/family training, Equipment evaluation/education, Continued evaluation, Energy conservation, Activityengagement          See flowsheet documentation for full assessment, interventions and recommendations  Note: Limitation: Decreased ADL status, Decreased endurance, Decreased self-care trans, Decreased high-level ADLs     Assessment: Pt is a 79yo male admitted to [facility] on 09/29/22 s/p elective L TKA  Per orthopedics, pt is WBAT L LE  Significant PMH impacting his occupational performance includes R knee osteoarthritis, L knee DJD, CAD s/p multiple stents, HTN, hx MI, Celiac disease, R TKA (04/13/22)  Pt w/ active OT orders and activity orders  Personal factors impacting performance includes multi level home environment, difficulty managing stairs  Pt reports living w/ wife in [address] [facility] P[place] w/ 4 [address] from deck  Pt reports I w/ ADL/ IADL w/ out use of AD or DME  Upon eval, pt alert and generally oriented  Pt required S to complete bed mobility, S to stand from EOB w/ + LOB  Pt engaged in functional mobility w/ S using  Pt completed UBD w/ mod I seated  Pt presents w/ expected L LE ROM / strength deficits, increased pain, and decreased activity tolerance impacting his I w/ dressing, bathing, oral hygiene, functional mobility, functional transfers, pet care, community mobility  Pt completing ADL below baseline level of I and would benefit from OT while in acute care to address deficits  Based on Barthel Index and AMPAC 6 clicks recommend DC home w/ OPPT using RW at this time   Will continue to follow     OT Discharge Recommendation: Home with outpatient rehabilitation (OPPT)

## 2022-09-29 NOTE — DISCHARGE INSTRUCTIONS
Discharge Instructions - Orthopedics  Kristan Navarro 78 y o  male MRN: [id number]  Unit/Bed#: [facility] OR MAIN    Weight Bearing Status:                                           Weight Bearing as tolerated to the left lower extremity  DVT prophylaxis:  Complete course of Aspirin 81 mg twice daily x 35 days from date of surgery as directed    Pain:  Start oxycodone 5 mg every 6 hrs after last dose taken after surgery for 1 day  Transition to oxycodone 5 mg every 6 hours as needed    Showering Instructions:   Do not shower until 5 days from date of surgery  Do not soak your incision(Tubs, Jacuzzi's, pools, ext)    Dressing Instructions: May remove dressing 5 days from date of surgery or may leave dressing in place until follow up office visit 2 weeks from surgery date  Keep dressing/incision clean, dry and intact until follow up appointment  Do not apply any creams or ointments/lotions to your incisions including antibiotic ointment, peroxide    Driving Instructions:  No driving until cleared by Orthopaedic Surgery  PT/OT:  Continue PT/OT on outpatient basis as directed  Continue motion and strengthening exercises while at home    Appt Instructions: If you do not have your appointment, please call the clinic at [phone]  Otherwise followup as scheduled    Contact the office sooner if you experience any increased numbness/tingling in the extremities

## 2022-09-29 NOTE — INTERVAL H&P NOTE
H&P reviewed  After examining the patient I find no changes in the patients condition since the H&P had been written      Vitals:    09/29/22 1051   BP: 139/84   Pulse: 68   Resp: 18   Temp: (!) 97 2 °F (36 2 °C)   SpO2: 97%   Patient seen and examined  To the OR for left total knee arthroplasty and all associated procedures  Pros and cons of op and non-op intervention discussed with patient  We will follow up postoperativley

## 2022-09-30 ENCOUNTER — TELEPHONE (OUTPATIENT)
Dept: OBGYN CLINIC | Facility: HOSPITAL | Age: 77
End: 2022-09-30

## 2022-09-30 VITALS
BODY MASS INDEX: 30.32 KG/M2 | SYSTOLIC BLOOD PRESSURE: 99 MMHG | WEIGHT: 223.89 LBS | OXYGEN SATURATION: 94 % | HEIGHT: 72 IN | HEART RATE: 61 BPM | RESPIRATION RATE: 18 BRPM | DIASTOLIC BLOOD PRESSURE: 62 MMHG | TEMPERATURE: 98 F

## 2022-09-30 LAB
ANION GAP SERPL CALCULATED.3IONS-SCNC: 6 MMOL/L (ref 4–13)
BUN SERPL-MCNC: 23 MG/DL (ref 5–25)
CALCIUM SERPL-MCNC: 8.9 MG/DL (ref 8.4–10.2)
CHLORIDE SERPL-SCNC: 104 MMOL/L (ref 96–108)
CO2 SERPL-SCNC: 24 MMOL/L (ref 21–32)
CREAT SERPL-MCNC: 0.9 MG/DL (ref 0.6–1.3)
ERYTHROCYTE [DISTWIDTH] IN BLOOD BY AUTOMATED COUNT: 12.2 % (ref 11.6–15.1)
GFR SERPL CREATININE-BSD FRML MDRD: 83 ML/MIN/1.73SQ M
GLUCOSE SERPL-MCNC: 117 MG/DL (ref 65–140)
HCT VFR BLD AUTO: 33.6 % (ref 36.5–49.3)
HGB BLD-MCNC: 12 G/DL (ref 12–17)
MCH RBC QN AUTO: 33.4 PG (ref 26.8–34.3)
MCHC RBC AUTO-ENTMCNC: 35.7 G/DL (ref 31.4–37.4)
MCV RBC AUTO: 94 FL (ref 82–98)
PLATELET # BLD AUTO: 202 THOUSANDS/UL (ref 149–390)
PMV BLD AUTO: 9.9 FL (ref 8.9–12.7)
POTASSIUM SERPL-SCNC: 4 MMOL/L (ref 3.5–5.3)
RBC # BLD AUTO: 3.59 MILLION/UL (ref 3.88–5.62)
SODIUM SERPL-SCNC: 134 MMOL/L (ref 135–147)
WBC # BLD AUTO: 12.21 THOUSAND/UL (ref 4.31–10.16)

## 2022-09-30 PROCEDURE — 80048 BASIC METABOLIC PNL TOTAL CA: CPT | Performed by: ORTHOPAEDIC SURGERY

## 2022-09-30 PROCEDURE — 97116 GAIT TRAINING THERAPY: CPT

## 2022-09-30 PROCEDURE — 85027 COMPLETE CBC AUTOMATED: CPT | Performed by: ORTHOPAEDIC SURGERY

## 2022-09-30 PROCEDURE — 99024 POSTOP FOLLOW-UP VISIT: CPT | Performed by: PHYSICIAN ASSISTANT

## 2022-09-30 RX ADMIN — SODIUM CHLORIDE 125 ML/HR: 0.9 INJECTION, SOLUTION INTRAVENOUS at 02:22

## 2022-09-30 RX ADMIN — OXYCODONE HYDROCHLORIDE 5 MG: 5 TABLET ORAL at 06:20

## 2022-09-30 RX ADMIN — SODIUM CHLORIDE 1000 ML: 0.9 INJECTION, SOLUTION INTRAVENOUS at 03:28

## 2022-09-30 RX ADMIN — ACETAMINOPHEN 975 MG: 325 TABLET, FILM COATED ORAL at 10:31

## 2022-09-30 RX ADMIN — SERTRALINE HYDROCHLORIDE 50 MG: 50 TABLET ORAL at 10:33

## 2022-09-30 RX ADMIN — ENOXAPARIN SODIUM 40 MG: 40 INJECTION SUBCUTANEOUS at 02:06

## 2022-09-30 RX ADMIN — CLOPIDOGREL BISULFATE 75 MG: 75 TABLET ORAL at 10:33

## 2022-09-30 NOTE — PHYSICAL THERAPY NOTE
PHYSICAL THERAPY TREATMENT  DATE: 09/30/22  TIME: 3565-2242    NAME:  Sherly Lamas  AGE:   78 y o  Mrn:   [id number]  Length Of Stay: 0    ADMIT DX:  Arthritis of left knee [M17 12]    Past Medical History:   Diagnosis Date    Celiac disease     Coronary artery disease     Diverticulitis     Diverticulosis     Hyperlipidemia     Hypertension     Myocardial infarction ([facility] [address] ) 1990     Past Surgical History:   Procedure Laterality Date    COLONOSCOPY      CORONARY ANGIOPLASTY WITH STENT PLACEMENT      Multiple times    NM TOTAL KNEE ARTHROPLASTY Right 04/13/2022    Procedure: ARTHROPLASTY KNEE TOTAL and all associated procedures;  Surgeon: Delmar Jarrett MD;  Location:  MAIN OR;  Service: Orthopedics       Performed at least 2 patient identifiers during session: Name and ID bracelet     09/30/22 0846   PT Last Visit   PT Visit Date 09/30/22   Note Type   Note Type BID visit/treatment   Pain Assessment   Pain Assessment Tool 0-10   Pain Score 2  ("1 5")   Pain Location/Orientation Orientation: Left; Location: Knee   Pain Radiating Towards posterior aspect of L knee   Pain Onset/Description Onset: Ongoing; Descriptor: Aching   Effect of Pain on Daily Activities limits gait mechanics   Patient's Stated Pain Goal No pain   Hospital Pain Intervention(s) Repositioned; Ambulation/increased activity   Multiple Pain Sites No   Restrictions/Precautions   Weight Bearing Precautions Per Order Yes   LLE Weight Bearing Per Order WBAT  (s/p L TKA 9/29/2022)   Braces or Orthoses Other (Comment)  (L knee ACE wrap)   Other Precautions WBS; Fall Risk;Pain   General   Chart Reviewed Yes   Additional Pertinent History Pt presents POD1 s/p L TKA by Dr Neel Oneal 9/29/2022  WBAT L LE  Pt planned for d/c home today  Response to Previous Treatment Patient with no complaints from previous session  Family/Caregiver Present No   Cognition   Overall Cognitive Status WFL   Arousal/Participation Alert; Cooperative   Attention Within functional limits   Orientation Level Oriented X4   Memory Within functional limits   Following Commands Follows all commands and directions without difficulty   Subjective   Subjective "I can move my left leg better than I was able to my right after surgery "   Bed Mobility   Supine to Sit 7  Independent   Sit to Supine Unable to assess   Additional Comments While seated at EOB, pt independently completes lower and upper body dressing w/ no adaptive equipment  Pt denies concerns about ADL/self care completion  Pt was left seated at EOB with needs in reach, care returned to RN  Transfers   Sit to Stand 6  Modified independent   Additional items Increased time required   Stand to Sit 6  Modified independent   Additional items Increased time required   Ambulation/Elevation   Gait pattern Antalgic;Decreased L stance  (improved stride length and gait speed)   Gait Assistance 5  Supervision   Additional items Assist x 1;Verbal cues   Assistive Device Rolling walker   Distance 300ft   Stair Management Assistance 5  Supervision   Additional items Assist x 1;Verbal cues; Increased time required   Stair Management Technique One rail R;Foreward;Nonreciprocal;With cane  (cues for step patterning, pt with good application and carry over)   Number of Stairs 9   Ambulation/Elevation Additional Comments GAIT SPEED = 1 3 m/s, indicating pt able to safely cross street   Balance   Static Sitting Normal   Dynamic Sitting Good   Static Standing Good  (w/ RW support)   Dynamic Standing Fair +  (w/ RW support)   Ambulatory Good  (w/ RW support)   Activity Tolerance   Activity Tolerance Patient tolerated treatment well   Medical Staff Made Aware Spoke with EDENILSON Colon   Nurse Made Aware Spoke with SHE Vallejo pre/post session   Exercises   TKR Left  (Pt verbalizes full understanding of post op TKA exercises, denies questions/concerns )   Assessment   Prognosis Excellent   Problem List Decreased strength;Decreased range of motion; Impaired balance;Decreased mobility; Decreased skin integrity;Orthopedic restrictions;Pain   Assessment Pt seen for PT treatment today with focus on gait and elevations training  Pt with c/o 1-2/10 pain in L knee, is motivated for increased mobility and d/c home today  BP monitored t/o session and stable with all changes in positioning and mobility efforts  Pt completes bed mobility independently, transfers at [facility] with RW, ambulates 300ft with RW and S, and negotiates 9 stairs with HR + SPC + S  Overall no significant change in pain or status with mobility efforts  Pt displays good functional capacity and safety for return to home POD1  Recommend f/u with OPPT upon d/c  Pt denies any questions/concerns re: d/c home today  Pt has all AD/DME needed  Pt was left seated at EOB with all needs in place, RN aware of pt status  Barriers to Discharge None   Goals   Patient Goals "to go home this morning"   [facility] Expiration Date 10/04/22   Short Term Goal #1 Patient PT goals established in order to maximize mobility & safety, and progress to next level of PT care  Pt will: complete all bed mobility independently in order to promote independence & simulate a home environment; complete all transfers w/ RW at [facility] level in order to increase safety &  independence; ambulate >250ft with RW at [facility] level in order to increase safety with household and short community functional mobility; negotiate 4 stairs with HR assist, SPC, and S in order to access his home; improve L LE strength to >/= 4/5 MMT t/o in order to increase safety & decrease risk of falls; demonstrate independence w/ 3-5 LE strengthening exercises to facilitate independence w/ HEP; improve AM-PAC score to >/= 23/24 in order to increase independence with mobility and decrease burden of care; improve Barthel Index score to >/= 60/100 in order to increase independence and decrease risk of falls     PT Treatment Day 1   Plan   Treatment/Interventions LE strengthening/ROM; Elevations; Therapeutic exercise;Patient/family training;Gait training;Spoke to nursing;Spoke to case management   Progress Improving as expected   PT Frequency Twice a day   Recommendation   PT Discharge Recommendation Home with outpatient rehabilitation   AM-PAC Basic Mobility Inpatient   Turning in Bed Without Bedrails 4   Lying on Back to Sitting on Edge of Flat Bed 4   Moving Bed to Chair 3   Standing Up From Chair 4   Walk in Room 3   Climb 3-5 Stairs 3   Basic Mobility Inpatient Raw Score 21   Basic Mobility Standardized Score 45 55   Highest Level Of Mobility   JH-HLM Goal 6: Walk 10 steps or more   JH-HLM Achieved 8: Walk 250 feet ot more   Education   Education Provided Mobility training;Home exercise program;Assistive device   Patient Explanation/teachback used;Demonstrates verbal understanding   End of Consult   Patient Position at End of Consult Seated edge of bed; All needs within reach   End of Consult Comments Based on patient's [facility] Level of Mobility scores today, patient currently has a goal of JH-HLM Levels: 8: WALK 250 FEET OR MORE, to be completed with RN staffing each shift, in order to improve overall activity tolerance and mobility, combat hospital related deconditioning, and maximize outcomes for d/c from the acute care setting  The patient's AM-PAC Basic Mobility Inpatient Short Form Raw Score is 21  A Raw score of greater than 16 suggests the patient may benefit from discharge to home  Please also refer to the recommendation of the Physical Therapist for safe discharge planning  SELF SELECTED WALKING SPEED:   SSWS identifies speed at which body is functioning with greatest efficiency    [place]; MCID: < 1 0 m/sec; quick estimates 10 sec = 0 5 m/s, 7 sec = 0 7 m/s, 5 sec = 1 0 m/s, 4 sec = 1 25 m/s, 3 sec = 1 7 m/s; 1 0 seconds  Patient categories:   0 m/s - 0 4 m/s >> Household walker  0 4 m/s - 0 8 m/s >> Limited community ambulator  0 8 m/s -1 3 m/s >> Community ambulatory  > 1 3 m/s >> May cross [place] safely      Jeanne Meadows PT, DPT   [facility] via [facility] # [id number]  [place] License - [id number]  [id number]

## 2022-09-30 NOTE — PROGRESS NOTES
Called to OR for difficult tijerina in patient undergoing TKA  Coronal hypospadias noted  16 Coude tijerina inserted without difficulty  Remove per normal Ortho protocol  No prior notes with urology  PSA 11/12/20 0 4  Follow with urology prn  Thank you

## 2022-09-30 NOTE — PLAN OF CARE
Problem: MOBILITY - ADULT  Goal: Maintain or return to baseline ADL function  Description: INTERVENTIONS:  -  Assess patient's ability to carry out ADLs; assess patient's baseline for ADL function and identify physical deficits which impact ability to perform ADLs (bathing, care of mouth/teeth, toileting, grooming, dressing, etc )  - Assess/evaluate cause of self-care deficits   - Assess range of motion  - Assess patient's mobility; develop plan if impaired  - Assess patient's need for assistive devices and provide as appropriate  - Encourage maximum independence but intervene and supervise when necessary  - Involve family in performance of ADLs  - Assess for home care needs following discharge   - Consider OT consult to assist with ADL evaluation and planning for discharge  - Provide patient education as appropriate  Outcome: Progressing  Goal: Maintains/Returns to pre admission functional level  Description: INTERVENTIONS:  - Perform BMAT or MOVE assessment daily    - Set and communicate daily mobility goal to care team and patient/family/caregiver  - Collaborate with rehabilitation services on mobility goals if consulted  - Perform Range of Motion 3 times a day  - Reposition patient every 2 hours    - Dangle patient 3 times a day  - Stand patient 3 times a day  - Ambulate patient 3 times a day  - Out of bed to chair 3 times a day   - Out of bed for meals 3 times a day  - Out of bed for toileting  - Record patient progress and toleration of activity level   Outcome: Progressing     Problem: PAIN - ADULT  Goal: Verbalizes/displays adequate comfort level or baseline comfort level  Description: Interventions:  - Encourage patient to monitor pain and request assistance  - Assess pain using appropriate pain scale  - Administer analgesics based on type and severity of pain and evaluate response  - Implement non-pharmacological measures as appropriate and evaluate response  - Consider cultural and social influences on pain and pain management  - Notify physician/advanced practitioner if interventions unsuccessful or patient reports new pain  Outcome: Progressing     Problem: SAFETY ADULT  Goal: Maintain or return to baseline ADL function  Description: INTERVENTIONS:  -  Assess patient's ability to carry out ADLs; assess patient's baseline for ADL function and identify physical deficits which impact ability to perform ADLs (bathing, care of mouth/teeth, toileting, grooming, dressing, etc )  - Assess/evaluate cause of self-care deficits   - Assess range of motion  - Assess patient's mobility; develop plan if impaired  - Assess patient's need for assistive devices and provide as appropriate  - Encourage maximum independence but intervene and supervise when necessary  - Involve family in performance of ADLs  - Assess for home care needs following discharge   - Consider OT consult to assist with ADL evaluation and planning for discharge  - Provide patient education as appropriate  Outcome: Progressing  Goal: Maintains/Returns to pre admission functional level  Description: INTERVENTIONS:  - Perform BMAT or MOVE assessment daily    - Set and communicate daily mobility goal to care team and patient/family/caregiver  - Collaborate with rehabilitation services on mobility goals if consulted  - Perform Range of Motion 3 times a day  - Reposition patient every 2 hours    - Dangle patient 3 times a day  - Stand patient 3 times a day  - Ambulate patient 3 times a day  - Out of bed to chair 3 times a day   - Out of bed for meals 3 times a day  - Out of bed for toileting  - Record patient progress and toleration of activity level   Outcome: Progressing  Goal: Patient will remain free of falls  Description: INTERVENTIONS:  - Educate patient/family on patient safety including physical limitations  - Instruct patient to call for assistance with activity   - Consult OT/PT to assist with strengthening/mobility   - Keep Call bell within reach  - Keep bed low and locked with side rails adjusted as appropriate  - Keep care items and personal belongings within reach  - Initiate and maintain comfort rounds  - Make Fall Risk Sign visible to staff  - Offer Toileting every 2 Hours, in advance of need  - Initiate/Maintain bed alarm  - Obtain necessary fall risk management equipment:   - Apply yellow socks and bracelet for high fall risk patients  - Consider moving patient to room near nurses station  Outcome: Progressing     Problem: Potential for Falls  Goal: Patient will remain free of falls  Description: INTERVENTIONS:  - Educate patient/family on patient safety including physical limitations  - Instruct patient to call for assistance with activity   - Consult OT/PT to assist with strengthening/mobility   - Keep Call bell within reach  - Keep bed low and locked with side rails adjusted as appropriate  - Keep care items and personal belongings within reach  - Initiate and maintain comfort rounds  - Make Fall Risk Sign visible to staff  - Offer Toileting every 2 Hours, in advance of need  - Initiate/Maintain bed alarm  - Obtain necessary fall risk management equipment:   - Apply yellow socks and bracelet for high fall risk patients  - Consider moving patient to room near nurses station  Outcome: Progressing

## 2022-09-30 NOTE — PROGRESS NOTES
Progress Note - Orthopedics   Irma Lopez 78 y o  male MRN: [id number]  Unit/Bed#: -01      Subjective:    78 y  o male seen this morning  No acute events, no new complaints  Patient doing well  Pain well controlled  Denies fevers, chills, CP, SOB, N/V, numbness or tingling  Patient reports no issues with urination or bowel movements  Patient states he was able to get out of bed and use the bathroom      Labs:  0   Lab Value Date/Time    HCT 33 6 (L) 09/30/2022 0454    HCT 43 1 08/30/2022 1545    HCT 37 8 04/14/2022 0432    HGB 12 0 09/30/2022 0454    HGB 15 0 08/30/2022 1545    HGB 12 9 04/14/2022 0432    INR 1 07 08/30/2022 1545    WBC 12 21 (H) 09/30/2022 0454    WBC 5 77 08/30/2022 1545    WBC 12 63 (H) 04/14/2022 0432    ESR 5 11/22/2019 0905    CRP <1 0 08/30/2022 1545       Meds:    Current Facility-Administered Medications:     acetaminophen (TYLENOL) tablet 975 mg, 975 mg, Oral, Q8H, Neeta Chaney MD, 975 mg at 09/29/22 1652    calcium carbonate (TUMS) chewable tablet 1,000 mg, 1,000 mg, Oral, Daily PRN, Madison Jung MD    clopidogrel (PLAVIX) tablet 75 mg, 75 mg, Oral, QAM, Madison Jung MD    docusate sodium (COLACE) capsule 100 mg, 100 mg, Oral, BID, Madison Jung MD, 100 mg at 09/29/22 1756    enoxaparin (LOVENOX) subcutaneous injection 40 mg, 40 mg, Subcutaneous, Daily, Neeta Chaney MD, 40 mg at 09/30/22 0206    ezetimibe (ZETIA) tablet 10 mg, 10 mg, Oral, Daily, Neeta Chaney MD, 10 mg at 09/29/22 1652    HYDROmorphone (DILAUDID) injection 0 5 mg, 0 5 mg, Intravenous, Q2H PRN, Madison Jung MD    lactated ringers bolus 1,000 mL, 1,000 mL, Intravenous, Once PRN **AND** lactated ringers bolus 1,000 mL, 1,000 mL, Intravenous, Once PRN, Madison Jung MD    lactated ringers infusion, 75 mL/hr, Intravenous, Continuous, Neeta Chaney MD, Last Rate: 75 mL/hr at 09/29/22 1652, 75 mL/hr at 09/29/22 1652    metoprolol succinate (TOPROL-XL) 24 hr tablet 50 mg, 50 mg, Oral, Daily, Amber Houser MD    oxyCODONE (ROXICODONE) immediate release tablet 10 mg, 10 mg, Oral, Q4H PRN, Amber Houser MD, 10 mg at 09/29/22 2349    oxyCODONE (ROXICODONE) IR tablet 5 mg, 5 mg, Oral, Q4H PRN, Amber Houser MD, 5 mg at 09/30/22 0620    sertraline (ZOLOFT) tablet 50 mg, 50 mg, Oral, Daily, Neeta Chaney MD, 50 mg at 09/29/22 1652    sodium chloride 0 9 % bolus 1,000 mL, 1,000 mL, Intravenous, Once PRN, Last Rate: 1,000 mL/hr at 09/30/22 0328, 1,000 mL at 09/30/22 0328 **AND** sodium chloride 0 9 % bolus 1,000 mL, 1,000 mL, Intravenous, Once PRN, Amber Houser MD    sodium chloride 0 9 % infusion, 125 mL/hr, Intravenous, Continuous, Eleno Downing PA-C, Last Rate: 125 mL/hr at 09/30/22 0222, 125 mL/hr at 09/30/22 0222    tamsulosin (FLOMAX) capsule 0 4 mg, 0 4 mg, Oral, Daily With Dinner, Amber Houser MD, 0 4 mg at 09/29/22 1652    traZODone (DESYREL) tablet 150 mg, 150 mg, Oral, HS, Neeta Chaney MD, 150 mg at 09/29/22 2349    Blood Culture:   No results found for: BLOODCX    Wound Culture:   No results found for: WOUNDCULT    Ins and Outs:  I/O last 24 hours: In: 2700 [I V :1550; IV Piggyback:1150]  Out: 791 [Urine:760; Stool:1; Blood:30]          Physical:  Vitals:    09/30/22 0612   BP: 99/62   Pulse: 61   Resp:    Temp:    SpO2:      Musculoskeletal: left Lower Extremity    · Skin No erythema or ecchymosis  · Dressing c/d/i  · TTP about the knee, thigh and calf supple  · SILT saphenous, sural, tibial, superficial peroneal nerve, and deep peroneal  · Motor intact to +FHL/EHL, +ankle dorsi/plantar flexion  · 2+ DP pulse, symmetric bilaterally  · Digits warm and well perfused  · Capillary refill < 2 seconds    Assessment:    77 y  o male POD 1 s/p left TKA   Patient doing well       Plan:  · WBAT LLE  · Hg 12 0  · PT/OT  · Pain control  · DVT ppx aspirin, on plavix  · Dispo: discharge planning to home pending PT today    Janette Hernandez PA-C

## 2022-09-30 NOTE — TELEPHONE ENCOUNTER
LUIS CARLOS parker    #[phone]    patient calling inquiring if he can take his bandages off end of next week states he got instructions for 5 days after surgery but remembers his wife had them on for a little longer     Please advise

## 2022-09-30 NOTE — TELEPHONE ENCOUNTER
Pt advised dressing per PAMarkieC can be removed after 5 days, pt instructed dressing can be removed Tuesday or after  He reports he is "doing very well, doing better than the last surgery", denies any questions or needs at this time  NN will call pt back Monday for full post-op assessment

## 2022-09-30 NOTE — NURSING NOTE
Patient discharged home with all belongings in wheelchair and accompanied by RN  Discharge instructions reviewed with patient  He verbalized understanding and had no further questions  IV removed and site dressed for infection prevention

## 2022-09-30 NOTE — PLAN OF CARE
Problem: PAIN - ADULT  Goal: Verbalizes/displays adequate comfort level or baseline comfort level  Description: Interventions:  - Encourage patient to monitor pain and request assistance  - Assess pain using appropriate pain scale  - Administer analgesics based on type and severity of pain and evaluate response  - Implement non-pharmacological measures as appropriate and evaluate response  - Consider cultural and social influences on pain and pain management  - Notify physician/advanced practitioner if interventions unsuccessful or patient reports new pain  Outcome: Progressing     Problem: SAFETY ADULT  Goal: Patient will remain free of falls  Description: INTERVENTIONS:  - Educate patient/family on patient safety including physical limitations  - Instruct patient to call for assistance with activity   - Consult OT/PT to assist with strengthening/mobility   - Keep Call bell within reach  - Keep bed low and locked with side rails adjusted as appropriate  - Keep care items and personal belongings within reach  - Initiate and maintain comfort rounds  - Make Fall Risk Sign visible to staff  - Obtain necessary fall risk management equipment: rolling walker  - Apply yellow socks and bracelet for high fall risk patients  - Consider moving patient to room near nurses station  Outcome: Progressing     Problem: INFECTION - ADULT  Goal: Absence or prevention of progression during hospitalization  Description: INTERVENTIONS:  - Assess and monitor for signs and symptoms of infection  - Monitor lab/diagnostic results  - Monitor all insertion sites, i e  indwelling lines, tubes, and drains  - Monitor endotracheal if appropriate and nasal secretions for changes in amount and color  - [place] appropriate cooling/warming therapies per order  - Administer medications as ordered  - Instruct and encourage patient and family to use good hand hygiene technique  - Identify and instruct in appropriate isolation precautions for identified infection/condition  Outcome: Progressing

## 2022-10-03 ENCOUNTER — APPOINTMENT (OUTPATIENT)
Dept: PHYSICAL THERAPY | Facility: REHABILITATION | Age: 77
End: 2022-10-03

## 2022-10-03 ENCOUNTER — OFFICE VISIT (OUTPATIENT)
Dept: PHYSICAL THERAPY | Facility: REHABILITATION | Age: 77
End: 2022-10-03
Payer: COMMERCIAL

## 2022-10-03 DIAGNOSIS — M17.0 PRIMARY OSTEOARTHRITIS OF BOTH KNEES: Primary | ICD-10-CM

## 2022-10-03 DIAGNOSIS — M25.551 CHRONIC ARTHRALGIAS OF KNEES AND HIPS: ICD-10-CM

## 2022-10-03 DIAGNOSIS — G89.29 CHRONIC ARTHRALGIAS OF KNEES AND HIPS: ICD-10-CM

## 2022-10-03 DIAGNOSIS — M25.552 CHRONIC ARTHRALGIAS OF KNEES AND HIPS: ICD-10-CM

## 2022-10-03 DIAGNOSIS — M25.562 CHRONIC ARTHRALGIAS OF KNEES AND HIPS: ICD-10-CM

## 2022-10-03 DIAGNOSIS — M25.561 CHRONIC ARTHRALGIAS OF KNEES AND HIPS: ICD-10-CM

## 2022-10-03 PROCEDURE — 97110 THERAPEUTIC EXERCISES: CPT

## 2022-10-03 PROCEDURE — 97164 PT RE-EVAL EST PLAN CARE: CPT

## 2022-10-03 NOTE — PROGRESS NOTES
PT Re-Evaluation     Today's date: 10/3/2022  Patient name: Ashley Neff  : 1945  MRN: [id number]  Referring provider: John Cheema PA-C  Dx:   Encounter Diagnosis     ICD-10-CM    1  Primary osteoarthritis of both knees  M17 0    2  Chronic arthralgias of knees and hips  M25 551     G89 29     M25 561     M25 552     M25 562                   Assessment  Assessment details: Ashley Neff is a 78y o  year old male who presents to PT today for a L knee post op TKA evaluation  DOS 2022  Current impairments include decreased L knee ROM, decreased balance, and decreased quad strength limiting functional mobility including stairs, sit to stand transfers, dressing, bathing, difficulty standing/walking for extended period of time, and discomfort with standing and sitting  These deficits impair his ability to perform all typical daily activities, household activities, and recreational tasks without pain  Recommend skilled PT services to address previously stated deficits to promote progress towards PLOF  Impairments: abnormal or restricted ROM, activity intolerance, impaired balance, impaired physical strength, lacks appropriate home exercise program, pain with function and poor body mechanics  Functional limitations: stairs, transfers, community distance ambulation  Symptom irritability: moderateUnderstanding of Dx/Px/POC: good   Prognosis: good    Goals  STG (4 weeks)  1  Pt to be I in HEP  2 Pt to reduce pain with rest by 50%  3  Patient to ambulate community distances with use of SPC  4  Patient to decreased TUG time to 20 seconds or less to reduce fall risk  5  Patient to demonstrate L knee ROM 0-110  LTG (By DC)  1 Pt to reduce pain with activity by 50%  2  Pt to improve FOTO score to predicted dc value  3  Pt to walk community distances without AD  4  Patient to ascend and descend steps reciprocally  5  Patient to demonstrate TUG time < 13 seconds    6  Patient to increase L LE strength by 1 full grade  Plan  Plan details: Thank you for referring Leroy Gill to Physical Therapy at [place] [address] and for the opportunity to coordinate care  Patient would benefit from: skilled physical therapy  Planned modality interventions: electrical stimulation/Russian stimulation, TENS, unattended electrical stimulation and thermotherapy: hydrocollator packs  Planned therapy interventions: abdominal trunk stabilization, activity modification, ADL training, ADL retraining, balance, body mechanics training, breathing training, fine motor coordination training, flexibility, functional ROM exercises, gait training, graded activity, graded exercise, graded motor, home exercise program, IADL retraining, Blum taping, motor coordination training, muscle pump exercises, patient education, postural training, self care, strengthening, stretching, therapeutic activities, therapeutic exercise, therapeutic training, transfer training, balance/weight bearing training, sensory integrative techniques, neuromuscular re-education, manual therapy and joint mobilization  Frequency: 2x week (1 visit preop; postop frequency TBD next visit)  Duration in visits: 10  Duration in weeks: 8  Plan of Care beginning date: 7/11/2022  Plan of Care expiration date: 12/16/2022  Treatment plan discussed with: patient        Subjective Evaluation    History of Present Illness  Mechanism of injury: Leroy Gill is a 78 y o  male presenting with L knee pain and is pre TKA to be performed by Dr Marielle Bashir on 9/29/22  Currently, Virginia Paul is having difficulty with descending stairs, discomfort with standing/sitting, and pain with extended walking/standing  Steps to enter home: 4 steps  First floor set up: Yes  Number of steps to second floor: Greater than 10 steps  Assistive devices available: [employer] and [employer]  Assist at home wife and son (son is close by)  Pre op TUG time: 8"      Quality of life: good    Pain  Current pain ratin  At best pain ratin  At worst pain rating: 3  Location: L knee  Quality: grinding  Aggravating factors: stair climbing and walking    Social Support  Steps to enter house: yes  Stairs in house: yes   Lives in: multiple-level home  Lives with: spouse    Employment status: not working  Treatments  Previous treatment: physical therapy  Current treatment: physical therapy  Patient Goals  Patient goals for therapy: decreased pain, increased motion, increased strength and independence with ADLs/IADLs          Objective     Observations     Additional Observation Details  (-) s/s of infection    Active Range of Motion   Left Knee   Flexion: 80 degrees   Extension: -12 degrees     Right Knee   Flexion: 125 degrees   Extensor lag: 10 degrees     Strength/Myotome Testing     Left Knee   Flexion: 4  Extension: 4-  Quadriceps contraction: poor    Right Knee   Flexion: 5  Extension: 5    General Comments:      Knee Comments  Post op TU seconds           Precautions: HTN, HLD, CAD, MI (), R TKA 22    * Indicates part of HEP  HEP code: [id number]     Daily Treatment Diary     Manuals 7/11 10/3         L Knee PROM nv nv         L patellar mobility nv nv                    Therapeutic Exercise           Bike            Heel slides* 5x5" 5" 2x10         Hamstring stretch  10"x10         Quad stretch           Piriformis stretch           Quad set* 5x10" 5" 2x10         SLR flexion* 1x12          SLR abduction           Seated calf stretch  10"x10         Pt ed HEP POC  Done                               Neuro Re-ed           Bridges* 3x10          Clamshells* 3x10          Lateral eccentric step down           Forward eccentric step down                                                       Therapeutic Activity           Sit to stands           Leg press           Heel raises* 3x10          Toe raises* 3x10          Band resisted side stepping           Monster walks Goblet squat           Deadlift           Forward step ups           Lateral step ups                                                       Modalities

## 2022-10-05 ENCOUNTER — OFFICE VISIT (OUTPATIENT)
Dept: PHYSICAL THERAPY | Facility: REHABILITATION | Age: 77
End: 2022-10-05
Payer: COMMERCIAL

## 2022-10-05 DIAGNOSIS — M25.562 CHRONIC PAIN OF LEFT KNEE: ICD-10-CM

## 2022-10-05 DIAGNOSIS — G89.29 CHRONIC PAIN OF LEFT KNEE: ICD-10-CM

## 2022-10-05 DIAGNOSIS — M17.12 ARTHRITIS OF LEFT KNEE: Primary | ICD-10-CM

## 2022-10-05 PROCEDURE — 97140 MANUAL THERAPY 1/> REGIONS: CPT | Performed by: PHYSICAL THERAPIST

## 2022-10-05 PROCEDURE — 97110 THERAPEUTIC EXERCISES: CPT | Performed by: PHYSICAL THERAPIST

## 2022-10-05 NOTE — PROGRESS NOTES
Daily Note     Today's date: 10/5/2022  Patient name: Jarocho Hall  : 1945  MRN: [id number]  Referring provider: Ana Oconnell PA-C  Dx:   Encounter Diagnosis     ICD-10-CM    1  Arthritis of left knee  M17 12    2  Chronic pain of left knee  M25 562     G89 29        Start Time: 1401  Stop Time: 1445  Total time in clinic (min): 44 minutes    Subjective: Otilio reports his knee is a bit painful and stiff today and his leg, primarily the thigh, is very swollen  Objective: See treatment diary below      Assessment: Tolerated treatment well  Otilio demonstrates good PROM of the L knee this session  Introduced LAQ for improved quad strengthening in preparation for flexion SLR in upcoming sessions  Patient demonstrated appropriate level of challenge and muscular fatigue throughout session and noted good status at end of visit  Patient demonstrated fatigue post treatment, exhibited good technique with therapeutic exercises and would benefit from continued PT  Plan: Continue per plan of care  Progress treatment as tolerated         Precautions: HTN, HLD, CAD, MI (), R TKA 22    * Indicates part of HEP  HEP code: [id number]     Daily Treatment Diary     Manuals 7/11 10/3 105        L Knee PROM nv nv done        L patellar mobility nv nv done                   Therapeutic Exercise           Bike    5' rocks > full rev        Heel slides* 5x5" 5" 2x10 2x10x5" L        Hamstring stretch  10"x10 10x10" L        Quad stretch           Piriformis stretch           Quad set* 5x10" 5" 2x10 2x10x5" L        LAQ   3x10x3" L        SLR flexion* 1x12          SLR abduction           Seated calf stretch  10"x10 10x10" L        Pt ed HEP POC  Done                               Neuro Re-ed           Bridges* 3x10  nv        Clamshells* 3x10          Lateral eccentric step down           Forward eccentric step down                                                       Therapeutic Activity Sit to stands   nv        Leg press           Heel raises* 3x10  3x10        Toe raises* 3x10  3x10        Band resisted side stepping           Monster walks           Goblet squat           Deadlift           Forward step ups           Lateral step ups                                                       Modalities           CP L knee   10' post long sitting

## 2022-10-11 ENCOUNTER — OFFICE VISIT (OUTPATIENT)
Dept: PHYSICAL THERAPY | Facility: REHABILITATION | Age: 77
End: 2022-10-11
Payer: COMMERCIAL

## 2022-10-11 DIAGNOSIS — M17.12 ARTHRITIS OF LEFT KNEE: Primary | ICD-10-CM

## 2022-10-11 DIAGNOSIS — M17.12 PRIMARY OSTEOARTHRITIS OF LEFT KNEE: ICD-10-CM

## 2022-10-11 DIAGNOSIS — M17.0 PRIMARY OSTEOARTHRITIS OF BOTH KNEES: ICD-10-CM

## 2022-10-11 DIAGNOSIS — M25.562 CHRONIC PAIN OF LEFT KNEE: ICD-10-CM

## 2022-10-11 DIAGNOSIS — M17.11 PRIMARY OSTEOARTHRITIS OF RIGHT KNEE: ICD-10-CM

## 2022-10-11 DIAGNOSIS — G89.29 CHRONIC PAIN OF LEFT KNEE: ICD-10-CM

## 2022-10-11 PROCEDURE — 97110 THERAPEUTIC EXERCISES: CPT | Performed by: PHYSICAL THERAPIST

## 2022-10-11 PROCEDURE — 97530 THERAPEUTIC ACTIVITIES: CPT | Performed by: PHYSICAL THERAPIST

## 2022-10-11 RX ORDER — DICLOFENAC SODIUM 75 MG/1
TABLET, DELAYED RELEASE ORAL
Qty: 30 TABLET | Refills: 0 | Status: SHIPPED | OUTPATIENT
Start: 2022-10-11

## 2022-10-11 NOTE — PROGRESS NOTES
Daily Note     Today's date: 10/11/2022  Patient name: Dean Witt  : 1945  MRN: [id number]  Referring provider: Libby Holbrook PA-C  Dx:   Encounter Diagnosis     ICD-10-CM    1  Arthritis of left knee  M17 12    2  Chronic pain of left knee  M25 562     G89 29        Start Time: 933  Stop Time: 1027  Total time in clinic (min): 54 minutes    Subjective: Asad Murray reports the bruising around his knee is getting better  States he tried his bike at home but wasn't able to pedal all the way around yet  Objective: See treatment diary below      Assessment: Tolerated treatment well  Held manual therapy this session secondary to good PROM with self stretches  Discomfort noted with LAQs this session, held progression to flexion SLR this session but will trial in upcoming sessions based on tolerance  Introduced sit to stands from elevated surface with good form  Patient demonstrated appropriate level of challenge and muscular fatigue throughout session and noted good status at end of visit  Patient demonstrated fatigue post treatment, exhibited good technique with therapeutic exercises and would benefit from continued PT  Plan: Continue per plan of care  Progress treatment as tolerated         Precautions: HTN, HLD, CAD, MI (), R TKA 22    * Indicates part of HEP  HEP code: [id number]     Daily Treatment Diary     Manuals 7/11 10/3 10/5 10/11       L Knee PROM nv nv done        L patellar mobility nv nv done                   Therapeutic Exercise           Bike    5' rocks > full rev 5' lvl 0       Heel slides* 5x5" 5" 2x10 2x10x5" L [id number]"       Hamstring stretch  10"x10 10x10" L 10x10" L       Quad stretch           Piriformis stretch           Quad set* 5x10" 5" 2x10 2x10x5" L [id number]" L       LAQ   3x10x3" L 3x10x3" L       SLR flexion* 1x12          SLR abduction           Seated calf stretch  10"x10 10x10" L 10x10" L       Pt ed HEP POC  Done                               Neuro Re-ed           Bridges* 3x10  nv 3x10       Clamshells* 3x10          Lateral eccentric step down           Forward eccentric step down                                                       Therapeutic Activity           Sit to stands   nv 3x8 + 2 foam boost       Leg press           Heel raises* 3x10  3x10 3x10       Toe raises* 3x10  3x10 3x10       Band resisted side stepping           Monster walks           Goblet squat           Deadlift           Forward step ups           Lateral step ups                                                       Modalities           CP L knee   10' post long sitting 10' post long sitting

## 2022-10-12 PROBLEM — Z00.00 MEDICARE ANNUAL WELLNESS VISIT, SUBSEQUENT: Status: RESOLVED | Noted: 2021-09-23 | Resolved: 2022-10-12

## 2022-10-13 ENCOUNTER — OFFICE VISIT (OUTPATIENT)
Dept: PHYSICAL THERAPY | Facility: REHABILITATION | Age: 77
End: 2022-10-13
Payer: COMMERCIAL

## 2022-10-13 DIAGNOSIS — G89.29 CHRONIC PAIN OF LEFT KNEE: ICD-10-CM

## 2022-10-13 DIAGNOSIS — M25.562 CHRONIC PAIN OF LEFT KNEE: ICD-10-CM

## 2022-10-13 DIAGNOSIS — M17.12 ARTHRITIS OF LEFT KNEE: Primary | ICD-10-CM

## 2022-10-13 PROCEDURE — 97530 THERAPEUTIC ACTIVITIES: CPT | Performed by: PHYSICAL THERAPIST

## 2022-10-13 PROCEDURE — 97110 THERAPEUTIC EXERCISES: CPT | Performed by: PHYSICAL THERAPIST

## 2022-10-13 NOTE — PROGRESS NOTES
Daily Note     Today's date: 10/13/2022  Patient name: Cathy Kennedy  : 1945  MRN: [id number]  Referring provider: Mel Arriaga PA-C  Dx:   Encounter Diagnosis     ICD-10-CM    1  Arthritis of left knee  M17 12    2  Chronic pain of left knee  M25 562     G89 29        Start Time: 1503  Stop Time: 1555  Total time in clinic (min): 52 minutes    Subjective: Otilio reports improvement in bruising in the L leg and states it's easier getting in/out of the car now  Objective: See treatment diary below      Assessment: Tolerated treatment well  Able to introduce flexion SLR without extension lag but evident fatigue by end of each set  Good tolerance to introduction of leg press as well for improved global LE strength  Patient demonstrated appropriate level of challenge and muscular fatigue throughout session and noted good status at end of visit  Patient demonstrated fatigue post treatment, exhibited good technique with therapeutic exercises and would benefit from continued PT  Plan: Continue per plan of care  Progress treatment as tolerated         Precautions: HTN, HLD, CAD, MI (), R TKA 22    * Indicates part of HEP  HEP code: [id number]     Daily Treatment Diary     Manuals 7/11 10/3 10/5 10/11 10/13      L Knee PROM nv nv done        L patellar mobility nv nv done                   Therapeutic Exercise           Bike    5' rocks > full rev 5' lvl 0 5' lvl 1      Heel slides* 5x5" 5" 2x10 2x10x5" L [id number]" [id number]"      Hamstring stretch  10"x10 10x10" L 10x10" L 10x10" L      Quad stretch           Piriformis stretch           Quad set* 5x10" 5" 2x10 2x10x5" L [id number]" L 10x10" L      LAQ   3x10x3" L 3x10x3" L 3x10x5" L      SLR flexion* 1x12    2x8      SLR abduction           Seated calf stretch  10"x10 10x10" L 10x10" L       Pt ed HEP POC  Done                               Neuro Re-ed           Bridges* 3x10  nv 3x10 3x10      Clamshells* 3x10          Lateral eccentric step down           Forward eccentric step down                                                       Therapeutic Activity           Sit to stands   nv 3x8 + 2 foam boost       Leg press     115# 1x10, 125# 2x10 P10S3      Heel raises* 3x10  3x10 3x10 3x12      Toe raises* 3x10  3x10 3x10 3x12      Band resisted side stepping           Monster walks           Goblet squat           Deadlift           Forward step ups           Lateral step ups                                                       Modalities           CP L knee   10' post long sitting 10' post long sitting 10' post long sitting

## 2022-10-14 ENCOUNTER — TELEPHONE (OUTPATIENT)
Dept: OBGYN CLINIC | Facility: CLINIC | Age: 77
End: 2022-10-14

## 2022-10-14 ENCOUNTER — TELEPHONE (OUTPATIENT)
Dept: OBGYN CLINIC | Facility: MEDICAL CENTER | Age: 77
End: 2022-10-14

## 2022-10-14 NOTE — TELEPHONE ENCOUNTER
Caller: Patient    Doctor: Femi Awad    Reason for call: Patient is calling to see if he has clearance to drive his car? He needs to go to [place] on Sunday? He states his left knee is doing great        Call back#: [phone]

## 2022-10-14 NOTE — TELEPHONE ENCOUNTER
Ortho PO visit today with Radha Kemp    (lmom for pt to cb to reschedule to [facility] on Tuesday as Colon Sizer is not in the office today)

## 2022-10-17 NOTE — TELEPHONE ENCOUNTER
Caller: Willi Bermudez     Doctor: Dr Chaney    Reason for call: Returning call for PA (his phone automatically blocks unknown numbers)     Call back#: [phone]

## 2022-10-17 NOTE — TELEPHONE ENCOUNTER
Caller: patient    Doctor: Tracie Fay    Reason for call: cb for Nikhil Singh    Call back#: [phone]

## 2022-10-18 ENCOUNTER — HOSPITAL ENCOUNTER (OUTPATIENT)
Dept: RADIOLOGY | Facility: HOSPITAL | Age: 77
Discharge: HOME | End: 2022-10-18
Payer: COMMERCIAL

## 2022-10-18 ENCOUNTER — OFFICE VISIT (OUTPATIENT)
Dept: PHYSICAL THERAPY | Facility: REHABILITATION | Age: 77
End: 2022-10-18
Payer: COMMERCIAL

## 2022-10-18 ENCOUNTER — OFFICE VISIT (OUTPATIENT)
Dept: OBGYN CLINIC | Facility: CLINIC | Age: 77
End: 2022-10-18

## 2022-10-18 DIAGNOSIS — M17.12 ARTHRITIS OF LEFT KNEE: Primary | ICD-10-CM

## 2022-10-18 DIAGNOSIS — M25.562 CHRONIC PAIN OF LEFT KNEE: ICD-10-CM

## 2022-10-18 DIAGNOSIS — Z96.652 S/P TOTAL KNEE REPLACEMENT, LEFT: Primary | ICD-10-CM

## 2022-10-18 DIAGNOSIS — Z96.652 S/P TOTAL KNEE REPLACEMENT, LEFT: ICD-10-CM

## 2022-10-18 DIAGNOSIS — G89.29 CHRONIC PAIN OF LEFT KNEE: ICD-10-CM

## 2022-10-18 PROCEDURE — 97530 THERAPEUTIC ACTIVITIES: CPT | Performed by: PHYSICAL THERAPIST

## 2022-10-18 PROCEDURE — 97110 THERAPEUTIC EXERCISES: CPT | Performed by: PHYSICAL THERAPIST

## 2022-10-18 PROCEDURE — 99024 POSTOP FOLLOW-UP VISIT: CPT | Performed by: PHYSICIAN ASSISTANT

## 2022-10-18 PROCEDURE — 73562 X-RAY EXAM OF KNEE 3: CPT

## 2022-10-18 NOTE — PROGRESS NOTES
78 y o male presents for 2 weeks postoperative visit status post left TKA  Patient states he is doing well pain is well under control denies any calf pain chest pain shortness of breath numbness tingling  Patient states he is doing well physical therapy is taking his aspirin twice daily as prescribed  Patient is very happy with outcome his left total knee arthroplasty at this time      Review of Systems  Review of systems negative unless otherwise specified in HPI    Past Medical History  Past Medical History:   Diagnosis Date   • Celiac disease    • Coronary artery disease    • Diverticulitis    • Diverticulosis    • Hyperlipidemia    • Hypertension    • Myocardial infarction ([facility] [address] ) 1990       Past Surgical History  Past Surgical History:   Procedure Laterality Date   • COLONOSCOPY     • CORONARY ANGIOPLASTY WITH STENT PLACEMENT      Multiple times   • MS TOTAL KNEE ARTHROPLASTY Right 04/13/2022    Procedure: ARTHROPLASTY KNEE TOTAL and all associated procedures;  Surgeon: Teresa Mi MD;  Location:  MAIN OR;  Service: Orthopedics   • MS TOTAL KNEE ARTHROPLASTY Left 9/29/2022    Procedure: ARTHROPLASTY KNEE TOTAL;  Surgeon: Teresa Mi MD;  Location:  MAIN OR;  Service: Orthopedics       Current Medications  Current Outpatient Medications on File Prior to Visit   Medication Sig Dispense Refill   • acetaminophen (TYLENOL) 650 mg CR tablet Take 1 tablet (650 mg total) by mouth every 8 (eight) hours as needed for mild pain 30 tablet 0   • ascorbic acid (VITAMIN C) 500 MG tablet Take 1 tablet (500 mg total) by mouth daily 60 tablet 0   • aspirin (ECOTRIN LOW STRENGTH) 81 mg EC tablet Take 1 tablet (81 mg total) by mouth 2 (two) times a day Please do not start taking until after total joint arthroplasty 70 tablet 0   • baclofen 10 mg tablet Take 1 tablet (10 mg total) by mouth 3 (three) times a day as needed for muscle spasms 90 tablet 0   • Cholecalciferol (Vitamin D3) 1 25 MG (06102 UT) CAPS Take 10,000 Units by mouth every morning       • clopidogrel (PLAVIX) 75 mg tablet Take 75 mg by mouth every morning       • Cyanocobalamin (VITAMIN B 12 PO) Take by mouth     • diclofenac (VOLTAREN) 75 mg EC tablet TAKE 1 TABLET BY MOUTH EVERY DAY IN THE MORNING 30 tablet 0   • ezetimibe (ZETIA) 10 mg tablet TAKE 1 TABLET BY MOUTH  DAILY (Patient taking differently: Take 20 mg by mouth daily at bedtime) 30 tablet 11   • ferrous sulfate 324 (65 Fe) mg Take 1 tablet (324 mg total) by mouth 2 (two) times a day before meals (Patient not taking: No sig reported) 60 tablet 0   • folic acid (FOLVITE) 1 mg tablet TAKE 1 TABLET BY MOUTH EVERY DAY 90 tablet 1   • gabapentin (NEURONTIN) 300 mg capsule TAKE 1 CAPSULE BY MOUTH 3  TIMES DAILY (Patient taking differently: Take 600 mg by mouth daily at bedtime) 270 capsule 3   • Icosapent Ethyl 1 g CAPS      • ketoconazole (NIZORAL) 2 % shampoo APPLY TOPICALLY 2 TIMES A WEEK 120 mL 0   • metoprolol succinate (TOPROL-XL) 50 mg 24 hr tablet Take 1 tablet (50 mg total) by mouth daily 90 tablet 3   • Multiple Vitamin (multivitamin) capsule Take 1 capsule by mouth daily     • oxyCODONE (ROXICODONE) 5 immediate release tablet Take 1 tablets every 6 hrs as needed for pain control 30 tablet 0   • sertraline (ZOLOFT) 50 mg tablet Take 1 tablet (50 mg total) by mouth daily (Patient taking differently: Take 50 mg by mouth every morning) 90 tablet 3   • simvastatin (ZOCOR) 40 mg tablet Take 1 tablet (40 mg total) by mouth daily at bedtime 90 tablet 1   • tamsulosin (FLOMAX) 0 4 mg TAKE 1 CAPSULE BY MOUTH  DAILY WITH DINNER 90 capsule 3   • traZODone (DESYREL) 150 mg tablet TAKE 1 TABLET BY MOUTH  DAILY AT BEDTIME 30 tablet 11     No current facility-administered medications on file prior to visit         Recent Labs [facility])  0   Lab Value Date/Time    HCT 33 6 (L) 09/30/2022 0454    HGB 12 0 09/30/2022 0454    WBC 12 21 (H) 09/30/2022 0454    INR 1 07 08/30/2022 1540 ESR 5 11/22/2019 0905    CRP <1 0 08/30/2022 1545    HGBA1C 5 4 08/30/2022 1545         Physical exam  · General: Awake, Alert, Oriented  · Eyes: Pupils equal, round and reactive to light  · Heart: regular rate and rhythm  · Lungs: No audible wheezing    left Knee exam  · Examination patient's left knee to 3° shy of full extension flexion 115° calf nontender palpation active ankle dorsiflexion sensation intact distal pulses present  Incision well approximated no erythema no tenderness to palpation calf nontender palpation bilaterally      Imaging  X-rays left knee reviewed x-rays reveal excellent aligned left total knee arthroplasty without evidence of loosening    Assessment:   Status post 2 weeks left total knee arthroplasty   Plan:   Weightbearing as tolerated left lower extremity   Continue aspirin 81 mg twice daily x5 weeks from date of surgery   Physical therapy range of motion stretching strengthening   Over-the-counter pain medication as needed   Case discussed with Dr Taran Camp in 2 months time repeat x-rays left knee

## 2022-10-18 NOTE — PROGRESS NOTES
Daily Note     Today's date: 10/18/2022  Patient name: Willi Bermudez  : 1945  MRN: [id number]  Referring provider: Veda Cast PA-C  Dx:   Encounter Diagnosis     ICD-10-CM    1  Arthritis of left knee  M17 12    2  Chronic pain of left knee  M25 562     G89 29        Start Time: 1450  Stop Time: 1540  Total time in clinic (min): 50 minutes    Subjective: Otilio reports his follow up appointment with MD went well  No significant changes since previous session  Objective: See treatment diary below      Assessment: Tolerated treatment well  Min verbal cuing required for quad set prior to lift leg for flexion with good ability to incorporate feedback into performance but increased challenge demonstrated  Patient demonstrated appropriate level of challenge and muscular fatigue throughout session and noted good status at end of visit  Patient demonstrated fatigue post treatment, exhibited good technique with therapeutic exercises and would benefit from continued PT  Plan: Continue per plan of care  Progress treatment as tolerated         Precautions: HTN, HLD, CAD, MI (), R TKA 22    * Indicates part of HEP  HEP code: [id number]     Daily Treatment Diary     Manuals 7/11 10/3 10/5 10/11 10/13 10/18     L Knee PROM nv nv done        L patellar mobility nv nv done                   Therapeutic Exercise           Bike    5' rocks > full rev 5' lvl 0 5' lvl 1 5' lvl1     Heel slides* 5x5" 5" 2x10 2x10x5" L [id number]" [id number]" 20x10"     Hamstring stretch  10"x10 10x10" L 10x10" L 10x10" L 10x10" L     Quad stretch           Piriformis stretch           Quad set* 5x10" 5" 2x10 2x10x5" L [id number]" L 10x10" L 10x10" L     LAQ   3x10x3" L 3x10x3" L 3x10x5" L      SLR flexion* 1x12    2x8 3x10     SLR abduction           Seated calf stretch  10"x10 10x10" L 10x10" L       Pt ed HEP POC  Done                               Neuro Re-ed           Bridges* 3x10  nv 3x10 3x10 3x12     Clamshells* 3x10          Lateral eccentric step down           Forward eccentric step down                                                       Therapeutic Activity           Sit to stands   nv 3x8 + 2 foam boost       Leg press     115# 1x10, 125# 2x10 P10S3 145# 3x10 P8S3     Heel raises* 3x10  3x10 3x10 3x12 3x12     Toe raises* 3x10  3x10 3x10 3x12 3x12     Band resisted side stepping           Monster walks           Goblet squat           Deadlift           Forward step ups           Lateral step ups                                                       Modalities           CP L knee   10' post long sitting 10' post long sitting 10' post long sitting 10' post long sitting

## 2022-10-19 DIAGNOSIS — M79.10 MYALGIA: ICD-10-CM

## 2022-10-19 RX ORDER — BACLOFEN 10 MG/1
10 TABLET ORAL 3 TIMES DAILY PRN
Qty: 90 TABLET | Refills: 0 | Status: SHIPPED | OUTPATIENT
Start: 2022-10-19 | End: 2022-10-19 | Stop reason: SDUPTHER

## 2022-10-19 RX ORDER — BACLOFEN 10 MG/1
10 TABLET ORAL 3 TIMES DAILY PRN
Qty: 90 TABLET | Refills: 0 | Status: SHIPPED | OUTPATIENT
Start: 2022-10-19

## 2022-10-19 NOTE — TELEPHONE ENCOUNTER
Oralia Smith has called the [facility] office requesting a medication refill  Sven has requested a refill of baclofen 10 mg tablet  Sven requested the refill to be sent to the [facility] on [address]

## 2022-10-20 ENCOUNTER — OFFICE VISIT (OUTPATIENT)
Dept: PHYSICAL THERAPY | Facility: REHABILITATION | Age: 77
End: 2022-10-20
Payer: COMMERCIAL

## 2022-10-20 DIAGNOSIS — M17.12 ARTHRITIS OF LEFT KNEE: Primary | ICD-10-CM

## 2022-10-20 DIAGNOSIS — G89.29 CHRONIC PAIN OF LEFT KNEE: ICD-10-CM

## 2022-10-20 DIAGNOSIS — M25.562 CHRONIC PAIN OF LEFT KNEE: ICD-10-CM

## 2022-10-20 PROCEDURE — 97110 THERAPEUTIC EXERCISES: CPT | Performed by: PHYSICAL THERAPIST

## 2022-10-20 PROCEDURE — 97530 THERAPEUTIC ACTIVITIES: CPT | Performed by: PHYSICAL THERAPIST

## 2022-10-20 NOTE — PROGRESS NOTES
Daily Note     Today's date: 10/20/2022  Patient name: Isaac Pelaez  : 1945  MRN: [id number]  Referring provider: Patsy Lang PA-C  Dx:   Encounter Diagnosis     ICD-10-CM    1  Arthritis of left knee  M17 12    2  Chronic pain of left knee  M25 562     G89 29        Start Time: 1322  Stop Time: 1415  Total time in clinic (min): 53 minutes    Subjective: Elda Romo reports his sciatic nerve continues to be bothersome but states his L knee is doing well  Objective: See treatment diary below      Assessment: Tolerated treatment well  Good tolerance to all exercises throughout session  Min verbal cuing for proper form during flexion SLR with good ability to incorporate feedback into performance  Patient demonstrated appropriate level of challenge and muscular fatigue throughout session and noted good status at end of visit  Patient demonstrated fatigue post treatment, exhibited good technique with therapeutic exercises and would benefit from continued PT  Plan: Continue per plan of care  Progress treatment as tolerated         Precautions: HTN, HLD, CAD, MI (), R TKA 22    * Indicates part of HEP  HEP code: [id number]     Daily Treatment Diary     Manuals 7/11 10/3 10/5 10/11 10/13 10/18 10/20    L Knee PROM nv nv done        L patellar mobility nv nv done                   Therapeutic Exercise           Bike    5' rocks > full rev 5' lvl 0 5' lvl 1 5' lvl1 5' lvl 2    Heel slides* 5x5" 5" 2x10 2x10x5" L [id number]" [id number]" 20x10" 20x10"    Hamstring stretch  10"x10 10x10" L 10x10" L 10x10" L 10x10" L 10x10" ea    Quad stretch           Piriformis stretch           Quad set* 5x10" 5" 2x10 2x10x5" L [id number]" L 10x10" L 10x10" L     LAQ   3x10x3" L 3x10x3" L 3x10x5" L      SLR flexion* 1x12    2x8 3x10 3x12 ea    SLR abduction           Seated calf stretch  10"x10 10x10" L 10x10" L       Leg curl       55# 3x10    Pt ed HEP POC  Done                               Neuro Re-ed Bridges* 3x10  nv 3x10 3x10 3x12 3x15    Clamshells* 3x10          Lateral eccentric step down           Forward eccentric step down                                                       Therapeutic Activity           Sit to stands   nv 3x8 + 2 foam boost       Leg press     115# 1x10, 125# 2x10 P10S3 145# 3x10 P8S3 125# 3x10 P8S3    Heel raises* 3x10  3x10 3x10 3x12 3x12 3x15    Toe raises* 3x10  3x10 3x10 3x12 3x12 3x15    Band resisted side stepping           Monster walks           Goblet squat           Deadlift           Forward step ups           Lateral step ups                                                       Modalities           CP L knee   10' post long sitting 10' post long sitting 10' post long sitting 10' post long sitting 10' post long sitting

## 2022-10-20 NOTE — TELEPHONE ENCOUNTER
Called patient to let him know Second attempt to contact patient.  His voicemail box is still not set up and unable to leave a voice message.  I did send patient a [phone] message with my phone number asking him to call me back to discuss the CT results.    Did speak with patient.  His CT with contrast shows a 2.2 x 2.5 x 3.4 cm mass in the left 7 to biliary region.  Reading radiologist stated that this is likely an abnormal enlarged lymph node and recommendation for biopsy.  I spoke to the patient and he did verbalize understanding is in agreement to proceed with the biopsy.  Core biopsy will be required requested.  Patient to follow-up in the clinic to review results once received.      CT-SOFT TISSUE NECK WITH    Result Date: 8/30/2022 8/30/2022 3:30 PM HISTORY/REASON FOR EXAM:  Lymphadenopathy, neck; left cervical adenopathy on U/S; left cervical adenopathy on U/S. TECHNIQUE/EXAM DESCRIPTION AND NUMBER OF VIEWS:  CT soft tissue neck with contrast. The study was performed on a helical multidetector CT scanner. Contiguous thin section helical images were obtained of the neck from the skull base through the thoracic inlet. 80 mL of Omnipaque 350 nonionic contrast was injected intravenously. Low dose optimization technique was utilized for this CT exam including automated exposure control and adjustment of the mA and/or kV according to patient size. COMPARISON: Ultrasound 8/17/2022 FINDINGS: BRAIN: Visualized portions of the brain are normal in appearance. TEMPORAL bones: The mastoid air cells and middle ear are clear. PARANASAL SINUSES: There is trace LEFT maxillary sinus fluid and mucosal thickening. There has been BILATERAL maxillary antrostomy. CERVICAL spine: There is no significant cervical spine abnormality. NODES: There is a 2.3 x 2.5 x 3.4 cm ovoid mass adjacent to but not appearing to arise from the LEFT submandibular gland. There is additional are mildly prominent LEFT anterior neck lymph nodes, none of which are enlarged by  pathologic size criteria. SALIVARY and THYROID gland: The parotid, submandibular, and thyroid gland are normal in appearance. AIRWAY: The airway appears patent. MEDIASTINUM: The superior mediastinum is normal in appearance. LUNGS: The visualized portions of lungs are clear.     1.  2.2 x 2.5 x 3.4 cm mass in the LEFT submandibular region adjacent to but not apparently arising from the LEFT submandibular gland. This is probably an abnormally enlarged lymph node. Suggest biopsy. 2.  Additional prominent LEFT neck lymph nodes not enlarged by size criterion

## 2022-10-25 ENCOUNTER — OFFICE VISIT (OUTPATIENT)
Dept: PHYSICAL THERAPY | Facility: REHABILITATION | Age: 77
End: 2022-10-25
Payer: COMMERCIAL

## 2022-10-25 DIAGNOSIS — M17.12 ARTHRITIS OF LEFT KNEE: Primary | ICD-10-CM

## 2022-10-25 DIAGNOSIS — G89.29 CHRONIC PAIN OF LEFT KNEE: ICD-10-CM

## 2022-10-25 DIAGNOSIS — M25.562 CHRONIC PAIN OF LEFT KNEE: ICD-10-CM

## 2022-10-25 PROCEDURE — 97110 THERAPEUTIC EXERCISES: CPT | Performed by: PHYSICAL THERAPIST

## 2022-10-25 PROCEDURE — 97530 THERAPEUTIC ACTIVITIES: CPT | Performed by: PHYSICAL THERAPIST

## 2022-10-25 NOTE — PROGRESS NOTES
Daily Note     Today's date: 10/25/2022  Patient name: Juany Vaughan  : 1945  MRN: [id number]  Referring provider: Tray Nava PA-C  Dx:   Encounter Diagnosis     ICD-10-CM    1  Arthritis of left knee  M17 12    2  Chronic pain of left knee  M25 562     G89 29        Start Time: 7894  Stop Time: 1552  Total time in clinic (min): 59 minutes    Subjective: Marty Frank reports he went for a mile walk over the weekend and the knee tolerated it well but had muscle soreness in other areas of the legs  Objective: See treatment diary below      Assessment: Tolerated treatment well  Challenged with introduction of split squats this session secondary to decreased LE strength  Patient demonstrated appropriate level of challenge and muscular fatigue throughout session and noted good status at end of visit  Patient demonstrated fatigue post treatment, exhibited good technique with therapeutic exercises and would benefit from continued PT  Plan: Continue per plan of care  Progress treatment as tolerated         Precautions: HTN, HLD, CAD, MI (), R TKA 22    * Indicates part of HEP  HEP code: [facility]     Daily Treatment Diary     Manuals 7/11 10/3 10/5 10/11 10/13 10/18 10/20 10/25   L Knee PROM nv nv done        L patellar mobility nv nv done                   Therapeutic Exercise           Bike    5' rocks > full rev 5' lvl 0 5' lvl 1 5' lvl1 5' lvl 2 5' lvl 2   Heel slides* 5x5" 5" 2x10 2x10x5" L 1u01b61" 1s61n37" 20x10" 20x10" 20x10"   Hamstring stretch  10"x10 10x10" L 10x10" L 10x10" L 10x10" L 10x10" ea 10x10" ea   Quad stretch           Piriformis stretch           Quad set* 5x10" 5" 2x10 2x10x5" L 9f64e11" L 10x10" L 10x10" L     LAQ   3x10x3" L 3x10x3" L 3x10x5" L      SLR flexion* 1x12    2x8 3x10 3x12 ea 3x12 ea   SLR abduction           Seated calf stretch  10"x10 10x10" L 10x10" L       Leg curl       55# 3x10 65# 3x10   Pt ed HEP POC  Done                               Neuro Re-ed           Bridges* 3x10  nv 3x10 3x10 3x12 3x15 3x12 12#   Clamshells* 3x10          Lateral eccentric step down           Forward eccentric step down                                                       Therapeutic Activity           Sit to stands   nv 3x8 + 2 foam boost       Leg press     115# 1x10, 125# 2x10 P10S3 145# 3x10 P8S3 125# 3x10 P8S3 125# 3x12 P6S3   Heel raises* 3x10  3x10 3x10 3x12 3x12 3x15    Toe raises* 3x10  3x10 3x10 3x12 3x12 3x15    Band resisted side stepping           Monster walks           Goblet squat           Split squat        2x10 ea, half depth   Deadlift           Forward step ups           Lateral step ups                                                       Modalities           CP L knee   10' post long sitting 10' post long sitting 10' post long sitting 10' post long sitting 10' post long sitting 10' post long sitting

## 2022-10-27 ENCOUNTER — OFFICE VISIT (OUTPATIENT)
Dept: PHYSICAL THERAPY | Facility: REHABILITATION | Age: 77
End: 2022-10-27
Payer: COMMERCIAL

## 2022-10-27 ENCOUNTER — OFFICE VISIT (OUTPATIENT)
Dept: INTERNAL MEDICINE CLINIC | Facility: CLINIC | Age: 77
End: 2022-10-27

## 2022-10-27 VITALS
HEIGHT: 72 IN | HEART RATE: 81 BPM | DIASTOLIC BLOOD PRESSURE: 80 MMHG | WEIGHT: 233.6 LBS | TEMPERATURE: 97.7 F | BODY MASS INDEX: 31.64 KG/M2 | OXYGEN SATURATION: 96 % | SYSTOLIC BLOOD PRESSURE: 134 MMHG

## 2022-10-27 DIAGNOSIS — G89.29 CHRONIC PAIN OF LEFT KNEE: ICD-10-CM

## 2022-10-27 DIAGNOSIS — M25.562 CHRONIC PAIN OF LEFT KNEE: ICD-10-CM

## 2022-10-27 DIAGNOSIS — K57.32 DIVERTICULITIS OF ASCENDING COLON: Primary | ICD-10-CM

## 2022-10-27 DIAGNOSIS — Z23 NEED FOR INFLUENZA VACCINATION: ICD-10-CM

## 2022-10-27 DIAGNOSIS — M17.12 ARTHRITIS OF LEFT KNEE: Primary | ICD-10-CM

## 2022-10-27 PROBLEM — M79.10 MUSCLE PAIN: Status: RESOLVED | Noted: 2019-11-05 | Resolved: 2022-10-27

## 2022-10-27 PROBLEM — L23.89 ALLERGIC CONTACT DERMATITIS DUE TO OTHER AGENTS: Status: RESOLVED | Noted: 2020-07-17 | Resolved: 2022-10-27

## 2022-10-27 PROBLEM — E78.5 HYPERLIPIDEMIA: Status: ACTIVE | Noted: 2019-02-27

## 2022-10-27 PROBLEM — I25.10 ATHEROSCLEROSIS OF CORONARY ARTERY WITHOUT ANGINA PECTORIS: Status: ACTIVE | Noted: 2019-02-27

## 2022-10-27 PROBLEM — I71.20 ANEURYSM OF THORACIC AORTA: Status: ACTIVE | Noted: 2019-05-29

## 2022-10-27 PROBLEM — M25.551 CHRONIC ARTHRALGIAS OF KNEES AND HIPS: Status: RESOLVED | Noted: 2019-09-10 | Resolved: 2022-10-27

## 2022-10-27 PROBLEM — M25.552 CHRONIC ARTHRALGIAS OF KNEES AND HIPS: Status: RESOLVED | Noted: 2019-09-10 | Resolved: 2022-10-27

## 2022-10-27 PROBLEM — M25.561 CHRONIC ARTHRALGIAS OF KNEES AND HIPS: Status: RESOLVED | Noted: 2019-09-10 | Resolved: 2022-10-27

## 2022-10-27 PROBLEM — Z11.59 NEED FOR HEPATITIS C SCREENING TEST: Status: RESOLVED | Noted: 2019-11-05 | Resolved: 2022-10-27

## 2022-10-27 PROCEDURE — 97110 THERAPEUTIC EXERCISES: CPT | Performed by: PHYSICAL THERAPIST

## 2022-10-27 RX ORDER — METHOCARBAMOL 500 MG/1
TABLET, FILM COATED ORAL
COMMUNITY

## 2022-10-27 RX ORDER — OXYCODONE HYDROCHLORIDE 5 MG/1
TABLET ORAL
COMMUNITY
End: 2022-11-02 | Stop reason: ALTCHOICE

## 2022-10-27 RX ORDER — PSEUDOEPHEDRINE HCL 30 MG
TABLET ORAL
COMMUNITY

## 2022-10-27 RX ORDER — CHLORAL HYDRATE 500 MG
1000 CAPSULE ORAL DAILY
COMMUNITY
Start: 2022-09-16 | End: 2022-11-02 | Stop reason: ALTCHOICE

## 2022-10-27 RX ORDER — MELOXICAM 15 MG/1
TABLET ORAL
COMMUNITY
End: 2022-11-02 | Stop reason: CLARIF

## 2022-10-27 RX ORDER — ROSUVASTATIN CALCIUM 20 MG/1
TABLET, COATED ORAL
COMMUNITY
End: 2022-11-02 | Stop reason: ALTCHOICE

## 2022-10-27 NOTE — ASSESSMENT & PLAN NOTE
· Patient had diverticulitis on right side 5-6 years ago requiring hospitalization due to perforation however did not require surgery  · Three days ago developed similar pain on right side of abdomen along with dry heaving and 1 episode of emesis  No diarrhea/constipation or bloody bowel movements  No fevers or chills  · Pain is resolved today and he is tolerating normal diet  · No need for imaging or further workup at this time  However if patient does develop symptoms or develops fevers and chills he was instructed to go to the ER

## 2022-10-27 NOTE — PROGRESS NOTES
Name: Deidre Briones      : 1945      MRN: [id number]  Encounter Provider: Ruby Morris DO  Encounter Date: 10/27/2022   Encounter department: [address] [place] [address]  Diverticulitis of ascending colon  Assessment & Plan:  · Patient had diverticulitis on right side 5-6 years ago requiring hospitalization due to perforation however did not require surgery  · Three days ago developed similar pain on right side of abdomen along with dry heaving and 1 episode of emesis  No diarrhea/constipation or bloody bowel movements  No fevers or chills  · Pain is resolved today and he is tolerating normal diet  · No need for imaging or further workup at this time  However if patient does develop symptoms or develops fevers and chills he was instructed to go to the ER  2  Need for influenza vaccination  Assessment & Plan:  · Patient given flu vaccine today    Orders:  -     influenza vaccine, high-dose, PF 0 7 mL (FLUZONE HIGH-DOSE)           Subjective      Patient reports 3 days ago he began having right-sided pain in his abdomen  He says he also had some nausea and 1 episode of emesis  No diarrhea or bloody bowel movements  He has been moving his bowels normally  The reports he is feeling better today but somewhat tender  No fevers or chills  He did have pain like this 5-6 years ago and he was diagnosed with diverticulitis with a perforation requiring hospitalization  He did not require surgery at this time  Says he is tolerating a normal diet  Otherwise patient has no complaints today but would like his influenza vaccine  Review of Systems   Constitutional: Negative for appetite change, chills and fever  Respiratory: Negative for shortness of breath  Cardiovascular: Negative for chest pain  Gastrointestinal: Positive for abdominal pain, nausea and vomiting  Negative for anal bleeding, blood in stool, constipation and diarrhea  Neurological: Negative for dizziness and weakness  Current Outpatient Medications on File Prior to Visit   Medication Sig   • acetaminophen (TYLENOL) 650 mg CR tablet Take 1 tablet (650 mg total) by mouth every 8 (eight) hours as needed for mild pain   • ascorbic acid (VITAMIN C) 500 MG tablet Take 1 tablet (500 mg total) by mouth daily   • baclofen 10 mg tablet Take 1 tablet (10 mg total) by mouth 3 (three) times a day as needed for muscle spasms   • Cholecalciferol (Vitamin D3) 1 25 MG (24001 UT) CAPS Take 10,000 Units by mouth every morning     • clopidogrel (PLAVIX) 75 mg tablet Take 75 mg by mouth every morning     • Cyanocobalamin (VITAMIN B 12 PO) Take by mouth   • diclofenac (VOLTAREN) 75 mg EC tablet TAKE 1 TABLET BY MOUTH EVERY DAY IN THE MORNING   • Docusate Sodium (DSS) 100 MG CAPS docusate sodium 100 mg capsule   TAKE 1 CAPSULE BY MOUTH TWICE A DAY   • ezetimibe (ZETIA) 10 mg tablet TAKE 1 TABLET BY MOUTH  DAILY (Patient taking differently: Take 20 mg by mouth daily at bedtime)   • folic acid (FOLVITE) 1 mg tablet TAKE 1 TABLET BY MOUTH EVERY DAY   • gabapentin (NEURONTIN) 300 mg capsule TAKE 1 CAPSULE BY MOUTH 3  TIMES DAILY (Patient taking differently: Take 600 mg by mouth daily at bedtime)   • Icosapent Ethyl 1 g CAPS    • ketoconazole (NIZORAL) 2 % shampoo APPLY TOPICALLY 2 TIMES A WEEK   • meloxicam (MOBIC) 15 mg tablet meloxicam 15 mg tablet   TAKE 1 TABLET (15 MG TOTAL) BY MOUTH DAILY  • methocarbamol (ROBAXIN) 500 mg tablet methocarbamol 500 mg tablet   TAKE 1 TABLET (500 MG TOTAL) BY MOUTH 4 (FOUR) TIMES A DAY     • metoprolol succinate (TOPROL-XL) 50 mg 24 hr tablet Take 1 tablet (50 mg total) by mouth daily   • Multiple Vitamin (multivitamin) capsule Take 1 capsule by mouth daily   • Omega-3 Fatty Acids (fish oil) 1,000 mg Take 1,000 mg by mouth daily   • oxyCODONE (ROXICODONE) 5 immediate release tablet oxycodone 5 mg tablet   TAKE 1 TABLETS BY MOUTH EVERY 6 HOURS AS NEEDED FOR PAIN CONTROL   • rosuvastatin (CRESTOR) 20 MG tablet rosuvastatin 20 mg tablet   TAKE 1 TABLET BY MOUTH EVERY DAY   • sertraline (ZOLOFT) 50 mg tablet Take 1 tablet (50 mg total) by mouth daily (Patient taking differently: Take 50 mg by mouth every morning)   • simvastatin (ZOCOR) 40 mg tablet Take 1 tablet (40 mg total) by mouth daily at bedtime   • tamsulosin (FLOMAX) 0 4 mg TAKE 1 CAPSULE BY MOUTH  DAILY WITH DINNER   • traZODone (DESYREL) 150 mg tablet TAKE 1 TABLET BY MOUTH  DAILY AT BEDTIME   • aspirin (ECOTRIN LOW STRENGTH) 81 mg EC tablet Take 1 tablet (81 mg total) by mouth 2 (two) times a day Please do not start taking until after total joint arthroplasty   • ferrous sulfate 324 (65 Fe) mg Take 1 tablet (324 mg total) by mouth 2 (two) times a day before meals (Patient not taking: No sig reported)   • oxyCODONE (ROXICODONE) 5 immediate release tablet Take 1 tablets every 6 hrs as needed for pain control (Patient not taking: No sig reported)       Objective     /80 (BP Location: Left arm, Patient Position: Sitting, Cuff Size: Standard)   Pulse 81   Temp 97 7 °F (36 5 °C) (Tympanic)   Ht 6' (1 829 m)   Wt 106 kg (233 lb 9 6 oz)   SpO2 96%   BMI 31 68 kg/m²     Physical Exam  Constitutional:       Appearance: He is obese  He is not ill-appearing  HENT:      Head: Normocephalic and atraumatic  Cardiovascular:      Rate and Rhythm: Normal rate and regular rhythm  Heart sounds: No murmur heard  Pulmonary:      Effort: Pulmonary effort is normal  No respiratory distress  Breath sounds: Normal breath sounds  No wheezing or rales  Abdominal:      General: Abdomen is flat  Bowel sounds are normal  There is no distension  Palpations: Abdomen is soft  Tenderness: There is no abdominal tenderness  There is no guarding  Neurological:      Mental Status: He is alert and oriented to person, place, and time     Psychiatric:         Mood and Affect: Mood normal          Thought Content: Thought content normal        Selma Parham, DO

## 2022-10-27 NOTE — PROGRESS NOTES
Daily Note     Today's date: 10/27/2022  Patient name: Marilee Guzman  : 1945  MRN: [id number]  Referring provider: Diane Baumgarten, PA-C  Dx:   Encounter Diagnosis     ICD-10-CM    1  Arthritis of left knee  M17 12    2  Chronic pain of left knee  M25 562     G89 29        Start Time: 1402  Stop Time: 1447  Total time in clinic (min): 45 minutes    Subjective: Otilio reports his L leg is swollen today and his sciatic pain is more bothersome  States he walked for about 30 minutes yesterday but no other different activity  Reports he tried sleeping in bed last night but that was bothersome for the knee and the back  Does state he ate salty food last night  Reports he continues to take Plavix  Objective: See treatment diary below  Significant swelling at the L knee and warm to touch  No redness   1+ pitting edema distal from mid shin   Tightness reported but no TTP through calf musculature   (-) Kati's sign       Assessment: Tolerated treatment fair  Held bridges this session secondary to discomfort in the L knee; no other reports of symptom provocation throughout session and noted good status at end of visit  Noted improvement in swelling at end of session compared to start of session  Patient demonstrated fatigue post treatment, exhibited good technique with therapeutic exercises and would benefit from continued PT  Advised patient to call surgeon regarding swelling; patient verbalized understanding  Plan: Continue per plan of care  Progress treatment as tolerated         Precautions: HTN, HLD, CAD, MI (), R TKA 22    * Indicates part of HEP  HEP code: [id number]     Daily Treatment Diary     Manuals 10/27  10/5 10/11 10/13 10/18 10/20 10/25   L Knee PROM   done        L patellar mobility   done                   Therapeutic Exercise           Bike    5' rocks > full rev 5' lvl 0 5' lvl 1 5' lvl1 5' lvl 2 5' lvl 2   Heel slides* [id number]"  2x10x5" L 6m35d79" 5f19a58" 20x10" 20x10" 20x10"   Hamstring stretch   10x10" L 10x10" L 10x10" L 10x10" L 10x10" ea 10x10" ea   Quad stretch           Piriformis stretch           Quad set*   2x10x5" L 9v92o23" L 10x10" L 10x10" L     LAQ   3x10x3" L 3x10x3" L 3x10x5" L      SLR flexion* 3x10 ea    2x8 3x10 3x12 ea 3x12 ea   SLR abduction           Seated calf stretch   10x10" L 10x10" L       Leg curl 65# 3x10      55# 3x10 65# 3x10   Pt ed HEP POC                                 Neuro Re-ed           Bridges*   nv 3x10 3x10 3x12 3x15 3x12 12#   Clamshells*           Lateral eccentric step down           Forward eccentric step down                                                       Therapeutic Activity           Sit to stands   nv 3x8 + 2 foam boost       Leg press 125# 3x12 P8S3    115# 1x10, 125# 2x10 P10S3 145# 3x10 P8S3 125# 3x10 P8S3 125# 3x12 P6S3   Heel raises*   3x10 3x10 3x12 3x12 3x15    Toe raises*   3x10 3x10 3x12 3x12 3x15    Band resisted side stepping           Monster walks           Goblet squat           Split squat        2x10 ea, half depth   Deadlift           Forward step ups           Lateral step ups                                                       Modalities           CP L knee 10' post long sitting  10' post long sitting 10' post long sitting 10' post long sitting 10' post long sitting 10' post long sitting 10' post long sitting

## 2022-11-01 ENCOUNTER — TELEPHONE (OUTPATIENT)
Dept: OBGYN CLINIC | Facility: CLINIC | Age: 77
End: 2022-11-01

## 2022-11-01 ENCOUNTER — OFFICE VISIT (OUTPATIENT)
Dept: PHYSICAL THERAPY | Facility: REHABILITATION | Age: 77
End: 2022-11-01

## 2022-11-01 DIAGNOSIS — Z96.652 S/P TOTAL KNEE REPLACEMENT, LEFT: Primary | ICD-10-CM

## 2022-11-01 DIAGNOSIS — G89.29 CHRONIC PAIN OF LEFT KNEE: ICD-10-CM

## 2022-11-01 DIAGNOSIS — M25.562 CHRONIC PAIN OF LEFT KNEE: ICD-10-CM

## 2022-11-01 DIAGNOSIS — M79.89 LEG SWELLING: ICD-10-CM

## 2022-11-01 DIAGNOSIS — M17.12 ARTHRITIS OF LEFT KNEE: Primary | ICD-10-CM

## 2022-11-01 DIAGNOSIS — M79.605 PAIN IN LEFT LEG: ICD-10-CM

## 2022-11-01 NOTE — TELEPHONE ENCOUNTER
Caller: Patient    Doctor: Dr Chaney     Reason for call: Patient states his left lower leg keeps swelling and that his physical therapist advised he should reach out to the office in regards to this  Denies any pain and redness         Surgery on 9/29/22     Call back#: [phone]

## 2022-11-01 NOTE — PROGRESS NOTES
Daily Note     Today's date: 2022  Patient name: Ortega Masters  : 1945  MRN: [id number]  Referring provider: Sarah Aldridge PA-C  Dx:   Encounter Diagnosis     ICD-10-CM    1  Arthritis of left knee  M17 12    2  Chronic pain of left knee  M25 562     G89 29        Start Time: 1410  Stop Time: 1507  Total time in clinic (min): 57 minutes    Subjective: Otilio reports continued swelling in the L knee at start of session; states he had to sit at his computer for an hour this morning  States his leg is feeling good but his left hip/sciatic pain is bothering him the most        Objective: See treatment diary below      Assessment: Tolerated treatment well  Good tolerance to all exercises this session without reports of pain or discomfort  Continues to demonstrate swelling in the L knee which improved following modalities at end of session; again, advised patient to notify/follow up with surgeon regarding swelling with patient verbalizing understanding  Patient demonstrated appropriate level of challenge and muscular fatigue throughout session and noted good status at end of visit  Patient demonstrated fatigue post treatment, exhibited good technique with therapeutic exercises and would benefit from continued PT  Plan: Continue per plan of care  Progress treatment as tolerated         Precautions: HTN, HLD, CAD, MI (), R TKA 22    * Indicates part of HEP  HEP code: [facility]     Daily Treatment Diary     Manuals 10/27 11/1  10/11 10/13 10/18 10/20 10/25   L Knee PROM           L patellar mobility                      Therapeutic Exercise           Bike   5' lvl 1  5' lvl 0 5' lvl 1 5' lvl1 5' lvl 2 5' lvl 2   Heel slides* [id number]"   2r07p98" 2f66b12" 20x10" 20x10" 20x10"   Hamstring stretch  10x10" ea  10x10" L 10x10" L 10x10" L 10x10" ea 10x10" ea   Quad stretch           Piriformis stretch           Quad set*    2h78s99" L 10x10" L 10x10" L     LAQ  35# 3x10  3x10x3" L 3x10x5" L SLR flexion* 3x10 ea 3x12 ea   2x8 3x10 3x12 ea 3x12 ea   SLR abduction           Seated calf stretch    10x10" L       Leg curl 65# 3x10 75# 1x15, 85# 2x10     55# 3x10 65# 3x10   Pt ed HEP POC                                 Neuro Re-ed           Bridges*  3x12 12#  3x10 3x10 3x12 3x15 3x12 12#   Clamshells*           Lateral eccentric step down           Forward eccentric step down                                                       Therapeutic Activity           Sit to stands    3x8 + 2 foam boost       Leg press 125# 3x12 P8S3 135# 3x10 P7S3   115# 1x10, 125# 2x10 P10S3 145# 3x10 P8S3 125# 3x10 P8S3 125# 3x12 P6S3   Heel raises*    3x10 3x12 3x12 3x15    Toe raises*    3x10 3x12 3x12 3x15    Band resisted side stepping           Monster walks           Goblet squat           Split squat        2x10 ea, half depth   Deadlift           Forward step ups  8" step up/over 10x ea         Lateral step ups  8" step 10x                                                     Modalities           CP L knee 10' post long sitting 10' post supine w/ L leg elevated  10' post long sitting 10' post long sitting 10' post long sitting 10' post long sitting 10' post long sitting

## 2022-11-02 ENCOUNTER — OFFICE VISIT (OUTPATIENT)
Dept: CARDIOLOGY CLINIC | Facility: CLINIC | Age: 77
End: 2022-11-02

## 2022-11-02 VITALS
HEIGHT: 72 IN | DIASTOLIC BLOOD PRESSURE: 82 MMHG | TEMPERATURE: 97.8 F | HEART RATE: 81 BPM | BODY MASS INDEX: 31.64 KG/M2 | WEIGHT: 233.6 LBS | SYSTOLIC BLOOD PRESSURE: 126 MMHG

## 2022-11-02 DIAGNOSIS — I35.0 NONRHEUMATIC AORTIC VALVE STENOSIS: ICD-10-CM

## 2022-11-02 DIAGNOSIS — E78.2 MIXED HYPERLIPIDEMIA: ICD-10-CM

## 2022-11-02 DIAGNOSIS — I25.10 CAD S/P PERCUTANEOUS CORONARY ANGIOPLASTY: Primary | ICD-10-CM

## 2022-11-02 DIAGNOSIS — I71.60 THORACOABDOMINAL AORTIC ANEURYSM (TAAA) WITHOUT RUPTURE, UNSPECIFIED PART: ICD-10-CM

## 2022-11-02 DIAGNOSIS — E66.09 CLASS 2 OBESITY DUE TO EXCESS CALORIES WITHOUT SERIOUS COMORBIDITY WITH BODY MASS INDEX (BMI) OF 35.0 TO 35.9 IN ADULT: ICD-10-CM

## 2022-11-02 DIAGNOSIS — Z98.61 CAD S/P PERCUTANEOUS CORONARY ANGIOPLASTY: Primary | ICD-10-CM

## 2022-11-02 DIAGNOSIS — Z96.652 S/P TOTAL KNEE ARTHROPLASTY, LEFT: ICD-10-CM

## 2022-11-02 RX ORDER — OMEGA-3-ACID ETHYL ESTERS 1 G/1
2 CAPSULE, LIQUID FILLED ORAL 2 TIMES DAILY
Qty: 120 CAPSULE | Refills: 3 | Status: SHIPPED | OUTPATIENT
Start: 2022-11-02

## 2022-11-02 NOTE — PROGRESS NOTES
[facility] [place] [address] [place] [address]  Phone#  [phone]  Fax#  [phone]                                               Cardiology Office Follow up  Blanche Guerra, 78 y o  male  YOB: 1945  MRN: [id number] Encounter: [id number]      PCP - Joel Monge MD  Referring Provider - No ref  provider found    No chief complaint on file  Assessment  CAD s/p multiple PCIs  Originally POBA in early 1990s, LAD & RCA stent in 2003, and then multiple repeat stents to RCA for multiple episodes of in-stent stenosis, last stent in 2010  Mixed hyperlipidemia  Dilated aortic root, 4 5 cm  Aortic stenosis, mild  Echo 04/05/2022 showed LVEF 29%, grade 1 diastolic dysfunction, mildly dilated RV, moderate related LA/RD, mild aortic stenosis, mildly dilated aortic root  RBBB  Obesity, Body mass index is 31 68 kg/m²  Prior cardiac testing (in [address] [place] [address])  Nuclear Rx stress - 2/14/2019 ([address] [place] [address]) - moderate, predominantly fixed defect of entire inferior/inferolateral wall - some apical inferior reversibility - possible diaphragmatic attenuation +/- small ischemia, LVEF 81%  PCI -  LAD - Cypher LAD stent 2/03  RCA - stents x6 (2/2003)   KRYSTAL to dRCA (8/2006)   DESx3 to RCA (restenosis) (2010)  [facility] - 9/2010 -   patent LAD stent, D1 WNL, LCx WNL, OM1 - non obsructive, pRA 99%, mRCA in-stent stenosis, dRCA 90% in-stent stenosis, RPDA & RPLB - non-obstructive   S/p PCI with KRYSTAL to [facility], [address], dRCA    Plan  CAD s/p multiple PCI  Multiple prior PCIs with stents to LAD and extensive stenting to RCA, but last stenting was in 2010  No current symptoms of chest pain or shortness of breath  We attempted to switch from simvastatin to rosuvastatin, but he reports having had a lot of side effects with it and as a result is back on simvastatin  Continue aspirin, plavix, metoprolol 50 mg bid, simvastatin 40 mg daily    Hyperlipidemia    His triglyceride levels continue to be elevated & cholesterol and LDL are also not at goal  Tried to switch simvastatin to rosuvastatin, but he reported a lot of side effect and as a result switched back to simvastatin  [employer], but it was too expensive  Currently taking simvastatin 40 mg, ezetimibe 20 mg daily  I have represcribed Lovaza 2 g b i d , he will check cost  If cost prohibitive, then he will switch to OTC fish oil 2 g twice daily  Counseled on dietary modifications as well - he has mostly eliminated apple juice    Aortic root dilation, Aortic stenosis  Echo 04/05/2022   LVEF 62%, grade 1 diastolic dysfunction, mildly dilated RV, moderate related LA/RD, mild aortic stenosis, dilated aortic root = 4 5 cm, mildly dilated ascending aorta  He reports to me that he also has history of ?abdominal aortic aneurysm  Recommend follow up CT chest/abdomen to better assess aortic size and aneurysm  Follow up echocardiogram    RBBB  Appears to be new, was not previously reported on ECG at cardiologist in [address] [place] [address] asymptomatic  Possibly age-related    No results found for this visit on 11/02/22  Orders Placed This Encounter   Procedures   • CT chest and abdomen wo contrast   • Lipid Panel with Direct LDL reflex   • Echo complete w/ contrast if indicated     Return in about 6 months (around 5/2/2023), or if symptoms worsen or fail to improve  History of Present Illness   78 y o  male comes in as a new patient for preoperative consultation prior to upcoming knee replacement surgery in a few weeks  He is originally from [place], and moved to this area a few years ago  He has not had any major issues with chest pain, shortness of breath, palpitations, dizziness, near-syncope or syncope recently  He does have an extensive cardiac history and has had several stents dating back to early 1990s    Most of his stenting took place between 2003 to 2010, where he has had multiple episodes of repeat InStent stenosis to RCA needing stenting  But over the last decade he has been doing well from cardiac standpoint  He still needs to go to [place] for other things and has a result has been following with his cardiologist in [place], but was asked to set up care closer to home as well  As a result he is now here today to establish care with me  Interval history - 5/23/2022  He comes back for follow-up after 2 months to discuss the results of his echocardiogram   He has no active complains of chest pain or shortness of breath  He continues to do well without any major problems  He tried to make the switch from simvastatin to rosuvastatin, but reports having had a lot of side effects with it and as a result is now back on his old simvastatin  Interval history - 11/2/2022  He comes back for follow-up after about 6 months  He is overall doing well and has not had any major issues with chest pain, shortness of breath, palpitations or dizziness  He did undergo left total knee replacement about a month ago and did not have any cardiac issues around this  He does report new ongoing issues with left leg swelling, but denies any shortness of breath, orthopnea or PND        Historical Information   Past Medical History:   Diagnosis Date   • Celiac disease    • Coronary artery disease    • Diverticulitis    • Diverticulosis    • Hyperlipidemia    • Hypertension    • Myocardial infarction ([facility] [address] ) 1990     Past Surgical History:   Procedure Laterality Date   • COLONOSCOPY     • CORONARY ANGIOPLASTY WITH STENT PLACEMENT      Multiple times   • TX TOTAL KNEE ARTHROPLASTY Right 04/13/2022    Procedure: ARTHROPLASTY KNEE TOTAL and all associated procedures;  Surgeon: Laura Dela Cruz MD;  Location:  MAIN OR;  Service: Orthopedics   • TX TOTAL KNEE ARTHROPLASTY Left 9/29/2022    Procedure: ARTHROPLASTY KNEE TOTAL;  Surgeon: Laura Dela Cruz MD;  Location:  MAIN OR;  Service: Orthopedics     Family History   Problem Relation Age of Onset   • Diabetes Mother    • Coronary artery disease Father    • Thyroid disease Sister      Current Outpatient Medications on File Prior to Visit   Medication Sig Dispense Refill   • ascorbic acid (VITAMIN C) 500 MG tablet Take 1 tablet (500 mg total) by mouth daily 60 tablet 0   • baclofen 10 mg tablet Take 1 tablet (10 mg total) by mouth 3 (three) times a day as needed for muscle spasms 90 tablet 0   • Cholecalciferol (Vitamin D3) 1 25 MG (70218 UT) CAPS Take 10,000 Units by mouth every morning       • clopidogrel (PLAVIX) 75 mg tablet Take 75 mg by mouth every morning       • Cyanocobalamin (VITAMIN B 12 PO) Take by mouth     • diclofenac (VOLTAREN) 75 mg EC tablet TAKE 1 TABLET BY MOUTH EVERY DAY IN THE MORNING 30 tablet 0   • ezetimibe (ZETIA) 10 mg tablet TAKE 1 TABLET BY MOUTH  DAILY (Patient taking differently: Take 20 mg by mouth daily at bedtime) 30 tablet 11   • gabapentin (NEURONTIN) 300 mg capsule TAKE 1 CAPSULE BY MOUTH 3  TIMES DAILY (Patient taking differently: Take 600 mg by mouth daily at bedtime) 270 capsule 3   • ketoconazole (NIZORAL) 2 % shampoo APPLY TOPICALLY 2 TIMES A WEEK 120 mL 0   • metoprolol succinate (TOPROL-XL) 50 mg 24 hr tablet Take 1 tablet (50 mg total) by mouth daily 90 tablet 3   • sertraline (ZOLOFT) 50 mg tablet Take 1 tablet (50 mg total) by mouth daily (Patient taking differently: Take 50 mg by mouth every morning) 90 tablet 3   • simvastatin (ZOCOR) 40 mg tablet Take 1 tablet (40 mg total) by mouth daily at bedtime 90 tablet 1   • tamsulosin (FLOMAX) 0 4 mg TAKE 1 CAPSULE BY MOUTH  DAILY WITH DINNER 90 capsule 3   • traZODone (DESYREL) 150 mg tablet TAKE 1 TABLET BY MOUTH  DAILY AT BEDTIME 30 tablet 11   • [DISCONTINUED] Icosapent Ethyl 1 g CAPS      • aspirin (ECOTRIN LOW STRENGTH) 81 mg EC tablet Take 1 tablet (81 mg total) by mouth 2 (two) times a day Please do not start taking until after total joint arthroplasty 70 tablet 0   • Docusate Sodium (DSS) 100 MG CAPS docusate sodium 100 mg capsule   TAKE 1 CAPSULE BY MOUTH TWICE A DAY     • methocarbamol (ROBAXIN) 500 mg tablet methocarbamol 500 mg tablet   TAKE 1 TABLET (500 MG TOTAL) BY MOUTH 4 (FOUR) TIMES A DAY  • [DISCONTINUED] ferrous sulfate 324 (65 Fe) mg Take 1 tablet (324 mg total) by mouth 2 (two) times a day before meals (Patient not taking: No sig reported) 60 tablet 0   • [DISCONTINUED] folic acid (FOLVITE) 1 mg tablet TAKE 1 TABLET BY MOUTH EVERY DAY (Patient not taking: Reported on 11/2/2022) 90 tablet 1   • [DISCONTINUED] meloxicam (MOBIC) 15 mg tablet meloxicam 15 mg tablet   TAKE 1 TABLET (15 MG TOTAL) BY MOUTH DAILY  (Patient not taking: Reported on 11/2/2022)     • [DISCONTINUED] Multiple Vitamin (multivitamin) capsule Take 1 capsule by mouth daily (Patient not taking: Reported on 11/2/2022)     • [DISCONTINUED] Omega-3 Fatty Acids (fish oil) 1,000 mg Take 1,000 mg by mouth daily (Patient not taking: Reported on 11/2/2022)     • [DISCONTINUED] oxyCODONE (ROXICODONE) 5 immediate release tablet Take 1 tablets every 6 hrs as needed for pain control (Patient not taking: No sig reported) 30 tablet 0   • [DISCONTINUED] oxyCODONE (ROXICODONE) 5 immediate release tablet oxycodone 5 mg tablet   TAKE 1 TABLETS BY MOUTH EVERY 6 HOURS AS NEEDED FOR PAIN CONTROL (Patient not taking: Reported on 11/2/2022)     • [DISCONTINUED] rosuvastatin (CRESTOR) 20 MG tablet rosuvastatin 20 mg tablet   TAKE 1 TABLET BY MOUTH EVERY DAY (Patient not taking: Reported on 11/2/2022)       No current facility-administered medications on file prior to visit       Allergies   Allergen Reactions   • Crestor [Rosuvastatin] Myalgia     Social History     Socioeconomic History   • Marital status: /Civil Union     Spouse name: None   • Number of children: 3   • Years of education: None   • Highest education level: None   Occupational History   • None   Tobacco Use   • Smoking status: Former Smoker     Quit date: 1985     Years since quitting: 37 8   • Smokeless tobacco: Never Used   • Tobacco comment: Per pt, quit over 36 years ago   Vaping Use   • Vaping Use: Never used   Substance and Sexual Activity   • Alcohol use: Not Currently   • Drug use: Never   • Sexual activity: Yes     Partners: Female     Birth control/protection: None   Other Topics Concern   • None   Social History Narrative   • None     Social Determinants of Health     Financial Resource Strain: Not on file   Food Insecurity: Not on file   Transportation Needs: Not on file   Physical Activity: Not on file   Stress: Not on file   Social Connections: Not on file   Intimate Partner Violence: Not on file   Housing Stability: Not on file        Review of Systems   All other systems reviewed and are negative  Vitals:  Vitals:    11/02/22 1543   BP: 126/82   BP Location: Right arm   Patient Position: Sitting   Cuff Size: Large   Pulse: 81   Temp: 97 8 °F (36 6 °C)   TempSrc: Tympanic   Weight: 106 kg (233 lb 9 6 oz)   Height: 6' (1 829 m)     BMI - Body mass index is 31 68 kg/m²  Wt Readings from Last 7 Encounters:   11/02/22 106 kg (233 lb 9 6 oz)   10/27/22 106 kg (233 lb 9 6 oz)   09/29/22 102 kg (223 lb 14 2 oz)   09/14/22 104 kg (230 lb 3 2 oz)   08/12/22 106 kg (232 lb 12 8 oz)   07/01/22 111 kg (245 lb 9 6 oz)   05/23/22 114 kg (251 lb)       Physical Exam  Vitals and nursing note reviewed  Constitutional:       General: He is not in acute distress  Appearance: He is well-developed  HENT:      Head: Normocephalic and atraumatic  Nose: No congestion  Eyes:      General: No scleral icterus  Conjunctiva/sclera: Conjunctivae normal    Neck:      Vascular: No carotid bruit or JVD  Cardiovascular:      Rate and Rhythm: Normal rate and regular rhythm  Heart sounds: Normal heart sounds  No murmur heard  No friction rub  No gallop  Pulmonary:      Effort: Pulmonary effort is normal  No respiratory distress  Breath sounds: Normal breath sounds   No wheezing or rales  Chest:      Chest wall: No tenderness  Abdominal:      General: There is no distension  Palpations: Abdomen is soft  Tenderness: There is no abdominal tenderness  Musculoskeletal:         General: No swelling, tenderness or deformity  Cervical back: Neck supple  No muscular tenderness  Right lower leg: No edema  Left lower leg: No edema  Skin:     General: Skin is warm  Neurological:      General: No focal deficit present  Mental Status: He is alert and oriented to person, place, and time  Mental status is at baseline  Psychiatric:         Mood and Affect: Mood normal          Behavior: Behavior normal          Thought Content: Thought content normal            Labs:  CBC:   Lab Results   Component Value Date    WBC 12 21 (H) 09/30/2022    RBC 3 59 (L) 09/30/2022    HGB 12 0 09/30/2022    HCT 33 6 (L) 09/30/2022    MCV 94 09/30/2022     09/30/2022    RDW 12 2 09/30/2022       CMP:   Lab Results   Component Value Date    K 4 0 09/30/2022     09/30/2022    CO2 24 09/30/2022    BUN 23 09/30/2022    CREATININE 0 90 09/30/2022    EGFR 83 09/30/2022    CALCIUM 8 9 09/30/2022    AST 30 08/30/2022    ALT 26 08/30/2022    ALKPHOS 74 08/30/2022       Magnesium:  No results found for: MG    Lipid Profile:   Lab Results   Component Value Date    HDL 29 (L) 08/11/2022    TRIG 333 (H) 08/11/2022    LDLCALC 104 (H) 08/11/2022       Thyroid Studies:   Lab Results   Component Value Date    HOT9APRLOFCK 1 018 11/22/2019       A1c:  No components found for: HGA1C    INR:  Lab Results   Component Value Date    INR 1 07 08/30/2022    INR 1 00 03/15/2022   5    Imaging: No results found  Cardiac testing:   No results found for this or any previous visit  No results found for this or any previous visit  No results found for this or any previous visit  No results found for this or any previous visit        XR knee 3 vw left non injury  Narrative: LEFT KNEE    INDICATION:   V42 209: Presence of left artificial knee joint  COMPARISON:  Left knee x-ray 2/22/2022    VIEWS:  XR KNEE 3 VW LEFT NON INJURY   Images: 3    FINDINGS:    There is no acute fracture or dislocation  There is no joint effusion  Unremarkable appearance of total knee arthroplasty  No evidence of hardware complication  No lytic or blastic osseous lesion  Soft tissues are unremarkable  Impression: Unremarkable appearance of total knee arthroplasty      Workstation performed: [id number]

## 2022-11-03 ENCOUNTER — EVALUATION (OUTPATIENT)
Dept: PHYSICAL THERAPY | Facility: REHABILITATION | Age: 77
End: 2022-11-03

## 2022-11-03 DIAGNOSIS — M25.562 CHRONIC PAIN OF LEFT KNEE: ICD-10-CM

## 2022-11-03 DIAGNOSIS — M17.12 ARTHRITIS OF LEFT KNEE: Primary | ICD-10-CM

## 2022-11-03 DIAGNOSIS — G89.29 CHRONIC PAIN OF LEFT KNEE: ICD-10-CM

## 2022-11-03 NOTE — TELEPHONE ENCOUNTER
L/m for pt regarding scheduling of VAS testing  Provided my call back number as well as central scheduling number

## 2022-11-03 NOTE — PROGRESS NOTES
PT Re-Evaluation     Today's date: 11/3/2022  Patient name: Celina Henderson  : 1945  MRN: [id number]  Referring provider: Francesca Hubbard PA-C  Dx:   Encounter Diagnosis     ICD-10-CM    1  Arthritis of left knee  M17 12    2  Chronic pain of left knee  M25 562     G89 29        Start Time: 1418  Stop Time: 1455  Total time in clinic (min): 37 minutes    Assessment  Assessment details: Per patient report and clinical findings, Celina Henderson has made good progress since starting skilled physical therapy services  Celina Henderson has been compliant with PT POC and HEP since initial evaluation  To this date, Celina Henderson has completed 10 visits of skilled physical therapy since L TKA performed on 22  Celina Henderson demonstrates improvements in objective data however, continues to be limited compared to his prior level of function  Remaining deficits include difficulty performing bending/squatting based movement patterns, slight limitation in L knee AROM, and difficulty lifting the LLE  Recommend continued skilled physical therapy services to address remaining deficits to promote progress towards his prior level of function  Demonstrates decreased edema around the L knee and LLE  Advised patient to schedule LE ultrasound that was ordered by ortho  No redness, patient denies calf pain, chest pain, SOB  Impairments: abnormal or restricted ROM, activity intolerance, impaired balance, impaired physical strength, lacks appropriate home exercise program, pain with function and poor body mechanics  Understanding of Dx/Px/POC: good   Prognosis: good    Goals  STG (4 weeks)  1  Pt to be I in HEP - met  2  Pt to reduce pain with rest by 50%  - met  3  Patient to ambulate community distances with use of SPC  - met  4  Patient to decreased TUG time to 20 seconds or less to reduce fall risk  - met  5   Patient to demonstrate L knee ROM 0-110  - met  LTG (By DC)  1 Pt to reduce pain with activity by 50% - met  2  Pt to improve FOTO score to predicted dc value  - met  3  Pt to walk community distances without AD  - met  4  Patient to ascend and descend steps reciprocally  - met  5  Patient to demonstrate TUG time < 13 seconds  - met  6  Patient to increase L LE strength by 1 full grade  - met    Plan  Plan details: Thank you for referring Alejandra Medina to Physical Therapy at [facility] and for the opportunity to coordinate care  Patient would benefit from: skilled physical therapy  Planned modality interventions: electrical stimulation/Russian stimulation, TENS, unattended electrical stimulation and thermotherapy: hydrocollator packs  Planned therapy interventions: abdominal trunk stabilization, activity modification, ADL training, ADL retraining, balance, body mechanics training, breathing training, fine motor coordination training, flexibility, functional ROM exercises, gait training, graded activity, graded exercise, graded motor, home exercise program, IADL retraining, Blum taping, motor coordination training, muscle pump exercises, patient education, postural training, self care, strengthening, stretching, therapeutic activities, therapeutic exercise, therapeutic training, transfer training, balance/weight bearing training, sensory integrative techniques, neuromuscular re-education, manual therapy and joint mobilization  Frequency: 2x week  Duration in visits: 10  Plan of Care beginning date: 11/3/2022  Treatment plan discussed with: patient        Subjective Evaluation    History of Present Illness  Mechanism of injury:   22:  Alejandra Medina reports feeling he has made 70% progress since IE  Dhruv Foy reports improvements in knee ROM, ability to do steps, lifting his leg, and walking  Despite improvements, Dhruv Foy reports continued difficulty with fully bending the knee, squatting, lifting his leg up      Quality of life: good    Pain  Current pain ratin  At best pain ratin  At worst pain rating: 3  Location: L knee  Quality: sharp  Aggravating factors: stair climbing and walking    Social Support  Steps to enter house: yes  Stairs in house: yes   Lives in: multiple-level home  Lives with: spouse    Employment status: not working  Treatments  Previous treatment: physical therapy  Current treatment: physical therapy  Patient Goals  Patient goals for therapy: decreased pain, increased motion, increased strength and independence with ADLs/IADLs          Objective     Observations   Left Knee   Positive for edema       Additional Observation Details  (-) s/s of infection  1+ pitting edema from mid shin distally     Active Range of Motion   Left Knee   Flexion: 120 degrees   Extension: 0 degrees     Right Knee   Flexion: 125 degrees   Extensor lag: 10 degrees     Strength/Myotome Testing     Left Knee   Flexion: 5  Extension: 5  Quadriceps contraction: good    Right Knee   Flexion: 5  Extension: 5    General Comments:      Knee Comments  Post op TU seconds      Flowsheet Rows    Flowsheet Row Most Recent Value   PT/OT G-Codes    Current Score 65   Projected Score 71           Precautions: HTN, HLD, CAD, MI (), R TKA 22    * Indicates part of HEP  HEP code: [facility]     Daily Treatment Diary     Manuals 10/27 11/1 11/3  10/13 10/18 10/20 10/25   L Knee PROM           L patellar mobility                      Therapeutic Exercise           Bike   5' lvl 1 np  5' lvl 1 5' lvl1 5' lvl 2 5' lvl 2   Heel slides* [id number]"  2x10x5"  [id number]" 20x10" 20x10" 20x10"   Hamstring stretch  10x10" ea 10x10"  10x10" L 10x10" L 10x10" ea 10x10" ea   Quad stretch           Piriformis stretch           Quad set*     10x10" L 10x10" L     LAQ  35# 3x10   3x10x5" L      SLR flexion* 3x10 ea 3x12 ea 3x12 ea  2x8 3x10 3x12 ea 3x12 ea   SLR abduction           Seated calf stretch           Leg curl 65# 3x10 75# 1x15, 85# 2x10     55# 3x10 65# 3x10   Pt ed HEP POC Neuro Re-ed           Bridges*  3x12 12#   3x10 3x12 3x15 3x12 12#   Clamshells*           Lateral eccentric step down           Forward eccentric step down                                                       Therapeutic Activity           Sit to stands           Leg press 125# 3x12 P8S3 135# 3x10 P7S3   115# 1x10, 125# 2x10 P10S3 145# 3x10 P8S3 125# 3x10 P8S3 125# 3x12 P6S3   Heel raises*     3x12 3x12 3x15    Toe raises*     3x12 3x12 3x15    Band resisted side stepping           Monster walks           Goblet squat           Split squat        2x10 ea, half depth   Deadlift           Forward step ups  8" step up/over 10x ea         Lateral step ups  8" step 10x                                                     Modalities           CP L knee 10' post long sitting 10' post supine w/ L leg elevated 10' post long sitting  10' post long sitting 10' post long sitting 10' post long sitting 10' post long sitting

## 2022-11-04 NOTE — TELEPHONE ENCOUNTER
S/w pt to update his VAS test for a sooner appt  He is now scheduled for Monday, 11/7 @ [facility]  He was also encouraged to report to [facility] if his symptoms worsen  Pt assured me that everything is the same and he will seek help should anything worsen over the weekend

## 2022-11-07 ENCOUNTER — OFFICE VISIT (OUTPATIENT)
Dept: PHYSICAL THERAPY | Facility: REHABILITATION | Age: 77
End: 2022-11-07

## 2022-11-07 ENCOUNTER — HOSPITAL ENCOUNTER (OUTPATIENT)
Dept: NON INVASIVE DIAGNOSTICS | Facility: CLINIC | Age: 77
Discharge: HOME | End: 2022-11-07
Attending: PHYSICIAN ASSISTANT

## 2022-11-07 DIAGNOSIS — F41.9 ANXIETY: ICD-10-CM

## 2022-11-07 DIAGNOSIS — M17.12 ARTHRITIS OF LEFT KNEE: Primary | ICD-10-CM

## 2022-11-07 DIAGNOSIS — M79.89 LEG SWELLING: ICD-10-CM

## 2022-11-07 DIAGNOSIS — G89.29 CHRONIC PAIN OF LEFT KNEE: ICD-10-CM

## 2022-11-07 DIAGNOSIS — Z96.652 S/P TOTAL KNEE REPLACEMENT, LEFT: ICD-10-CM

## 2022-11-07 DIAGNOSIS — M25.562 CHRONIC PAIN OF LEFT KNEE: ICD-10-CM

## 2022-11-07 DIAGNOSIS — M79.605 PAIN IN LEFT LEG: ICD-10-CM

## 2022-11-07 NOTE — TELEPHONE ENCOUNTER
Received fax from [address] [place] [address] in regards to [employer]  Requesting a refill of sertraline (ZOLOFT) 50 mg tablet  Refill to be sent to the [facility]

## 2022-11-07 NOTE — PROGRESS NOTES
Daily Note     Today's date: 2022  Patient name: Thor Calvin  : 1945  MRN: [id number]  Referring provider: Brent Garza  Dx:   Encounter Diagnosis     ICD-10-CM    1  Arthritis of left knee  M17 12    2  Chronic pain of left knee  M25 562     G89 29        Start Time: 1400  Stop Time: 1452  Total time in clinic (min): 52 minutes    Subjective: Otilio reports his knee is feeling pretty good  Attributes the swelling in his leg to sitting in he "easy chair "      Objective: See treatment diary below       Assessment: Tolerated treatment well  Good tolerance to all exercises throughout session without reports of pain or discomfort  Challenged with additional set of forward step ups but able to complete with good form  Patient demonstrated appropriate level of challenge and muscular fatigue throughout session and noted good status at end of visit  Patient demonstrated fatigue post treatment, exhibited good technique with therapeutic exercises and would benefit from continued PT  Plan: Continue per plan of care  Progress treatment as tolerated         Precautions: HTN, HLD, CAD, MI (), R TKA 22    * Indicates part of HEP  HEP code: [facility]     Daily Treatment Diary     Manuals 10/27 11/1 11/3 11/7  10/18 10/20 10/25   L Knee PROM           L patellar mobility                      Therapeutic Exercise           Bike   5' lvl 1 np 5' lvl 3  5' lvl1 5' lvl 2 5' lvl 2   Heel slides* [id number]"  2x10x5"   20x10" 20x10" 20x10"   Hamstring stretch  10x10" ea 10x10" 10x10" ea  10x10" L 10x10" ea 10x10" ea   Quad stretch           Piriformis stretch           Quad set*      10x10" L     LAQ  35# 3x10  35# 3x12       SLR flexion* 3x10 ea 3x12 ea 3x12 ea 3x12 ea  3x10 3x12 ea 3x12 ea   SLR abduction           Seated calf stretch           Leg curl 65# 3x10 75# 1x15, 85# 2x10  75# 3x12   55# 3x10 65# 3x10   Pt ed HEP POC                                 Neuro Re-ed           Yamilex Araya* 3x12 12#  3x15 12#  3x12 3x15 3x12 12#   Clamshells*           Lateral eccentric step down           Forward eccentric step down                                                       Therapeutic Activity           Sit to stands           Leg press 125# 3x12 P8S3 135# 3x10 P7S3  135# 3x12 P6S3  145# 3x10 P8S3 125# 3x10 P8S3 125# 3x12 P6S3   Heel raises*      3x12 3x15    Toe raises*      3x12 3x15    Band resisted side stepping           Monster walks           Goblet squat           Split squat        2x10 ea, half depth   Deadlift           Forward step ups  8" step up/over 10x ea  8" step up/over 2x10 ea       Lateral step ups  8" step 10x                                                     Modalities           CP L knee 10' post long sitting 10' post supine w/ L leg elevated 10' post long sitting 9' post long sitting  10' post long sitting 10' post long sitting 10' post long sitting

## 2022-11-08 ENCOUNTER — TELEPHONE (OUTPATIENT)
Dept: OBGYN CLINIC | Facility: HOSPITAL | Age: 77
End: 2022-11-08

## 2022-11-08 DIAGNOSIS — M79.605 PAIN IN LEFT LEG: Primary | ICD-10-CM

## 2022-11-08 NOTE — TELEPHONE ENCOUNTER
Caller: Otilio    Doctor: Noemi Max    Reason for call: Pateint called his back of his lower spine is not getting better at all  He is wondering what to do or who to see  He did do physical therapy  Please advise      Call back#: [phone]

## 2022-11-09 ENCOUNTER — NURSE TRIAGE (OUTPATIENT)
Dept: PHYSICAL THERAPY | Facility: OTHER | Age: 77
End: 2022-11-09

## 2022-11-09 DIAGNOSIS — G89.29 CHRONIC LEFT-SIDED LOW BACK PAIN WITH LEFT-SIDED SCIATICA: Primary | ICD-10-CM

## 2022-11-09 DIAGNOSIS — M54.42 CHRONIC LEFT-SIDED LOW BACK PAIN WITH LEFT-SIDED SCIATICA: Primary | ICD-10-CM

## 2022-11-09 NOTE — TELEPHONE ENCOUNTER
Additional Information  • Negative: Is this related to a work injury? • Negative: Is this related to an MVA? • Negative: Are you currently recieving homecare services? • Negative: Has the patient had unexplained weight loss? • Negative: Does the patient have a fever? • Negative: Is the patient experiencing urine retention? • Negative: Is the patient experiencing acute drop foot or paralysis? • Negative: Is the patient experiencing blood in sputum? • Negative: Has the patient experienced major trauma? (fall from height, high speed collision, direct blow to spine) and is also experiencing nausea, light-headedness, or loss of consciousness? • Affirmative: Is this a chronic condition? Background - Initial Assessment  Clinical complaint: Left sided low back pain and Left LE "tingling"  Date of onset: May 2022 post fall-  Frequency of pain: constant  Quality of pain: sharp    Protocols used:  AMB COMPREHENSIVE SPINE PROGRAM PROTOCOL    Patient seen by Ortho and referred to  CSP  This RN did review the Comprehensive Spine Program in detail and what we offer for their back or neck pain  RN and patient reviewed complaints and prior interventions  Nurse explained the triage and referral process with him and discussed his providers recommendations  Patient stated he did not seek tx for fall in May 2020 until a few mths after  Patient seen/evaluated at [facility]) Physical therapy and received injection with Orthopedic  Pt stated both txs provided minimal relief  Patient would prefer to establish care with Spine & Pain to discuss interventions for chronic low back pain/Sciatica dx  Patient is agreeable to triage and would like to proceed w/ possible evaluation/tx at [facility]) Spine & Pain  Nurse offered to triage and enter referral for the S&P provider to review for appropriateness  Patient understood and agreed  Triaged and NO RF s/s present      Patient made aware clerical would be calling to schedule appointment after review of EMR  Contact information for preferred [facility] office was provided for the patient as well  Encouraged to f/u with call to the office if needed  Patient appreciative of call and explanation of program     Nurse wished patient well and referral closed per protocol  Patient did not voice any clinical questions and/or concerns  Patient will review HI with clerical   All information about patients intended careplan reviewed  Patient in agreement with nurse's explanation of referrals and treatment plan

## 2022-11-10 ENCOUNTER — OFFICE VISIT (OUTPATIENT)
Dept: PHYSICAL THERAPY | Facility: REHABILITATION | Age: 77
End: 2022-11-10

## 2022-11-10 DIAGNOSIS — M17.12 ARTHRITIS OF LEFT KNEE: Primary | ICD-10-CM

## 2022-11-10 DIAGNOSIS — M25.562 CHRONIC PAIN OF LEFT KNEE: ICD-10-CM

## 2022-11-10 DIAGNOSIS — G89.29 CHRONIC PAIN OF LEFT KNEE: ICD-10-CM

## 2022-11-10 NOTE — PROGRESS NOTES
Daily Note     Today's date: 11/10/2022  Patient name: Mima Elizabeth  : 1945  MRN: [id number]  Referring provider: Pooja Galvez*  Dx:   Encounter Diagnosis     ICD-10-CM    1  Arthritis of left knee  M17 12    2  Chronic pain of left knee  M25 562     G89 29        Start Time:   Stop Time:   Total time in clinic (min): 54 minutes    Subjective: Otilio report a lot of L side sciatic pain today  States he doesn't notice the L knee pain because of the sciatic pain  Objective: See treatment diary below      Assessment: Tolerated treatment well  Held additional reps/sets of forward step ups secondary to increased sciatic pain and reported L leg numbness  No other reports of symptom provocation throughout session and noted good status at end of visit  Patient demonstrated fatigue post treatment, exhibited good technique with therapeutic exercises and would benefit from continued PT  Plan: Continue per plan of care  Progress treatment as tolerated         Precautions: HTN, HLD, CAD, MI (), R TKA 22    * Indicates part of HEP  HEP code: [facility]     Daily Treatment Diary     Manuals 10/27 11/1 11/3 11/7 11/10  10/20 10/25   L Knee PROM           L patellar mobility                      Therapeutic Exercise           Bike   5' lvl 1 np 5' lvl 3 5' lvl 3  5' lvl 2 5' lvl 2   Heel slides* [id number]"  2x10x5"    20x10" 20x10"   Hamstring stretch  10x10" ea 10x10" 10x10" ea 10x10" ea  10x10" ea 10x10" ea   Quad stretch           Piriformis stretch           Quad set*           LAQ  35# 3x10  35# 3x12 45# 3x10      SLR flexion* 3x10 ea 3x12 ea 3x12 ea 3x12 ea 3x12 ea  3x12 ea 3x12 ea   SLR abduction           Seated calf stretch           Leg curl 65# 3x10 75# 1x15, 85# 2x10  75# 3x12 85# 3x10  55# 3x10 65# 3x10   Pt ed HEP POC                                 Neuro Re-ed           Bridges*  3x12 12#  3x15 12# 3x12 15#  3x15 3x12 12#   Clamshells*     Supine BTB 3x10      Lateral eccentric step down           Forward eccentric step down                                                       Therapeutic Activity           Sit to stands           Leg press 125# 3x12 P8S3 135# 3x10 P7S3  135# 3x12 P6S3 135# 3x12 P6S3  125# 3x10 P8S3 125# 3x12 P6S3   Heel raises*       3x15    Toe raises*       3x15    Band resisted side stepping           Monster walks           Goblet squat           Split squat        2x10 ea, half depth   Deadlift           Forward step ups  8" step up/over 10x ea  8" step up/over 2x10 ea 8" step up/over 10x ea - L leg numbness      Lateral step ups  8" step 10x                                                     Modalities           CP L knee 10' post long sitting 10' post supine w/ L leg elevated 10' post long sitting 9' post long sitting 8' post long sitting  10' post long sitting 10' post long sitting

## 2022-11-14 ENCOUNTER — OFFICE VISIT (OUTPATIENT)
Dept: PHYSICAL THERAPY | Facility: REHABILITATION | Age: 77
End: 2022-11-14

## 2022-11-14 DIAGNOSIS — M25.562 CHRONIC PAIN OF LEFT KNEE: ICD-10-CM

## 2022-11-14 DIAGNOSIS — M17.12 ARTHRITIS OF LEFT KNEE: Primary | ICD-10-CM

## 2022-11-14 DIAGNOSIS — G89.29 CHRONIC PAIN OF LEFT KNEE: ICD-10-CM

## 2022-11-14 NOTE — PROGRESS NOTES
Daily Note     Today's date: 2022  Patient name: Sukhjinder Vazquez  : 1945  MRN: [id number]  Referring provider: Jennifer Huntley  Dx:   Encounter Diagnosis     ICD-10-CM    1  Arthritis of left knee  M17 12    2  Chronic pain of left knee  M25 562     G89 29        Start Time: 1405  Stop Time: 1448  Total time in clinic (min): 43 minutes    Subjective: Yamil Powers states his knee feels very good but his sciatic nerve has continued to cause pain in the L leg  Objective: See treatment diary below       Assessment: Tolerated treatment well  Good tolerance to all exercises throughout session without reports of knee pain or discomfort  Secondary to continued good status, anticipate d/c next visit pending continued good status  Patient demonstrated fatigue post treatment, exhibited good technique with therapeutic exercises and would benefit from continued PT  Plan: Continue per plan of care  Potential discharge next visit       Precautions: HTN, HLD, CAD, MI (), R TKA 22    * Indicates part of HEP  HEP code: [facility]     Daily Treatment Diary     Manuals 10/27 11/1 11/3 11/7 11/10 11/14  10/25   L Knee PROM           L patellar mobility                      Therapeutic Exercise           Bike   5' lvl 1 np 5' lvl 3 5' lvl 3 5' lvl 1  5' lvl 2   Heel slides* [id number]"  2x10x5"     20x10"   Hamstring stretch  10x10" ea 10x10" 10x10" ea 10x10" ea Active 10x10" ea  10x10" ea   Quad stretch           Piriformis stretch           Quad set*           LAQ  35# 3x10  35# 3x12 45# 3x10 45# 3x10     SLR flexion* 3x10 ea 3x12 ea 3x12 ea 3x12 ea 3x12 ea 3x15 ea  3x12 ea   SLR abduction           Seated calf stretch           Leg curl 65# 3x10 75# 1x15, 85# 2x10  75# 3x12 85# 3x10 85# 3x12  65# 3x10   Pt ed HEP POC                                 Neuro Re-ed           Bridges*  3x12 12#  3x15 12# 3x12 15# 3x15 15#   3x12 12#   Clamshells*     Supine BTB 3x10 Supine BTB 3x12     Lateral eccentric step down           Forward eccentric step down                                                       Therapeutic Activity           Sit to stands           Leg press 125# 3x12 P8S3 135# 3x10 P7S3  135# 3x12 P6S3 135# 3x12 P6S3 135# 3x15 P6S3  125# 3x12 P6S3   Heel raises*           Toe raises*           Band resisted side stepping           Monster walks           Goblet squat           Split squat        2x10 ea, half depth   Deadlift           Forward step ups  8" step up/over 10x ea  8" step up/over 2x10 ea 8" step up/over 10x ea - L leg numbness      Lateral step ups  8" step 10x                                                     Modalities           CP L knee 10' post long sitting 10' post supine w/ L leg elevated 10' post long sitting 9' post long sitting 8' post long sitting   10' post long sitting

## 2022-11-15 ENCOUNTER — HOSPITAL ENCOUNTER (OUTPATIENT)
Dept: NON INVASIVE DIAGNOSTICS | Facility: HOSPITAL | Age: 77
Discharge: HOME | End: 2022-11-15
Attending: INTERNAL MEDICINE

## 2022-11-15 ENCOUNTER — HOSPITAL ENCOUNTER (OUTPATIENT)
Dept: CT IMAGING | Facility: HOSPITAL | Age: 77
Discharge: HOME | End: 2022-11-15
Attending: INTERNAL MEDICINE

## 2022-11-15 VITALS
DIASTOLIC BLOOD PRESSURE: 82 MMHG | SYSTOLIC BLOOD PRESSURE: 126 MMHG | WEIGHT: 233 LBS | BODY MASS INDEX: 31.56 KG/M2 | HEIGHT: 72 IN | HEART RATE: 85 BPM

## 2022-11-15 DIAGNOSIS — I71.60 THORACOABDOMINAL AORTIC ANEURYSM (TAAA) WITHOUT RUPTURE, UNSPECIFIED PART: ICD-10-CM

## 2022-11-16 ENCOUNTER — OFFICE VISIT (OUTPATIENT)
Dept: PHYSICAL THERAPY | Facility: REHABILITATION | Age: 77
End: 2022-11-16

## 2022-11-16 DIAGNOSIS — M79.10 MYALGIA: ICD-10-CM

## 2022-11-16 DIAGNOSIS — M25.562 CHRONIC PAIN OF LEFT KNEE: ICD-10-CM

## 2022-11-16 DIAGNOSIS — M17.12 ARTHRITIS OF LEFT KNEE: Primary | ICD-10-CM

## 2022-11-16 DIAGNOSIS — G89.29 CHRONIC PAIN OF LEFT KNEE: ICD-10-CM

## 2022-11-16 LAB
AORTIC VALVE MEAN VELOCITY: 14 M/S
APICAL FOUR CHAMBER EJECTION FRACTION: 72 %
ASCENDING AORTA: 4 CM
AV AREA BY CONTINUOUS VTI: 2.3 CM2
AV AREA PEAK VELOCITY: 2.2 CM2
AV LVOT MEAN GRADIENT: 2 MMHG
AV LVOT PEAK GRADIENT: 4 MMHG
AV MEAN GRADIENT: 9 MMHG
AV PEAK GRADIENT: 18 MMHG
AV VALVE AREA: 2.27 CM2
AV VELOCITY RATIO: 0.48
DOP CALC AO PEAK VEL: 2.12 M/S
DOP CALC AO VTI: 48.44 CM
DOP CALC LVOT AREA: 4.52 CM2
DOP CALC LVOT DIAMETER: 2.4 CM
DOP CALC LVOT PEAK VEL VTI: 24.28 CM
DOP CALC LVOT PEAK VEL: 1.01 M/S
DOP CALC LVOT STROKE INDEX: 46.9 ML/M2
DOP CALC LVOT STROKE VOLUME: 109.78 CM3
E WAVE DECELERATION TIME: 230 MS
FRACTIONAL SHORTENING: 31 % (ref 28–44)
INTERVENTRICULAR SEPTUM IN DIASTOLE (PARASTERNAL SHORT AXIS VIEW): 1.4 CM
INTERVENTRICULAR SEPTUM: 1.4 CM (ref 0.6–1.1)
LAAS-AP2: 19.2 CM2
LAAS-AP4: 22.3 CM2
LEFT ATRIUM SIZE: 4.8 CM
LEFT INTERNAL DIMENSION IN SYSTOLE: 2.5 CM (ref 2.1–4)
LEFT VENTRICULAR INTERNAL DIMENSION IN DIASTOLE: 3.6 CM (ref 3.5–6)
LEFT VENTRICULAR POSTERIOR WALL IN END DIASTOLE: 1.2 CM
LEFT VENTRICULAR STROKE VOLUME: 32 ML
LVSV (TEICH): 32 ML
MV E'TISSUE VEL-SEP: 9 CM/S
MV PEAK A VEL: 1.03 M/S
MV PEAK E VEL: 88 CM/S
MV STENOSIS PRESSURE HALF TIME: 67 MS
MV VALVE AREA P 1/2 METHOD: 3.28 CM2
RA PRESSURE ESTIMATED: 8 MMHG
RIGHT ATRIAL 2D VOLUME: 50 ML
RIGHT ATRIUM AREA SYSTOLE A4C: 17.8 CM2
RIGHT VENTRICLE ID DIMENSION: 4.1 CM
RV PSP: 31 MMHG
SL CV LEFT ATRIUM LENGTH A2C: 5 CM
SL CV LV EF: 65
SL CV PED ECHO LEFT VENTRICLE DIASTOLIC VOLUME (MOD BIPLANE) 2D: 55 ML
SL CV PED ECHO LEFT VENTRICLE SYSTOLIC VOLUME (MOD BIPLANE) 2D: 23 ML
TR MAX PG: 23 MMHG
TR PEAK VELOCITY: 2.4 M/S
TRICUSPID VALVE PEAK REGURGITATION VELOCITY: 2.4 M/S

## 2022-11-16 NOTE — PROGRESS NOTES
PT Re-Evaluation  and PT Discharge    Today's date: 2022  Patient name: Ebony Green  : 1945  MRN: [id number]  Referring provider: Hitesh Marques*  Dx:   Encounter Diagnosis     ICD-10-CM    1  Arthritis of left knee  M17 12       2  Chronic pain of left knee  M25 562     G89 29           Start Time: 1055  Stop Time: 1138  Total time in clinic (min): 43 minutes    Assessment  Assessment details: Per patient report and clinical findings, Ebony Green has made good progress since starting skilled physical therapy services  To this date, Ebony Green has completed 1 pre-opvisit and 14 visits of skilled physical therapy  Noted improvements include improved ROM, improved LE strength, improved gait, and decreased pain frequency and intensity  After discussion and mutual agreement with patient, recommend discharge to independent HEP at this time secondary to continued improved status, achieving goals set at IE, and returning to PLOF  Updated and reviewed HEP with patient; patient verbalized and demonstrated understanding of all exercises  Understanding of Dx/Px/POC: good   Prognosis: good    Goals  STG (4 weeks)  1  Pt to be I in HEP - met  2  Pt to reduce pain with rest by 50%  - met  3  Patient to ambulate community distances with use of SPC  - met  4  Patient to decreased TUG time to 20 seconds or less to reduce fall risk  - met  5  Patient to demonstrate L knee ROM 0-110  - met  LTG (By DC)  1 Pt to reduce pain with activity by 50% - met  2  Pt to improve FOTO score to predicted dc value  - met  3  Pt to walk community distances without AD  - met  4  Patient to ascend and descend steps reciprocally  - met  5  Patient to demonstrate TUG time < 13 seconds  - met  6  Patient to increase L LE strength by 1 full grade  - met    Plan  Plan details: Thank you for referring Ebony Green to Physical Therapy at [place] [address] and for the opportunity to coordinate care      Planned therapy interventions: home exercise program  Duration in visits: 1  Plan of Care beginning date: 2022  Treatment plan discussed with: patient        Subjective Evaluation    History of Present Illness  Mechanism of injury: surgery  Mechanism of injury: 22:  Savanna Nguyen reports feeling he has made 90% progress since IE  Bryan Morse reports improvements in motion and strength walking  Despite improvements, Bryan Morse reports continued difficulty with bending/squatting and some stiffness in the knee  22:  Savanna Nguyen reports feeling he has made 70% progress since IE  Bryan Morse reports improvements in knee ROM, ability to do steps, lifting his leg, and walking  Despite improvements, Bryan Morse reports continued difficulty with fully bending the knee, squatting, lifting his leg up      Quality of life: good    Pain  Current pain ratin  At best pain ratin  At worst pain ratin  Location: L knee  Quality: sharp  Aggravating factors: stair climbing and walking    Social Support  Steps to enter house: yes  Stairs in house: yes   Lives in: multiple-level home  Lives with: spouse    Employment status: not working  Treatments  Previous treatment: physical therapy  Current treatment: physical therapy  Patient Goals  Patient goals for therapy: decreased pain, increased motion, increased strength and independence with ADLs/IADLs          Objective     Active Range of Motion   Left Knee   Flexion: 130 degrees   Extension: 0 degrees     Right Knee   Flexion: 125 degrees   Extensor lag: 10 degrees     Strength/Myotome Testing     Left Knee   Flexion: 5  Extension: 5  Quadriceps contraction: good    Right Knee   Flexion: 5  Extension: 5      Flowsheet Rows    Flowsheet Row Most Recent Value   PT/OT G-Codes    Current Score 99   Projected Score 80           Precautions: HTN, HLD, CAD, MI (), R TKA 22    * Indicates part of HEP  HEP code: [id number]     Daily Treatment Diary     Manuals 10/27 11/1 11/3 11/7 11/10 11/14 11/16    L Knee PROM           L patellar mobility                      Therapeutic Exercise           Bike   5' lvl 1 np 5' lvl 3 5' lvl 3 5' lvl 1 5' lvl 1    Heel slides* [id number]"  2x10x5"        Hamstring stretch  10x10" ea 10x10" 10x10" ea 10x10" ea Active 10x10" ea Active 10x10" ea    Quad stretch           Piriformis stretch           Quad set*           LAQ  35# 3x10  35# 3x12 45# 3x10 45# 3x10 45# 3x12    SLR flexion* 3x10 ea 3x12 ea 3x12 ea 3x12 ea 3x12 ea 3x15 ea 3x15 ea    SLR abduction           Seated calf stretch           Leg curl 65# 3x10 75# 1x15, 85# 2x10  75# 3x12 85# 3x10 85# 3x12 85# 3x12    Pt ed HEP POC                                 Neuro Re-ed           Bridges*  3x12 12#  3x15 12# 3x12 15# 3x15 15#  3x15 15#    Clamshells*     Supine BTB 3x10 Supine BTB 3x12     Lateral eccentric step down           Forward eccentric step down                                                       Therapeutic Activity           Sit to stands           Leg press 125# 3x12 P8S3 135# 3x10 P7S3  135# 3x12 P6S3 135# 3x12 P6S3 135# 3x15 P6S3 135# 3x15 P6S3    Heel raises*           Toe raises*           Band resisted side stepping           Monster walks           Goblet squat           Split squat           Deadlift           Forward step ups  8" step up/over 10x ea  8" step up/over 2x10 ea 8" step up/over 10x ea - L leg numbness      Lateral step ups  8" step 10x                                                     Modalities           CP L knee 10' post long sitting 10' post supine w/ L leg elevated 10' post long sitting 9' post long sitting 8' post long sitting

## 2022-11-17 RX ORDER — BACLOFEN 10 MG/1
TABLET ORAL
Qty: 270 TABLET | Refills: 1 | Status: SHIPPED | OUTPATIENT
Start: 2022-11-17

## 2022-11-17 NOTE — TELEPHONE ENCOUNTER
Daphnie Dinh has requested a 90 day supply of baclofen 10 mg tablet  Would a 90 day supply be appropriate?

## 2022-11-21 ENCOUNTER — APPOINTMENT (OUTPATIENT)
Dept: PHYSICAL THERAPY | Facility: REHABILITATION | Age: 77
End: 2022-11-21

## 2022-11-21 ENCOUNTER — TELEPHONE (OUTPATIENT)
Dept: OBGYN CLINIC | Facility: HOSPITAL | Age: 77
End: 2022-11-21

## 2022-11-21 DIAGNOSIS — M17.11 PRIMARY OSTEOARTHRITIS OF RIGHT KNEE: ICD-10-CM

## 2022-11-21 DIAGNOSIS — M17.0 PRIMARY OSTEOARTHRITIS OF BOTH KNEES: ICD-10-CM

## 2022-11-21 DIAGNOSIS — M17.12 PRIMARY OSTEOARTHRITIS OF LEFT KNEE: ICD-10-CM

## 2022-11-21 RX ORDER — DICLOFENAC SODIUM 75 MG/1
TABLET, DELAYED RELEASE ORAL
Qty: 30 TABLET | Refills: 0 | Status: SHIPPED | OUTPATIENT
Start: 2022-11-21

## 2022-11-21 NOTE — TELEPHONE ENCOUNTER
Caller: patient    Doctor: Khadijah Hazel    Reason for call: patient requested a script for PT    Call back#: [phone]

## 2022-11-22 DIAGNOSIS — Z96.652 S/P TOTAL KNEE REPLACEMENT, LEFT: Primary | ICD-10-CM

## 2022-11-23 ENCOUNTER — APPOINTMENT (OUTPATIENT)
Dept: PHYSICAL THERAPY | Facility: REHABILITATION | Age: 77
End: 2022-11-23

## 2022-11-29 DIAGNOSIS — E78.5 HYPERLIPIDEMIA: ICD-10-CM

## 2022-11-30 RX ORDER — EZETIMIBE 10 MG/1
10 TABLET ORAL DAILY
Qty: 30 TABLET | Refills: 0 | Status: SHIPPED | OUTPATIENT
Start: 2022-11-30

## 2022-11-30 NOTE — TELEPHONE ENCOUNTER
Ghanshyam Serrano has requested a refill of ezetimibe (ZETIA) 10 mg tablet  Pt states the prescription was changed to 20 mg by Dr Lyn Zavaleta from 10 mg  Would a change to 20 mg tablet be appropriate?

## 2022-12-08 DIAGNOSIS — E78.2 MIXED HYPERLIPIDEMIA: ICD-10-CM

## 2022-12-09 RX ORDER — OMEGA-3-ACID ETHYL ESTERS 1 G/1
CAPSULE, LIQUID FILLED ORAL
Qty: 360 CAPSULE | Refills: 2 | Status: SHIPPED | OUTPATIENT
Start: 2022-12-09

## 2022-12-09 NOTE — TELEPHONE ENCOUNTER
Requested medication(s) are due for refill today: Yes  Patient has already received a courtesy refill: No  Other reason request has been forwarded to provider: Changes Requested     Name from pharmacy: [address] [place] [address] 1 GM CAP         Will file in chart as: omega-3-acid ethyl esters (LOVAZA) 1 g capsule    Sig: TAKE 2 CAPSULES BY MOUTH 2 TIMES A DAY  Disp:  360 capsule    Refills:  2    Start: 12/8/2022    Class: Normal    Non-formulary For: Mixed hyperlipidemia    Last ordered: 1 month ago by Vladimir Hutchinson MD Last refill: 11/2/2022    Rx #: [id number]    Pharmacy comment: REQUEST FOR 90 DAYS PRESCRIPTION  DX Code Needed  Endocrinology:  Nutritional Agents Passed 12/08/2022 01:34 PM   Protocol Details  Valid encounter within last 12 months      This request has changes from the previous prescription     To be filled at: [facility]/pharmacy #[id number]- [facility]

## 2022-12-20 ENCOUNTER — OFFICE VISIT (OUTPATIENT)
Dept: OBGYN CLINIC | Facility: CLINIC | Age: 77
End: 2022-12-20

## 2022-12-20 ENCOUNTER — HOSPITAL ENCOUNTER (OUTPATIENT)
Dept: RADIOLOGY | Facility: HOSPITAL | Age: 77
Discharge: HOME | End: 2022-12-20
Attending: ORTHOPAEDIC SURGERY

## 2022-12-20 VITALS — HEIGHT: 72 IN | BODY MASS INDEX: 30.81 KG/M2 | WEIGHT: 227.5 LBS

## 2022-12-20 DIAGNOSIS — M46.1 SACROILIITIS (HCC): Primary | ICD-10-CM

## 2022-12-20 DIAGNOSIS — M17.12 PRIMARY OSTEOARTHRITIS OF LEFT KNEE: ICD-10-CM

## 2022-12-20 DIAGNOSIS — M17.11 PRIMARY OSTEOARTHRITIS OF RIGHT KNEE: ICD-10-CM

## 2022-12-20 DIAGNOSIS — Z96.652 S/P TOTAL KNEE REPLACEMENT, LEFT: ICD-10-CM

## 2022-12-20 DIAGNOSIS — M17.0 PRIMARY OSTEOARTHRITIS OF BOTH KNEES: ICD-10-CM

## 2022-12-20 RX ORDER — DICLOFENAC SODIUM 75 MG/1
TABLET, DELAYED RELEASE ORAL
Qty: 30 TABLET | Refills: 0 | Status: SHIPPED | OUTPATIENT
Start: 2022-12-20

## 2022-12-20 NOTE — PROGRESS NOTES
Assessment:  1  Sacroiliitis [facility])  Ambulatory referral to Physical Therapy      2  S/P total knee replacement, left  XR knee 3 vw left non injury          Plan:    • Patient is 8 months s/p left total knee arthroplasty, DOS 4/13/22 and left SI joint pain    • Weight bearing as tolerated left lower extremity   • PT order was given out today for left SI joint pain  • Repeat bilateral knee x-rays at the next office visit   • Follow up in 9 months         The above stated was discussed in layman's terms and the patient expressed understanding  All questions were answered to the patient's satisfaction  Subjective:   Jamel Azevedo is a 78 y o  male who presents today 8 months s/p left total knee arthroplasty, DOS 4/13/22  He states he is doing well  He notes his left knee is doing much better and is happy with his progress  He is starting physical therapy for his lumbar spine tomorrow         Review of systems negative unless otherwise specified in HPI    Past Medical History:   Diagnosis Date   • Celiac disease    • Coronary artery disease    • Diverticulitis    • Diverticulosis    • Hyperlipidemia    • Hypertension    • Myocardial infarction ([facility] ) 1990       Past Surgical History:   Procedure Laterality Date   • COLONOSCOPY     • CORONARY ANGIOPLASTY WITH STENT PLACEMENT      Multiple times   • UT TOTAL KNEE ARTHROPLASTY Right 04/13/2022    Procedure: ARTHROPLASTY KNEE TOTAL and all associated procedures;  Surgeon: Francis Borges MD;  Location:  MAIN OR;  Service: Orthopedics   • UT TOTAL KNEE ARTHROPLASTY Left 9/29/2022    Procedure: ARTHROPLASTY KNEE TOTAL;  Surgeon: Francis Borges MD;  Location:  MAIN OR;  Service: Orthopedics       Family History   Problem Relation Age of Onset   • Diabetes Mother    • Coronary artery disease Father    • Thyroid disease Sister        Social History     Occupational History   • Not on file   Tobacco Use   • Smoking status: Former     Types: Cigarettes Quit date: 5     Years since quittin 9   • Smokeless tobacco: Never   • Tobacco comments:     Per pt, quit over 36 years ago   Vaping Use   • Vaping Use: Never used   Substance and Sexual Activity   • Alcohol use: Not Currently   • Drug use: Never   • Sexual activity: Yes     Partners: Female     Birth control/protection: None         Current Outpatient Medications:   •  baclofen 10 mg tablet, TAKE 1 TABLET BY MOUTH THREE TIMES A DAY AS NEEDED FOR MUSCLE SPASMS, Disp: 270 tablet, Rfl: 1  •  Cholecalciferol (Vitamin D3) 1 25 MG (09207 UT) CAPS, Take 10,000 Units by mouth every morning  , Disp: , Rfl:   •  clopidogrel (PLAVIX) 75 mg tablet, Take 75 mg by mouth every morning  , Disp: , Rfl:   •  diclofenac (VOLTAREN) 75 mg EC tablet, TAKE 1 TABLET BY MOUTH EVERY DAY IN THE MORNING, Disp: 30 tablet, Rfl: 0  •  Docusate Sodium (DSS) 100 MG CAPS, docusate sodium 100 mg capsule  TAKE 1 CAPSULE BY MOUTH TWICE A DAY, Disp: , Rfl:   •  ezetimibe (ZETIA) 10 mg tablet, Take 1 tablet (10 mg total) by mouth daily, Disp: 30 tablet, Rfl: 0  •  gabapentin (NEURONTIN) 300 mg capsule, TAKE 1 CAPSULE BY MOUTH 3  TIMES DAILY (Patient taking differently: Take 600 mg by mouth daily at bedtime), Disp: 270 capsule, Rfl: 3  •  ketoconazole (NIZORAL) 2 % shampoo, APPLY TOPICALLY 2 TIMES A WEEK, Disp: 120 mL, Rfl: 0  •  metoprolol succinate (TOPROL-XL) 50 mg 24 hr tablet, Take 1 tablet (50 mg total) by mouth daily, Disp: 90 tablet, Rfl: 3  •  omega-3-acid ethyl esters (LOVAZA) 1 g capsule, TAKE 2 CAPSULES BY MOUTH 2 TIMES A DAY , Disp: 360 capsule, Rfl: 2  •  sertraline (ZOLOFT) 50 mg tablet, Take 1 tablet (50 mg total) by mouth daily, Disp: 90 tablet, Rfl: 3  •  simvastatin (ZOCOR) 40 mg tablet, Take 1 tablet (40 mg total) by mouth daily at bedtime, Disp: 90 tablet, Rfl: 1  •  tamsulosin (FLOMAX) 0 4 mg, TAKE 1 CAPSULE BY MOUTH  DAILY WITH DINNER, Disp: 90 capsule, Rfl: 3  •  traZODone (DESYREL) 150 mg tablet, TAKE 1 TABLET BY MOUTH DAILY AT BEDTIME, Disp: 30 tablet, Rfl: 11  •  ascorbic acid (VITAMIN C) 500 MG tablet, Take 1 tablet (500 mg total) by mouth daily, Disp: 60 tablet, Rfl: 0  •  aspirin (ECOTRIN LOW STRENGTH) 81 mg EC tablet, Take 1 tablet (81 mg total) by mouth 2 (two) times a day Please do not start taking until after total joint arthroplasty, Disp: 70 tablet, Rfl: 0  •  Cyanocobalamin (VITAMIN B 12 PO), Take by mouth, Disp: , Rfl:   •  methocarbamol (ROBAXIN) 500 mg tablet, methocarbamol 500 mg tablet  TAKE 1 TABLET (500 MG TOTAL) BY MOUTH 4 (FOUR) TIMES A DAY , Disp: , Rfl:     Allergies   Allergen Reactions   • Crestor [Rosuvastatin] Myalgia            Vitals:       Objective:            Physical Exam  Physical Exam:      General Appearance:    Alert, cooperative, no distress, appears stated age   Head:    Normocephalic, without obvious abnormality, atraumatic   Eyes:    conjunctiva/corneas clear, both eyes         Nose:   Nares normal, septum midline, no drainage    Throat:   Lips normal; teeth and gums normal   Neck:    symmetrical, trachea midline, ;     thyroid:  no enlargement/   Back:     Symmetric, no curvature, ROM normal   Lungs:   No audible wheezing or labored breathing   Chest Wall:    No tenderness or deformity    Heart:    Regular rate and rhythm                         Pulses:   2+ and symmetric all extremities   Skin:   Skin color, texture, turgor normal, no rashes or lesions   Neurologic:   normal strength, sensation and reflexes     throughout                       Ortho Exam  Left knee  Surgical incision well healed  No erythema   Full extension   Stable to varus and valgus stress  Neurovascularly Intact Distally     Diagnostics, reviewed and taken today if performed as documented    The attending physician has personally reviewed the pertinent films in PACS and interpretation is as follows: x-ray left knee demonstrates s/p TKA adequate alignment of implant, no sign of loosening       Procedures, if performed today:    Procedures    None performed      Scribe Attestation    I,:  Dm Mayo am acting as a scribe while in the presence of the attending physician :       I,:  Fany Shelton MD personally performed the services described in this documentation    as scribed in my presence :             Portions of the record may have been created with voice recognition software  Occasional wrong word or "sound a like" substitutions may have occurred due to the inherent limitations of voice recognition software  Read the chart carefully and recognize, using context, where substitutions have occurred

## 2023-01-04 ENCOUNTER — EVALUATION (OUTPATIENT)
Dept: PHYSICAL THERAPY | Facility: CLINIC | Age: 78
End: 2023-01-04

## 2023-01-04 DIAGNOSIS — Z96.652 S/P TOTAL KNEE REPLACEMENT, LEFT: ICD-10-CM

## 2023-01-04 DIAGNOSIS — M53.3 CHRONIC SI JOINT PAIN: Primary | ICD-10-CM

## 2023-01-04 DIAGNOSIS — G89.29 CHRONIC SI JOINT PAIN: Primary | ICD-10-CM

## 2023-01-04 DIAGNOSIS — M46.1 SACROILIITIS (HCC): ICD-10-CM

## 2023-01-04 NOTE — PROGRESS NOTES
PT Evaluation     Today's date: 2023  Patient name: Adelina Reaves  : 1945  MRN: [id number]  Referring provider: Octaviano Irwin MD  Dx:   Encounter Diagnosis     ICD-10-CM    1  Chronic SI joint pain  M53 3     G89 29       2  S/P total knee replacement, left  [id number] [id number] Ambulatory referral to Physical Therapy      3  Sacroiliitis ([address] )  M46 1 Ambulatory referral to Physical Therapy                     Assessment  Assessment details: Adelina Reaves is a 78 y o  male with L lumbar radiculopathy possibly related to gait abnormalities following B TKA  He presents with pain, decreased ROM, , ambulatory and postural dysfunction  Due to these impairments, Patient has difficulty performing a/iadls, recreational activities and engaging in social activities  Patient's clinical presentation is consistent with their referring diagnosis  Patient would benefit from skilled physical therapy to address their aforementioned impairments, improve their level of function and to improve their overall quality of life   has been given a home exercise program and is in agreement with the plan of care  Thank you for your referral   Impairments: abnormal gait, abnormal muscle tone, abnormal or restricted ROM, activity intolerance, impaired balance, lacks appropriate home exercise program and pain with function    Symptom irritability: moderateUnderstanding of Dx/Px/POC: excellent  Goals  ST Goals - 2-4 weeks  1  Patient will report decreased pain with activity by at least 2 points within 4 weeks  2  Patient will improve ROM 5-10 degrees within 4 weeks  3  Patient will demonstrate ability to actively correct posture without cueing within 4 weeks  4  Patient will perform IADLs without pain in 2 weeks  5  Patient will increase strength by 25% in 4 weeks    LT Goals - Discharge  1  Patient will improve FOTO score to maximum stated or greater by discharge  2   Patient will return to preferred recreational activity without significant pain increase by discharge   3  Patient will return to all work related activities without pain by discharge     Plan  Patient would benefit from: skilled physical therapy  Referral necessary: No  Planned therapy interventions: joint mobilization, manual therapy, neuromuscular re-education, strengthening, stretching, therapeutic activities, therapeutic exercise and home exercise program  Frequency: 2x week  Duration in visits: 20  Duration in weeks: 20  Plan of Care beginning date: 2023  Plan of Care expiration date: 2023  Treatment plan discussed with: patient        Subjective Evaluation    History of Present Illness  Mechanism of injury: Patient reports that he has L SI joint pain after falling in the spring  He was not sure if the fall caused the pain  He had a TKA on the L in Sept and also a TKA on the R in April  He did have an injection into the SI joint in July with no change in sx  He does use Aleve occasionally for pain  CC:   He has pain in the L SI joint region which he states did radiate down the L lateral LE but that has improved  He reports that his sx are constant  Function:  He is currently working from home sitting at a computer  He also is walking 2-3 times a week  He tries to walk an hour  He does like to sleep on his belly/side but pain does not wake him at night  Recurrent probem    Quality of life: excellent    Pain  Current pain rating: 3  At worst pain ratin  Relieving factors: medications and heat    Social Support  Steps to enter house: yes  Stairs in house: yes     Employment status: working    Diagnostic Tests  X-ray: abnormal    FCE comments: No imaging has been taken  Treatments  Previous treatment: physical therapy  Current treatment: physical therapy  Patient Goals  Patient goals for therapy: decreased pain and improved balance          Objective     Static Posture     Lumbar Spine   Decreased lordosis       Palpation   Left Muscle spasm in the quadratus lumborum  Tenderness of the quadratus lumborum  Tenderness     Lumbar Spine  Tenderness in the facet joint  Neurological Testing     Sensation     Lumbar   Left   Intact: light touch    Right   Intact: light touch    Reflexes   Left   Patellar (L4): normal (2+)    Right   Patellar (L4): normal (2+)    Active Range of Motion     Lumbar   Flexion:  Restriction level: minimal  Extension: 20 degrees   Left lateral flexion: 16 degrees       Right lateral flexion: 25 degrees   Mechanical Assessment    Cervical      Thoracic    Lying extension: repeated movements  Pain location: centralized  Pain intensity: better    Lumbar      Strength/Myotome Testing     Lumbar   Left   Normal strength  Heel walk: normal  Toe walk: normal    Right   Normal strength  Heel walk: normal  Toe walk: normal    Tests     Lumbar   Negative SIJ compression  Left   Positive slump test    Negative crossed SLR and passive SLR  Left Pelvic Girdle/Sacrum   Negative: active SLR test      Left Hip   Negative [place] and long sit  Ambulation     Observational Gait   Walking speed and stride length within functional limits  Functional Assessment        Single Leg Stance - Eyes Closed   Left  Trial 1: 5 seconds  Trial 2: 10 seconds    Right  Trial 1: 6 seconds    Comments  5 x sit to stand:  13 34 seconds    General Comments:    Lower quarter screen   Hips: unremarkable  Foot/ankle: unremarkable    Knee Comments  B TKA incisions               Precautions:  [id number]       Manuals 1/4                                                                Neuro Re-Ed             SLS             bike             Prone press ups 10 x :10            EIS 10 x :10            GlutSt             Open Book             Hip ABD Iso                                                                                                                                  Ther Activity                                       Gait Training                                       Modalities

## 2023-01-09 ENCOUNTER — OFFICE VISIT (OUTPATIENT)
Dept: PHYSICAL THERAPY | Facility: CLINIC | Age: 78
End: 2023-01-09

## 2023-01-09 DIAGNOSIS — M46.1 SACROILIITIS (HCC): ICD-10-CM

## 2023-01-09 DIAGNOSIS — G89.29 CHRONIC SI JOINT PAIN: Primary | ICD-10-CM

## 2023-01-09 DIAGNOSIS — M53.3 CHRONIC SI JOINT PAIN: Primary | ICD-10-CM

## 2023-01-09 NOTE — PROGRESS NOTES
Daily Note     Today's date: 2023  Patient name: Ann-Marie Mejia  : 1945  MRN: [id number]  Referring provider: Gibran Newman MD  Dx:   Encounter Diagnosis     ICD-10-CM    1  Chronic SI joint pain  M53 3     G89 29       2  Sacroiliitis ([facility] [address] )  M46 1                      Subjective: Patient reports that he went for an hour walk which causes L glute pain  Objective: See treatment diary below      Assessment: Tolerated treatment well  Patient would benefit from continued PT      Plan: Continue per plan of care        Precautions:  [id number]       Manuals                                                                Neuro Re-Ed             SLS  5 x :10           bike  6'           Prone press ups 10 x :10 10 x :10           EIS 10 x :10            GlutSt  5 x :20           LTR ro R  5 x :20           Hip ABD Iso  10 x :10                                                                                                                                Ther Activity                                       Gait Training                                       Modalities

## 2023-01-12 ENCOUNTER — OFFICE VISIT (OUTPATIENT)
Dept: PHYSICAL THERAPY | Facility: CLINIC | Age: 78
End: 2023-01-12

## 2023-01-12 DIAGNOSIS — M53.3 CHRONIC SI JOINT PAIN: Primary | ICD-10-CM

## 2023-01-12 DIAGNOSIS — G89.29 CHRONIC SI JOINT PAIN: Primary | ICD-10-CM

## 2023-01-12 NOTE — PROGRESS NOTES
Daily Note     Today's date: 2023  Patient name: Serene Dunn  : 1945  MRN: [id number]  Referring provider: Nereyda Birmingham MD  Dx: No diagnosis found  Subjective: Patient states that he has noticed an improvement with pain since starting therapy  Objective: See treatment diary below      Assessment: Tolerated treatment well  Patient exhibited good technique with therapeutic exercises      Plan: Continue per plan of care        Precautions:  [id number]       Manuals                                                               Neuro Re-Ed             SLS  5 x :10 15" x 5           bike  6' 7          Prone press ups 10 x :10 10 x :10 10" x 10           EIS 10 x :10            GlutSt  5 x :20 5 x 20"          LTR ro R  5 x :20 5 x 20"          Hip ABD Iso  10 x :10 10"x 10          Side stepping   YTB x 4 laps           Clamshells   RTB x 20                                                                                                      Ther Activity                                       Gait Training                                       Modalities

## 2023-01-16 ENCOUNTER — OFFICE VISIT (OUTPATIENT)
Dept: PHYSICAL THERAPY | Facility: CLINIC | Age: 78
End: 2023-01-16

## 2023-01-16 DIAGNOSIS — G89.29 CHRONIC SI JOINT PAIN: Primary | ICD-10-CM

## 2023-01-16 DIAGNOSIS — M46.1 SACROILIITIS (HCC): ICD-10-CM

## 2023-01-16 DIAGNOSIS — M53.3 CHRONIC SI JOINT PAIN: Primary | ICD-10-CM

## 2023-01-16 NOTE — PROGRESS NOTES
Daily Note     Today's date: 2023  Patient name: Patricia Kraft  : 1945  MRN: [id number]  Referring provider: Bell Ramsay MD  Dx: No diagnosis found  Subjective: Patient states that he was sore after a long walks this weekend  Objective: See treatment diary below      Assessment: Tolerated treatment well  Patient exhibited good technique with therapeutic exercises      Plan: Continue per plan of care        Precautions: h  [id number]       Manuals                                                              Neuro Re-Ed             SLS  5 x :10 15" x 5  20" x 5          bike  6' 7 7          Prone press ups 10 x :10 10 x :10 10" x 10  10" x 10          EIS 10 x :10            GlutSt  5 x :20 5 x 20" 5 x 20"          LTR ro R  5 x :20 5 x 20" 5 x 20"         Hip ABD Iso  10 x :10 10"x 10 10" x 10         Side stepping   YTB x 4 laps  YTB 5 laps          Clamshells   RTB x 20  RTB x 20          Standing hip abduction    YTB 1 x 10         Standing hip extension    YTB 1 x 10         Sustained extension    5 min                                                              Ther Activity                                       Gait Training                                       Modalities

## 2023-01-19 ENCOUNTER — OFFICE VISIT (OUTPATIENT)
Dept: PHYSICAL THERAPY | Facility: CLINIC | Age: 78
End: 2023-01-19

## 2023-01-19 DIAGNOSIS — M53.3 CHRONIC SI JOINT PAIN: Primary | ICD-10-CM

## 2023-01-19 DIAGNOSIS — G89.29 CHRONIC SI JOINT PAIN: Primary | ICD-10-CM

## 2023-01-19 NOTE — PROGRESS NOTES
Daily Note     Today's date: 2023  Patient name: Mark Vallejo  : 1945  MRN: [id number]  Referring provider: Gabbie Fischer MD  Dx:   Encounter Diagnosis     ICD-10-CM    1  Chronic SI joint pain  M53 3     G89 29                      Subjective: Patient has decreased his recreational walk to 1 hour due to pain  Objective: See treatment diary below      Assessment: Tolerated treatment well  Patient exhibited good technique with therapeutic exercises      Plan: Continue per plan of care        Precautions: h  [id number]       Manuals                                                              Neuro Re-Ed             SLS  5 x :10 15" x 5  20" x 5  20" x 5         bike  6' 7 7  7        Prone press ups 10 x :10 10 x :10 10" x 10  10" x 10  10" x 10        EIS 10 x :10            GlutSt  5 x :20 5 x 20" 5 x 20"  5 x 20"        LTR ro R  5 x :20 5 x 20" 5 x 20"         Hip ABD Iso  10 x :10 10"x 10 10" x 10         Side stepping   YTB x 4 laps  YTB 5 laps  YTB x 5 laps         Clamshells   RTB x 20  RTB x 20          Standing hip abduction    YTB 1 x 10         Standing hip extension    YTB 1 x 10         Sustained extension    5 min  5 min         EIS OP     X 10         Pball press downs     X 10         Alt UE/LE with resistance     BTB 1 x 10                      Ther Activity                                       Gait Training                                       Modalities

## 2023-01-23 ENCOUNTER — OFFICE VISIT (OUTPATIENT)
Dept: PHYSICAL THERAPY | Facility: CLINIC | Age: 78
End: 2023-01-23

## 2023-01-23 DIAGNOSIS — G89.29 CHRONIC SI JOINT PAIN: Primary | ICD-10-CM

## 2023-01-23 DIAGNOSIS — E78.2 MIXED HYPERLIPIDEMIA: ICD-10-CM

## 2023-01-23 DIAGNOSIS — M53.3 CHRONIC SI JOINT PAIN: Primary | ICD-10-CM

## 2023-01-23 RX ORDER — SIMVASTATIN 40 MG
40 TABLET ORAL
Qty: 90 TABLET | Refills: 3 | Status: SHIPPED | OUTPATIENT
Start: 2023-01-23

## 2023-01-23 NOTE — PROGRESS NOTES
Daily Note     Today's date: 2023  Patient name: Yasemin Tinajero  : 1945  MRN: [id number]  Referring provider: Radha Gracia MD  Dx:   Encounter Diagnosis     ICD-10-CM    1  Chronic SI joint pain  M53 3     G89 29                      Subjective: patient states that he remains maximally compliant with HEP  Objective: See treatment diary below      Assessment: Tolerated treatment well  Patient exhibited good technique with therapeutic exercises      Plan: Continue per plan of care        Precautions: h  [id number]       Manuals                                                            Neuro Re-Ed             SLS  5 x :10 15" x 5  20" x 5  20" x 5  20" x 5        bike  6' 7 7  7 7       Prone press ups 10 x :10 10 x :10 10" x 10  10" x 10  10" x 10        EIS 10 x :10            GlutSt  5 x :20 5 x 20" 5 x 20"  5 x 20" 5 x 20"        LTR ro R  5 x :20 5 x 20" 5 x 20"         Hip ABD Iso  10 x :10 10"x 10 10" x 10         Side stepping   YTB x 4 laps  YTB 5 laps  YTB x 5 laps  YTB x 5 laps        Clamshells   RTB x 20  RTB x 20   RTB x 20        Standing hip abduction    YTB 1 x 10 YTB x 15  YTB x 15        Standing hip extension    YTB 1 x 10 YTB x 10 YTB x 15       Sustained extension    5 min  5 min         EIS OP     X 10  RT 10       Pball press downs     X 10  10       Alt UE/LE with resistance     BTB 1 x 10         SLR abduction      X 10        Paloff press      RTB x 15        Ther Activity                                       Gait Training                                       Modalities

## 2023-01-24 DIAGNOSIS — M17.12 PRIMARY OSTEOARTHRITIS OF LEFT KNEE: ICD-10-CM

## 2023-01-24 DIAGNOSIS — M17.11 PRIMARY OSTEOARTHRITIS OF RIGHT KNEE: ICD-10-CM

## 2023-01-24 DIAGNOSIS — M17.0 PRIMARY OSTEOARTHRITIS OF BOTH KNEES: ICD-10-CM

## 2023-01-24 RX ORDER — DICLOFENAC SODIUM 75 MG/1
TABLET, DELAYED RELEASE ORAL
Qty: 30 TABLET | Refills: 0 | Status: SHIPPED | OUTPATIENT
Start: 2023-01-24

## 2023-01-25 ENCOUNTER — OFFICE VISIT (OUTPATIENT)
Dept: PHYSICAL THERAPY | Facility: CLINIC | Age: 78
End: 2023-01-25

## 2023-01-25 DIAGNOSIS — G89.29 CHRONIC SI JOINT PAIN: Primary | ICD-10-CM

## 2023-01-25 DIAGNOSIS — M53.3 CHRONIC SI JOINT PAIN: Primary | ICD-10-CM

## 2023-01-25 DIAGNOSIS — M46.1 SACROILIITIS (HCC): ICD-10-CM

## 2023-01-25 NOTE — PROGRESS NOTES
Daily Note     Today's date: 2023  Patient name: Leona Duong  : 1945  MRN: [id number]  Referring provider: Jessica Hanna MD  Dx: No diagnosis found  Subjective: patient states that his tolerance with daily walking is improving  Objective: See treatment diary below      Assessment: Tolerated treatment well  Patient exhibited good technique with therapeutic exercises      Plan: Continue per plan of care        Precautions: B/L total knee replacement   [id number]       Manuals                                                           Neuro Re-Ed             SLS  5 x :10 15" x 5  20" x 5  20" x 5  20" x 5  10' x 5 hard foam       bike  6' 7 7  7 7 7      Prone press ups 10 x :10 10 x :10 10" x 10  10" x 10  10" x 10        EIS 10 x :10            GlutSt  5 x :20 5 x 20" 5 x 20"  5 x 20" 5 x 20"  5 x 20"      LTR ro R  5 x :20 5 x 20" 5 x 20"         Hip ABD Iso  10 x :10 10"x 10 10" x 10         Side stepping   YTB x 4 laps  YTB 5 laps  YTB x 5 laps  YTB x 5 laps  YTB 6 laps      Clamshells   RTB x 20  RTB x 20   RTB x 20  bTB 20      Standing hip abduction    YTB 1 x 10 YTB x 15  YTB x 15  YTB 20       Standing hip extension    YTB 1 x 10 YTB x 10 YTB x 15 YTB x 20      Sustained extension    5 min  5 min         EIS OP     X 10  RT 10 10x       Pball press downs     X 10  10 10       Alt UE/LE with resistance     BTB 1 x 10         SLR abduction      X 10  X 10 @ wall      Paloff press      RTB x 15  RTB x 15      Ther Activity                                       Gait Training                                       Modalities

## 2023-01-30 ENCOUNTER — OFFICE VISIT (OUTPATIENT)
Dept: PHYSICAL THERAPY | Facility: CLINIC | Age: 78
End: 2023-01-30

## 2023-01-30 DIAGNOSIS — M53.3 CHRONIC SI JOINT PAIN: ICD-10-CM

## 2023-01-30 DIAGNOSIS — G89.29 CHRONIC SI JOINT PAIN: ICD-10-CM

## 2023-01-30 DIAGNOSIS — M46.1 SACROILIITIS (HCC): Primary | ICD-10-CM

## 2023-01-30 NOTE — PROGRESS NOTES
Daily Note     Today's date: 2023  Patient name: Cade Dudley  : 1945  MRN: [id number]  Referring provider: Manasa Ruiz MD  Dx: No diagnosis found  Subjective: Patient reports that he has been having less pain recently  He has been doing the TB side steps and he feels those exercises are improved  Objective: See treatment diary below      Assessment: Tolerated treatment well  Patient would benefit from continued PT      Plan: Continue per plan of care        Precautions: B/L total knee replacement   [id number]       Manuals                                                           Neuro Re-Ed             SLS  5 x :10 15" x 5  20" x 5  20" x 5  20" x 5  10' x 5 hard foam       bike  6' 7 7  7 7 7      Prone press ups 10 x :10 10 x :10 10" x 10  10" x 10  10" x 10  Onto hands x 10      EIS 10 x :10      10 x :10      GlutSt  5 x :20 5 x 20" 5 x 20"  5 x 20" 5 x 20"  5 x 20"      LTR ro R  5 x :20 5 x 20" 5 x 20"         Hip ABD Iso  10 x :10 10"x 10 10" x 10         Side stepping   YTB x 4 laps  YTB 5 laps  YTB x 5 laps  YTB x 5 laps  YTB 6 laps      Clamshells   RTB x 20  RTB x 20   RTB x 20  bTB 20      Standing hip abduction    YTB 1 x 10 YTB x 15  YTB x 15  YTB 20       Standing hip extension    YTB 1 x 10 YTB x 10 YTB x 15 YTB x 20      Sustained extension    5 min  5 min         EIS OP     X 10  RT 10 10x       Pball press downs     X 10  10 2x10       Alt UE/LE with resistance     BTB 1 x 10   GTB x 20      SLR abduction      X 10  X 10 @ wall      Paloff press      RTB x 15  GTB x 20      SLR       2 x 10                                Gait Training                                       Modalities

## 2023-02-01 ENCOUNTER — EVALUATION (OUTPATIENT)
Dept: PHYSICAL THERAPY | Facility: CLINIC | Age: 78
End: 2023-02-01

## 2023-02-01 DIAGNOSIS — M46.1 SACROILIITIS (HCC): Primary | ICD-10-CM

## 2023-02-01 NOTE — PROGRESS NOTES
Daily Note     Today's date: 2023  Patient name: Darvin Moran  : 1945  MRN: [id number]  Referring provider: Vipin Villareal MD  Dx:   Encounter Diagnosis     ICD-10-CM    1  Sacroiliitis ([facility] [address] )  M46 1                      Subjective: Patient states that he is feeling well  Objective: See treatment diary below      Assessment: Tolerated treatment well  Patient exhibited good technique with therapeutic exercises      Plan: Continue per plan of care        Precautions: B/L total knee replacement   [id number]       Manuals                                                          Neuro Re-Ed             SLS  5 x :10 15" x 5  20" x 5  20" x 5  20" x 5  10' x 5 hard foam  10" x 5 hard foam      bike  6' 7 7  7 7 7 7     Prone press ups 10 x :10 10 x :10 10" x 10  10" x 10  10" x 10  Onto hands x 10 20      EIS 10 x :10      10 x :10 10" x 10      GlutSt  5 x :20 5 x 20" 5 x 20"  5 x 20" 5 x 20"  5 x 20" 5 x 20"     LTR ro R  5 x :20 5 x 20" 5 x 20"         Hip ABD Iso  10 x :10 10"x 10 10" x 10         Side stepping   YTB x 4 laps  YTB 5 laps  YTB x 5 laps  YTB x 5 laps  YTB 6 laps YTB 5 laps     Clamshells   RTB x 20  RTB x 20   RTB x 20  bTB 20 BTB 20      Standing hip abduction    YTB 1 x 10 YTB x 15  YTB x 15  YTB 20  YTB x 10      Standing hip extension    YTB 1 x 10 YTB x 10 YTB x 15 YTB x 20 YTB x 10      Sustained extension    5 min  5 min         EIS OP     X 10  RT 10 10x  10     Pball press downs     X 10  10 2x10       Alt UE/LE with resistance     BTB 1 x 10   GTB x 20      SLR abduction      X 10  X 10 @ wall      Paloff press      RTB x 15  GTB x 20 G x 20      SLR       2 x 10 20x     Bridges with BTB        3" x 20                   Gait Training                                       Modalities

## 2023-02-07 ENCOUNTER — OFFICE VISIT (OUTPATIENT)
Dept: PHYSICAL THERAPY | Facility: CLINIC | Age: 78
End: 2023-02-07

## 2023-02-07 DIAGNOSIS — M46.1 SACROILIITIS (HCC): Primary | ICD-10-CM

## 2023-02-07 NOTE — PROGRESS NOTES
Daily Note     Today's date: 2023  Patient name: Cade Dudley  : 1945  MRN: [id number]  Referring provider: Manasa Ruiz MD  Dx:   Encounter Diagnosis     ICD-10-CM    1  Sacroiliitis ([address] )  M46 1                      Subjective: Patient states that he has noticed an improvement with his walking tolerance and LBP  Patient able to  his kitchen for 1 hour without complaint  Objective: See treatment diary below      Assessment: Tolerated treatment well  Patient remains maximally compliant with HEP      Plan: Continue per plan of care        Precautions: B/L total knee replacement   [id number]       Manuals                                                         Neuro Re-Ed             SLS  5 x :10 15" x 5  20" x 5  20" x 5  20" x 5  10' x 5 hard foam  10" x 5 hard foam  Hard foam 10" x 5    bike  6' 7 7  7 7 7 7 7    Prone press ups 10 x :10 10 x :10 10" x 10  10" x 10  10" x 10  Onto hands x 10 20      EIS 10 x :10      10 x :10 10" x 10  10" x 10    GlutSt  5 x :20 5 x 20" 5 x 20"  5 x 20" 5 x 20"  5 x 20" 5 x 20"     LTR ro R  5 x :20 5 x 20" 5 x 20"         Hip ABD Iso  10 x :10 10"x 10 10" x 10         Side stepping   YTB x 4 laps  YTB 5 laps  YTB x 5 laps  YTB x 5 laps  YTB 6 laps YTB 5 laps RTB x 5    Clamshells   RTB x 20  RTB x 20   RTB x 20  bTB 20 BTB 20  BTB x 20    Standing hip abduction    YTB 1 x 10 YTB x 15  YTB x 15  YTB 20  YTB x 10      Standing hip extension    YTB 1 x 10 YTB x 10 YTB x 15 YTB x 20 YTB x 10  RTB x10     Sustained extension    5 min  5 min         EIS OP     X 10  RT 10 10x  10 10    Pball press downs     X 10  10 2x10   2 x 10     Alt UE/LE with resistance     BTB 1 x 10   GTB x 20      SLR abduction      X 10  X 10 @ wall  Stand- heels on wall x 10 B    Paloff press      RTB x 15  GTB x 20 G x 20  G x 20     SLR       2 x 10 20x 3# 15    Bridges with BTB        3" x 20  BTB 3" x 20     Gastroc stretch wedge         20"x 5     Gait Training                                       Modalities

## 2023-02-09 ENCOUNTER — OFFICE VISIT (OUTPATIENT)
Dept: PHYSICAL THERAPY | Facility: CLINIC | Age: 78
End: 2023-02-09

## 2023-02-09 DIAGNOSIS — M46.1 SACROILIITIS (HCC): Primary | ICD-10-CM

## 2023-02-09 NOTE — PROGRESS NOTES
Daily Note     Today's date: 2023  Patient name: Ellen Hancock  : 1945  MRN: [id number]  Referring provider: Concha Lucia MD  Dx: No diagnosis found  Subjective: Patient states that he was able to walk recreationally for 1 hour without complaints of pain  Objective: See treatment diary below      Assessment: Tolerated treatment well  Patient exhibited good technique with therapeutic exercises      Plan: Continue per plan of care        Precautions: B/L total knee replacement   [id number]       Manuals                                                        Neuro Re-Ed             SLS  5 x :10 15" x 5  20" x 5  20" x 5  20" x 5  10' x 5 hard foam  10" x 5 hard foam  Hard foam 10" x 5 Hard foam 15" x 5    bike  6' 7 7  7 7 7 7 7 7   Prone press ups 10 x :10 10 x :10 10" x 10  10" x 10  10" x 10  Onto hands x 10 20      EIS 10 x :10      10 x :10 10" x 10  10" x 10    GlutSt  5 x :20 5 x 20" 5 x 20"  5 x 20" 5 x 20"  5 x 20" 5 x 20"     LTR ro R  5 x :20 5 x 20" 5 x 20"         Hip ABD Iso  10 x :10 10"x 10 10" x 10         Side stepping   YTB x 4 laps  YTB 5 laps  YTB x 5 laps  YTB x 5 laps  YTB 6 laps YTB 5 laps RTB x 5 RTB 5 laps    Clamshells   RTB x 20  RTB x 20   RTB x 20  bTB 20 BTB 20  BTB x 20 BTB 20    Standing hip abduction    YTB 1 x 10 YTB x 15  YTB x 15  YTB 20  YTB x 10   RTB x 15   Standing hip extension    YTB 1 x 10 YTB x 10 YTB x 15 YTB x 20 YTB x 10  RTB x10  RTB x15    Sustained extension    5 min  5 min         EIS OP     X 10  RT 10 10x  10 10 10   Pball press downs     X 10  10 2x10   2 x 10     Alt UE/LE with resistance     BTB 1 x 10   GTB x 20      SLR abduction      X 10  X 10 @ wall  Stand- heels on wall x 10 B Toes at wall x 10    Paloff press      RTB x 15  GTB x 20 G x 20  G x 20  G 20    SLR       2 x 10 20x 3# 15    Bridges with BTB        3" x 20  BTB 3" x 20  BTB 20    Gastroc stretch wedge 20"x 5  20" x 5    Gait Training                                       Modalities

## 2023-02-13 ENCOUNTER — OFFICE VISIT (OUTPATIENT)
Dept: PHYSICAL THERAPY | Facility: CLINIC | Age: 78
End: 2023-02-13

## 2023-02-13 DIAGNOSIS — M53.3 CHRONIC SI JOINT PAIN: ICD-10-CM

## 2023-02-13 DIAGNOSIS — G89.29 CHRONIC SI JOINT PAIN: ICD-10-CM

## 2023-02-13 DIAGNOSIS — M46.1 SACROILIITIS (HCC): Primary | ICD-10-CM

## 2023-02-13 NOTE — PROGRESS NOTES
Daily Note     Today's date: 2023  Patient name: Connor Zavaleta  : 1945  MRN: [id number]  Referring provider: Dee Rossi MD  Dx: No diagnosis found  Subjective: patient able to complete household tasks which required standing for several hours without complaint  Overall feeling well and ready to DC to HEP after nv  Objective: See treatment diary below      Assessment: Tolerated treatment well   Patient exhibited good technique with therapeutic exercises      Plan: DC to HEP following NV     Precautions: B/L total knee replacement   [id number]       Manuals                                                        Neuro Re-Ed             SLS Hard foam 20" x 5     20" x 5  20" x 5  10' x 5 hard foam  10" x 5 hard foam  Hard foam 10" x 5 Hard foam 15" x 5    bike 8 min     7 7 7 7 7 7   Prone press ups     10" x 10  Onto hands x 10 20      EIS       10 x :10 10" x 10  10" x 10    GlutSt     5 x 20" 5 x 20"  5 x 20" 5 x 20"     LTR ro R             Hip ABD Iso             Side stepping RTB x 5 laps     YTB x 5 laps  YTB x 5 laps  YTB 6 laps YTB 5 laps RTB x 5 RTB 5 laps    Clamshells BTB 20      RTB x 20  bTB 20 BTB 20  BTB x 20 BTB 20    Standing hip abduction RTB x 20     YTB x 15  YTB x 15  YTB 20  YTB x 10   RTB x 15   Standing hip extension RTB x 20     YTB x 10 YTB x 15 YTB x 20 YTB x 10  RTB x10  RTB x15    Sustained extension     5 min         EIS OP 10    X 10  RT 10 10x  10 10 10   Pball press downs     X 10  10 2x10   2 x 10     Alt UE/LE with resistance     BTB 1 x 10   GTB x 20      SLR abduction Toes at wall x 20      X 10  X 10 @ wall  Stand- heels on wall x 10 B Toes at wall x 10    Paloff press G 20      RTB x 15  GTB x 20 G x 20  G x 20  G 20    SLR       2 x 10 20x 3# 15    Bridges with BTB BTB 20        3" x 20  BTB 3" x 20  BTB 20    Gastroc stretch wedge 20" x 5         20"x 5  20" x 5    Gait Training Modalities

## 2023-02-14 ENCOUNTER — APPOINTMENT (OUTPATIENT)
Dept: PHYSICAL THERAPY | Facility: CLINIC | Age: 78
End: 2023-02-14

## 2023-02-16 ENCOUNTER — OFFICE VISIT (OUTPATIENT)
Dept: PHYSICAL THERAPY | Facility: CLINIC | Age: 78
End: 2023-02-16

## 2023-02-16 DIAGNOSIS — M46.1 SACROILIITIS (HCC): Primary | ICD-10-CM

## 2023-02-16 DIAGNOSIS — M53.3 CHRONIC SI JOINT PAIN: ICD-10-CM

## 2023-02-16 DIAGNOSIS — G89.29 CHRONIC SI JOINT PAIN: ICD-10-CM

## 2023-02-16 NOTE — PROGRESS NOTES
Daily Note     Today's date: 2023  Patient name: Kellie Alpers  : 1945  MRN: [id number]  Referring provider: Alfreda Malloy MD  Dx: No diagnosis found  Subjective: Patient states that he is feeling well and ready to DC to HEP      Objective: See treatment diary below      Assessment: Tolerated treatment well  Patient exhibited good technique with therapeutic exercises      Plan: Continue per plan of care        Precautions: B/L total knee replacement   [id number]       Manuals                                                                Neuro Re-Ed             SLS Hard foam 20" x 5  Soft foam 20"  X 5           bike 8 min  8 min            Prone press ups             EIS             GlutSt             LTR ro R             Hip ABD Iso             Side stepping RTB x 5 laps  RTB 5 laps           Clamshells BTB 20  20 tight red band            Standing hip abduction RTB x 20  RTB 20            Standing hip extension RTB x 20  RTB 20            Sustained extension             EIS OP 10 10           Pball press downs             Alt UE/LE with resistance             SLR abduction Toes at wall x 20  Toes wall x 20            Paloff press G 20             SLR             Bridges with BTB BTB 20  Tight red x 20            Gastroc stretch wedge 20" x 5  20" x 5                         Gait Training                                       Modalities

## 2023-02-23 NOTE — PROGRESS NOTES
DISCHARGE    Otilio made excellent progress in improving his pain and function  He has discontinued his treatment at this time and will continue exercising on his own    Thank you for your referral

## 2023-03-09 DIAGNOSIS — F41.9 ANXIETY: ICD-10-CM

## 2023-03-09 DIAGNOSIS — M79.10 MYALGIA: ICD-10-CM

## 2023-03-09 RX ORDER — TRAZODONE HYDROCHLORIDE 150 MG/1
150 TABLET ORAL
Qty: 30 TABLET | Refills: 11 | Status: SHIPPED | OUTPATIENT
Start: 2023-03-09

## 2023-03-10 RX ORDER — BACLOFEN 10 MG/1
10 TABLET ORAL 3 TIMES DAILY PRN
Qty: 270 TABLET | Refills: 1 | Status: SHIPPED | OUTPATIENT
Start: 2023-03-10

## 2023-03-30 DIAGNOSIS — I10 ESSENTIAL HYPERTENSION: ICD-10-CM

## 2023-03-30 RX ORDER — METOPROLOL SUCCINATE 50 MG/1
TABLET, EXTENDED RELEASE ORAL
Qty: 90 TABLET | Refills: 3 | Status: SHIPPED | OUTPATIENT
Start: 2023-03-30

## 2023-03-31 ENCOUNTER — TELEPHONE (OUTPATIENT)
Dept: INTERNAL MEDICINE CLINIC | Facility: CLINIC | Age: 78
End: 2023-03-31

## 2023-03-31 DIAGNOSIS — E66.09 CLASS 1 OBESITY DUE TO EXCESS CALORIES WITHOUT SERIOUS COMORBIDITY WITH BODY MASS INDEX (BMI) OF 34.0 TO 34.9 IN ADULT: ICD-10-CM

## 2023-03-31 DIAGNOSIS — E55.9 HYPOVITAMINOSIS D: Primary | ICD-10-CM

## 2023-04-20 PROBLEM — K57.32: Status: RESOLVED | Noted: 2022-10-27 | Resolved: 2023-04-20

## 2023-04-20 PROBLEM — Z86.39 H/O HYPERLIPIDEMIA: Status: ACTIVE | Noted: 2023-04-20

## 2023-04-20 PROBLEM — Z12.5 ENCOUNTER FOR SCREENING FOR MALIGNANT NEOPLASM OF PROSTATE: Status: ACTIVE | Noted: 2020-02-11

## 2023-04-21 PROBLEM — L21.9 SEBORRHEIC DERMATITIS: Status: ACTIVE | Noted: 2023-04-21

## 2023-04-28 ENCOUNTER — OFFICE VISIT (OUTPATIENT)
Dept: CARDIOLOGY CLINIC | Facility: CLINIC | Age: 78
End: 2023-04-28

## 2023-04-28 VITALS
TEMPERATURE: 98.1 F | SYSTOLIC BLOOD PRESSURE: 126 MMHG | WEIGHT: 245 LBS | DIASTOLIC BLOOD PRESSURE: 70 MMHG | BODY MASS INDEX: 35.07 KG/M2 | OXYGEN SATURATION: 98 % | HEART RATE: 65 BPM | HEIGHT: 70 IN

## 2023-04-28 DIAGNOSIS — E78.5 HYPERLIPIDEMIA, UNSPECIFIED HYPERLIPIDEMIA TYPE: ICD-10-CM

## 2023-04-28 DIAGNOSIS — I35.0 NONRHEUMATIC AORTIC VALVE STENOSIS: ICD-10-CM

## 2023-04-28 DIAGNOSIS — I71.21 ANEURYSM OF ASCENDING AORTA WITHOUT RUPTURE (HCC): ICD-10-CM

## 2023-04-28 DIAGNOSIS — I49.1 PAC (PREMATURE ATRIAL CONTRACTION): ICD-10-CM

## 2023-04-28 DIAGNOSIS — I25.10 CAD S/P PERCUTANEOUS CORONARY ANGIOPLASTY: Primary | ICD-10-CM

## 2023-04-28 DIAGNOSIS — Z98.61 CAD S/P PERCUTANEOUS CORONARY ANGIOPLASTY: Primary | ICD-10-CM

## 2023-04-28 DIAGNOSIS — E66.09 CLASS 2 OBESITY DUE TO EXCESS CALORIES WITHOUT SERIOUS COMORBIDITY WITH BODY MASS INDEX (BMI) OF 35.0 TO 35.9 IN ADULT: ICD-10-CM

## 2023-04-28 RX ORDER — ASPIRIN 81 MG/1
81 TABLET ORAL DAILY
Qty: 10 TABLET | Refills: 0
Start: 2023-04-28

## 2023-04-28 NOTE — PROGRESS NOTES
[facility] [address] [place] [place] [address]  Phone#  [phone]  Fax#  [phone]                                               Cardiology Office Follow up  Gregoria Last, 78 y o  male  YOB: 1945  MRN: [id number] Encounter: [id number]      PCP - Natalya Stone MD  Referring Provider - No ref  provider found    Chief Complaint   Patient presents with   • Follow-up   • Hyperlipidemia   • Cardiac Valve Problem       Assessment  CAD s/p multiple PCIs  Originally POBA in early 1990s, LAD & RCA stent in 2003, and then multiple repeat stents to RCA for multiple episodes of in-stent stenosis, last stent in 2010  Mixed hyperlipidemia  Dilated aortic root, 4 5 cm  Aortic stenosis, mild  Echo 04/05/2022 showed LVEF 09%, grade 1 diastolic dysfunction, mildly dilated RV, moderate related LA/RD, mild aortic stenosis, mildly dilated aortic root  RBBB  Obesity, Body mass index is 35 15 kg/m²  Prior cardiac testing (in [place])  Nuclear Rx stress - 2/14/2019 ([address] [place] [address]) - moderate, predominantly fixed defect of entire inferior/inferolateral wall - some apical inferior reversibility - possible diaphragmatic attenuation +/- small ischemia, LVEF 81%  PCI -  LAD - Cypher LAD stent 2/03  RCA - stents x6 (2/2003)   KRYSTAL to dRCA (8/2006)   DESx3 to RCA (restenosis) (2010)  [facility] - 9/2010 -   patent LAD stent, D1 WNL, LCx WNL, OM1 - non obsructive, pRA 99%, mRCA in-stent stenosis, dRCA 90% in-stent stenosis, RPDA & RPLB - non-obstructive   S/p PCI with KRYSTAL to [address] [place] [address] [facility], dRCA    Plan  CAD s/p multiple PCI  Multiple prior PCIs with stents to LAD and extensive stenting to RCA, but last stenting was in 2010  No major symptoms of chest pain or shortness of breath, but he has limited activity  No recent stress testing for at least 6 years now  He is unable to exercise adequately for stress  Check nuclear Lexiscan stress test for cardiac monitoring / risk stratification  Continue aspirin, plavix, metoprolol 50 mg bid, simvastatin 40 mg daily    Hyperlipidemia    Triglycerides continue to be elevated, but are overall much improved over the last 2 years   Tried to switch simvastatin to rosuvastatin, but he reported a lot of side effect and as a result switched back to simvastatin  [facility], but it was too expensive  Continue simvastatin 40 mg, ezetimibe 10 mg daily, Lovaza 2 g b i d  Continues to work on diet modifications, but has difficulty with sweets and sugars at times    Aortic root dilation, Aortic stenosis  TTE 4/05/2022   LVEF 92%, grade 1 diastolic dysfunction, mildly dilated RV, moderate related LA/RD, mild aortic stenosis, dilated aortic root = 4 5 cm, mildly dilated ascending aorta  TTE 11/15/22:   LVEF 65%, aortic root 4 0 cm, mild aortic stenosis, mild MR  CT chest and abdomen 11/15/22  Fusiform ectasia of the ascending thoracic aorta measuring 45 mm  Follow-up CT chest at 1 year in November 2023   Optimize blood pressure control, lipid    RBBB  Appears to be new, was not previously reported on ECG at cardiologist in [address] [place] [address] asymptomatic  Possibly age-related    Results for orders placed or performed in visit on 04/28/23   POCT ECG    Impression    Sinus rhythm with first-degree AV block with PACs  Left axis deviation  Right bundle branch block       Orders Placed This Encounter   Procedures   • CT chest wo contrast   • POCT ECG     Return in about 7 months (around 11/27/2023), or if symptoms worsen or fail to improve  History of Present Illness   78 y o  male comes in as a new patient for preoperative consultation prior to upcoming knee replacement surgery in a few weeks  He is originally from [place], and moved to this area a few years ago  He has not had any major issues with chest pain, shortness of breath, palpitations, dizziness, near-syncope or syncope recently    He does have an extensive cardiac history and has had several stents dating back to early 1990s  Most of his stenting took place between 2003 to 2010, where he has had multiple episodes of repeat InStent stenosis to RCA needing stenting  But over the last decade he has been doing well from cardiac standpoint  He still needs to go to [place] for other things and has a result has been following with his cardiologist in [place], but was asked to set up care closer to home as well  As a result he is now here today to establish care with me  Interval history - 5/23/2022  He comes back for follow-up after 2 months to discuss the results of his echocardiogram   He has no active complains of chest pain or shortness of breath  He continues to do well without any major problems  He tried to make the switch from simvastatin to rosuvastatin, but reports having had a lot of side effects with it and as a result is now back on his old simvastatin  Interval history - 11/2/2022  He comes back for follow-up after about 6 months  He is overall doing well and has not had any major issues with chest pain, shortness of breath, palpitations or dizziness  He did undergo left total knee replacement about a month ago and did not have any cardiac issues around this  He does report new ongoing issues with left leg swelling, but denies any shortness of breath, orthopnea or PND  Interval history - 4/28/2023  He comes back for follow-up after about 6 months  In [facility] he has been doing well overall and has not any major issues with chest pain, shortness of breath, palpitations or dizziness  He remains active, but is limited by musculoskeletal issues    He remains compliant with medical therapy and is otherwise doing well        Historical Information   Past Medical History:   Diagnosis Date   • Celiac disease    • Coronary artery disease    • Diverticulitis    • Diverticulitis of ascending colon 10/27/2022   • Diverticulosis    • Hyperlipidemia    • Hypertension    • Myocardial infarction ([facility] [address] ) 1990     Past Surgical History:   Procedure Laterality Date   • COLONOSCOPY     • CORONARY ANGIOPLASTY WITH STENT PLACEMENT      Multiple times   • RI ARTHRP KNE CONDYLE&PLATU MEDIAL&LAT COMPARTMENTS Right 04/13/2022    Procedure: ARTHROPLASTY KNEE TOTAL and all associated procedures;  Surgeon: Elsy Saenz MD;  Location:  MAIN OR;  Service: Orthopedics   • RI ARTHRP KNE CONDYLE&PLATU MEDIAL&LAT COMPARTMENTS Left 9/29/2022    Procedure: ARTHROPLASTY KNEE TOTAL;  Surgeon: Elsy Saenz MD;  Location:  MAIN OR;  Service: Orthopedics     Family History   Problem Relation Age of Onset   • Diabetes Mother    • Coronary artery disease Father    • Thyroid disease Sister      Current Outpatient Medications on File Prior to Visit   Medication Sig Dispense Refill   • baclofen 10 mg tablet Take 1 tablet (10 mg total) by mouth 3 (three) times a day as needed for muscle spasms 270 tablet 1   • calcium carbonate (OS-LANA) 1250 (500 Ca) MG chewable tablet Chew 1 tablet (1,250 mg total) daily 60 tablet 2   • cholecalciferol (VITAMIN D3) 1,000 units tablet Take 1 tablet (1,000 Units total) by mouth daily 60 tablet 2   • clopidogrel (PLAVIX) 75 mg tablet Take 75 mg by mouth every morning       • Docusate Sodium (DSS) 100 MG CAPS docusate sodium 100 mg capsule   TAKE 1 CAPSULE BY MOUTH TWICE A DAY     • ezetimibe (ZETIA) 10 mg tablet TAKE 1 TABLET BY MOUTH DAILY 90 tablet 3   • gabapentin (NEURONTIN) 300 mg capsule TAKE 1 CAPSULE BY MOUTH 3  TIMES DAILY (Patient taking differently: Take 600 mg by mouth daily at bedtime) 270 capsule 3   • ketoconazole (NIZORAL) 2 % shampoo APPLY TOPICALLY 2 TIMES A WEEK 120 mL 0   • metoprolol succinate (TOPROL-XL) 50 mg 24 hr tablet TAKE 1 TABLET BY MOUTH  DAILY 90 tablet 3   • omega-3-acid ethyl esters (LOVAZA) 1 g capsule Take 2 capsules (2 g total) by mouth 2 (two) times a day 480 capsule 2   • sertraline (ZOLOFT) 50 mg tablet Take 1 tablet (50 mg total) by mouth daily 90 tablet 3   • simvastatin (ZOCOR) 40 mg tablet Take 1 tablet (40 mg total) by mouth daily at bedtime 90 tablet 3   • tamsulosin (FLOMAX) 0 4 mg TAKE 1 CAPSULE BY MOUTH  DAILY WITH DINNER 90 capsule 3   • traZODone (DESYREL) 150 mg tablet Take 1 tablet (150 mg total) by mouth daily at bedtime 30 tablet 11   • [DISCONTINUED] aspirin (ECOTRIN LOW STRENGTH) 81 mg EC tablet Take 1 tablet (81 mg total) by mouth 2 (two) times a day Please do not start taking until after total joint arthroplasty 70 tablet 0     No current facility-administered medications on file prior to visit  Allergies   Allergen Reactions   • Crestor [Rosuvastatin] Myalgia     Social History     Socioeconomic History   • Marital status: /Civil Union     Spouse name: None   • Number of children: 3   • Years of education: None   • Highest education level: None   Occupational History   • None   Tobacco Use   • Smoking status: Former     Types: Cigarettes     Quit date:      Years since quittin 3   • Smokeless tobacco: Never   • Tobacco comments:     Per pt, quit over 36 years ago   Vaping Use   • Vaping Use: Never used   Substance and Sexual Activity   • Alcohol use: Not Currently   • Drug use: Never   • Sexual activity: Yes     Partners: Female     Birth control/protection: None   Other Topics Concern   • None   Social History Narrative   • None     Social Determinants of Health     Financial Resource Strain: Low Risk    • Difficulty of Paying Living Expenses: Not hard at all   Food Insecurity: Not on file   Transportation Needs: No Transportation Needs   • Lack of Transportation (Medical): No   • Lack of Transportation (Non-Medical): No   Physical Activity: Not on file   Stress: Not on file   Social Connections: Not on file   Intimate Partner Violence: Not on file   Housing Stability: Not on file        Review of Systems   All other systems reviewed and are negative        Vitals:  Vitals:    23 1447   BP: 126/70 "  Pulse: 65   Temp: 98 1 °F (36 7 °C)   SpO2: 98%   Weight: 111 kg (245 lb)   Height: 5' 10\" (1 778 m)     BMI - Body mass index is 35 15 kg/m²  Wt Readings from Last 7 Encounters:   04/28/23 111 kg (245 lb)   04/20/23 110 kg (241 lb 12 8 oz)   12/20/22 103 kg (227 lb 8 oz)   11/15/22 106 kg (233 lb)   11/02/22 106 kg (233 lb 9 6 oz)   10/27/22 106 kg (233 lb 9 6 oz)   09/29/22 102 kg (223 lb 14 2 oz)       Physical Exam  Vitals and nursing note reviewed  Constitutional:       General: He is not in acute distress  Appearance: Normal appearance  He is well-developed  He is not ill-appearing or diaphoretic  HENT:      Head: Normocephalic and atraumatic  Nose: No congestion  Eyes:      General: No scleral icterus  Conjunctiva/sclera: Conjunctivae normal    Neck:      Vascular: No carotid bruit or JVD  Cardiovascular:      Rate and Rhythm: Normal rate and regular rhythm  Occasional extrasystoles are present  Heart sounds: Murmur heard  Crescendo decrescendo systolic murmur is present with a grade of 3/6  No friction rub  No gallop  Pulmonary:      Effort: Pulmonary effort is normal  No respiratory distress  Breath sounds: Normal breath sounds  No wheezing or rales  Chest:      Chest wall: No tenderness  Abdominal:      General: There is no distension  Palpations: Abdomen is soft  Tenderness: There is no abdominal tenderness  Musculoskeletal:         General: No swelling, tenderness or deformity  Cervical back: Neck supple  No muscular tenderness  Right lower leg: No edema  Left lower leg: No edema  Skin:     General: Skin is warm  Neurological:      General: No focal deficit present  Mental Status: He is alert and oriented to person, place, and time  Mental status is at baseline  Gait: Gait abnormal    Psychiatric:         Mood and Affect: Mood normal          Behavior: Behavior normal          Thought Content:  Thought content normal  " Labs:  CBC:   Lab Results   Component Value Date    WBC 12 21 (H) 09/30/2022    RBC 3 59 (L) 09/30/2022    HGB 12 0 09/30/2022    HCT 33 6 (L) 09/30/2022    MCV 94 09/30/2022     09/30/2022    RDW 12 2 09/30/2022       CMP:   Lab Results   Component Value Date    K 4 0 09/30/2022     09/30/2022    CO2 24 09/30/2022    BUN 23 09/30/2022    CREATININE 0 90 09/30/2022    EGFR 83 09/30/2022    CALCIUM 8 9 09/30/2022    AST 30 08/30/2022    ALT 26 08/30/2022    ALKPHOS 74 08/30/2022       Magnesium:  No results found for: MG    Lipid Profile:   Lab Results   Component Value Date    HDL 34 (L) 04/18/2023    TRIG 265 (H) 04/18/2023    LDLCALC 66 04/18/2023       Thyroid Studies:   Lab Results   Component Value Date    RET0STCOBCES 1 018 11/22/2019       A1c:  No components found for: HGA1C    INR:  Lab Results   Component Value Date    INR 1 07 08/30/2022    INR 1 00 03/15/2022   5    Imaging: No results found  Cardiac testing:   No results found for this or any previous visit  No results found for this or any previous visit  No results found for this or any previous visit  No results found for this or any previous visit  XR knee 3 vw left non injury  Narrative: LEFT KNEE    INDICATION:   A99 479: Presence of left artificial knee joint  COMPARISON:  Left knee x-ray dated October 18, 2022  VIEWS:  XR KNEE 3 VW LEFT NON INJURY     FINDINGS:    There is no acute fracture or dislocation  There is no joint effusion  Unremarkable appearance of total knee arthroplasty  No evidence of hardware complication  No lytic or blastic osseous lesion  There are atherosclerotic calcifications  Soft tissues are otherwise unremarkable  Impression: Unremarkable appearance of total knee arthroplasty      Workstation performed: [id number]

## 2023-04-28 NOTE — PROGRESS NOTES
[facility] [address] [place]  [place] [address]  Phone#  [phone]  Fax#  [phone]                                               Cardiology Office Follow up  Alaine Lundborg, 78 y o  male  YOB: 1945  MRN: [id number] Encounter: [id number]      PCP - Tin Coles MD  Referring Provider - No ref  provider found    Chief Complaint   Patient presents with   • Follow-up   • Hyperlipidemia   • Cardiac Valve Problem       Assessment  CAD s/p multiple PCIs  Originally POBA in early 1990s, LAD & RCA stent in 2003, and then multiple repeat stents to RCA for multiple episodes of in-stent stenosis, last stent in 2010  Mixed hyperlipidemia  Dilated aortic root, 4 5 cm  Aortic stenosis, mild  Echo 04/05/2022 showed LVEF 78%, grade 1 diastolic dysfunction, mildly dilated RV, moderate related LA/RD, mild aortic stenosis, mildly dilated aortic root  RBBB  Obesity, Body mass index is 35 15 kg/m²  Prior cardiac testing (in [place])  Nuclear Rx stress - 2/14/2019 ([address] [place] [address]) - moderate, predominantly fixed defect of entire inferior/inferolateral wall - some apical inferior reversibility - possible diaphragmatic attenuation +/- small ischemia, LVEF 81%  PCI -  LAD - Cypher LAD stent 2/03  RCA - stents x6 (2/2003)   KRYSTAL to dRCA (8/2006)   DESx3 to RCA (restenosis) (2010)  [facility] - 9/2010 -   patent LAD stent, D1 WNL, LCx WNL, OM1 - non obsructive, pRA 99%, mRCA in-stent stenosis, dRCA 90% in-stent stenosis, RPDA & RPLB - non-obstructive   S/p PCI with KRYSTAL to [facility], [address], dRCA    Plan  CAD s/p multiple PCI  Multiple prior PCIs with stents to LAD and extensive stenting to RCA, but last stenting was in 2010  No current symptoms of chest pain or shortness of breath  We attempted to switch from simvastatin to rosuvastatin, but he reports having had a lot of side effects with it and as a result is back on simvastatin  Continue aspirin, plavix, metoprolol 50 mg bid, simvastatin 40 mg daily    Hyperlipidemia    His triglyceride levels continue to be elevated & cholesterol and LDL are also not at goal  Tried to switch simvastatin to rosuvastatin, but he reported a lot of side effect and as a result switched back to simvastatin  [employer], but it was too expensive  Currently taking simvastatin 40 mg, ezetimibe 20 mg daily  I have represcribed Lovaza 2 g b i d , he will check cost  If cost prohibitive, then he will switch to OTC fish oil 2 g twice daily  Counseled on dietary modifications as well - he has mostly eliminated apple juice    Aortic root dilation, Aortic stenosis  Echo 04/05/2022   LVEF 83%, grade 1 diastolic dysfunction, mildly dilated RV, moderate related LA/RD, mild aortic stenosis, dilated aortic root = 4 5 cm, mildly dilated ascending aorta  He reports to me that he also has history of ?abdominal aortic aneurysm  Recommend follow up CT chest/abdomen to better assess aortic size and aneurysm  Follow up echocardiogram    RBBB  Appears to be new, was not previously reported on ECG at cardiologist in [address] [place] [address] asymptomatic  Possibly age-related    No results found for this visit on 04/28/23  Orders Placed This Encounter   Procedures   • CT chest wo contrast   • POCT ECG     Return in about 7 months (around 11/27/2023), or if symptoms worsen or fail to improve  History of Present Illness   78 y o  male comes in as a new patient for preoperative consultation prior to upcoming knee replacement surgery in a few weeks  He is originally from [place], and moved to this area a few years ago  He has not had any major issues with chest pain, shortness of breath, palpitations, dizziness, near-syncope or syncope recently  He does have an extensive cardiac history and has had several stents dating back to early 1990s    Most of his stenting took place between 2003 to 2010, where he has had multiple episodes of repeat InStent stenosis to RCA needing stenting  But over the last decade he has been doing well from cardiac standpoint  He still needs to go to [place] for other things and has a result has been following with his cardiologist in [place], but was asked to set up care closer to home as well  As a result he is now here today to establish care with me  Interval history - 5/23/2022  He comes back for follow-up after 2 months to discuss the results of his echocardiogram   He has no active complains of chest pain or shortness of breath  He continues to do well without any major problems  He tried to make the switch from simvastatin to rosuvastatin, but reports having had a lot of side effects with it and as a result is now back on his old simvastatin  Interval history - 11/2/2022  He comes back for follow-up after about 6 months  He is overall doing well and has not had any major issues with chest pain, shortness of breath, palpitations or dizziness  He did undergo left total knee replacement about a month ago and did not have any cardiac issues around this  He does report new ongoing issues with left leg swelling, but denies any shortness of breath, orthopnea or PND      Interval history - 4/28/2023  ***        Historical Information   Past Medical History:   Diagnosis Date   • Celiac disease    • Coronary artery disease    • Diverticulitis    • Diverticulitis of ascending colon 10/27/2022   • Diverticulosis    • Hyperlipidemia    • Hypertension    • Myocardial infarction ([facility] [address] ) 1990     Past Surgical History:   Procedure Laterality Date   • COLONOSCOPY     • CORONARY ANGIOPLASTY WITH STENT PLACEMENT      Multiple times   • [place] ARTHRP KNE CONDYLE&PLATU MEDIAL&LAT COMPARTMENTS Right 04/13/2022    Procedure: ARTHROPLASTY KNEE TOTAL and all associated procedures;  Surgeon: Reji Chatman MD;  Location:  MAIN OR;  Service: Orthopedics   • [place] ARTHRP KNE CONDYLE&PLATU MEDIAL&LAT COMPARTMENTS Left 9/29/2022    Procedure: ARTHROPLASTY KNEE TOTAL;  Surgeon: Katie Ramos MD;  Location:  MAIN OR;  Service: Orthopedics     Family History   Problem Relation Age of Onset   • Diabetes Mother    • Coronary artery disease Father    • Thyroid disease Sister      Current Outpatient Medications on File Prior to Visit   Medication Sig Dispense Refill   • baclofen 10 mg tablet Take 1 tablet (10 mg total) by mouth 3 (three) times a day as needed for muscle spasms 270 tablet 1   • calcium carbonate (OS-LANA) 1250 (500 Ca) MG chewable tablet Chew 1 tablet (1,250 mg total) daily 60 tablet 2   • cholecalciferol (VITAMIN D3) 1,000 units tablet Take 1 tablet (1,000 Units total) by mouth daily 60 tablet 2   • clopidogrel (PLAVIX) 75 mg tablet Take 75 mg by mouth every morning       • Docusate Sodium (DSS) 100 MG CAPS docusate sodium 100 mg capsule   TAKE 1 CAPSULE BY MOUTH TWICE A DAY     • ezetimibe (ZETIA) 10 mg tablet TAKE 1 TABLET BY MOUTH DAILY 90 tablet 3   • gabapentin (NEURONTIN) 300 mg capsule TAKE 1 CAPSULE BY MOUTH 3  TIMES DAILY (Patient taking differently: Take 600 mg by mouth daily at bedtime) 270 capsule 3   • ketoconazole (NIZORAL) 2 % shampoo APPLY TOPICALLY 2 TIMES A WEEK 120 mL 0   • metoprolol succinate (TOPROL-XL) 50 mg 24 hr tablet TAKE 1 TABLET BY MOUTH  DAILY 90 tablet 3   • omega-3-acid ethyl esters (LOVAZA) 1 g capsule Take 2 capsules (2 g total) by mouth 2 (two) times a day 480 capsule 2   • sertraline (ZOLOFT) 50 mg tablet Take 1 tablet (50 mg total) by mouth daily 90 tablet 3   • simvastatin (ZOCOR) 40 mg tablet Take 1 tablet (40 mg total) by mouth daily at bedtime 90 tablet 3   • tamsulosin (FLOMAX) 0 4 mg TAKE 1 CAPSULE BY MOUTH  DAILY WITH DINNER 90 capsule 3   • traZODone (DESYREL) 150 mg tablet Take 1 tablet (150 mg total) by mouth daily at bedtime 30 tablet 11   • [DISCONTINUED] aspirin (ECOTRIN LOW STRENGTH) 81 mg EC tablet Take 1 tablet (81 mg total) by mouth 2 (two) times a day Please do not start taking until after total joint "arthroplasty 70 tablet 0     No current facility-administered medications on file prior to visit  Allergies   Allergen Reactions   • Crestor [Rosuvastatin] Myalgia     Social History     Socioeconomic History   • Marital status: /Civil Union     Spouse name: None   • Number of children: 3   • Years of education: None   • Highest education level: None   Occupational History   • None   Tobacco Use   • Smoking status: Former     Types: Cigarettes     Quit date:      Years since quittin 3   • Smokeless tobacco: Never   • Tobacco comments:     Per pt, quit over 36 years ago   Vaping Use   • Vaping Use: Never used   Substance and Sexual Activity   • Alcohol use: Not Currently   • Drug use: Never   • Sexual activity: Yes     Partners: Female     Birth control/protection: None   Other Topics Concern   • None   Social History Narrative   • None     Social Determinants of Health     Financial Resource Strain: Low Risk    • Difficulty of Paying Living Expenses: Not hard at all   Food Insecurity: Not on file   Transportation Needs: No Transportation Needs   • Lack of Transportation (Medical): No   • Lack of Transportation (Non-Medical): No   Physical Activity: Not on file   Stress: Not on file   Social Connections: Not on file   Intimate Partner Violence: Not on file   Housing Stability: Not on file        Review of Systems   All other systems reviewed and are negative  Vitals:  Vitals:    23 1447   BP: 126/70   Pulse: 65   Temp: 98 1 °F (36 7 °C)   SpO2: 98%   Weight: 111 kg (245 lb)   Height: 5' 10\" (1 778 m)     BMI - Body mass index is 35 15 kg/m²  Wt Readings from Last 7 Encounters:   23 111 kg (245 lb)   23 110 kg (241 lb 12 8 oz)   22 103 kg (227 lb 8 oz)   11/15/22 106 kg (233 lb)   22 106 kg (233 lb 9 6 oz)   10/27/22 106 kg (233 lb 9 6 oz)   22 102 kg (223 lb 14 2 oz)       Physical Exam  Vitals and nursing note reviewed     Constitutional:       General: He " is not in acute distress  Appearance: Normal appearance  He is well-developed  He is obese  HENT:      Head: Normocephalic and atraumatic  Nose: No congestion  Eyes:      General: No scleral icterus  Conjunctiva/sclera: Conjunctivae normal    Neck:      Vascular: No carotid bruit or JVD  Cardiovascular:      Rate and Rhythm: Normal rate and regular rhythm  Occasional extrasystoles are present  Heart sounds: Murmur heard  Crescendo decrescendo systolic murmur is present with a grade of 3/6  No friction rub  No gallop  Pulmonary:      Effort: Pulmonary effort is normal  No respiratory distress  Breath sounds: Normal breath sounds  No wheezing or rales  Chest:      Chest wall: No tenderness  Abdominal:      General: There is no distension  Palpations: Abdomen is soft  Tenderness: There is no abdominal tenderness  Musculoskeletal:         General: No swelling, tenderness or deformity  Cervical back: Neck supple  No muscular tenderness  Right lower leg: No edema  Left lower leg: Edema present  Skin:     General: Skin is warm  Neurological:      General: No focal deficit present  Mental Status: He is alert and oriented to person, place, and time  Mental status is at baseline  Psychiatric:         Mood and Affect: Mood normal          Behavior: Behavior normal          Thought Content:  Thought content normal            Labs:  CBC:   Lab Results   Component Value Date    WBC 12 21 (H) 09/30/2022    RBC 3 59 (L) 09/30/2022    HGB 12 0 09/30/2022    HCT 33 6 (L) 09/30/2022    MCV 94 09/30/2022     09/30/2022    RDW 12 2 09/30/2022       CMP:   Lab Results   Component Value Date    K 4 0 09/30/2022     09/30/2022    CO2 24 09/30/2022    BUN 23 09/30/2022    CREATININE 0 90 09/30/2022    EGFR 83 09/30/2022    CALCIUM 8 9 09/30/2022    AST 30 08/30/2022    ALT 26 08/30/2022    ALKPHOS 74 08/30/2022       Magnesium:  No results found for: MG    Lipid Profile:   Lab Results   Component Value Date    HDL 34 (L) 04/18/2023    TRIG 265 (H) 04/18/2023    LDLCALC 66 04/18/2023       Thyroid Studies:   Lab Results   Component Value Date    ASS5YSRYJZDM 1 018 11/22/2019       A1c:  No components found for: HGA1C    INR:  Lab Results   Component Value Date    INR 1 07 08/30/2022    INR 1 00 03/15/2022   5    Imaging: No results found  Cardiac testing:   No results found for this or any previous visit  No results found for this or any previous visit  No results found for this or any previous visit  No results found for this or any previous visit  XR knee 3 vw left non injury  Narrative: LEFT KNEE    INDICATION:   [id number]: Presence of left artificial knee joint  COMPARISON:  Left knee x-ray dated October 18, 2022  VIEWS:  XR KNEE 3 VW LEFT NON INJURY     FINDINGS:    There is no acute fracture or dislocation  There is no joint effusion  Unremarkable appearance of total knee arthroplasty  No evidence of hardware complication  No lytic or blastic osseous lesion  There are atherosclerotic calcifications  Soft tissues are otherwise unremarkable  Impression: Unremarkable appearance of total knee arthroplasty      Workstation performed: [id number]

## 2023-05-04 DIAGNOSIS — F41.9 ANXIETY: ICD-10-CM

## 2023-05-04 NOTE — TELEPHONE ENCOUNTER
The patient is going out of the country for 3 weeks (leaving 5/14) and would like to pick this refill up sometime early next week so he has it to take on his trip    Thank you!

## 2023-05-05 RX ORDER — TRAZODONE HYDROCHLORIDE 150 MG/1
150 TABLET ORAL
Qty: 30 TABLET | Refills: 11 | Status: SHIPPED | OUTPATIENT
Start: 2023-05-05

## 2023-05-11 ENCOUNTER — HOSPITAL ENCOUNTER (OUTPATIENT)
Dept: NON INVASIVE DIAGNOSTICS | Facility: HOSPITAL | Age: 78
Discharge: HOME | End: 2023-05-11
Attending: INTERNAL MEDICINE

## 2023-05-11 ENCOUNTER — HOSPITAL ENCOUNTER (OUTPATIENT)
Facility: HOSPITAL | Age: 78
Discharge: HOME | End: 2023-05-11
Attending: INTERNAL MEDICINE

## 2023-05-11 VITALS — HEIGHT: 70 IN | BODY MASS INDEX: 35.07 KG/M2 | WEIGHT: 245 LBS

## 2023-05-11 DIAGNOSIS — I25.10 CAD S/P PERCUTANEOUS CORONARY ANGIOPLASTY: ICD-10-CM

## 2023-05-11 DIAGNOSIS — Z98.61 CAD S/P PERCUTANEOUS CORONARY ANGIOPLASTY: ICD-10-CM

## 2023-05-11 LAB
CHEST PAIN STATEMENT: NORMAL
MAX DIASTOLIC BP: 82 MMHG
MAX HEART RATE: 74 BPM
MAX PREDICTED HEART RATE: 145 BPM
MAX. SYSTOLIC BP: 122 MMHG
NUC STRESS EJECTION FRACTION: 60 %
PROTOCOL NAME: NORMAL
RATE PRESSURE PRODUCT: 9028
SL CV REST NUCLEAR ISOTOPE DOSE: 11 MCI
SL CV STRESS NUCLEAR ISOTOPE DOSE: 32.1 MCI
SL CV STRESS RECOVERY BP: NORMAL MMHG
SL CV STRESS RECOVERY HR: 69 BPM
SL CV STRESS RECOVERY O2 SAT: 96 %
STRESS ANGINA INDEX: 0
STRESS BASELINE BP: NORMAL MMHG
STRESS BASELINE HR: 63 BPM
STRESS O2 SAT REST: 95 %
STRESS PEAK HR: 74 BPM
STRESS POST O2 SAT PEAK: 97 %
STRESS POST PEAK BP: 122 MMHG
STRESS/REST PERFUSION RATIO: 1.3
TARGET HR FORMULA: NORMAL
TEST INDICATION: NORMAL
TIME IN EXERCISE PHASE: NORMAL

## 2023-05-11 RX ORDER — REGADENOSON 0.08 MG/ML
0.4 INJECTION, SOLUTION INTRAVENOUS ONCE
Status: COMPLETED | OUTPATIENT
Start: 2023-05-11 | End: 2023-05-11

## 2023-05-11 RX ADMIN — REGADENOSON 0.4 MG: 0.08 INJECTION, SOLUTION INTRAVENOUS at 10:55

## 2023-05-31 DIAGNOSIS — F41.9 ANXIETY: ICD-10-CM

## 2023-06-01 RX ORDER — TRAZODONE HYDROCHLORIDE 150 MG/1
TABLET ORAL
Qty: 90 TABLET | Refills: 4 | Status: SHIPPED | OUTPATIENT
Start: 2023-06-01

## 2023-06-08 ENCOUNTER — OFFICE VISIT (OUTPATIENT)
Dept: URGENT CARE | Facility: CLINIC | Age: 78
End: 2023-06-08
Payer: COMMERCIAL

## 2023-06-08 VITALS
HEART RATE: 63 BPM | SYSTOLIC BLOOD PRESSURE: 149 MMHG | RESPIRATION RATE: 20 BRPM | OXYGEN SATURATION: 97 % | DIASTOLIC BLOOD PRESSURE: 80 MMHG | TEMPERATURE: 97.2 F

## 2023-06-08 DIAGNOSIS — J06.9 VIRAL URI: Primary | ICD-10-CM

## 2023-06-08 PROCEDURE — 99214 OFFICE O/P EST MOD 30 MIN: CPT | Performed by: PHYSICIAN ASSISTANT

## 2023-06-08 NOTE — PATIENT INSTRUCTIONS
Nearly all of upper respiratory infections in adults are the result of viruses  Antibiotics do not treat viruses and are not recommended or indicated at this time  Take over the counter medications as needed  Recommend over the counter decongestants as needed and age appropriate  FLONASE NASAL SPRAY, CORCIDIN HBP for congestion  Neti Pot rinses and saline nasal sprays may also help clear nasal and/or sinus congestion while also keeping nasal passages lubricated to prevent nose bleeds  If symptoms persist for 7+ days, follow-up with primary care provider or return to clinic to discuss appropriateness of antibiotics  Vaccination is important to help prevent the spread of disease and infants  Vaccines are available for COVID and influenza for ages 7 months and older  Please discuss scheduling vaccines with your PCP  If symptoms worsen, shortness of breath develops, you experience severe sinus pain, difficulty swallowing or changes in voice, report to the emergency department

## 2023-06-08 NOTE — PROGRESS NOTES
[facility]        NAME: Hill Mack is a 70 y o  male  : 1945    MRN: [id number]  DATE: 2023  TIME: 12:50 PM    Assessment and Plan   Viral URI [J06 9]  1  Viral URI        Pt presents with symptoms and exam consistent with viral URI, recommend symptomatic treatment  Patient Instructions   Patient Instructions   Nearly all of upper respiratory infections in adults are the result of viruses  Antibiotics do not treat viruses and are not recommended or indicated at this time  Take over the counter medications as needed  Recommend over the counter decongestants as needed and age appropriate  FLONASE NASAL SPRAY, CORCIDIN HBP for congestion  Neti Pot rinses and saline nasal sprays may also help clear nasal and/or sinus congestion while also keeping nasal passages lubricated to prevent nose bleeds  If symptoms persist for 7+ days, follow-up with primary care provider or return to clinic to discuss appropriateness of antibiotics  Vaccination is important to help prevent the spread of disease and infants  Vaccines are available for COVID and influenza for ages 7 months and older  Please discuss scheduling vaccines with your PCP  If symptoms worsen, shortness of breath develops, you experience severe sinus pain, difficulty swallowing or changes in voice, report to the emergency department  Follow up with PCP in 3-5 days  Proceed to  ER if symptoms worsen  Chief Complaint     Chief Complaint   Patient presents with   • Nasal Congestion     States he has congestion and blood and pus coming from his nose  Started 5 days ago  History of Present Illness       70year old male presents with sinus congestion and runny nose x 5 days  He recently returned from [place] and states the weather was terrible the entire time he was there raining with blowing wind and cold  He reports green discharge and blood since return home yesterday  Denies fever, chills, and cough  Review of Systems   Review of Systems   Constitutional: Negative for chills, fatigue and fever  HENT: Positive for congestion, nosebleeds, postnasal drip, rhinorrhea and sore throat  Negative for trouble swallowing and voice change  Respiratory: Negative for cough and shortness of breath  Cardiovascular: Negative for chest pain  Gastrointestinal: Negative for diarrhea, nausea and vomiting  Musculoskeletal: Negative for myalgias  Neurological: Negative for headaches           Current Medications       Current Outpatient Medications:   •  aspirin (ECOTRIN LOW STRENGTH) 81 mg EC tablet, Take 1 tablet (81 mg total) by mouth daily Please do not start taking until after total joint arthroplasty, Disp: 10 tablet, Rfl: 0  •  baclofen 10 mg tablet, Take 1 tablet (10 mg total) by mouth 3 (three) times a day as needed for muscle spasms, Disp: 270 tablet, Rfl: 1  •  calcium carbonate (OS-LANA) 1250 (500 Ca) MG chewable tablet, Chew 1 tablet (1,250 mg total) daily, Disp: 60 tablet, Rfl: 2  •  cholecalciferol (VITAMIN D3) 1,000 units tablet, Take 1 tablet (1,000 Units total) by mouth daily, Disp: 60 tablet, Rfl: 2  •  clopidogrel (PLAVIX) 75 mg tablet, Take 75 mg by mouth every morning  , Disp: , Rfl:   •  ezetimibe (ZETIA) 10 mg tablet, TAKE 1 TABLET BY MOUTH DAILY, Disp: 90 tablet, Rfl: 3  •  gabapentin (NEURONTIN) 300 mg capsule, TAKE 1 CAPSULE BY MOUTH 3  TIMES DAILY (Patient taking differently: Take 600 mg by mouth daily at bedtime), Disp: 270 capsule, Rfl: 3  •  ketoconazole (NIZORAL) 2 % shampoo, APPLY TOPICALLY 2 TIMES A WEEK, Disp: 120 mL, Rfl: 0  •  metoprolol succinate (TOPROL-XL) 50 mg 24 hr tablet, TAKE 1 TABLET BY MOUTH  DAILY, Disp: 90 tablet, Rfl: 3  •  omega-3-acid ethyl esters (LOVAZA) 1 g capsule, Take 2 capsules (2 g total) by mouth 2 (two) times a day, Disp: 480 capsule, Rfl: 2  •  sertraline (ZOLOFT) 50 mg tablet, Take 1 tablet (50 mg total) by mouth daily, Disp: 90 tablet, Rfl: 3  • simvastatin (ZOCOR) 40 mg tablet, Take 1 tablet (40 mg total) by mouth daily at bedtime, Disp: 90 tablet, Rfl: 3  •  tamsulosin (FLOMAX) 0 4 mg, TAKE 1 CAPSULE BY MOUTH  DAILY WITH DINNER, Disp: 90 capsule, Rfl: 3  •  traZODone (DESYREL) 150 mg tablet, TAKE 1 TABLET BY MOUTH AT BEDTIME, Disp: 90 tablet, Rfl: 4  •  Docusate Sodium (DSS) 100 MG CAPS, docusate sodium 100 mg capsule  TAKE 1 CAPSULE BY MOUTH TWICE A DAY (Patient not taking: Reported on 6/8/2023), Disp: , Rfl:     Current Allergies     Allergies as of 06/08/2023 - Reviewed 06/08/2023   Allergen Reaction Noted   • Crestor [rosuvastatin] Myalgia 04/13/2022            The following portions of the patient's history were reviewed and updated as appropriate: allergies, current medications, past family history, past medical history, past social history, past surgical history and problem list      Past Medical History:   Diagnosis Date   • Celiac disease    • Coronary artery disease    • Diverticulitis    • Diverticulitis of ascending colon 10/27/2022   • Diverticulosis    • Hyperlipidemia    • Hypertension    • Myocardial infarction ([facility] [address] ) 1990       Past Surgical History:   Procedure Laterality Date   • COLONOSCOPY     • CORONARY ANGIOPLASTY WITH STENT PLACEMENT      Multiple times   • IN ARTHRP KNE CONDYLE&PLATU MEDIAL&LAT COMPARTMENTS Right 04/13/2022    Procedure: ARTHROPLASTY KNEE TOTAL and all associated procedures;  Surgeon: Alyssa Fraser MD;  Location:  MAIN OR;  Service: Orthopedics   • IN ARTHRP KNE CONDYLE&PLATU MEDIAL&LAT COMPARTMENTS Left 9/29/2022    Procedure: ARTHROPLASTY KNEE TOTAL;  Surgeon: Alyssa Fraser MD;  Location:  MAIN OR;  Service: Orthopedics       Family History   Problem Relation Age of Onset   • Diabetes Mother    • Coronary artery disease Father    • Thyroid disease Sister          Medications have been verified          Objective   /80   Pulse 63   Temp (!) 97 2 °F (36 2 °C) (Temporal)   Resp 20   SpO2 97% No LMP for male patient  Physical Exam     Physical Exam  Vitals and nursing note reviewed  Constitutional:       General: He is awake  He is not in acute distress  Appearance: Normal appearance  He is well-developed and well-groomed  He is not ill-appearing, toxic-appearing or diaphoretic  HENT:      Head: Normocephalic and atraumatic  Jaw: No trismus  Right Ear: Hearing, tympanic membrane, ear canal and external ear normal  There is no impacted cerumen  No foreign body  Left Ear: Hearing, tympanic membrane, ear canal and external ear normal  There is no impacted cerumen  No foreign body  Nose: Mucosal edema, congestion and rhinorrhea present  No nasal deformity  Rhinorrhea is clear  Right Nostril: No foreign body, epistaxis or occlusion  Left Nostril: No foreign body, epistaxis or occlusion  Right Turbinates: Not enlarged, swollen or pale  Left Turbinates: Not enlarged, swollen or pale  Comments: Dried blood in nares bilaterally, no clots noted  Mouth/Throat:      Lips: Pink  No lesions  Mouth: Mucous membranes are moist  No injury, oral lesions or angioedema  Dentition: Normal dentition  Tongue: No lesions  Tongue does not deviate from midline  Palate: No mass and lesions  Pharynx: Uvula midline  No pharyngeal swelling, oropharyngeal exudate, posterior oropharyngeal erythema or uvula swelling  Tonsils: No tonsillar exudate or tonsillar abscesses  Comments: Postnasal drip present  Eyes:      General: Lids are normal  Vision grossly intact  Gaze aligned appropriately  No allergic shiner  Extraocular Movements: Extraocular movements intact  Cardiovascular:      Rate and Rhythm: Normal rate  Pulmonary:      Effort: Pulmonary effort is normal       Comments: Patient is speaking in full sentences with no increased respiratory effort  No audible wheezing or stridor     Musculoskeletal:      Cervical back: Normal "range of motion  Lymphadenopathy:      Cervical: No cervical adenopathy  Skin:     General: Skin is warm and dry  Neurological:      Mental Status: He is alert and oriented to person, place, and time  Coordination: Coordination is intact  Gait: Gait is intact  Psychiatric:         Attention and Perception: Attention and perception normal          Mood and Affect: Mood and affect normal          Speech: Speech normal          Behavior: Behavior normal  Behavior is cooperative  Note: Portions of this record may have been created with voice recognition software  Occasional wrong word or \"sound a like\" substitutions may have occurred due to the inherent limitations of voice recognition software  Please read the chart carefully and recognize, using context, where substitutions have occurred  *      "

## 2023-06-09 NOTE — PLAN OF CARE
Oncology Nurse Triage - Reporting Symptoms  Situation:   Dimitrios reporting the following symptoms: numbness down L side      Background:   Treating Provider: Dr. Campos     Date of last office visit: 4/11/23 w/ Dr. Campos     Recent treatments: Yes: Cabozantinib 20mg every other day    Assessment:  Numbness on L side. Starts around neck and down arm. Pt states it has improved from yesterday but still not what it should be. Pt reports he is able to move arm and turn head without issue.   He was seen in  yesterday and was recommended to f/u orthopedics and was given a referral.  Pt will call Monday to schedule if he does not hear from them today.  also recommended pt update oncology team.     Pt denies chest pain, SOB, n/v, F/C    Pt just looking to update oncology team in case they wanted to do other tests or had further recommendations.    Recommendation:  Routed to , Frances Elizalde RN[facility]       Problem: PHYSICAL THERAPY ADULT  Goal: Performs mobility at highest level of function for planned discharge setting  See evaluation for individualized goals  Description: Treatment/Interventions: Functional transfer training, LE strengthening/ROM, Elevations, Therapeutic exercise, Patient/family training, Equipment eval/education, Bed mobility, Gait training, Spoke to nursing, Spoke to case management     See flowsheet documentation for full assessment, interventions and recommendations  Outcome: Adequate for Discharge  Note: Prognosis: Excellent  Problem List: Decreased strength, Decreased range of motion, Impaired balance, Decreased mobility, Decreased skin integrity, Orthopedic restrictions, Pain  Assessment: Pt seen for PT treatment today with focus on gait and elevations training  Pt with c/o 1-2/10 pain in L knee, is motivated for increased mobility and d/c home today  BP monitored t/o session and stable with all changes in positioning and mobility efforts  Pt completes bed mobility independently, transfers at [facility] with RW, ambulates 300ft with RW and S, and negotiates 9 stairs with HR + SPC + S  Overall no significant change in pain or status with mobility efforts  Pt displays good functional capacity and safety for return to home POD1  Recommend f/u with OPPT upon d/c  Pt denies any questions/concerns re: d/c home today  Pt has all AD/DME needed  Pt was left seated at EOB with all needs in place, RN aware of pt status  Barriers to Discharge: None     PT Discharge Recommendation: Home with outpatient rehabilitation    See flowsheet documentation for full assessment

## 2023-06-13 DIAGNOSIS — N40.1 BENIGN PROSTATIC HYPERPLASIA WITH LOWER URINARY TRACT SYMPTOMS, SYMPTOM DETAILS UNSPECIFIED: ICD-10-CM

## 2023-06-15 RX ORDER — TAMSULOSIN HYDROCHLORIDE 0.4 MG/1
CAPSULE ORAL
Qty: 100 CAPSULE | Refills: 2 | Status: SHIPPED | OUTPATIENT
Start: 2023-06-15

## 2023-06-19 PROBLEM — Z12.5 ENCOUNTER FOR SCREENING FOR MALIGNANT NEOPLASM OF PROSTATE: Status: RESOLVED | Noted: 2020-02-11 | Resolved: 2023-06-19

## 2023-06-19 PROBLEM — Z00.00 MEDICARE ANNUAL WELLNESS VISIT, SUBSEQUENT: Status: RESOLVED | Noted: 2021-09-23 | Resolved: 2023-06-19

## 2023-06-22 ENCOUNTER — TELEPHONE (OUTPATIENT)
Dept: ADMINISTRATIVE | Facility: OTHER | Age: 78
End: 2023-06-22

## 2023-06-22 NOTE — TELEPHONE ENCOUNTER
06/22/23 2:55 PM    Patient contacted (spoke with patient) to bring ACP document to next scheduled pcp visit  Thank you    Radha Graf MA  [facility]

## 2023-08-15 DIAGNOSIS — F41.9 ANXIETY: ICD-10-CM

## 2023-08-15 DIAGNOSIS — M79.10 MYALGIA: ICD-10-CM

## 2023-08-16 DIAGNOSIS — F41.9 ANXIETY: ICD-10-CM

## 2023-08-16 DIAGNOSIS — E78.2 MIXED HYPERLIPIDEMIA: ICD-10-CM

## 2023-08-16 RX ORDER — BACLOFEN 10 MG/1
TABLET ORAL
Qty: 300 TABLET | Refills: 1 | Status: SHIPPED | OUTPATIENT
Start: 2023-08-16 | End: 2023-08-18

## 2023-08-16 RX ORDER — TRAZODONE HYDROCHLORIDE 150 MG/1
150 TABLET ORAL
Qty: 90 TABLET | Refills: 0 | Status: SHIPPED | OUTPATIENT
Start: 2023-08-16 | End: 2023-08-21 | Stop reason: SDUPTHER

## 2023-08-17 DIAGNOSIS — M79.10 MYALGIA: ICD-10-CM

## 2023-08-17 RX ORDER — SIMVASTATIN 40 MG
40 TABLET ORAL
Qty: 100 TABLET | Refills: 2 | Status: SHIPPED | OUTPATIENT
Start: 2023-08-17

## 2023-08-18 RX ORDER — BACLOFEN 10 MG/1
TABLET ORAL
Qty: 270 TABLET | Refills: 0 | Status: SHIPPED | OUTPATIENT
Start: 2023-08-18

## 2023-08-21 DIAGNOSIS — F41.9 ANXIETY: ICD-10-CM

## 2023-08-21 RX ORDER — TRAZODONE HYDROCHLORIDE 150 MG/1
150 TABLET ORAL
Qty: 90 TABLET | Refills: 0 | Status: SHIPPED | OUTPATIENT
Start: 2023-08-21 | End: 2023-09-13

## 2023-09-13 DIAGNOSIS — F41.9 ANXIETY: ICD-10-CM

## 2023-09-13 DIAGNOSIS — M46.1 SACROILIITIS, NOT ELSEWHERE CLASSIFIED (HCC): ICD-10-CM

## 2023-09-13 RX ORDER — TRAZODONE HYDROCHLORIDE 150 MG/1
150 TABLET ORAL
Qty: 90 TABLET | Refills: 3 | Status: SHIPPED | OUTPATIENT
Start: 2023-09-13

## 2023-09-13 NOTE — TELEPHONE ENCOUNTER
Medication Refill Request     Name Gabapentin  Dose/Frequency 300 mg, take 1 cap TID  Quantity 270  Verified pharmacy   [x]  Verified ordering Provider   [x]  Does patient have enough for the next 3 days?  Yes [x] No []

## 2023-09-14 RX ORDER — GABAPENTIN 300 MG/1
300 CAPSULE ORAL 3 TIMES DAILY
Qty: 270 CAPSULE | Refills: 0 | Status: SHIPPED | OUTPATIENT
Start: 2023-09-14

## 2023-09-20 ENCOUNTER — HOSPITAL ENCOUNTER (OUTPATIENT)
Dept: RADIOLOGY | Facility: HOSPITAL | Age: 78
Discharge: HOME | End: 2023-09-20
Attending: ORTHOPAEDIC SURGERY
Payer: COMMERCIAL

## 2023-09-20 ENCOUNTER — OFFICE VISIT (OUTPATIENT)
Dept: OBGYN CLINIC | Facility: CLINIC | Age: 78
End: 2023-09-20
Payer: COMMERCIAL

## 2023-09-20 VITALS
BODY MASS INDEX: 35.07 KG/M2 | HEIGHT: 70 IN | DIASTOLIC BLOOD PRESSURE: 84 MMHG | HEART RATE: 58 BPM | SYSTOLIC BLOOD PRESSURE: 130 MMHG | WEIGHT: 245 LBS

## 2023-09-20 DIAGNOSIS — Z96.651 STATUS POST TOTAL RIGHT KNEE REPLACEMENT: ICD-10-CM

## 2023-09-20 DIAGNOSIS — Z96.652 S/P TOTAL KNEE REPLACEMENT, LEFT: Primary | ICD-10-CM

## 2023-09-20 DIAGNOSIS — Z96.652 S/P TOTAL KNEE REPLACEMENT, LEFT: ICD-10-CM

## 2023-09-20 DIAGNOSIS — M17.11 PRIMARY OSTEOARTHRITIS OF RIGHT KNEE: ICD-10-CM

## 2023-09-20 PROCEDURE — 73562 X-RAY EXAM OF KNEE 3: CPT

## 2023-09-20 PROCEDURE — 99213 OFFICE O/P EST LOW 20 MIN: CPT | Performed by: ORTHOPAEDIC SURGERY

## 2023-09-20 NOTE — PROGRESS NOTES
Assessment:  1. S/P total knee replacement, left  XR knee 3 vw left non injury      2. Status post total right knee replacement  XR knee 3 vw right non injury          Plan:    • Patient is 1 year s/p left total knee arthroplasty ,DOS 9/29/22 and status post right total knee arthroplasty,DOS 4/13/22  • Weightbearing as tolerated  • No restrictions   • Follow up in 1 year with repeat bilateral knee x-rays         The above stated was discussed in layman's terms and the patient expressed understanding. All questions were answered to the patient's satisfaction. Subjective:   Marie Kemp is a 70 y.o. male who presents today 1 year s/p left total knee arthroplasty ,DOS 9/29/22 and status post right total knee arthroplasty,DOS 4/13/22. Patient states he is doing well. He is happy with his knees. He notes occasional achy pain. He is back to doing all his activities. He did go to physical therapy for his lower back which has helped. Current not taking pain medications.       Review of systems negative unless otherwise specified in HPI    Past Medical History:   Diagnosis Date   • Celiac disease    • Coronary artery disease    • Diverticulitis    • Diverticulitis of ascending colon 10/27/2022   • Diverticulosis    • Hyperlipidemia    • Hypertension    • Myocardial infarction ([address]) 1990       Past Surgical History:   Procedure Laterality Date   • COLONOSCOPY     • CORONARY ANGIOPLASTY WITH STENT PLACEMENT      Multiple times   • [place] ARTHRP KNE CONDYLE&PLATU MEDIAL&LAT COMPARTMENTS Right 04/13/2022    Procedure: ARTHROPLASTY KNEE TOTAL and all associated procedures;  Surgeon: Luciano Stevenson MD;  Location:  MAIN OR;  Service: Orthopedics   • [place] ARTHRP KNE CONDYLE&PLATU MEDIAL&LAT COMPARTMENTS Left 9/29/2022    Procedure: ARTHROPLASTY KNEE TOTAL;  Surgeon: Luciano Stevenson MD;  Location:  MAIN OR;  Service: Orthopedics       Family History   Problem Relation Age of Onset   • Diabetes Mother    • Coronary artery disease Father    • Thyroid disease Sister        Social History     Occupational History   • Not on file   Tobacco Use   • Smoking status: Former     Types: Cigarettes     Quit date:      Years since quittin.7   • Smokeless tobacco: Never   • Tobacco comments:     Per pt, quit over 36 years ago   Vaping Use   • Vaping Use: Never used   Substance and Sexual Activity   • Alcohol use: Not Currently   • Drug use: Never   • Sexual activity: Yes     Partners: Female     Birth control/protection: None         Current Outpatient Medications:   •  aspirin (ECOTRIN LOW STRENGTH) 81 mg EC tablet, Take 1 tablet (81 mg total) by mouth daily Please do not start taking until after total joint arthroplasty, Disp: 10 tablet, Rfl: 0  •  baclofen 10 mg tablet, TAKE 1 TABLET BY MOUTH 3 TIMES  DAILY AS NEEDED FOR MUSCLE  SPASM(S), Disp: 270 tablet, Rfl: 0  •  cholecalciferol (VITAMIN D3) 1,000 units tablet, Take 1 tablet (1,000 Units total) by mouth daily, Disp: 60 tablet, Rfl: 2  •  clopidogrel (PLAVIX) 75 mg tablet, Take 75 mg by mouth every morning  , Disp: , Rfl:   •  ezetimibe (ZETIA) 10 mg tablet, TAKE 1 TABLET BY MOUTH DAILY, Disp: 90 tablet, Rfl: 3  •  gabapentin (NEURONTIN) 300 mg capsule, Take 1 capsule (300 mg total) by mouth 3 (three) times a day, Disp: 270 capsule, Rfl: 0  •  ketoconazole (NIZORAL) 2 % shampoo, APPLY TOPICALLY 2 TIMES A WEEK, Disp: 120 mL, Rfl: 0  •  metoprolol succinate (TOPROL-XL) 50 mg 24 hr tablet, TAKE 1 TABLET BY MOUTH  DAILY, Disp: 90 tablet, Rfl: 3  •  omega-3-acid ethyl esters (LOVAZA) 1 g capsule, Take 2 capsules (2 g total) by mouth 2 (two) times a day, Disp: 480 capsule, Rfl: 2  •  sertraline (ZOLOFT) 50 mg tablet, TAKE 1 TABLET BY MOUTH DAILY, Disp: 90 tablet, Rfl: 3  •  simvastatin (ZOCOR) 40 mg tablet, TAKE 1 TABLET BY MOUTH DAILY AT  BEDTIME, Disp: 100 tablet, Rfl: 2  •  tamsulosin (FLOMAX) 0.4 mg, TAKE 1 CAPSULE BY MOUTH  DAILY WITH DINNER, Disp: 100 capsule, Rfl: 2  •  traZODone (DESYREL) 150 mg tablet, TAKE 1 TABLET BY MOUTH DAILY AT  BEDTIME, Disp: 90 tablet, Rfl: 3  •  calcium carbonate (OS-LANA) 1250 (500 Ca) MG chewable tablet, Chew 1 tablet (1,250 mg total) daily, Disp: 60 tablet, Rfl: 2  •  Docusate Sodium (DSS) 100 MG CAPS, docusate sodium 100 mg capsule  TAKE 1 CAPSULE BY MOUTH TWICE A DAY (Patient not taking: Reported on 6/8/2023), Disp: , Rfl:     Allergies   Allergen Reactions   • Crestor [Rosuvastatin] Myalgia            Vitals:    09/20/23 1449   BP: 130/84   Pulse: 58       Objective:            Physical Exam  Physical Exam:      General Appearance:    Alert, cooperative, no distress, appears stated age   Head:    Normocephalic, without obvious abnormality, atraumatic   Eyes:    conjunctiva/corneas clear, both eyes         Nose:   Nares normal, septum midline, no drainage    Throat:   Lips normal; teeth and gums normal   Neck:    symmetrical, trachea midline, ;     thyroid:  no enlargement/   Back:     Symmetric, no curvature, ROM normal   Lungs:   No audible wheezing or labored breathing   Chest Wall:    No tenderness or deformity    Heart:    Regular rate and rhythm                         Pulses:   2+ and symmetric all extremities   Skin:   Skin color, texture, turgor normal, no rashes or lesions   Neurologic:   normal strength, sensation and reflexes     throughout                       Ortho Exam  Right knee  Surgical incision well-healed  Erythema  Full range of motion  No swelling  Stable to varus and valgus stress  Neurovascularly Intact Distally    Left knee  Skin intact  No erythema  Full range of motion  No tense palpation  Stable to varus and valgus stress  No swelling  Neurovascular intact distally    Diagnostics, reviewed and taken today if performed as documented:     The attending physician has personally reviewed the pertinent films in PACS and interpretation is as follows: X-ray bilateral knees demonstrate status post TKA adequate alignment implant, no sign of loosening      Procedures, if performed today:    Procedures    None performed      Scribe Attestation    I,:  Estefany Lanza am acting as a scribe while in the presence of the attending physician.:       I,:  Antonietta Ni MD personally performed the services described in this documentation    as scribed in my presence.:             Portions of the record may have been created with voice recognition software. Occasional wrong word or "sound a like" substitutions may have occurred due to the inherent limitations of voice recognition software. Read the chart carefully and recognize, using context, where substitutions have occurred.

## 2023-09-28 ENCOUNTER — RA CDI HCC (OUTPATIENT)
Dept: OTHER | Facility: HOSPITAL | Age: 78
End: 2023-09-28

## 2023-09-28 NOTE — PROGRESS NOTES
[facility] coding opportunities       Chart reviewed, no opportunity found: CHART REVIEWED, NO OPPORTUNITY FOUND        Patients Insurance     Medicare [facility]: [facility]

## 2023-10-02 ENCOUNTER — OFFICE VISIT (OUTPATIENT)
Dept: INTERNAL MEDICINE CLINIC | Facility: CLINIC | Age: 78
End: 2023-10-02
Payer: COMMERCIAL

## 2023-10-02 VITALS
HEART RATE: 68 BPM | DIASTOLIC BLOOD PRESSURE: 64 MMHG | WEIGHT: 246 LBS | HEIGHT: 70 IN | SYSTOLIC BLOOD PRESSURE: 108 MMHG | BODY MASS INDEX: 35.22 KG/M2 | TEMPERATURE: 97.7 F | OXYGEN SATURATION: 94 % | RESPIRATION RATE: 20 BRPM

## 2023-10-02 DIAGNOSIS — R10.2 SUPRAPUBIC DISCOMFORT: Primary | ICD-10-CM

## 2023-10-02 DIAGNOSIS — Z23 ENCOUNTER FOR ADMINISTRATION OF VACCINE: ICD-10-CM

## 2023-10-02 PROBLEM — R10.9 ABDOMINAL PAIN: Status: ACTIVE | Noted: 2023-10-02

## 2023-10-02 PROCEDURE — G0008 ADMIN INFLUENZA VIRUS VAC: HCPCS | Performed by: INTERNAL MEDICINE

## 2023-10-02 PROCEDURE — 99213 OFFICE O/P EST LOW 20 MIN: CPT

## 2023-10-02 PROCEDURE — 90662 IIV NO PRSV INCREASED AG IM: CPT | Performed by: INTERNAL MEDICINE

## 2023-10-02 RX ORDER — VITAMIN B COMPLEX
1 CAPSULE ORAL DAILY
COMMUNITY

## 2023-10-02 NOTE — PROGRESS NOTES
INTERNAL MEDICINE FOLLOW-UP OFFICE VISIT  [facility] - [facility] INTERNAL MEDICINE DIPTI    NAME: Otilio Machuca  AGE: 70 y.o. SEX: male  : 1945     DATE: 10/2/2023     Assessment and Plan:     1. Suprapubic discomfort  Assessment & Plan:  · Presents with complaint of persistent suprapubic burning and discomfort that started three months ago. · Denies urinary hesitancy, dribbling, frequency, dysuria, hematuria, fever, and chills. · He does endorse an occurrence of prostatitis when he was in his 25s. · Patient has been taking the same dose of trazodone at 150 mg daily at bedtime for many years and denies any complications from the medication. · Denies any history of unintentional weight loss or prostate or bladder cancer in his family. He was a long time pack per day smoker, but quit many years ago. · Differentials include prostatitis vs cystitis vs urinary retention secondary to down stream obstruction (BPH)    Plan:  · Check urinalysis with reflex to culture  · Bladder ultrasound with post void residual volume to assess for retention  · Check urine for Chlamydia and Gonorrhea   · Follow-up with PSA  · I informed patient that if the above work-up returns negative, we can consider urology referral for further assessment. · Patient expressed desire to hold off on urology referral at this time. Orders:  -     UA w Reflex to Microscopic w Reflex to Culture -Lab Collect; Future; Expected date: 10/02/2023  -     US bladder; Future; Expected date: 10/02/2023  -     Chlamydia/Gonococcus/Genital Mycoplasma Profile, JORGE L, Urine    2. Encounter for administration of vaccine  -     influenza vaccine, high-dose, PF 0.7 mL (FLUZONE HIGH-DOSE)      Return for Next scheduled follow up.      Chief Complaint:     Chief Complaint   Patient presents with   • Follow-up     Burning in bladder area        History of Present Illness:     Ciara Martins is a 70year old male with PMH of CAD and osteoarthritis of both knees s/p bilateral knee replacements who presents to the office for follow-up. He has a primary complaint of a burning sensation in his bladder that he first noticed three months ago. He denies any pain in the area of the bladder and dysuria and hematuria. He denies urinary frequency, fevers, and chills. He states he recently has been riding a bicycle with a very hard and uncomfortable seat around the time the symptoms started. There is no urinary dribbling and hesitancy. There is no urinary or bowel incontinence. He does endorse left sided lower back pain for which he received physical therapy for three months. The pain is on and off these days following physical therapy. He does endorse intermittent burning of his bladder when his feet get cold. He denies constipation. The following portions of the patient's history were reviewed and updated as appropriate: allergies, current medications, past family history, past medical history, past social history, past surgical history and problem list.     Review of Systems:     Review of Systems   Constitutional: Negative for chills and fever. HENT: Negative for ear pain and sore throat. Eyes: Negative for pain and visual disturbance. Respiratory: Negative for cough and shortness of breath. Cardiovascular: Negative for chest pain and palpitations. Gastrointestinal: Negative for abdominal pain, constipation, diarrhea, nausea and vomiting. Genitourinary: Negative for decreased urine volume, difficulty urinating, dysuria, frequency, hematuria and urgency. Musculoskeletal: Positive for back pain. Negative for arthralgias. Skin: Negative for color change and rash. Neurological: Negative for seizures and syncope. Psychiatric/Behavioral: Negative for agitation and confusion. All other systems reviewed and are negative.        Problem List:     Patient Active Problem List   Diagnosis   • Hypovitaminosis D   • Atherosclerosis of coronary artery without angina pectoris   • Hyperlipidemia   • Primary osteoarthritis of both knees   • Sacroiliitis, not elsewhere classified ([address])   • Actinic keratosis   • Primary osteoarthritis of right knee   • Status post total right knee replacement   • Aneurysm of thoracic aorta   • Primary osteoarthritis of left knee   • Status post total knee replacement, left   • Need for influenza vaccination   • H/O hyperlipidemia   • BMI 34.0-34.9,adult   • Seborrheic dermatitis   • Suprapubic discomfort        Objective:     /64 (BP Location: Left arm, Patient Position: Sitting, Cuff Size: Large)   Pulse 68   Temp 97.7 °F (36.5 °C)   Resp 20   Ht 5' 10" (1.778 m)   Wt 112 kg (246 lb)   SpO2 94%   BMI 35.30 kg/m²     Physical Exam  Vitals and nursing note reviewed. Constitutional:       General: He is not in acute distress. Appearance: He is well-developed. HENT:      Head: Normocephalic and atraumatic. Eyes:      Conjunctiva/sclera: Conjunctivae normal.   Cardiovascular:      Rate and Rhythm: Normal rate and regular rhythm. Pulses: Normal pulses. Heart sounds: Normal heart sounds. No murmur heard. Pulmonary:      Effort: Pulmonary effort is normal. No respiratory distress. Breath sounds: Normal breath sounds. No wheezing. Abdominal:      General: Abdomen is flat. Bowel sounds are normal. There is no distension. Palpations: Abdomen is soft. Tenderness: There is abdominal tenderness in the suprapubic area. There is no guarding or rebound. Musculoskeletal:         General: No swelling. Cervical back: Neck supple. Right lower le+ Pitting Edema present. Left lower le+ Pitting Edema present. Skin:     General: Skin is warm and dry. Capillary Refill: Capillary refill takes less than 2 seconds. Neurological:      Mental Status: He is alert.    Psychiatric:         Attention and Perception: Attention normal.         Mood and Affect: Mood normal.         Speech: Speech normal.         Behavior: Behavior is cooperative. Pertinent Laboratory/Diagnostic Studies:    Laboratory Results: I have personally reviewed the pertinent laboratory results/reports     PSA, Total Screen  Order: [id number]   Status: Final result      Visible to patient: Yes (seen)      Next appt: 11/06/2023 at 01:30 PM in Radiology (EA CT 1)      Dx: Benign prostatic hyperplasia, unspeci. ..      0 Result Notes       Component Ref Range & Units 11/12/20 12:33 PM   PSA 0.0 - 4.0 ng/mL 0.4           Radiology/Other Diagnostic Testing Results: I have personally reviewed pertinent reports.       [address] [place] [address] Drive, DO  [facility] INTERNAL [facility]IC[facility] Mela Aburto

## 2023-10-02 NOTE — ASSESSMENT & PLAN NOTE
· Presents with complaint of persistent suprapubic burning and discomfort that started three months ago. · Denies urinary hesitancy, dribbling, frequency, dysuria, hematuria, fever, and chills. · He does endorse an occurrence of prostatitis when he was in his 25s. · Patient has been taking the same dose of trazodone at 150 mg daily at bedtime for many years and denies any complications from the medication. · Denies any history of unintentional weight loss or prostate or bladder cancer in his family. He was a long time pack per day smoker, but quit many years ago. · Differentials include prostatitis vs cystitis vs urinary retention secondary to down stream obstruction (BPH)    Plan:  · Check urinalysis with reflex to culture  · Bladder ultrasound with post void residual volume to assess for retention  · Check urine for Chlamydia and Gonorrhea   · Follow-up with PSA  · I informed patient that if the above work-up returns negative, we can consider urology referral for further assessment. · Patient expressed desire to hold off on urology referral at this time.

## 2023-10-03 ENCOUNTER — APPOINTMENT (OUTPATIENT)
Dept: LAB | Facility: CLINIC | Age: 78
End: 2023-10-03
Payer: COMMERCIAL

## 2023-10-03 DIAGNOSIS — Z12.5 ENCOUNTER FOR SCREENING FOR MALIGNANT NEOPLASM OF PROSTATE: ICD-10-CM

## 2023-10-03 DIAGNOSIS — Z00.00 MEDICARE ANNUAL WELLNESS VISIT, SUBSEQUENT: ICD-10-CM

## 2023-10-03 DIAGNOSIS — R10.2 SUPRAPUBIC DISCOMFORT: ICD-10-CM

## 2023-10-03 DIAGNOSIS — Z86.39 H/O HYPERLIPIDEMIA: ICD-10-CM

## 2023-10-03 LAB
ALBUMIN SERPL BCP-MCNC: 3.9 G/DL (ref 3.5–5)
ALP SERPL-CCNC: 67 U/L (ref 34–104)
ALT SERPL W P-5'-P-CCNC: 28 U/L (ref 7–52)
ANION GAP SERPL CALCULATED.3IONS-SCNC: 4 MMOL/L
AST SERPL W P-5'-P-CCNC: 30 U/L (ref 13–39)
BASOPHILS # BLD AUTO: 0.03 THOUSANDS/ÂΜL (ref 0–0.1)
BASOPHILS NFR BLD AUTO: 1 % (ref 0–1)
BILIRUB SERPL-MCNC: 0.78 MG/DL (ref 0.2–1)
BILIRUB UR QL STRIP: NEGATIVE
BUN SERPL-MCNC: 17 MG/DL (ref 5–25)
CALCIUM SERPL-MCNC: 9.5 MG/DL (ref 8.4–10.2)
CHLORIDE SERPL-SCNC: 105 MMOL/L (ref 96–108)
CLARITY UR: CLEAR
CO2 SERPL-SCNC: 29 MMOL/L (ref 21–32)
COLOR UR: YELLOW
CREAT SERPL-MCNC: 0.93 MG/DL (ref 0.6–1.3)
EOSINOPHIL # BLD AUTO: 0.27 THOUSAND/ÂΜL (ref 0–0.61)
EOSINOPHIL NFR BLD AUTO: 5 % (ref 0–6)
ERYTHROCYTE [DISTWIDTH] IN BLOOD BY AUTOMATED COUNT: 12.6 % (ref 11.6–15.1)
EST. AVERAGE GLUCOSE BLD GHB EST-MCNC: 111 MG/DL
GFR SERPL CREATININE-BSD FRML MDRD: 79 ML/MIN/1.73SQ M
GLUCOSE P FAST SERPL-MCNC: 102 MG/DL (ref 65–99)
GLUCOSE UR STRIP-MCNC: NEGATIVE MG/DL
HBA1C MFR BLD: 5.5 %
HCT VFR BLD AUTO: 43 % (ref 36.5–49.3)
HGB BLD-MCNC: 15.4 G/DL (ref 12–17)
HGB UR QL STRIP.AUTO: NEGATIVE
IMM GRANULOCYTES # BLD AUTO: 0.02 THOUSAND/UL (ref 0–0.2)
IMM GRANULOCYTES NFR BLD AUTO: 0 % (ref 0–2)
KETONES UR STRIP-MCNC: NEGATIVE MG/DL
LEUKOCYTE ESTERASE UR QL STRIP: NEGATIVE
LYMPHOCYTES # BLD AUTO: 1.33 THOUSANDS/ÂΜL (ref 0.6–4.47)
LYMPHOCYTES NFR BLD AUTO: 26 % (ref 14–44)
MCH RBC QN AUTO: 32.6 PG (ref 26.8–34.3)
MCHC RBC AUTO-ENTMCNC: 35.8 G/DL (ref 31.4–37.4)
MCV RBC AUTO: 91 FL (ref 82–98)
MONOCYTES # BLD AUTO: 0.42 THOUSAND/ÂΜL (ref 0.17–1.22)
MONOCYTES NFR BLD AUTO: 8 % (ref 4–12)
NEUTROPHILS # BLD AUTO: 3.11 THOUSANDS/ÂΜL (ref 1.85–7.62)
NEUTS SEG NFR BLD AUTO: 60 % (ref 43–75)
NITRITE UR QL STRIP: NEGATIVE
NRBC BLD AUTO-RTO: 0 /100 WBCS
PH UR STRIP.AUTO: 6 [PH]
PLATELET # BLD AUTO: 196 THOUSANDS/UL (ref 149–390)
PMV BLD AUTO: 9.6 FL (ref 8.9–12.7)
POTASSIUM SERPL-SCNC: 4.3 MMOL/L (ref 3.5–5.3)
PROT SERPL-MCNC: 6.6 G/DL (ref 6.4–8.4)
PROT UR STRIP-MCNC: NEGATIVE MG/DL
PSA SERPL-MCNC: 0.48 NG/ML (ref 0–4)
RBC # BLD AUTO: 4.72 MILLION/UL (ref 3.88–5.62)
SODIUM SERPL-SCNC: 138 MMOL/L (ref 135–147)
SP GR UR STRIP.AUTO: 1.02 (ref 1–1.03)
UROBILINOGEN UR STRIP-ACNC: <2 MG/DL
WBC # BLD AUTO: 5.18 THOUSAND/UL (ref 4.31–10.16)

## 2023-10-03 PROCEDURE — 81003 URINALYSIS AUTO W/O SCOPE: CPT

## 2023-10-03 PROCEDURE — 80053 COMPREHEN METABOLIC PANEL: CPT

## 2023-10-03 PROCEDURE — 87591 N.GONORRHOEAE DNA AMP PROB: CPT

## 2023-10-03 PROCEDURE — 83036 HEMOGLOBIN GLYCOSYLATED A1C: CPT

## 2023-10-03 PROCEDURE — 85025 COMPLETE CBC W/AUTO DIFF WBC: CPT

## 2023-10-03 PROCEDURE — 36415 COLL VENOUS BLD VENIPUNCTURE: CPT

## 2023-10-03 PROCEDURE — 87491 CHLMYD TRACH DNA AMP PROBE: CPT

## 2023-10-03 PROCEDURE — G0103 PSA SCREENING: HCPCS

## 2023-10-04 LAB
C TRACH DNA SPEC QL NAA+PROBE: NEGATIVE
N GONORRHOEA DNA SPEC QL NAA+PROBE: NEGATIVE

## 2023-10-10 ENCOUNTER — HOSPITAL ENCOUNTER (OUTPATIENT)
Dept: ULTRASOUND IMAGING | Facility: HOSPITAL | Age: 78
Discharge: HOME | End: 2023-10-10
Payer: COMMERCIAL

## 2023-10-10 DIAGNOSIS — R10.2 SUPRAPUBIC DISCOMFORT: ICD-10-CM

## 2023-10-10 PROCEDURE — 76857 US EXAM PELVIC LIMITED: CPT

## 2023-10-12 DIAGNOSIS — I25.10 ATHEROSCLEROSIS OF CORONARY ARTERY WITHOUT ANGINA PECTORIS, UNSPECIFIED VESSEL OR LESION TYPE, UNSPECIFIED WHETHER NATIVE OR TRANSPLANTED HEART: Primary | ICD-10-CM

## 2023-10-16 RX ORDER — CLOPIDOGREL BISULFATE 75 MG/1
75 TABLET ORAL EVERY MORNING
Qty: 90 TABLET | Refills: 1 | Status: SHIPPED | OUTPATIENT
Start: 2023-10-16

## 2023-10-20 DIAGNOSIS — E78.5 HYPERLIPIDEMIA: ICD-10-CM

## 2023-10-20 RX ORDER — EZETIMIBE 10 MG/1
TABLET ORAL
Qty: 100 TABLET | Refills: 2 | Status: SHIPPED | OUTPATIENT
Start: 2023-10-20

## 2023-10-26 DIAGNOSIS — E78.2 MIXED HYPERLIPIDEMIA: ICD-10-CM

## 2023-10-26 RX ORDER — OMEGA-3-ACID ETHYL ESTERS 1 G/1
2 CAPSULE, LIQUID FILLED ORAL 2 TIMES DAILY
Qty: 400 CAPSULE | Refills: 2 | Status: SHIPPED | OUTPATIENT
Start: 2023-10-26

## 2023-10-28 PROCEDURE — 99285 EMERGENCY DEPT VISIT HI MDM: CPT | Performed by: EMERGENCY MEDICINE

## 2023-10-28 PROCEDURE — 99285 EMERGENCY DEPT VISIT HI MDM: CPT

## 2023-10-29 ENCOUNTER — APPOINTMENT (EMERGENCY)
Dept: CT IMAGING | Facility: HOSPITAL | Age: 78
DRG: 092 | End: 2023-10-29
Payer: COMMERCIAL

## 2023-10-29 ENCOUNTER — APPOINTMENT (INPATIENT)
Dept: MRI IMAGING | Facility: HOSPITAL | Age: 78
DRG: 092 | End: 2023-10-29
Payer: COMMERCIAL

## 2023-10-29 ENCOUNTER — HOSPITAL ENCOUNTER (INPATIENT)
Facility: HOSPITAL | Age: 78
LOS: 1 days | Discharge: HOME | DRG: 092 | End: 2023-10-29
Attending: EMERGENCY MEDICINE | Admitting: INTERNAL MEDICINE
Payer: COMMERCIAL

## 2023-10-29 VITALS
HEIGHT: 70 IN | TEMPERATURE: 98.3 F | HEART RATE: 73 BPM | DIASTOLIC BLOOD PRESSURE: 74 MMHG | WEIGHT: 238.1 LBS | OXYGEN SATURATION: 95 % | RESPIRATION RATE: 18 BRPM | SYSTOLIC BLOOD PRESSURE: 115 MMHG | BODY MASS INDEX: 34.09 KG/M2

## 2023-10-29 DIAGNOSIS — R40.4 TRANSIENT ALTERATION OF AWARENESS: Primary | ICD-10-CM

## 2023-10-29 DIAGNOSIS — I10 HYPERTENSION, UNSPECIFIED TYPE: ICD-10-CM

## 2023-10-29 DIAGNOSIS — R41.0 ACUTE CONFUSION: ICD-10-CM

## 2023-10-29 DIAGNOSIS — R29.90 STROKE-LIKE SYMPTOM: ICD-10-CM

## 2023-10-29 DIAGNOSIS — R11.10 EMESIS: ICD-10-CM

## 2023-10-29 DIAGNOSIS — N20.1 OBSTRUCTION OF LEFT URETEROPELVIC JUNCTION (UPJ) DUE TO STONE: ICD-10-CM

## 2023-10-29 DIAGNOSIS — N13.2 HYDRONEPHROSIS WITH OBSTRUCTING CALCULUS: ICD-10-CM

## 2023-10-29 DIAGNOSIS — K92.0 COFFEE GROUND EMESIS: ICD-10-CM

## 2023-10-29 DIAGNOSIS — K92.0 HEMATEMESIS WITH NAUSEA: ICD-10-CM

## 2023-10-29 PROBLEM — K04.7 TOOTH INFECTION: Status: ACTIVE | Noted: 2023-10-29

## 2023-10-29 LAB
2HR DELTA HS TROPONIN: 5 NG/L
ABO GROUP BLD: NORMAL
ALBUMIN SERPL BCP-MCNC: 4.5 G/DL (ref 3.5–5)
ALP SERPL-CCNC: 71 U/L (ref 34–104)
ALT SERPL W P-5'-P-CCNC: 24 U/L (ref 7–52)
AMPHETAMINES SERPL QL SCN: NEGATIVE
ANION GAP SERPL CALCULATED.3IONS-SCNC: 5 MMOL/L
ANION GAP SERPL CALCULATED.3IONS-SCNC: 8 MMOL/L
APTT PPP: 29 SECONDS (ref 23–37)
AST SERPL W P-5'-P-CCNC: 38 U/L (ref 13–39)
ATRIAL RATE: 66 BPM
ATRIAL RATE: 67 BPM
BACTERIA UR QL AUTO: ABNORMAL /HPF
BARBITURATES UR QL: NEGATIVE
BENZODIAZ UR QL: NEGATIVE
BILIRUB DIRECT SERPL-MCNC: 0.06 MG/DL (ref 0–0.2)
BILIRUB SERPL-MCNC: 0.74 MG/DL (ref 0.2–1)
BILIRUB UR QL STRIP: NEGATIVE
BLD GP AB SCN SERPL QL: NEGATIVE
BUN SERPL-MCNC: 29 MG/DL (ref 5–25)
BUN SERPL-MCNC: 31 MG/DL (ref 5–25)
CALCIUM SERPL-MCNC: 10.1 MG/DL (ref 8.4–10.2)
CALCIUM SERPL-MCNC: 9.4 MG/DL (ref 8.4–10.2)
CARDIAC TROPONIN I PNL SERPL HS: 4 NG/L
CARDIAC TROPONIN I PNL SERPL HS: 9 NG/L
CHLORIDE SERPL-SCNC: 104 MMOL/L (ref 96–108)
CHLORIDE SERPL-SCNC: 107 MMOL/L (ref 96–108)
CHOLEST SERPL-MCNC: 135 MG/DL
CLARITY UR: CLEAR
CO2 SERPL-SCNC: 23 MMOL/L (ref 21–32)
CO2 SERPL-SCNC: 24 MMOL/L (ref 21–32)
COCAINE UR QL: NEGATIVE
COLOR UR: YELLOW
CREAT SERPL-MCNC: 0.82 MG/DL (ref 0.6–1.3)
CREAT SERPL-MCNC: 1.19 MG/DL (ref 0.6–1.3)
ERYTHROCYTE [DISTWIDTH] IN BLOOD BY AUTOMATED COUNT: 12.6 % (ref 11.6–15.1)
ERYTHROCYTE [DISTWIDTH] IN BLOOD BY AUTOMATED COUNT: 12.7 % (ref 11.6–15.1)
EST. AVERAGE GLUCOSE BLD GHB EST-MCNC: 111 MG/DL
FLUAV RNA RESP QL NAA+PROBE: NEGATIVE
FLUBV RNA RESP QL NAA+PROBE: NEGATIVE
GFR SERPL CREATININE-BSD FRML MDRD: 58 ML/MIN/1.73SQ M
GFR SERPL CREATININE-BSD FRML MDRD: 85 ML/MIN/1.73SQ M
GLUCOSE SERPL-MCNC: 112 MG/DL (ref 65–140)
GLUCOSE SERPL-MCNC: 113 MG/DL (ref 65–140)
GLUCOSE SERPL-MCNC: 122 MG/DL (ref 65–140)
GLUCOSE UR STRIP-MCNC: NEGATIVE MG/DL
HBA1C MFR BLD: 5.5 %
HCT VFR BLD AUTO: 40.4 % (ref 36.5–49.3)
HCT VFR BLD AUTO: 43.6 % (ref 36.5–49.3)
HDLC SERPL-MCNC: 37 MG/DL
HGB BLD-MCNC: 14.1 G/DL (ref 12–17)
HGB BLD-MCNC: 15.7 G/DL (ref 12–17)
HGB UR QL STRIP.AUTO: ABNORMAL
HYALINE CASTS #/AREA URNS LPF: ABNORMAL /LPF
INR PPP: 1.03 (ref 0.84–1.19)
KETONES UR STRIP-MCNC: NEGATIVE MG/DL
LDLC SERPL CALC-MCNC: 59 MG/DL (ref 0–100)
LEUKOCYTE ESTERASE UR QL STRIP: NEGATIVE
LIPASE SERPL-CCNC: 23 U/L (ref 11–82)
MCH RBC QN AUTO: 32.3 PG (ref 26.8–34.3)
MCH RBC QN AUTO: 33.3 PG (ref 26.8–34.3)
MCHC RBC AUTO-ENTMCNC: 34.9 G/DL (ref 31.4–37.4)
MCHC RBC AUTO-ENTMCNC: 36 G/DL (ref 31.4–37.4)
MCV RBC AUTO: 93 FL (ref 82–98)
MCV RBC AUTO: 93 FL (ref 82–98)
METHADONE UR QL: NEGATIVE
MUCOUS THREADS UR QL AUTO: ABNORMAL
NITRITE UR QL STRIP: NEGATIVE
NON-SQ EPI CELLS URNS QL MICRO: ABNORMAL /HPF
OPIATES UR QL SCN: NEGATIVE
OXYCODONE+OXYMORPHONE UR QL SCN: POSITIVE
P AXIS: 34 DEGREES
P AXIS: 41 DEGREES
PCP UR QL: NEGATIVE
PH UR STRIP.AUTO: 7 [PH]
PLATELET # BLD AUTO: 194 THOUSANDS/UL (ref 149–390)
PLATELET # BLD AUTO: 211 THOUSANDS/UL (ref 149–390)
PMV BLD AUTO: 9.7 FL (ref 8.9–12.7)
PMV BLD AUTO: 9.9 FL (ref 8.9–12.7)
POTASSIUM SERPL-SCNC: 4.2 MMOL/L (ref 3.5–5.3)
POTASSIUM SERPL-SCNC: 4.8 MMOL/L (ref 3.5–5.3)
PR INTERVAL: 204 MS
PR INTERVAL: 222 MS
PROT SERPL-MCNC: 7.6 G/DL (ref 6.4–8.4)
PROT UR STRIP-MCNC: ABNORMAL MG/DL
PROTHROMBIN TIME: 14.2 SECONDS (ref 11.6–14.5)
QRS AXIS: -21 DEGREES
QRS AXIS: -22 DEGREES
QRSD INTERVAL: 160 MS
QRSD INTERVAL: 162 MS
QT INTERVAL: 432 MS
QT INTERVAL: 436 MS
QTC INTERVAL: 452 MS
QTC INTERVAL: 460 MS
RBC # BLD AUTO: 4.36 MILLION/UL (ref 3.88–5.62)
RBC # BLD AUTO: 4.71 MILLION/UL (ref 3.88–5.62)
RBC #/AREA URNS AUTO: ABNORMAL /HPF
RH BLD: POSITIVE
RSV RNA RESP QL NAA+PROBE: NEGATIVE
SARS-COV-2 RNA RESP QL NAA+PROBE: NEGATIVE
SODIUM SERPL-SCNC: 135 MMOL/L (ref 135–147)
SODIUM SERPL-SCNC: 136 MMOL/L (ref 135–147)
SP GR UR STRIP.AUTO: >=1.05 (ref 1–1.03)
SPECIMEN EXPIRATION DATE: NORMAL
T WAVE AXIS: 11 DEGREES
T WAVE AXIS: 12 DEGREES
THC UR QL: NEGATIVE
TRIGL SERPL-MCNC: 194 MG/DL
TSH SERPL DL<=0.05 MIU/L-ACNC: 1.53 UIU/ML (ref 0.45–4.5)
UROBILINOGEN UR STRIP-ACNC: <2 MG/DL
VENTRICULAR RATE: 66 BPM
VENTRICULAR RATE: 67 BPM
WBC # BLD AUTO: 7.26 THOUSAND/UL (ref 4.31–10.16)
WBC # BLD AUTO: 8.28 THOUSAND/UL (ref 4.31–10.16)
WBC #/AREA URNS AUTO: ABNORMAL /HPF

## 2023-10-29 PROCEDURE — 80048 BASIC METABOLIC PNL TOTAL CA: CPT

## 2023-10-29 PROCEDURE — 84484 ASSAY OF TROPONIN QUANT: CPT

## 2023-10-29 PROCEDURE — 99235 HOSP IP/OBS SAME DATE MOD 70: CPT | Performed by: FAMILY MEDICINE

## 2023-10-29 PROCEDURE — 86850 RBC ANTIBODY SCREEN: CPT

## 2023-10-29 PROCEDURE — 80048 BASIC METABOLIC PNL TOTAL CA: CPT | Performed by: PHYSICIAN ASSISTANT

## 2023-10-29 PROCEDURE — 80076 HEPATIC FUNCTION PANEL: CPT

## 2023-10-29 PROCEDURE — 70498 CT ANGIOGRAPHY NECK: CPT

## 2023-10-29 PROCEDURE — 99245 OFF/OP CONSLTJ NEW/EST HI 55: CPT | Performed by: PSYCHIATRY & NEUROLOGY

## 2023-10-29 PROCEDURE — C9113 INJ PANTOPRAZOLE SODIUM, VIA: HCPCS | Performed by: PHYSICIAN ASSISTANT

## 2023-10-29 PROCEDURE — 74177 CT ABD & PELVIS W/CONTRAST: CPT

## 2023-10-29 PROCEDURE — 93010 ELECTROCARDIOGRAM REPORT: CPT | Performed by: INTERNAL MEDICINE

## 2023-10-29 PROCEDURE — 85027 COMPLETE CBC AUTOMATED: CPT | Performed by: PHYSICIAN ASSISTANT

## 2023-10-29 PROCEDURE — 36415 COLL VENOUS BLD VENIPUNCTURE: CPT

## 2023-10-29 PROCEDURE — 86901 BLOOD TYPING SEROLOGIC RH(D): CPT

## 2023-10-29 PROCEDURE — 82948 REAGENT STRIP/BLOOD GLUCOSE: CPT

## 2023-10-29 PROCEDURE — G1004 CDSM NDSC: HCPCS

## 2023-10-29 PROCEDURE — 80307 DRUG TEST PRSMV CHEM ANLYZR: CPT | Performed by: PSYCHIATRY & NEUROLOGY

## 2023-10-29 PROCEDURE — 86900 BLOOD TYPING SEROLOGIC ABO: CPT

## 2023-10-29 PROCEDURE — 85027 COMPLETE CBC AUTOMATED: CPT

## 2023-10-29 PROCEDURE — 0241U HB NFCT DS VIR RESP RNA 4 TRGT: CPT

## 2023-10-29 PROCEDURE — 96365 THER/PROPH/DIAG IV INF INIT: CPT

## 2023-10-29 PROCEDURE — 99222 1ST HOSP IP/OBS MODERATE 55: CPT | Performed by: UROLOGY

## 2023-10-29 PROCEDURE — C9113 INJ PANTOPRAZOLE SODIUM, VIA: HCPCS

## 2023-10-29 PROCEDURE — 93005 ELECTROCARDIOGRAM TRACING: CPT

## 2023-10-29 PROCEDURE — 70496 CT ANGIOGRAPHY HEAD: CPT

## 2023-10-29 PROCEDURE — 83036 HEMOGLOBIN GLYCOSYLATED A1C: CPT | Performed by: PHYSICIAN ASSISTANT

## 2023-10-29 PROCEDURE — 96375 TX/PRO/DX INJ NEW DRUG ADDON: CPT

## 2023-10-29 PROCEDURE — 85610 PROTHROMBIN TIME: CPT

## 2023-10-29 PROCEDURE — 96366 THER/PROPH/DIAG IV INF ADDON: CPT

## 2023-10-29 PROCEDURE — 85730 THROMBOPLASTIN TIME PARTIAL: CPT

## 2023-10-29 PROCEDURE — 71260 CT THORAX DX C+: CPT

## 2023-10-29 PROCEDURE — 84443 ASSAY THYROID STIM HORMONE: CPT

## 2023-10-29 PROCEDURE — 81001 URINALYSIS AUTO W/SCOPE: CPT

## 2023-10-29 PROCEDURE — 83690 ASSAY OF LIPASE: CPT

## 2023-10-29 PROCEDURE — 80061 LIPID PANEL: CPT | Performed by: PHYSICIAN ASSISTANT

## 2023-10-29 PROCEDURE — 70551 MRI BRAIN STEM W/O DYE: CPT

## 2023-10-29 RX ORDER — TAMSULOSIN HYDROCHLORIDE 0.4 MG/1
0.4 CAPSULE ORAL
Status: DISCONTINUED | OUTPATIENT
Start: 2023-10-29 | End: 2023-10-29 | Stop reason: HOSPADM

## 2023-10-29 RX ORDER — PRAVASTATIN SODIUM 80 MG/1
80 TABLET ORAL
Status: DISCONTINUED | OUTPATIENT
Start: 2023-10-29 | End: 2023-10-29 | Stop reason: HOSPADM

## 2023-10-29 RX ORDER — AMOXICILLIN AND CLAVULANATE POTASSIUM 500; 125 MG/1; MG/1
1 TABLET, FILM COATED ORAL 2 TIMES DAILY
Status: DISCONTINUED | OUTPATIENT
Start: 2023-10-29 | End: 2023-10-29 | Stop reason: HOSPADM

## 2023-10-29 RX ORDER — ONDANSETRON 2 MG/ML
4 INJECTION INTRAMUSCULAR; INTRAVENOUS ONCE
Status: COMPLETED | OUTPATIENT
Start: 2023-10-29 | End: 2023-10-29

## 2023-10-29 RX ORDER — TRAZODONE HYDROCHLORIDE 50 MG/1
150 TABLET ORAL
Status: DISCONTINUED | OUTPATIENT
Start: 2023-10-29 | End: 2023-10-29 | Stop reason: HOSPADM

## 2023-10-29 RX ORDER — SODIUM CHLORIDE 9 MG/ML
125 INJECTION, SOLUTION INTRAVENOUS CONTINUOUS
Status: DISCONTINUED | OUTPATIENT
Start: 2023-10-29 | End: 2023-10-29 | Stop reason: HOSPADM

## 2023-10-29 RX ORDER — GABAPENTIN 300 MG/1
300 CAPSULE ORAL 3 TIMES DAILY
Status: DISCONTINUED | OUTPATIENT
Start: 2023-10-29 | End: 2023-10-29 | Stop reason: HOSPADM

## 2023-10-29 RX ORDER — SODIUM CHLORIDE 9 MG/ML
75 INJECTION, SOLUTION INTRAVENOUS ONCE
Status: COMPLETED | OUTPATIENT
Start: 2023-10-29 | End: 2023-10-29

## 2023-10-29 RX ORDER — AMOXICILLIN AND CLAVULANATE POTASSIUM 500; 125 MG/1; MG/1
500 TABLET, FILM COATED ORAL 2 TIMES DAILY
COMMUNITY
Start: 2023-10-25 | End: 2023-11-01

## 2023-10-29 RX ORDER — CHLORAL HYDRATE 500 MG
1000 CAPSULE ORAL 2 TIMES DAILY
Status: DISCONTINUED | OUTPATIENT
Start: 2023-10-29 | End: 2023-10-29 | Stop reason: HOSPADM

## 2023-10-29 RX ORDER — OMEPRAZOLE 20 MG/1
20 CAPSULE, DELAYED RELEASE ORAL DAILY
Qty: 30 CAPSULE | Refills: 0 | Status: SHIPPED | OUTPATIENT
Start: 2023-10-29 | End: 2023-11-28

## 2023-10-29 RX ORDER — EZETIMIBE 10 MG/1
10 TABLET ORAL DAILY
Status: DISCONTINUED | OUTPATIENT
Start: 2023-10-29 | End: 2023-10-29 | Stop reason: HOSPADM

## 2023-10-29 RX ORDER — METOCLOPRAMIDE HYDROCHLORIDE 5 MG/ML
10 INJECTION INTRAMUSCULAR; INTRAVENOUS ONCE
Status: COMPLETED | OUTPATIENT
Start: 2023-10-29 | End: 2023-10-29

## 2023-10-29 RX ADMIN — SODIUM CHLORIDE 75 ML/HR: 0.9 INJECTION, SOLUTION INTRAVENOUS at 06:25

## 2023-10-29 RX ADMIN — PANTOPRAZOLE SODIUM 80 MG: 40 INJECTION, POWDER, FOR SOLUTION INTRAVENOUS at 01:39

## 2023-10-29 RX ADMIN — PANTOPRAZOLE SODIUM 8 MG/HR: 40 INJECTION, POWDER, FOR SOLUTION INTRAVENOUS at 02:05

## 2023-10-29 RX ADMIN — SERTRALINE HYDROCHLORIDE 50 MG: 50 TABLET ORAL at 09:41

## 2023-10-29 RX ADMIN — IOHEXOL 100 ML: 350 INJECTION, SOLUTION INTRAVENOUS at 00:31

## 2023-10-29 RX ADMIN — GABAPENTIN 300 MG: 300 CAPSULE ORAL at 09:40

## 2023-10-29 RX ADMIN — PANTOPRAZOLE SODIUM 8 MG/HR: 40 INJECTION, POWDER, FOR SOLUTION INTRAVENOUS at 13:00

## 2023-10-29 RX ADMIN — SODIUM CHLORIDE 125 ML/HR: 0.9 INJECTION, SOLUTION INTRAVENOUS at 09:45

## 2023-10-29 RX ADMIN — EZETIMIBE 10 MG: 10 TABLET ORAL at 09:41

## 2023-10-29 RX ADMIN — AMOXICILLIN AND CLAVULANATE POTASSIUM 1 TABLET: 500; 125 TABLET, FILM COATED ORAL at 09:40

## 2023-10-29 RX ADMIN — OMEGA-3 FATTY ACIDS CAP 1000 MG 1000 MG: 1000 CAP at 09:41

## 2023-10-29 RX ADMIN — ONDANSETRON 4 MG: 2 INJECTION INTRAMUSCULAR; INTRAVENOUS at 00:44

## 2023-10-29 RX ADMIN — METOCLOPRAMIDE 10 MG: 5 INJECTION, SOLUTION INTRAMUSCULAR; INTRAVENOUS at 01:20

## 2023-10-29 NOTE — ASSESSMENT & PLAN NOTE
Patient reportedly had an episode of "coffee ground emesis" while in the ED. He reports that he had a similar episode of emesis just prior to presentation but was not concerned by it as he had eaten black licorice earlier in the evening. Hgb  15.7 initially, now 14.1  Started on Protonix drip; continued for now. Repeat H&H q12h. GI consult. Patient declined EGD, recommend protonix and recheck hemoglobin in 3 days. Keep NPO. Will hold Plavix and aspirin for now.

## 2023-10-29 NOTE — ASSESSMENT & PLAN NOTE
Patient reports that he was started on Augmentin 500-125mg BID on 10/25/23 for a dental infection and was prescribed this for 1 week; continue on admission.

## 2023-10-29 NOTE — QUICK NOTE
Stroke Alert Note   Luisana Zazueta 70 y.o. male  MRN: [id number]   Unit/Bed#: ED-36 Encounter: [id number]     Stroke alert text received at: 12:23am  Call from  received at: 12:24am  Neurology response by phone was immediate    78M with history of HTN, CAD s/p multiple stents (last in 2010) on DAPT with aspirin and plavix, HLD, presented to the ED with left flank pain and acute onset confusion. He was seen at his neurologic baseline at 9pm, but at 10pm was noted to be significantly confused. He started asking the same questions over and over again, not remembering the answers, or that he had already asked the questions. He did not recall details of what happened prior to presenting to the hospital.  He was also disoriented to the current month (no guess), and year (2003), and did not know the current president. He did recognize his wife, and was apparently joking with her. Exam otherwise reported to be unremarkable. Initial /98. Confusion nearly resolved by the end of the stroke alert. ED triage note/chief complaint reviewed, which indicates pt took a unknown pain medication prior to presentation. In the ED he was found to have an obstructive kidney stone, he had an episode of ground coffee emesis,     NIHSS reported as 0, but most likely 1-2 points for not being oriented. CT head wo:   "1.  Ectatic ascending thoracic aorta measuring 4 cm in diameter. 2.  Moderate focal narrowing of the left vertebral artery at the level of C7 due to osteophytes. Otherwise no hemodynamically significant stenosis of the arteries of the neck. 3.  No high-grade proximal stenosis of the visualized [place]. 4.  No significant intracranial arteriovenous malformation or aneurysm."  CTA head/neck: "No acute intracranial hemorrhage, midline shift, or mass effect.  If there is continued clinical concern for acute infarct, further evaluation with MRI may be obtained which is more sensitive."    IV TNK was not given due to rapidly improving symptoms. Uncertain etiology of episode of confusion. Suspect that it may be due to medication side effects of unknown pain medication and/or toxic/metabolic encephalopathy, with fairly low suspicion for stroke, although difficult to completely rule out based on the acute symptom onset. - Admit on stroke pathway  - continue home aspirin 81 mg daily, and plavix 75mg daily, unless antiplatelet therapy needs to be held due to concern for active GI bleed given episode of "coffee ground emesis. "  - pt apparently intolerant of high potency statins. Continue home simvastatin 40mg daily.  - MRI brain wo, echo, lipid panel, HbA1c. Could consider canceling stroke workup after pt seen in person if there is no significant suspicion for stroke. - permissive HTN to only , given low suspicion for stroke and that he is doing well with lower SBP. - monitor on telemetry  - normothermia, euglycemia  - avoid hypotonic fluids.       James Dodson MD

## 2023-10-29 NOTE — CONSULTS
Consultation - [address] [place] [address] [facility]  Tyson Lynch 70 y.o. male MRN: [id number]  Unit/Bed#: ED-36 Encounter: [id number]    ASSESSMENT/PLAN:     #1.  Coffee-ground emesis, no evidence of active upper GI bleeding currently although his BUN was noted to be elevated, appears possible he may have had recent GI bleed that is not currently active. May be related to Carrie-Alvarado tear or hemorrhagic gastritis, less likely but in the differential would be more significant pathology such as peptic ulcer disease or upper GI malignancy    -IV Protonix    -Monitor hemoglobin closely, transfuse if needed    -If he continues without signs of active GI bleeding he may have diet advanced today, but would recommend n.p.o. after midnight in preparation for EGD tomorrow    -EGD procedure was explained in detail to the patient at this time including associated risks and benefits    Inpatient consult to gastroenterology  Consult performed by: Cynthia Edmonds PA-C  Consult ordered by: Shashank Winter PA-C          Reason for Consult / Principal Problem:  Coffee ground emesis, patient on antiplatelet therapy at home with Plavix    HPI: Tyson Lynch is a 70y.o. year old male with history of coronary artery disease on antiplatelet therapy with Plavix who presented to the emergency room last night, his wife and reported that about 3 hours earlier he started to become apparently confused, with repetitive questioning, and also starting with complaints of pain in his left flank. He currently has an MRI of his brain pending, was seen by neurology, CT scan of the abdomen pelvis also showed some findings suggestive of obstructing ureteral calculus in the left UVJ, he was seen by urology as well. Our service were consulted as he was reported with an episode of coffee-ground appearing emesis while in the emergency room.   His BUN appears elevated at 29 with creatinine of 0.82, his hemoglobin does appear stable at 14.1 at this time. INR 1.03, platelets normal 103. He had a colonoscopy in January 2021 seeing the removal of 6 polyps, for which a colonoscopy in 3 years was recommended to follow-up. The patient says he has not had any further vomiting episodes since what was reported this morning, he denies any nausea or abdominal pain at this time. He thinks his last oral intake before the vomiting was a strawberry Sunday and some black licorice and he thinks this may be related to the reported appearance of his vomitus. He does not know of any history of peptic ulcer disease, does not think he has had an EGD before. Tells me he occasionally uses NSAIDs, about twice a week on average. Denies any alcohol use or known history of liver disease. REVIEW OF SYSTEMS:    CONSTITUTIONAL: Denies any fever, chills, or rigors. Good appetite, and no recent weight loss. HEENT: No earache or tinnitus. Denies hearing loss or visual disturbances. CARDIOVASCULAR: No chest pain or palpitations. RESPIRATORY: Denies any cough, hemoptysis, shortness of breath or dyspnea on exertion. GASTROINTESTINAL: As noted in the History of Present Illness. GENITOURINARY: No problems with urination. Denies any hematuria or dysuria. NEUROLOGIC: No dizziness or vertigo, denies headaches. MUSCULOSKELETAL: Denies any muscle or joint pain. SKIN: Denies skin rashes or itching. ENDOCRINE: Denies excessive thirst. Denies intolerance to heat or cold. PSYCHOSOCIAL: Denies depression or anxiety. Denies any recent memory loss.        Historical Information   Past Medical History:   Diagnosis Date    Celiac disease     Coronary artery disease     Diverticulitis     Diverticulitis of ascending colon 10/27/2022    Diverticulosis     Hyperlipidemia     Hypertension     Myocardial infarction ([address]) 1990     Past Surgical History:   Procedure Laterality Date    COLONOSCOPY      CORONARY ANGIOPLASTY WITH STENT PLACEMENT      Multiple times    [place] ARTHRP KNE CONDYLE&PLATU MEDIAL&LAT COMPARTMENTS Right 2022    Procedure: ARTHROPLASTY KNEE TOTAL and all associated procedures;  Surgeon: Iza Nuno MD;  Location:  MAIN OR;  Service: Orthopedics    [place] ARTHRP KNE CONDYLE&PLATU MEDIAL&LAT COMPARTMENTS Left 2022    Procedure: ARTHROPLASTY KNEE TOTAL;  Surgeon: Iza Nuon MD;  Location:  MAIN OR;  Service: Orthopedics     Social History   Social History     Substance and Sexual Activity   Alcohol Use Not Currently     Social History     Substance and Sexual Activity   Drug Use Never     Social History     Tobacco Use   Smoking Status Former    Types: Cigarettes    Start date: 1964    Quit date:     Years since quittin.8    Passive exposure: Never   Smokeless Tobacco Never   Tobacco Comments    Per pt, quit over 36 years ago     Family History   Problem Relation Age of Onset    Diabetes Mother     Coronary artery disease Father     Thyroid disease Sister        Meds/Allergies     (Not in a hospital admission)    Current Facility-Administered Medications   Medication Dose Route Frequency    amoxicillin-clavulanate (AUGMENTIN) 500-125 mg per tablet 1 tablet  1 tablet Oral BID    ezetimibe (ZETIA) tablet 10 mg  10 mg Oral Daily    fish oil capsule 1,000 mg  1,000 mg Oral BID    gabapentin (NEURONTIN) capsule 300 mg  300 mg Oral TID    pantoprazole (PROTONIX) 80 mg in sodium chloride 0.9 % 100 mL infusion  8 mg/hr Intravenous Continuous    pravastatin (PRAVACHOL) tablet 80 mg  80 mg Oral Daily With Dinner    sertraline (ZOLOFT) tablet 50 mg  50 mg Oral Daily    tamsulosin (FLOMAX) capsule 0.4 mg  0.4 mg Oral Daily With Dinner    traZODone (DESYREL) tablet 150 mg  150 mg Oral HS       Allergies   Allergen Reactions    Crestor [Rosuvastatin] Myalgia           Objective     Blood pressure 130/82, pulse 66, temperature 98.3 °F (36.8 °C), temperature source Oral, resp.  rate 16, height 5' 10" (1.778 m), weight 108 kg (238 lb 1.6 oz), SpO2 94 %.      Intake/Output Summary (Last 24 hours) at 10/29/2023 0850  Last data filed at 10/29/2023 0155  Gross per 24 hour   Intake 100 ml   Output --   Net 100 ml         PHYSICAL EXAM     General Appearance:   Alert, cooperative, no distress, appears stated age    HEENT:   Normocephalic, atraumatic, anicteric. Neck:  Supple, symmetrical, trachea midline, no adenopathy;    thyroid: no enlargement/tenderness/nodules; no carotid  bruit or JVD    Lungs:   Clear to auscultation bilaterally; no rales, rhonchi or wheezing; respirations unlabored    Heart[de-identified]   S1 and S2 normal; regular rate and rhythm; no murmur, rub, or gallop.    Abdomen:   Soft, non-tender, non-distended; normal bowel sounds; no masses, no organomegaly    Genitalia:   Deferred    Rectal:   Deferred    Extremities:  No cyanosis, clubbing or edema    Pulses:  2+ and symmetric all extremities    Skin:  Skin color, texture, turgor normal, no rashes or lesions    Lymph nodes:  No palpable cervical, axillary or inguinal lymphadenopathy        Lab Results:   Admission on 10/29/2023   Component Date Value    POC Glucose 10/29/2023 112     Sodium 10/29/2023 135     Potassium 10/29/2023 4.8     Chloride 10/29/2023 104     CO2 10/29/2023 23     ANION GAP 10/29/2023 8     BUN 10/29/2023 31 (H)     Creatinine 10/29/2023 1.19     Glucose 10/29/2023 122     Calcium 10/29/2023 10.1     eGFR 10/29/2023 58     WBC 10/29/2023 7.26     RBC 10/29/2023 4.71     Hemoglobin 10/29/2023 15.7     Hematocrit 10/29/2023 43.6     MCV 10/29/2023 93     MCH 10/29/2023 33.3     MCHC 10/29/2023 36.0     RDW 10/29/2023 12.7     Platelets 84/11/2526 211     MPV 10/29/2023 9.9     Protime 10/29/2023 14.2     INR 10/29/2023 1.03     PTT 10/29/2023 29     hs TnI 0hr 10/29/2023 4     SARS-CoV-2 10/29/2023 Negative     INFLUENZA A PCR 10/29/2023 Negative     INFLUENZA B PCR 10/29/2023 Negative     RSV PCR 10/29/2023 Negative     Ventricular Rate 10/29/2023 67     Atrial Rate 10/29/2023 67 TN Interval 10/29/2023 204     QRSD Interval 10/29/2023 162     QT Interval 10/29/2023 436     QTC Interval 10/29/2023 460     P Axis 10/29/2023 34     QRS [place] 10/29/2023 -21     T Wave Axis 10/29/2023 11     TSH 3RD GENERATON 10/29/2023 1.532     Color, UA 10/29/2023 Yellow     Clarity, UA 10/29/2023 Clear     Specific Gravity, UA 10/29/2023 >=1.050 (H)     pH, UA 10/29/2023 7.0     Leukocytes, UA 10/29/2023 Negative     Nitrite, UA 10/29/2023 Negative     Protein, UA 10/29/2023 Trace (A)     Glucose, UA 10/29/2023 Negative     Ketones, UA 10/29/2023 Negative     Urobilinogen, UA 10/29/2023 <2.0     Bilirubin, UA 10/29/2023 Negative     Occult Blood, UA 10/29/2023 Moderate (A)     Lipase 10/29/2023 23     Total Bilirubin 10/29/2023 0.74     Bilirubin, Direct 10/29/2023 0.06     Alkaline Phosphatase 10/29/2023 71     AST 10/29/2023 38     ALT 10/29/2023 24     Total Protein 10/29/2023 7.6     Albumin 10/29/2023 4.5     Ventricular Rate 10/29/2023 66     Atrial Rate 10/29/2023 66     TN Interval 10/29/2023 222     QRSD Interval 10/29/2023 160     QT Interval 10/29/2023 432     QTC Interval 10/29/2023 452     P Axis 10/29/2023 41     QRS [place] 10/29/2023 -22     T Wave [place] 10/29/2023 12     hs TnI 2hr 10/29/2023 9     Delta 2hr hsTnI 10/29/2023 5     ABO Grouping 10/29/2023 A     Rh Factor 10/29/2023 Positive     Antibody Screen 10/29/2023 Negative     Specimen Expiration Date 10/29/2023 [id number]     RBC, UA 10/29/2023 Innumerable (A)     WBC, UA 10/29/2023 1-2     Epithelial Cells 10/29/2023 None Seen     Bacteria, UA 10/29/2023 Occasional     MUCUS THREADS 10/29/2023 Occasional (A)     Hyaline Casts, UA 10/29/2023 3-5 (A)     Amph/Meth UR 10/29/2023 Negative     Barbiturate Ur 10/29/2023 Negative     Benzodiazepine Urine 10/29/2023 Negative     Cocaine Urine 10/29/2023 Negative     Methadone Urine 10/29/2023 Negative     Opiate Urine 10/29/2023 Negative     PCP Ur 10/29/2023 Negative     THC Urine 10/29/2023 Negative     Oxycodone Urine 10/29/2023 Positive (A)     Cholesterol 10/29/2023 135     Triglycerides 10/29/2023 194 (H)     HDL, Direct 10/29/2023 37 (L)     LDL Calculated 10/29/2023 59     WBC 10/29/2023 8.28     RBC 10/29/2023 4.36     Hemoglobin 10/29/2023 14.1     Hematocrit 10/29/2023 40.4     MCV 10/29/2023 93     MCH 10/29/2023 32.3     MCHC 10/29/2023 34.9     RDW 10/29/2023 12.6     Platelets 39/29/6543 194     MPV 10/29/2023 9.7     Sodium 10/29/2023 136     Potassium 10/29/2023 4.2     Chloride 10/29/2023 107     CO2 10/29/2023 24     ANION GAP 10/29/2023 5     BUN 10/29/2023 29 (H)     Creatinine 10/29/2023 0.82     Glucose 10/29/2023 113     Calcium 10/29/2023 9.4     eGFR 10/29/2023 85        Imaging Studies: I have personally reviewed pertinent reports. CT CHEST, ABDOMEN AND PELVIS WITH IV CONTRAST     INDICATION:   left flank pain. COMPARISON: CT of the chest abdomen pelvis on November 15, 2022. TECHNIQUE: CT examination of the chest, abdomen and pelvis was performed. Multiplanar 2D reformatted images were created from the source data. This examination, like all CT scans performed in the [facility], was performed utilizing techniques to minimize radiation dose exposure, including the use of iterative reconstruction and automated exposure control. Radiation dose length   product (DLP) for this visit:  1049 mGy-cm     IV Contrast:  100 mL of iohexol (OMNIPAQUE)  Enteric Contrast: Enteric contrast was administered. FINDINGS:     CHEST     LUNGS:  Lungs are clear. There is no tracheal or endobronchial lesion. PLEURA:  Unremarkable. HEART/GREAT VESSELS: Coronary artery calcifications. No pericardial effusion. There is fusiform ectasia of the ascending thoracic aorta measuring up to 45 mm. Recommendation is for follow-up low radiation dose chest CT in one year. MEDIASTINUM AND NICOL:  Unremarkable.      CHEST WALL AND LOWER NECK: Mild left greater than right gynecomastia. ABDOMEN     LIVER/BILIARY TREE:  Unremarkable. GALLBLADDER:  No calcified gallstones. No pericholecystic inflammatory change. SPLEEN:  Unremarkable. PANCREAS:  Unremarkable. ADRENAL GLANDS:  Unremarkable. KIDNEYS/URETERS: 3 x 2 x 2 mm calculus at the left ureterovesicular junction (series 307, image 213. There is moderate left hydronephrosis. Mild left perinephric stranding. There is a delayed left nephrogram. No right-sided hydronephrosis. STOMACH AND BOWEL: Colonic diverticulosis without evidence of diverticulitis. APPENDIX:  A normal appendix was visualized. ABDOMINOPELVIC CAVITY:  No ascites. No pneumoperitoneum. No lymphadenopathy. VESSELS:  Unremarkable for patient's age. PELVIS     REPRODUCTIVE ORGANS:  Unremarkable for patient's age. URINARY BLADDER:  Unremarkable. ABDOMINAL WALL/INGUINAL REGIONS:  Unremarkable. OSSEOUS STRUCTURES:  No acute fracture or destructive osseous lesion. Spinal degenerative changes are noted. Old right superior and inferior pubic ramus fractures. IMPRESSION:     1.  3 x 2 x 2 mm calculus in the left ureterovesicular junction with associated moderate left hydronephrosis. Delayed left nephrogram which is likely due to significant obstruction however correlate with urinalysis to exclude superimposed infection. 2.  Stable fusiform ectasia of the ascending thoracic aorta measuring up to 45 mm. The patient was seen and examined by Dr. Dayton Lanier, all key medical decisions were made with Dr. Dayton Lanier. Thank you for allowing us to participate in the care of this pleasant patient. We will follow up with you closely.

## 2023-10-29 NOTE — ASSESSMENT & PLAN NOTE
70 y.o. male with HTN, CAD s/p multiple stents (last in 2010), HLD who presented to [facility] ED on 10/28/23 with left flank pain and acute onset of confusion. A stroke alert was initiated. LKW 9 PM 10/28/23. BP on arrival 164/98. CTH revealed no acute intracranial hemorrhage, midline shift, or mass effect. CTA H/N wwo contrast revealed no IR target. NIHSS reported per overnight stroke alert quick note 0. Pt not a thrombolytic candidate due to rapidly improving symptoms. Pt on ASA and Plavix PTA. Neurodiagnostics  -CT head 10/29/23:  "No acute intracranial hemorrhage, midline shift, or mass effect. If there is continued clinical concern for acute infarct, further evaluation with MRI may be obtained which is more sensitive. "  -CTA head/neck 10/29/23:  "1.  Ectatic ascending thoracic aorta measuring 4 cm in diameter. 2.  Moderate focal narrowing of the left vertebral artery at the level of C7 due to osteophytes. Otherwise no hemodynamically significant stenosis of the arteries of the neck. 3.  No high-grade proximal stenosis of the visualized [place]. 4.  No significant intracranial arteriovenous malformation or aneurysm. "  - MRI brain wo contrast 10/29/23:   "No acute intracranial pathology. Chronic microangiopathy. "  - Labs:   - hemoglobin A1c: pending   - lipid panel: Total cholesterol 135, triglycerides 194, LDL 59    Confusion likely due to taking opoid medication. Pt now back to baseline mentation. Plan:  - Stroke pathway  MRI brain wo contrast completed, official report pending  Echo pending   Antiplatelets being held by primary team due to concern for GI bleed. Recommend restarting home antiplatelet medications when able   Continue pravastatin 80 mg daily   Normalize BP   Euglycemic, normothermic goal  Continue telemetry  PT/OT/ST  Stroke education  Frequent neuro checks. Continue to monitor and notify neurology with any changes.   STAT CT head for any acute change in neuro exam  - Medical management and supportive care per primary team. Correction of any metabolic or infectious disturbances.

## 2023-10-29 NOTE — CONSULTS
Consultation - Neurology   Daphine Dakins 70 y.o. male MRN: [id number]  Unit/Bed#: ED-36 Encounter: [id number]      Assessment/Plan   * Acute confusion  Assessment & Plan  70 y.o. male with HTN, CAD s/p multiple stents (last in 2010), HLD who presented to [facility] ED on 10/28/23 with left flank pain and acute onset of confusion. A stroke alert was initiated. LKW 9 PM 10/28/23. BP on arrival 164/98. CTH revealed no acute intracranial hemorrhage, midline shift, or mass effect. CTA H/N wwo contrast revealed no IR target. NIHSS reported per overnight stroke alert quick note 0. Pt not a thrombolytic candidate due to rapidly improving symptoms. Pt on ASA and Plavix PTA. Neurodiagnostics  -CT head 10/29/23:  "No acute intracranial hemorrhage, midline shift, or mass effect. If there is continued clinical concern for acute infarct, further evaluation with MRI may be obtained which is more sensitive. "  -CTA head/neck 10/29/23:  "1.  Ectatic ascending thoracic aorta measuring 4 cm in diameter. 2.  Moderate focal narrowing of the left vertebral artery at the level of C7 due to osteophytes. Otherwise no hemodynamically significant stenosis of the arteries of the neck. 3.  No high-grade proximal stenosis of the visualized [place]. 4.  No significant intracranial arteriovenous malformation or aneurysm. "  - MRI brain wo contrast 10/29/23:   "No acute intracranial pathology. Chronic microangiopathy. "  - Labs:   - hemoglobin A1c: pending   - lipid panel: Total cholesterol 135, triglycerides 194, LDL 59    Confusion likely due to taking opoid medication. Pt now back to baseline mentation. Plan:  - Stroke pathway  MRI brain wo contrast completed, official report pending  Echo pending   Antiplatelets being held by primary team due to concern for GI bleed.  Recommend restarting home antiplatelet medications when able   Continue pravastatin 80 mg daily   Normalize BP   Euglycemic, normothermic goal  Continue telemetry  PT/OT/ST  Stroke education  Frequent neuro checks. Continue to monitor and notify neurology with any changes. STAT CT head for any acute change in neuro exam  - Medical management and supportive care per primary team. Correction of any metabolic or infectious disturbances. Emesis  Assessment & Plan  -Episode of coffee-ground emesis in the ED  -Started on Protonix drip  -Close blood count monitoring  -GI consult, NPO for now    Hydronephrosis with obstructing calculus  Assessment & Plan  -Noted on CT of the abdomen/pelvis  -Urology consult pending  -IV fluids, pain control per primary team    Aneurysm of thoracic aorta  Assessment & Plan  - recommend vascular surgery consult. CAD (coronary artery disease)  Assessment & Plan  -On aspirin/Plavix at home prior to admission  -As mentioned above on hold at the moment due to emesis/possible GI bleed      Case and plan discussed with attending neurologist.  Please see attending attestation for any further recommendations and/or changes to plan. Henrique Vogel will need follow up in in 3 months with neurovascular attending for following L vertebral artery stenosis. He will not require outpatient neurological testing. History of Present Illness     Reason for Consult / Principal Problem: Confusion/AMS, perseverative speech, status post stroke alert  Hx and PE limited by: N/A   HPI: Henrique Vogel is a 70 y.o. right handed male with HTN, CAD s/p multiple stents (last in 2010), HLD who [facility] ED on 10/28/23 with left flank pain and acute onset of confusion. A stroke alert was initiated. LKW 9 PM 10/28/23. BP on arrival 164/98. CTH revealed no acute intracranial hemorrhage, midline shift, or mass effect. CTA H/N wwo contrast revealed no IR target. NIHSS reported per overnight stroke alert quick note 0. Pt not a thrombolytic candidate due to rapidly improving symptoms. Pt on ASA and Plavix PTA.      Per overnight stroke alert quick note: Around 10 PM, patient was noted to be significantly confused. Patient started asking the same questions over and over, not remembering the answers with that he had already asked the questions. Patient did not recall details of what happened prior to presenting to the hospital.  Patient disoriented to the current month () and year (2003) as well as did not know the current president. Patient did recognize his wife. Confusion reportedly nearly resolved by the end of stroke alert. Patient took an unknown pain medication prior to presenting to the hospital.    Patient reports he took a half of an oxycodone last night prior to coming to the ED. Pt reports he remembers feeling confused. Pt believes he has had some confusion before after taking an opioid medication after a procedure. Pt reports he feels back to his baseline mentation. Pt reports abdominal pain is improved compared to yesterday. Pt denies numbness/tingling. Pt reports he has decreased sensation in LLE at baseline since his L knee replacement. Pt denies one sided weakness. Pt denies nausea and/or vomiting. Inpatient consult to Neurology  Consult performed by: Israel Noguera PA-C  Consult ordered by: Marta Lazcano PA-C      Consult to Neurology  Consult performed by: Israel Noguera PA-C  Consult ordered by: Roderick Burkitt, MD          Review of Systems   Neurological:  Negative for dizziness, facial asymmetry, speech difficulty, weakness, numbness and headaches.        Historical Information   Past Medical History:   Diagnosis Date    Celiac disease     Coronary artery disease     Diverticulitis     Diverticulitis of ascending colon 10/27/2022    Diverticulosis     Hyperlipidemia     Hypertension     Myocardial infarction ([address]) 1990     Past Surgical History:   Procedure Laterality Date    COLONOSCOPY      CORONARY ANGIOPLASTY WITH STENT PLACEMENT      Multiple times    [place] ARTHRP KNE CONDYLE&PLATU MEDIAL&LAT COMPARTMENTS Right 2022    Procedure: ARTHROPLASTY KNEE TOTAL and all associated procedures;  Surgeon: Jose Meneses MD;  Location: [facility];  Service: Orthopedics    [place] ARTHRP KNE CONDYLE&PLATU MEDIAL&LAT COMPARTMENTS Left 2022    Procedure: ARTHROPLASTY KNEE TOTAL;  Surgeon: Jose Meneses MD;  Location:  MAIN OR;  Service: Orthopedics     Social History   Social History     Substance and Sexual Activity   Alcohol Use Not Currently     Social History     Substance and Sexual Activity   Drug Use Never     E-Cigarette/Vaping    E-Cigarette Use Never User      E-Cigarette/Vaping Substances    Nicotine No     THC No     CBD No     Flavoring No     Other No     Unknown No      Social History     Tobacco Use   Smoking Status Former    Types: Cigarettes    Start date: 1964    Quit date:     Years since quittin.8    Passive exposure: Never   Smokeless Tobacco Never   Tobacco Comments    Per pt, quit over 36 years ago     Family History:   Family History   Problem Relation Age of Onset    Diabetes Mother     Coronary artery disease Father     Thyroid disease Sister        Review of previous medical records was completed.      Meds/Allergies   current meds:   Current Facility-Administered Medications   Medication Dose Route Frequency    amoxicillin-clavulanate (AUGMENTIN) 500-125 mg per tablet 1 tablet  1 tablet Oral BID    ezetimibe (ZETIA) tablet 10 mg  10 mg Oral Daily    fish oil capsule 1,000 mg  1,000 mg Oral BID    gabapentin (NEURONTIN) capsule 300 mg  300 mg Oral TID    pantoprazole (PROTONIX) 80 mg in sodium chloride 0.9 % 100 mL infusion  8 mg/hr Intravenous Continuous    pravastatin (PRAVACHOL) tablet 80 mg  80 mg Oral Daily With Dinner    sertraline (ZOLOFT) tablet 50 mg  50 mg Oral Daily    sodium chloride 0.9 % infusion  125 mL/hr Intravenous Continuous    tamsulosin (FLOMAX) capsule 0.4 mg  0.4 mg Oral Daily With Dinner    traZODone (DESYREL) tablet 150 mg  150 mg Oral HS    and PTA meds:   Prior to Admission Medications   Prescriptions Last Dose Informant Patient Reported? Taking?    Docusate Sodium (DSS) 100 MG CAPS  Self Yes No   Sig: docusate sodium 100 mg capsule   TAKE 1 CAPSULE BY MOUTH TWICE A DAY   Patient not taking: Reported on 6/8/2023   amoxicillin-clavulanate (AUGMENTIN) 500-125 mg per tablet   Yes Yes   Sig: Take 500 mg by mouth 2 (two) times a day   aspirin (ECOTRIN LOW STRENGTH) 81 mg EC tablet  Self No No   Sig: Take 1 tablet (81 mg total) by mouth daily Please do not start taking until after total joint arthroplasty   b complex vitamins capsule   Yes No   Sig: Take 1 capsule by mouth daily   baclofen 10 mg tablet   No No   Sig: TAKE 1 TABLET BY MOUTH 3 TIMES  DAILY AS NEEDED FOR MUSCLE  SPASM(S)   calcium carbonate (OS-LANA) 1250 (500 Ca) MG chewable tablet  Self No No   Sig: Chew 1 tablet (1,250 mg total) daily   cholecalciferol (VITAMIN D3) 1,000 units tablet  Self No No   Sig: Take 1 tablet (1,000 Units total) by mouth daily   clopidogrel (PLAVIX) 75 mg tablet   No No   Sig: Take 1 tablet (75 mg total) by mouth every morning   ezetimibe (ZETIA) 10 mg tablet   No No   Sig: TAKE 1 TABLET BY MOUTH DAILY   gabapentin (NEURONTIN) 300 mg capsule   No No   Sig: TAKE 1 CAPSULE BY MOUTH 3 TIMES  DAILY   ketoconazole (NIZORAL) 2 % shampoo  Self No No   Sig: APPLY TOPICALLY 2 TIMES A WEEK   metoprolol succinate (TOPROL-XL) 50 mg 24 hr tablet  Self No No   Sig: TAKE 1 TABLET BY MOUTH  DAILY   omega-3-acid ethyl esters (LOVAZA) 1 g capsule   No No   Sig: TAKE 2 CAPSULES BY MOUTH TWICE  DAILY   sertraline (ZOLOFT) 50 mg tablet   No No   Sig: TAKE 1 TABLET BY MOUTH DAILY   simvastatin (ZOCOR) 40 mg tablet   No No   Sig: TAKE 1 TABLET BY MOUTH DAILY AT  BEDTIME   tamsulosin (FLOMAX) 0.4 mg   No No   Sig: TAKE 1 CAPSULE BY MOUTH  DAILY WITH DINNER   traZODone (DESYREL) 150 mg tablet   No No   Sig: TAKE 1 TABLET BY MOUTH DAILY AT  BEDTIME      Facility-Administered Medications: None Allergies   Allergen Reactions    Crestor [Rosuvastatin] Myalgia       Objective   Vitals:Blood pressure 160/94, pulse 68, temperature 98.3 °F (36.8 °C), temperature source Oral, resp. rate 16, height 5' 10" (1.778 m), weight 108 kg (238 lb 1.6 oz), SpO2 96 %. ,Body mass index is 34.16 kg/m². Intake/Output Summary (Last 24 hours) at 10/29/2023 1121  Last data filed at 10/29/2023 0852  Gross per 24 hour   Intake 283.75 ml   Output --   Net 283.75 ml       Invasive Devices: Invasive Devices       Peripheral Intravenous Line  Duration             Peripheral IV 10/29/23 Left Antecubital <1 day    Peripheral IV 10/29/23 Left Forearm <1 day                    Physical Exam  Vitals and nursing note reviewed. Constitutional:       General: He is not in acute distress. Appearance: He is not diaphoretic. HENT:      Head: Normocephalic and atraumatic. Nose: Nose normal.      Mouth/Throat:      Mouth: Mucous membranes are moist.   Eyes:      General: No scleral icterus. Right eye: No discharge. Left eye: No discharge. Extraocular Movements: Extraocular movements intact and EOM normal.      Conjunctiva/sclera: Conjunctivae normal.   Cardiovascular:      Rate and Rhythm: Normal rate. Pulmonary:      Effort: Pulmonary effort is normal.   Neurological:      Mental Status: He is alert. Coordination: Finger-Nose-Finger Test normal.   Psychiatric:      Comments: Pleasant and cooperative during exam        Neurologic Exam     Mental Status   Follows 1 step commands. Attention: appropriately attends to provider. Concentration: no redirection or reorientation required during exam.   Speech: (No dysarthria appreciated on exam. Speech fluent. )  Level of consciousness: alert  Able to name object (glove, glasses, watch).     -oriented to self   -oriented to month and year   -Oriented to place "[facility]"     Cranial Nerves     CN II   Visual acuity: (grossly intact)  Right visual field deficit: none  Left visual field deficit: none     CN III, IV, VI   Extraocular motions are normal.   Right pupil: Size: 3 mm. Shape: regular. Left pupil: Size: 3 mm. Shape: regular. Nystagmus: none   Conjugate gaze: present    CN V   Facial sensation intact. Right facial sensation deficit: none  Left facial sensation deficit: none    CN VII   Facial expression full, symmetric. Right facial weakness: none  Left facial weakness: none    CN VIII   Hearing impaired: grossly intact. CN XI   Right trapezius strength: normal  Left trapezius strength: normal    CN XII   CN XII normal.   Tongue: not atrophic  Fasciculations: absent  Tongue deviation: none    Motor Exam   Muscle bulk: normal  Overall muscle tone: normal Strength to confrontation testing:  -Bilateral hand  5/5   -bilateral elbow flexion and extension 5/5   -Bilateral shoulder abduction 5/5   -Bilateral plantar flexion and dorsiflexion 5/5   -bilateral knee flexion and extension 5/5   -Bilateral hip flexion 5/5     Sensory Exam   Light touch normal.   Right arm light touch: normal  Left arm light touch: normal  Right leg light touch: normal  Left leg light touch: normal   -extinction absent     Gait, Coordination, and Reflexes     Gait  Gait: (deferred for patient safety)    Coordination   Finger to nose coordination: normal      Lab Results: I have personally reviewed pertinent reports.   , CBC:   Results from last 7 days   Lab Units 10/29/23  0620 10/29/23  0024   WBC Thousand/uL 8.28 7.26   RBC Million/uL 4.36 4.71   HEMOGLOBIN g/dL 14.1 15.7   HEMATOCRIT % 40.4 43.6   MCV fL 93 93   PLATELETS Thousands/uL 194 211   , BMP/CMP:   Results from last 7 days   Lab Units 10/29/23  0620 10/29/23  0024   SODIUM mmol/L 136 135   POTASSIUM mmol/L 4.2 4.8   CHLORIDE mmol/L 107 104   CO2 mmol/L 24 23   BUN mg/dL 29* 31*   CREATININE mg/dL 0.82 1.19   CALCIUM mg/dL 9.4 10.1   AST U/L  --  38   ALT U/L  --  24   ALK PHOS U/L  --  71   EGFR ml/min/1.73sq m 85 58   , Vitamin B12:   , HgBA1C:   , TSH:   Results from last 7 days   Lab Units 10/29/23  0025   TSH 3RD GENERATON uIU/mL 1.532   , Coagulation:   Results from last 7 days   Lab Units 10/29/23  0024   INR  1.03   , Lipid Profile:   Results from last 7 days   Lab Units 10/29/23  0620   HDL mg/dL 37*   LDL CALC mg/dL 59   TRIGLYCERIDES mg/dL 194*   , Ammonia:   , Urinalysis:   Results from last 7 days   Lab Units 10/29/23  0220   COLOR UA  Yellow   CLARITY UA  Clear   SPEC GRAV UA  >=1.050*   PH UA  7.0   LEUKOCYTES UA  Negative   NITRITE UA  Negative   GLUCOSE UA mg/dl Negative   KETONES UA mg/dl Negative   BILIRUBIN UA  Negative   BLOOD UA  Moderate*   , Drug Screen:   Results from last 7 days   Lab Units 10/29/23  0220   BARBITURATE UR  Negative   BENZODIAZEPINE UR  Negative   THC UR  Negative   COCAINE UR  Negative   METHADONE URINE  Negative   OPIATE UR  Negative   PCP UR  Negative   , Medication Drug Levels:       Invalid input(s): "CARBAMAZEPINE", "LACOSAMIDE", "OXCARBAZEPINE"  Imaging Studies: I have personally reviewed pertinent reports. and I have personally reviewed pertinent films in PACS CT head without contrast 10/29/2023, CTA head and neck with and without contrast 10/29/2023, MRI brain without contrast 10/29/2023  EKG, Pathology, and Other Studies: I have personally reviewed pertinent reports.     VTE Prophylaxis: Sequential compression device (Venodyne)     Code Status: Level 1 - Full Code  Advance Directive and Living Will:      Power of :    POLST:      Counseling / Coordination of Care  I have spent a total time of 60 minutes on 10/29/23 in caring for this patient including Diagnostic results, Prognosis, Risks and benefits of tx options, Instructions for management, Patient and family education, Importance of tx compliance, Risk factor reductions, Impressions, Counseling / Coordination of care, Documenting in the medical record, Reviewing / ordering tests, medicine, procedures  , Obtaining or reviewing history  , and Communicating with other healthcare professionals . This note was completed in part utilizing [facility]. Grammatical errors, random word insertions, spelling mistakes, and incomplete sentences may be an occasional consequence of this system secondary to software limitations, ambient noise, and hardware issues. If you have any questions or concerns about the content, text, or information contained within the body of this dictation, please contact the provider for clarification.

## 2023-10-29 NOTE — Clinical Note
Case was discussed with Tiny Velazquez and the patient's admission status was agreed to be Admission Status: inpatient status to the service of Dr. Lanny Frankel .

## 2023-10-29 NOTE — H&P
[address] [facility]  H&P  Name: Duncan Motta 70 y.o. male I MRN: [id number]  Unit/Bed#: ED-36 I Date of Admission: 10/29/2023   Date of Service: 10/29/2023 I Hospital Day: 0      Assessment/Plan   * Acute confusion  Assessment & Plan  Presented with acute onset of confusion  R/o TIA/CVA vs acute encephalopathy, medication related. CTH: no acute findings. CTA head and neck: "Ectatic ascending thoracic aorta measuring 4 cm in diameter. Moderate focal narrowing of the left vertebral artery at the level of C7 due to osteophytes."  Stroke pathway. Neuro checks per protocol. Notify of any changes. Obtain MRI brain. Obtain echo. Check lipid panel and Hgb A1c. Continue statin   Hold AM dose of aspirin given episode of emesis. Neurology consult. PT/OT/ST ellis. Patient's wife reported on presentation that he had taken a pain medication prior to coming in; UDS positive for oxycodone    Emesis  Assessment & Plan  Patient reportedly had an episode of "coffee ground emesis" while in the ED. He reports that he had a similar episode of emesis just prior to presentation but was not concerned by it as he had eaten black licorice earlier in the evening. Hgb  15.7 initially, now 14.1  Started on Protonix drip; continued for now. Repeat H&H q12h. GI consult. Keep NPO. Will hold Plavix and aspirin for now. Hydronephrosis with obstructing calculus  Assessment & Plan  Reported left sided flank pain since last evening  CT A/P: "3 x 2 x 2 mm calculus in the left ureterovesicular junction with associated moderate left hydronephrosis."  Urology consult. Keep NPO. IV fluids. Pain control. Urine is without evidence of infection. Gentle IV fluids     Dental infection  Assessment & Plan  Patient reports that he was started on Augmentin 500-125mg BID on 10/25/23 for a dental infection and was prescribed this for 1 week; continue on admission.      Aneurysm of thoracic aorta  Assessment & Plan  "Stable fusiform ectasia of the ascending thoracic aorta measuring up to 45 mm" noted on CT scan. Hyperlipidemia  Assessment & Plan  Continue statin, Zetia and Lovaza. CAD (coronary artery disease)  Assessment & Plan  S/p multiple PCI. Follows with cardiology as an outpatient. On aspirin, Plavix, metoprolol and statin. Hold aspirin and Plavix given possible GI bleed. VTE Pharmacologic Prophylaxis: VTE Score: 9 High Risk (Score >/= 5) - Pharmacological DVT Prophylaxis Contraindicated. Sequential Compression Devices Ordered. Code Status: Full Code  Discussion with family: Patient declined call to . Anticipated Length of Stay: Patient will be admitted on an inpatient basis with an anticipated length of stay of greater than 2 midnights secondary to kidney stone need for urology eval, confusion with need for neurology eval.    Total Time Spent on Date of Encounter in care of patient: This time was spent on one or more of the following: performing physical exam; counseling and coordination of care; obtaining or reviewing history; documenting in the medical record; reviewing/ordering tests, medications or procedures; communicating with other healthcare professionals and discussing with patient's family/caregivers. Chief Complaint: left flank pain and confusion    History of Present Illness:  Staci Love is a 70 y.o. male with a PMH of CAD s/p stenting, HLD, HTN who presents with left flank pain and confusion. Patient reports that he was in his usual state of health until last evening when he had sudden onset of severe left flank pain. He reports that shortly after onset of his pain he began to feel nauseous and had an episode of dark/ black emesis at home but had eaten black licorice earlier in the evening. He reportedly took a pain medication at home and then became confused. Per patient's records, he was asking the same question repetitively and was disoriented. His confusion has resolved since presentation. He denies fevers/ chill, chest pain, SOB, cough, diarrhea, bloody stools. Review of Systems:  Review of Systems   Constitutional:  Negative for appetite change, chills and fever. Respiratory:  Negative for choking and shortness of breath. Cardiovascular:  Negative for chest pain and leg swelling. Gastrointestinal:  Positive for nausea and vomiting. Negative for abdominal pain and blood in stool. Genitourinary:  Positive for flank pain (left). Negative for dysuria and frequency. Neurological:  Negative for dizziness, speech difficulty, weakness, light-headedness, numbness and headaches. Psychiatric/Behavioral:  Positive for confusion. All other systems reviewed and are negative. Past Medical and Surgical History:   Past Medical History:   Diagnosis Date    Celiac disease     Coronary artery disease     Diverticulitis     Diverticulitis of ascending colon 10/27/2022    Diverticulosis     Hyperlipidemia     Hypertension     Myocardial infarction ([address]) 1990       Past Surgical History:   Procedure Laterality Date    COLONOSCOPY      CORONARY ANGIOPLASTY WITH STENT PLACEMENT      Multiple times    DE ARTHRP KNE CONDYLE&PLATU MEDIAL&LAT COMPARTMENTS Right 04/13/2022    Procedure: ARTHROPLASTY KNEE TOTAL and all associated procedures;  Surgeon: Elvis Seo MD;  Location:  MAIN OR;  Service: Orthopedics    DE ARTHRP KNE CONDYLE&PLATU MEDIAL&LAT COMPARTMENTS Left 9/29/2022    Procedure: ARTHROPLASTY KNEE TOTAL;  Surgeon: Elvis Seo MD;  Location:  MAIN OR;  Service: Orthopedics       Meds/Allergies:  Prior to Admission medications    Medication Sig Start Date End Date Taking?  Authorizing Provider   aspirin (ECOTRIN LOW STRENGTH) 81 mg EC tablet Take 1 tablet (81 mg total) by mouth daily Please do not start taking until after total joint arthroplasty 4/28/23   Ronnie Turner MD   b complex vitamins capsule Take 1 capsule by mouth daily    Historical Provider, MD   baclofen 10 mg tablet TAKE 1 TABLET BY MOUTH 3 TIMES  DAILY AS NEEDED FOR MUSCLE  SPASM(S) 8/18/23   Tod Quinonez MD   calcium carbonate (OS-LANA) 1250 (500 Ca) MG chewable tablet Chew 1 tablet (1,250 mg total) daily 4/20/23   Carlie Marmolejo MD   cholecalciferol (VITAMIN D3) 1,000 units tablet Take 1 tablet (1,000 Units total) by mouth daily 4/20/23   Carlie Marmolejo MD   clopidogrel (PLAVIX) 75 mg tablet Take 1 tablet (75 mg total) by mouth every morning 10/16/23   Galileo Brown DO   Docusate Sodium (DSS) 100 MG CAPS docusate sodium 100 mg capsule   TAKE 1 CAPSULE BY MOUTH TWICE A DAY  Patient not taking: Reported on 6/8/2023    Historical Provider, MD   ezetimibe (ZETIA) 10 mg tablet TAKE 1 TABLET BY MOUTH DAILY 10/20/23   Idalia Jackson MD   gabapentin (NEURONTIN) 300 mg capsule TAKE 1 CAPSULE BY MOUTH 3 TIMES  DAILY 10/26/23   Judi Schneider MD   ketoconazole (NIZORAL) 2 % shampoo APPLY TOPICALLY 2 TIMES A WEEK 8/4/22   Tod Quinonez MD   metoprolol succinate (TOPROL-XL) 50 mg 24 hr tablet TAKE 1 TABLET BY MOUTH  DAILY 3/30/23   Carlton Wilson MD   omega-3-acid ethyl esters (LOVAZA) 1 g capsule TAKE 2 CAPSULES BY MOUTH TWICE  DAILY 10/26/23   Jones Kirk MD   sertraline (ZOLOFT) 50 mg tablet TAKE 1 TABLET BY MOUTH DAILY 8/16/23   Judi Schneider MD   simvastatin (ZOCOR) 40 mg tablet TAKE 1 TABLET BY MOUTH DAILY AT  BEDTIME 8/17/23   Jones Kirk MD   tamsulosin (FLOMAX) 0.4 mg TAKE 1 CAPSULE BY MOUTH  DAILY WITH DINNER 6/15/23   Tod Quinonez MD   traZODone (DESYREL) 150 mg tablet TAKE 1 TABLET BY MOUTH DAILY AT  BEDTIME 9/13/23   Judi Schneider MD     I have reviewed home medications with patient personally. Allergies:    Allergies   Allergen Reactions    Crestor [Rosuvastatin] Myalgia       Social History:  Marital Status: /Civil Union   Occupation:   Patient Pre-hospital Living Situation: Home  Patient Pre-hospital Level of Mobility: walks  Patient Pre-hospital Diet Restrictions:   Substance Use History:   Social History     Substance and Sexual Activity   Alcohol Use Not Currently     Social History     Tobacco Use   Smoking Status Former    Types: Cigarettes    Start date: 1964    Quit date: 5    Years since quittin.8    Passive exposure: Never   Smokeless Tobacco Never   Tobacco Comments    Per pt, quit over 36 years ago     Social History     Substance and Sexual Activity   Drug Use Never       Family History:  Family History   Problem Relation Age of Onset    Diabetes Mother     Coronary artery disease Father     Thyroid disease Sister        Physical Exam:     Vitals:   Blood Pressure: 128/80 (10/29/23 0655)  Pulse: 68 (10/29/23 0655)  Temperature: 98.3 °F (36.8 °C) (10/29/23 0001)  Temp Source: Oral (10/29/23 0001)  Respirations: 18 (10/29/23 0655)  Height: 5' 10" (177.8 cm) (10/29/23 0013)  Weight - Scale: 108 kg (238 lb 1.6 oz) (10/29/23 0013)  SpO2: 93 % (10/29/23 0655)    Physical Exam  Vitals and nursing note reviewed. Constitutional:       General: He is not in acute distress. Appearance: He is not ill-appearing or diaphoretic. HENT:      Head: Normocephalic and atraumatic. Eyes:      Conjunctiva/sclera: Conjunctivae normal.   Cardiovascular:      Rate and Rhythm: Normal rate and regular rhythm. Pulmonary:      Effort: Pulmonary effort is normal. No respiratory distress. Breath sounds: Normal breath sounds. Abdominal:      General: Bowel sounds are normal.      Palpations: Abdomen is soft. Tenderness: There is no abdominal tenderness. Musculoskeletal:      Right lower leg: No edema. Left lower leg: No edema. Skin:     General: Skin is warm and dry. Coloration: Skin is not jaundiced. Findings: No erythema. Neurological:      Mental Status: He is alert and oriented to person, place, and time.  Mental status is at baseline. Psychiatric:         Mood and Affect: Mood normal.          Additional Data:     Lab Results:  Results from last 7 days   Lab Units 10/29/23  0620   WBC Thousand/uL 8.28   HEMOGLOBIN g/dL 14.1   HEMATOCRIT % 40.4   PLATELETS Thousands/uL 194     Results from last 7 days   Lab Units 10/29/23  0620 10/29/23  0024   SODIUM mmol/L 136 135   POTASSIUM mmol/L 4.2 4.8   CHLORIDE mmol/L 107 104   CO2 mmol/L 24 23   BUN mg/dL 29* 31*   CREATININE mg/dL 0.82 1.19   ANION GAP mmol/L 5 8   CALCIUM mg/dL 9.4 10.1   ALBUMIN g/dL  --  4.5   TOTAL BILIRUBIN mg/dL  --  0.74   ALK PHOS U/L  --  71   ALT U/L  --  24   AST U/L  --  38   GLUCOSE RANDOM mg/dL 113 122     Results from last 7 days   Lab Units 10/29/23  0024   INR  1.03     Results from last 7 days   Lab Units 10/29/23  0005   POC GLUCOSE mg/dl 112               Lines/Drains:  Invasive Devices       Peripheral Intravenous Line  Duration             Peripheral IV 10/29/23 Left Antecubital <1 day    Peripheral IV 10/29/23 Left Forearm <1 day                        Imaging: Reviewed radiology reports from this admission including: CT head  CT stroke alert brain   Final Result by Aissatou Connor MD (10/29 0118)      No acute intracranial hemorrhage, midline shift, or mass effect. If there is continued clinical concern for acute infarct, further evaluation with MRI may be obtained which is more sensitive. Findings were directly discussed with Shania Molina at 10/29/2023 at 12:44 a.m. Workstation performed: [id number]         CTA stroke alert (head/neck)   Final Result by Aissatou Connor MD (10/29 0117)      1. Ectatic ascending thoracic aorta measuring 4 cm in diameter. 2.  Moderate focal narrowing of the left vertebral artery at the level of C7 due to osteophytes. Otherwise no hemodynamically significant stenosis of the arteries of the neck. 3.  No high-grade proximal stenosis of the visualized [place].    4.  No significant intracranial arteriovenous malformation or aneurysm. Findings were directly discussed with Steve Nelson at 10/29/2023 at 1:00 a.m. .                           Workstation performed: [id number]         CT chest abdomen pelvis w contrast   Final Result by Tangela Green MD (10/29 0129)      1.  3 x 2 x 2 mm calculus in the left ureterovesicular junction with associated moderate left hydronephrosis. Delayed left nephrogram which is likely due to significant obstruction however correlate with urinalysis to exclude superimposed infection. 2.  Stable fusiform ectasia of the ascending thoracic aorta measuring up to 45 mm. The study was marked in [facility] for immediate notification. Workstation performed: [id number]         MRI Inpatient Order    (Results Pending)       EKG and Other Studies Reviewed on Admission:   EKG:  sinus rhythm with 1st degree AV block, HR 66.    ** Please Note: This note has been constructed using a voice recognition system.  **

## 2023-10-29 NOTE — ASSESSMENT & PLAN NOTE
-Episode of coffee-ground emesis in the ED  -Started on Protonix drip  -Close blood count monitoring  -GI consult, NPO for now

## 2023-10-29 NOTE — DISCHARGE SUMMARY
[facility]  Discharge- Guillermo Ramirez 1945, 70 y.o. male MRN: [id number]  Unit/Bed#: ED-36 Encounter: [id number]  Primary Care Provider: Aleisha Nuñez MD   Date and time admitted to hospital: 10/29/2023 12:05 AM    Dental infection  Assessment & Plan  Patient reports that he was started on Augmentin 500-125mg BID on 10/25/23 for a dental infection and was prescribed this for 1 week; continue on admission. Emesis  Assessment & Plan  Patient reportedly had an episode of "coffee ground emesis" while in the ED. He reports that he had a similar episode of emesis just prior to presentation but was not concerned by it as he had eaten black licorice earlier in the evening. Hgb  15.7 initially, now 14.1  Started on Protonix drip; continued for now. Repeat H&H q12h. GI consult. Patient declined EGD, recommend protonix and recheck hemoglobin in 3 days. Keep NPO. Will hold Plavix and aspirin for now. Hydronephrosis with obstructing calculus  Assessment & Plan  Reported left sided flank pain since last evening  CT A/P: "3 x 2 x 2 mm calculus in the left ureterovesicular junction with associated moderate left hydronephrosis."  Urology consult. Flomax, strain urine. Follow up outpatient. Keep NPO. IV fluids. Pain control. Urine is without evidence of infection. Gentle IV fluids     Aneurysm of thoracic aorta  Assessment & Plan  "Stable fusiform ectasia of the ascending thoracic aorta measuring up to 45 mm" noted on CT scan. Hyperlipidemia  Assessment & Plan  Continue statin, Zetia and Lovaza. CAD (coronary artery disease)  Assessment & Plan  S/p multiple PCI. Follows with cardiology as an outpatient. On aspirin, Plavix, metoprolol and statin. Hold aspirin and Plavix given possible GI bleed.     * Acute confusion  Assessment & Plan  Presented with acute onset of confusion  R/o TIA/CVA vs acute encephalopathy, medication related likely diagnosis from taking oxycodone for flank pain. CTH: no acute findings. CTA head and neck: "Ectatic ascending thoracic aorta measuring 4 cm in diameter. Moderate focal narrowing of the left vertebral artery at the level of C7 due to osteophytes."  Stroke pathway. Neuro checks per protocol. Notify of any changes. Obtain MRI brain - Unremarkable. Obtain echo. Check lipid panel and Hgb A1c. Continue statin   Hold AM dose of aspirin given episode of emesis. Neurology consult. PT/OT/ST evals. Patient's wife reported on presentation that he had taken a pain medication prior to coming in; UDS positive for oxycodone        Medical Problems       Resolved Problems  Date Reviewed: 10/29/2023   None       Discharging Physician / Practitioner: Irene Adam DO  PCP: Elías Barker MD  Admission Date:   Admission Orders (From admission, onward)       Ordered        10/29/23 0332  INPATIENT ADMISSION  Once                          Discharge Date: 10/29/23    Consultations During Hospital Stay:  Neurology, GI and Urology     Procedures Performed:   None    Significant Findings / Test Results:   CT chest abdomen pelvis:   1.  3 x 2 x 2 mm calculus in the left ureterovesicular junction with associated moderate left hydronephrosis. Delayed left nephrogram which is likely due to significant obstruction however correlate with urinalysis to exclude superimposed infection. 2.  Stable fusiform ectasia of the ascending thoracic aorta measuring up to 45 mm. MRI brain: No acute intracranial pathology. Chronic microangiopathy. Incidental Findings:   None       Test Results Pending at Discharge (will require follow up): None      Outpatient Tests Requested:  CBC     Complications:  None     Reason for Admission: Confusion     Hospital Course:   Duncan Motta is a 70 y.o. male with a PMH of CAD s/p stenting, HLD, HTN who presents with left flank pain and confusion.  Patient reports that he was in his usual state of health until last evening when he had sudden onset of severe left flank pain. He reports that shortly after onset of his pain he began to feel nauseous and had an episode of dark/ black emesis at home but had eaten black licorice earlier in the evening. He reportedly took a pain medication at home and then became confused. Per patient's records, he was asking the same question repetitively and was disoriented. His confusion has resolved since presentation. He denies fevers/ chill, chest pain, SOB, cough, diarrhea, bloody stools. Patient was admitted on stroke pathway for acute confusion, it was later discovered that the patient took a dose of oxycodone for flank pain that is likely the cause of acute confusion. Neurology consulted. MRI was negative. Urology consulted for  Left ureteral calculus with hydronephrosis without MIGUEL ÁNGEL. His pain is currently well controlled. Strain urine, flomax, IV fluids, will need follow up with urology as an outpatient with follow up ultrasound. He also had an episode of coffee ground emesis, he reports he ate black licorice last night. His hemoglobin did go from 15.7 to 14.1, he was placed on a protonix drip and consult to GI was placed. He denies any recent NSAID use, he also reports no alcohol consumption for 23 years and previously drank "socially". GI offered EGD which the patient declined, plan for discharge with PPI and recheck hemoglobin in 3 days. The patient, initially admitted to the hospital as inpatient, was discharged earlier than expected given the following: improvement of encephalopathy due to taking oxycodone, MRI brain unremarkable for stroke . Please see above list of diagnoses and related plan for additional information. Condition at Discharge: fair    Discharge Day Visit / Exam:   Subjective:  Patient seen and examined. He reports flank pain is well controlled. No more emesis.    Vitals: Blood Pressure: 150/81 (10/29/23 1100)  Pulse: 66 (10/29/23 1100)  Temperature: 98.3 °F (36.8 °C) (10/29/23 0001)  Temp Source: Oral (10/29/23 0001)  Respirations: 16 (10/29/23 1100)  Height: 5' 10" (177.8 cm) (10/29/23 0013)  Weight - Scale: 108 kg (238 lb 1.6 oz) (10/29/23 0013)  SpO2: 94 % (10/29/23 1100)  Exam:   Physical Exam  Vitals and nursing note reviewed. Constitutional:       General: He is not in acute distress. Appearance: He is well-developed. HENT:      Head: Normocephalic and atraumatic. Eyes:      Conjunctiva/sclera: Conjunctivae normal.   Cardiovascular:      Rate and Rhythm: Normal rate and regular rhythm. Heart sounds: No murmur heard. Pulmonary:      Effort: Pulmonary effort is normal. No respiratory distress. Breath sounds: Normal breath sounds. Abdominal:      Palpations: Abdomen is soft. Tenderness: There is no abdominal tenderness. Musculoskeletal:         General: No swelling. Cervical back: Neck supple. Right lower leg: No edema. Left lower leg: No edema. Skin:     General: Skin is warm and dry. Capillary Refill: Capillary refill takes less than 2 seconds. Neurological:      Mental Status: He is alert. Psychiatric:         Mood and Affect: Mood normal.          Discussion with Family: Patient declined call to . Discharge instructions/Information to patient and family:   See after visit summary for information provided to patient and family. Provisions for Follow-Up Care:  See after visit summary for information related to follow-up care and any pertinent home health orders. Disposition:   Home    Planned Readmission: No      Discharge Statement:  I spent 35 minutes discharging the patient. This time was spent on the day of discharge. I had direct contact with the patient on the day of discharge.  Greater than 50% of the total time was spent examining patient, answering all patient questions, arranging and discussing plan of care with patient as well as directly providing post-discharge instructions. Additional time then spent on discharge activities. Discharge Medications:  See after visit summary for reconciled discharge medications provided to patient and/or family.       **Please Note: This note may have been constructed using a voice recognition system**

## 2023-10-29 NOTE — ASSESSMENT & PLAN NOTE
Presented with acute onset of confusion  R/o TIA/CVA vs acute encephalopathy, medication related. CTH: no acute findings. CTA head and neck: "Ectatic ascending thoracic aorta measuring 4 cm in diameter. Moderate focal narrowing of the left vertebral artery at the level of C7 due to osteophytes."  Stroke pathway. Neuro checks per protocol. Notify of any changes. Obtain MRI brain. Obtain echo. Check lipid panel and Hgb A1c. Continue statin   Hold AM dose of aspirin given episode of emesis. Neurology consult. PT/OT/ST caleb.    Patient's wife reported on presentation that he had taken a pain medication prior to coming in; UDS positive for oxycodone

## 2023-10-29 NOTE — ED ATTENDING ATTESTATION
10/28/2023  ILino MD, saw and evaluated the patient. I have discussed the patient with the resident/non-physician practitioner and agree with the resident's/non-physician practitioner's findings, Plan of Care, and MDM as documented in the resident's/non-physician practitioner's note, except where noted. All available labs and Radiology studies were reviewed. I was present for key portions of any procedure(s) performed by the resident/non-physician practitioner and I was immediately available to provide assistance. At this point I agree with the current assessment done in the Emergency Department. I have conducted an independent evaluation of this patient a history and physical is as follows:    History    Patient is a 79-year-old male, with a history significant for MI, diverticulosis, celiac disease, who presents to the ED today due to sudden onset confusion/difficulty speaking per his wife. This occurred around 9 PM.  Patient also developed left flank pain as well as dysuria. Patient's wife gave him gabapentin to try and remit his symptoms and this was before the confusion began. Patient is providing history at this time and blood glucose on arrival was within normal limits. He denies fever, chest pain, dyspnea. Patient's wife, present in room, is concerned that patient is having a stroke. Patient is without other concerns at this time. ROS  Patient denies: Fever; dysphagia; vision change; chest pain; dyspnea; polyuria; rash; weakness; numbness; difficulty walking    Physical Exam    GENERAL APPEARANCE: NAD. Diaphoretic  NEURO: Cranial nerves 2-12 intact. 5/5 strength in all four extremities including finger extension against resistance. Sensation to light touch subjectively intact/equal in all four extremities and the face. Patient is speaking clearly in complete sentences. Patient is answering appropriately and able to follow commands.    HEENT: PERRL, Moist mucous membranes, external ears normal, nose normal  Neck: No cervical adenopathy  CV: RRR. No murmurs, rubs, gallops  LUNGS: Clear to auscultation: No wheezes, stridor, rhonchi, rales  GI: Abdomen non-distended. Soft. Tender to palpation in the suprapubic area, left lower quadrant, left CVA without rebound or guarding. : Deferred at this time  MSK: No deformity. Skin: Warm and dry  Capillary refill: <2 seconds    Assessment/Plan  Confusion, abdominal/flank pain, dysuria  -Concern for ureterolithiasis, colitis, anemia, MIGUEL ÁNGEL, electrolyte abnormality, diverticulitis, ICH, thyroid dysregulation, ACS  -Patient made stroke alert shortly after arrival.  Will investigate with stroke alert order set, cardiac work-up, UA, CT chest abdomen pelvis  -Will manage based upon work-up    ED Course  ED Course as of 10/29/23 0731   Sun Oct 29, 2023   0057 Patient's mental status improving per wife on reevaluation   0109 TSH 3RD GENERATON: 1.532  WNL   0109 Lipase: 23  WNL   0116 Alerted that patient has had some coffee-ground emesis. Hemoglobin within normal limits. Protonix started   0119 Per resident discussion with neurology, no TNK   0119 I discussed with patient and his wife: Patient   [phone] On reevaluation, patient states has been 23 years since he drank alcohol and he denies history of varices.   Patient appears more comfortable than when he arrived         Critical Care Time  Procedures

## 2023-10-29 NOTE — ASSESSMENT & PLAN NOTE
-Noted on CT of the abdomen/pelvis  -Urology consult pending  -IV fluids, pain control per primary team

## 2023-10-29 NOTE — ASSESSMENT & PLAN NOTE
Reported left sided flank pain since last evening  CT A/P: "3 x 2 x 2 mm calculus in the left ureterovesicular junction with associated moderate left hydronephrosis."  Urology consult. Flomax, strain urine. Follow up outpatient. Keep NPO. IV fluids. Pain control. Urine is without evidence of infection.   Gentle IV fluids

## 2023-10-29 NOTE — CONSULTS
CONSULT    Patient Name: Ilene Alanis  Patient MRN: [id number]  Date of Service: 10/29/2023   Date / Time Note Created: 10/29/2023 9:13 AM   Referring Provider: Salo Nielsen DO    Provider Creating Note: BETH Logan    PCP: Ariela Pino  Attending Provider:  Salo Nielsen DO    Reason for Consult: Flank Pain    HPI: Ilene Alanis is a pleasant 70-year-old male, with history of coronal hypospadias, difficult catheter insertion in 2022 during orthopedic procedure requiring urologist assistance, otherwise no routine urologic care presenting with acute change in mental status, nausea and vomiting with dark emesis while on Plavix and left flank pain, not accompanied by fever, chills, dysuria or gross hematuria. He was admitted for further neurologic and GI evaluation. Our service was contacted due to CT of the abdomen and pelvis demonstrating 3 x 2 x 2 mm left UVJ calculus with associated moderate hydronephrosis. Patient reports pain is now relieved with discomfort in the bladder area. He is voiding volitionally without irritative or obstructive urinary symptoms. He is afebrile and hemodynamically stable. Normal renal function. No leukocytosis. Urine analysis demonstrates innumerable RBCs, trace pyuria and only occasional bacteria on microscopic. Urologic consultation requested for further management recommendations.          Active Problems:    Patient Active Problem List   Diagnosis    Hypovitaminosis D    CAD (coronary artery disease)    Hyperlipidemia    Primary osteoarthritis of both knees    Sacroiliitis, not elsewhere classified ([address])    Actinic keratosis    Primary osteoarthritis of right knee    Status post total right knee replacement    Aneurysm of thoracic aorta    Primary osteoarthritis of left knee    Status post total knee replacement, left    Need for influenza vaccination    H/O hyperlipidemia    BMI 34.0-34.9,adult    Seborrheic dermatitis    Suprapubic discomfort    Acute confusion    Hydronephrosis with obstructing calculus    Emesis    Dental infection            Impressions  Left ureteral Calculus--3 x 2 x 2 mm. Passable and distal.  Hydronephrosis secondary to previous without--MIGUEL ÁNGEL  Renal Colic--resolved    Recommendations  Initiate aggressive IVFs   Flomax  Analgesia/Narcotics   Anti-emetics   Strain urine   Additional evaluation per consultants. Appreciate medical optimization by primary team.  Continue medical expulsive therapy. Discharge at the discretion of the internal medicine service when adequately pain controlled with or without stone. We will follow-up in our office with repeat ultrasound prior. No indication for acute/urgent or emergent  surgical intervention.         Past Medical History:   Diagnosis Date    Celiac disease     Coronary artery disease     Diverticulitis     Diverticulitis of ascending colon 10/27/2022    Diverticulosis     Hyperlipidemia     Hypertension     Myocardial infarction ([address]) 1990       Past Surgical History:   Procedure Laterality Date    COLONOSCOPY      CORONARY ANGIOPLASTY WITH STENT PLACEMENT      Multiple times    CT ARTHRP KNE CONDYLE&PLATU MEDIAL&LAT COMPARTMENTS Right 04/13/2022    Procedure: ARTHROPLASTY KNEE TOTAL and all associated procedures;  Surgeon: Dmitriy Kurtz MD;  Location: [facility];  Service: Orthopedics    CT ARTHRP KNE CONDYLE&PLATU MEDIAL&LAT COMPARTMENTS Left 9/29/2022    Procedure: ARTHROPLASTY KNEE TOTAL;  Surgeon: Dmitriy Kurtz MD;  Location: [facility];  Service: Orthopedics       Family History   Problem Relation Age of Onset    Diabetes Mother     Coronary artery disease Father     Thyroid disease Sister        Social History     Socioeconomic History    Marital status: /Civil Union     Spouse name: Not on file    Number of children: 3    Years of education: Not on file    Highest education level: Not on file   Occupational History    Not on file   Tobacco Use Smoking status: Former     Types: Cigarettes     Start date: 1964     Quit date: 1985     Years since quittin.8     Passive exposure: Never    Smokeless tobacco: Never    Tobacco comments:     Per pt, quit over 36 years ago   Vaping Use    Vaping Use: Never used   Substance and Sexual Activity    Alcohol use: Not Currently    Drug use: Never    Sexual activity: Yes     Partners: Female     Birth control/protection: None   Other Topics Concern    Not on file   Social History Narrative    Not on file     Social Determinants of Health     Financial Resource Strain: Low Risk  (2023)    Overall Financial Resource Strain (CARDIA)     Difficulty of Paying Living Expenses: Not hard at all   Food Insecurity: No Food Insecurity (9/10/2019)    Hunger Vital Sign     Worried About Running Out of Food in the Last Year: Never true     [address] in the Last Year: Never true   Transportation Needs: No Transportation Needs (2023)    PRAPARE - Transportation     Lack of Transportation (Medical): No     Lack of Transportation (Non-Medical): No   Physical Activity: Sufficiently Active (9/10/2019)    Exercise Vital Sign     Days of Exercise per Week: 3 days     Minutes of Exercise per Session: 60 min   Stress: Stress Concern Present (9/10/2019)    [address] [place] [address]     Feeling of Stress : To some extent   Social Connections:  Moderately Isolated (9/10/2019)    Social Connection and Isolation Panel [NHANES]     Frequency of Communication with Friends and Family: Twice a week     Frequency of Social Gatherings with Friends and Family: Once a week     Attends Temple Services: Never     Active Member of Clubs or Organizations: No     Attends Club or Organization Meetings: Never     Marital Status:    Intimate Partner Violence: Not At Risk (9/10/2019)    Humiliation, Afraid, Rape, and Kick questionnaire     Fear of Current or Ex-Partner: No Emotionally Abused: No     Physically Abused: No     Sexually Abused: No   Housing Stability: Not on file       Allergies   Allergen Reactions    Crestor [Rosuvastatin] Myalgia       Review of Systems  10 point review of systems negative except as noted in HPI  Constitutional:   negative for - chills or fever  Cardiovascular:   no chest pain or dyspnea on exertion  Gastrointestinal:   positive for - abdominal pain and nausea/vomiting  Genito-Urinary:   no dysuria, trouble voiding, or hematuria  Neurological:   no TIA or stroke symptoms     Chart Review   Allergies, current medications, history, problem list    Vital Signs  /82 (BP Location: Right arm)   Pulse 66   Temp 98.3 °F (36.8 °C) (Oral)   Resp 16   Ht 5' 10" (1.778 m)   Wt 108 kg (238 lb 1.6 oz)   SpO2 94%   BMI 34.16 kg/m²     Physical Exam  General appearance: alert and oriented, in no acute distress, appears stated age, cooperative, and no distress  Head: Normocephalic, without obvious abnormality, atraumatic  Neck: no JVD and supple, symmetrical, trachea midline  Lungs: clear to auscultation bilaterally  Heart: regular rate and rhythm, S1, S2 normal, no murmur, click, rub or gallop  Abdomen: soft, non-tender; bowel sounds normal; no masses,  no organomegaly  Extremities: extremities normal, warm and well-perfused; no cyanosis, clubbing, or edema  Pulses: 2+ and symmetric  Neurologic: Grossly normal  No urinary drains     Laboratory Studies  Lab Results   Component Value Date    HGBA1C 5.5 10/03/2023    K 4.2 10/29/2023     10/29/2023    CO2 24 10/29/2023    CREATININE 0.82 10/29/2023    BUN 29 (H) 10/29/2023     Lab Results   Component Value Date    WBC 8.28 10/29/2023    RBC 4.36 10/29/2023    HGB 14.1 10/29/2023    HCT 40.4 10/29/2023    MCV 93 10/29/2023    MCH 32.3 10/29/2023    RDW 12.6 10/29/2023     10/29/2023         Imaging and Other Studies  ). CT chest abdomen pelvis w contrast    Result Date: 10/29/2023  Narrative: CT CHEST, ABDOMEN AND PELVIS WITH IV CONTRAST INDICATION:   left flank pain. COMPARISON: CT of the chest abdomen pelvis on November 15, 2022. TECHNIQUE: CT examination of the chest, abdomen and pelvis was performed. Multiplanar 2D reformatted images were created from the source data. This examination, like all CT scans performed in the [facility], was performed utilizing techniques to minimize radiation dose exposure, including the use of iterative reconstruction and automated exposure control. Radiation dose length product (DLP) for this visit:  1049 mGy-cm IV Contrast:  100 mL of iohexol (OMNIPAQUE) Enteric Contrast: Enteric contrast was administered. FINDINGS: CHEST LUNGS:  Lungs are clear. There is no tracheal or endobronchial lesion. PLEURA:  Unremarkable. HEART/GREAT VESSELS: Coronary artery calcifications. No pericardial effusion. There is fusiform ectasia of the ascending thoracic aorta measuring up to 45 mm. Recommendation is for follow-up low radiation dose chest CT in one year. MEDIASTINUM AND NICOL:  Unremarkable. CHEST WALL AND LOWER NECK: Mild left greater than right gynecomastia. ABDOMEN LIVER/BILIARY TREE:  Unremarkable. GALLBLADDER:  No calcified gallstones. No pericholecystic inflammatory change. SPLEEN:  Unremarkable. PANCREAS:  Unremarkable. ADRENAL GLANDS:  Unremarkable. KIDNEYS/URETERS: 3 x 2 x 2 mm calculus at the left ureterovesicular junction (series 307, image 213. There is moderate left hydronephrosis. Mild left perinephric stranding. There is a delayed left nephrogram. No right-sided hydronephrosis. STOMACH AND BOWEL: Colonic diverticulosis without evidence of diverticulitis. APPENDIX:  A normal appendix was visualized. ABDOMINOPELVIC CAVITY:  No ascites. No pneumoperitoneum. No lymphadenopathy. VESSELS:  Unremarkable for patient's age. PELVIS REPRODUCTIVE ORGANS:  Unremarkable for patient's age. URINARY BLADDER:  Unremarkable.  ABDOMINAL WALL/INGUINAL REGIONS: Unremarkable. OSSEOUS STRUCTURES:  No acute fracture or destructive osseous lesion. Spinal degenerative changes are noted. Old right superior and inferior pubic ramus fractures. Impression: 1.  3 x 2 x 2 mm calculus in the left ureterovesicular junction with associated moderate left hydronephrosis. Delayed left nephrogram which is likely due to significant obstruction however correlate with urinalysis to exclude superimposed infection. 2.  Stable fusiform ectasia of the ascending thoracic aorta measuring up to 45 mm. The study was marked in [facility] for immediate notification. Workstation performed: [id number]       US bladder    Result Date: 10/18/2023  Narrative: BLADDER ULTRASOUND INDICATION:  R10.2: Pelvic and perineal pain. COMPARISON: None. TECHNIQUE:  Ultrasound of the bladder was performed with a curvilinear transducer utilizing volumetric sweeps and still imaging techniques. FINDINGS: BLADDER: Marginally distended. No focal thickening or mass lesions. Bilateral ureteral jets detected. Pre-void volume = 50.0 cc Post-void volume = 2.8 cc     Impression: Marginally distended which limits evaluation for focal wall thickening.  No significant post void residual volume Workstation performed: [id number]       Medications   Current Facility-Administered Medications   Medication Dose Route Frequency Provider Last Rate    amoxicillin-clavulanate  1 tablet Oral BID Shireen Scale, PA-C      ezetimibe  10 mg Oral Daily Shireen Scale, PA-C      fish oil  1,000 mg Oral BID Shireen Scale, PA-C      gabapentin  300 mg Oral TID Shireen Scale, PA-C      pantoprazole (PROTONIX) 80 mg in sodium chloride 0.9 % 100 mL infusion  8 mg/hr Intravenous Continuous Shireen Scale, PA-C 8 mg/hr (10/29/23 0205)    pravastatin  80 mg Oral Daily With Dinner Shireen Scale, PA-C      sertraline  50 mg Oral Daily Shireen Scale, PA-C      tamsulosin  0.4 mg Oral Daily With [address], [place] traZODone  150 mg Oral HS LUIS CARLOS Vanessa CRNP

## 2023-10-29 NOTE — ASSESSMENT & PLAN NOTE
Presented with acute onset of confusion  R/o TIA/CVA vs acute encephalopathy, medication related likely diagnosis from taking oxycodone for flank pain. CTH: no acute findings. CTA head and neck: "Ectatic ascending thoracic aorta measuring 4 cm in diameter. Moderate focal narrowing of the left vertebral artery at the level of C7 due to osteophytes."  Stroke pathway. Neuro checks per protocol. Notify of any changes. Obtain MRI brain - Unremarkable. Obtain echo. Check lipid panel and Hgb A1c. Continue statin   Hold AM dose of aspirin given episode of emesis. Neurology consult. PT/OT/ST evals.    Patient's wife reported on presentation that he had taken a pain medication prior to coming in; UDS positive for oxycodone

## 2023-10-29 NOTE — ASSESSMENT & PLAN NOTE
Patient reportedly had an episode of "coffee ground emesis" while in the ED. He reports that he had a similar episode of emesis just prior to presentation but was not concerned by it as he had eaten black licorice earlier in the evening. Hgb  15.7 initially, now 14.1  Started on Protonix drip; continued for now. Repeat H&H q12h. GI consult. Keep NPO. Will hold Plavix and aspirin for now.

## 2023-10-29 NOTE — ASSESSMENT & PLAN NOTE
S/p multiple PCI. Follows with cardiology as an outpatient. On aspirin, Plavix, metoprolol and statin. Hold aspirin and Plavix given possible GI bleed.

## 2023-10-29 NOTE — ED PROVIDER NOTES
History  Chief Complaint   Patient presents with    Altered Mental Status     Patient comes in w/ wife. Wife reports patient was reporting L flank pain that began tonight. Per wife patient experiencing AMS, asking here 3x why he was here in the ER. Per wife, patient normally Alert/ oriented x4. Wife states he took pain medication PTA but is unsure what medication he took. Patient denies urinary issues. Flank Pain     57-year-old male history of hypertension, MI on DAPT, HLD presenting with acute confusion and left flank pain. Wife states that at 9 PM (2 hours 45 minutes ago) he was at his normal mental status, over the next hour started becoming confused. Repetitive questioning. Is asked 5 times why he is in the ED. Also started complaining of left flank pain around that time. Wife gave patient some gabapentin for the flank pain prior to the onset of his confusion. Per wife at bedside, he has never been confused like this before and she is worried that he could be having a stroke. No fevers, chills, chest pain, shortness of breath. Is having some nausea without vomiting. Elevated blood pressure on arrival, did not take any of his evening meds. Denies dysuria or hematuria. Prior to Admission Medications   Prescriptions Last Dose Informant Patient Reported? Taking?    Docusate Sodium (DSS) 100 MG CAPS  Self Yes No   Sig: docusate sodium 100 mg capsule   TAKE 1 CAPSULE BY MOUTH TWICE A DAY   Patient not taking: Reported on 6/8/2023   amoxicillin-clavulanate (AUGMENTIN) 500-125 mg per tablet   Yes Yes   Sig: Take 500 mg by mouth 2 (two) times a day   aspirin (ECOTRIN LOW STRENGTH) 81 mg EC tablet  Self No No   Sig: Take 1 tablet (81 mg total) by mouth daily Please do not start taking until after total joint arthroplasty   b complex vitamins capsule   Yes No   Sig: Take 1 capsule by mouth daily   baclofen 10 mg tablet   No No   Sig: TAKE 1 TABLET BY MOUTH 3 TIMES  DAILY AS NEEDED FOR MUSCLE  SPASM(S) calcium carbonate (OS-LANA) 1250 (500 Ca) MG chewable tablet  Self No No   Sig: Chew 1 tablet (1,250 mg total) daily   cholecalciferol (VITAMIN D3) 1,000 units tablet  Self No No   Sig: Take 1 tablet (1,000 Units total) by mouth daily   clopidogrel (PLAVIX) 75 mg tablet   No No   Sig: Take 1 tablet (75 mg total) by mouth every morning   ezetimibe (ZETIA) 10 mg tablet   No No   Sig: TAKE 1 TABLET BY MOUTH DAILY   gabapentin (NEURONTIN) 300 mg capsule   No No   Sig: TAKE 1 CAPSULE BY MOUTH 3 TIMES  DAILY   ketoconazole (NIZORAL) 2 % shampoo  Self No No   Sig: APPLY TOPICALLY 2 TIMES A WEEK   metoprolol succinate (TOPROL-XL) 50 mg 24 hr tablet  Self No No   Sig: TAKE 1 TABLET BY MOUTH  DAILY   omega-3-acid ethyl esters (LOVAZA) 1 g capsule   No No   Sig: TAKE 2 CAPSULES BY MOUTH TWICE  DAILY   sertraline (ZOLOFT) 50 mg tablet   No No   Sig: TAKE 1 TABLET BY MOUTH DAILY   simvastatin (ZOCOR) 40 mg tablet   No No   Sig: TAKE 1 TABLET BY MOUTH DAILY AT  BEDTIME   tamsulosin (FLOMAX) 0.4 mg   No No   Sig: TAKE 1 CAPSULE BY MOUTH  DAILY WITH DINNER   traZODone (DESYREL) 150 mg tablet   No No   Sig: TAKE 1 TABLET BY MOUTH DAILY AT  BEDTIME      Facility-Administered Medications: None       Past Medical History:   Diagnosis Date    Celiac disease     Coronary artery disease     Diverticulitis     Diverticulitis of ascending colon 10/27/2022    Diverticulosis     Hyperlipidemia     Hypertension     Myocardial infarction ([address]) 1990       Past Surgical History:   Procedure Laterality Date    COLONOSCOPY      CORONARY ANGIOPLASTY WITH STENT PLACEMENT      Multiple times    WA ARTHRP KNE CONDYLE&PLATU MEDIAL&LAT COMPARTMENTS Right 04/13/2022    Procedure: ARTHROPLASTY KNEE TOTAL and all associated procedures;  Surgeon: Ronda Seth MD;  Location:  MAIN OR;  Service: Orthopedics    WA ARTHRP KNE CONDYLE&PLATU MEDIAL&LAT COMPARTMENTS Left 9/29/2022    Procedure: ARTHROPLASTY KNEE TOTAL;  Surgeon: Ronda Seth MD; Location:  MAIN OR;  Service: Orthopedics       Family History   Problem Relation Age of Onset    Diabetes Mother     Coronary artery disease Father     Thyroid disease Sister      I have reviewed and agree with the history as documented. E-Cigarette/Vaping    E-Cigarette Use Never User      E-Cigarette/Vaping Substances    Nicotine No     THC No     CBD No     Flavoring No     Other No     Unknown No      Social History     Tobacco Use    Smoking status: Former     Types: Cigarettes     Start date: 1964     Quit date:      Years since quittin.8     Passive exposure: Never    Smokeless tobacco: Never    Tobacco comments:     Per pt, quit over 36 years ago   Vaping Use    Vaping Use: Never used   Substance Use Topics    Alcohol use: Not Currently    Drug use: Never        Review of Systems   Constitutional:  Negative for activity change, fever and unexpected weight change. Respiratory:  Negative for cough, chest tightness and shortness of breath. Cardiovascular:  Negative for chest pain and palpitations. Gastrointestinal:  Positive for nausea. Negative for abdominal pain, diarrhea and vomiting. Genitourinary:  Positive for flank pain. Negative for dysuria and hematuria. Skin:  Negative for wound. Allergic/Immunologic: Negative for immunocompromised state. Neurological:  Negative for syncope. Psychiatric/Behavioral:  Positive for confusion. All other systems reviewed and are negative.       Physical Exam  ED Triage Vitals [10/29/23 0001]   Temperature Pulse Respirations Blood Pressure SpO2   98.3 °F (36.8 °C) 70 18 164/98 97 %      Temp Source Heart Rate Source Patient Position - Orthostatic VS BP Location FiO2 (%)   Oral Monitor Sitting Right arm --      Pain Score       No Pain             Orthostatic Vital Signs  Vitals:    10/29/23 0500 10/29/23 0600 10/29/23 0655 10/29/23 0730   BP: 127/78 124/76 128/80 130/82   Pulse: 79 76 68 66   Patient Position - Orthostatic VS: Lying Lying Lying        Physical Exam  Vitals and nursing note reviewed. Constitutional:       General: He is in acute distress. Appearance: He is ill-appearing and diaphoretic. He is not toxic-appearing. HENT:      Head: Normocephalic and atraumatic. Right Ear: External ear normal.      Left Ear: External ear normal.      Nose: Nose normal.      Mouth/Throat:      Mouth: Mucous membranes are moist.      Pharynx: Oropharynx is clear. Eyes:      General: No scleral icterus. Right eye: No discharge. Left eye: No discharge. Extraocular Movements: Extraocular movements intact. Conjunctiva/sclera: Conjunctivae normal.      Pupils: Pupils are equal, round, and reactive to light. Cardiovascular:      Rate and Rhythm: Normal rate and regular rhythm. Pulses: Normal pulses. Radial pulses are 2+ on the right side. Dorsalis pedis pulses are 2+ on the right side and 2+ on the left side. Heart sounds: Normal heart sounds, S1 normal and S2 normal. No murmur heard. Pulmonary:      Effort: Pulmonary effort is normal. No respiratory distress. Breath sounds: Normal breath sounds. No stridor. No wheezing. Abdominal:      Palpations: Abdomen is soft. Tenderness: There is no abdominal tenderness. Musculoskeletal:         General: No deformity. Normal range of motion. Cervical back: Normal range of motion and neck supple. No rigidity. Right lower leg: No edema. Left lower leg: No edema. Skin:     General: Skin is warm. Coloration: Skin is not jaundiced. Neurological:      General: No focal deficit present. Mental Status: He is alert. He is confused. GCS: GCS eye subscore is 4. GCS verbal subscore is 4. GCS motor subscore is 6. Sensory: Sensation is intact. Motor: Motor function is intact. Coordination: Coordination is intact.  Finger-Nose-Finger Test normal.      Comments: Unsure of the year or the president is., Several times questioned why he was in the ED, forgot some details of events leading to arrival to the ED. CN exam:  No visual field deficits, denies changes in visual acuity  Pupils PEARRL, EOM intact  Sensation intact and equal in upper, middle, and lower face  Able to open mouth fully, no TMJ tenderness, able to puff out cheeks, able keep eyes closed against tension  No facial droop or dysarthria  Able to hear finger rub equally b/l  Able to stick out tongue and move in both directions  No uvular deviation  Able to turn head and shrug shoulders against resistance     Psychiatric:         Mood and Affect: Mood normal.         Behavior: Behavior normal.         Cognition and Memory: Memory is impaired.          ED Medications  Medications   pantoprazole (PROTONIX) 80 mg in sodium chloride 0.9 % 100 mL IVPB (0 mg Intravenous Stopped 10/29/23 0155)     Followed by   pantoprazole (PROTONIX) 80 mg in sodium chloride 0.9 % 100 mL infusion (8 mg/hr Intravenous New Bag 10/29/23 0205)   ezetimibe (ZETIA) tablet 10 mg (has no administration in time range)   gabapentin (NEURONTIN) capsule 300 mg (has no administration in time range)   sertraline (ZOLOFT) tablet 50 mg (has no administration in time range)   pravastatin (PRAVACHOL) tablet 80 mg (has no administration in time range)   tamsulosin (FLOMAX) capsule 0.4 mg (has no administration in time range)   traZODone (DESYREL) tablet 150 mg (has no administration in time range)   fish oil capsule 1,000 mg (has no administration in time range)   amoxicillin-clavulanate (AUGMENTIN) 500-125 mg per tablet 1 tablet (has no administration in time range)   iohexol (OMNIPAQUE) 350 MG/ML injection (MULTI-DOSE) 100 mL (100 mL Intravenous Given 10/29/23 0031)   ondansetron (ZOFRAN) injection 4 mg (4 mg Intravenous Given 10/29/23 0044)   metoclopramide (REGLAN) injection 10 mg (10 mg Intravenous Given 10/29/23 0120)   sodium chloride 0.9 % infusion (75 mL/hr Intravenous New Bag 10/29/23 0625)       Diagnostic Studies  Results Reviewed       Procedure Component Value Units Date/Time    Lipid Panel with Direct LDL reflex [[id number]]  (Abnormal) Collected: 10/29/23 0620    Lab Status: Final result Specimen: Blood from Arm, Left Updated: 10/29/23 0656     Cholesterol 135 mg/dL      Triglycerides 194 mg/dL      HDL, Direct 37 mg/dL      LDL Calculated 59 mg/dL     Basic metabolic panel [[id number]]  (Abnormal) Collected: 10/29/23 0620    Lab Status: Final result Specimen: Blood from Arm, Left Updated: 10/29/23 0656     Sodium 136 mmol/L      Potassium 4.2 mmol/L      Chloride 107 mmol/L      CO2 24 mmol/L      ANION GAP 5 mmol/L      BUN 29 mg/dL      Creatinine 0.82 mg/dL      Glucose 113 mg/dL      Calcium 9.4 mg/dL      eGFR 85 ml/min/1.73sq m     Narrative:      [facility] guidelines for Chronic Kidney Disease (CKD):     Stage 1 with normal or high GFR (GFR > 90 mL/min/1.73 square meters)    Stage 2 Mild CKD (GFR = 60-89 mL/min/1.73 square meters)    Stage 3A Moderate CKD (GFR = 45-59 mL/min/1.73 square meters)    Stage 3B Moderate CKD (GFR = 30-44 mL/min/1.73 square meters)    Stage 4 Severe CKD (GFR = 15-29 mL/min/1.73 square meters)    Stage 5 End Stage CKD (GFR <15 mL/min/1.73 square meters)  Note: GFR calculation is accurate only with a steady state creatinine    CBC (With Platelets) [[id number]]  (Normal) Collected: 10/29/23 0620    Lab Status: Final result Specimen: Blood from Arm, Left Updated: 10/29/23 0643     WBC 8.28 Thousand/uL      RBC 4.36 Million/uL      Hemoglobin 14.1 g/dL      Hematocrit 40.4 %      MCV 93 fL      MCH 32.3 pg      MCHC 34.9 g/dL      RDW 12.6 %      Platelets 439 Thousands/uL      MPV 9.7 fL     Hemoglobin A1c w/EAG Estimation [[id number]] Collected: 10/29/23 8590    Lab Status: In process Specimen: Blood from Arm, Left Updated: 10/29/23 0636    Occult blood gastric / duodenum [[id number]]     Lab Status: No result Specimen:  Body Fluid Rapid drug screen, urine [[id number]]  (Abnormal) Collected: 10/29/23 0220    Lab Status: Final result Specimen: Urine, Clean Catch Updated: 10/29/23 0444     Amph/Meth UR Negative     Barbiturate Ur Negative     Benzodiazepine Urine Negative     Cocaine Urine Negative     Methadone Urine Negative     Opiate Urine Negative     PCP Ur Negative     THC Urine Negative     Oxycodone Urine Positive    Narrative:      Presumptive report. If requested, specimen will be sent to reference lab for confirmation. FOR MEDICAL PURPOSES ONLY. IF CONFIRMATION NEEDED PLEASE CONTACT THE LAB WITHIN 5 DAYS.     Drug Screen Cutoff Levels:  AMPHETAMINE/METHAMPHETAMINES  1000 ng/mL  BARBITURATES     200 ng/mL  BENZODIAZEPINES     200 ng/mL  COCAINE      300 ng/mL  METHADONE      300 ng/mL  OPIATES      300 ng/mL  PHENCYCLIDINE     25 ng/mL  THC       50 ng/mL  OXYCODONE      100 ng/mL    Urine Microscopic [[id number]]  (Abnormal) Collected: 10/29/23 0220    Lab Status: Final result Specimen: Urine, Clean Catch Updated: 10/29/23 0426     RBC, UA Innumerable /hpf      WBC, UA 1-2 /hpf      Epithelial Cells None Seen /hpf      Bacteria, UA Occasional /hpf      MUCUS THREADS Occasional     Hyaline Casts, UA 3-5 /lpf     UA w Reflex to Microscopic w Reflex to Culture [[id number]]  (Abnormal) Collected: 10/29/23 0220    Lab Status: Final result Specimen: Urine, Clean Catch Updated: 10/29/23 0303     Color, UA Yellow     Clarity, UA Clear     Specific Gravity, UA >=1.050     pH, UA 7.0     Leukocytes, UA Negative     Nitrite, UA Negative     Protein, UA Trace mg/dl      Glucose, UA Negative mg/dl      Ketones, UA Negative mg/dl      Urobilinogen, UA <2.0 mg/dl      Bilirubin, UA Negative     Occult Blood, UA Moderate    HS Troponin I 2hr [[id number]]  (Normal) Collected: 10/29/23 0225    Lab Status: Final result Specimen: Blood from Arm, Left Updated: 10/29/23 0302     hs TnI 2hr 9 ng/L      Delta 2hr hsTnI 5 ng/L     HS Troponin I 4hr [[id number]]     Lab Status: No result Specimen: Blood     FLU/RSV/COVID - if FLU/RSV clinically relevant [[id number]]  (Normal) Collected: 10/29/23 0024    Lab Status: Final result Specimen: Nares from Nose Updated: 10/29/23 0126     SARS-CoV-2 Negative     INFLUENZA A PCR Negative     INFLUENZA B PCR Negative     RSV PCR Negative    Narrative:      FOR PEDIATRIC PATIENTS - copy/paste COVID Guidelines URL to browser: [email or web address]    SARS-CoV-2 assay is a Nucleic Acid Amplification assay intended for the  qualitative detection of nucleic acid from SARS-CoV-2 in nasopharyngeal  swabs. Results are for the presumptive identification of SARS-CoV-2 RNA. Positive results are indicative of infection with SARS-CoV-2, the virus  causing COVID-19, but do not rule out bacterial infection or co-infection  with other viruses. Laboratories within the [facility] and its  territories are required to report all positive results to the appropriate  public health authorities. Negative results do not preclude SARS-CoV-2  infection and should not be used as the sole basis for treatment or other  patient management decisions. Negative results must be combined with  clinical observations, patient history, and epidemiological information. This test has not been FDA cleared or approved. This test has been authorized by FDA under an Emergency Use Authorization  (EUA). This test is only authorized for the duration of time the  declaration that circumstances exist justifying the authorization of the  emergency use of an in vitro diagnostic tests for detection of SARS-CoV-2  virus and/or diagnosis of COVID-19 infection under section 564(b)(1) of  the Act, 21 U. S.C. 899HUX-8(I)(3), unless the authorization is terminated  or revoked sooner. The test has been validated but independent review by FDA  and CLIA is pending. Test performed using Nexvet:  This RT-PCR assay targets N2,  a region unique to SARS-CoV-2. A conserved region in the E-gene was chosen  for pan-Sarbecovirus detection which includes SARS-CoV-2. According to CMS-2020-01-R, this platform meets the definition of high-throughput technology.     TSH, 3rd generation with Free T4 reflex [[id number]]  (Normal) Collected: 10/29/23 0025    Lab Status: Final result Specimen: Blood from Arm, Left Updated: 10/29/23 0109     TSH 3RD GENERATON 1.532 uIU/mL     Basic metabolic panel [[id number]]  (Abnormal) Collected: 10/29/23 0024    Lab Status: Final result Specimen: Blood from Arm, Left Updated: 10/29/23 0106     Sodium 135 mmol/L      Potassium 4.8 mmol/L      Chloride 104 mmol/L      CO2 23 mmol/L      ANION GAP 8 mmol/L      BUN 31 mg/dL      Creatinine 1.19 mg/dL      Glucose 122 mg/dL      Calcium 10.1 mg/dL      eGFR 58 ml/min/1.73sq m     Narrative:      [place] guidelines for Chronic Kidney Disease (CKD):     Stage 1 with normal or high GFR (GFR > 90 mL/min/1.73 square meters)    Stage 2 Mild CKD (GFR = 60-89 mL/min/1.73 square meters)    Stage 3A Moderate CKD (GFR = 45-59 mL/min/1.73 square meters)    Stage 3B Moderate CKD (GFR = 30-44 mL/min/1.73 square meters)    Stage 4 Severe CKD (GFR = 15-29 mL/min/1.73 square meters)    Stage 5 End Stage CKD (GFR <15 mL/min/1.73 square meters)  Note: GFR calculation is accurate only with a steady state creatinine    Lipase [[id number]]  (Normal) Collected: 10/29/23 0024    Lab Status: Final result Specimen: Blood from Arm, Left Updated: 10/29/23 0106     Lipase 23 u/L     Hepatic function panel [[id number]]  (Normal) Collected: 10/29/23 0024    Lab Status: Final result Specimen: Blood from Arm, Left Updated: 10/29/23 0106     Total Bilirubin 0.74 mg/dL      Bilirubin, Direct 0.06 mg/dL      Alkaline Phosphatase 71 U/L      AST 38 U/L      ALT 24 U/L      Total Protein 7.6 g/dL      Albumin 4.5 g/dL     HS Troponin 0hr (reflex protocol) [[id number]]  (Normal) Collected: 10/29/23 0024    Lab Status: Final result Specimen: Blood from Arm, Left Updated: 10/29/23 0100     hs TnI 0hr 4 ng/L     Protime-INR [[id number]]  (Normal) Collected: 10/29/23 0024    Lab Status: Final result Specimen: Blood from Arm, Left Updated: 10/29/23 0056     Protime 14.2 seconds      INR 1.03    APTT [[id number]]  (Normal) Collected: 10/29/23 0024    Lab Status: Final result Specimen: Blood from Arm, Left Updated: 10/29/23 0056     PTT 29 seconds     CBC and Platelet [[id number]]  (Normal) Collected: 10/29/23 0024    Lab Status: Final result Specimen: Blood from Arm, Left Updated: 10/29/23 0033     WBC 7.26 Thousand/uL      RBC 4.71 Million/uL      Hemoglobin 15.7 g/dL      Hematocrit 43.6 %      MCV 93 fL      MCH 33.3 pg      MCHC 36.0 g/dL      RDW 12.7 %      Platelets 104 Thousands/uL      MPV 9.9 fL     Fingerstick Glucose (POCT) [[id number]]  (Normal) Collected: 10/29/23 0005    Lab Status: Final result Updated: 10/29/23 0006     POC Glucose 112 mg/dl                    CT stroke alert brain   Final Result by Carlton Blunt MD (10/29 0118)      No acute intracranial hemorrhage, midline shift, or mass effect. If there is continued clinical concern for acute infarct, further evaluation with MRI may be obtained which is more sensitive. Findings were directly discussed with Nereida Cowan at 10/29/2023 at 12:44 a.m. Workstation performed: [id number]         CTA stroke alert (head/neck)   Final Result by Carlton Blunt MD (10/29 0117)      1. Ectatic ascending thoracic aorta measuring 4 cm in diameter. 2.  Moderate focal narrowing of the left vertebral artery at the level of C7 due to osteophytes. Otherwise no hemodynamically significant stenosis of the arteries of the neck. 3.  No high-grade proximal stenosis of the visualized [place]. 4.  No significant intracranial arteriovenous malformation or aneurysm.       Findings were directly discussed with [facility] at 10/29/2023 at 1:00 a.m. .                           Workstation performed: [id number]         CT chest abdomen pelvis w contrast   Final Result by Caitlin Dwyer MD (10/29 0129)      1.  3 x 2 x 2 mm calculus in the left ureterovesicular junction with associated moderate left hydronephrosis. Delayed left nephrogram which is likely due to significant obstruction however correlate with urinalysis to exclude superimposed infection. 2.  Stable fusiform ectasia of the ascending thoracic aorta measuring up to 45 mm. The study was marked in [facility] for immediate notification. Workstation performed: [id number]         MRI brain wo contrast    (Results Pending)         Procedures  ECG 12 Lead Documentation Only    Date/Time: 10/29/2023 12:25 AM    Performed by: Clifton Pérez MD  Authorized by: Clifton Pérez MD    Indications / Diagnosis:  Cva  ECG reviewed by me, the ED Provider: yes    Patient location:  ED  Previous ECG:     Previous ECG:  Unavailable  Interpretation:     Interpretation: non-specific    Quality:     Tracing quality:  Limited by artifact  Rate:     ECG rate:  66    ECG rate assessment: normal    Rhythm:     Rhythm: sinus rhythm and A-V block    Ectopy:     Ectopy: none    QRS:     QRS axis:  Normal    QRS intervals: Wide  Conduction:     Conduction: abnormal      Abnormal conduction: complete RBBB and 1st degree    ST segments:     ST segments:  Normal  T waves:     T waves: inverted      Inverted:  III        ED Course  ED Course as of 10/29/23 0813   Sun Oct 29, 2023   0055 Confusion slightly improved. Per wife, closer to his baseline   0103 hs TnI 0hr: 4   4050 Basic metabolic panel(!)  wnl   6793 Hepatic function panel  wnl   0119 CT stroke alert brain  No acute intracranial hemorrhage, midline shift, or mass effect. If there is continued clinical concern for acute infarct, further evaluation with MRI may be obtained which is more sensitive.    0119 CTA stroke alert (head/neck)  1. Ectatic ascending thoracic aorta measuring 4 cm in diameter. 2.  Moderate focal narrowing of the left vertebral artery at the level of C7 due to osteophytes. Otherwise no hemodynamically significant stenosis of the arteries of the neck. 3.  No high-grade proximal stenosis of the visualized [place]. 4.  No significant intracranial arteriovenous malformation or aneurysm. 8082 CT chest abdomen pelvis w contrast  1.  3 x 2 x 2 mm calculus in the left ureterovesicular junction with associated moderate left hydronephrosis. Delayed left nephrogram which is likely due to significant obstruction however correlate with urinalysis to exclude superimposed infection. 2.  Stable fusiform ectasia of the ascending thoracic aorta measuring up to 45 mm.     0312 Blood, UA(!): Moderate   0312 UA w Reflex to Microscopic w Reflex to Culture(!)  No uti             HEART Risk Score      Flowsheet Row Most Recent Value   Heart Score Risk Calculator    History 0 Filed at: 10/29/2023 0000   ECG 1 Filed at: 10/29/2023 0000   Age 2 Filed at: 10/29/2023 0000   Risk Factors 2 Filed at: 10/29/2023 0000   Troponin 0 Filed at: 10/29/2023 0000   HEART Score 5 Filed at: 10/29/2023 0000             Stroke Assessment       Row Name 10/29/23 0000             NIH Stroke Scale    Interval Baseline      Level of Consciousness (1a.) 0      LOC Questions (1b.) 0      LOC Commands (1c.) 0      Best Gaze (2.) 0      Visual (3.) 0      Facial Palsy (4.) 0      Motor Arm, Left (5a.) 0      Motor Arm, Right (5b.) 0      Motor Leg, Left (6a.) 0      Motor Leg, Right (6b.) 0      Limb Ataxia (7.) 0      Sensory (8.) 0      Best Language (9.) 0      Dysarthria (10.) 0      Extinction and Inattention (11.) (Formerly Neglect) 0      Total 0                    Flowsheet Row Most Recent Value   Thrombolytic Decision Options    Thrombolytic Decision Patient not a candidate.    Patient is not a candidate options Symptoms resolved/clearly non disabling. SBIRT 20yo+      Flowsheet Row Most Recent Value   Initial Alcohol Screen: US AUDIT-C     1. How often do you have a drink containing alcohol? 0 Filed at: 10/29/2023 0003   2. How many drinks containing alcohol do you have on a typical day you are drinking? 0 Filed at: 10/29/2023 0003   3a. Male UNDER 65: How often do you have five or more drinks on one occasion? 0 Filed at: 10/29/2023 0003   3b. FEMALE Any Age, or MALE 65+: How often do you have 4 or more drinks on one occassion? 0 Filed at: 10/29/2023 0003   Audit-C Score 0 Filed at: 10/29/2023 0003   ROSEMARIE: How many times in the past year have you. .. Used an illegal drug or used a prescription medication for non-medical reasons? Never Filed at: 10/29/2023 0003                  Medical Decision Making  Patient presented with acute confusion concerning for the possibility of CVA. With the addition of flank pain there was a possibility of this being UTI/pyelonephritis induced. Also possibility of this being due to the unknown pain medication wife gave him for the flank pain. CT scans of patient's head came back negative for acute pathology. On reevaluation after the CT scans, his confusion was much improved and has had NIH score of 0. Based on this improvement and clearly nondisabling symptoms, decision made to not give TNK. Did get a CT scan of abdomen/pelvis which did show an obstructing stone in the left UVJ which accounts for his flank pain. Urinalysis did not show any signs of acute infection making this not an infected stone. Reevaluation did have an episode of hematemesis. There was some concern that this could actually be vomit that was black due to licorice however he did a Hemoccult test on the vomit which was very positive for the presence of heme. This is possibly related to patient taking DAPT prior to admission. Patient was started on Protonix bolus and infusion.     Patient was admitted on stroke pathway to be seen by GI, neurology, urology for the above issues. Problems Addressed:  Hematemesis with nausea: complicated acute illness or injury  Hypertension, unspecified type: chronic illness or injury with exacerbation, progression, or side effects of treatment  Obstruction of left ureteropelvic junction (UPJ) due to stone: acute illness or injury  Stroke-like symptom: complicated acute illness or injury  Transient alteration of awareness: complicated acute illness or injury    Amount and/or Complexity of Data Reviewed  Independent Historian: spouse and EMS  Labs: ordered. Decision-making details documented in ED Course. Radiology: ordered. Decision-making details documented in ED Course. ECG/medicine tests: ordered and independent interpretation performed. Discussion of management or test interpretation with external provider(s): Discussed and comanaged patient's case with on-call neurology    Risk  Prescription drug management. Decision regarding hospitalization.           Disposition  Final diagnoses:   Stroke-like symptom   Transient alteration of awareness   Hypertension, unspecified type   Obstruction of left ureteropelvic junction (UPJ) due to stone   Hematemesis with nausea     Time reflects when diagnosis was documented in both MDM as applicable and the Disposition within this note       Time User Action Codes Description Comment    10/29/2023 12:22 AM Cherry Fuse Add [R29.90] Stroke-like symptom     10/29/2023  3:30 AM Cherry Fuse Add [R40.4] Transient alteration of awareness     10/29/2023  3:31 AM Cherry Fuse Add [I10] Hypertension, unspecified type     10/29/2023  3:31 AM Cherry Fuse Add [K80.61] Multiple obstructing stones in biliary tract     10/29/2023  3:31 AM Cherry Fuse Remove [K80.61] Multiple obstructing stones in biliary tract     10/29/2023  3:31 AM Cherry Fuse Add [N20.1] Obstruction of left ureteropelvic junction (UPJ) due to stone     10/29/2023 3:31 AM Boneta Ban Add [K92.0] Hematemesis with nausea     10/29/2023  3:32 AM Boneta Ban Modify [R29.90] Stroke-like symptom     10/29/2023  3:32 AM Boneta Ban Modify [R40.4] Transient alteration of awareness     10/29/2023  5:11 AM Shabbir Norm E Add [R41.0] Acute confusion     10/29/2023  5:11 AM Shabbir Norm E Add [K92.0] Coffee ground emesis     10/29/2023  5:13 AM Shabbir Norm E Add [N13.2] Hydronephrosis with obstructing calculus     10/29/2023  6:51 AM Shabbir Norm E Add [R11.10] Emesis           ED Disposition       ED Disposition   Admit    Condition   Stable    Date/Time   Sun Oct 29, 2023 0330    Comment   Case was discussed with Red Duque and the patient's admission status was agreed to be Admission Status: inpatient status to the service of Dr. Oswaldo Hamlin . Follow-up Information    None         Patient's Medications   Discharge Prescriptions    No medications on file     No discharge procedures on file. PDMP Review         Value Time User    PDMP Reviewed  Yes 4/20/2023  2:47 PM Estefani Dunn MD             ED Provider  Attending physically available and evaluated Juma Wong. I managed the patient along with the ED Attending.     Electronically Signed by           Chandana Alvarenga MD  10/29/23 9463

## 2023-10-29 NOTE — ASSESSMENT & PLAN NOTE
Reported left sided flank pain since last evening  CT A/P: "3 x 2 x 2 mm calculus in the left ureterovesicular junction with associated moderate left hydronephrosis."  Urology consult. Keep NPO. IV fluids. Pain control. Urine is without evidence of infection.   Gentle IV fluids

## 2023-10-29 NOTE — ASSESSMENT & PLAN NOTE
-On aspirin/Plavix at home prior to admission  -As mentioned above on hold at the moment due to emesis/possible GI bleed

## 2023-10-30 NOTE — UTILIZATION REVIEW
Initial Clinical Review    Admission: Date/Time/Statement:   Admission Orders (From admission, onward)       Ordered        10/29/23 0332  INPATIENT ADMISSION  Once                          Orders Placed This Encounter   Procedures    INPATIENT ADMISSION     Standing Status:   Standing     Number of Occurrences:   1     Order Specific Question:   Level of Care     Answer:   Med Surg [16]     Order Specific Question:   Bed request comments     Answer:   tele     Order Specific Question:   Estimated length of stay     Answer:   More than 2 Midnights     Order Specific Question:   Certification     Answer:   I certify that inpatient services are medically necessary for this patient for a duration of greater than two midnights. See H&P and MD Progress Notes for additional information about the patient's course of treatment. ED Arrival Information       Expected   -    Arrival   10/28/2023 23:41    Acuity   Emergent              Means of arrival   Walk-In    Escorted by   Spouse    Service   Hospitalist    Admission type   Emergency              Arrival complaint   Left Sided Flank Pain/Vomiting             Chief Complaint   Patient presents with    Altered Mental Status     Patient comes in w/ wife. Wife reports patient was reporting L flank pain that began tonight. Per wife patient experiencing AMS, asking here 3x why he was here in the ER. Per wife, patient normally Alert/ oriented x4. Wife states he took pain medication PTA but is unsure what medication he took. Patient denies urinary issues. Flank Pain       Initial Presentation: 70 y.o. male PMH of CAD s/p stenting, HLD, HTN to ED from home admitted Inpatient d/t acute confusion. presents with left flank pain and confusion. episode of coffee ground emesis, he reports he ate black licorice last night. no alcohol consumption for 23 years and previously drank "socially".  started on Augmentin on 10/25/23 for a dental infection and was prescribed this for 1 week.hemoglobin did go from 15.7 to 14. 1. MRI was negative. CTH: no acute findings. CTA head and neck: "Ectatic ascending thoracic aorta measuring 4 cm in diameter. Moderate focal narrowing of the left vertebral artery at the level of C7 due to osteophytes." CT A/P: "3 x 2 x 2 mm calculus in the left ureterovesicular junction with associated moderate left hydronephrosis." Strain urine, flomax, IV fluids. IV PPI. Neuro checks. Urology consulted for  Left ureteral calculus with hydronephrosis without MIGUEL ÁNGEL. Neurology consult. GI consult. Npo. PT/OT/ST.    10/29 GI CONSULT: left flank pain. MRI of the brain did not demonstrate any acute changes. CT scan of the abdomen and pelvis demonstrated finding suggestive of kidney stones and is being evaluated by urology as well. We are consulted for coffee-ground emesis. BUN 29. Patient did have a colonoscopy in 2021 which demonstrated 6 polyps with repeat colonoscopy planned in 3 years. PPI. Plan for EGD evaluation to rule out peptic ulcer disease versus gastritis versus esophagitis especially  since antiplatelet therapy will have to be likely restarted. monitor H&H.    10/29 NEUROLOGY CONSULT:left flank pain and acute onset of confusion. A stroke alert was initiated. LKW 9 PM 10/28/23. BP on arrival 164/98. CTH revealed no acute intracranial hemorrhage, midline shift, or mass effect. CTA H/N wwo contrast revealed no IR target. NIHSS reported per overnight stroke alert quick note 0. Pt not a thrombolytic candidate due to rapidly improving symptoms. Pt on ASA and Plavix PTA. Suspect oxycodone related confusion overnight. Pt has returned to baseline. MRI brain not acute. Frequent neuro checks. Tele. PT/OT/ST.    10/29 UROLOGY CONSULT:3 mm distal left ureteral calculus without evidence of fever or leukocytosis without MIGUEL ÁNGEL. conservative management with hydration, tamsulosin, pain medication and plans for medical expulsive therapy with outpatient follow-up.   Would hold on surgical intervention at this time. 10/29:  later discovered that the patient took a dose of oxycodone for flank pain that is likely the cause of acute confusion. Patient declined EGD, recommend protonix and recheck hemoglobin in 3 days. Discharged.         ED Triage Vitals [10/29/23 0001]   Temperature Pulse Respirations Blood Pressure SpO2   98.3 °F (36.8 °C) 70 18 164/98 97 %      Temp Source Heart Rate Source Patient Position - Orthostatic VS BP Location FiO2 (%)   Oral Monitor Sitting Right arm --      Pain Score       No Pain          Wt Readings from Last 1 Encounters:   10/29/23 108 kg (238 lb 1.6 oz)     Additional Vital Signs:   10/29/23 1517 -- 73 18 115/74 -- 95 % None (Room air) Sitting   10/29/23 1100 -- 66 16 150/81 110 94 % None (Room air) --   10/29/23 1000 -- 68 16 160/94 122 96 % None (Room air) --   10/29/23 0900 -- 67 16 146/86 111 94 % None (Room air) --   10/29/23 0730 -- 66 16 130/82 101 94 % None (Room air) --   10/29/23 0655 -- 68 18 128/80 -- 93 % None (Room air) Lying   10/29/23 0600 -- 76 20 124/76 92 94 % None (Room air) Lying   10/29/23 0500 -- 79 20 127/78 97 94 % None (Room air) Lying   10/29/23 0400 -- 70 20 134/80 -- 91 % None (Room air) Lying   10/29/23 0315 -- 68 18 142/74 -- 94 % None (Room air) Lying   10/29/23 0300 -- 68 18 130/90 105 94 % None (Room air) Lying   10/29/23 0230 -- 68 18 143/97 117 97 % None (Room air) Lying   10/29/23 0215 -- 67 18 134/82 104 95 % None (Room air) Lying   10/29/23 0130 -- 69 18 145/76 105 97 % None (Room air) Lying   10/29/23 0045 -- 71 18 214/100 Abnormal  143 98 % None (Room air) Sitting   10/29/23 0030 -- 65 17 170/100 -- 97 % -- --   10/29/23 0020 -- 67 -- -- -- 98 % -- --   10/29/23 0015 -- -- -- -- -- -- None (Room air) --   10/29/23 0013 -- 67 19 182/107 Abnormal   -- 97 % None (Room air) --   BP: provider at bedside at 10/29/23 0013   10/29/23 0001 98.3 °F (36.8 °C) 70 18 164/98 -- 97 % -- Sitting     Pertinent Labs/Diagnostic Test Results: 10/29 EKG:  Sinus rhythm with 1st degree A-V block  Right bundle branch block  Abnormal ECG  When compared with ECG of 29-OCT-2023 00:23, (unconfirmed)  Premature atrial complexes are no longer Present  Confirmed by Johana Brambila ([id number]) on 10/29/2023 8:54:57 AM    EKG:Date/Time: 10/29/2023 12:25 AM     Performed by: Alpa Brady MD   Authorized by: Alpa Brady MD    Indications / Diagnosis:  Cva   ECG reviewed by me, the ED Provider: yes    Patient location:  ED   Previous ECG:     Previous ECG:  Unavailable   Interpretation:     Interpretation: non-specific    Quality:     Tracing quality:  Limited by artifact   Rate:     ECG rate:  66     ECG rate assessment: normal    Rhythm:     Rhythm: sinus rhythm and A-V block    Ectopy:     Ectopy: none    QRS:     QRS axis:  Normal     QRS intervals: Wide   Conduction:     Conduction: abnormal      Abnormal conduction: complete RBBB and 1st degree    ST segments:     ST segments:  Normal   T waves:     T waves: inverted      Inverted:  III     10/29 EKG:  Sinus rhythm with occasional Premature atrial complexes  Right bundle branch block  Abnormal ECG  When compared with ECG of 01-SEP-2022 16:29,  SC interval has decreased  Right bundle branch block has replaced Incomplete right bundle branch block  Confirmed by Johana Brambila ([id number]) on 10/29/2023 8:55:18 AM       MRI brain wo contrast   Final Result by AURORA Sepulveda MD (10/29 1080)      No acute intracranial pathology. Chronic microangiopathy. Workstation performed: [id number]         CT stroke alert brain   Final Result by Oscar Irwin MD (10/29 0118)      No acute intracranial hemorrhage, midline shift, or mass effect. If there is continued clinical concern for acute infarct, further evaluation with MRI may be obtained which is more sensitive. Findings were directly discussed with Jesus Adams at 10/29/2023 at 12:44 a.m.       Workstation performed: [id number]         CTA stroke alert (head/neck)   Final Result by Hal Haskins MD (10/29 9092)      1. Ectatic ascending thoracic aorta measuring 4 cm in diameter. 2.  Moderate focal narrowing of the left vertebral artery at the level of C7 due to osteophytes. Otherwise no hemodynamically significant stenosis of the arteries of the neck. 3.  No high-grade proximal stenosis of the visualized Eek of Henson. 4.  No significant intracranial arteriovenous malformation or aneurysm. Findings were directly discussed with James Dodson at 10/29/2023 at 1:00 a.m. .                           Workstation performed: [id number]         CT chest abdomen pelvis w contrast   Final Result by Hal Haskins MD (10/29 0129)      1.  3 x 2 x 2 mm calculus in the left ureterovesicular junction with associated moderate left hydronephrosis. Delayed left nephrogram which is likely due to significant obstruction however correlate with urinalysis to exclude superimposed infection. 2.  Stable fusiform ectasia of the ascending thoracic aorta measuring up to 45 mm. The study was marked in [facility] for immediate notification.       Workstation performed: [id number]           Results from last 7 days   Lab Units 10/29/23  0024   SARS-COV-2  Negative     Results from last 7 days   Lab Units 10/29/23  0620 10/29/23  0024   WBC Thousand/uL 8.28 7.26   HEMOGLOBIN g/dL 14.1 15.7   HEMATOCRIT % 40.4 43.6   PLATELETS Thousands/uL 194 211         Results from last 7 days   Lab Units 10/29/23  0620 10/29/23  0024   SODIUM mmol/L 136 135   POTASSIUM mmol/L 4.2 4.8   CHLORIDE mmol/L 107 104   CO2 mmol/L 24 23   ANION GAP mmol/L 5 8   BUN mg/dL 29* 31*   CREATININE mg/dL 0.82 1.19   EGFR ml/min/1.73sq m 85 58   CALCIUM mg/dL 9.4 10.1     Results from last 7 days   Lab Units 10/29/23  0024   AST U/L 38   ALT U/L 24   ALK PHOS U/L 71   TOTAL PROTEIN g/dL 7.6   ALBUMIN g/dL 4.5   TOTAL BILIRUBIN mg/dL 0.74   BILIRUBIN DIRECT mg/dL 0.06     Results from last 7 days   Lab Units 10/29/23  0005   POC GLUCOSE mg/dl 112     Results from last 7 days   Lab Units 10/29/23  0620 10/29/23  0024   GLUCOSE RANDOM mg/dL 113 122         Results from last 7 days   Lab Units 10/29/23  0620   HEMOGLOBIN A1C % 5.5   EAG mg/dl 111       Results from last 7 days   Lab Units 10/29/23  0225 10/29/23  0024   HS TNI 0HR ng/L  --  4   HS TNI 2HR ng/L 9  --    HSTNI D2 ng/L 5  --          Results from last 7 days   Lab Units 10/29/23  0024   PROTIME seconds 14.2   INR  1.03   PTT seconds 29     Results from last 7 days   Lab Units 10/29/23  0025   TSH 3RD GENERATON uIU/mL 1.532       Results from last 7 days   Lab Units 10/29/23  0024   LIPASE u/L 23                 Results from last 7 days   Lab Units 10/29/23  0220   CLARITY UA  Clear   COLOR UA  Yellow   SPEC GRAV UA  >=1.050*   PH UA  7.0   GLUCOSE UA mg/dl Negative   KETONES UA mg/dl Negative   BLOOD UA  Moderate*   PROTEIN UA mg/dl Trace*   NITRITE UA  Negative   BILIRUBIN UA  Negative   UROBILINOGEN UA (BE) mg/dl <2.0   LEUKOCYTES UA  Negative   WBC UA /hpf 1-2   RBC UA /hpf Innumerable*   BACTERIA UA /hpf Occasional   EPITHELIAL CELLS WET PREP /hpf None Seen   MUCUS THREADS  Occasional*     Results from last 7 days   Lab Units 10/29/23  0024   INFLUENZA A PCR  Negative   INFLUENZA B PCR  Negative   RSV PCR  Negative         Results from last 7 days   Lab Units 10/29/23  0220   AMPH/METH  Negative   BARBITURATE UR  Negative   BENZODIAZEPINE UR  Negative   COCAINE UR  Negative   METHADONE URINE  Negative   OPIATE UR  Negative   PCP UR  Negative   THC UR  Negative         ED Treatment:   Medication Administration from 10/28/2023 2341 to 10/30/2023 1042         Date/Time Order Dose Route Action Action by Comments                10/29/2023 0044 EDT ondansetron (ZOFRAN) injection 4 mg 4 mg Intravenous Given                  10/29/2023 0139 EDT pantoprazole (PROTONIX) 80 mg in sodium chloride 0.9 % 100 mL IVPB 80 mg Intravenous New Bag 10/29/2023 1300 EDT pantoprazole (PROTONIX) 80 mg in sodium chloride 0.9 % 100 mL infusion 8 mg/hr Intravenous New Bag                  10/29/2023 0205 EDT pantoprazole (PROTONIX) 80 mg in sodium chloride 0.9 % 100 mL infusion 8 mg/hr Intravenous New Bag       10/29/2023 0120 EDT metoclopramide (REGLAN) injection 10 mg 10 mg Intravenous Given       10/29/2023 0941 EDT ezetimibe (ZETIA) tablet 10 mg 10 mg Oral Given       10/29/2023 0940 EDT gabapentin (NEURONTIN) capsule 300 mg 300 mg Oral Given       10/29/2023 0941 EDT sertraline (ZOLOFT) tablet 50 mg 50 mg Oral Given       10/29/2023 0941 EDT fish oil capsule 1,000 mg 1,000 mg Oral Given                  10/29/2023 9340 EDT sodium chloride 0.9 % infusion 75 mL/hr Intravenous New Bag       10/29/2023 0940 EDT amoxicillin-clavulanate (AUGMENTIN) 500-125 mg per tablet 1 tablet 1 tablet Oral Given                  10/29/2023 0945 EDT sodium chloride 0.9 % infusion 125 mL/hr Intravenous New Bag            Past Medical History:   Diagnosis Date    Celiac disease     Coronary artery disease     Diverticulitis     Diverticulitis of ascending colon 10/27/2022    Diverticulosis     Hyperlipidemia     Hypertension     Myocardial infarction ([address]) 1990     Present on Admission:   Hyperlipidemia   Acute confusion   Hydronephrosis with obstructing calculus   Emesis   CAD (coronary artery disease)   Aneurysm of thoracic aorta   Dental infection      Admitting Diagnosis: Altered mental status [R41.82]  Age/Sex: 70 y.o. male  Admission Orders:  Scheduled Medications:  amoxicillin-clavulanate (AUGMENTIN) 500-125 mg per tablet 1 tablet  Dose: 1 tablet  Freq: 2 times daily Route: PO  Start: 10/29/23 0900 End: 10/29/23 1723   Order specific questions:                0940     1723-D/C'd      ezetimibe (ZETIA) tablet 10 mg  Dose: 10 mg  Freq: Daily Route: PO  Start: 10/29/23 0900 End: 10/29/23 1723   Admin Instructions:                2852     1723-D/C'd      fish oil capsule 1,000 mg  Dose: 1,000 mg  Freq: 2 times daily Route: PO  Start: 10/29/23 0900 End: 10/29/23 1723   Admin Instructions:                4697     1723-D/C'd      gabapentin (NEURONTIN) capsule 300 mg  Dose: 300 mg  Freq: 3 times daily Route: PO  Start: 10/29/23 0900 End: 10/29/23 1723   Admin Instructions:                0940     1723-D/C'd      metoclopramide (REGLAN) injection 10 mg  Dose: 10 mg  Freq: Once Route: IV  Start: 10/29/23 0130 End: 10/29/23 0120             0120        ondansetron (ZOFRAN) injection 4 mg  Dose: 4 mg  Freq: Once Route: IV  Start: 10/29/23 0045 End: 10/29/23 0044   Admin Instructions:                0044         pantoprazole (PROTONIX) 80 mg in sodium chloride 0.9 % 100 mL IVPB  Dose: 80 mg  Freq: Once Route: IV  Last Dose: Stopped (10/29/23 0155)  Start: 10/29/23 0130 End: 10/29/23 0155   Admin Instructions:                0139     0155        Followed by  pantoprazole (PROTONIX) 80 mg in sodium chloride 0.9 % 100 mL infusion  Rate: 10 mL/hr Dose: 8 mg/hr  Freq: Continuous Route: IV  Last Dose: Stopped (10/29/23 1423)  Start: 10/29/23 0130 End: 10/29/23 1417   Admin Instructions:                2323     3679     3170     1417-D/C'd  1423        pravastatin (PRAVACHOL) tablet 80 mg  Dose: 80 mg  Freq: Daily with dinner Route: PO  Start: 10/29/23 1630 End: 10/29/23 1723             1723-D/C'd      sertraline (ZOLOFT) tablet 50 mg  Dose: 50 mg  Freq: Daily Route: PO  Start: 10/29/23 0900 End: 10/29/23 1723   Admin Instructions:                7029     1723-D/C'd      sodium chloride 0.9 % infusion  Dose: 75 mL/hr  Freq:  Once Route: IV  Last Dose: Stopped (10/29/23 0852)  Start: 10/29/23 0600 End: 10/29/23 0852             8562     0852        tamsulosin (FLOMAX) capsule 0.4 mg  Dose: 0.4 mg  Freq: Daily with dinner Route: PO  Start: 10/29/23 1630 End: 10/29/23 1723   Admin Instructions:                1723-D/C'd      traZODone (DESYREL) tablet 150 mg  Dose: 150 mg  Freq: Daily at bedtime Route: PO  Start: 10/29/23 2200 End: 10/29/23 1723   Admin Instructions:                1723-D/C'd          Continuous IV Infusions:  pantoprazole (PROTONIX) 80 mg in sodium chloride 0.9 % 100 mL IVPB  Dose: 80 mg  Freq: Once Route: IV  Last Dose: Stopped (10/29/23 0155)  Start: 10/29/23 0130 End: 10/29/23 0155   Admin Instructions:                0139     0155        Followed by  pantoprazole (PROTONIX) 80 mg in sodium chloride 0.9 % 100 mL infusion  Rate: 10 mL/hr Dose: 8 mg/hr  Freq: Continuous Route: IV  Last Dose: Stopped (10/29/23 1423)  Start: 10/29/23 0130 End: 10/29/23 1417   Admin Instructions:                0205     1231     1300     1417-D/C'd  1423        sodium chloride 0.9 % infusion  Rate: 125 mL/hr Dose: 125 mL/hr  Freq: Continuous Route: IV  Indications of Use: IV Hydration  Last Dose: Stopped (10/29/23 1433)  Start: 10/29/23 0945 End: 10/29/23 1723             0945     1433     1723-D/C'd          NPO  SCD  I&O  NEURO CHECKS Q15 MIN, Q1H, Q2H, Q4H  PT/OT  SPEECH EVAL  OOB  TELE  CONTACT AND AIRBORNE ISOLATION          IP CONSULT TO NEUROLOGY  IP CONSULT TO NEUROLOGY  IP CONSULT TO GASTROENTEROLOGY  IP CONSULT TO UROLOGY    Network Utilization Review Department  ATTENTION: Please call with any questions or concerns to [phone] and carefully listen to the prompts so that you are directed to the right person. All voicemails are confidential.   For Discharge needs, contact Care Management DC Support Team at [phone] opt. 2  Send all requests for admission clinical reviews, approved or denied determinations and any other requests to dedicated fax number below belonging to the campus where the patient is receiving treatment.  List of dedicated fax numbers for the Facilities:  [place] DENIALS (Administrative/Medical Necessity) [phone]   DISCHARGE SUPPORT TEAM (NETWORK) [id number] (Maternity/NICU/Pediatrics) [address] [place] [address] Norma Hendrickson [phone]   [facility] [address] [facility] [phone] [address] [facility] [address] [place] [address] [phone]   [address] [place] [address] [phone]

## 2023-10-30 NOTE — UTILIZATION REVIEW
NOTIFICATION OF ADMISSION DISCHARGE   This is a Notification of Discharge from [address] [facility]. Please be advised that this patient has been discharge from our facility. Below you will find the admission and discharge date and time including the patient’s disposition. UTILIZATION REVIEW CONTACT:  Manuel Cleveland  Utilization   Network Utilization Review Department  Phone: [phone] x carefully listen to the prompts. All voicemails are confidential.  Email: [email or web address]     ADMISSION INFORMATION  PRESENTATION DATE: 10/29/2023 12:05 AM  OBERVATION ADMISSION DATE:   INPATIENT ADMISSION DATE: 10/29/23  3:32 AM   DISCHARGE DATE: 10/29/2023  3:22 PM   DISPOSITION:Home/Self Care    Network Utilization Review Department  ATTENTION: Please call with any questions or concerns to [phone] and carefully listen to the prompts so that you are directed to the right person. All voicemails are confidential.   For Discharge needs, contact Care Management DC Support Team at [phone] opt. 2  Send all requests for admission clinical reviews, approved or denied determinations and any other requests to dedicated fax number below belonging to the campus where the patient is receiving treatment.  List of dedicated fax numbers for the Facilities:  [place] DENIALS (Administrative/Medical Necessity) [phone]   DISCHARGE SUPPORT TEAM (Network) [phone] [address] [facility] (Maternity/NICU/Pediatrics) [phone]   [address] [facility] [address] [place] [address] [address]  [place] [phone]

## 2023-10-31 ENCOUNTER — TRANSITIONAL CARE MANAGEMENT (OUTPATIENT)
Dept: INTERNAL MEDICINE CLINIC | Facility: CLINIC | Age: 78
End: 2023-10-31

## 2023-11-03 ENCOUNTER — NURSE TRIAGE (OUTPATIENT)
Age: 78
End: 2023-11-03

## 2023-11-03 NOTE — TELEPHONE ENCOUNTER
Regarding: ER/ Kidney stone  ----- Message from Afshin Charles MA sent at 11/3/2023  1:46 PM EDT -----  New Patient    What is the reason for the patient's appointment?: pt called to schedule an appt for ER follow up for kidney stone. What office location does the patient prefer?: [place]/ [place]     Does patient have Imaging/Lab Results: CT scan showed : 1.  3 x 2 x 2 mm calculus in the left ureterovesicular junction with associated moderate left hydronephrosis. Delayed left nephrogram which is likely due to significant obstruction however correlate with urinalysis to exclude superimposed infection. 2.  Stable fusiform ectasia of the ascending thoracic aorta measuring up to 45 mm. The study was marked in [facility] for immediate notification.       Have patient records been requested?:  If No, are the records showing in Epic:       INSURANCE:   Do we accept the patient's insurance or is the patient Self-Pay?:    Insurance Provider: Socrates Cervantes Type/Number:   Member ID#:       HISTORY:   Has the patient had any previous Urologist(s)?: No    Was the patient seen in the ED?: yes     Has the patient had any outside testing done?: no     Does the patient have a personal history of cancer?:No

## 2023-11-03 NOTE — TELEPHONE ENCOUNTER
Reason for Disposition  • The patient has mild-moderate hydronephrosis with obstructing kidney stone    Protocols used:  FLANK PAIN / KIDNEY STONES / HYDRONEPHROSIS

## 2023-11-06 ENCOUNTER — TRANSITIONAL CARE MANAGEMENT (OUTPATIENT)
Dept: INTERNAL MEDICINE CLINIC | Facility: CLINIC | Age: 78
End: 2023-11-06

## 2023-11-08 ENCOUNTER — TELEPHONE (OUTPATIENT)
Dept: ADMINISTRATIVE | Facility: OTHER | Age: 78
End: 2023-11-08

## 2023-11-08 NOTE — TELEPHONE ENCOUNTER
11/08/23 3:42 PM    Patient contacted (spoke with patient) to bring Advance Directive, POLST, or Living Will document to next scheduled pcp visit. Thank you.   Catrina Quinteros MA  [facility]

## 2023-11-09 ENCOUNTER — OFFICE VISIT (OUTPATIENT)
Dept: INTERNAL MEDICINE CLINIC | Facility: CLINIC | Age: 78
End: 2023-11-09
Payer: COMMERCIAL

## 2023-11-09 ENCOUNTER — TRANSITIONAL CARE MANAGEMENT (OUTPATIENT)
Dept: INTERNAL MEDICINE CLINIC | Facility: CLINIC | Age: 78
End: 2023-11-09

## 2023-11-09 VITALS
DIASTOLIC BLOOD PRESSURE: 80 MMHG | TEMPERATURE: 97.5 F | RESPIRATION RATE: 14 BRPM | HEIGHT: 72 IN | BODY MASS INDEX: 33.59 KG/M2 | OXYGEN SATURATION: 100 % | WEIGHT: 248 LBS | SYSTOLIC BLOOD PRESSURE: 128 MMHG | HEART RATE: 68 BPM

## 2023-11-09 DIAGNOSIS — I71.21 ANEURYSM OF ASCENDING AORTA WITHOUT RUPTURE (HCC): ICD-10-CM

## 2023-11-09 DIAGNOSIS — Z76.89 ENCOUNTER FOR SUPPORT AND COORDINATION OF TRANSITION OF CARE: Primary | ICD-10-CM

## 2023-11-09 DIAGNOSIS — Z86.010 HISTORY OF COLON POLYPS: ICD-10-CM

## 2023-11-09 PROBLEM — Z86.0100 HISTORY OF COLON POLYPS: Status: ACTIVE | Noted: 2023-11-09

## 2023-11-09 PROCEDURE — 99495 TRANSJ CARE MGMT MOD F2F 14D: CPT

## 2023-11-09 NOTE — PROGRESS NOTES
Assessment & Plan     1. Encounter for support and coordination of transition of care  Assessment & Plan:  Recent admission for altered mental status after taking oxycodone for uretal stone  Mental status has returned to baseline with no further episodes of confusion  Uretal stone now passed without further instances of pain or suprapubic discomfort  Due for colonoscopy for this following year  Up to date with vaccinations  Hemoglobin stable, no need for EGD at this time  Patient feeling well and has no complaints today      2. Aneurysm of ascending aorta without rupture [facility])  Assessment & Plan:  Most recent measurement is 45 cm on CT chest  Blood Pressure: 128/80   Continue surveillance and current blood pressure management        3. History of colon polyps  Assessment & Plan:  Colonoscopy in 2021 showed 6 polyps that were removed  Repeat colonoscopy in 2024 recommended  Referral made           Subjective     Transitional Care Management Review:   Baltazar Garrett is a 70 y.o. male here for TCM follow up. Patient [facility] for acute confusion and flank pain. Patient had been complaining of suprapubic flank and flank pain for which his wife gave him an oxycodone. Patient reports taking the medication on an empty stomach. He became acutely confused and his wife became concerned he may be having a stroke. Workup in the hospital was negative for acute CVA but revealed he had a small uretal stone associated with moderate left hydronephrosis. Urology recommended supportive care and no surgical intervention was attempted. His confusion resolved over the next day and he was sent home in stable condition. Patient reports passing the stone the following day with resolution of his flank pain. He has had no further episodes of discomfort. Patient is eating and drinking normally and still remains active in his daily life. He is ambulating well. He denies any further episodes of confusion. During the TCM phone call patient stated:  TCM Call       Date and time call was made  11/6/2023  1:17 PM    Hospital care reviewed  Records reviewed    Patient was hospitialized at  [address] [place] [address]    Date of Admission  10/29/23    Date of discharge  10/29/23    Diagnosis  Stroke like symptom    Disposition  Home    Current Symptoms  None          TCM Call       Post hospital issues  None    Should patient be enrolled in anticoag monitoring? No    Scheduled for follow up? Yes    Did you obtain your prescribed medications  Yes    Do you need help managing your prescriptions or medications  No    Is transportation to your appointment needed  No    I have advised the patient to call PCP with any new or worsening symptoms  [address] [employer] members    Support System  Family    The type of support provided  Emotional; Financial; Physical    Do you have social support  Yes, as much as I need    Are you recieving any outpatient services  No    Are you recieving home care services  No    Are you using any community resources  No    Current waiver services  No    Have you fallen in the last 12 months  No    Interperter language line needed  No    Counseling  Family            Review of Systems   Constitutional:  Negative for chills and fever. HENT:  Negative for ear pain and sore throat. Eyes:  Negative for pain and visual disturbance. Respiratory:  Negative for cough and shortness of breath. Cardiovascular:  Negative for chest pain and palpitations. Gastrointestinal:  Negative for abdominal pain and vomiting. Genitourinary:  Negative for dysuria and hematuria. Musculoskeletal:  Negative for arthralgias and back pain. Skin:  Negative for color change and rash. Neurological:  Negative for seizures and syncope. All other systems reviewed and are negative.       Objective     /80 (BP Location: Right arm, Patient Position: Sitting, Cuff Size: Large)   Pulse 68 Temp 97.5 °F (36.4 °C) (Tympanic)   Resp 14   Ht 6' (1.829 m)   Wt 112 kg (248 lb)   SpO2 100%   BMI 33.63 kg/m²      Physical Exam  Constitutional:       General: He is not in acute distress. Appearance: Normal appearance. He is not ill-appearing or toxic-appearing. HENT:      Head: Normocephalic and atraumatic. Eyes:      Extraocular Movements: Extraocular movements intact. Conjunctiva/sclera: Conjunctivae normal.      Pupils: Pupils are equal, round, and reactive to light. Cardiovascular:      Rate and Rhythm: Normal rate and regular rhythm. Pulses: Normal pulses. Heart sounds: Normal heart sounds. Pulmonary:      Effort: Pulmonary effort is normal.      Breath sounds: Normal breath sounds. Abdominal:      General: Abdomen is flat. Bowel sounds are normal.      Palpations: Abdomen is soft. Musculoskeletal:         General: Normal range of motion. Skin:     General: Skin is warm. Coloration: Skin is not jaundiced. Neurological:      General: No focal deficit present. Mental Status: He is alert and oriented to person, place, and time. Mental status is at baseline. Sensory: No sensory deficit.    Psychiatric:         Mood and Affect: Mood normal.         Behavior: Behavior normal.       Medications have been reviewed by provider in current encounter    Mika Yi MD

## 2023-11-09 NOTE — ASSESSMENT & PLAN NOTE
Recent admission for altered mental status after taking oxycodone for uretal stone  Mental status has returned to baseline with no further episodes of confusion  Uretal stone now passed without further instances of pain or suprapubic discomfort  Due for colonoscopy for this following year  Up to date with vaccinations  Hemoglobin stable, no need for EGD at this time  Patient feeling well and has no complaints today

## 2023-11-09 NOTE — ASSESSMENT & PLAN NOTE
Colonoscopy in 2021 showed 6 polyps that were removed  Repeat colonoscopy in 2024 recommended  Referral made

## 2023-11-09 NOTE — ASSESSMENT & PLAN NOTE
Most recent measurement is 45 cm on CT chest  Blood Pressure: 128/80   Continue surveillance and current blood pressure management

## 2023-11-20 DIAGNOSIS — K92.0 COFFEE GROUND EMESIS: ICD-10-CM

## 2023-11-20 RX ORDER — OMEPRAZOLE 20 MG/1
20 CAPSULE, DELAYED RELEASE ORAL DAILY
Qty: 90 CAPSULE | Refills: 1 | Status: SHIPPED | OUTPATIENT
Start: 2023-11-20

## 2023-11-30 DIAGNOSIS — F41.9 ANXIETY: ICD-10-CM

## 2023-11-30 RX ORDER — TRAZODONE HYDROCHLORIDE 150 MG/1
150 TABLET ORAL
Qty: 90 TABLET | Refills: 0 | Status: SHIPPED | OUTPATIENT
Start: 2023-11-30

## 2023-12-06 ENCOUNTER — PREP FOR PROCEDURE (OUTPATIENT)
Dept: CARDIOLOGY CLINIC | Facility: CLINIC | Age: 78
End: 2023-12-06

## 2023-12-06 ENCOUNTER — TELEPHONE (OUTPATIENT)
Dept: GASTROENTEROLOGY | Facility: AMBULARY SURGERY CENTER | Age: 78
End: 2023-12-06

## 2023-12-06 ENCOUNTER — OFFICE VISIT (OUTPATIENT)
Dept: GASTROENTEROLOGY | Facility: AMBULARY SURGERY CENTER | Age: 78
End: 2023-12-06
Payer: COMMERCIAL

## 2023-12-06 ENCOUNTER — TELEPHONE (OUTPATIENT)
Age: 78
End: 2023-12-06

## 2023-12-06 VITALS
BODY MASS INDEX: 33.86 KG/M2 | HEIGHT: 72 IN | HEART RATE: 66 BPM | OXYGEN SATURATION: 99 % | SYSTOLIC BLOOD PRESSURE: 138 MMHG | WEIGHT: 250 LBS | DIASTOLIC BLOOD PRESSURE: 82 MMHG

## 2023-12-06 DIAGNOSIS — Z86.010 HISTORY OF COLON POLYPS: Primary | ICD-10-CM

## 2023-12-06 DIAGNOSIS — Z79.02 ANTIPLATELET OR ANTITHROMBOTIC LONG-TERM USE: ICD-10-CM

## 2023-12-06 DIAGNOSIS — K92.0 COFFEE GROUND EMESIS: ICD-10-CM

## 2023-12-06 PROCEDURE — 99214 OFFICE O/P EST MOD 30 MIN: CPT | Performed by: INTERNAL MEDICINE

## 2023-12-06 RX ORDER — BISACODYL 5 MG/1
10 TABLET, DELAYED RELEASE ORAL ONCE
Qty: 2 TABLET | Refills: 0 | Status: SHIPPED | OUTPATIENT
Start: 2023-12-06 | End: 2023-12-06

## 2023-12-06 RX ORDER — CYCLOBENZAPRINE HCL 10 MG
10 TABLET ORAL 3 TIMES DAILY
COMMUNITY
Start: 2023-11-02

## 2023-12-06 NOTE — ASSESSMENT & PLAN NOTE
Patient denies any symptoms at this time and not taking omeprazole.    -Schedule EGD    -Patient was explained about the lifestyle and dietary modifications. Advised to avoid fatty foods, chocolates, caffeine, alcohol and any other triggering foods. Avoid eating for at least 3 hours before going to bed.

## 2023-12-06 NOTE — PATIENT INSTRUCTIONS
Scheduled date of colonoscopy (as of today):  12/29/23  Physician performing colonoscopy:  Dr Dariana Hart   Location of colonoscopy:  Counts include 234 beds at the [facility]   Bowel prep reviewed with patient:  Golytely   Instructions reviewed with patient by:  Sveta Rosenberg   Clearances: Plavix hold sent to cardiology

## 2023-12-06 NOTE — ASSESSMENT & PLAN NOTE
Personal history of colon polyps- patient is at increased risk for colon cancer screening. Rule out colorectal lesions including polyps or malignancy.    -Schedule for colonoscopy. -High-fiber diet.    -Patient was given instructions about the colonoscopy prep.    -Patient was explained about the risks and benefits of the procedure. Risks including but not limited to bleeding, infection, perforation were explained in detail. Also explained about less than 100% sensitivity with the exam and other alternatives.

## 2023-12-06 NOTE — PROGRESS NOTES
Follow-up Note -  [facility]  uJma Louisein 1945 male         ASSESSMENT & PLAN:    Coffee ground emesis  Patient denies any symptoms at this time and not taking omeprazole.    -Schedule EGD    -Patient was explained about the lifestyle and dietary modifications. Advised to avoid fatty foods, chocolates, caffeine, alcohol and any other triggering foods. Avoid eating for at least 3 hours before going to bed. History of colon polyps  Personal history of colon polyps- patient is at increased risk for colon cancer screening. Rule out colorectal lesions including polyps or malignancy.    -Schedule for colonoscopy. -High-fiber diet.    -Patient was given instructions about the colonoscopy prep.    -Patient was explained about the risks and benefits of the procedure. Risks including but not limited to bleeding, infection, perforation were explained in detail. Also explained about less than 100% sensitivity with the exam and other alternatives. Antiplatelet or antithrombotic long-term use  Patient is at increased risks because of antiplatelet therapy. He reports holding Plavix prior to the procedures before. Reason: For colonoscopy    HPI:  Mr. Mishel You had multiple colon polyps removed 3 years ago. Regular bowel movements and denies any blood Demix in the stool. Good appetite, no recent weight loss. He was seen by our service at the hospital couple of months ago when he had coffee-ground emesis which he thinks is from some strawberry licorice. He declined upper endoscopy at that time. He was prescribed omeprazole but he is not taking. Denies any abdominal pain, nausea or vomiting. Good appetite, no recent weight loss. History of coronary artery disease status post stent placement about 14 years ago and has been on Plavix.     REVIEW OF SYSTEMS: Review of Systems   Constitutional:  Negative for activity change, appetite change, chills, diaphoresis, fatigue, fever and unexpected weight change. HENT:  Negative for ear discharge, ear pain, facial swelling, hearing loss, nosebleeds, sore throat, tinnitus and voice change. Eyes:  Negative for pain, discharge, redness, itching and visual disturbance. Respiratory:  Negative for apnea, cough, chest tightness, shortness of breath and wheezing. Cardiovascular:  Negative for chest pain and palpitations. Gastrointestinal:         As noted in HPI   Endocrine: Negative for cold intolerance, heat intolerance and polyuria. Genitourinary:  Negative for difficulty urinating, dysuria, flank pain, hematuria and urgency. Musculoskeletal:  Negative for arthralgias, back pain, gait problem, joint swelling and myalgias. Skin:  Negative for rash and wound. Neurological:  Negative for dizziness, tremors, seizures, speech difficulty, light-headedness, numbness and headaches. Hematological:  Negative for adenopathy. Does not bruise/bleed easily. Psychiatric/Behavioral:  Negative for agitation, behavioral problems and confusion. The patient is not nervous/anxious.          Past Medical History:   Diagnosis Date    Celiac disease     Coronary artery disease     Diverticulitis     Diverticulitis of ascending colon 10/27/2022    Diverticulosis     Hyperlipidemia     Hypertension     Myocardial infarction ([address]) 1990      Past Surgical History:   Procedure Laterality Date    COLONOSCOPY      CORONARY ANGIOPLASTY WITH STENT PLACEMENT      Multiple times    MN ARTHRP KNE CONDYLE&PLATU MEDIAL&LAT COMPARTMENTS Right 04/13/2022    Procedure: ARTHROPLASTY KNEE TOTAL and all associated procedures;  Surgeon: Michael Humphrey MD;  Location:  MAIN OR;  Service: Orthopedics    MN ARTHRP KNE CONDYLE&PLATU MEDIAL&LAT COMPARTMENTS Left 9/29/2022    Procedure: ARTHROPLASTY KNEE TOTAL;  Surgeon: Michael Humphrey MD;  Location:  MAIN OR;  Service: Orthopedics     Social History     Socioeconomic History    Marital status: /Civil Union Spouse name: Not on file    Number of children: 3    Years of education: Not on file    Highest education level: Not on file   Occupational History    Not on file   Tobacco Use    Smoking status: Former     Types: Cigarettes     Start date: 1964     Quit date: 5     Years since quittin.9     Passive exposure: Never    Smokeless tobacco: Never    Tobacco comments:     Per pt, quit over 36 years ago   Vaping Use    Vaping Use: Never used   Substance and Sexual Activity    Alcohol use: Not Currently    Drug use: Never    Sexual activity: Yes     Partners: Female     Birth control/protection: None   Other Topics Concern    Not on file   Social History Narrative    Not on file     Social Determinants of Health     Financial Resource Strain: Low Risk  (2023)    Overall Financial Resource Strain (CARDIA)     Difficulty of Paying Living Expenses: Not hard at all   Food Insecurity: No Food Insecurity (9/10/2019)    Hunger Vital Sign     Worried About Running Out of Food in the Last Year: Never true     [address] in the Last Year: Never true   Transportation Needs: No Transportation Needs (2023)    PRAPARE - Transportation     Lack of Transportation (Medical): No     Lack of Transportation (Non-Medical): No   Physical Activity: Sufficiently Active (9/10/2019)    Exercise Vital Sign     Days of Exercise per Week: 3 days     Minutes of Exercise per Session: 60 min   Stress: Stress Concern Present (9/10/2019)    [facility]     Feeling of Stress : To some extent   Social Connections:  Moderately Isolated (9/10/2019)    Social Connection and Isolation Panel [NHANES]     Frequency of Communication with Friends and Family: Twice a week     Frequency of Social Gatherings with Friends and Family: Once a week     Attends Orthodox Services: Never     Active Member of Clubs or Organizations: No     Attends Club or Organization Meetings: Never Marital Status:    Intimate Partner Violence: Not At Risk (9/10/2019)    Humiliation, Afraid, Rape, and Kick questionnaire     Fear of Current or Ex-Partner: No     Emotionally Abused: No     Physically Abused: No     Sexually Abused: No   Housing Stability: Not on file     Family History   Problem Relation Age of Onset    Diabetes Mother     Coronary artery disease Father     Thyroid disease Sister      Crestor [rosuvastatin] and Ramipril  Current Outpatient Medications   Medication Sig Dispense Refill    aspirin (ECOTRIN LOW STRENGTH) 81 mg EC tablet Take 1 tablet (81 mg total) by mouth daily Please do not start taking until after total joint arthroplasty 10 tablet 0    b complex vitamins capsule Take 1 capsule by mouth daily      baclofen 10 mg tablet TAKE 1 TABLET BY MOUTH 3 TIMES  DAILY AS NEEDED FOR MUSCLE  SPASM(S) 270 tablet 0    bisacodyl (DULCOLAX) 5 mg EC tablet Take 2 tablets (10 mg total) by mouth once for 1 dose 2 tablet 0    cholecalciferol (VITAMIN D3) 1,000 units tablet Take 1 tablet (1,000 Units total) by mouth daily 60 tablet 2    clopidogrel (PLAVIX) 75 mg tablet Take 1 tablet (75 mg total) by mouth every morning 90 tablet 1    cyclobenzaprine (FLEXERIL) 10 mg tablet Take 10 mg by mouth 3 (three) times a day      ezetimibe (ZETIA) 10 mg tablet TAKE 1 TABLET BY MOUTH DAILY 100 tablet 2    gabapentin (NEURONTIN) 300 mg capsule TAKE 1 CAPSULE BY MOUTH 3 TIMES  DAILY 270 capsule 3    ketoconazole (NIZORAL) 2 % shampoo APPLY TOPICALLY 2 TIMES A WEEK 120 mL 0    metoprolol succinate (TOPROL-XL) 50 mg 24 hr tablet TAKE 1 TABLET BY MOUTH  DAILY 90 tablet 3    omega-3-acid ethyl esters (LOVAZA) 1 g capsule TAKE 2 CAPSULES BY MOUTH TWICE  DAILY 400 capsule 2    omeprazole (PriLOSEC) 20 mg delayed release capsule TAKE 1 CAPSULE BY MOUTH EVERY DAY 90 capsule 1    polyethylene glycol (GOLYTELY) 4000 mL solution Take 4,000 mL by mouth once for 1 dose 4000 mL 0    sertraline (ZOLOFT) 50 mg tablet TAKE 1 TABLET BY MOUTH DAILY 90 tablet 3    simvastatin (ZOCOR) 40 mg tablet TAKE 1 TABLET BY MOUTH DAILY AT  BEDTIME 100 tablet 2    tamsulosin (FLOMAX) 0.4 mg TAKE 1 CAPSULE BY MOUTH  DAILY WITH DINNER 100 capsule 2    traZODone (DESYREL) 150 mg tablet Take 1 tablet (150 mg total) by mouth daily at bedtime 90 tablet 0     No current facility-administered medications for this visit. Blood pressure 138/82, pulse 66, height 6' (1.829 m), weight 113 kg (250 lb), SpO2 99 %. PHYSICAL EXAM: Physical Exam  Constitutional:       Appearance: He is well-developed. HENT:      Head: Normocephalic and atraumatic. Eyes:      General: No scleral icterus. Right eye: No discharge. Left eye: No discharge. Conjunctiva/sclera: Conjunctivae normal.      Pupils: Pupils are equal, round, and reactive to light. Neck:      Thyroid: No thyromegaly. Vascular: No JVD. Trachea: No tracheal deviation. Cardiovascular:      Rate and Rhythm: Normal rate and regular rhythm. Heart sounds: Normal heart sounds. No murmur heard. No friction rub. No gallop. Pulmonary:      Effort: Pulmonary effort is normal. No respiratory distress. Breath sounds: Normal breath sounds. No wheezing or rales. Chest:      Chest wall: No tenderness. Abdominal:      General: Bowel sounds are normal. There is no distension. Palpations: Abdomen is soft. There is no mass. Tenderness: There is no abdominal tenderness. There is no guarding or rebound. Hernia: No hernia is present. Musculoskeletal:      Cervical back: Neck supple. Lymphadenopathy:      Cervical: No cervical adenopathy. Skin:     General: Skin is warm and dry. Findings: No erythema or rash. Neurological:      Mental Status: He is alert and oriented to person, place, and time. Psychiatric:         Behavior: Behavior normal.         Thought Content:  Thought content normal.          Lab Results   Component Value Date    WBC 8.28 10/29/2023    HGB 14.1 10/29/2023    HCT 40.4 10/29/2023    MCV 93 10/29/2023     10/29/2023     Lab Results   Component Value Date    CALCIUM 9.4 10/29/2023    K 4.2 10/29/2023    CO2 24 10/29/2023     10/29/2023    BUN 29 (H) 10/29/2023    CREATININE 0.82 10/29/2023     Lab Results   Component Value Date    ALT 24 10/29/2023    AST 38 10/29/2023    ALKPHOS 71 10/29/2023     Lab Results   Component Value Date    INR 1.03 10/29/2023    INR 1.07 08/30/2022    INR 1.00 03/15/2022    PROTIME 14.2 10/29/2023    PROTIME 14.1 08/30/2022    PROTIME 13.2 03/15/2022       MRI brain wo contrast    Result Date: 10/29/2023  Impression: No acute intracranial pathology. Chronic microangiopathy. Workstation performed: [id number]     CT chest abdomen pelvis w contrast    Result Date: 10/29/2023  Impression: 1.  3 x 2 x 2 mm calculus in the left ureterovesicular junction with associated moderate left hydronephrosis. Delayed left nephrogram which is likely due to significant obstruction however correlate with urinalysis to exclude superimposed infection. 2.  Stable fusiform ectasia of the ascending thoracic aorta measuring up to 45 mm. The study was marked in [facility] for immediate notification. Workstation performed: [id number]     CT stroke alert brain    Result Date: 10/29/2023  Impression: No acute intracranial hemorrhage, midline shift, or mass effect. If there is continued clinical concern for acute infarct, further evaluation with MRI may be obtained which is more sensitive. Findings were directly discussed with Martell Jarvis at 10/29/2023 at 12:44 a.m. Workstation performed: [id number]     CTA stroke alert (head/neck)    Result Date: 10/29/2023  Impression: 1.  Ectatic ascending thoracic aorta measuring 4 cm in diameter. 2.  Moderate focal narrowing of the left vertebral artery at the level of C7 due to osteophytes. Otherwise no hemodynamically significant stenosis of the arteries of the neck.  3.  No high-grade proximal stenosis of the visualized [place]. 4.  No significant intracranial arteriovenous malformation or aneurysm. Findings were directly discussed with Angi Antoine at 10/29/2023 at 1:00 a.m. . Workstation performed: [id number]

## 2023-12-06 NOTE — ASSESSMENT & PLAN NOTE
Patient is at increased risks because of antiplatelet therapy. He reports holding Plavix prior to the procedures before.

## 2023-12-12 ENCOUNTER — TELEPHONE (OUTPATIENT)
Dept: NEUROLOGY | Facility: CLINIC | Age: 78
End: 2023-12-12

## 2023-12-12 NOTE — TELEPHONE ENCOUNTER
1ST ATTEMPT,     CALLED THE PATIENT AND SPOKE TO WIFE WHO WILL HAVE PATIENT CALL US BACK TO SCHEDULE. Thank you,     Keon Encinas     HFU/ [facility] [place]/Acute confusion     DC- HOME - 10/29/2023    Devan Schrader will need follow up in in 3 months with neurovascular attending for following L vertebral artery stenosis. He will not require outpatient neurological testing.

## 2023-12-13 ENCOUNTER — TELEPHONE (OUTPATIENT)
Dept: INTERNAL MEDICINE CLINIC | Facility: CLINIC | Age: 78
End: 2023-12-13

## 2023-12-13 DIAGNOSIS — I25.10 ATHEROSCLEROSIS OF CORONARY ARTERY WITHOUT ANGINA PECTORIS, UNSPECIFIED VESSEL OR LESION TYPE, UNSPECIFIED WHETHER NATIVE OR TRANSPLANTED HEART: ICD-10-CM

## 2023-12-13 DIAGNOSIS — L21.9 SEBORRHEIC DERMATITIS: Primary | ICD-10-CM

## 2023-12-13 DIAGNOSIS — K92.0 COFFEE GROUND EMESIS: ICD-10-CM

## 2023-12-13 RX ORDER — CLOPIDOGREL BISULFATE 75 MG/1
75 TABLET ORAL EVERY MORNING
Qty: 90 TABLET | Refills: 1 | Status: SHIPPED | OUTPATIENT
Start: 2023-12-13 | End: 2023-12-14 | Stop reason: SDUPTHER

## 2023-12-13 RX ORDER — OMEPRAZOLE 20 MG/1
20 CAPSULE, DELAYED RELEASE ORAL DAILY
Qty: 90 CAPSULE | Refills: 1 | Status: SHIPPED | OUTPATIENT
Start: 2023-12-13 | End: 2023-12-14 | Stop reason: SDUPTHER

## 2023-12-13 NOTE — TELEPHONE ENCOUNTER
received vm from 12/12 at 11:25am-hi, this is julia whitehead [address] [place] [address] in [place],  YOB: 1945. I am returning your call. My phone is on. So if you would like to call me back, please do so. Thank you.  Raine.  [phone]

## 2023-12-13 NOTE — TELEPHONE ENCOUNTER
2nd Attempt,     Called patient and spoke with him regarding HFU. Patient declined to schedule. I did advise him that he can schedule an HFU up to 1 yr from his d/c date, anything after that would be a new patient appt. He verbalized understanding.      Not scheduling HFU at this time    HE STATED HE IS DOING FINE     Thank you,     [facility]

## 2023-12-14 DIAGNOSIS — K92.0 COFFEE GROUND EMESIS: ICD-10-CM

## 2023-12-14 DIAGNOSIS — I25.10 ATHEROSCLEROSIS OF CORONARY ARTERY WITHOUT ANGINA PECTORIS, UNSPECIFIED VESSEL OR LESION TYPE, UNSPECIFIED WHETHER NATIVE OR TRANSPLANTED HEART: ICD-10-CM

## 2023-12-14 RX ORDER — OMEPRAZOLE 20 MG/1
20 CAPSULE, DELAYED RELEASE ORAL DAILY
Qty: 90 CAPSULE | Refills: 1 | Status: SHIPPED | OUTPATIENT
Start: 2023-12-14

## 2023-12-14 RX ORDER — CLOPIDOGREL BISULFATE 75 MG/1
75 TABLET ORAL EVERY MORNING
Qty: 90 TABLET | Refills: 1 | Status: SHIPPED | OUTPATIENT
Start: 2023-12-14

## 2024-01-03 ENCOUNTER — TELEPHONE (OUTPATIENT)
Dept: CARDIOLOGY CLINIC | Facility: CLINIC | Age: 79
End: 2024-01-03

## 2024-01-03 NOTE — TELEPHONE ENCOUNTER
Office attempted to speak directly with patient but received voicemail instead. A message was left informing patient their appointment on 1/9/24 would need to be rescheduled as they have not completed the required testing for visit. The message informed the patient that they needed to have the CT the provider ordered completed prior to appointment. A return call to office as requested to reschedule appointment with provider.

## 2024-01-05 ENCOUNTER — TELEPHONE (OUTPATIENT)
Dept: GASTROENTEROLOGY | Facility: AMBULARY SURGERY CENTER | Age: 79
End: 2024-01-05

## 2024-01-11 RX ORDER — SODIUM CHLORIDE, SODIUM LACTATE, POTASSIUM CHLORIDE, CALCIUM CHLORIDE 600; 310; 30; 20 MG/100ML; MG/100ML; MG/100ML; MG/100ML
75 INJECTION, SOLUTION INTRAVENOUS CONTINUOUS
Status: CANCELLED | OUTPATIENT
Start: 2024-01-11

## 2024-01-12 ENCOUNTER — ANESTHESIA EVENT (OUTPATIENT)
Dept: GASTROENTEROLOGY | Facility: AMBULARY SURGERY CENTER | Age: 79
End: 2024-01-12

## 2024-01-12 ENCOUNTER — ANESTHESIA (OUTPATIENT)
Dept: GASTROENTEROLOGY | Facility: AMBULARY SURGERY CENTER | Age: 79
End: 2024-01-12

## 2024-01-12 ENCOUNTER — HOSPITAL ENCOUNTER (OUTPATIENT)
Dept: GASTROENTEROLOGY | Facility: AMBULARY SURGERY CENTER | Age: 79
Setting detail: OUTPATIENT SURGERY
End: 2024-01-12
Attending: INTERNAL MEDICINE
Payer: COMMERCIAL

## 2024-01-12 VITALS
BODY MASS INDEX: 32.78 KG/M2 | SYSTOLIC BLOOD PRESSURE: 117 MMHG | RESPIRATION RATE: 18 BRPM | DIASTOLIC BLOOD PRESSURE: 68 MMHG | TEMPERATURE: 98.1 F | HEART RATE: 76 BPM | WEIGHT: 242 LBS | HEIGHT: 72 IN | OXYGEN SATURATION: 95 %

## 2024-01-12 DIAGNOSIS — K92.0 COFFEE GROUND EMESIS: ICD-10-CM

## 2024-01-12 DIAGNOSIS — Z86.010 HISTORY OF COLON POLYPS: ICD-10-CM

## 2024-01-12 PROBLEM — Z98.61 HISTORY OF PTCA: Status: ACTIVE | Noted: 2024-01-12

## 2024-01-12 PROBLEM — Z96.653 S/P TOTAL KNEE ARTHROPLASTY, BILATERAL: Status: ACTIVE | Noted: 2024-01-12

## 2024-01-12 PROBLEM — I10 HTN (HYPERTENSION): Status: ACTIVE | Noted: 2024-01-12

## 2024-01-12 PROBLEM — I21.9 MI (MYOCARDIAL INFARCTION) (HCC): Status: ACTIVE | Noted: 2024-01-12

## 2024-01-12 PROCEDURE — 88305 TISSUE EXAM BY PATHOLOGIST: CPT | Performed by: STUDENT IN AN ORGANIZED HEALTH CARE EDUCATION/TRAINING PROGRAM

## 2024-01-12 PROCEDURE — G0121 COLON CA SCRN NOT HI RSK IND: HCPCS | Performed by: INTERNAL MEDICINE

## 2024-01-12 PROCEDURE — 43239 EGD BIOPSY SINGLE/MULTIPLE: CPT | Performed by: INTERNAL MEDICINE

## 2024-01-12 RX ORDER — LIDOCAINE HYDROCHLORIDE 10 MG/ML
INJECTION, SOLUTION EPIDURAL; INFILTRATION; INTRACAUDAL; PERINEURAL AS NEEDED
Status: DISCONTINUED | OUTPATIENT
Start: 2024-01-12 | End: 2024-01-12

## 2024-01-12 RX ORDER — PROPOFOL 10 MG/ML
INJECTION, EMULSION INTRAVENOUS AS NEEDED
Status: DISCONTINUED | OUTPATIENT
Start: 2024-01-12 | End: 2024-01-12

## 2024-01-12 RX ORDER — SODIUM CHLORIDE, SODIUM LACTATE, POTASSIUM CHLORIDE, CALCIUM CHLORIDE 600; 310; 30; 20 MG/100ML; MG/100ML; MG/100ML; MG/100ML
75 INJECTION, SOLUTION INTRAVENOUS CONTINUOUS
Status: DISCONTINUED | OUTPATIENT
Start: 2024-01-12 | End: 2024-01-16 | Stop reason: HOSPADM

## 2024-01-12 RX ORDER — PANTOPRAZOLE SODIUM 40 MG/1
40 TABLET, DELAYED RELEASE ORAL DAILY
Qty: 30 TABLET | Refills: 1 | Status: SHIPPED | OUTPATIENT
Start: 2024-01-12

## 2024-01-12 RX ADMIN — Medication 0.6 MG: at 13:44

## 2024-01-12 RX ADMIN — PROPOFOL 50 MG: 10 INJECTION, EMULSION INTRAVENOUS at 13:37

## 2024-01-12 RX ADMIN — LIDOCAINE HYDROCHLORIDE 50 MG: 10 INJECTION, SOLUTION EPIDURAL; INFILTRATION; INTRACAUDAL; PERINEURAL at 13:36

## 2024-01-12 RX ADMIN — SODIUM CHLORIDE, SODIUM LACTATE, POTASSIUM CHLORIDE, AND CALCIUM CHLORIDE 75 ML/HR: .6; .31; .03; .02 INJECTION, SOLUTION INTRAVENOUS at 12:53

## 2024-01-12 RX ADMIN — PROPOFOL 50 MG: 10 INJECTION, EMULSION INTRAVENOUS at 13:48

## 2024-01-12 RX ADMIN — PROPOFOL 50 MG: 10 INJECTION, EMULSION INTRAVENOUS at 13:51

## 2024-01-12 RX ADMIN — PROPOFOL 50 MG: 10 INJECTION, EMULSION INTRAVENOUS at 13:39

## 2024-01-12 RX ADMIN — PROPOFOL 100 MG: 10 INJECTION, EMULSION INTRAVENOUS at 13:36

## 2024-01-12 RX ADMIN — PROPOFOL 50 MG: 10 INJECTION, EMULSION INTRAVENOUS at 13:42

## 2024-01-12 NOTE — ANESTHESIA POSTPROCEDURE EVALUATION
Post-Op Assessment Note    CV Status:  Stable  Pain Score: 0    Pain management: adequate       Mental Status:  Sleepy   Hydration Status:  Stable   PONV Controlled:  None   Airway Patency:  Patent     Post Op Vitals Reviewed: Yes      Staff: Anesthesiologist               BP  107/65   Temp      Pulse  74   Resp      SpO2  96%

## 2024-01-12 NOTE — H&P
History and Physical - [facility]  Otilio Machuca 78 y.o. male MRN: [id number]        HPI: 78-year-old male with history of colon polyps had an episode of coffee-ground emesis.  Reports doing well.    Historical Information   Past Medical History:   Diagnosis Date    Celiac disease     Colon polyp     Coronary artery disease     Diverticulitis     Diverticulitis of ascending colon 10/27/2022    Diverticulosis     Hyperlipidemia     Hypertension     Myocardial infarction (HCC)      Past Surgical History:   Procedure Laterality Date    COLONOSCOPY      CORONARY ANGIOPLASTY WITH STENT PLACEMENT      Multiple times    [place] ARTHRP KNE CONDYLE&PLATU MEDIAL&LAT COMPARTMENTS Right 2022    Procedure: ARTHROPLASTY KNEE TOTAL and all associated procedures;  Surgeon: Neeta Chaney MD;  Location: [facility];  Service: Orthopedics    [place] ARTHRP KNE CONDYLE&PLATU MEDIAL&LAT COMPARTMENTS Left 2022    Procedure: ARTHROPLASTY KNEE TOTAL;  Surgeon: Neeta Chaney MD;  Location: [facility];  Service: Orthopedics     Social History   Social History     Substance and Sexual Activity   Alcohol Use Not Currently     Social History     Substance and Sexual Activity   Drug Use Never     Social History     Tobacco Use   Smoking Status Former    Current packs/day: 0.00    Types: Cigarettes    Start date: 1964    Quit date:     Years since quittin.0    Passive exposure: Never   Smokeless Tobacco Never   Tobacco Comments    Per pt, quit over 36 years ago     Family History   Problem Relation Age of Onset    Diabetes Mother     Coronary artery disease Father     Thyroid disease Sister        Meds/Allergies     (Not in a hospital admission)      Allergies   Allergen Reactions    Crestor [Rosuvastatin] Myalgia    Ramipril Cough       Objective     Blood pressure 157/95, pulse 68, temperature 98.1 °F (36.7 °C), temperature source Temporal, resp. rate 18, height 6' (1.829 m), weight 110 kg  (242 lb), SpO2 94%.    PHYSICAL EXAM:    Gen: NAD  CV: S1 & S2 normal, RRR  CHEST: Clear to auscultate  ABD: soft, NT/ND, good bowel sounds  EXT: no edema    ASSESSMENT:     Coffee-ground emesis, history of colon polyps    PLAN:    EGD and colonoscopy

## 2024-01-12 NOTE — ANESTHESIA PREPROCEDURE EVALUATION
Procedure:  COLONOSCOPY  EGD    Relevant Problems   CARDIO   (+) Aneurysm of thoracic aorta   (+) CAD (coronary artery disease)   (+) HTN (hypertension)   (+) History of PTCA with stents   (+) Hyperlipidemia   (+) MI (myocardial infarction) (HCC)      GI/HEPATIC   (+) Coffee ground emesis      /RENAL   (+) Hydronephrosis with obstructing calculus      MUSCULOSKELETAL   (+) Primary osteoarthritis of both knees   (+) Primary osteoarthritis of left knee   (+) Primary osteoarthritis of right knee   (+) Sacroiliitis, not elsewhere classified (HCC)      Other   (+) S/P total knee arthroplasty, bilateral        Physical Exam    Airway    Mallampati score: II  TM Distance: >3 FB  Neck ROM: full     Dental       Cardiovascular  Rhythm: regular, Rate: normal    Pulmonary   Breath sounds clear to auscultation    Other Findings        Anesthesia Plan  ASA Score- 3     Anesthesia Type- IV sedation with anesthesia with ASA Monitors.         Additional Monitors:     Airway Plan:            Plan Factors-    Chart reviewed.        Patient is not a current smoker.              Induction- intravenous.    Postoperative Plan-     Informed Consent- Anesthetic plan and risks discussed with patient.  I personally reviewed this patient with the CRNA. Discussed and agreed on the Anesthesia Plan with the CRNA..

## 2024-01-16 PROCEDURE — 88305 TISSUE EXAM BY PATHOLOGIST: CPT | Performed by: STUDENT IN AN ORGANIZED HEALTH CARE EDUCATION/TRAINING PROGRAM

## 2024-01-17 DIAGNOSIS — F41.9 ANXIETY: ICD-10-CM

## 2024-01-17 RX ORDER — TRAZODONE HYDROCHLORIDE 150 MG/1
150 TABLET ORAL
Qty: 90 TABLET | Refills: 0 | Status: SHIPPED | OUTPATIENT
Start: 2024-01-17

## 2024-01-19 ENCOUNTER — HOSPITAL ENCOUNTER (OUTPATIENT)
Dept: CT IMAGING | Facility: HOSPITAL | Age: 79
Discharge: HOME | End: 2024-01-19
Attending: INTERNAL MEDICINE
Payer: COMMERCIAL

## 2024-01-19 DIAGNOSIS — I71.21 ANEURYSM OF ASCENDING AORTA WITHOUT RUPTURE (HCC): ICD-10-CM

## 2024-01-19 PROCEDURE — 71250 CT THORAX DX C-: CPT

## 2024-01-19 PROCEDURE — G1004 CDSM NDSC: HCPCS

## 2024-01-24 DIAGNOSIS — N40.1 BENIGN PROSTATIC HYPERPLASIA WITH LOWER URINARY TRACT SYMPTOMS, SYMPTOM DETAILS UNSPECIFIED: ICD-10-CM

## 2024-01-24 DIAGNOSIS — I10 ESSENTIAL HYPERTENSION: ICD-10-CM

## 2024-01-25 RX ORDER — METOPROLOL SUCCINATE 50 MG/1
TABLET, EXTENDED RELEASE ORAL
Qty: 100 TABLET | Refills: 2 | Status: SHIPPED | OUTPATIENT
Start: 2024-01-25

## 2024-01-25 RX ORDER — TAMSULOSIN HYDROCHLORIDE 0.4 MG/1
CAPSULE ORAL
Qty: 100 CAPSULE | Refills: 1 | Status: SHIPPED | OUTPATIENT
Start: 2024-01-25

## 2024-01-28 DIAGNOSIS — I71.21 ANEURYSM OF ASCENDING AORTA WITHOUT RUPTURE (HCC): Primary | ICD-10-CM

## 2024-01-28 DIAGNOSIS — I35.0 NONRHEUMATIC AORTIC VALVE STENOSIS: ICD-10-CM

## 2024-02-01 ENCOUNTER — TELEPHONE (OUTPATIENT)
Dept: OBGYN CLINIC | Facility: CLINIC | Age: 79
End: 2024-02-01

## 2024-02-01 ENCOUNTER — TELEPHONE (OUTPATIENT)
Age: 79
End: 2024-02-01

## 2024-02-01 NOTE — TELEPHONE ENCOUNTER
Provider only available in the morning for appts. Has an OR day starting at 1pm. Called pt. To reschedule. LVM stating I switched the appt. From the afternoon to 9:45am and if he was not able to make it we could reschedule all together.

## 2024-02-01 NOTE — TELEPHONE ENCOUNTER
Caller: patient    Doctor/Office: n/a    Call regarding :  calling ortho     Call was transferred to: ortho

## 2024-02-05 DIAGNOSIS — K92.0 COFFEE GROUND EMESIS: ICD-10-CM

## 2024-02-06 RX ORDER — PANTOPRAZOLE SODIUM 40 MG/1
40 TABLET, DELAYED RELEASE ORAL DAILY
Qty: 90 TABLET | Refills: 1 | Status: SHIPPED | OUTPATIENT
Start: 2024-02-06

## 2024-02-07 ENCOUNTER — HOSPITAL ENCOUNTER (OUTPATIENT)
Dept: RADIOLOGY | Facility: HOSPITAL | Age: 79
Discharge: HOME | End: 2024-02-07
Attending: ORTHOPAEDIC SURGERY
Payer: COMMERCIAL

## 2024-02-07 ENCOUNTER — OFFICE VISIT (OUTPATIENT)
Dept: OBGYN CLINIC | Facility: CLINIC | Age: 79
End: 2024-02-07
Payer: COMMERCIAL

## 2024-02-07 VITALS
SYSTOLIC BLOOD PRESSURE: 153 MMHG | WEIGHT: 253 LBS | HEART RATE: 68 BPM | HEIGHT: 72 IN | DIASTOLIC BLOOD PRESSURE: 96 MMHG | BODY MASS INDEX: 34.27 KG/M2

## 2024-02-07 DIAGNOSIS — M19.019 GLENOHUMERAL ARTHRITIS: Primary | ICD-10-CM

## 2024-02-07 DIAGNOSIS — M25.511 BILATERAL SHOULDER PAIN, UNSPECIFIED CHRONICITY: ICD-10-CM

## 2024-02-07 DIAGNOSIS — M25.512 BILATERAL SHOULDER PAIN, UNSPECIFIED CHRONICITY: ICD-10-CM

## 2024-02-07 PROCEDURE — 73030 X-RAY EXAM OF SHOULDER: CPT

## 2024-02-07 PROCEDURE — 99213 OFFICE O/P EST LOW 20 MIN: CPT | Performed by: ORTHOPAEDIC SURGERY

## 2024-02-07 NOTE — PROGRESS NOTES
[facility] [address] [id number] [phone]       Otilio Machuca  [id number]  1945    ORTHOPAEDIC SURGERY OUTPATIENT NOTE  2/7/2024      HISTORY:  78 y.o. male presents for initial evaluation of bilateral shoulder pain.  He reports the left is worse than the right.  He is right-hand dominant.  He complains of bilateral shoulder pain progressive with insidious onset over the last several years.  He has crepitus with movement.  He has limitations with range of motion and pain.  No weakness.  No numbness or tingling.  No previous intervention such as injections.  He does take Plavix.  He is not diabetic. Pain 01/0 at rest, 6/10 with movement. He reports mild nighttime symptoms.       Past Medical History:   Diagnosis Date    Celiac disease     Colon polyp     Coronary artery disease     Diverticulitis     Diverticulitis of ascending colon 10/27/2022    Diverticulosis     Hyperlipidemia     Hypertension     Myocardial infarction (HCC) 1990       Past Surgical History:   Procedure Laterality Date    COLONOSCOPY      CORONARY ANGIOPLASTY WITH STENT PLACEMENT      Multiple times    [place] ARTHRP KNE CONDYLE&PLATU MEDIAL&LAT COMPARTMENTS Right 04/13/2022    Procedure: ARTHROPLASTY KNEE TOTAL and all associated procedures;  Surgeon: Neeta Chaney MD;  Location: [facility];  Service: Orthopedics    [place] ARTHRP KNE CONDYLE&PLATU MEDIAL&LAT COMPARTMENTS Left 9/29/2022    Procedure: ARTHROPLASTY KNEE TOTAL;  Surgeon: Neeta Chaney MD;  Location:  MAIN OR;  Service: Orthopedics       Social History     Socioeconomic History    Marital status: /Civil Union     Spouse name: Not on file    Number of children: 3    Years of education: Not on file    Highest education level: Not on file   Occupational History    Not on file   Tobacco Use    Smoking status: Former     Current packs/day: 0.00     Types: Cigarettes      Start date: 1964     Quit date:      Years since quittin.1     Passive exposure: Never    Smokeless tobacco: Never    Tobacco comments:     Per pt, quit over 36 years ago   Vaping Use    Vaping status: Never Used   Substance and Sexual Activity    Alcohol use: Not Currently    Drug use: Never    Sexual activity: Yes     Partners: Female     Birth control/protection: None   Other Topics Concern    Not on file   Social History Narrative    Not on file     Social Determinants of Health     Financial Resource Strain: Low Risk  (2023)    Overall Financial Resource Strain (CARDIA)     Difficulty of Paying Living Expenses: Not hard at all   Food Insecurity: No Food Insecurity (9/10/2019)    Hunger Vital Sign     Worried About Running Out of Food in the Last Year: Never true     Ran Out of Food in the Last Year: Never true   Transportation Needs: No Transportation Needs (2023)    PRAPARE - Transportation     Lack of Transportation (Medical): No     Lack of Transportation (Non-Medical): No   Physical Activity: Sufficiently Active (9/10/2019)    Exercise Vital Sign     Days of Exercise per Week: 3 days     Minutes of Exercise per Session: 60 min   Stress: Stress Concern Present (9/10/2019)    [employer] [place] [employer] - Occupational Stress Questionnaire     Feeling of Stress : To some extent   Social Connections: Moderately Isolated (9/10/2019)    Social Connection and Isolation Panel [NHANES]     Frequency of Communication with Friends and Family: Twice a week     Frequency of Social Gatherings with Friends and Family: Once a week     Attends Islam Services: Never     Active Member of Clubs or Organizations: No     Attends Club or Organization Meetings: Never     Marital Status:    Intimate Partner Violence: Not At Risk (9/10/2019)    Humiliation, Afraid, Rape, and Kick questionnaire     Fear of Current or Ex-Partner: No     Emotionally Abused: No     Physically Abused: No      Sexually Abused: No   Housing Stability: Not on file       Family History   Problem Relation Age of Onset    Diabetes Mother     Coronary artery disease Father     Thyroid disease Sister         Patient's Medications   New Prescriptions    No medications on file   Previous Medications    ASPIRIN (ECOTRIN LOW STRENGTH) 81 MG EC TABLET    Take 1 tablet (81 mg total) by mouth daily Please do not start taking until after total joint arthroplasty    B COMPLEX VITAMINS CAPSULE    Take 1 capsule by mouth daily    BACLOFEN 10 MG TABLET    TAKE 1 TABLET BY MOUTH 3 TIMES  DAILY AS NEEDED FOR MUSCLE  SPASM(S)    BISACODYL (DULCOLAX) 5 MG EC TABLET    Take 2 tablets (10 mg total) by mouth once for 1 dose    CHOLECALCIFEROL (VITAMIN D3) 1,000 UNITS TABLET    Take 1 tablet (1,000 Units total) by mouth daily    CLOPIDOGREL (PLAVIX) 75 MG TABLET    Take 1 tablet (75 mg total) by mouth every morning    CYCLOBENZAPRINE (FLEXERIL) 10 MG TABLET    Take 10 mg by mouth 3 (three) times a day    EZETIMIBE (ZETIA) 10 MG TABLET    TAKE 1 TABLET BY MOUTH DAILY    GABAPENTIN (NEURONTIN) 300 MG CAPSULE    TAKE 1 CAPSULE BY MOUTH 3 TIMES  DAILY    KETOCONAZOLE (NIZORAL) 2 % SHAMPOO    APPLY TOPICALLY 2 TIMES A WEEK    METOPROLOL SUCCINATE (TOPROL-XL) 50 MG 24 HR TABLET    TAKE 1 TABLET BY MOUTH DAILY    OMEGA-3-ACID ETHYL ESTERS (LOVAZA) 1 G CAPSULE    TAKE 2 CAPSULES BY MOUTH TWICE  DAILY    PANTOPRAZOLE (PROTONIX) 40 MG TABLET    TAKE 1 TABLET BY MOUTH EVERY DAY    POLYETHYLENE GLYCOL (GOLYTELY) 4000 ML SOLUTION    Take 4,000 mL by mouth once for 1 dose    SERTRALINE (ZOLOFT) 50 MG TABLET    TAKE 1 TABLET BY MOUTH DAILY    SIMVASTATIN (ZOCOR) 40 MG TABLET    TAKE 1 TABLET BY MOUTH DAILY AT  BEDTIME    TAMSULOSIN (FLOMAX) 0.4 MG    TAKE 1 CAPSULE BY MOUTH DAILY  WITH DINNER    TRAZODONE (DESYREL) 150 MG TABLET    Take 1 tablet (150 mg total) by mouth daily at bedtime   Modified Medications    No medications on file   Discontinued Medications     No medications on file       Allergies   Allergen Reactions    Crestor [Rosuvastatin] Myalgia    Ramipril Cough        /96 (BP Location: Right arm, Patient Position: Sitting, Cuff Size: Standard)   Pulse 68   Ht 6' (1.829 m)   Wt 115 kg (253 lb)   BMI 34.31 kg/m²      REVIEW OF SYSTEMS:  Constitutional: Negative.    HEENT: Negative.    Respiratory: Negative.    Skin: Negative.    Neurological: Negative.    Psychiatric/Behavioral: Negative.  Musculoskeletal: Negative except for that mentioned in the HPI.    PHYSICAL EXAM:     /96 (BP Location: Right arm, Patient Position: Sitting, Cuff Size: Standard)   Pulse 68   Ht 6' (1.829 m)   Wt 115 kg (253 lb)   BMI 34.31 kg/m²   Gen: No acute distress, resting comfortably in bed  HEENT: Eyes clear, moist mucus membranes, hearing intact  Respiratory: No audible wheezing or stridor  Cardiovascular: Well Perfused peripherally, 2+ distal pulse  Abdomen: nondistended, no peritoneal signs    LEFT SHOULDER:     NVI axillary/medial/radial/ulnar and sensory intact     Forward flexion:   160 degrees   Abduction:  90 degrees   External rotation at 0 degrees:   70 degrees   Internal rotation: L1     STRENGTH:  Forward flexion:  5/5   External rotation:  5/5   Internal rotation:  5/5          RIGHT SHOULDER:     NVI axillary/medial/radial/ulnar and sensory intact     Forward flexion:   160 degrees   Abduction:  90 degrees    External rotation at 0 degrees:  70 degrees   Internal rotation: L1  Crepitus on motion    STRENGTH:  Forward flexion:  5/5   External rotation:  5/5   Internal rotation:  5/5        No scapular winging or dyskinesis     Capillary refill brisk   Full ROM of elbows bilaterally      Skin:  No ecchymosis/abrasion/erythema/warmth     Cervical spine:   Full rotation, side bending, flexion and extension without radiating pain  Spurling's: Negative    IMAGING: X-ray of the right shoulder taken the office today.  This was reviewed in PACS and demonstrates  severe glenohumeral arthritis with joint space narrowing and inferior spurring    X-ray left shoulder taken today in office this was reviewed in PACS.  This demonstrates severe glenohumeral arthritis with joint space narrowing and inferior spurring    ASSESSMENT AND PLAN: 78 y.o. male with bilateral glenohumeral arthritis.  Discussed with the patient treatment options in the form of steroid injection and anti-inflammatories versus surgical intervention in the form of total shoulder arthroplasty. Post operative course was discussed including reverse vs anatomic replacements. He would like to hold off and follow up in the summer to set up for surgery in the fall.     Scribe Attestation      I,:  Narendra Hewitt PA-C am acting as a scribe while in the presence of the attending physician.:       I,:  Lalitha Ann personally performed the services described in this documentation    as scribed in my presence.:

## 2024-02-08 ENCOUNTER — TELEPHONE (OUTPATIENT)
Dept: INTERNAL MEDICINE CLINIC | Facility: CLINIC | Age: 79
End: 2024-02-08

## 2024-02-25 DIAGNOSIS — I25.10 ATHEROSCLEROSIS OF CORONARY ARTERY WITHOUT ANGINA PECTORIS, UNSPECIFIED VESSEL OR LESION TYPE, UNSPECIFIED WHETHER NATIVE OR TRANSPLANTED HEART: ICD-10-CM

## 2024-02-26 RX ORDER — CLOPIDOGREL BISULFATE 75 MG/1
75 TABLET ORAL EVERY MORNING
Qty: 100 TABLET | Refills: 2 | Status: SHIPPED | OUTPATIENT
Start: 2024-02-26

## 2024-03-07 ENCOUNTER — HOSPITAL ENCOUNTER (OUTPATIENT)
Dept: RADIOLOGY | Facility: HOSPITAL | Age: 79
End: 2024-03-07
Payer: COMMERCIAL

## 2024-03-07 ENCOUNTER — OFFICE VISIT (OUTPATIENT)
Dept: OBGYN CLINIC | Facility: CLINIC | Age: 79
End: 2024-03-07
Payer: COMMERCIAL

## 2024-03-07 VITALS — BODY MASS INDEX: 34 KG/M2 | OXYGEN SATURATION: 97 % | HEIGHT: 72 IN | WEIGHT: 251 LBS | HEART RATE: 69 BPM

## 2024-03-07 DIAGNOSIS — M25.571 PAIN, JOINT, ANKLE AND FOOT, RIGHT: Primary | ICD-10-CM

## 2024-03-07 DIAGNOSIS — S99.921A INJURY OF RIGHT FOOT, INITIAL ENCOUNTER: ICD-10-CM

## 2024-03-07 DIAGNOSIS — M25.571 PAIN, JOINT, ANKLE AND FOOT, RIGHT: ICD-10-CM

## 2024-03-07 PROCEDURE — 99203 OFFICE O/P NEW LOW 30 MIN: CPT | Performed by: PHYSICIAN ASSISTANT

## 2024-03-07 PROCEDURE — 73610 X-RAY EXAM OF ANKLE: CPT

## 2024-03-07 NOTE — PROGRESS NOTES
Assessment/Plan   Diagnoses and all orders for this visit:    Pain, joint, ankle and foot, right    Right foot and ankle osteoarthritis    Injury of right foot, initial encounter  - CAM boot  - CT foot attn midfoot  - Follow up with Dr. Singer or Dr. Flaherty          Subjective   Patient ID: Otilio Machuca is a 78 y.o. male.    Vitals:    03/07/24 1139   Pulse: 69   SpO2: 97%     77yo male comes in for an evaluation of his right ankle.  He has been having pain and swelling for 5 days after stepping awkwardly in his garden.  The pain is in the dorsal midfoot.  No fevers, chills, or gout history. The pain is dull in character, mild in severity, does not radiate and is not associated with numbness.          The following portions of the patient's history were reviewed and updated as appropriate: allergies, current medications, past family history, past medical history, past social history, past surgical history, and problem list.    Review of Systems  Ortho Exam  Past Medical History:   Diagnosis Date    Celiac disease     Colon polyp     Coronary artery disease     Diverticulitis     Diverticulitis of ascending colon 10/27/2022    Diverticulosis     Hyperlipidemia     Hypertension     Myocardial infarction (HCC) 1990     Past Surgical History:   Procedure Laterality Date    COLONOSCOPY      CORONARY ANGIOPLASTY WITH STENT PLACEMENT      Multiple times    [place] ARTHRP KNE CONDYLE&PLATU MEDIAL&LAT COMPARTMENTS Right 04/13/2022    Procedure: ARTHROPLASTY KNEE TOTAL and all associated procedures;  Surgeon: Neeta Chaney MD;  Location:  MAIN OR;  Service: Orthopedics    [place] ARTHRP KNE CONDYLE&PLATU MEDIAL&LAT COMPARTMENTS Left 9/29/2022    Procedure: ARTHROPLASTY KNEE TOTAL;  Surgeon: Neeta Chaney MD;  Location:  MAIN OR;  Service: Orthopedics     Family History   Problem Relation Age of Onset    Diabetes Mother     Coronary artery disease Father     Thyroid disease Sister      Social History      Occupational History    Not on file   Tobacco Use    Smoking status: Former     Current packs/day: 0.00     Types: Cigarettes     Start date: 1964     Quit date:      Years since quittin.2     Passive exposure: Never    Smokeless tobacco: Never    Tobacco comments:     Per pt, quit over 36 years ago   Vaping Use    Vaping status: Never Used   Substance and Sexual Activity    Alcohol use: Not Currently    Drug use: Never    Sexual activity: Yes     Partners: Female     Birth control/protection: None       Review of Systems   Constitutional: Negative.    HENT: Negative.    Eyes: Negative.    Respiratory: Negative.    Cardiovascular: Negative.    Gastrointestinal: Negative.    Endocrine: Negative.    Genitourinary: Negative.    Musculoskeletal: As below..   Allergic/Immunologic: Negative.    Neurological: Negative.    Hematological: Negative.    Psychiatric/Behavioral: Negative.          Objective   Physical Exam      Xray:  3 views of the right ankle were done in the office today  I have personally reviewed pertinent films in PACS and my interpretation is arthritic changes noted in the midfoot and ankle. No acute displaced fracture.  Radiologist interpretation pending.      Constitutional: Awake, Alert, Oriented  Eyes: EOMI  Psych: Mood and affect appropriate  Heart: regular rate   Lungs: No audible wheezing  Abdomen: No guarding  Lymph: no lymphedema            right ankle, foot:  Appearance  Swelling: mild/mod dorsal, erythema: mild dorsal, and no ecchymosis, open skin, or deformity  Palpation  + tenderness of the entire dorsal midfoot area  Non-tender ankle and forefoot  ROM  Dorsiflexion: 0 and Plantarflexion: 20.  Pain with active dorsiflexion  Special Tests  Negative anterior drawer  Motor  normal 5/5 in all planes  NVI distally

## 2024-03-09 ENCOUNTER — HOSPITAL ENCOUNTER (OUTPATIENT)
Dept: CT IMAGING | Facility: HOSPITAL | Age: 79
Discharge: HOME | End: 2024-03-09
Payer: COMMERCIAL

## 2024-03-09 DIAGNOSIS — M25.571 PAIN, JOINT, ANKLE AND FOOT, RIGHT: ICD-10-CM

## 2024-03-09 DIAGNOSIS — S99.921A INJURY OF RIGHT FOOT, INITIAL ENCOUNTER: ICD-10-CM

## 2024-03-09 PROCEDURE — 73700 CT LOWER EXTREMITY W/O DYE: CPT

## 2024-03-20 ENCOUNTER — OFFICE VISIT (OUTPATIENT)
Dept: OBGYN CLINIC | Facility: CLINIC | Age: 79
End: 2024-03-20
Payer: COMMERCIAL

## 2024-03-20 VITALS
WEIGHT: 241 LBS | BODY MASS INDEX: 32.64 KG/M2 | DIASTOLIC BLOOD PRESSURE: 70 MMHG | SYSTOLIC BLOOD PRESSURE: 150 MMHG | HEART RATE: 70 BPM | HEIGHT: 72 IN

## 2024-03-20 DIAGNOSIS — S86.211A TRAUMATIC TEAR OF RIGHT ANTERIOR TIBIALIS TENDON: Primary | ICD-10-CM

## 2024-03-20 PROCEDURE — 99213 OFFICE O/P EST LOW 20 MIN: CPT | Performed by: ORTHOPAEDIC SURGERY

## 2024-03-20 NOTE — PROGRESS NOTES
James R Lachman, M.D.  Attending, Orthopaedic Surgery  Foot and Ankle  [facility]        ORTHOPAEDIC FOOT AND ANKLE CLINIC VISIT     Assessment:     Encounter Diagnosis   Name Primary?    Traumatic tear of right anterior tibialis tendon Yes        Plan:   The patient verbalized understanding of exam findings and treatment plan. We engaged in the shared decision-making process and treatment options were discussed at length with the patient. Surgical and conservative management discussed today along with risks and benefits.  Tear of right anterior tibialis, initial injury occurred almost 3 weeks ago (3/2/2024)  Seen at urgent care 3/7/2024, CT RLE ordered and revealed complete tear of anterior tibialis tendon  On exam, noted to have compensatory dorsiflexion to help maintain normal gait  Patient notes he does not have significant pain currently nor significant restrictions on activities  Since patient not having significant pain and not having significant restrictions of ADLs, will defer surgical repair at this time. We will get him into PT and see how he does over the next 1 month. If symptoms worsen despite PT, can reconsider surgical repair.  Begin PT  Return in about 4 weeks (around 4/17/2024).      History of Present Illness:   Chief Complaint:   Chief Complaint   Patient presents with    Right Foot - Pain     Patient says he has a torn ligament in his foot.      Otilio Machuca is a 78 y.o. male who is being seen for right ankle pain. Notes 3 weeks ago, he stepped awkwardly and felt significant pain in anterior ankle.  Pain is localized at anterior ankle with minimal radiating and described as sharp and severe. Patient denies numbness, tingling or radicular pain.  Denies history of neuropathy.  Patient does not smoke, does not have diabetes and does  take blood thinners (plavix and ASA, s/p stents).  Patient denies family history of anesthesia complications and has not had any  complications with anesthesia.     Pain/symptom timing:  Worse during the day when active  Pain/symptom context:  Worse with activites and work  Pain/symptom modifying factors:  Rest makes better, activities make worse  Pain/symptom associated signs/symptoms: none    Prior treatment   NSAIDsYes    Injections No   Bracing/Orthotics Yes   Physical Therapy No     Orthopedic Surgical History:   See below    Past Medical, Surgical and Social History:  Past Medical History:  has a past medical history of Celiac disease, Colon polyp, Coronary artery disease, Diverticulitis, Diverticulitis of ascending colon (10/27/2022), Diverticulosis, Hyperlipidemia, Hypertension, and Myocardial infarction (HCC) (1990).  Problem List: does not have any pertinent problems on file.  Past Surgical History:  has a past surgical history that includes Coronary angioplasty with stent; Colonoscopy; pr arthrp kne condyle&platu medial&lat compartments (Right, 04/13/2022); and pr arthrp kne condyle&platu medial&lat compartments (Left, 9/29/2022).  Family History: family history includes Coronary artery disease in his father; Diabetes in his mother; Thyroid disease in his sister.  Social History:  reports that he quit smoking about 39 years ago. His smoking use included cigarettes. He started smoking about 60 years ago. He has never been exposed to tobacco smoke. He has never used smokeless tobacco. He reports that he does not currently use alcohol. He reports that he does not use drugs.  Current Medications: has a current medication list which includes the following prescription(s): aspirin, b complex vitamins, baclofen, cholecalciferol, clopidogrel, ezetimibe, gabapentin, ketoconazole, metoprolol succinate, omega-3-acid ethyl esters, pantoprazole, sertraline, simvastatin, tamsulosin, trazodone, bisacodyl, cyclobenzaprine, and polyethylene glycol.  Allergies: is allergic to crestor [rosuvastatin] and ramipril.     Review of Systems:  General-  denies fever/chills  HEENT- denies hearing loss or sore throat  Eyes- denies eye pain or visual disturbances, denies red eyes  Respiratory- denies cough or SOB  Cardio- denies chest pain or palpitations  GI- denies abdominal pain  Endocrine- denies urinary frequency  Urinary- denies pain with urination  Musculoskeletal- Negative except noted above  Skin- denies rashes or wounds  Neurological- denies dizziness or headache  Psychiatric- denies anxiety or difficulty concentrating    Physical Exam:   /70   Pulse 70   Ht 6' (1.829 m)   Wt 109 kg (241 lb)   BMI 32.69 kg/m²   General/Constitutional: No apparent distress: well-nourished and well developed.  Eyes: normal ocular motion  Cardio: RRR, Normal S1S2, No m/r/g  Lymphatic: No appreciable lymphadenopathy  Respiratory: Non-labored breathing, CTA b/l no w/c/r  Vascular: No edema, swelling or tenderness, except as noted in detailed exam.  Integumentary: No impressive skin lesions present, except as noted in detailed exam.  Neuro: No ataxia or tremors noted  Psych: Normal mood and affect, oriented to person, place and time. Appropriate affect.  Musculoskeletal: Normal, except as noted in detailed exam and in HPI.    Examination    Right    Gait Normal   Musculoskeletal Tender to palpation at distal anterior tibialis    Skin Normal.      Nails Normal    Range of Motion  20 degrees dorsiflexion, 30 degrees plantarflexion  Subtalar motion: normal    Stability Stable    Muscle Strength 5/5 tibialis anterior  5/5 gastrocnemius-soleus  5/5 posterior tibialis  5/5 peroneal/eversion strength  5/5 EHL  5/5 FHL    Neurologic Normal    Sensation  Intact to light touch throughout sural, saphenous, superficial peroneal, deep peroneal and medial/lateral plantar nerve distributions.  Bartlett-Keyla 5.07 filament (10g) testing  deferred.    Cardiovascular Brisk capillary refill < 2 seconds,intact DP and PT pulses    Special Tests None      Imaging Studies:   CT RLE 3/9/2024  reviewed and demonstrates complete tear of anterior tibialis tendon.    Scribe Attestation      I,:  Ritu Luna PA-C am acting as a scribe while in the presence of the attending physician.:       I,:  James R Lachman, MD personally performed the services described in this documentation    as scribed in my presence.:           James R. Lachman, MD  Foot & Ankle Surgery   Department of Orthopaedic Surgery  [facility]      I personally performed the service.    James R. Lachman, MD

## 2024-04-03 ENCOUNTER — EVALUATION (OUTPATIENT)
Dept: PHYSICAL THERAPY | Facility: CLINIC | Age: 79
End: 2024-04-03
Payer: COMMERCIAL

## 2024-04-03 DIAGNOSIS — S86.211A TRAUMATIC TEAR OF RIGHT ANTERIOR TIBIALIS TENDON: ICD-10-CM

## 2024-04-03 PROCEDURE — 97161 PT EVAL LOW COMPLEX 20 MIN: CPT | Performed by: PHYSICAL THERAPIST

## 2024-04-03 PROCEDURE — 97110 THERAPEUTIC EXERCISES: CPT | Performed by: PHYSICAL THERAPIST

## 2024-04-03 NOTE — PROGRESS NOTES
PT Evaluation     Today's date: 4/3/2024  Patient name: Otilio Machuca  : 1945  MRN: [id number]  Referring provider: Lachman, James R, MD  Dx:   Encounter Diagnosis     ICD-10-CM    1. Traumatic tear of right anterior tibialis tendon  S86.211A Ambulatory Referral to Physical Therapy                     Assessment  Assessment details: Otilio Machuca is a 78 y.o. male with Traumatic tear of right anterior tibialis tendon. He presents with  minimal pain, decreased strength, decreased ROM, and ambulatory dysfunction.  Due to these impairments, Patient has difficulty performing a/iadls, recreational activities and engaging in social activities. Patient's clinical presentation is consistent with their referring diagnosis. Patient would benefit from skilled physical therapy to address their aforementioned impairments, improve their level of function and to improve their overall quality of life.  has been given a home exercise program and is in agreement with the plan of care.  Thank you for your referral.     Impairments: abnormal gait, abnormal muscle firing, abnormal or restricted ROM, impaired physical strength and lacks appropriate home exercise program    Symptom irritability: moderateUnderstanding of Dx/Px/POC: excellent  Goals  ST Goals - 2-4 weeks  1. Patient will report decreased pain with activity by at least 2 points within 4 weeks  2. Patient will improve ROM 5-10 degrees within 4 weeks  3. Patient will demonstrate ability to actively DF for gait without cueing within 4 weeks  4.  Patient will perform IADLs without pain in 2 weeks  5.  Patient will increase strength by 25% in 4 weeks    LT Goals - Discharge  1. Patient will improve FOTO score to maximum stated or greater by discharge  2. Patient will return to preferred recreational activity without significant pain increase by discharge   3.  Patient will return to all work related activities without pain by discharge          Subjective  Evaluation    History of Present Illness  Mechanism of injury: Patient reports that he had on and off pain on the top of his foot and then one day he felt a significant increase in pain in his L foot and he had torn his anterior tibialis tendon.  He notes that he had bruising, swelling and deformity of the tendon .  He saw ortho that gave him a cam walker boot .  He was not recommended to have surgery.    He denies pain, just soreness.  He denies radicular sx.    CC:  patient c/o being unable to dorsiflex his foot.    Function:  Patient reports that he can walk, but does not having a drop foot. He denies difficulty with driving or going up and down stairs.  He denies any significant change in function. He denies any difficulty with sleeping.           Recurrent probem    Quality of life: excellent          Objective     Palpation     Right   Tenderness of the anterior tibialis.     Active Range of Motion   Left Ankle/Foot   Dorsiflexion (ke): 12 degrees   Plantar flexion: 40 degrees   Inversion: 30 degrees   Eversion: 15 degrees     Right Ankle/Foot   Dorsiflexion (ke): 3 degrees   Plantar flexion: 45 degrees   Inversion: 40 degrees   Eversion: 10 degrees     Strength/Myotome Testing     Left Ankle/Foot   Normal strength    Right Ankle/Foot   Dorsiflexion: 4  Plantar flexion: 4+  Inversion: 4+  Eversion: 4+  Great toe flexion: 5    Swelling   Left Ankle/Foot   Metatarsal heads: 27.5 cm  Figure 8: 57 cm  Malleoli: 30 cm    Right Ankle/Foot   Metatarsal heads: 25 cm  Figure 8: 57 cm  Malleoli: 30 cm    Functional Assessment        Single Leg Stance - Eyes Open   Left  Trial 1: 10 seconds    Right  Trial 1: 8 seconds             Precautions:       Manuals                                                                 Neuro Re-Ed             SLS             Side steps                                                                              Ther Ex             Bike             TM gait                          Seated  TR             St TR             TB DF             Great Toe Ext             Smaller toes Ext             Ther Activity                                       Gait Training                                       Modalities

## 2024-04-10 ENCOUNTER — OFFICE VISIT (OUTPATIENT)
Dept: PHYSICAL THERAPY | Facility: CLINIC | Age: 79
End: 2024-04-10
Payer: COMMERCIAL

## 2024-04-10 DIAGNOSIS — S86.211A TRAUMATIC TEAR OF RIGHT ANTERIOR TIBIALIS TENDON: Primary | ICD-10-CM

## 2024-04-10 PROCEDURE — 97110 THERAPEUTIC EXERCISES: CPT

## 2024-04-10 PROCEDURE — 97112 NEUROMUSCULAR REEDUCATION: CPT

## 2024-04-10 NOTE — PROGRESS NOTES
"Daily Note     Today's date: 4/10/2024  Patient name: Otilio Machuca  : 1945  MRN: [id number]  Referring provider: Lachman, James R, MD  Dx:   Encounter Diagnosis     ICD-10-CM    1. Traumatic tear of right anterior tibialis tendon  S86.211A                      Subjective: Patient states that continues to walk recreationally 1 mile per day       Objective: See treatment diary below      Assessment: Tolerated treatment well. Patient exhibited good technique with therapeutic exercises      Plan: Continue per plan of care.      Precautions:       Manuals 04/10                                                                Neuro Re-Ed             SLS 10\" x 6             Side steps                                                                              Ther Ex             Bike 6 min             TM gait             Standing HR/TR 2 x 10             Seated TR 2 x 10             TR with legs extended  6# 2 x 10             TR while seated on plank  6# 20             TB DF GTB x 20             Great Toe Ext 20             Smaller toes Ext 20             Ther Activity                                       Gait Training                                       Modalities                                            "

## 2024-04-11 ENCOUNTER — OFFICE VISIT (OUTPATIENT)
Dept: PHYSICAL THERAPY | Facility: CLINIC | Age: 79
End: 2024-04-11
Payer: COMMERCIAL

## 2024-04-11 DIAGNOSIS — S86.211A TRAUMATIC TEAR OF RIGHT ANTERIOR TIBIALIS TENDON: Primary | ICD-10-CM

## 2024-04-11 PROCEDURE — 97112 NEUROMUSCULAR REEDUCATION: CPT

## 2024-04-11 NOTE — PROGRESS NOTES
"Daily Note     Today's date: 2024  Patient name: Otilio Machuca  : 1945  MRN: [id number]  Referring provider: Lachman, James R, MD  Dx:   Encounter Diagnosis     ICD-10-CM    1. Traumatic tear of right anterior tibialis tendon  S86.211A                      Subjective: No complaints following last treatment session.       Objective: See treatment diary below      Assessment: Tolerated treatment well. Patient exhibited good technique with therapeutic exercises      Plan: Continue per plan of care.      Precautions:       Manuals 04/10 4/11                                                               Neuro Re-Ed             SLS 10\" x 6  10\" x 5 ea           Side steps  Foam 5 laps            Tandem walking   Foam 5 laps                                                               Ther Ex             Bike 6 min  7 min            TM gait             Wedge stretch  20\" x 5           Soleus stretch  20\" x 5            Standing HR/TR 2 x 10   5 x 10            Standing TR  10\" x 20            Seated TR 2 x 10  2 x 10           TR with legs extended  6# 2 x 10  6# 3 x 10            TR while seated on plank  6# 20  6# 3 x 10            TB DF GTB x 20             Great Toe Ext 20  20            Smaller toes Ext 20  20            Heel walking   4 laps           Toe walking  4 laps           Ther Activity                                       Gait Training                                       Modalities                                              "

## 2024-04-15 ENCOUNTER — OFFICE VISIT (OUTPATIENT)
Dept: PHYSICAL THERAPY | Facility: CLINIC | Age: 79
End: 2024-04-15
Payer: COMMERCIAL

## 2024-04-15 DIAGNOSIS — S86.211A TRAUMATIC TEAR OF RIGHT ANTERIOR TIBIALIS TENDON: Primary | ICD-10-CM

## 2024-04-15 PROCEDURE — 97112 NEUROMUSCULAR REEDUCATION: CPT | Performed by: PHYSICAL THERAPIST

## 2024-04-15 PROCEDURE — 97110 THERAPEUTIC EXERCISES: CPT | Performed by: PHYSICAL THERAPIST

## 2024-04-15 NOTE — PROGRESS NOTES
"Daily Note     Today's date: 4/15/2024  Patient name: Otilio Machuca  : 1945  MRN: [id number]  Referring provider: Lachman, James R, MD  Dx:   Encounter Diagnosis     ICD-10-CM    1. Traumatic tear of right anterior tibialis tendon  S86.211A                      Subjective: Patient reports he's doing well.      Objective: See treatment diary below      Assessment: Tolerated treatment well. Patient would benefit from continued PT      Plan: Continue per plan of care.      Precautions:       Manuals 04/10 4/11 4/15                                                              Neuro Re-Ed             SLS 10\" x 6  10\" x 5 ea 10 x :10          Side steps  Foam 5 laps  Foam 5 laps          Tandem walking   Foam 5 laps Foam 5 laps                                                              Ther Ex             Bike 6 min  7 min  7'          TM gait             Wedge stretch  20\" x 5 5 x :20          Soleus stretch  20\" x 5  5 x :20          Standing HR/TR 2 x 10   5 x 10  20          Standing TR  10\" x 20  20          Seated TR 2 x 10  2 x 10 20          TR with legs extended  6# 2 x 10  6# 3 x 10  6.5# 3 x 10 8#         TR while seated on plank  6# 20  6# 3 x 10  6.5# 3 x 10 8#         TB DF GTB x 20             Great Toe Ext 20  20            Smaller toes Ext 20  20            Heel walking   4 laps hep          Toe walking  4 laps hep          Ther Activity                                       Gait Training                                       Modalities                                                "

## 2024-04-17 ENCOUNTER — OFFICE VISIT (OUTPATIENT)
Dept: PHYSICAL THERAPY | Facility: CLINIC | Age: 79
End: 2024-04-17
Payer: COMMERCIAL

## 2024-04-17 DIAGNOSIS — S86.211A TRAUMATIC TEAR OF RIGHT ANTERIOR TIBIALIS TENDON: Primary | ICD-10-CM

## 2024-04-17 PROCEDURE — 97112 NEUROMUSCULAR REEDUCATION: CPT

## 2024-04-17 PROCEDURE — 97110 THERAPEUTIC EXERCISES: CPT

## 2024-04-17 NOTE — PROGRESS NOTES
"Daily Note     Today's date: 2024  Patient name: Otilio Machuca  : 1945  MRN: [id number]  Referring provider: Lachman, James R, MD  Dx:   Encounter Diagnosis     ICD-10-CM    1. Traumatic tear of right anterior tibialis tendon  S86.211A                      Subjective: Patient feeling well. Remains maximally compliant with HEP      Objective: See treatment diary below      Assessment: Tolerated treatment well. Patient exhibited good technique with therapeutic exercises      Plan: Continue per plan of care.      Precautions:       Manuals 04/10 4/11 4/15 4/17                                                              Neuro Re-Ed             SLS 10\" x 6  10\" x 5 ea 10 x :10 Foam 10\" x 10          Side steps  Foam 5 laps  Foam 5 laps Foam 5 laps          Tandem walking   Foam 5 laps Foam 5 laps Foam 5 laps                                                              Ther Ex             Bike 6 min  7 min  7' 7         TM gait             Wedge stretch  20\" x 5 5 x :20 5 x 20\"          Soleus stretch  20\" x 5  5 x :20 5 x 20\"          Standing HR/TR 2 x 10   5 x 10  20          Standing TR  10\" x 20  20 10\"x 20          Seated TR 2 x 10  2 x 10 20          TR with legs extended  6# 2 x 10  6# 3 x 10  6.5# 3 x 10 8# 5\"x 50          TR while seated on plank  6# 20  6# 3 x 10  6.5# 3 x 10 8# 5\" x 50         TB DF GTB x 20             Great Toe Ext 20  20            Smaller toes Ext 20  20            Heel walking   4 laps hep          Toe walking  4 laps hep          Leg press HR    75# 2 x 10         Ther Activity                                       Gait Training                                       Modalities                                                  "

## 2024-04-23 ENCOUNTER — OFFICE VISIT (OUTPATIENT)
Dept: OBGYN CLINIC | Facility: CLINIC | Age: 79
End: 2024-04-23
Payer: COMMERCIAL

## 2024-04-23 ENCOUNTER — OFFICE VISIT (OUTPATIENT)
Dept: PHYSICAL THERAPY | Facility: CLINIC | Age: 79
End: 2024-04-23
Payer: COMMERCIAL

## 2024-04-23 ENCOUNTER — APPOINTMENT (OUTPATIENT)
Dept: PHYSICAL THERAPY | Facility: CLINIC | Age: 79
End: 2024-04-23
Payer: COMMERCIAL

## 2024-04-23 VITALS
BODY MASS INDEX: 32.23 KG/M2 | DIASTOLIC BLOOD PRESSURE: 88 MMHG | HEART RATE: 61 BPM | WEIGHT: 238 LBS | HEIGHT: 72 IN | SYSTOLIC BLOOD PRESSURE: 148 MMHG

## 2024-04-23 DIAGNOSIS — S86.211A TRAUMATIC TEAR OF RIGHT ANTERIOR TIBIALIS TENDON: Primary | ICD-10-CM

## 2024-04-23 PROCEDURE — 97110 THERAPEUTIC EXERCISES: CPT

## 2024-04-23 PROCEDURE — 99213 OFFICE O/P EST LOW 20 MIN: CPT | Performed by: ORTHOPAEDIC SURGERY

## 2024-04-23 PROCEDURE — 97112 NEUROMUSCULAR REEDUCATION: CPT

## 2024-04-23 NOTE — PROGRESS NOTES
"Daily Note     Today's date: 2024  Patient name: Otilio Machuca  : 1945  MRN: [id number]  Referring provider: Lachman, James R, MD  Dx:   Encounter Diagnosis     ICD-10-CM    1. Traumatic tear of right anterior tibialis tendon  S86.211A                      Subjective: Patient notes R foot is improving in both strength and flexibility.       Objective: See treatment diary below      Assessment: Tolerated treatment well. Patient exhibited good technique with therapeutic exercises      Plan: Continue per plan of care.      Precautions:       Manuals 04/10 4/11 4/15 4/17  4/23                                                            Neuro Re-Ed             SLS 10\" x 6  10\" x 5 ea 10 x :10 Foam 10\" x 10  Foam 10\" x10        Side steps  Foam 5 laps  Foam 5 laps Foam 5 laps  Foam 5 laps        Tandem walking   Foam 5 laps Foam 5 laps Foam 5 laps  Foam 5 laps                                                             Ther Ex             Bike 6 min  7 min  7' 7 7'        TM gait             Wedge stretch  20\" x 5 5 x :20 5 x 20\"  5 x 20\"         Soleus stretch  20\" x 5  5 x :20 5 x 20\"  5 x20\"         Standing HR/TR 2 x 10   5 x 10  20          Standing TR  10\" x 20  20 10\"x 20  10\" x 20        Seated TR 2 x 10  2 x 10 20          TR with legs extended  6# 2 x 10  6# 3 x 10  6.5# 3 x 10 8# 5\"x 50  8# 5\" x50        TR while seated on plank  6# 20  6# 3 x 10  6.5# 3 x 10 8# 5\" x 50 8# 5\" x50        TB DF GTB x 20             Great Toe Ext 20  20            Smaller toes Ext 20  20            Heel walking   4 laps hep          Toe walking  4 laps hep          Leg press HR    75# 2 x 10 143# 2 x10        Ther Activity                                       Gait Training                                       Modalities                                                  "

## 2024-04-23 NOTE — PROGRESS NOTES
James R Lachman, M.D.  Attending, Orthopaedic Surgery  Foot and Ankle  [facility]      ORTHOPAEDIC FOOT AND ANKLE CLINIC VISIT     Assessment:     Encounter Diagnosis   Name Primary?    Traumatic tear of right anterior tibialis tendon Yes       Plan:   The patient verbalized understanding of exam findings and treatment plan. We engaged in the shared decision-making process and treatment options were discussed at length with the patient. Surgical and conservative management discussed today along with risks and benefits.  Patient sustained tear of right anterior tibialis tendon, DOI: 3/2/2024  Reports improvement with PT, denies acute pain today  Patient is able to maintain his ADLs without restrictions  Continue PT  Supportive shoes   Will defer surgery at this time given lack of pain and lack of restrictions of ADLs. Can reconsider surgical repair if symptoms worsen in the future  Patient cleared from orthopaedic standpoint  Return if symptoms worsen or fail to improve.      History of Present Illness:   Chief Complaint:   Chief Complaint   Patient presents with    Follow-up     Follow up of the right ankle/ foot. Everything going well.      Otilio Machuca is a 78 y.o. male who is being seen in follow-up for Right ankle pain. When we last saw he we recommended PT.  Pain has  improved. Denies acute pain currently.     Pain/symptom timing:  Worse during the day when active  Pain/symptom context:  Worse with activites and work  Pain/symptom modifying factors:  Rest makes better, activities make worse  Pain/symptom associated signs/symptoms: none    Prior treatment   NSAIDsYes   Injections No   Bracing/Orthotics Yes    Physical Therapy Yes     Orthopedic Surgical History:   See below    Past Medical, Surgical and Social History:  Past Medical History:  has a past medical history of Celiac disease, Colon polyp, Coronary artery disease, Diverticulitis, Diverticulitis of ascending colon  (10/27/2022), Diverticulosis, Hyperlipidemia, Hypertension, and Myocardial infarction (HCC) (1990).  Problem List: does not have any pertinent problems on file.  Past Surgical History:  has a past surgical history that includes Coronary angioplasty with stent; Colonoscopy; pr arthrp kne condyle&platu medial&lat compartments (Right, 04/13/2022); and pr arthrp kne condyle&platu medial&lat compartments (Left, 9/29/2022).  Family History: family history includes Coronary artery disease in his father; Diabetes in his mother; Thyroid disease in his sister.  Social History:  reports that he quit smoking about 39 years ago. His smoking use included cigarettes. He started smoking about 60 years ago. He has never been exposed to tobacco smoke. He has never used smokeless tobacco. He reports that he does not currently use alcohol. He reports that he does not use drugs.  Current Medications: has a current medication list which includes the following prescription(s): aspirin, b complex vitamins, baclofen, cholecalciferol, clopidogrel, ezetimibe, gabapentin, ketoconazole, metoprolol succinate, omega-3-acid ethyl esters, pantoprazole, sertraline, simvastatin, tamsulosin, trazodone, bisacodyl, cyclobenzaprine, and polyethylene glycol.  Allergies: is allergic to crestor [rosuvastatin] and ramipril.     Review of Systems:  General- denies fever/chills  HEENT- denies hearing loss or sore throat  Eyes- denies eye pain or visual disturbances, denies red eyes  Respiratory- denies cough or SOB  Cardio- denies chest pain or palpitations  GI- denies abdominal pain  Endocrine- denies urinary frequency  Urinary- denies pain with urination  Musculoskeletal- Negative except noted above  Skin- denies rashes or wounds  Neurological- denies dizziness or headache  Psychiatric- denies anxiety or difficulty concentrating    Physical Exam:   /88   Pulse 61   Ht 6' (1.829 m)   Wt 108 kg (238 lb)   BMI 32.28 kg/m²   General/Constitutional: No  apparent distress: well-nourished and well developed.  Eyes: normal ocular motion  Lymphatic: No appreciable lymphadenopathy  Respiratory: Non-labored breathing  Vascular: No edema, swelling or tenderness, except as noted in detailed exam.  Integumentary: No impressive skin lesions present, except as noted in detailed exam.  Neuro: No ataxia or tremors noted  Psych: Normal mood and affect, oriented to person, place and time. Appropriate affect.  Musculoskeletal: Normal, except as noted in detailed exam and in HPI.    Examination    Right    Gait Normal   Musculoskeletal No ttp    Skin Normal.     Nails Normal    Range of Motion  20 degrees dorsiflexion, 30 degrees plantarflexion  Subtalar motion: normal    Stability Stable    Muscle Strength 5/5 tibialis anterior  5/5 gastrocnemius-soleus  5/5 posterior tibialis  5/5 peroneal/eversion strength  5/5 EHL  5/5 FHL    Neurologic Normal    Sensation  Intact to light touch throughout sural, saphenous, superficial peroneal, deep peroneal and medial/lateral plantar nerve distributions.  [place]-Keyla 5.07 filament (10g) testing  deferred.    Cardiovascular Brisk capillary refill < 2 seconds,intact DP and PT pulses    Special Tests None      Imaging Studies:   No new imaging      Scribe Attestation      I,:  Ritu Luna PA-C am acting as a scribe while in the presence of the attending physician.:       I,:  James R Lachman, MD personally performed the services described in this documentation    as scribed in my presence.:               James R. Lachman, MD  Foot & Ankle Surgery   Department of Orthopaedic Surgery  [facility]      I personally performed the service.    James R. Lachman, MD

## 2024-04-25 ENCOUNTER — APPOINTMENT (OUTPATIENT)
Dept: PHYSICAL THERAPY | Facility: CLINIC | Age: 79
End: 2024-04-25
Payer: COMMERCIAL

## 2024-04-29 ENCOUNTER — OFFICE VISIT (OUTPATIENT)
Dept: PHYSICAL THERAPY | Facility: CLINIC | Age: 79
End: 2024-04-29
Payer: COMMERCIAL

## 2024-04-29 DIAGNOSIS — S86.211A TRAUMATIC TEAR OF RIGHT ANTERIOR TIBIALIS TENDON: Primary | ICD-10-CM

## 2024-04-29 PROCEDURE — 97112 NEUROMUSCULAR REEDUCATION: CPT | Performed by: PHYSICAL THERAPIST

## 2024-04-29 PROCEDURE — 97110 THERAPEUTIC EXERCISES: CPT | Performed by: PHYSICAL THERAPIST

## 2024-04-29 NOTE — PROGRESS NOTES
"Daily Note     Today's date: 2024  Patient name: Otilio Machuca  : 1945  MRN: [id number]  Referring provider: Lachman, James R, MD  Dx:   Encounter Diagnosis     ICD-10-CM    1. Traumatic tear of right anterior tibialis tendon  S86.211A                      Subjective: Patient reports that he feels that he is getting more ROM however, he notes fatigue with walking.      Objective: See treatment diary below      Assessment: Tolerated treatment well. Patient would benefit from continued PT      Plan: Continue per plan of care.      Precautions:       Manuals 04/10 4/11 4/15 4/17  4/23 4/29                                                           Neuro Re-Ed             SLS 10\" x 6  10\" x 5 ea 10 x :10 Foam 10\" x 10  Foam 10\" x10 10 x :10 foam       Side steps  Foam 5 laps  Foam 5 laps Foam 5 laps  Foam 5 laps Foamx2 5 laps       Tandem walking   Foam 5 laps Foam 5 laps Foam 5 laps  Foam 5 laps  5 laps foam       Heel Walk      5 laps                                              Ther Ex             Bike 6 min  7 min  7' 7 7' 7'       TM gait             Wedge stretch  20\" x 5 5 x :20 5 x 20\"  5 x 20\"  5 x :20       Soleus stretch  20\" x 5  5 x :20 5 x 20\"  5 x20\"  5 x :20       Standing HR/TR 2 x 10   5 x 10  20          Standing TR  10\" x 20  20 10\"x 20  10\" x 20 20       Seated TR 2 x 10  2 x 10 20          TR with legs extended  6# 2 x 10  6# 3 x 10  6.5# 3 x 10 8# 5\"x 50  8# 5\" x50 8# x 50       TR while seated on plank  6# 20  6# 3 x 10  6.5# 3 x 10 8# 5\" x 50 8# 5\" x50 8# x 50       TB DF GTB x 20             Great Toe Ext 20  20            Smaller toes Ext 20  20            Heel walking   4 laps hep          Toe walking  4 laps hep          Leg press HR    75# 2 x 10 143# 2 x10 140# x 20       S/L IV                                       Gait Training                                       Modalities                                                    "

## 2024-05-01 ENCOUNTER — OFFICE VISIT (OUTPATIENT)
Dept: PHYSICAL THERAPY | Facility: CLINIC | Age: 79
End: 2024-05-01
Payer: COMMERCIAL

## 2024-05-01 DIAGNOSIS — S86.211A TRAUMATIC TEAR OF RIGHT ANTERIOR TIBIALIS TENDON: Primary | ICD-10-CM

## 2024-05-01 PROCEDURE — 97110 THERAPEUTIC EXERCISES: CPT | Performed by: PHYSICAL THERAPIST

## 2024-05-01 NOTE — PROGRESS NOTES
"Daily Note     Today's date: 2024  Patient name: Otilio Machuca  : 1945  MRN: [id number]  Referring provider: Lachman, James R, MD  Dx:   Encounter Diagnosis     ICD-10-CM    1. Traumatic tear of right anterior tibialis tendon  S86.211A                      Subjective: Patient reports that he's wearing some new nike running shoes and feels he is able to elevate his toe more      Objective: See treatment diary below.  Patient's gait pattern appears improved with these shoes.      Assessment: Tolerated treatment well. Patient would benefit from continued PT  Patient is I with hep.  Strength:  5/5 all planes  ROM:  DF:  9 degrees  Patient has achieved all goals.    Plan: Potential discharge next visit.     Precautions:       Manuals 04/10 4/11 4/15 4/17  4/23 4/29 5/1                                                          Neuro Re-Ed             SLS 10\" x 6  10\" x 5 ea 10 x :10 Foam 10\" x 10  Foam 10\" x10 10 x :10 foam 10 x :10 foam      Side steps  Foam 5 laps  Foam 5 laps Foam 5 laps  Foam 5 laps Foamx2 5 laps Foam 5 laps      Tandem walking   Foam 5 laps Foam 5 laps Foam 5 laps  Foam 5 laps  5 laps foam 5 laps foam      Heel Walk      5 laps 1 lap around gym                                             Ther Ex             Bike 6 min  7 min  7' 7 7' 7' 7      TM gait             Wedge stretch  20\" x 5 5 x :20 5 x 20\"  5 x 20\"  5 x :20 5 x :20      Soleus stretch  20\" x 5  5 x :20 5 x 20\"  5 x20\"  5 x :20 5 x :20      Standing HR/TR 2 x 10   5 x 10  20          Standing TR  10\" x 20  20 10\"x 20  10\" x 20 20 20      Seated TR 2 x 10  2 x 10 20          TR with legs extended  6# 2 x 10  6# 3 x 10  6.5# 3 x 10 8# 5\"x 50  8# 5\" x50 8# x 50 10# x 50      TR while seated on plank  6# 20  6# 3 x 10  6.5# 3 x 10 8# 5\" x 50 8# 5\" x50 8# x 50 10# x 50      TB DF GTB x 20             Great Toe Ext 20  20            Smaller toes Ext 20  20            Heel walking   4 laps hep          Toe walking  4 laps hep      "     Leg press HR    75# 2 x 10 143# 2 x10 140# x 20 140# x 20      S/L IV/EV       5#                                Gait Training                                       Modalities

## 2024-05-20 ENCOUNTER — OFFICE VISIT (OUTPATIENT)
Dept: OBGYN CLINIC | Facility: MEDICAL CENTER | Age: 79
End: 2024-05-20
Payer: COMMERCIAL

## 2024-05-20 VITALS
SYSTOLIC BLOOD PRESSURE: 128 MMHG | BODY MASS INDEX: 32.23 KG/M2 | DIASTOLIC BLOOD PRESSURE: 83 MMHG | WEIGHT: 238 LBS | HEART RATE: 54 BPM | HEIGHT: 72 IN

## 2024-05-20 DIAGNOSIS — Z01.818 PRE-OPERATIVE CLEARANCE: ICD-10-CM

## 2024-05-20 DIAGNOSIS — M19.012 ARTHRITIS OF LEFT GLENOHUMERAL JOINT: Primary | ICD-10-CM

## 2024-05-20 PROCEDURE — 99214 OFFICE O/P EST MOD 30 MIN: CPT | Performed by: ORTHOPAEDIC SURGERY

## 2024-05-20 RX ORDER — CHLORHEXIDINE GLUCONATE ORAL RINSE 1.2 MG/ML
15 SOLUTION DENTAL ONCE
OUTPATIENT
Start: 2024-05-20 | End: 2024-05-20

## 2024-05-20 RX ORDER — CEFAZOLIN SODIUM 2 G/50ML
2000 SOLUTION INTRAVENOUS ONCE
OUTPATIENT
Start: 2024-05-20 | End: 2024-05-20

## 2024-05-20 RX ORDER — ACETAMINOPHEN 160 MG
1 TABLET,DISINTEGRATING ORAL DAILY
COMMUNITY

## 2024-05-20 RX ORDER — CHLORHEXIDINE GLUCONATE 40 MG/ML
SOLUTION TOPICAL DAILY PRN
OUTPATIENT
Start: 2024-05-20

## 2024-05-20 RX ORDER — TRANEXAMIC ACID 10 MG/ML
1000 INJECTION, SOLUTION INTRAVENOUS ONCE
OUTPATIENT
Start: 2024-05-20 | End: 2024-05-20

## 2024-05-20 NOTE — PROGRESS NOTES
[facility] [place]  [address]  [facility] [id number]       Otilio Machuca  [id number]  1945    ORTHOPAEDIC SURGERY OUTPATIENT NOTE  2024      HISTORY:  78 y.o. male  presents today follow up for left shoulder pain due to severe glenohumeral osteoarthritis. He would like to discussed surgery for the left shoulder. He states the pain has been getting worse. He notes his motion has become limited. He is having pain over anterior left shoulder. He notes pain with overhead lifting and has trouble sleeping at night due to the pain. He is taking Tylenol for pain relief.  Hia pain level is 6-7/10.     Past Medical History:   Diagnosis Date    Celiac disease     Colon polyp     Coronary artery disease     Diverticulitis     Diverticulitis of ascending colon 10/27/2022    Diverticulosis     Hyperlipidemia     Hypertension     Myocardial infarction (HCC)        Past Surgical History:   Procedure Laterality Date    COLONOSCOPY      CORONARY ANGIOPLASTY WITH STENT PLACEMENT      Multiple times    OH ARTHRP KNE CONDYLE&PLATU MEDIAL&LAT COMPARTMENTS Right 2022    Procedure: ARTHROPLASTY KNEE TOTAL and all associated procedures;  Surgeon: Neeta Chaney MD;  Location:  MAIN OR;  Service: Orthopedics    OH ARTHRP KNE CONDYLE&PLATU MEDIAL&LAT COMPARTMENTS Left 2022    Procedure: ARTHROPLASTY KNEE TOTAL;  Surgeon: Neeta Chaney MD;  Location:  MAIN OR;  Service: Orthopedics       Social History     Socioeconomic History    Marital status: /Civil Union     Spouse name: Not on file    Number of children: 3    Years of education: Not on file    Highest education level: Not on file   Occupational History    Not on file   Tobacco Use    Smoking status: Former     Current packs/day: 0.00     Types: Cigarettes     Start date: 1964     Quit date:      Years since quittin.4     Passive exposure: Never    Smokeless  tobacco: Never    Tobacco comments:     Per pt, quit over 36 years ago   Vaping Use    Vaping status: Never Used   Substance and Sexual Activity    Alcohol use: Not Currently    Drug use: Never    Sexual activity: Yes     Partners: Female     Birth control/protection: None   Other Topics Concern    Not on file   Social History Narrative    Not on file     Social Determinants of Health     Financial Resource Strain: Low Risk  (4/14/2023)    Overall Financial Resource Strain (CARDIA)     Difficulty of Paying Living Expenses: Not hard at all   Food Insecurity: No Food Insecurity (9/10/2019)    Hunger Vital Sign     Worried About Running Out of Food in the Last Year: Never true     Ran Out of Food in the Last Year: Never true   Transportation Needs: No Transportation Needs (4/14/2023)    PRAPARE - Transportation     Lack of Transportation (Medical): No     Lack of Transportation (Non-Medical): No   Physical Activity: Sufficiently Active (9/10/2019)    Exercise Vital Sign     Days of Exercise per Week: 3 days     Minutes of Exercise per Session: 60 min   Stress: Stress Concern Present (9/10/2019)    [employer] - Occupational Stress Questionnaire     Feeling of Stress : To some extent   Social Connections: Moderately Isolated (9/10/2019)    Social Connection and Isolation Panel [NHANES]     Frequency of Communication with Friends and Family: Twice a week     Frequency of Social Gatherings with Friends and Family: Once a week     Attends Scientologist Services: Never     Active Member of Clubs or Organizations: No     Attends Club or Organization Meetings: Never     Marital Status:    Intimate Partner Violence: Not At Risk (9/10/2019)    Humiliation, Afraid, Rape, and Kick questionnaire     Fear of Current or Ex-Partner: No     Emotionally Abused: No     Physically Abused: No     Sexually Abused: No   Housing Stability: Not on file       Family History   Problem Relation Age of Onset     Diabetes Mother     Coronary artery disease Father     Thyroid disease Sister         Patient's Medications   New Prescriptions    No medications on file   Previous Medications    ASPIRIN (ECOTRIN LOW STRENGTH) 81 MG EC TABLET    Take 1 tablet (81 mg total) by mouth daily Please do not start taking until after total joint arthroplasty    B COMPLEX VITAMINS CAPSULE    Take 1 capsule by mouth daily    BACLOFEN 10 MG TABLET    TAKE 1 TABLET BY MOUTH 3 TIMES  DAILY AS NEEDED FOR MUSCLE  SPASM(S)    BISACODYL (DULCOLAX) 5 MG EC TABLET    Take 2 tablets (10 mg total) by mouth once for 1 dose    CHOLECALCIFEROL (VITAMIN D3) 1,000 UNITS TABLET    Take 1 tablet (1,000 Units total) by mouth daily    CHOLECALCIFEROL (VITAMIN D3) 50 MCG (2000 UT) CAPSULE    Take 1 tablet by mouth daily    CLOPIDOGREL (PLAVIX) 75 MG TABLET    TAKE 1 TABLET BY MOUTH IN THE  MORNING    CYCLOBENZAPRINE (FLEXERIL) 10 MG TABLET    Take 10 mg by mouth 3 (three) times a day    EZETIMIBE (ZETIA) 10 MG TABLET    TAKE 1 TABLET BY MOUTH DAILY    GABAPENTIN (NEURONTIN) 300 MG CAPSULE    TAKE 1 CAPSULE BY MOUTH 3 TIMES  DAILY    KETOCONAZOLE (NIZORAL) 2 % SHAMPOO    APPLY TOPICALLY 2 TIMES A WEEK    METOPROLOL SUCCINATE (TOPROL-XL) 50 MG 24 HR TABLET    TAKE 1 TABLET BY MOUTH DAILY    OMEGA-3-ACID ETHYL ESTERS (LOVAZA) 1 G CAPSULE    TAKE 2 CAPSULES BY MOUTH TWICE  DAILY    PANTOPRAZOLE (PROTONIX) 40 MG TABLET    TAKE 1 TABLET BY MOUTH EVERY DAY    POLYETHYLENE GLYCOL (GOLYTELY) 4000 ML SOLUTION    Take 4,000 mL by mouth once for 1 dose    SERTRALINE (ZOLOFT) 50 MG TABLET    TAKE 1 TABLET BY MOUTH DAILY    SIMVASTATIN (ZOCOR) 40 MG TABLET    TAKE 1 TABLET BY MOUTH DAILY AT  BEDTIME    TAMSULOSIN (FLOMAX) 0.4 MG    TAKE 1 CAPSULE BY MOUTH DAILY  WITH DINNER    TRAZODONE (DESYREL) 150 MG TABLET    Take 1 tablet (150 mg total) by mouth daily at bedtime   Modified Medications    No medications on file   Discontinued Medications    No medications on file        Allergies   Allergen Reactions    Crestor [Rosuvastatin] Myalgia    Ramipril Cough        /83 (BP Location: Left arm, Patient Position: Sitting, Cuff Size: Standard)   Pulse (!) 54   Ht 6' (1.829 m)   Wt 108 kg (238 lb)   BMI 32.28 kg/m²      REVIEW OF SYSTEMS:  Constitutional: Negative.    HEENT: Negative.    Respiratory: Negative.    Skin: Negative.    Neurological: Negative.    Psychiatric/Behavioral: Negative.  Musculoskeletal: Negative except for that mentioned in the HPI.      PHYSICAL EXAM:      RIGHT SHOULDER:    Appearance:     Forward flexion:   180 degrees   Abduction:  180 degrees   External rotation at 90 degrees abduction:   90 degrees   Internal rotation at 90 degrees abduction:  90 degrees   External rotation at 0 degrees:   70 degrees   Internal rotation: T7     STRENGTH:  Forward flexion:  5/5   Abduction:  5/5   External rotation:  5/5   Internal rotation:  5/5     Radial/median/ulnar nerve intact    <2 sec cap refill      LEFT SHOULDER:    Appearance:     Forward flexion:   160 degrees   Abduction:  90 degrees   External rotation at 90 degrees abduction:   90 degrees   Internal rotation at 90 degrees abduction:  90 degrees   External rotation at 0 degrees:   30 degrees   Internal rotation: Left sacrum      STRENGTH:  Forward flexion:  5/5   Abduction:  5/5   External rotation:  5/5   Internal rotation:  5/5          Radial/median/ulnar nerve intact    <2 sec cap refill          IMAGING:  Not taken today     ASSESSMENT AND PLAN:  78 y.o. male  with severe  Left glenohumeral osteoarthritis     Discussed with patient surgery for reverse  vs anatomic  Left total shoulder arthroplasty.  He would like to proceed forward scheduling for surgery .Will remove sutures at the PO visit and also repeat left shoulder. He is to follow up PO Left reverse total shoulder arthroplasty    The patient understands the risks and benefits of the procedure with risks including pain, stiffness, infection,  neurovascular injury, recurrence of symptoms, failure of surgical procedure, inadvertent intraoperative complications, blood loss, blood clots, allergic reaction to anesthesia, stroke, heart attack, all up to and including to death. The patient understood and did consent for surgery today.                 Scribe Attestation      I,:  Sukhdev Pierre am acting as a scribe while in the presence of the attending physician.:       I,:  Lalitha Ann, DO personally performed the services described in this documentation    as scribed in my presence.:

## 2024-05-21 ENCOUNTER — APPOINTMENT (OUTPATIENT)
Dept: LAB | Facility: CLINIC | Age: 79
End: 2024-05-21
Payer: COMMERCIAL

## 2024-05-21 DIAGNOSIS — K92.0 COFFEE GROUND EMESIS: ICD-10-CM

## 2024-05-21 DIAGNOSIS — M19.012 ARTHRITIS OF LEFT GLENOHUMERAL JOINT: ICD-10-CM

## 2024-05-21 DIAGNOSIS — Z01.818 PRE-OPERATIVE CLEARANCE: ICD-10-CM

## 2024-05-21 LAB
ALBUMIN SERPL BCP-MCNC: 4 G/DL (ref 3.5–5)
ALP SERPL-CCNC: 70 U/L (ref 34–104)
ALT SERPL W P-5'-P-CCNC: 18 U/L (ref 7–52)
ANION GAP SERPL CALCULATED.3IONS-SCNC: 5 MMOL/L (ref 4–13)
APTT PPP: 29 SECONDS (ref 23–37)
AST SERPL W P-5'-P-CCNC: 24 U/L (ref 13–39)
BASOPHILS # BLD AUTO: 0.03 THOUSANDS/ÂΜL (ref 0–0.1)
BASOPHILS NFR BLD AUTO: 1 % (ref 0–1)
BILIRUB SERPL-MCNC: 0.65 MG/DL (ref 0.2–1)
BUN SERPL-MCNC: 22 MG/DL (ref 5–25)
CALCIUM SERPL-MCNC: 9.8 MG/DL (ref 8.4–10.2)
CHLORIDE SERPL-SCNC: 106 MMOL/L (ref 96–108)
CO2 SERPL-SCNC: 29 MMOL/L (ref 21–32)
CREAT SERPL-MCNC: 0.83 MG/DL (ref 0.6–1.3)
EOSINOPHIL # BLD AUTO: 0.25 THOUSAND/ÂΜL (ref 0–0.61)
EOSINOPHIL NFR BLD AUTO: 5 % (ref 0–6)
ERYTHROCYTE [DISTWIDTH] IN BLOOD BY AUTOMATED COUNT: 12.7 % (ref 11.6–15.1)
EST. AVERAGE GLUCOSE BLD GHB EST-MCNC: 103 MG/DL
GFR SERPL CREATININE-BSD FRML MDRD: 85 ML/MIN/1.73SQ M
GLUCOSE P FAST SERPL-MCNC: 99 MG/DL (ref 65–99)
HBA1C MFR BLD: 5.2 %
HCT VFR BLD AUTO: 42.9 % (ref 36.5–49.3)
HGB BLD-MCNC: 15.1 G/DL (ref 12–17)
IMM GRANULOCYTES # BLD AUTO: 0.02 THOUSAND/UL (ref 0–0.2)
IMM GRANULOCYTES NFR BLD AUTO: 0 % (ref 0–2)
INR PPP: 1.01 (ref 0.84–1.19)
LYMPHOCYTES # BLD AUTO: 1.34 THOUSANDS/ÂΜL (ref 0.6–4.47)
LYMPHOCYTES NFR BLD AUTO: 27 % (ref 14–44)
MCH RBC QN AUTO: 32.2 PG (ref 26.8–34.3)
MCHC RBC AUTO-ENTMCNC: 35.2 G/DL (ref 31.4–37.4)
MCV RBC AUTO: 92 FL (ref 82–98)
MONOCYTES # BLD AUTO: 0.37 THOUSAND/ÂΜL (ref 0.17–1.22)
MONOCYTES NFR BLD AUTO: 7 % (ref 4–12)
NEUTROPHILS # BLD AUTO: 3.04 THOUSANDS/ÂΜL (ref 1.85–7.62)
NEUTS SEG NFR BLD AUTO: 60 % (ref 43–75)
NRBC BLD AUTO-RTO: 0 /100 WBCS
PLATELET # BLD AUTO: 194 THOUSANDS/UL (ref 149–390)
PMV BLD AUTO: 9.9 FL (ref 8.9–12.7)
POTASSIUM SERPL-SCNC: 4.7 MMOL/L (ref 3.5–5.3)
PROT SERPL-MCNC: 6.8 G/DL (ref 6.4–8.4)
PROTHROMBIN TIME: 13.9 SECONDS (ref 11.6–14.5)
RBC # BLD AUTO: 4.69 MILLION/UL (ref 3.88–5.62)
SODIUM SERPL-SCNC: 140 MMOL/L (ref 135–147)
WBC # BLD AUTO: 5.05 THOUSAND/UL (ref 4.31–10.16)

## 2024-05-21 PROCEDURE — 80053 COMPREHEN METABOLIC PANEL: CPT

## 2024-05-21 PROCEDURE — 86900 BLOOD TYPING SEROLOGIC ABO: CPT | Performed by: PHYSICIAN ASSISTANT

## 2024-05-21 PROCEDURE — 83036 HEMOGLOBIN GLYCOSYLATED A1C: CPT

## 2024-05-21 PROCEDURE — 85025 COMPLETE CBC W/AUTO DIFF WBC: CPT

## 2024-05-21 PROCEDURE — 86850 RBC ANTIBODY SCREEN: CPT | Performed by: PHYSICIAN ASSISTANT

## 2024-05-21 PROCEDURE — 85610 PROTHROMBIN TIME: CPT

## 2024-05-21 PROCEDURE — 36415 COLL VENOUS BLD VENIPUNCTURE: CPT

## 2024-05-21 PROCEDURE — 86901 BLOOD TYPING SEROLOGIC RH(D): CPT | Performed by: PHYSICIAN ASSISTANT

## 2024-05-21 PROCEDURE — 85730 THROMBOPLASTIN TIME PARTIAL: CPT

## 2024-05-22 ENCOUNTER — OFFICE VISIT (OUTPATIENT)
Dept: CARDIOLOGY CLINIC | Facility: CLINIC | Age: 79
End: 2024-05-22
Payer: COMMERCIAL

## 2024-05-22 ENCOUNTER — LAB REQUISITION (OUTPATIENT)
Dept: LAB | Facility: HOSPITAL | Age: 79
End: 2024-05-22
Payer: COMMERCIAL

## 2024-05-22 VITALS
WEIGHT: 234 LBS | BODY MASS INDEX: 31.69 KG/M2 | OXYGEN SATURATION: 94 % | DIASTOLIC BLOOD PRESSURE: 62 MMHG | SYSTOLIC BLOOD PRESSURE: 110 MMHG | TEMPERATURE: 96 F | HEIGHT: 72 IN

## 2024-05-22 DIAGNOSIS — I35.0 NONRHEUMATIC AORTIC VALVE STENOSIS: ICD-10-CM

## 2024-05-22 DIAGNOSIS — I25.10 CAD S/P PERCUTANEOUS CORONARY ANGIOPLASTY: ICD-10-CM

## 2024-05-22 DIAGNOSIS — M19.012 PRIMARY OSTEOARTHRITIS, LEFT SHOULDER: ICD-10-CM

## 2024-05-22 DIAGNOSIS — E66.09 CLASS 1 OBESITY DUE TO EXCESS CALORIES WITH SERIOUS COMORBIDITY AND BODY MASS INDEX (BMI) OF 31.0 TO 31.9 IN ADULT: ICD-10-CM

## 2024-05-22 DIAGNOSIS — E78.2 MIXED HYPERLIPIDEMIA: ICD-10-CM

## 2024-05-22 DIAGNOSIS — Z98.61 CAD S/P PERCUTANEOUS CORONARY ANGIOPLASTY: ICD-10-CM

## 2024-05-22 DIAGNOSIS — Z01.810 PRE-OPERATIVE CARDIOVASCULAR EXAMINATION: Primary | ICD-10-CM

## 2024-05-22 PROBLEM — I25.118 CORONARY ARTERY DISEASE OF NATIVE ARTERY OF NATIVE HEART WITH STABLE ANGINA PECTORIS (HCC): Status: ACTIVE | Noted: 2019-02-27

## 2024-05-22 PROCEDURE — 99214 OFFICE O/P EST MOD 30 MIN: CPT | Performed by: INTERNAL MEDICINE

## 2024-05-22 NOTE — PROGRESS NOTES
[facility] [place]  [address] [address]  [phone]                                              Cardiology Office Follow up  Otilio Machuca, 78 y.o. male  YOB: 1945  MRN: [id number] Encounter: [id number]      PCP - Candelaria Schuster MD  Referring Provider - No ref. provider found    No chief complaint on file.      Assessment  Preoperative cardiovascular examination   CAD s/p multiple PCIs  Originally POBA in early 1990s, LAD & RCA stent in 2003, and then multiple repeat stents to RCA for multiple episodes of in-stent stenosis, last stent in 2010  Mixed hyperlipidemia  Dilated aortic root, 4.5 cm  Aortic stenosis, mild  Echo 04/05/2022 showed LVEF 65%, grade 1 diastolic dysfunction, mildly dilated RV, moderate related LA/RD, mild aortic stenosis, mildly dilated aortic root  RBBB  Obesity, Body mass index is 31.74 kg/m².     Prior cardiac testing (in [place])  Nuclear Rx stress - 2/14/2019 ([facility] [place], [place]) - moderate, predominantly fixed defect of entire inferior/inferolateral wall - some apical inferior reversibility - possible diaphragmatic attenuation +/- small ischemia, LVEF 81%  PCI -  LAD - Cypher LAD stent 2/03  RCA - stents x6 (2/2003)   KRYSTAL to dRCA (8/2006)   DESx3 to RCA (restenosis) (2010).   [facility] - 9/2010 -   patent LAD stent, D1 WNL, LCx WNL, OM1 - non obsructive, pRA 99%, mRCA in-stent stenosis, dRCA 90% in-stent stenosis, RPDA & RPLB - non-obstructive. S/p PCI with KRYSTAL to pRCA, mRCA, dRCA    Plan  Pre-operative cardiovascular evaluation  Planned procedure: Left shoulder arthroplasty ()  Active cardiac complaints: None  Cardiac co-morbidities: CAD s/p multiple prior stents  Functional status: active, reports walking about an hour daily.  Vitals - reviewed and stable  Nuclear stress test last year had shown small amount of inferoapical ischemia, but with lack of symptoms has been medically managed  With his prior history  of CAD and concerns for ischemia on the prior test, recommend repeating nuclear Lexiscan stress test now  If no significant (medium-large sized) ischemia, then he will be okay to proceed with planned surgery as moderate cardiac risk  Siddhartha-operative cardiac medication management  Anticoagulation/anti-platelets  Aspirin, plavix  Okay for hold plavix for 5 days pre-operatively  If okay to continue on aspirin 81 mg perioperatively then would prefer the same.  But if must hold aspirin as well, then okay to hold aspirin 81 mg daily for up to 7 days preoperatively.  Resume both aspirin and Plavix postoperatively at earliest acceptable time  Continue beta-blockers siddhartha-operatively      CAD s/p multiple PCI  Overview  Multiple prior PCIs with stents to LAD and extensive stenting to RCA, but last stenting was in 2010  Reports being free of chest pain or shortness of breath   Nuclear Lexiscan stress test 5/2023: Predominantly fixed inferior defect, some reversibility in apical inferior wall.  Consider inferoapical ischemia , LVEF 60%  He did not have any symptoms after this and did not follow up scheduled over the last year and as a result has remained on the same medical therapy  Stress test report appears to be similar to report from 2019 in [place]  5/2024: No major chest pain or any significant or acute worsening shortness of breath.  Remains compliant with medical therapy  Impression  Stable CAD, ?mild inferoapical ischemia, but no clear anginal symptoms  Plan  Continue current therapy and optimize risk factors  Continue aspirin, Plavix, metoprolol , simvastatin 40 mg daily    Aortic stenosis, mild  TTE 11/2022 - mild aortic stenosis  No shortness of breath or heart failure symptoms at present  Continue to monitor clinically and follow-up on echocardiogram    Hyperlipidemia    Triglycerides continue to improve but remain elevated  Continue simvastatin, ezetimibe, lovaza 2 g bid  Tried to switch simvastatin to  rosuvastatin, but he reported a lot of side effect and as a result switched back to simvastatin  Tried Vascepa, but it was too expensive  Dietary modifications to reduce carbohydrate intake    Aortic root dilation, Aortic stenosis  Date Modality Aortic root Ascending aorta Other findings   4/5/22 TTE 4.5 cm  Mild aortic stenosis   11/15/22 TTE 4.0 cm  Mild aortic stenosis   11/15/22 CT C/A  4.5 cm Fusiform ectasis of ascending TAA   1/2024 CT chest wo  4.4 cm Stable fusiform TAA ectasia     Ascending aorta remains dilated but stable at 4.4 to 4.5 cm  Plan is to follow-up on echocardiogram in January 2025, but with his preop state we will get the echocardiogram done now   optimize blood pressure control, lipid    RBBB  Appears to be new, was not previously reported on ECG at cardiologist in [place]  Remains asymptomatic  Possibly age-related    No results found for this visit on 05/22/24.      Orders Placed This Encounter   Procedures   • Lipid Panel with Direct LDL reflex   • Echo complete w/ contrast if indicated       Return in about 4 months (around 9/22/2024), or if symptoms worsen or fail to improve.      History of Present Illness   78 y.o. male comes in as a new patient for preoperative consultation prior to upcoming knee replacement surgery in a few weeks.    He is originally from [facility], and moved to this area a few years ago.  He has not had any major issues with chest pain, shortness of breath, palpitations, dizziness, near-syncope or syncope recently.  He does have an extensive cardiac history and has had several stents dating back to early 1990s.  Most of his stenting took place between 2003 to 2010, where he has had multiple episodes of repeat InStent stenosis to RCA needing stenting.  But over the last decade he has been doing well from cardiac standpoint.  He still needs to go to [place] for other things and has a result has been following with his cardiologist in [place], but was  asked to set up care closer to home as well.  As a result he is now here today to establish care with me.    Interval history - 5/23/2022  He comes back for follow-up after 2 months to discuss the results of his echocardiogram.  He has no active complains of chest pain or shortness of breath.  He continues to do well without any major problems.  He tried to make the switch from simvastatin to rosuvastatin, but reports having had a lot of side effects with it and as a result is now back on his old simvastatin.    Interval history - 11/2/2022  He comes back for follow-up after about 6 months.  He is overall doing well and has not had any major issues with chest pain, shortness of breath, palpitations or dizziness.  He did undergo left total knee replacement about a month ago and did not have any cardiac issues around this.  He does report new ongoing issues with left leg swelling, but denies any shortness of breath, orthopnea or PND.    Interval history - 4/28/2023  He comes back for follow-up after about 6 months.  In that [place] he has been doing well overall and has not any major issues with chest pain, shortness of breath, palpitations or dizziness.  He remains active, but is limited by musculoskeletal issues.  He remains compliant with medical therapy and is otherwise doing well    Interval history - 5/22/2024  He returns for preoperative consultation after about 13 months.  He did the nuclear stress test last year and was scheduled to follow-up in September 2023.  His nuclear stress test showed some abnormalities and I sent him a message about it, and asked him to let us know if he was having any symptoms and to make an earlier visit.  He subsequently did not follow up as he was not having any symptoms and is now here for preoperative cardiac evaluation prior to upcoming shoulder surgery.  He continues to do well otherwise and remains free of any major chest pain or shortness of breath.  He states that he walks  for an hour daily as well as does 1 flight of stairs without any chest pain.    Historical Information   Past Medical History:   Diagnosis Date   • Celiac disease    • Colon polyp    • Coronary artery disease    • Diverticulitis    • Diverticulitis of ascending colon 10/27/2022   • Diverticulosis    • Hyperlipidemia    • Hypertension    • Myocardial infarction (HCC) 1990     Past Surgical History:   Procedure Laterality Date   • COLONOSCOPY     • CORONARY ANGIOPLASTY WITH STENT PLACEMENT      Multiple times   • TN ARTHRP KNE CONDYLE&PLATU MEDIAL&LAT COMPARTMENTS Right 04/13/2022    Procedure: ARTHROPLASTY KNEE TOTAL and all associated procedures;  Surgeon: Neeta Chaney MD;  Location:  MAIN OR;  Service: Orthopedics   • TN ARTHRP KNE CONDYLE&PLATU MEDIAL&LAT COMPARTMENTS Left 9/29/2022    Procedure: ARTHROPLASTY KNEE TOTAL;  Surgeon: Neeta Chaney MD;  Location:  MAIN OR;  Service: Orthopedics     Family History   Problem Relation Age of Onset   • Diabetes Mother    • Coronary artery disease Father    • Thyroid disease Sister      Current Outpatient Medications on File Prior to Visit   Medication Sig Dispense Refill   • aspirin (ECOTRIN LOW STRENGTH) 81 mg EC tablet Take 1 tablet (81 mg total) by mouth daily Please do not start taking until after total joint arthroplasty 10 tablet 0   • b complex vitamins capsule Take 1 capsule by mouth daily     • baclofen 10 mg tablet TAKE 1 TABLET BY MOUTH 3 TIMES  DAILY AS NEEDED FOR MUSCLE  SPASM(S) 270 tablet 0   • cholecalciferol (VITAMIN D3) 1,000 units tablet Take 1 tablet (1,000 Units total) by mouth daily 60 tablet 2   • clopidogrel (PLAVIX) 75 mg tablet TAKE 1 TABLET BY MOUTH IN THE  MORNING 100 tablet 2   • ezetimibe (ZETIA) 10 mg tablet TAKE 1 TABLET BY MOUTH DAILY 100 tablet 2   • gabapentin (NEURONTIN) 300 mg capsule TAKE 1 CAPSULE BY MOUTH 3 TIMES  DAILY 270 capsule 3   • ketoconazole (NIZORAL) 2 % shampoo APPLY TOPICALLY 2 TIMES A WEEK 120 mL 0   •  metoprolol succinate (TOPROL-XL) 50 mg 24 hr tablet TAKE 1 TABLET BY MOUTH DAILY 100 tablet 2   • omega-3-acid ethyl esters (LOVAZA) 1 g capsule TAKE 2 CAPSULES BY MOUTH TWICE  DAILY 400 capsule 2   • pantoprazole (PROTONIX) 40 mg tablet TAKE 1 TABLET BY MOUTH EVERY DAY 90 tablet 1   • sertraline (ZOLOFT) 50 mg tablet TAKE 1 TABLET BY MOUTH DAILY 90 tablet 3   • simvastatin (ZOCOR) 40 mg tablet TAKE 1 TABLET BY MOUTH DAILY AT  BEDTIME 100 tablet 2   • tamsulosin (FLOMAX) 0.4 mg TAKE 1 CAPSULE BY MOUTH DAILY  WITH DINNER 100 capsule 1   • traZODone (DESYREL) 150 mg tablet Take 1 tablet (150 mg total) by mouth daily at bedtime 90 tablet 0   • Cholecalciferol (Vitamin D3) 50 MCG ( UT) capsule Take 1 tablet by mouth daily (Patient not taking: Reported on 2024)     • [DISCONTINUED] bisacodyl (DULCOLAX) 5 mg EC tablet Take 2 tablets (10 mg total) by mouth once for 1 dose 2 tablet 0   • [DISCONTINUED] cyclobenzaprine (FLEXERIL) 10 mg tablet Take 10 mg by mouth 3 (three) times a day     • [DISCONTINUED] polyethylene glycol (GOLYTELY) 4000 mL solution Take 4,000 mL by mouth once for 1 dose 4000 mL 0     No current facility-administered medications on file prior to visit.     Allergies   Allergen Reactions   • Crestor [Rosuvastatin] Myalgia   • Ramipril Cough     Social History     Socioeconomic History   • Marital status: /Civil Union     Spouse name: Not on file   • Number of children: 3   • Years of education: Not on file   • Highest education level: Not on file   Occupational History   • Not on file   Tobacco Use   • Smoking status: Former     Current packs/day: 0.00     Types: Cigarettes     Start date: 1964     Quit date:      Years since quittin.4     Passive exposure: Never   • Smokeless tobacco: Never   • Tobacco comments:     Per pt, quit over 36 years ago   Vaping Use   • Vaping status: Never Used   Substance and Sexual Activity   • Alcohol use: Not Currently   • Drug use: Never   •  Sexual activity: Yes     Partners: Female     Birth control/protection: None   Other Topics Concern   • Not on file   Social History Narrative   • Not on file     Social Determinants of Health     Financial Resource Strain: Low Risk  (4/14/2023)    Overall Financial Resource Strain (CARDIA)    • Difficulty of Paying Living Expenses: Not hard at all   Food Insecurity: No Food Insecurity (9/10/2019)    Hunger Vital Sign    • Worried About Running Out of Food in the Last Year: Never true    • Ran Out of Food in the Last Year: Never true   Transportation Needs: No Transportation Needs (4/14/2023)    PRAPARE - Transportation    • Lack of Transportation (Medical): No    • Lack of Transportation (Non-Medical): No   Physical Activity: Sufficiently Active (9/10/2019)    Exercise Vital Sign    • Days of Exercise per Week: 3 days    • Minutes of Exercise per Session: 60 min   Stress: Stress Concern Present (9/10/2019)    [employer] - Occupational Stress Questionnaire    • Feeling of Stress : To some extent   Social Connections: Moderately Isolated (9/10/2019)    Social Connection and Isolation Panel [NHANES]    • Frequency of Communication with Friends and Family: Twice a week    • Frequency of Social Gatherings with Friends and Family: Once a week    • Attends Religion Services: Never    • Active Member of Clubs or Organizations: No    • Attends Club or Organization Meetings: Never    • Marital Status:    Intimate Partner Violence: Not At Risk (9/10/2019)    Humiliation, Afraid, Rape, and Kick questionnaire    • Fear of Current or Ex-Partner: No    • Emotionally Abused: No    • Physically Abused: No    • Sexually Abused: No   Housing Stability: Not on file        Review of Systems   All other systems reviewed and are negative.      Vitals:  Vitals:    05/22/24 1126   BP: 110/62   BP Location: Left arm   Patient Position: Sitting   Cuff Size: Adult   Temp: (!) 96 °F (35.6 °C)   SpO2: 94%    Weight: 106 kg (234 lb)   Height: 6' (1.829 m)     BMI - Body mass index is 31.74 kg/m².  Wt Readings from Last 7 Encounters:   05/22/24 106 kg (234 lb)   05/20/24 108 kg (238 lb)   04/23/24 108 kg (238 lb)   03/20/24 109 kg (241 lb)   03/07/24 114 kg (251 lb)   02/07/24 115 kg (253 lb)   01/12/24 110 kg (242 lb)       Physical Exam  Vitals and nursing note reviewed.   Constitutional:       General: He is not in acute distress.     Appearance: Normal appearance. He is well-developed. He is not ill-appearing or diaphoretic.   HENT:      Head: Normocephalic and atraumatic.      Nose: No congestion.   Eyes:      General: No scleral icterus.     Conjunctiva/sclera: Conjunctivae normal.   Neck:      Vascular: No carotid bruit or JVD.   Cardiovascular:      Rate and Rhythm: Normal rate and regular rhythm. Occasional Extrasystoles are present.     Heart sounds: Murmur heard.      Crescendo decrescendo systolic murmur is present with a grade of 3/6.      No friction rub. No gallop.   Pulmonary:      Effort: Pulmonary effort is normal. No respiratory distress.      Breath sounds: Normal breath sounds. No wheezing or rales.   Chest:      Chest wall: No tenderness.   Abdominal:      General: There is no distension.      Palpations: Abdomen is soft.      Tenderness: There is no abdominal tenderness.   Musculoskeletal:         General: No swelling, tenderness or deformity.      Cervical back: Neck supple. No muscular tenderness.      Right lower leg: No edema.      Left lower leg: No edema.   Skin:     General: Skin is warm.   Neurological:      General: No focal deficit present.      Mental Status: He is alert and oriented to person, place, and time. Mental status is at baseline.      Gait: Gait abnormal.   Psychiatric:         Mood and Affect: Mood normal.         Behavior: Behavior normal.         Thought Content: Thought content normal.           Labs:  CBC:   Lab Results   Component Value Date    WBC 5.05 05/21/2024     "RBC 4.69 05/21/2024    HGB 15.1 05/21/2024    HCT 42.9 05/21/2024    MCV 92 05/21/2024     05/21/2024    RDW 12.7 05/21/2024       CMP:   Lab Results   Component Value Date    K 4.7 05/21/2024     05/21/2024    CO2 29 05/21/2024    BUN 22 05/21/2024    CREATININE 0.83 05/21/2024    EGFR 85 05/21/2024    CALCIUM 9.8 05/21/2024    AST 24 05/21/2024    ALT 18 05/21/2024    ALKPHOS 70 05/21/2024       Magnesium:  No results found for: \"MG\"    Lipid Profile:   Lab Results   Component Value Date    HDL 37 (L) 10/29/2023    TRIG 194 (H) 10/29/2023    LDLCALC 59 10/29/2023       Thyroid Studies:   Lab Results   Component Value Date    FFT2FKQWAVMT 1.532 10/29/2023       A1c:  No components found for: \"HGA1C\"    INR:  Lab Results   Component Value Date    INR 1.01 05/21/2024    INR 1.03 10/29/2023    INR 1.07 08/30/2022   5    Imaging: No results found.    Cardiac testing:   No results found for this or any previous visit.    No results found for this or any previous visit.    No results found for this or any previous visit.    No results found for this or any previous visit.      CT lower extremity wo contrast right  Narrative: CT RIGHT foot without IV contrast    INDICATION: M25.571: Pain in right ankle and joints of right foot  S99.921A: Unspecified injury of right foot, initial encounter. Midfoot pain and swelling after twisting ankle 5 days ago.    COMPARISON: Right ankle radiograph 3/7/2024    TECHNIQUE: CT examination of the right foot was performed. This examination, like all CT scans performed in the [facility], was performed utilizing techniques to minimize radiation dose exposure, including the use of iterative   reconstruction and automated exposure control software.  Multiplanar 2D reformatted images were created from the source data.    Rad dose  738 mGy-cm    FINDINGS:    OSSEOUS STRUCTURES:  - No fracture, dislocation or destructive osseous lesion. Small well-corticated osseous " fragment adjacent to medial malleolus, likely as sequela of old trauma.    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 Mild osteoarthritis in the talonavicular, naviculocuneiform, calcaneocuboid subtalar and tibiotalar joints.    - Small os trigonum. Retrocalcaneal spur.    VISUALIZED MUSCULATURE:  Unremarkable.    SOFT TISSUES:    - Torn anterior tibialis tendon, with the torn tendon edge proximally retracted to the level of the talonavicular joint (series 3 images 66-86). Associated tendinosis and calcification within the torn tendon.    - Moderate Achilles tendinosis with calcifications and small retrocalcaneal enthesophyte (series 401 image 37).    -Calcifications of the peroneus longus tendon (series 401 images 26-29). Proximal plantar fascia calcification measuring 0.8 x 1.4 x 1.4 cm (series 2 image 117, series 401 image 43).    -Mild subcutaneous edema without localized fluid collection.    OTHER PERTINENT FINDINGS: Atherosclerotic calcifications.  Impression: 1. No acute osseous abnormality.    2. Complete tear of anterior tibialis tendon, with the torn tendon edge proximally retracted to the level of the talonavicular joint.    3. Calcific tendinosis of the Achilles, anterior tibialis and peroneus longus tendons.    4. Moderate midfoot degenerative changes.    Resident: David Bunch I, the attending radiologist, have reviewed the images and agree with the final report above.    Workstation performed: [id number]

## 2024-05-24 ENCOUNTER — HOSPITAL ENCOUNTER (OUTPATIENT)
Dept: CT IMAGING | Facility: HOSPITAL | Age: 79
End: 2024-05-24
Payer: COMMERCIAL

## 2024-05-24 DIAGNOSIS — Z01.818 PRE-OPERATIVE CLEARANCE: ICD-10-CM

## 2024-05-24 DIAGNOSIS — M19.012 ARTHRITIS OF LEFT GLENOHUMERAL JOINT: ICD-10-CM

## 2024-05-24 PROCEDURE — 73200 CT UPPER EXTREMITY W/O DYE: CPT

## 2024-05-31 DIAGNOSIS — Z01.818 PRE-OP TESTING: Primary | ICD-10-CM

## 2024-06-02 DIAGNOSIS — F41.9 ANXIETY: ICD-10-CM

## 2024-06-11 ENCOUNTER — APPOINTMENT (OUTPATIENT)
Dept: RADIOLOGY | Facility: HOSPITAL | Age: 79
End: 2024-06-11
Attending: INTERNAL MEDICINE
Payer: COMMERCIAL

## 2024-06-11 ENCOUNTER — APPOINTMENT (OUTPATIENT)
Dept: NON INVASIVE DIAGNOSTICS | Facility: HOSPITAL | Age: 79
End: 2024-06-11
Attending: INTERNAL MEDICINE
Payer: COMMERCIAL

## 2024-06-12 DIAGNOSIS — E78.2 MIXED HYPERLIPIDEMIA: ICD-10-CM

## 2024-06-13 RX ORDER — SIMVASTATIN 40 MG
40 TABLET ORAL
Qty: 100 TABLET | Refills: 1 | Status: SHIPPED | OUTPATIENT
Start: 2024-06-13

## 2024-06-18 ENCOUNTER — CONSULT (OUTPATIENT)
Dept: INTERNAL MEDICINE CLINIC | Facility: CLINIC | Age: 79
End: 2024-06-18
Payer: COMMERCIAL

## 2024-06-18 ENCOUNTER — LAB REQUISITION (OUTPATIENT)
Dept: LAB | Facility: HOSPITAL | Age: 79
End: 2024-06-18
Payer: COMMERCIAL

## 2024-06-18 ENCOUNTER — APPOINTMENT (OUTPATIENT)
Dept: LAB | Facility: CLINIC | Age: 79
End: 2024-06-18
Payer: COMMERCIAL

## 2024-06-18 VITALS
TEMPERATURE: 98.5 F | OXYGEN SATURATION: 96 % | RESPIRATION RATE: 16 BRPM | HEIGHT: 70 IN | SYSTOLIC BLOOD PRESSURE: 112 MMHG | BODY MASS INDEX: 33.21 KG/M2 | WEIGHT: 232 LBS | DIASTOLIC BLOOD PRESSURE: 66 MMHG | HEART RATE: 62 BPM

## 2024-06-18 DIAGNOSIS — M19.012 PRIMARY OSTEOARTHRITIS, LEFT SHOULDER: ICD-10-CM

## 2024-06-18 DIAGNOSIS — I35.0 NONRHEUMATIC AORTIC VALVE STENOSIS: ICD-10-CM

## 2024-06-18 DIAGNOSIS — E78.2 MIXED HYPERLIPIDEMIA: ICD-10-CM

## 2024-06-18 DIAGNOSIS — Z01.818 PRE-OP TESTING: ICD-10-CM

## 2024-06-18 DIAGNOSIS — Z01.818 PREOPERATIVE CLEARANCE: Primary | ICD-10-CM

## 2024-06-18 LAB
ALBUMIN SERPL BCP-MCNC: 4 G/DL (ref 3.5–5)
ALP SERPL-CCNC: 59 U/L (ref 34–104)
ALT SERPL W P-5'-P-CCNC: 19 U/L (ref 7–52)
ANION GAP SERPL CALCULATED.3IONS-SCNC: 6 MMOL/L (ref 4–13)
APTT PPP: 31 SECONDS (ref 23–37)
AST SERPL W P-5'-P-CCNC: 27 U/L (ref 13–39)
BASOPHILS # BLD AUTO: 0.03 THOUSANDS/ÂΜL (ref 0–0.1)
BASOPHILS NFR BLD AUTO: 1 % (ref 0–1)
BILIRUB SERPL-MCNC: 0.78 MG/DL (ref 0.2–1)
BUN SERPL-MCNC: 26 MG/DL (ref 5–25)
CALCIUM SERPL-MCNC: 9.7 MG/DL (ref 8.4–10.2)
CHLORIDE SERPL-SCNC: 106 MMOL/L (ref 96–108)
CHOLEST SERPL-MCNC: 124 MG/DL
CO2 SERPL-SCNC: 28 MMOL/L (ref 21–32)
CREAT SERPL-MCNC: 0.92 MG/DL (ref 0.6–1.3)
EOSINOPHIL # BLD AUTO: 0.2 THOUSAND/ÂΜL (ref 0–0.61)
EOSINOPHIL NFR BLD AUTO: 4 % (ref 0–6)
ERYTHROCYTE [DISTWIDTH] IN BLOOD BY AUTOMATED COUNT: 12.8 % (ref 11.6–15.1)
GFR SERPL CREATININE-BSD FRML MDRD: 79 ML/MIN/1.73SQ M
GLUCOSE P FAST SERPL-MCNC: 89 MG/DL (ref 65–99)
HCT VFR BLD AUTO: 42.5 % (ref 36.5–49.3)
HDLC SERPL-MCNC: 36 MG/DL
HGB BLD-MCNC: 14.8 G/DL (ref 12–17)
IMM GRANULOCYTES # BLD AUTO: 0.01 THOUSAND/UL (ref 0–0.2)
IMM GRANULOCYTES NFR BLD AUTO: 0 % (ref 0–2)
INR PPP: 1.01 (ref 0.84–1.19)
LDLC SERPL CALC-MCNC: 48 MG/DL (ref 0–100)
LYMPHOCYTES # BLD AUTO: 1.49 THOUSANDS/ÂΜL (ref 0.6–4.47)
LYMPHOCYTES NFR BLD AUTO: 31 % (ref 14–44)
MCH RBC QN AUTO: 32.4 PG (ref 26.8–34.3)
MCHC RBC AUTO-ENTMCNC: 34.8 G/DL (ref 31.4–37.4)
MCV RBC AUTO: 93 FL (ref 82–98)
MONOCYTES # BLD AUTO: 0.4 THOUSAND/ÂΜL (ref 0.17–1.22)
MONOCYTES NFR BLD AUTO: 8 % (ref 4–12)
NEUTROPHILS # BLD AUTO: 2.73 THOUSANDS/ÂΜL (ref 1.85–7.62)
NEUTS SEG NFR BLD AUTO: 56 % (ref 43–75)
NRBC BLD AUTO-RTO: 0 /100 WBCS
PLATELET # BLD AUTO: 192 THOUSANDS/UL (ref 149–390)
PMV BLD AUTO: 10.2 FL (ref 8.9–12.7)
POTASSIUM SERPL-SCNC: 4.6 MMOL/L (ref 3.5–5.3)
PROT SERPL-MCNC: 6.5 G/DL (ref 6.4–8.4)
PROTHROMBIN TIME: 13.9 SECONDS (ref 11.6–14.5)
RBC # BLD AUTO: 4.57 MILLION/UL (ref 3.88–5.62)
SODIUM SERPL-SCNC: 140 MMOL/L (ref 135–147)
TRIGL SERPL-MCNC: 201 MG/DL
WBC # BLD AUTO: 4.86 THOUSAND/UL (ref 4.31–10.16)

## 2024-06-18 PROCEDURE — G2211 COMPLEX E/M VISIT ADD ON: HCPCS

## 2024-06-18 PROCEDURE — 80061 LIPID PANEL: CPT

## 2024-06-18 PROCEDURE — 86901 BLOOD TYPING SEROLOGIC RH(D): CPT | Performed by: ORTHOPAEDIC SURGERY

## 2024-06-18 PROCEDURE — 85025 COMPLETE CBC W/AUTO DIFF WBC: CPT

## 2024-06-18 PROCEDURE — 99214 OFFICE O/P EST MOD 30 MIN: CPT

## 2024-06-18 PROCEDURE — 85610 PROTHROMBIN TIME: CPT

## 2024-06-18 PROCEDURE — 85730 THROMBOPLASTIN TIME PARTIAL: CPT

## 2024-06-18 PROCEDURE — 80053 COMPREHEN METABOLIC PANEL: CPT

## 2024-06-18 PROCEDURE — 36415 COLL VENOUS BLD VENIPUNCTURE: CPT

## 2024-06-18 PROCEDURE — 86900 BLOOD TYPING SEROLOGIC ABO: CPT | Performed by: ORTHOPAEDIC SURGERY

## 2024-06-18 PROCEDURE — 86850 RBC ANTIBODY SCREEN: CPT | Performed by: ORTHOPAEDIC SURGERY

## 2024-06-18 NOTE — PROGRESS NOTES
INTERNAL MEDICINE PRE-OPERATIVE EVALUATION  [facility]YS[facility]IAN [facility] - [facility] INTERNAL MEDICINE [place]    NAME: Otilio Machuca  AGE: 79 y.o. SEX: male  : 1945     DATE: 2024     Internal Medicine Pre-Operative Evaluation:     Chief Complaint: Pre-operative Evaluation     Surgery: Total Left Shoulder Arthroplasty  Anticipated Date of Surgery: 2024  Referring Provider: No ref. provider found        History of Present Illness:     Otilio Mahcuca is a 79 y.o. male who presents to the office today for a preoperative consultation at the request of surgeon, Dr. Ann, who plans on performing Left Shoulder Total Arthroplasty on 2024. Planned anesthesia is general. Patient has a bleeding risk of: no recent abnormal bleeding. Patient does not have objections to receiving blood products if needed. Current anti-platelet/anti-coagulation medications that the patient is prescribed includes: Aspirin and Clopidogrel (Plavix). Per Dr. Feng, the patient may be off Plavix for 5 days and off ASA for up to 7 days for the procedure, if needed, although would prefer to continue ASA perioperatively.     Assessment of Chronic Conditions:   - Coronary Artery Disease: With LAD stent in , currently on aspirin and Plavix.  Patient follows with cardiology at [facility].  Patient to hold aspirin and Plavix 5 days prior to surgery.  Will also get another stress test and echocardiogram to evaluate for worsening LV dysfunction and/or ischemia.  If these tests are normal, patient can proceed with surgery.  - Hypertension: Currently controlled on metoprolol.     Assessment of Cardiac Risk:  Denies unstable or severe angina or MI in the last 6 weeks or history of stent placement in the last year   Denies decompensated heart failure (e.g. New onset heart failure, NYHA functional class IV heart failure, or worsening existing heart failure)  Denies significant arrhythmias such as high grade AV block,  symptomatic ventricular arrhythmia, newly recognized ventricular tachycardia, supraventricular tachycardia with resting heart rate >100, or symptomatic bradycardia  Denies severe heart valve disease including aortic stenosis or symptomatic mitral stenosis     Exercise Capacity:  Able to walk 4 blocks without symptoms?: Yes  Able to walk 2 flights without symptoms?: Yes    Prior Anesthesia Reactions: No     Personal history of venous thromboembolic disease? No    History of steroid use for >2 weeks within last year? No    STOP-BANG Sleep Apnea Screening Questionnaire:      Do you SNORE loudly (louder than talking or loud enough to be heard through closed doors)? Yes = 1 point   Do you often feel TIRED, fatigued, or sleepy during daytime? No = 0 point   Has anyone OBSERVED you stop breathing during your sleep? No = 0 point   Do you have or are you being treated for high blood pressure? Yes = 1 point   BMI more than 35 kg/m2? No = 0 point   AGE over 50 years old? Yes = 1 point   NECK circumference > 16 inches (40 cm)? No = 0 point   Male GENDER? Yes = 1 point   TOTAL SCORE 4= INTERMEDIATE risk of VIRY       Review of Systems:     Review of Systems   Constitutional:  Negative for chills and fever.   HENT:  Negative for ear pain and sore throat.    Eyes:  Negative for pain and visual disturbance.   Respiratory:  Negative for cough and shortness of breath.    Cardiovascular:  Negative for chest pain and palpitations.   Gastrointestinal:  Negative for abdominal pain and vomiting.   Genitourinary:  Negative for dysuria and hematuria.   Musculoskeletal:  Negative for arthralgias and back pain.   Skin:  Negative for color change and rash.   Neurological:  Negative for seizures and syncope.   All other systems reviewed and are negative.       Problem List:     Patient Active Problem List   Diagnosis    Hypovitaminosis D    Coronary artery disease of native artery of native heart with stable angina pectoris (HCC)     Hyperlipidemia    Primary osteoarthritis of both knees    Sacroiliitis, not elsewhere classified (HCC)    Encounter for support and coordination of transition of care    Actinic keratosis    Primary osteoarthritis of right knee    Status post total right knee replacement    Aneurysm of thoracic aorta    Primary osteoarthritis of left knee    Status post total knee replacement, left    Need for influenza vaccination    H/O hyperlipidemia    BMI 34.0-34.9,adult    Seborrheic dermatitis    Suprapubic discomfort    Acute confusion    Hydronephrosis with obstructing calculus    Emesis    Dental infection    History of colon polyps    Coffee ground emesis    Antiplatelet or antithrombotic long-term use    History of PTCA with stents    HTN (hypertension)    MI (myocardial infarction) ([facility])    S/P total knee arthroplasty, bilateral    Injury of right foot, initial encounter    Pain, joint, ankle and foot, right    Traumatic tear of right anterior tibialis tendon    Preoperative clearance    Arthritis of left glenohumeral joint        Allergies:     Allergies   Allergen Reactions    Crestor [Rosuvastatin] Myalgia    Ramipril Cough        Current Medications:       Current Outpatient Medications:     aspirin (ECOTRIN LOW STRENGTH) 81 mg EC tablet, Take 1 tablet (81 mg total) by mouth daily Please do not start taking until after total joint arthroplasty, Disp: 10 tablet, Rfl: 0    b complex vitamins capsule, Take 1 capsule by mouth daily, Disp: , Rfl:     baclofen 10 mg tablet, TAKE 1 TABLET BY MOUTH 3 TIMES  DAILY AS NEEDED FOR MUSCLE  SPASM(S), Disp: 270 tablet, Rfl: 0    cholecalciferol (VITAMIN D3) 1,000 units tablet, Take 1 tablet (1,000 Units total) by mouth daily, Disp: 60 tablet, Rfl: 2    clopidogrel (PLAVIX) 75 mg tablet, TAKE 1 TABLET BY MOUTH IN THE  MORNING, Disp: 100 tablet, Rfl: 2    ezetimibe (ZETIA) 10 mg tablet, TAKE 1 TABLET BY MOUTH DAILY, Disp: 100 tablet, Rfl: 2    gabapentin (NEURONTIN) 300 mg capsule,  TAKE 1 CAPSULE BY MOUTH 3 TIMES  DAILY, Disp: 270 capsule, Rfl: 3    ketoconazole (NIZORAL) 2 % shampoo, APPLY TOPICALLY 2 TIMES A WEEK, Disp: 120 mL, Rfl: 0    metoprolol succinate (TOPROL-XL) 50 mg 24 hr tablet, TAKE 1 TABLET BY MOUTH DAILY, Disp: 100 tablet, Rfl: 2    omega-3-acid ethyl esters (LOVAZA) 1 g capsule, TAKE 2 CAPSULES BY MOUTH TWICE  DAILY, Disp: 400 capsule, Rfl: 2    pantoprazole (PROTONIX) 40 mg tablet, TAKE 1 TABLET BY MOUTH EVERY DAY, Disp: 90 tablet, Rfl: 1    sertraline (ZOLOFT) 50 mg tablet, TAKE 1 TABLET BY MOUTH DAILY, Disp: 90 tablet, Rfl: 1    simvastatin (ZOCOR) 40 mg tablet, TAKE 1 TABLET BY MOUTH DAILY AT  BEDTIME, Disp: 100 tablet, Rfl: 1    tamsulosin (FLOMAX) 0.4 mg, TAKE 1 CAPSULE BY MOUTH DAILY  WITH DINNER, Disp: 100 capsule, Rfl: 1    traZODone (DESYREL) 150 mg tablet, Take 1 tablet (150 mg total) by mouth daily at bedtime, Disp: 90 tablet, Rfl: 0    Cholecalciferol (Vitamin D3) 50 MCG (2000 UT) capsule, Take 1 tablet by mouth daily (Patient not taking: Reported on 5/22/2024), Disp: , Rfl:      Past History:     Past Medical History:   Diagnosis Date    Celiac disease     Colon polyp     Coronary artery disease     Diverticulitis     Diverticulitis of ascending colon 10/27/2022    Diverticulosis     Hyperlipidemia     Hypertension     Myocardial infarction (HCC) 1990        Past Surgical History:   Procedure Laterality Date    COLONOSCOPY      CORONARY ANGIOPLASTY WITH STENT PLACEMENT      Multiple times    TX ARTHRP KNE CONDYLE&PLATU MEDIAL&LAT COMPARTMENTS Right 04/13/2022    Procedure: ARTHROPLASTY KNEE TOTAL and all associated procedures;  Surgeon: Neeta Chaney MD;  Location: [facility];  Service: Orthopedics    TX ARTHRP KNE CONDYLE&PLATU MEDIAL&LAT COMPARTMENTS Left 9/29/2022    Procedure: ARTHROPLASTY KNEE TOTAL;  Surgeon: Neeta Chaney MD;  Location: [facility];  Service: Orthopedics        Family History   Problem Relation Age of Onset    Diabetes Mother      Coronary artery disease Father     Thyroid disease Sister         Social History     Socioeconomic History    Marital status: /Civil Union     Spouse name: Not on file    Number of children: 3    Years of education: Not on file    Highest education level: Not on file   Occupational History    Not on file   Tobacco Use    Smoking status: Former     Current packs/day: 0.00     Types: Cigarettes     Start date: 1964     Quit date:      Years since quittin.4     Passive exposure: Never    Smokeless tobacco: Never    Tobacco comments:     Per pt, quit over 36 years ago   Vaping Use    Vaping status: Never Used   Substance and Sexual Activity    Alcohol use: Not Currently    Drug use: Never    Sexual activity: Yes     Partners: Female     Birth control/protection: None   Other Topics Concern    Not on file   Social History Narrative    Not on file     Social Determinants of Health     Financial Resource Strain: Low Risk  (2023)    Overall Financial Resource Strain (CARDIA)     Difficulty of Paying Living Expenses: Not hard at all   Food Insecurity: No Food Insecurity (9/10/2019)    Hunger Vital Sign     Worried About Running Out of Food in the Last Year: Never true     Ran Out of Food in the Last Year: Never true   Transportation Needs: No Transportation Needs (2023)    PRAPARE - Transportation     Lack of Transportation (Medical): No     Lack of Transportation (Non-Medical): No   Physical Activity: Sufficiently Active (9/10/2019)    Exercise Vital Sign     Days of Exercise per Week: 3 days     Minutes of Exercise per Session: 60 min   Stress: Stress Concern Present (9/10/2019)    [employer] [place] [employer] - Occupational Stress Questionnaire     Feeling of Stress : To some extent   Social Connections: Moderately Isolated (9/10/2019)    Social Connection and Isolation Panel [NHANES]     Frequency of Communication with Friends and Family: Twice a week     Frequency of Social  "Gatherings with Friends and Family: Once a week     Attends Christianity Services: Never     Active Member of Clubs or Organizations: No     Attends Club or Organization Meetings: Never     Marital Status:    Intimate Partner Violence: Not At Risk (9/10/2019)    Humiliation, Afraid, Rape, and Kick questionnaire     Fear of Current or Ex-Partner: No     Emotionally Abused: No     Physically Abused: No     Sexually Abused: No   Housing Stability: Not on file        Physical Exam:      /66 (BP Location: Right arm, Patient Position: Sitting, Cuff Size: Adult)   Pulse 62   Temp 98.5 °F (36.9 °C) (Tympanic)   Resp 16   Ht 5' 10\" (1.778 m)   Wt 105 kg (232 lb)   SpO2 96%   BMI 33.29 kg/m²     Physical Exam  Constitutional:       General: He is not in acute distress.     Appearance: Normal appearance. He is obese. He is not ill-appearing.   HENT:      Head: Normocephalic and atraumatic.      Mouth/Throat:      Mouth: Mucous membranes are moist.   Eyes:      General: No scleral icterus.     Extraocular Movements: Extraocular movements intact.      Pupils: Pupils are equal, round, and reactive to light.   Cardiovascular:      Rate and Rhythm: Normal rate and regular rhythm.      Pulses: Normal pulses.      Heart sounds: Normal heart sounds. No murmur heard.     No friction rub. No gallop.   Pulmonary:      Effort: Pulmonary effort is normal. No respiratory distress.      Breath sounds: Normal breath sounds. No wheezing or rales.   Abdominal:      General: There is no distension.      Palpations: Abdomen is soft.      Tenderness: There is no abdominal tenderness. There is no guarding.   Musculoskeletal:      Right lower leg: No edema.      Left lower leg: No edema.   Skin:     General: Skin is warm.      Capillary Refill: Capillary refill takes less than 2 seconds.   Neurological:      Mental Status: He is alert and oriented to person, place, and time. Mental status is at baseline.   Psychiatric:         Mood " and Affect: Mood normal.         Behavior: Behavior normal.           Data:     Pre-operative work-up    Laboratory Results: I have personally reviewed the pertinent laboratory results/reports     EKG: I have personally reviewed pertinent reports.      Chest x-ray: I have personally reviewed pertinent reports.      Previous cardiopulmonary studies within the past year:  Echocardiogram: none  Cardiac Catheterization: none   Stress Test: none  Pulmonary Function Testing: none       Assessment:     1. Preoperative clearance             Plan:     79 y.o. male with planned surgery: Total shoulder arthroplasty.      Cardiac Risk Estimation: per the Revised Cardiac Risk Index (Circ. 100:1043, 1999), the patient's risk factors for cardiac complications include history of ischemic heart disease, putting him in: RCI RISK CLASS II (1 risk factor, risk of major cardiac compl. appr. 1.3%).    1. Further preoperative workup as follows:   - Cardiac stress testing to evaluate known coronary disease (per patient's cardiologist)  - Echocardiography to exclude impaired ventricular function per patient cardiologist    2. Medication Management/Recommendations:   - Regarding anti-platelet agents: Patient to hold Plavix and aspirin for 5 days before surgery.    3. Prophylaxis for cardiac events with perioperative beta-blockers: not indicated.    4. Patient requires further consultation with: Cardiology    Clearance  Patient is CLEARED for surgery with cardiac stress test and echocardiogram per the patient's cardiologist cardiac testing.     Mihir Reilly MD  [facility] INTERNAL MEDICINE [address] [address]  Phone#  [phone]  Fax#  [phone]

## 2024-06-19 DIAGNOSIS — F41.9 ANXIETY: ICD-10-CM

## 2024-06-19 RX ORDER — TRAZODONE HYDROCHLORIDE 150 MG/1
150 TABLET ORAL
Qty: 30 TABLET | Refills: 0 | Status: SHIPPED | OUTPATIENT
Start: 2024-06-19 | End: 2024-06-27

## 2024-06-21 NOTE — PRE-PROCEDURE INSTRUCTIONS
Pre-Surgery Instructions:   Medication Instructions    aspirin (ECOTRIN LOW STRENGTH) 81 mg EC tablet Will be called with surgeon's recommendation    baclofen 10 mg tablet Uses PRN- OK to take day of surgery    cholecalciferol (VITAMIN D3) 1,000 units tablet Stop taking 7 days prior to surgery.    clopidogrel (PLAVIX) 75 mg tablet Instructions provided by MD calderon for 5 days    ezetimibe (ZETIA) 10 mg tablet Take day of surgery.    gabapentin (NEURONTIN) 300 mg capsule Take day of surgery.    metoprolol succinate (TOPROL-XL) 50 mg 24 hr tablet Take day of surgery.    omega-3-acid ethyl esters (LOVAZA) 1 g capsule Stop taking 7 days prior to surgery.    pantoprazole (PROTONIX) 40 mg tablet Take day of surgery.    sertraline (ZOLOFT) 50 mg tablet Take day of surgery.    simvastatin (ZOCOR) 40 mg tablet Take day of surgery.    tamsulosin (FLOMAX) 0.4 mg Take night before surgery    traZODone (DESYREL) 150 mg tablet Take night before surgery   Medication instructions for day surgery reviewed. Please use only a sip of water to take your instructed medications. Avoid all over the counter vitamins, supplements and NSAIDS for one week prior to surgery per anesthesia guidelines. Tylenol is ok to take as needed.     You will receive a call one business day prior to surgery with an arrival time and hospital directions. If your surgery is scheduled on a Monday, the hospital will be calling you on the Friday prior to your surgery. If you have not heard from anyone by 8pm, please call the hospital supervisor through the hospital  at [phone]. ([place] [phone] or [place] [phone]).    Do not eat or drink anything after midnight the night before your surgery, including candy, mints, lifesavers, or chewing gum. Do not drink alcohol 24hrs before your surgery. Try not to smoke at least 24hrs before your surgery.       Follow the pre surgery showering instructions as listed in the “My Surgical Experience Booklet”  or otherwise provided by your surgeon's office. Do not use a blade to shave the surgical area 1 week before surgery. It is okay to use a clean electric clippers up to 24 hours before surgery. Do not apply any lotions, creams, including makeup, cologne, deodorant, or perfumes after showering on the day of your surgery. Do not use dry shampoo, hair spray, hair gel, or any type of hair products.     No contact lenses, eye make-up, or artificial eyelashes. Remove nail polish, including gel polish, and any artificial, gel, or acrylic nails if possible. Remove all jewelry including rings and body piercing jewelry.     Wear causal clothing that is easy to take on and off. Consider your type of surgery.    Keep any valuables, jewelry, piercings at home. Please bring any specially ordered equipment (sling, braces) if indicated.    Arrange for a responsible person to drive you to and from the hospital on the day of your surgery. Please confirm the visitor policy for the day of your procedure when you receive your phone call with an arrival time.     Call the surgeon's office with any new illnesses, exposures, or additional questions prior to surgery.    Please reference your “My Surgical Experience Booklet” for additional information to prepare for your upcoming surgery.

## 2024-06-24 ENCOUNTER — HOSPITAL ENCOUNTER (OUTPATIENT)
Dept: NON INVASIVE DIAGNOSTICS | Facility: CLINIC | Age: 79
Discharge: HOME | End: 2024-06-24
Attending: INTERNAL MEDICINE
Payer: COMMERCIAL

## 2024-06-24 VITALS
SYSTOLIC BLOOD PRESSURE: 112 MMHG | HEIGHT: 70 IN | DIASTOLIC BLOOD PRESSURE: 66 MMHG | HEART RATE: 62 BPM | WEIGHT: 232 LBS | BODY MASS INDEX: 33.21 KG/M2

## 2024-06-24 DIAGNOSIS — I35.0 NONRHEUMATIC AORTIC VALVE STENOSIS: ICD-10-CM

## 2024-06-24 DIAGNOSIS — E78.5 HYPERLIPIDEMIA: ICD-10-CM

## 2024-06-24 DIAGNOSIS — Z98.61 CAD S/P PERCUTANEOUS CORONARY ANGIOPLASTY: ICD-10-CM

## 2024-06-24 DIAGNOSIS — I25.10 CAD S/P PERCUTANEOUS CORONARY ANGIOPLASTY: ICD-10-CM

## 2024-06-24 LAB
AORTIC ROOT: 3.8 CM
AORTIC VALVE MEAN VELOCITY: 11.7 M/S
APICAL FOUR CHAMBER EJECTION FRACTION: 58 %
ASCENDING AORTA: 3.9 CM
AV AREA BY CONTINUOUS VTI: 2.9 CM2
AV AREA PEAK VELOCITY: 2.8 CM2
AV LVOT MEAN GRADIENT: 2 MMHG
AV LVOT PEAK GRADIENT: 4 MMHG
AV MEAN GRADIENT: 6 MMHG
AV PEAK GRADIENT: 11 MMHG
AV VALVE AREA: 2.67 CM2
AV VELOCITY RATIO: 0.59
DOP CALC AO PEAK VEL: 1.7 M/S
DOP CALC AO VTI: 37.68 CM
DOP CALC LVOT AREA: 4.15 CM2
DOP CALC LVOT CARDIAC INDEX: 2.73 L/MIN/M2
DOP CALC LVOT CARDIAC OUTPUT: 6.05 L/MIN
DOP CALC LVOT DIAMETER: 2.3 CM
DOP CALC LVOT PEAK VEL VTI: 24.24 CM
DOP CALC LVOT PEAK VEL: 1.01 M/S
DOP CALC LVOT STROKE INDEX: 47.7 ML/M2
DOP CALC LVOT STROKE VOLUME: 100.66 CM3
E WAVE DECELERATION TIME: 325 MS
E/A RATIO: 0.86
FRACTIONAL SHORTENING: 33 (ref 28–44)
INTERVENTRICULAR SEPTUM IN DIASTOLE (PARASTERNAL SHORT AXIS VIEW): 1.1 CM
INTERVENTRICULAR SEPTUM: 1.2 CM (ref 0.6–1.1)
IVC: 15 MM
LAAS-AP2: 25.4 CM2
LAAS-AP4: 22 CM2
LEFT ATRIUM SIZE: 4.6 CM
LEFT ATRIUM VOLUME (MOD BIPLANE): 72 ML
LEFT ATRIUM VOLUME INDEX (MOD BIPLANE): 32.4 ML/M2
LEFT INTERNAL DIMENSION IN SYSTOLE: 3 CM (ref 2.1–4)
LEFT VENTRICULAR INTERNAL DIMENSION IN DIASTOLE: 4.5 CM (ref 3.5–6)
LEFT VENTRICULAR POSTERIOR WALL IN END DIASTOLE: 1.1 CM
LEFT VENTRICULAR STROKE VOLUME: 57 ML
LVSV (TEICH): 57 ML
MAX HR PERCENT: 60 %
MAX HR: 86 BPM
MV E'TISSUE VEL-SEP: 6 CM/S
MV PEAK A VEL: 0.74 M/S
MV PEAK E VEL: 64 CM/S
MV STENOSIS PRESSURE HALF TIME: 94 MS
MV VALVE AREA P 1/2 METHOD: 2.34
NUC STRESS EJECTION FRACTION: 65 %
RA PRESSURE ESTIMATED: 3 MMHG
RATE PRESSURE PRODUCT: NORMAL
RIGHT ATRIUM AREA SYSTOLE A4C: 23.9 CM2
RIGHT VENTRICLE ID DIMENSION: 3.4 CM
RV PSP: 22 MMHG
SL CV LEFT ATRIUM LENGTH A2C: 7.2 CM
SL CV LV EF: 70
SL CV PED ECHO LEFT VENTRICLE DIASTOLIC VOLUME (MOD BIPLANE) 2D: 91 ML
SL CV PED ECHO LEFT VENTRICLE SYSTOLIC VOLUME (MOD BIPLANE) 2D: 34 ML
SL CV REST NUCLEAR ISOTOPE DOSE: 10.41 MCI
SL CV STRESS NUCLEAR ISOTOPE DOSE: 31.1 MCI
SL CV STRESS RECOVERY BP: NORMAL MMHG
SL CV STRESS RECOVERY HR: 55 BPM
SL CV STRESS RECOVERY O2 SAT: 98 %
STRESS ANGINA INDEX: 0
STRESS BASELINE BP: NORMAL MMHG
STRESS BASELINE HR: 56 BPM
STRESS O2 SAT REST: 98 %
STRESS PEAK HR: 86 BPM
STRESS POST EXERCISE DUR MIN: 3 MIN
STRESS POST EXERCISE DUR SEC: 0 SEC
STRESS POST O2 SAT PEAK: 97 %
STRESS POST PEAK BP: 122 MMHG
STRESS/REST PERFUSION RATIO: 1
TR MAX PG: 19 MMHG
TR PEAK VELOCITY: 2.2 M/S
TRICUSPID ANNULAR PLANE SYSTOLIC EXCURSION: 2.8 CM
TRICUSPID VALVE PEAK REGURGITATION VELOCITY: 2.18 M/S

## 2024-06-24 PROCEDURE — 93018 CV STRESS TEST I&R ONLY: CPT | Performed by: INTERNAL MEDICINE

## 2024-06-24 PROCEDURE — 93306 TTE W/DOPPLER COMPLETE: CPT | Performed by: INTERNAL MEDICINE

## 2024-06-24 PROCEDURE — A9502 TC99M TETROFOSMIN: HCPCS

## 2024-06-24 PROCEDURE — 78452 HT MUSCLE IMAGE SPECT MULT: CPT

## 2024-06-24 PROCEDURE — 93306 TTE W/DOPPLER COMPLETE: CPT

## 2024-06-24 PROCEDURE — 93016 CV STRESS TEST SUPVJ ONLY: CPT | Performed by: INTERNAL MEDICINE

## 2024-06-24 PROCEDURE — 78452 HT MUSCLE IMAGE SPECT MULT: CPT | Performed by: INTERNAL MEDICINE

## 2024-06-24 PROCEDURE — 93017 CV STRESS TEST TRACING ONLY: CPT

## 2024-06-24 RX ORDER — REGADENOSON 0.08 MG/ML
0.4 INJECTION, SOLUTION INTRAVENOUS ONCE
Status: COMPLETED | OUTPATIENT
Start: 2024-06-24 | End: 2024-06-24

## 2024-06-24 RX ADMIN — REGADENOSON 0.4 MG: 0.08 INJECTION, SOLUTION INTRAVENOUS at 09:43

## 2024-06-24 NOTE — TELEPHONE ENCOUNTER
Pt called to check status of traZODone (DESYREL) 150 mg tablet. I confirmed that it was sent to [facility]/pharmacy #[id number] - [place] - [facility] on 6/19/24 and E-Prescribing Status: Receipt confirmed by pharmacy (6/19/2024  2:00 PM EDT).      kyra

## 2024-06-25 LAB
CHEST PAIN STATEMENT: NORMAL
MAX DIASTOLIC BP: 70 MMHG
MAX PREDICTED HEART RATE: 143 BPM
PROTOCOL NAME: NORMAL
REASON FOR TERMINATION: NORMAL
STRESS POST EXERCISE DUR MIN: 3 MIN
STRESS POST EXERCISE DUR SEC: 0 SEC
STRESS POST PEAK HR: 86 BPM
STRESS POST PEAK SYSTOLIC BP: 128 MMHG
TARGET HR FORMULA: NORMAL
TEST INDICATION: NORMAL

## 2024-06-25 RX ORDER — EZETIMIBE 10 MG/1
TABLET ORAL
Qty: 100 TABLET | Refills: 1 | Status: SHIPPED | OUTPATIENT
Start: 2024-06-25

## 2024-06-27 ENCOUNTER — TELEPHONE (OUTPATIENT)
Age: 79
End: 2024-06-27

## 2024-06-27 DIAGNOSIS — K92.0 COFFEE GROUND EMESIS: ICD-10-CM

## 2024-06-27 DIAGNOSIS — F41.9 ANXIETY: ICD-10-CM

## 2024-06-27 RX ORDER — TRAZODONE HYDROCHLORIDE 150 MG/1
150 TABLET ORAL
Qty: 90 TABLET | Refills: 1 | Status: SHIPPED | OUTPATIENT
Start: 2024-06-27

## 2024-06-27 RX ORDER — PANTOPRAZOLE SODIUM 40 MG/1
40 TABLET, DELAYED RELEASE ORAL DAILY
Qty: 90 TABLET | Refills: 1 | Status: SHIPPED | OUTPATIENT
Start: 2024-06-27

## 2024-07-02 ENCOUNTER — ANESTHESIA (OUTPATIENT)
Dept: PERIOP | Facility: HOSPITAL | Age: 79
End: 2024-07-02
Payer: COMMERCIAL

## 2024-07-02 ENCOUNTER — ANESTHESIA EVENT (OUTPATIENT)
Dept: PERIOP | Facility: HOSPITAL | Age: 79
End: 2024-07-02
Payer: COMMERCIAL

## 2024-07-02 ENCOUNTER — APPOINTMENT (OUTPATIENT)
Dept: RADIOLOGY | Facility: HOSPITAL | Age: 79
End: 2024-07-02
Payer: COMMERCIAL

## 2024-07-02 ENCOUNTER — HOSPITAL ENCOUNTER (OUTPATIENT)
Facility: HOSPITAL | Age: 79
Setting detail: OUTPATIENT SURGERY
Discharge: HOME | End: 2024-07-02
Attending: ORTHOPAEDIC SURGERY | Admitting: ORTHOPAEDIC SURGERY
Payer: COMMERCIAL

## 2024-07-02 VITALS
HEART RATE: 70 BPM | OXYGEN SATURATION: 95 % | HEIGHT: 70 IN | SYSTOLIC BLOOD PRESSURE: 98 MMHG | WEIGHT: 233.2 LBS | TEMPERATURE: 97.5 F | RESPIRATION RATE: 16 BRPM | BODY MASS INDEX: 33.39 KG/M2 | DIASTOLIC BLOOD PRESSURE: 62 MMHG

## 2024-07-02 DIAGNOSIS — M19.012 ARTHRITIS OF LEFT GLENOHUMERAL JOINT: Primary | ICD-10-CM

## 2024-07-02 PROCEDURE — NC001 PR NO CHARGE: Performed by: ORTHOPAEDIC SURGERY

## 2024-07-02 PROCEDURE — 23472 RECONSTRUCT SHOULDER JOINT: CPT | Performed by: PHYSICIAN ASSISTANT

## 2024-07-02 PROCEDURE — C1776 JOINT DEVICE (IMPLANTABLE): HCPCS | Performed by: ORTHOPAEDIC SURGERY

## 2024-07-02 PROCEDURE — C1713 ANCHOR/SCREW BN/BN,TIS/BN: HCPCS | Performed by: ORTHOPAEDIC SURGERY

## 2024-07-02 PROCEDURE — C9290 INJ, BUPIVACAINE LIPOSOME: HCPCS | Performed by: STUDENT IN AN ORGANIZED HEALTH CARE EDUCATION/TRAINING PROGRAM

## 2024-07-02 PROCEDURE — 73020 X-RAY EXAM OF SHOULDER: CPT

## 2024-07-02 PROCEDURE — 23472 RECONSTRUCT SHOULDER JOINT: CPT | Performed by: ORTHOPAEDIC SURGERY

## 2024-07-02 DEVICE — 4.75MM BC KNOTLESS SWIVELOCK
Type: IMPLANTABLE DEVICE | Site: SHOULDER | Status: FUNCTIONAL
Brand: ARTHREX®

## 2024-07-02 DEVICE — CAPIT SHLDR TOTAL SIMPLICITI: Type: IMPLANTABLE DEVICE | Site: SHOULDER | Status: FUNCTIONAL

## 2024-07-02 DEVICE — IMPLANTABLE DEVICE: Type: IMPLANTABLE DEVICE | Site: SHOULDER | Status: FUNCTIONAL

## 2024-07-02 DEVICE — SMARTSET HV HIGH VISCOSITY BONE CEMENT 40G
Type: IMPLANTABLE DEVICE | Site: SHOULDER | Status: FUNCTIONAL
Brand: SMARTSET

## 2024-07-02 RX ORDER — EPHEDRINE SULFATE 50 MG/ML
INJECTION INTRAVENOUS AS NEEDED
Status: DISCONTINUED | OUTPATIENT
Start: 2024-07-02 | End: 2024-07-02

## 2024-07-02 RX ORDER — PROPOFOL 10 MG/ML
INJECTION, EMULSION INTRAVENOUS AS NEEDED
Status: DISCONTINUED | OUTPATIENT
Start: 2024-07-02 | End: 2024-07-02

## 2024-07-02 RX ORDER — HYDROMORPHONE HCL/PF 1 MG/ML
0.5 SYRINGE (ML) INJECTION
Status: DISCONTINUED | OUTPATIENT
Start: 2024-07-02 | End: 2024-07-02 | Stop reason: HOSPADM

## 2024-07-02 RX ORDER — LABETALOL HYDROCHLORIDE 5 MG/ML
10 INJECTION, SOLUTION INTRAVENOUS
Status: DISCONTINUED | OUTPATIENT
Start: 2024-07-02 | End: 2024-07-02 | Stop reason: HOSPADM

## 2024-07-02 RX ORDER — SODIUM CHLORIDE, SODIUM LACTATE, POTASSIUM CHLORIDE, CALCIUM CHLORIDE 600; 310; 30; 20 MG/100ML; MG/100ML; MG/100ML; MG/100ML
INJECTION, SOLUTION INTRAVENOUS CONTINUOUS PRN
Status: DISCONTINUED | OUTPATIENT
Start: 2024-07-02 | End: 2024-07-02

## 2024-07-02 RX ORDER — METOCLOPRAMIDE HYDROCHLORIDE 5 MG/ML
10 INJECTION INTRAMUSCULAR; INTRAVENOUS ONCE AS NEEDED
Status: DISCONTINUED | OUTPATIENT
Start: 2024-07-02 | End: 2024-07-02 | Stop reason: HOSPADM

## 2024-07-02 RX ORDER — NEOSTIGMINE METHYLSULFATE 1 MG/ML
INJECTION INTRAVENOUS AS NEEDED
Status: DISCONTINUED | OUTPATIENT
Start: 2024-07-02 | End: 2024-07-02

## 2024-07-02 RX ORDER — ALBUTEROL SULFATE 2.5 MG/3ML
2.5 SOLUTION RESPIRATORY (INHALATION) ONCE AS NEEDED
Status: DISCONTINUED | OUTPATIENT
Start: 2024-07-02 | End: 2024-07-02 | Stop reason: HOSPADM

## 2024-07-02 RX ORDER — VANCOMYCIN HYDROCHLORIDE 1 G/20ML
INJECTION, POWDER, LYOPHILIZED, FOR SOLUTION INTRAVENOUS AS NEEDED
Status: DISCONTINUED | OUTPATIENT
Start: 2024-07-02 | End: 2024-07-02 | Stop reason: HOSPADM

## 2024-07-02 RX ORDER — GLYCOPYRROLATE 0.2 MG/ML
INJECTION INTRAMUSCULAR; INTRAVENOUS AS NEEDED
Status: DISCONTINUED | OUTPATIENT
Start: 2024-07-02 | End: 2024-07-02

## 2024-07-02 RX ORDER — FENTANYL CITRATE/PF 50 MCG/ML
50 SYRINGE (ML) INJECTION
Status: DISCONTINUED | OUTPATIENT
Start: 2024-07-02 | End: 2024-07-02 | Stop reason: HOSPADM

## 2024-07-02 RX ORDER — LIDOCAINE HCL/PF 100 MG/5ML
SYRINGE (ML) INJECTION AS NEEDED
Status: DISCONTINUED | OUTPATIENT
Start: 2024-07-02 | End: 2024-07-02

## 2024-07-02 RX ORDER — ROCURONIUM BROMIDE 10 MG/ML
INJECTION, SOLUTION INTRAVENOUS AS NEEDED
Status: DISCONTINUED | OUTPATIENT
Start: 2024-07-02 | End: 2024-07-02

## 2024-07-02 RX ORDER — ONDANSETRON 2 MG/ML
4 INJECTION INTRAMUSCULAR; INTRAVENOUS ONCE AS NEEDED
Status: DISCONTINUED | OUTPATIENT
Start: 2024-07-02 | End: 2024-07-02 | Stop reason: HOSPADM

## 2024-07-02 RX ORDER — MIDAZOLAM HYDROCHLORIDE 2 MG/2ML
INJECTION, SOLUTION INTRAMUSCULAR; INTRAVENOUS AS NEEDED
Status: DISCONTINUED | OUTPATIENT
Start: 2024-07-02 | End: 2024-07-02

## 2024-07-02 RX ORDER — OXYCODONE HYDROCHLORIDE 5 MG/1
5 TABLET ORAL EVERY 4 HOURS PRN
Qty: 30 TABLET | Refills: 0 | Status: SHIPPED | OUTPATIENT
Start: 2024-07-02 | End: 2024-07-07

## 2024-07-02 RX ORDER — SODIUM CHLORIDE, SODIUM LACTATE, POTASSIUM CHLORIDE, CALCIUM CHLORIDE 600; 310; 30; 20 MG/100ML; MG/100ML; MG/100ML; MG/100ML
50 INJECTION, SOLUTION INTRAVENOUS CONTINUOUS
Status: DISCONTINUED | OUTPATIENT
Start: 2024-07-02 | End: 2024-07-02 | Stop reason: HOSPADM

## 2024-07-02 RX ORDER — CHLORHEXIDINE GLUCONATE ORAL RINSE 1.2 MG/ML
15 SOLUTION DENTAL ONCE
Status: COMPLETED | OUTPATIENT
Start: 2024-07-02 | End: 2024-07-02

## 2024-07-02 RX ORDER — FENTANYL CITRATE 50 UG/ML
INJECTION, SOLUTION INTRAMUSCULAR; INTRAVENOUS AS NEEDED
Status: DISCONTINUED | OUTPATIENT
Start: 2024-07-02 | End: 2024-07-02

## 2024-07-02 RX ORDER — ONDANSETRON 2 MG/ML
INJECTION INTRAMUSCULAR; INTRAVENOUS AS NEEDED
Status: DISCONTINUED | OUTPATIENT
Start: 2024-07-02 | End: 2024-07-02

## 2024-07-02 RX ORDER — DEXAMETHASONE SODIUM PHOSPHATE 10 MG/ML
INJECTION, SOLUTION INTRAMUSCULAR; INTRAVENOUS AS NEEDED
Status: DISCONTINUED | OUTPATIENT
Start: 2024-07-02 | End: 2024-07-02

## 2024-07-02 RX ORDER — PHENYLEPHRINE HCL IN 0.9% NACL 1 MG/10 ML
SYRINGE (ML) INTRAVENOUS AS NEEDED
Status: DISCONTINUED | OUTPATIENT
Start: 2024-07-02 | End: 2024-07-02

## 2024-07-02 RX ORDER — TRANEXAMIC ACID 10 MG/ML
1000 INJECTION, SOLUTION INTRAVENOUS ONCE
Status: COMPLETED | OUTPATIENT
Start: 2024-07-02 | End: 2024-07-02

## 2024-07-02 RX ORDER — CHLORHEXIDINE GLUCONATE 40 MG/ML
SOLUTION TOPICAL DAILY PRN
Status: DISCONTINUED | OUTPATIENT
Start: 2024-07-02 | End: 2024-07-02 | Stop reason: HOSPADM

## 2024-07-02 RX ORDER — PENICILLIN V POTASSIUM 500 MG/1
500 TABLET ORAL EVERY 6 HOURS SCHEDULED
Qty: 8 TABLET | Refills: 0 | Status: SHIPPED | OUTPATIENT
Start: 2024-07-02 | End: 2024-07-04

## 2024-07-02 RX ORDER — CEFAZOLIN SODIUM 2 G/50ML
2000 SOLUTION INTRAVENOUS ONCE
Status: COMPLETED | OUTPATIENT
Start: 2024-07-02 | End: 2024-07-02

## 2024-07-02 RX ORDER — BUPIVACAINE HYDROCHLORIDE 2.5 MG/ML
INJECTION, SOLUTION EPIDURAL; INFILTRATION; INTRACAUDAL
Status: DISCONTINUED | OUTPATIENT
Start: 2024-07-02 | End: 2024-07-02

## 2024-07-02 RX ORDER — MAGNESIUM HYDROXIDE 1200 MG/15ML
LIQUID ORAL AS NEEDED
Status: DISCONTINUED | OUTPATIENT
Start: 2024-07-02 | End: 2024-07-02 | Stop reason: HOSPADM

## 2024-07-02 RX ADMIN — ROCURONIUM BROMIDE 30 MG: 50 INJECTION, SOLUTION INTRAVENOUS at 08:41

## 2024-07-02 RX ADMIN — Medication 100 MCG: at 07:52

## 2024-07-02 RX ADMIN — DEXAMETHASONE SODIUM PHOSPHATE 10 MG: 10 INJECTION, SOLUTION INTRAMUSCULAR; INTRAVENOUS at 07:44

## 2024-07-02 RX ADMIN — BUPIVACAINE HYDROCHLORIDE 5 ML: 2.5 INJECTION, SOLUTION EPIDURAL; INFILTRATION; INTRACAUDAL; PERINEURAL at 07:25

## 2024-07-02 RX ADMIN — BUPIVACAINE 20 ML: 13.3 INJECTION, SUSPENSION, LIPOSOMAL INFILTRATION at 07:25

## 2024-07-02 RX ADMIN — PHENYLEPHRINE HYDROCHLORIDE 30 MCG/MIN: 10 INJECTION INTRAVENOUS at 07:49

## 2024-07-02 RX ADMIN — CHLORHEXIDINE GLUCONATE 0.12% ORAL RINSE 15 ML: 1.2 LIQUID ORAL at 07:00

## 2024-07-02 RX ADMIN — FENTANYL CITRATE 25 MCG: 50 INJECTION, SOLUTION INTRAMUSCULAR; INTRAVENOUS at 07:25

## 2024-07-02 RX ADMIN — SODIUM CHLORIDE, SODIUM LACTATE, POTASSIUM CHLORIDE, AND CALCIUM CHLORIDE: .6; .31; .03; .02 INJECTION, SOLUTION INTRAVENOUS at 07:20

## 2024-07-02 RX ADMIN — ONDANSETRON 4 MG: 2 INJECTION INTRAMUSCULAR; INTRAVENOUS at 09:47

## 2024-07-02 RX ADMIN — Medication 100 MCG: at 09:57

## 2024-07-02 RX ADMIN — EPHEDRINE SULFATE 5 MG: 50 INJECTION, SOLUTION INTRAVENOUS at 09:26

## 2024-07-02 RX ADMIN — NEOSTIGMINE METHYLSULFATE 4 MG: 1 INJECTION INTRAVENOUS at 09:47

## 2024-07-02 RX ADMIN — FENTANYL CITRATE 75 MCG: 50 INJECTION, SOLUTION INTRAMUSCULAR; INTRAVENOUS at 07:42

## 2024-07-02 RX ADMIN — MIDAZOLAM HYDROCHLORIDE 1 MG: 1 INJECTION, SOLUTION INTRAMUSCULAR; INTRAVENOUS at 07:25

## 2024-07-02 RX ADMIN — GLYCOPYRROLATE 0.6 MG: 0.2 INJECTION, SOLUTION INTRAMUSCULAR; INTRAVENOUS at 09:47

## 2024-07-02 RX ADMIN — EPHEDRINE SULFATE 5 MG: 50 INJECTION, SOLUTION INTRAVENOUS at 08:01

## 2024-07-02 RX ADMIN — LIDOCAINE HYDROCHLORIDE 50 MG: 20 INJECTION INTRAVENOUS at 07:42

## 2024-07-02 RX ADMIN — TRANEXAMIC ACID 1000 MG: 10 INJECTION, SOLUTION INTRAVENOUS at 09:44

## 2024-07-02 RX ADMIN — EPHEDRINE SULFATE 5 MG: 50 INJECTION, SOLUTION INTRAVENOUS at 08:04

## 2024-07-02 RX ADMIN — Medication 100 MCG: at 07:57

## 2024-07-02 RX ADMIN — Medication 100 MCG: at 07:49

## 2024-07-02 RX ADMIN — SODIUM CHLORIDE, SODIUM LACTATE, POTASSIUM CHLORIDE, AND CALCIUM CHLORIDE: .6; .31; .03; .02 INJECTION, SOLUTION INTRAVENOUS at 09:44

## 2024-07-02 RX ADMIN — EPHEDRINE SULFATE 5 MG: 50 INJECTION, SOLUTION INTRAVENOUS at 08:30

## 2024-07-02 RX ADMIN — CEFAZOLIN SODIUM 2000 MG: 2 SOLUTION INTRAVENOUS at 07:35

## 2024-07-02 RX ADMIN — PROPOFOL 150 MG: 10 INJECTION, EMULSION INTRAVENOUS at 07:42

## 2024-07-02 RX ADMIN — EPHEDRINE SULFATE 5 MG: 50 INJECTION, SOLUTION INTRAVENOUS at 10:02

## 2024-07-02 RX ADMIN — EPHEDRINE SULFATE 5 MG: 50 INJECTION, SOLUTION INTRAVENOUS at 09:28

## 2024-07-02 RX ADMIN — GLYCOPYRROLATE 0.2 MG: 0.2 INJECTION, SOLUTION INTRAMUSCULAR; INTRAVENOUS at 08:37

## 2024-07-02 RX ADMIN — EPHEDRINE SULFATE 5 MG: 50 INJECTION, SOLUTION INTRAVENOUS at 09:24

## 2024-07-02 RX ADMIN — TRANEXAMIC ACID 1000 MG: 10 INJECTION, SOLUTION INTRAVENOUS at 07:35

## 2024-07-02 RX ADMIN — Medication 100 MCG: at 08:01

## 2024-07-02 RX ADMIN — ROCURONIUM BROMIDE 50 MG: 50 INJECTION, SOLUTION INTRAVENOUS at 07:42

## 2024-07-02 NOTE — ANESTHESIA POSTPROCEDURE EVALUATION
Post-Op Assessment Note    CV Status:  Stable  Pain Score: 0    Pain management: adequate       Mental Status:  Awake   Hydration Status:  Stable   PONV Controlled:  Controlled   Airway Patency:  Patent     Post Op Vitals Reviewed: Yes    No anethesia notable event occurred.    Staff: CRNA               BP 96/60 (07/02/24 1105)    Temp   97.2   Pulse 61 (07/02/24 1105)   Resp 16 (07/02/24 1105)    SpO2 94 % (07/02/24 1105)

## 2024-07-02 NOTE — ANESTHESIA PROCEDURE NOTES
Peripheral Block    Patient location during procedure: holding area  Start time: 7/2/2024 7:25 AM  Reason for block: at surgeon's request and post-op pain management  Staffing  Performed by: Maury Gaston MD  Authorized by: Maury Gaston MD    Preanesthetic Checklist  Completed: patient identified, IV checked, site marked, risks and benefits discussed, surgical consent, monitors and equipment checked, pre-op evaluation and timeout performed  Peripheral Block  Patient position: supine  Prep: ChloraPrep  Patient monitoring: frequent blood pressure checks, continuous pulse oximetry and heart rate  Block type: Interscalene  Laterality: left  Injection technique: single-shot  Procedures: ultrasound guided, Ultrasound guidance required for the procedure to increase accuracy and safety of medication placement and decrease risk of complications.  Ultrasound permanent image saved  bupivacaine liposomal (EXPAREL) 1.3 % injection 20 mL - Perineural   20 mL - 7/2/2024 7:25:00 AM  bupivacaine (PF) (MARCAINE) 0.25 % injection 20 mL - Perineural   5 mL - 7/2/2024 7:25:00 AM  Needle  Needle type: Stimuplex   Needle gauge: 20 G  Needle length: 4 in  Needle localization: anatomical landmarks and ultrasound guidance  Assessment  Injection assessment: incremental injection, frequent aspiration, injected with ease, negative aspiration, negative for heart rate change, no paresthesia on injection, no symptoms of intraneural/intravenous injection and needle tip visualized at all times  Paresthesia pain: none  Post-procedure:  site cleaned  patient tolerated the procedure well with no immediate complications

## 2024-07-02 NOTE — OP NOTE
OPERATIVE REPORT  PATIENT NAME: Otilio Machuca    :  1945  MRN: [id number]  Pt Location: [facility] OR ROOM 01    SURGERY DATE: 2024    Surgeons and Role:     * Lalitha Ann,  - Primary     * Narendra Hewitt PA-C - Assisting    Preop Diagnosis:  Arthritis of left glenohumeral joint [M19.012]    Post-Op Diagnosis Codes:     * Arthritis of left glenohumeral joint [M19.012]    Procedure(s):  Left - ARTHROPLASTY SHOULDER- left anatomic. BICEPS TENODESIS    Specimen(s):  * No specimens in log *    Estimated Blood Loss:   400 mL    Drains:  * No LDAs found *    Anesthesia Type:   General w/ Interscalene Block    Operative Indications:  Arthritis of left glenohumeral joint [M19.012]      Operative Findings:  Severe glenohumeral joint osteoarthritis with a B2 glenoid      Complications:   None    Procedure and Technique:      IMPLANTS: Tornier: Extra-large 25 degree augment corti lock glenoid, size 3 nucleus, 52 x 19 simpliciti head    INDICATIONS AND HISTORY:  This is a 79 y.o. male with chronic left shoulder pain and dysfunction.  The patient was diagnosed with glenohumeral joint osteoarthritis and failed conservative management.  The patient is indicated for total shoulder arthroplasty.  Patient understood the risks and benefits of the procedure with risks including pain, stiffness, fracture, failure of the prosthesis, glenoid loosening, neurovascular injury, infection, rotator cuff tear, blood loss, blood clots, stroke, heart attack, all up to including death.  Patient understood and did consent for surgery today.    DESCRIPTION OF OPERATION:  Patient was seen in the preoperative holding area where the operative extremity marked.  Patient was taken to the operating room and placed in supine position.  The operative extremity was prepped and draped in the usual sterile fashion.  A time-out was called.  Patient was administered IV antibiotics prior to incision.  Using a 10 blade knife a deltopectoral skin  incision was made.  Subcutaneous dissection was taken down to cephalic vein which was identified.  This was retracted medially.  The clavipectoral fascia was then identified and incised.  The conjoined tendon was then identified and retractors placed underneath the conjoined tendon and deltoid.  The long head biceps was then identified and unroofed using a Bovie cautery.  The long head of biceps was tenodesed to the pectoralis major tendon using a # 2 FiberWire.  The proximal stump was then amputated near the joint.  The subscapularis tendon was then identified and a fiber tape stitch was placed in the subscap tendon in a Adames stitch type fashion.  A lesser tuberosity osteotomy was then performed using combination of the oscillating saw and osteotome.  The subscapularis was reflected using a Bovie cautery staying against the bone and protecting the axillary nerve.  The humeral head was then dislocated.  A humeral head osteotomy was then performed with an oscillating saw.  This was saved for bone graft.  A center pin was then placed in the middle of the humeral metaphysis.  A punch was then used to insert the appropriate size nucleus trial implant.  A humeral head protector was then placed and the glenoid was then exposed.  A pan-labral excision was then performed using a Bovie cautery.  The full glenoid was then exposed and a center guide pin was placed.  The appropriate sized poly trial was seated for the glenoid.  A central pin was placed through the guide.  Sequential reaming was then performed down to subchondral bone.  Once this was done a glenoid peg guide instrument was placed down the center of the glenoid and sequential drill holes were made peripherally.  A central drill hole was then made over the center guide pin.  Once this was done the glenoid was copiously irrigated.  The peripheral holes were cemented and the middle hole was left uncemented.  The final poly implant was then inserted and impacted  using the impactor mallet.  Good seating and adequate fit of the whole glenoid was accomplished.  Once the glenoid was completed attention was brought back to the humeral side.  A trial humeral head implant was placed.  The shoulder was reduced and under fluoroscopic imaging demonstrated well placed implant with a good size head that was not overstuffed.  About 50% posterior translation was achieved with these trial implants.  Once this was done the trial implants were removed.  Three drill holes were made medial and lateral to the lesser tuberosity and # 2 Fiber Loop stitches were placed from lateral to medial for repair of the subscapularis tendon osteotomy.  The final implants were then impacted into the humerus with good press-fit.  This was again visualized intraoperatively with fluoroscopy and showed well placed implants with no signs of periprosthetic fracture.  Once this was done the lesser tuberosity osteotomy was repaired using heavy braided sutures and was passed just medial to the bone tendon junction of the osteotomy.  These were then tied down in a ratchet hitch type fashion and the strands were brought down to a lateral row using two 4.75 Arthrex SwiveLock anchors in the greater tuberosity.  This completed the double row repair of the lesser tuberosity osteotomy. The rotator interval was then closed using # 2 FiberWire.  Once the subscapularis was repaired copious irrigation of the wound was performed.  Vancomycin powder was placed deep to the deltopectoral interval.   The deltopectoral interval was closed with # 0 Ethibond.  Subcutaneous closure of the skin was performed with 2-0 Monocryl and the epidermis was closed with 3-0 STRATAFIX in running subcuticular fashion.  Exofin was then placed.  Dressings included Mepilex dressing.  The patient was placed in a shoulder immobilizer.  There was no complication throughout the case.  The patient was placed in PACU in stable condition.  The patient  tolerated the procedure well.     I was present for the entire procedure., A qualified resident physician was not available., and A physician assistant was required during the procedure for retraction, tissue handling, dissection and suturing.    Patient Disposition:  PACU         SIGNATURE: Lalitha Ann DO  DATE: July 2, 2024  TIME: 10:02 AM

## 2024-07-02 NOTE — H&P
"Patient Name:  Otilio Machuca  MRN:  [id number]      Assessment & Plan     Left glenohumeral arthritis  Plan for left total shoulder arthroplasty today    Chief Complaint     Left shoulder pain.     History of the Present Illness     Otilio Machuca is a 79 y.o. male with  bilateral shoulder pain.  He reports the left is worse than the right.  He is right-hand dominant.  He complains of bilateral shoulder pain progressive with insidious onset over the last several years.  He has crepitus with movement.  He has limitations with range of motion and pain.  No weakness.  No numbness or tingling.  No previous intervention such as injections.  He does take Plavix.  He is not diabetic. Pain 01/0 at rest, 6/10 with movement. He reports mild nighttime symptoms.     Physical Exam     /70   Pulse 65   Temp 97.6 °F (36.4 °C) (Temporal)   Resp 12   Ht 5' 10\" (1.778 m)   Wt 106 kg (233 lb 3.2 oz)   SpO2 96%   BMI 33.46 kg/m²     LEFT SHOULDER:     NVI axillary/medial/radial/ulnar and sensory intact     Forward flexion:   160 degrees   Abduction:  90 degrees   External rotation at 0 degrees:   70 degrees   Internal rotation: L1     STRENGTH:  Forward flexion:  5/5   External rotation:  5/5   Internal rotation:  5/5    Eyes:  Anicteric sclerae.  ENT:  Trachea midline.  Lungs:  Clear to auscultation bilaterally.  Heart:  Regular rate and rhythm with audible S1 and S2.  Abdomen:  Soft and nontender.  Lymph:  No palpable lymphadenopathy.  Skin:  Intact without erythema.  Neuro:  Sensation grossly intact to light touch.  Psych:  Mood and affect are appropriate.    Data Review     I have personally reviewed pertinent films in PACS, and my interpretation follows.    X-ray left shoulder taken today in office this was reviewed in PACS. This demonstrates severe glenohumeral arthritis with joint space narrowing and inferior spurring     I have personally reviewed pertinent lab results.    Lab Results   Component Value " Date    K 4.6 06/18/2024     06/18/2024    CO2 28 06/18/2024    BUN 26 (H) 06/18/2024    CREATININE 0.92 06/18/2024     Lab Results   Component Value Date    WBC 4.86 06/18/2024    HGB 14.8 06/18/2024     06/18/2024     Lab Results   Component Value Date    INR 1.01 06/18/2024    PTT 31 06/18/2024       Past Medical History:   Diagnosis Date    Arthritis     Celiac disease     Colon polyp     Coronary artery disease     Diverticulitis     Diverticulitis of ascending colon 10/27/2022    Diverticulosis     GERD (gastroesophageal reflux disease)     Hyperlipidemia     Hypertension     Kidney stone     Myocardial infarction (HCC) 1990       Past Surgical History:   Procedure Laterality Date    COLONOSCOPY      CORONARY ANGIOPLASTY WITH STENT PLACEMENT      Multiple times    JOINT REPLACEMENT      DE ARTHRP KNE CONDYLE&PLATU MEDIAL&LAT COMPARTMENTS Right 04/13/2022    Procedure: ARTHROPLASTY KNEE TOTAL and all associated procedures;  Surgeon: Neeta Chaney MD;  Location: [facility];  Service: Orthopedics    DE ARTHRP KNE CONDYLE&PLATU MEDIAL&LAT COMPARTMENTS Left 09/29/2022    Procedure: ARTHROPLASTY KNEE TOTAL;  Surgeon: Neeta Chaney MD;  Location: [facility];  Service: Orthopedics       Allergies   Allergen Reactions    Crestor [Rosuvastatin] Myalgia    Ramipril Cough       No current facility-administered medications on file prior to encounter.     Current Outpatient Medications on File Prior to Encounter   Medication Sig Dispense Refill    aspirin (ECOTRIN LOW STRENGTH) 81 mg EC tablet Take 1 tablet (81 mg total) by mouth daily Please do not start taking until after total joint arthroplasty 10 tablet 0    b complex vitamins capsule Take 1 capsule by mouth daily      baclofen 10 mg tablet TAKE 1 TABLET BY MOUTH 3 TIMES  DAILY AS NEEDED FOR MUSCLE  SPASM(S) 270 tablet 0    cholecalciferol (VITAMIN D3) 1,000 units tablet Take 1 tablet (1,000 Units total) by mouth daily 60 tablet 2    clopidogrel  (PLAVIX) 75 mg tablet TAKE 1 TABLET BY MOUTH IN THE  MORNING 100 tablet 2    gabapentin (NEURONTIN) 300 mg capsule TAKE 1 CAPSULE BY MOUTH 3 TIMES  DAILY 270 capsule 3    ketoconazole (NIZORAL) 2 % shampoo APPLY TOPICALLY 2 TIMES A WEEK 120 mL 0    metoprolol succinate (TOPROL-XL) 50 mg 24 hr tablet TAKE 1 TABLET BY MOUTH DAILY 100 tablet 2    omega-3-acid ethyl esters (LOVAZA) 1 g capsule TAKE 2 CAPSULES BY MOUTH TWICE  DAILY 400 capsule 2    tamsulosin (FLOMAX) 0.4 mg TAKE 1 CAPSULE BY MOUTH DAILY  WITH DINNER 100 capsule 1    Cholecalciferol (Vitamin D3) 50 MCG ( UT) capsule Take 1 tablet by mouth daily (Patient not taking: Reported on 2024)         Social History     Tobacco Use    Smoking status: Former     Current packs/day: 0.00     Types: Cigarettes     Start date: 1964     Quit date:      Years since quittin.5     Passive exposure: Never    Smokeless tobacco: Never    Tobacco comments:     Per pt, quit over 36 years ago   Vaping Use    Vaping status: Never Used   Substance Use Topics    Alcohol use: Not Currently    Drug use: Never       Family History   Problem Relation Age of Onset    Diabetes Mother     Coronary artery disease Father     Thyroid disease Sister        Review of Systems     As stated in the HPI. All other systems were reviewed and are negative.

## 2024-07-02 NOTE — ANESTHESIA PREPROCEDURE EVALUATION
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     Physical Exam    Airway    Mallampati score: I  TM Distance: >3 FB  Neck ROM: full     Dental       Cardiovascular  Cardiovascular exam normal    Pulmonary  Pulmonary exam normal     Other Findings        Anesthesia Plan  ASA Score- 3     Anesthesia Type- general with ASA Monitors.         Additional Monitors:     Airway Plan: ETT.    Comment: Interscalene block .       Plan Factors-Exercise tolerance (METS): >4 METS.    Chart reviewed. EKG reviewed.  Existing labs reviewed. Patient summary reviewed.    Patient is not a current smoker.  Patient did not smoke on day of surgery.    Obstructive sleep apnea risk education given perioperatively.        Induction- intravenous.    Postoperative Plan- Plan for postoperative opioid use. Planned trial extubation    Perioperative Resuscitation Plan - Level 1 - Full Code.       Informed Consent- Anesthetic plan and risks discussed with patient.  I personally reviewed this patient with the CRNA. Discussed and agreed on the Anesthesia Plan with the CRNA..

## 2024-07-02 NOTE — DISCHARGE INSTR - AVS FIRST PAGE
Lalitha Ann DO    Orthopedic Surgery, Shoulder/Elbow and Sports Medicine  [place] [address] [address] [place] [address], [place] [address]  Phone: [phone]    General Post-op Surgical Instructions:    Date of Procedure - 07/02/24     Procedure - Left total shoulder arthroplasty    Weight Bearing Status - Nonweightbearing left arm    DVT Prophylaxis and Duration - restart aspirin on 7/3/24    PT/OT Instructions - to be discussed at first post-op    Stitches/Staples - Will be removed at 7-10 days postop; we will then place steristrips on incision site    Wound Care - maintain dressing, keep clean and dry, do not remove    Xray follow up - to be done at or prior to office visit follow-up if indicated    Additional Info - Please complete your course of antibiotics     Any questions or concerns please call [phone] please!

## 2024-07-10 ENCOUNTER — OFFICE VISIT (OUTPATIENT)
Dept: OBGYN CLINIC | Facility: CLINIC | Age: 79
End: 2024-07-10

## 2024-07-10 ENCOUNTER — HOSPITAL ENCOUNTER (OUTPATIENT)
Dept: RADIOLOGY | Facility: HOSPITAL | Age: 79
Discharge: HOME | End: 2024-07-10
Attending: ORTHOPAEDIC SURGERY
Payer: COMMERCIAL

## 2024-07-10 VITALS
BODY MASS INDEX: 33.36 KG/M2 | HEIGHT: 70 IN | HEART RATE: 68 BPM | SYSTOLIC BLOOD PRESSURE: 100 MMHG | WEIGHT: 233 LBS | DIASTOLIC BLOOD PRESSURE: 76 MMHG

## 2024-07-10 DIAGNOSIS — Z96.612 STATUS POST TOTAL SHOULDER ARTHROPLASTY, LEFT: ICD-10-CM

## 2024-07-10 DIAGNOSIS — M46.1 SACROILIITIS (HCC): ICD-10-CM

## 2024-07-10 DIAGNOSIS — Z96.612 STATUS POST TOTAL SHOULDER ARTHROPLASTY, LEFT: Primary | ICD-10-CM

## 2024-07-10 PROCEDURE — 99024 POSTOP FOLLOW-UP VISIT: CPT | Performed by: ORTHOPAEDIC SURGERY

## 2024-07-10 PROCEDURE — 73030 X-RAY EXAM OF SHOULDER: CPT

## 2024-07-10 NOTE — PROGRESS NOTES
[facility] [address] [id number] [phone]       Otilio Machuca  [id number]  1945    ORTHOPAEDIC SURGERY OUTPATIENT NOTE  7/10/2024      HISTORY:  79 y.o. male presents for postop visit status post left anatomic total shoulder arthroplasty performed on 7-24.  Patient is doing very well.  His pain is controlled.  Denies numbness or tingling.  He has been compliant with the sling.    Past Medical History:   Diagnosis Date    Arthritis     Celiac disease     Colon polyp     Coronary artery disease     Diverticulitis     Diverticulitis of ascending colon 10/27/2022    Diverticulosis     GERD (gastroesophageal reflux disease)     Hyperlipidemia     Hypertension     Kidney stone     Myocardial infarction (HCC) 1990       Past Surgical History:   Procedure Laterality Date    COLONOSCOPY      CORONARY ANGIOPLASTY WITH STENT PLACEMENT      Multiple times    JOINT REPLACEMENT      KS ARTHROPLASTY GLENOHUMERAL JOINT TOTAL SHOULDER Left 7/2/2024    Procedure: ARTHROPLASTY SHOULDER- left anatomic, BICEPS TENODESIS;  Surgeon: Lalitha Ann DO;  Location: [facility];  Service: Orthopedics    KS ARTHRP KNE CONDYLE&PLATU MEDIAL&LAT COMPARTMENTS Right 04/13/2022    Procedure: ARTHROPLASTY KNEE TOTAL and all associated procedures;  Surgeon: Neeta Chaney MD;  Location: [facility];  Service: Orthopedics    KS ARTHRP KNE CONDYLE&PLATU MEDIAL&LAT COMPARTMENTS Left 09/29/2022    Procedure: ARTHROPLASTY KNEE TOTAL;  Surgeon: Neeta Chaney MD;  Location:  MAIN OR;  Service: Orthopedics       Social History     Socioeconomic History    Marital status: /Civil Union     Spouse name: Not on file    Number of children: 3    Years of education: Not on file    Highest education level: Not on file   Occupational History    Not on file   Tobacco Use    Smoking status: Former     Current packs/day: 0.00     Types: Cigarettes      Start date: 1964     Quit date:      Years since quittin.5     Passive exposure: Never    Smokeless tobacco: Never    Tobacco comments:     Per pt, quit over 36 years ago   Vaping Use    Vaping status: Never Used   Substance and Sexual Activity    Alcohol use: Not Currently    Drug use: Never    Sexual activity: Yes     Partners: Female     Birth control/protection: None   Other Topics Concern    Not on file   Social History Narrative    Not on file     Social Determinants of Health     Financial Resource Strain: Low Risk  (2023)    Overall Financial Resource Strain (CARDIA)     Difficulty of Paying Living Expenses: Not hard at all   Food Insecurity: No Food Insecurity (9/10/2019)    Hunger Vital Sign     Worried About Running Out of Food in the Last Year: Never true     Ran Out of Food in the Last Year: Never true   Transportation Needs: No Transportation Needs (2023)    PRAPARE - Transportation     Lack of Transportation (Medical): No     Lack of Transportation (Non-Medical): No   Physical Activity: Sufficiently Active (9/10/2019)    Exercise Vital Sign     Days of Exercise per Week: 3 days     Minutes of Exercise per Session: 60 min   Stress: Stress Concern Present (9/10/2019)    [employer] - Occupational Stress Questionnaire     Feeling of Stress : To some extent   Social Connections: Moderately Isolated (9/10/2019)    Social Connection and Isolation Panel [NHANES]     Frequency of Communication with Friends and Family: Twice a week     Frequency of Social Gatherings with Friends and Family: Once a week     Attends Druze Services: Never     Active Member of Clubs or Organizations: No     Attends Club or Organization Meetings: Never     Marital Status:    Intimate Partner Violence: Not At Risk (9/10/2019)    Humiliation, Afraid, Rape, and Kick questionnaire     Fear of Current or Ex-Partner: No     Emotionally Abused: No     Physically Abused: No      Sexually Abused: No   Housing Stability: Not on file       Family History   Problem Relation Age of Onset    Diabetes Mother     Coronary artery disease Father     Thyroid disease Sister         Patient's Medications   New Prescriptions    No medications on file   Previous Medications    ASPIRIN (ECOTRIN LOW STRENGTH) 81 MG EC TABLET    Take 1 tablet (81 mg total) by mouth daily Please do not start taking until after total joint arthroplasty    B COMPLEX VITAMINS CAPSULE    Take 1 capsule by mouth daily    BACLOFEN 10 MG TABLET    TAKE 1 TABLET BY MOUTH 3 TIMES  DAILY AS NEEDED FOR MUSCLE  SPASM(S)    CHOLECALCIFEROL (VITAMIN D3) 1,000 UNITS TABLET    Take 1 tablet (1,000 Units total) by mouth daily    CHOLECALCIFEROL (VITAMIN D3) 50 MCG (2000 UT) CAPSULE    Take 1 tablet by mouth daily    CLOPIDOGREL (PLAVIX) 75 MG TABLET    TAKE 1 TABLET BY MOUTH IN THE  MORNING    EZETIMIBE (ZETIA) 10 MG TABLET    TAKE 1 TABLET BY MOUTH DAILY    GABAPENTIN (NEURONTIN) 300 MG CAPSULE    TAKE 1 CAPSULE BY MOUTH 3 TIMES  DAILY    KETOCONAZOLE (NIZORAL) 2 % SHAMPOO    APPLY TOPICALLY 2 TIMES A WEEK    METOPROLOL SUCCINATE (TOPROL-XL) 50 MG 24 HR TABLET    TAKE 1 TABLET BY MOUTH DAILY    OMEGA-3-ACID ETHYL ESTERS (LOVAZA) 1 G CAPSULE    TAKE 2 CAPSULES BY MOUTH TWICE  DAILY    PANTOPRAZOLE (PROTONIX) 40 MG TABLET    TAKE 1 TABLET BY MOUTH EVERY DAY    SERTRALINE (ZOLOFT) 50 MG TABLET    TAKE 1 TABLET BY MOUTH DAILY    SIMVASTATIN (ZOCOR) 40 MG TABLET    TAKE 1 TABLET BY MOUTH DAILY AT  BEDTIME    TAMSULOSIN (FLOMAX) 0.4 MG    TAKE 1 CAPSULE BY MOUTH DAILY  WITH DINNER    TRAZODONE (DESYREL) 150 MG TABLET    TAKE 1 TABLET BY MOUTH AT BEDTIME   Modified Medications    No medications on file   Discontinued Medications    No medications on file       Allergies   Allergen Reactions    Crestor [Rosuvastatin] Myalgia    Ramipril Cough        /76 (BP Location: Left arm, Patient Position: Sitting, Cuff Size: Standard)   Pulse 68   Ht  "5' 10\" (1.778 m)   Wt 106 kg (233 lb)   BMI 33.43 kg/m²      REVIEW OF SYSTEMS:  Constitutional: Negative.    HEENT: Negative.    Respiratory: Negative.    Skin: Negative.    Neurological: Negative.    Psychiatric/Behavioral: Negative.  Musculoskeletal: Negative except for that mentioned in the HPI.    /76 (BP Location: Left arm, Patient Position: Sitting, Cuff Size: Standard)   Pulse 68   Ht 5' 10\" (1.778 m)   Wt 106 kg (233 lb)   BMI 33.43 kg/m²   Gen: No acute distress, resting comfortably in bed  HEENT: Eyes clear, moist mucus membranes, hearing intact  Respiratory: No audible wheezing or stridor  Cardiovascular: Well Perfused peripherally, 2+ distal pulse  Abdomen: nondistended, no peritoneal signs     PHYSICAL EXAM:    Left shoulder:  Incision is clean dry intact with glue  No ecchymosis  Sensation intact to the upper extremity  Brisk cap refill    IMAGING: X-ray of the left shoulder taken the office today.  This reviewed demonstrates intact anatomic total shoulder arthroplasty in stable positioning    ASSESSMENT AND PLAN:  79 y.o. male 1 week status post left total shoulder arthroplasty.  Patient is doing well.  He will start on physical therapy.  Protocol was placed in the chart.  He will maintain the sling.  Will see him back in 5 weeks.       "

## 2024-07-11 ENCOUNTER — OFFICE VISIT (OUTPATIENT)
Dept: PHYSICAL THERAPY | Facility: REHABILITATION | Age: 79
End: 2024-07-11
Payer: COMMERCIAL

## 2024-07-11 DIAGNOSIS — G89.29 CHRONIC LEFT SHOULDER PAIN: Primary | ICD-10-CM

## 2024-07-11 DIAGNOSIS — Z96.612 STATUS POST TOTAL SHOULDER ARTHROPLASTY, LEFT: ICD-10-CM

## 2024-07-11 DIAGNOSIS — M25.512 CHRONIC LEFT SHOULDER PAIN: Primary | ICD-10-CM

## 2024-07-11 PROCEDURE — 97110 THERAPEUTIC EXERCISES: CPT | Performed by: PHYSICAL THERAPIST

## 2024-07-11 PROCEDURE — 97140 MANUAL THERAPY 1/> REGIONS: CPT | Performed by: PHYSICAL THERAPIST

## 2024-07-11 PROCEDURE — 97161 PT EVAL LOW COMPLEX 20 MIN: CPT | Performed by: PHYSICAL THERAPIST

## 2024-07-11 NOTE — PROGRESS NOTES
PT Evaluation     Today's date: 2024  Patient name: Otilio Machuca  : 1945  MRN: [id number]  Referring provider: Narendra Hewitt PA-C  Dx:   Encounter Diagnosis     ICD-10-CM    1. Chronic left shoulder pain  M25.512     G89.29       2. Status post total shoulder arthroplasty, left  Z96.612           Start Time: 1450  Stop Time: 1530  Total time in clinic (min): 40 minutes    Assessment  Impairments: abnormal gait, abnormal muscle firing, abnormal muscle tone, abnormal or restricted ROM, activity intolerance, impaired physical strength, lacks appropriate home exercise program, pain with function, scapular dyskinesis, poor posture , poor body mechanics, participation limitations and activity limitations    Assessment details: Otilio Machuca is a 79 y.o. year old male who presents with chronic left shoulder pain who is status post L TSA with biceps tenodesis performed by Dr. Ann on 24. Current deficits include impaired L shoulder A/PROM ROM, impaired LUE strength, impaired activity tolerance, and pain which impaired his ability to perform all ADLs, household activities, and social/recreational activities. Signs and symptoms are as anticipated following surgery. Recommend skilled PT services to address previously stated deficits to promote progress towards PLOF.   Understanding of Dx/Px/POC: good     Prognosis: good    Goals  STG (6 weeks):  1. L shoulder flexion PROM WFL to promote improved activity tolerance.  2. L shoulder abduction PROM WFL to promote improved activity tolerance.  3. Decrease pain at worst from 2/10 to 1/10 or less for improved QoL.  4. Able to sleep through the night without waking secondary to pain to promote improved sleep quality.  5. Able to perform ADLs independently for improved QoL.    LTG (12 weeks):  1. L shoulder flexion AROM to at least 150* to promote ability to complete overhead tasks.  2. Able to reach overhead with 0-1/10 pain to promote improved ability  to complete household tasks.  3. Increased global L shoulder strength to at least 4+/5 to promote improved activity tolerance.  4. Independent with HEP.    Plan  Patient would benefit from: skilled physical therapy  Planned modality interventions: cryotherapy, electrical stimulation/Russian stimulation, TENS, thermotherapy: hydrocollator packs and unattended electrical stimulation    Planned therapy interventions: abdominal trunk stabilization, activity modification, ADL retraining, balance, balance/weight bearing training, behavior modification, body mechanics training, breathing training, fine motor coordination training, flexibility, functional ROM exercises, gait training, graded activity, graded exercise, graded motor, home exercise program, work reintegration, transfer training, therapeutic training, therapeutic exercise, therapeutic activities, stretching, strengthening, self care, postural training, patient education, neuromuscular re-education, muscle pump exercises, motor coordination training, Blmu taping, manual therapy, joint mobilization, IADL retraining, IASTM, kinesiology taping, nerve gliding and patient/caregiver education    Frequency: 2x week  Duration in weeks: 12  Plan of Care beginning date: 7/11/2024  Plan of Care expiration date: 10/3/2024  Treatment plan discussed with: patient  Plan details: Thank you for referring Otilio Machuca to Physical Therapy at [facility] and for the opportunity to coordinate care.          Subjective Evaluation    History of Present Illness  Date of surgery: 7/2/2024  Mechanism of injury: surgery  Mechanism of injury:   07/11/24:  Otilio Machuca is a 79 y.o. male patient presenting s/p left TSA with biceps tenodesis performed by Dr. Ann on 7/2/24. Pain is relieved or reduced with Tylenol and ice. Otilio currently having difficulty with opening a jar, moving the L arm, lifting/reaching, dressing. Denies numbness/tingling in the LUE. Pt would like  to return to being able to do all typical activities. Next follow up with the physician is 24.    Patient Goals  Patient goals for therapy: increased motion, increased strength, independence with ADLs/IADLs, return to sport/leisure activities and decreased pain    Pain  Current pain ratin  At best pain ratin  At worst pain ratin  Location: L shoulder  Quality: sharp    Hand dominance: right      Diagnostic Tests  Abnormal x-ray: 7/10/24: Satisfactory left shoulder arthroplasty as above. Anatomic alignment with intact hardware. No loosening. Mild acromioclavicular arthrosis.  Treatments  Current treatment: physical therapy        Objective     Active Range of Motion   Cervical/Thoracic Spine       Cervical    Flexion:  WFL  Extension:  WFL  Left lateral flexion:  Restriction level: moderate  Right lateral flexion:  Restriction level moderate  Left rotation:  Restriction level: moderate  Right rotation:  Restriction level: moderate    Right Shoulder   Flexion: 135 degrees   Abduction: 140 degrees   External rotation BTH: T3   Internal rotation BTB: L4     Left Elbow   Flexion: 150 degrees   Extension: 20 degrees     Right Elbow   Flexion: WFL  Extension: WFL    Additional Active Range of Motion Details  L shoulder AROM deferred secondary to post-op status.    Passive Range of Motion   Left Shoulder   Flexion: 90 degrees with pain  Abduction: 80 degrees   External rotation 0°: 10 degrees     Strength/Myotome Testing     Right Shoulder     Planes of Motion   Flexion: 5   Extension: 5   Abduction: 5   External rotation at 0°: 5   Internal rotation at 0°: 5     Right Elbow   Flexion: 5  Extension: 5    Additional Strength Details  L shoulder strength testing deferred secondary to post-op status.  L elbow strength assessment deferred secondary to post-op status.         Precautions:   Past Medical History:   Diagnosis Date    Arthritis     Celiac disease     Colon polyp     Coronary artery disease      "Diverticulitis     Diverticulitis of ascending colon 10/27/2022    Diverticulosis     GERD (gastroesophageal reflux disease)     Hyperlipidemia     Hypertension     Kidney stone     Myocardial infarction (HCC) 1990       * Indicates part of HEP   HEP code: [id number]     Daily Treatment Diary     Manuals 7/11          L shoulder PROM done          L elbow PROM done          Scapular mobility                                 Neuro Re-Ed           Scapular retraction* 2x10 5\"          Scapular depression nv          Scapular retraction with ER           Chin tuck           Prone I's           Prone T's           Prone Y's           Resisted W's           Sustained YWT's           Serratus push up           Plank shoulder taps           Plank to down dog                      Ther Ex           UBE           Gripping* 1'x2          Elbow PROM flx/ext* 10x          Table slides - flexion nv          Table slides - scaption nv          Supine shoulder flexion with cane           Shoulder ER with cane nv          Shoulder isometrics against wall (NO IR until 3 weeks) nv          Sidelying shoulder ER           Sidelying shoulder flexion           Sidelying shoulder abduction           Foam roller up wall           TB rows           TB lat pull down           Band pull aparts           IR/ER isometric walkouts           Active IR/ER           Band wall walks horizontal           Band wall walks vertical           Ball wall circles                      Patient education done                     Ther Activity           1 arm row           1 arm press                      Suitcase carry           90/90 carry           OH carry                                            Modalities                                              "

## 2024-07-12 ENCOUNTER — APPOINTMENT (OUTPATIENT)
Dept: PHYSICAL THERAPY | Facility: REHABILITATION | Age: 79
End: 2024-07-12
Payer: COMMERCIAL

## 2024-07-15 ENCOUNTER — APPOINTMENT (OUTPATIENT)
Dept: PHYSICAL THERAPY | Facility: REHABILITATION | Age: 79
End: 2024-07-15
Payer: COMMERCIAL

## 2024-07-16 ENCOUNTER — OFFICE VISIT (OUTPATIENT)
Dept: PHYSICAL THERAPY | Facility: REHABILITATION | Age: 79
End: 2024-07-16
Payer: COMMERCIAL

## 2024-07-16 DIAGNOSIS — G89.29 CHRONIC LEFT SHOULDER PAIN: Primary | ICD-10-CM

## 2024-07-16 DIAGNOSIS — Z96.612 STATUS POST TOTAL SHOULDER ARTHROPLASTY, LEFT: ICD-10-CM

## 2024-07-16 DIAGNOSIS — M25.512 CHRONIC LEFT SHOULDER PAIN: Primary | ICD-10-CM

## 2024-07-16 PROCEDURE — 97140 MANUAL THERAPY 1/> REGIONS: CPT | Performed by: PHYSICAL THERAPIST

## 2024-07-16 PROCEDURE — 97110 THERAPEUTIC EXERCISES: CPT | Performed by: PHYSICAL THERAPIST

## 2024-07-16 NOTE — PROGRESS NOTES
"Daily Note     Today's date: 2024  Patient name: Otilio Machuca  : 1945  MRN: [id number]  Referring provider: Narendra Hewitt PA-C  Dx:   Encounter Diagnosis     ICD-10-CM    1. Chronic left shoulder pain  M25.512     G89.29       2. Status post total shoulder arthroplasty, left  Z96.612           Start Time: 1450  Stop Time: 1544  Total time in clinic (min): 54 minutes    Subjective: Otilio reports minimal pain in the L shoulder.       Objective: See treatment diary below      Assessment: Tolerated treatment well. Initiated PT POC as outlined below with emphasis on L shoulder PROM and sub maximal muscle engagement. Patient demonstrated appropriate level of challenge and muscular fatigue throughout session and noted good status at end of visit. Updated and reviewed HEP with patient; patient verbalized and demonstrated understanding of all exercises. Patient demonstrated fatigue post treatment, exhibited good technique with therapeutic exercises, and would benefit from continued PT.      Plan: Continue per plan of care.  Progress treatment as tolerated.       Precautions:   Past Medical History:   Diagnosis Date    Arthritis     Celiac disease     Colon polyp     Coronary artery disease     Diverticulitis     Diverticulitis of ascending colon 10/27/2022    Diverticulosis     GERD (gastroesophageal reflux disease)     Hyperlipidemia     Hypertension     Kidney stone     Myocardial infarction (HCC)        * Indicates part of HEP   HEP code: [id number]     Daily Treatment Diary     Manuals          L shoulder PROM done done         L elbow PROM done done         Scapular mobility                                 Neuro Re-Ed           Scapular retraction* 2x10 5\" 2x10x5\"         Scapular depression nv 2x10         Scapular retraction with ER           Chin tuck           Prone I's           Prone T's           Prone Y's           Resisted W's           Sustained YWT's           Serratus push " "up           Plank shoulder taps           Plank to down dog                      Ther Ex           UBE           Gripping* 1'x2          Elbow PROM flx/ext* 10x          Table slides - flexion* nv 15x5\"         Table slides - scaption* nv 15x5\"         Supine shoulder flexion with cane           Shoulder ER with cane nv nv         Shoulder isometrics against wall (NO IR until 3 weeks) nv Flx/abd/ext/ER 10x5\"         Sidelying shoulder ER           Sidelying shoulder flexion           Sidelying shoulder abduction           Foam roller up wall           TB rows           TB lat pull down           Band pull aparts           IR/ER isometric walkouts           Active IR/ER           Band wall walks horizontal           Band wall walks vertical           Ball wall circles                      Patient education done                     Ther Activity           1 arm row           1 arm press                      Suitcase carry           90/90 carry           OH carry                                            Modalities           CP L shoulder - seated  10' post                               1:1 from 3:03 PM - 3:30 PM    "

## 2024-07-18 ENCOUNTER — OFFICE VISIT (OUTPATIENT)
Dept: PHYSICAL THERAPY | Facility: REHABILITATION | Age: 79
End: 2024-07-18
Payer: COMMERCIAL

## 2024-07-18 ENCOUNTER — OFFICE VISIT (OUTPATIENT)
Dept: OBGYN CLINIC | Facility: CLINIC | Age: 79
End: 2024-07-18

## 2024-07-18 ENCOUNTER — TELEPHONE (OUTPATIENT)
Age: 79
End: 2024-07-18

## 2024-07-18 VITALS
HEIGHT: 70 IN | DIASTOLIC BLOOD PRESSURE: 80 MMHG | SYSTOLIC BLOOD PRESSURE: 105 MMHG | WEIGHT: 233 LBS | HEART RATE: 69 BPM | BODY MASS INDEX: 33.36 KG/M2

## 2024-07-18 DIAGNOSIS — Z96.612 STATUS POST TOTAL SHOULDER ARTHROPLASTY, LEFT: ICD-10-CM

## 2024-07-18 DIAGNOSIS — G89.29 CHRONIC LEFT SHOULDER PAIN: Primary | ICD-10-CM

## 2024-07-18 DIAGNOSIS — T81.49XA INCISIONAL INFECTION: ICD-10-CM

## 2024-07-18 DIAGNOSIS — M25.512 CHRONIC LEFT SHOULDER PAIN: Primary | ICD-10-CM

## 2024-07-18 DIAGNOSIS — Z96.612 STATUS POST TOTAL SHOULDER ARTHROPLASTY, LEFT: Primary | ICD-10-CM

## 2024-07-18 PROCEDURE — 97140 MANUAL THERAPY 1/> REGIONS: CPT | Performed by: PHYSICAL THERAPIST

## 2024-07-18 PROCEDURE — 99024 POSTOP FOLLOW-UP VISIT: CPT | Performed by: ORTHOPAEDIC SURGERY

## 2024-07-18 PROCEDURE — 97110 THERAPEUTIC EXERCISES: CPT | Performed by: PHYSICAL THERAPIST

## 2024-07-18 RX ORDER — CEPHALEXIN 500 MG/1
500 CAPSULE ORAL EVERY 6 HOURS SCHEDULED
Qty: 28 CAPSULE | Refills: 0 | Status: SHIPPED | OUTPATIENT
Start: 2024-07-18 | End: 2024-07-25

## 2024-07-18 NOTE — PROGRESS NOTES
"Daily Note     Today's date: 2024  Patient name: Otilio Machuca  : 1945  MRN: [id number]  Referring provider: Narendra Hewitt PA-C  Dx:   Encounter Diagnosis     ICD-10-CM    1. Chronic left shoulder pain  M25.512     G89.29       2. Status post total shoulder arthroplasty, left  Z96.612           Start Time: 913  Stop Time: 1009  Total time in clinic (min): 56 minutes    Subjective: Otilio reports his shoulder is feeling good today.       Objective: See treatment diary below      Assessment: Tolerated treatment well. Introduced shoulder ER stretch with cane for improved ROM of the L shoulder. Patient demonstrated appropriate level of challenge and muscular fatigue throughout session and noted good status at end of visit. Patient demonstrated fatigue post treatment, exhibited good technique with therapeutic exercises, and would benefit from continued PT.      Plan: Continue per plan of care.  Progress treatment as tolerated.       Precautions:   Past Medical History:   Diagnosis Date    Arthritis     Celiac disease     Colon polyp     Coronary artery disease     Diverticulitis     Diverticulitis of ascending colon 10/27/2022    Diverticulosis     GERD (gastroesophageal reflux disease)     Hyperlipidemia     Hypertension     Kidney stone     Myocardial infarction (HCC)        * Indicates part of HEP   HEP code: [id number]     Daily Treatment Diary     Manuals         L shoulder PROM done done done        L elbow PROM done done         Scapular mobility                                 Neuro Re-Ed           Scapular retraction* 2x10 5\" 2x10x5\" 2x10x5\"        Scapular depression nv 2x10 2x10        Scapular retraction with ER           Chin tuck           Prone I's           Prone T's           Prone Y's           Resisted W's           Sustained YWT's           Serratus push up           Plank shoulder taps           Plank to down dog                      Ther Ex           UBE        " "   Gripping* 1'x2          Elbow PROM flx/ext* 10x          Table slides - flexion* nv 15x5\" 20x5\"        Table slides - scaption* nv 15x5\" 20x5\"        Supine shoulder flexion with cane           Shoulder ER with cane nv nv 20x5\"        Shoulder isometrics against wall (NO IR until 3 weeks) nv Flx/abd/ext/ER 10x5\" Flx/abd/ext/ER 15x5\"        Sidelying shoulder ER           Sidelying shoulder flexion           Sidelying shoulder abduction           Foam roller up wall           TB rows           TB lat pull down           Band pull aparts           IR/ER isometric walkouts           Active IR/ER           Band wall walks horizontal           Band wall walks vertical           Ball wall circles                      Patient education done                     Ther Activity           1 arm row           1 arm press                      Suitcase carry           90/90 carry           OH carry                                            Modalities           CP L shoulder - seated  10' post 10' post                                1:1 from 9:13 AM - 9:51 AM    "

## 2024-07-18 NOTE — PROGRESS NOTES
[facility] [address] [address] [phone]       Otilio Machuca  [id number]  1945    ORTHOPAEDIC SURGERY OUTPATIENT NOTE  7/18/2024      HISTORY:  79 y.o. male presents for postop visit status post left anatomic total shoulder arthroplasty.  He noticed some drainage out of the distal portion of his incision.  He reports no pain.  No fever or chills.  This was scant yellow drainage.    Past Medical History:   Diagnosis Date    Arthritis     Celiac disease     Colon polyp     Coronary artery disease     Diverticulitis     Diverticulitis of ascending colon 10/27/2022    Diverticulosis     GERD (gastroesophageal reflux disease)     Hyperlipidemia     Hypertension     Kidney stone     Myocardial infarction (HCC) 1990       Past Surgical History:   Procedure Laterality Date    COLONOSCOPY      CORONARY ANGIOPLASTY WITH STENT PLACEMENT      Multiple times    JOINT REPLACEMENT      CA ARTHROPLASTY GLENOHUMERAL JOINT TOTAL SHOULDER Left 7/2/2024    Procedure: ARTHROPLASTY SHOULDER- left anatomic, BICEPS TENODESIS;  Surgeon: Lalitha Ann DO;  Location: [facility] MAIN OR;  Service: Orthopedics    CA ARTHRP KNE CONDYLE&PLATU MEDIAL&LAT COMPARTMENTS Right 04/13/2022    Procedure: ARTHROPLASTY KNEE TOTAL and all associated procedures;  Surgeon: Neeta Chaney MD;  Location: [facility];  Service: Orthopedics    CA ARTHRP KNE CONDYLE&PLATU MEDIAL&LAT COMPARTMENTS Left 09/29/2022    Procedure: ARTHROPLASTY KNEE TOTAL;  Surgeon: Neeta Chaney MD;  Location:  MAIN OR;  Service: Orthopedics       Social History     Socioeconomic History    Marital status: /Civil Union     Spouse name: Not on file    Number of children: 3    Years of education: Not on file    Highest education level: Not on file   Occupational History    Not on file   Tobacco Use    Smoking status: Former     Current packs/day: 0.00     Types: Cigarettes      Start date: 1964     Quit date:      Years since quittin.5     Passive exposure: Never    Smokeless tobacco: Never    Tobacco comments:     Per pt, quit over 36 years ago   Vaping Use    Vaping status: Never Used   Substance and Sexual Activity    Alcohol use: Not Currently    Drug use: Never    Sexual activity: Yes     Partners: Female     Birth control/protection: None   Other Topics Concern    Not on file   Social History Narrative    Not on file     Social Determinants of Health     Financial Resource Strain: Low Risk  (2023)    Overall Financial Resource Strain (CARDIA)     Difficulty of Paying Living Expenses: Not hard at all   Food Insecurity: No Food Insecurity (9/10/2019)    Hunger Vital Sign     Worried About Running Out of Food in the Last Year: Never true     Ran Out of Food in the Last Year: Never true   Transportation Needs: No Transportation Needs (2023)    PRAPARE - Transportation     Lack of Transportation (Medical): No     Lack of Transportation (Non-Medical): No   Physical Activity: Sufficiently Active (9/10/2019)    Exercise Vital Sign     Days of Exercise per Week: 3 days     Minutes of Exercise per Session: 60 min   Stress: Stress Concern Present (9/10/2019)    [place] [employer] - Occupational Stress Questionnaire     Feeling of Stress : To some extent   Social Connections: Moderately Isolated (9/10/2019)    Social Connection and Isolation Panel [NHANES]     Frequency of Communication with Friends and Family: Twice a week     Frequency of Social Gatherings with Friends and Family: Once a week     Attends [employer] Services: Never     Active Member of Clubs or Organizations: No     Attends Club or Organization Meetings: Never     Marital Status:    Intimate Partner Violence: Not At Risk (9/10/2019)    Humiliation, Afraid, Rape, and Kick questionnaire     Fear of Current or Ex-Partner: No     Emotionally Abused: No     Physically Abused: No      Sexually Abused: No   Housing Stability: Not on file       Family History   Problem Relation Age of Onset    Diabetes Mother     Coronary artery disease Father     Thyroid disease Sister         Patient's Medications   New Prescriptions    CEPHALEXIN (KEFLEX) 500 MG CAPSULE    Take 1 capsule (500 mg total) by mouth every 6 (six) hours for 7 days   Previous Medications    ASPIRIN (ECOTRIN LOW STRENGTH) 81 MG EC TABLET    Take 1 tablet (81 mg total) by mouth daily Please do not start taking until after total joint arthroplasty    B COMPLEX VITAMINS CAPSULE    Take 1 capsule by mouth daily    BACLOFEN 10 MG TABLET    TAKE 1 TABLET BY MOUTH 3 TIMES  DAILY AS NEEDED FOR MUSCLE  SPASM(S)    CHOLECALCIFEROL (VITAMIN D3) 1,000 UNITS TABLET    Take 1 tablet (1,000 Units total) by mouth daily    CHOLECALCIFEROL (VITAMIN D3) 50 MCG (2000 UT) CAPSULE    Take 1 tablet by mouth daily    CLOPIDOGREL (PLAVIX) 75 MG TABLET    TAKE 1 TABLET BY MOUTH IN THE  MORNING    EZETIMIBE (ZETIA) 10 MG TABLET    TAKE 1 TABLET BY MOUTH DAILY    GABAPENTIN (NEURONTIN) 300 MG CAPSULE    TAKE 1 CAPSULE BY MOUTH 3 TIMES  DAILY    KETOCONAZOLE (NIZORAL) 2 % SHAMPOO    APPLY TOPICALLY 2 TIMES A WEEK    METOPROLOL SUCCINATE (TOPROL-XL) 50 MG 24 HR TABLET    TAKE 1 TABLET BY MOUTH DAILY    OMEGA-3-ACID ETHYL ESTERS (LOVAZA) 1 G CAPSULE    TAKE 2 CAPSULES BY MOUTH TWICE  DAILY    PANTOPRAZOLE (PROTONIX) 40 MG TABLET    TAKE 1 TABLET BY MOUTH EVERY DAY    SERTRALINE (ZOLOFT) 50 MG TABLET    TAKE 1 TABLET BY MOUTH DAILY    SIMVASTATIN (ZOCOR) 40 MG TABLET    TAKE 1 TABLET BY MOUTH DAILY AT  BEDTIME    TAMSULOSIN (FLOMAX) 0.4 MG    TAKE 1 CAPSULE BY MOUTH DAILY  WITH DINNER    TRAZODONE (DESYREL) 150 MG TABLET    TAKE 1 TABLET BY MOUTH AT BEDTIME   Modified Medications    No medications on file   Discontinued Medications    No medications on file       Allergies   Allergen Reactions    Crestor [Rosuvastatin] Myalgia    Ramipril Cough        /80  "(BP Location: Left arm, Patient Position: Sitting, Cuff Size: Standard)   Pulse 69   Ht 5' 10\" (1.778 m)   Wt 106 kg (233 lb)   BMI 33.43 kg/m²      REVIEW OF SYSTEMS:  Constitutional: Negative.    HEENT: Negative.    Respiratory: Negative.    Skin: Negative.    Neurological: Negative.    Psychiatric/Behavioral: Negative.  Musculoskeletal: Negative except for that mentioned in the HPI.    /80 (BP Location: Left arm, Patient Position: Sitting, Cuff Size: Standard)   Pulse 69   Ht 5' 10\" (1.778 m)   Wt 106 kg (233 lb)   BMI 33.43 kg/m²   Gen: No acute distress, resting comfortably in bed  HEENT: Eyes clear, moist mucus membranes, hearing intact  Respiratory: No audible wheezing or stridor  Cardiovascular: Well Perfused peripherally, 2+ distal pulse  Abdomen: nondistended, no peritoneal signs     PHYSICAL EXAM:    Left shoulder:  Incision distal 7 mm with fibrinous tissue at the incision.  No Dehiscence  No drainage expressed  No surrounding erythema or warmth    IMAGING: None    ASSESSMENT AND PLAN:  79 y.o. male status post left total shoulder arthroplasty.  He has some fibrinous tissue at the distal end of the incision.  We discussed local wound care keeping this clean and dry.  No ointments or creams.  He will dab with an alcohol swab twice a day and allow this to air dry.  We did prescribe short course of Keflex.  Will see him back in 1 week.    Scribe Attestation      I,:  Narendra Hewitt PA-C am acting as a scribe while in the presence of the attending physician.:       I,:  Lalitha Ann personally performed the services described in this documentation    as scribed in my presence.:             "

## 2024-07-18 NOTE — TELEPHONE ENCOUNTER
Caller: Patient     Doctor: Seth     Reason for call: Patient asked to discuss wound care     Call back#: [phone]

## 2024-07-18 NOTE — TELEPHONE ENCOUNTER
Received photo via [employer], per Dr. Ann would like to see pt. In office. Called and spoke with pt. Booked pt appt. For today 7/18/24 @3pm. Pt. Thanked me.

## 2024-07-18 NOTE — TELEPHONE ENCOUNTER
Called pt and asked if he was able to upload picture through [employer]. Pt. Agreed. Told pt. I will call back as soon as we receive the photo. Pt thanked me.

## 2024-07-22 ENCOUNTER — OFFICE VISIT (OUTPATIENT)
Dept: PHYSICAL THERAPY | Facility: REHABILITATION | Age: 79
End: 2024-07-22
Payer: COMMERCIAL

## 2024-07-22 DIAGNOSIS — Z96.612 STATUS POST TOTAL SHOULDER ARTHROPLASTY, LEFT: ICD-10-CM

## 2024-07-22 DIAGNOSIS — G89.29 CHRONIC LEFT SHOULDER PAIN: Primary | ICD-10-CM

## 2024-07-22 DIAGNOSIS — M25.512 CHRONIC LEFT SHOULDER PAIN: Primary | ICD-10-CM

## 2024-07-22 PROCEDURE — 97110 THERAPEUTIC EXERCISES: CPT | Performed by: PHYSICAL THERAPIST

## 2024-07-22 PROCEDURE — 97140 MANUAL THERAPY 1/> REGIONS: CPT | Performed by: PHYSICAL THERAPIST

## 2024-07-22 NOTE — PROGRESS NOTES
"Daily Note     Today's date: 2024  Patient name: Otilio Machuca  : 1945  MRN: [id number]  Referring provider: Narendra Hewitt PA-C  Dx:   Encounter Diagnosis     ICD-10-CM    1. Chronic left shoulder pain  M25.512     G89.29       2. Status post total shoulder arthroplasty, left  Z96.612           Start Time: 08  Stop Time: 941  Total time in clinic (min): 53 minutes    Subjective: Otilio reports good compliance with HEP.       Objective: See treatment diary below      Assessment: Tolerated treatment well. Introduced shoulder flexion with cane for improved AAROM of the L shoulder. Demonstrates good L shoulder PROM per post op protocol. Patient demonstrated appropriate level of challenge and muscular fatigue throughout session and noted good status at end of visit. Patient demonstrated fatigue post treatment, exhibited good technique with therapeutic exercises, and would benefit from continued PT.      Plan: Continue per plan of care.  Progress treatment as tolerated.       Precautions:   Past Medical History:   Diagnosis Date    Arthritis     Celiac disease     Colon polyp     Coronary artery disease     Diverticulitis     Diverticulitis of ascending colon 10/27/2022    Diverticulosis     GERD (gastroesophageal reflux disease)     Hyperlipidemia     Hypertension     Kidney stone     Myocardial infarction (HCC)        * Indicates part of HEP   HEP code: [id number]     Daily Treatment Diary     Manuals        L shoulder PROM done done done done       L elbow PROM done done         Scapular mobility                                 Neuro Re-Ed           Scapular retraction* 2x10 5\" 2x10x5\" 2x10x5\" 3x10x5\"       Scapular depression nv 2x10 2x10        Scapular retraction with ER           Chin tuck           Prone I's    nv       Prone T's    nv       Prone Y's           Resisted W's           Sustained YWT's           Serratus push up           Plank shoulder taps         " "  Plank to down dog                      Ther Ex           UBE           Gripping* 1'x2          Elbow PROM flx/ext* 10x          Table slides - flexion* nv 15x5\" 20x5\" 20x5\"       Table slides - scaption* nv 15x5\" 20x5\" 20x5\"       Supine shoulder flexion with cane    2x10x5\"       Shoulder ER with cane nv nv 20x5\" 20x5\"       Shoulder isometrics against wall (NO IR until 3 weeks) nv Flx/abd/ext/ER 10x5\" Flx/abd/ext/ER 15x5\" Flx/abd/ext/ER 15x5\"       Sidelying shoulder ER    nv       Sidelying shoulder flexion           Sidelying shoulder abduction    nv       Foam roller up wall           TB rows           TB lat pull down           Band pull aparts           IR/ER isometric walkouts           Active IR/ER           Band wall walks horizontal           Band wall walks vertical           Ball wall circles                      Patient education done                     Ther Activity           1 arm row           1 arm press                      Suitcase carry           90/90 carry           OH carry                                            Modalities           CP L shoulder - seated  10' post 10' post 10' post                                 1:1 from 8:48 AM - 9:26 AM    "

## 2024-07-25 DIAGNOSIS — N40.1 BENIGN PROSTATIC HYPERPLASIA WITH LOWER URINARY TRACT SYMPTOMS, SYMPTOM DETAILS UNSPECIFIED: ICD-10-CM

## 2024-07-26 ENCOUNTER — OFFICE VISIT (OUTPATIENT)
Dept: PHYSICAL THERAPY | Facility: REHABILITATION | Age: 79
End: 2024-07-26
Payer: COMMERCIAL

## 2024-07-26 DIAGNOSIS — Z96.612 STATUS POST TOTAL SHOULDER ARTHROPLASTY, LEFT: ICD-10-CM

## 2024-07-26 DIAGNOSIS — M25.512 CHRONIC LEFT SHOULDER PAIN: Primary | ICD-10-CM

## 2024-07-26 DIAGNOSIS — G89.29 CHRONIC LEFT SHOULDER PAIN: Primary | ICD-10-CM

## 2024-07-26 PROCEDURE — 97112 NEUROMUSCULAR REEDUCATION: CPT | Performed by: PHYSICAL THERAPIST

## 2024-07-26 PROCEDURE — 97140 MANUAL THERAPY 1/> REGIONS: CPT | Performed by: PHYSICAL THERAPIST

## 2024-07-26 PROCEDURE — 97110 THERAPEUTIC EXERCISES: CPT | Performed by: PHYSICAL THERAPIST

## 2024-07-26 RX ORDER — TAMSULOSIN HYDROCHLORIDE 0.4 MG/1
CAPSULE ORAL
Qty: 100 CAPSULE | Refills: 1 | Status: SHIPPED | OUTPATIENT
Start: 2024-07-26

## 2024-07-26 NOTE — PROGRESS NOTES
"Daily Note     Today's date: 2024  Patient name: Otilio Machuca  : 1945  MRN: [id number]  Referring provider: Narendra Hewitt PA-C  Dx:   Encounter Diagnosis     ICD-10-CM    1. Chronic left shoulder pain  M25.512     G89.29       2. Status post total shoulder arthroplasty, left  Z96.612           Start Time: 1048  Stop Time: 1136  Total time in clinic (min): 48 minutes    Subjective: Otilio reports his shoulder is feeling good at start of visit.       Objective: See treatment diary below      Assessment: Tolerated treatment well. Progressions in AROM this session as indicated below with good tolerance, no pain. Patient demonstrated appropriate level of challenge and muscular fatigue throughout session and noted good status at end of visit. Updated and reviewed HEP with patient; patient verbalized and demonstrated understanding of all exercises. Patient demonstrated fatigue post treatment, exhibited good technique with therapeutic exercises, and would benefit from continued PT.      Plan: Continue per plan of care.  Progress treatment as tolerated.       Precautions:   Past Medical History:   Diagnosis Date    Arthritis     Celiac disease     Colon polyp     Coronary artery disease     Diverticulitis     Diverticulitis of ascending colon 10/27/2022    Diverticulosis     GERD (gastroesophageal reflux disease)     Hyperlipidemia     Hypertension     Kidney stone     Myocardial infarction (HCC)        * Indicates part of HEP   HEP code: [id number]     Daily Treatment Diary     Manuals       L shoulder PROM done done done done done      L elbow PROM done done         Scapular mobility                                 Neuro Re-Ed           Scapular retraction* 2x10 5\" 2x10x5\" 2x10x5\" 3x10x5\"       Scapular depression nv 2x10 2x10        Scapular retraction with ER           Chin tuck           Prone I's    nv 2x10      Prone T's    nv 2x10      Prone Y's           Resisted W's  " "         Sustained YWT's           Supine serratus punch           Serratus push up           Plank shoulder taps           Plank to down dog                      Ther Ex           UBE           Gripping* 1'x2          Elbow PROM flx/ext* 10x          Table slides - flexion* nv 15x5\" 20x5\" 20x5\" 20x5\"      Table slides - scaption* nv 15x5\" 20x5\" 20x5\" 20x5\"      Supine shoulder flexion with cane    2x10x5\" 2x10x5\"      Shoulder ER with cane nv nv 20x5\" 20x5\" 20x5\"      Shoulder isometrics against wall (NO IR until 3 weeks) nv Flx/abd/ext/ER 10x5\" Flx/abd/ext/ER 15x5\" Flx/abd/ext/ER 15x5\"       Sidelying shoulder ER    nv 2x10      Sidelying shoulder flexion     nv      Sidelying shoulder abduction    nv 2x10      Foam roller up wall           TB rows           TB lat pull down           Band pull aparts           IR/ER isometric walkouts           Active IR/ER           Band wall walks horizontal           Band wall walks vertical           Ball wall circles                      Patient education done                     Ther Activity           1 arm row           1 arm press                      Suitcase carry           90/90 carry           OH carry                                            Modalities           CP L shoulder - seated  10' post 10' post 10' post 10' post                                  "

## 2024-07-29 ENCOUNTER — APPOINTMENT (EMERGENCY)
Dept: CT IMAGING | Facility: HOSPITAL | Age: 79
DRG: 872 | End: 2024-07-29
Payer: COMMERCIAL

## 2024-07-29 ENCOUNTER — OFFICE VISIT (OUTPATIENT)
Dept: INTERNAL MEDICINE CLINIC | Facility: CLINIC | Age: 79
End: 2024-07-29
Payer: COMMERCIAL

## 2024-07-29 ENCOUNTER — OFFICE VISIT (OUTPATIENT)
Dept: OBGYN CLINIC | Facility: MEDICAL CENTER | Age: 79
End: 2024-07-29

## 2024-07-29 ENCOUNTER — HOSPITAL ENCOUNTER (INPATIENT)
Facility: HOSPITAL | Age: 79
LOS: 3 days | Discharge: HOME | DRG: 872 | End: 2024-08-01
Attending: EMERGENCY MEDICINE | Admitting: INTERNAL MEDICINE
Payer: COMMERCIAL

## 2024-07-29 VITALS
BODY MASS INDEX: 33.36 KG/M2 | OXYGEN SATURATION: 98 % | WEIGHT: 233 LBS | SYSTOLIC BLOOD PRESSURE: 102 MMHG | HEART RATE: 86 BPM | RESPIRATION RATE: 16 BRPM | HEIGHT: 70 IN | TEMPERATURE: 101.3 F | DIASTOLIC BLOOD PRESSURE: 60 MMHG

## 2024-07-29 VITALS
SYSTOLIC BLOOD PRESSURE: 107 MMHG | WEIGHT: 233 LBS | HEIGHT: 70 IN | BODY MASS INDEX: 33.36 KG/M2 | HEART RATE: 78 BPM | DIASTOLIC BLOOD PRESSURE: 88 MMHG

## 2024-07-29 DIAGNOSIS — K86.2 PANCREATIC CYST: ICD-10-CM

## 2024-07-29 DIAGNOSIS — M46.1 SACROILIITIS, NOT ELSEWHERE CLASSIFIED (HCC): ICD-10-CM

## 2024-07-29 DIAGNOSIS — Z47.89 AFTERCARE FOLLOWING SURGERY OF THE MUSCULOSKELETAL SYSTEM: Primary | ICD-10-CM

## 2024-07-29 DIAGNOSIS — K40.90 LEFT INGUINAL HERNIA: ICD-10-CM

## 2024-07-29 DIAGNOSIS — A41.9 SEPSIS SECONDARY TO UTI  (HCC): ICD-10-CM

## 2024-07-29 DIAGNOSIS — N39.0 SEPSIS SECONDARY TO UTI  (HCC): ICD-10-CM

## 2024-07-29 DIAGNOSIS — A41.9 SEPSIS (HCC): ICD-10-CM

## 2024-07-29 DIAGNOSIS — N30.90 CYSTITIS: Primary | ICD-10-CM

## 2024-07-29 DIAGNOSIS — R10.31 RIGHT LOWER QUADRANT ABDOMINAL PAIN: Primary | ICD-10-CM

## 2024-07-29 PROBLEM — R65.10 SIRS (SYSTEMIC INFLAMMATORY RESPONSE SYNDROME) (HCC): Status: ACTIVE | Noted: 2024-07-29

## 2024-07-29 PROBLEM — R82.81 PYURIA: Status: ACTIVE | Noted: 2024-07-29

## 2024-07-29 LAB
ALBUMIN SERPL BCG-MCNC: 3.8 G/DL (ref 3.5–5)
ALP SERPL-CCNC: 71 U/L (ref 34–104)
ALT SERPL W P-5'-P-CCNC: 11 U/L (ref 7–52)
ANION GAP SERPL CALCULATED.3IONS-SCNC: 8 MMOL/L (ref 4–13)
AST SERPL W P-5'-P-CCNC: 19 U/L (ref 13–39)
BACTERIA UR QL AUTO: ABNORMAL /HPF
BASOPHILS # BLD AUTO: 0.06 THOUSANDS/ÂΜL (ref 0–0.1)
BASOPHILS NFR BLD AUTO: 0 % (ref 0–1)
BILIRUB DIRECT SERPL-MCNC: 0.12 MG/DL (ref 0–0.2)
BILIRUB SERPL-MCNC: 1.33 MG/DL (ref 0.2–1)
BILIRUB UR QL STRIP: NEGATIVE
BUN SERPL-MCNC: 14 MG/DL (ref 5–25)
CALCIUM SERPL-MCNC: 9.1 MG/DL (ref 8.4–10.2)
CHLORIDE SERPL-SCNC: 100 MMOL/L (ref 96–108)
CLARITY UR: CLEAR
CO2 SERPL-SCNC: 24 MMOL/L (ref 21–32)
COLOR UR: YELLOW
CREAT SERPL-MCNC: 0.8 MG/DL (ref 0.6–1.3)
EOSINOPHIL # BLD AUTO: 0.04 THOUSAND/ÂΜL (ref 0–0.61)
EOSINOPHIL NFR BLD AUTO: 0 % (ref 0–6)
ERYTHROCYTE [DISTWIDTH] IN BLOOD BY AUTOMATED COUNT: 12.6 % (ref 11.6–15.1)
GFR SERPL CREATININE-BSD FRML MDRD: 86 ML/MIN/1.73SQ M
GLUCOSE SERPL-MCNC: 99 MG/DL (ref 65–140)
GLUCOSE UR STRIP-MCNC: NEGATIVE MG/DL
HCT VFR BLD AUTO: 42.8 % (ref 36.5–49.3)
HGB BLD-MCNC: 14.7 G/DL (ref 12–17)
HGB UR QL STRIP.AUTO: NEGATIVE
IMM GRANULOCYTES # BLD AUTO: 0.16 THOUSAND/UL (ref 0–0.2)
IMM GRANULOCYTES NFR BLD AUTO: 1 % (ref 0–2)
KETONES UR STRIP-MCNC: NEGATIVE MG/DL
LACTATE SERPL-SCNC: 2 MMOL/L (ref 0.5–2)
LEUKOCYTE ESTERASE UR QL STRIP: ABNORMAL
LIPASE SERPL-CCNC: <6 U/L (ref 11–82)
LYMPHOCYTES # BLD AUTO: 1.76 THOUSANDS/ÂΜL (ref 0.6–4.47)
LYMPHOCYTES NFR BLD AUTO: 9 % (ref 14–44)
MCH RBC QN AUTO: 31.8 PG (ref 26.8–34.3)
MCHC RBC AUTO-ENTMCNC: 34.3 G/DL (ref 31.4–37.4)
MCV RBC AUTO: 93 FL (ref 82–98)
MONOCYTES # BLD AUTO: 1.34 THOUSAND/ÂΜL (ref 0.17–1.22)
MONOCYTES NFR BLD AUTO: 7 % (ref 4–12)
NEUTROPHILS # BLD AUTO: 15.45 THOUSANDS/ÂΜL (ref 1.85–7.62)
NEUTS SEG NFR BLD AUTO: 83 % (ref 43–75)
NITRITE UR QL STRIP: NEGATIVE
NON-SQ EPI CELLS URNS QL MICRO: ABNORMAL /HPF
NRBC BLD AUTO-RTO: 0 /100 WBCS
PH UR STRIP.AUTO: 7 [PH]
PLATELET # BLD AUTO: 214 THOUSANDS/UL (ref 149–390)
PMV BLD AUTO: 9.9 FL (ref 8.9–12.7)
POTASSIUM SERPL-SCNC: 4.2 MMOL/L (ref 3.5–5.3)
PROT SERPL-MCNC: 6.5 G/DL (ref 6.4–8.4)
PROT UR STRIP-MCNC: ABNORMAL MG/DL
RBC # BLD AUTO: 4.62 MILLION/UL (ref 3.88–5.62)
RBC #/AREA URNS AUTO: ABNORMAL /HPF
SODIUM SERPL-SCNC: 132 MMOL/L (ref 135–147)
SP GR UR STRIP.AUTO: 1.02 (ref 1–1.03)
UROBILINOGEN UR STRIP-ACNC: 2 MG/DL
WBC # BLD AUTO: 18.81 THOUSAND/UL (ref 4.31–10.16)
WBC #/AREA URNS AUTO: ABNORMAL /HPF

## 2024-07-29 PROCEDURE — 83605 ASSAY OF LACTIC ACID: CPT | Performed by: EMERGENCY MEDICINE

## 2024-07-29 PROCEDURE — 99285 EMERGENCY DEPT VISIT HI MDM: CPT | Performed by: EMERGENCY MEDICINE

## 2024-07-29 PROCEDURE — 87040 BLOOD CULTURE FOR BACTERIA: CPT | Performed by: EMERGENCY MEDICINE

## 2024-07-29 PROCEDURE — 80048 BASIC METABOLIC PNL TOTAL CA: CPT | Performed by: EMERGENCY MEDICINE

## 2024-07-29 PROCEDURE — 99024 POSTOP FOLLOW-UP VISIT: CPT | Performed by: ORTHOPAEDIC SURGERY

## 2024-07-29 PROCEDURE — G2211 COMPLEX E/M VISIT ADD ON: HCPCS

## 2024-07-29 PROCEDURE — 87086 URINE CULTURE/COLONY COUNT: CPT | Performed by: EMERGENCY MEDICINE

## 2024-07-29 PROCEDURE — 96365 THER/PROPH/DIAG IV INF INIT: CPT

## 2024-07-29 PROCEDURE — 80076 HEPATIC FUNCTION PANEL: CPT | Performed by: EMERGENCY MEDICINE

## 2024-07-29 PROCEDURE — 83690 ASSAY OF LIPASE: CPT | Performed by: EMERGENCY MEDICINE

## 2024-07-29 PROCEDURE — 85025 COMPLETE CBC W/AUTO DIFF WBC: CPT | Performed by: EMERGENCY MEDICINE

## 2024-07-29 PROCEDURE — 99213 OFFICE O/P EST LOW 20 MIN: CPT

## 2024-07-29 PROCEDURE — 99284 EMERGENCY DEPT VISIT MOD MDM: CPT

## 2024-07-29 PROCEDURE — 1123F ACP DISCUSS/DSCN MKR DOCD: CPT | Performed by: EMERGENCY MEDICINE

## 2024-07-29 PROCEDURE — 81001 URINALYSIS AUTO W/SCOPE: CPT | Performed by: EMERGENCY MEDICINE

## 2024-07-29 PROCEDURE — 99222 1ST HOSP IP/OBS MODERATE 55: CPT | Performed by: NURSE PRACTITIONER

## 2024-07-29 PROCEDURE — 74177 CT ABD & PELVIS W/CONTRAST: CPT

## 2024-07-29 PROCEDURE — 36415 COLL VENOUS BLD VENIPUNCTURE: CPT | Performed by: EMERGENCY MEDICINE

## 2024-07-29 PROCEDURE — 96367 TX/PROPH/DG ADDL SEQ IV INF: CPT

## 2024-07-29 RX ORDER — ENOXAPARIN SODIUM 100 MG/ML
40 INJECTION SUBCUTANEOUS DAILY
Status: DISCONTINUED | OUTPATIENT
Start: 2024-07-30 | End: 2024-08-01 | Stop reason: HOSPADM

## 2024-07-29 RX ORDER — GABAPENTIN 300 MG/1
600 CAPSULE ORAL
Qty: 270 CAPSULE | Refills: 3 | Status: SHIPPED | OUTPATIENT
Start: 2024-07-29

## 2024-07-29 RX ORDER — CLOPIDOGREL BISULFATE 75 MG/1
75 TABLET ORAL EVERY MORNING
Status: DISCONTINUED | OUTPATIENT
Start: 2024-07-30 | End: 2024-08-01 | Stop reason: HOSPADM

## 2024-07-29 RX ORDER — PRAVASTATIN SODIUM 80 MG/1
80 TABLET ORAL
Status: DISCONTINUED | OUTPATIENT
Start: 2024-07-30 | End: 2024-08-01 | Stop reason: HOSPADM

## 2024-07-29 RX ORDER — ACETAMINOPHEN 325 MG/1
650 TABLET ORAL EVERY 6 HOURS PRN
Status: DISCONTINUED | OUTPATIENT
Start: 2024-07-29 | End: 2024-08-01 | Stop reason: HOSPADM

## 2024-07-29 RX ORDER — GABAPENTIN 300 MG/1
600 CAPSULE ORAL
Status: DISCONTINUED | OUTPATIENT
Start: 2024-07-29 | End: 2024-08-01 | Stop reason: HOSPADM

## 2024-07-29 RX ORDER — SODIUM CHLORIDE, SODIUM LACTATE, POTASSIUM CHLORIDE, CALCIUM CHLORIDE 600; 310; 30; 20 MG/100ML; MG/100ML; MG/100ML; MG/100ML
50 INJECTION, SOLUTION INTRAVENOUS CONTINUOUS
Status: DISCONTINUED | OUTPATIENT
Start: 2024-07-29 | End: 2024-08-01

## 2024-07-29 RX ORDER — CHLORAL HYDRATE 500 MG
1000 CAPSULE ORAL 2 TIMES DAILY
Status: DISCONTINUED | OUTPATIENT
Start: 2024-07-30 | End: 2024-08-01 | Stop reason: HOSPADM

## 2024-07-29 RX ORDER — ACETAMINOPHEN 10 MG/ML
1000 INJECTION, SOLUTION INTRAVENOUS ONCE
Status: COMPLETED | OUTPATIENT
Start: 2024-07-29 | End: 2024-07-29

## 2024-07-29 RX ORDER — PANTOPRAZOLE SODIUM 40 MG/1
40 TABLET, DELAYED RELEASE ORAL DAILY
Status: DISCONTINUED | OUTPATIENT
Start: 2024-07-30 | End: 2024-08-01 | Stop reason: HOSPADM

## 2024-07-29 RX ORDER — ONDANSETRON 2 MG/ML
4 INJECTION INTRAMUSCULAR; INTRAVENOUS EVERY 6 HOURS PRN
Status: DISCONTINUED | OUTPATIENT
Start: 2024-07-29 | End: 2024-08-01 | Stop reason: HOSPADM

## 2024-07-29 RX ADMIN — ACETAMINOPHEN 1000 MG: 10 INJECTION INTRAVENOUS at 20:43

## 2024-07-29 RX ADMIN — CEFTRIAXONE SODIUM 2000 MG: 10 INJECTION, POWDER, FOR SOLUTION INTRAVENOUS at 21:01

## 2024-07-29 RX ADMIN — IOHEXOL 100 ML: 350 INJECTION, SOLUTION INTRAVENOUS at 21:29

## 2024-07-29 NOTE — PROGRESS NOTES
[facility] [place]  [address]  [facility] [address]       Otilio Machuca  [id number]  1945    ORTHOPAEDIC SURGERY OUTPATIENT NOTE  7/29/2024      HISTORY:  79 y.o. male presents for wound check status post left total shoulder arthroplasty.  Patient states his wound has been healing nicely with no increased symptoms or issues.  He still has a little bit of redness at the end of the incision.  However he complains of no drainage and no pain in the left shoulder.  However today he does complain of a fever and does feel lightheaded.  He states she did have a history of diverticulosis and had similar issues years ago.    Past Medical History:   Diagnosis Date    Arthritis     Celiac disease     Colon polyp     Coronary artery disease     Diverticulitis     Diverticulitis of ascending colon 10/27/2022    Diverticulosis     GERD (gastroesophageal reflux disease)     Hyperlipidemia     Hypertension     Kidney stone     Myocardial infarction (HCC) 1990       Past Surgical History:   Procedure Laterality Date    COLONOSCOPY      CORONARY ANGIOPLASTY WITH STENT PLACEMENT      Multiple times    JOINT REPLACEMENT      WI ARTHROPLASTY GLENOHUMERAL JOINT TOTAL SHOULDER Left 7/2/2024    Procedure: ARTHROPLASTY SHOULDER- left anatomic, BICEPS TENODESIS;  Surgeon: Lalitha Ann DO;  Location: [facility];  Service: Orthopedics    WI ARTHRP KNE CONDYLE&PLATU MEDIAL&LAT COMPARTMENTS Right 04/13/2022    Procedure: ARTHROPLASTY KNEE TOTAL and all associated procedures;  Surgeon: Neeta Chaney MD;  Location: [facility];  Service: Orthopedics    WI ARTHRP KNE CONDYLE&PLATU MEDIAL&LAT COMPARTMENTS Left 09/29/2022    Procedure: ARTHROPLASTY KNEE TOTAL;  Surgeon: Neeta Chaney MD;  Location:  MAIN OR;  Service: Orthopedics       Social History     Socioeconomic History    Marital status: /Civil Union     Spouse name: Not on file    Number of children: 3     Years of education: Not on file    Highest education level: Not on file   Occupational History    Not on file   Tobacco Use    Smoking status: Former     Current packs/day: 0.00     Types: Cigarettes     Start date: 1964     Quit date:      Years since quittin.6     Passive exposure: Never    Smokeless tobacco: Never    Tobacco comments:     Per pt, quit over 36 years ago   Vaping Use    Vaping status: Never Used   Substance and Sexual Activity    Alcohol use: Not Currently    Drug use: Never    Sexual activity: Yes     Partners: Female     Birth control/protection: None   Other Topics Concern    Not on file   Social History Narrative    Not on file     Social Determinants of Health     Financial Resource Strain: Low Risk  (2023)    Overall Financial Resource Strain (CARDIA)     Difficulty of Paying Living Expenses: Not hard at all   Food Insecurity: No Food Insecurity (9/10/2019)    Hunger Vital Sign     Worried About Running Out of Food in the Last Year: Never true     Ran Out of Food in the Last Year: Never true   Transportation Needs: No Transportation Needs (2023)    PRAPARE - Transportation     Lack of Transportation (Medical): No     Lack of Transportation (Non-Medical): No   Physical Activity: Sufficiently Active (9/10/2019)    Exercise Vital Sign     Days of Exercise per Week: 3 days     Minutes of Exercise per Session: 60 min   Stress: Stress Concern Present (9/10/2019)    [employer] - Occupational Stress Questionnaire     Feeling of Stress : To some extent   Social Connections: Moderately Isolated (9/10/2019)    Social Connection and Isolation Panel [NHANES]     Frequency of Communication with Friends and Family: Twice a week     Frequency of Social Gatherings with Friends and Family: Once a week     Attends Islam Services: Never     Active Member of Clubs or Organizations: No     Attends Club or Organization Meetings: Never     Marital Status:     Intimate Partner Violence: Not At Risk (9/10/2019)    Humiliation, Afraid, Rape, and Kick questionnaire     Fear of Current or Ex-Partner: No     Emotionally Abused: No     Physically Abused: No     Sexually Abused: No   Housing Stability: Not on file       Family History   Problem Relation Age of Onset    Diabetes Mother     Coronary artery disease Father     Thyroid disease Sister         Patient's Medications   New Prescriptions    No medications on file   Previous Medications    ASPIRIN (ECOTRIN LOW STRENGTH) 81 MG EC TABLET    Take 1 tablet (81 mg total) by mouth daily Please do not start taking until after total joint arthroplasty    B COMPLEX VITAMINS CAPSULE    Take 1 capsule by mouth daily    BACLOFEN 10 MG TABLET    TAKE 1 TABLET BY MOUTH 3 TIMES  DAILY AS NEEDED FOR MUSCLE  SPASM(S)    CHOLECALCIFEROL (VITAMIN D3) 1,000 UNITS TABLET    Take 1 tablet (1,000 Units total) by mouth daily    CHOLECALCIFEROL (VITAMIN D3) 50 MCG (2000 UT) CAPSULE    Take 1 tablet by mouth daily    CLOPIDOGREL (PLAVIX) 75 MG TABLET    TAKE 1 TABLET BY MOUTH IN THE  MORNING    EZETIMIBE (ZETIA) 10 MG TABLET    TAKE 1 TABLET BY MOUTH DAILY    KETOCONAZOLE (NIZORAL) 2 % SHAMPOO    APPLY TOPICALLY 2 TIMES A WEEK    METOPROLOL SUCCINATE (TOPROL-XL) 50 MG 24 HR TABLET    TAKE 1 TABLET BY MOUTH DAILY    OMEGA-3-ACID ETHYL ESTERS (LOVAZA) 1 G CAPSULE    TAKE 2 CAPSULES BY MOUTH TWICE  DAILY    PANTOPRAZOLE (PROTONIX) 40 MG TABLET    TAKE 1 TABLET BY MOUTH EVERY DAY    SERTRALINE (ZOLOFT) 50 MG TABLET    TAKE 1 TABLET BY MOUTH DAILY    SIMVASTATIN (ZOCOR) 40 MG TABLET    TAKE 1 TABLET BY MOUTH DAILY AT  BEDTIME    TAMSULOSIN (FLOMAX) 0.4 MG    TAKE 1 CAPSULE BY MOUTH DAILY  WITH DINNER    TRAZODONE (DESYREL) 150 MG TABLET    TAKE 1 TABLET BY MOUTH AT BEDTIME   Modified Medications    Modified Medication Previous Medication    GABAPENTIN (NEURONTIN) 300 MG CAPSULE gabapentin (NEURONTIN) 300 mg capsule       Take 2 capsules (600  "mg total) by mouth daily at bedtime    TAKE 1 CAPSULE BY MOUTH 3 TIMES  DAILY   Discontinued Medications    No medications on file       Allergies   Allergen Reactions    Crestor [Rosuvastatin] Myalgia    Ramipril Cough        /88 (BP Location: Left arm, Patient Position: Sitting, Cuff Size: Standard)   Pulse 78   Ht 5' 10\" (1.778 m)   Wt 106 kg (233 lb)   BMI 33.43 kg/m²      REVIEW OF SYSTEMS:  Constitutional: Negative.    HEENT: Negative.    Respiratory: Negative.    Skin: Negative.    Neurological: Negative.    Psychiatric/Behavioral: Negative.  Musculoskeletal: Negative except for that mentioned in the HPI.    [unfilled]     PHYSICAL EXAM: Left shoulder: Incision site is clean dry and intact.  Mild erythema at the distal end of the incision with no drainage and small eschar.    IMAGING: No images taken in office today    ASSESSMENT AND PLAN:  79 y.o. male status post left total shoulder arthroplasty.    The wound is healing nicely and does have improvement since we last saw him.  However given his symptoms of fever that he just recently felt overnight and lightheadedness we did urged him to seek care at an urgent care given his most recent thermometer reading was 102 °F and he still complained of some lightheadedness.  I do not think this is a result of his left total shoulder and do think this is of a viral etiology and or GI etiology.  "

## 2024-07-29 NOTE — PROGRESS NOTES
"Ambulatory Visit  Name: Otilio Machuca      : 1945      MRN: [id number]  Encounter Provider: Hank Priest MD  Encounter Date: 2024   Encounter department: [facility] INTERNAL MEDICINE [place]    Assessment & Plan   1. Right lower quadrant abdominal pain  Assessment & Plan:  Started yesterday evening, patient stated he ate granola yesterday which she generally tries to avoid with his diverticulitis history.  He has been feeling intermittent right lower quadrant abdominal pain and has been febrile around 101 at home.  Reports dry heaves, not eaten anything since yesterday reports having some \"Betty's tea\" today that has helped him with his abdominal pain in the past.  Feels lightheaded upon standing.    Plan:  Advised to go to the ED for further evaluation and IV hydration.  Orders:  -     Transfer to other facility  2. Sacroiliitis, not elsewhere classified (HCC)  -     gabapentin (NEURONTIN) 300 mg capsule; Take 2 capsules (600 mg total) by mouth daily at bedtime       History of Present Illness     Mr. Machuca is a 79-year-old male with a history of diverticulitis who presents to the office today with fever and right lower quadrant abdominal pain.  He had some granola yesterday which in the past has flared his diverticulitis episodes, last flare was about 8 years ago.  He went for a post surgical follow-up at orthopedics earlier today and was noted to be febrile, at that time he was advised to follow-up with his PCP.  He reports having dry heaves and not having eaten anything since yesterday.  He has been drinking some \"Rachels tea\" which has helped with abdominal pain in the past.  He does report having a bowel movement today which was hard in consistency.  He also reports feeling \"woozy\" and nauseous.  Denies any shortness of breath, chest pain.        Review of Systems   Constitutional:  Positive for chills (Feeling cold all night.) and fever.   Respiratory:  Negative for cough, chest " "tightness and shortness of breath.    Cardiovascular:  Negative for chest pain.   Gastrointestinal:  Positive for abdominal pain (Right lower quadrant) and nausea (Dry heaving). Negative for diarrhea and vomiting.   Genitourinary:  Negative for dysuria.   Neurological:  Positive for light-headedness. Negative for headaches.       Objective     /60 (BP Location: Right arm, Patient Position: Sitting, Cuff Size: Adult)   Pulse 86   Temp (!) 101.3 °F (38.5 °C) (Tympanic)   Resp 16   Ht 5' 10\" (1.778 m)   Wt 106 kg (233 lb)   SpO2 98%   BMI 33.43 kg/m²     Physical Exam  Vitals reviewed.   Constitutional:       General: He is not in acute distress.     Comments: Diaphoretic   HENT:      Head: Normocephalic and atraumatic.      Mouth/Throat:      Mouth: Mucous membranes are dry.      Pharynx: Oropharynx is clear.   Eyes:      Extraocular Movements: Extraocular movements intact.      Conjunctiva/sclera: Conjunctivae normal.   Cardiovascular:      Rate and Rhythm: Normal rate and regular rhythm.   Pulmonary:      Effort: No respiratory distress.      Breath sounds: Normal breath sounds. No stridor. No wheezing or rhonchi.   Abdominal:      General: Bowel sounds are normal. There is no distension.      Palpations: Abdomen is soft. There is no mass.      Tenderness: There is no abdominal tenderness (No tenderness to palpation).      Hernia: No hernia is present.   Musculoskeletal:      Right lower leg: No edema.      Left lower leg: No edema.   Skin:     General: Skin is warm.      Coloration: Skin is pale. Skin is not jaundiced.      Findings: Lesion (Left shoulder, anterior surgical scar scar, no surrounding erythema or discharge noted) present.   Neurological:      Mental Status: He is alert and oriented to person, place, and time.       Administrative Statements           "

## 2024-07-29 NOTE — ASSESSMENT & PLAN NOTE
"Started yesterday evening, patient stated he ate granola yesterday which she generally tries to avoid with his diverticulitis history.  He has been feeling intermittent right lower quadrant abdominal pain and has been febrile around 101 at home.  Reports dry heaves, not eaten any food since yesterday has been drinking some \"Betty's tea\" today that has helped his abdominal pain in the past.  Feels lightheaded upon standing.    Suspect dehydration secondary to poor oral intake, enteritis and possible intra-abdominal infection.    Plan:  Advised to go to the ED for further evaluation and IV hydration.  "

## 2024-07-30 ENCOUNTER — APPOINTMENT (OUTPATIENT)
Dept: PHYSICAL THERAPY | Facility: REHABILITATION | Age: 79
End: 2024-07-30
Payer: COMMERCIAL

## 2024-07-30 PROBLEM — Z96.612 HISTORY OF ARTHROPLASTY OF LEFT SHOULDER: Status: ACTIVE | Noted: 2024-07-30

## 2024-07-30 PROBLEM — F41.9 ANXIETY: Status: ACTIVE | Noted: 2024-07-30

## 2024-07-30 PROBLEM — A41.9 SEPSIS SECONDARY TO UTI  (HCC): Status: ACTIVE | Noted: 2024-07-29

## 2024-07-30 PROBLEM — N39.0 SEPSIS SECONDARY TO UTI  (HCC): Status: ACTIVE | Noted: 2024-07-29

## 2024-07-30 LAB
ANION GAP SERPL CALCULATED.3IONS-SCNC: 7 MMOL/L (ref 4–13)
BACTERIA UR CULT: NORMAL
BASOPHILS # BLD AUTO: 0.06 THOUSANDS/ÂΜL (ref 0–0.1)
BASOPHILS NFR BLD AUTO: 1 % (ref 0–1)
BUN SERPL-MCNC: 13 MG/DL (ref 5–25)
CALCIUM SERPL-MCNC: 9.2 MG/DL (ref 8.4–10.2)
CHLORIDE SERPL-SCNC: 102 MMOL/L (ref 96–108)
CO2 SERPL-SCNC: 25 MMOL/L (ref 21–32)
CREAT SERPL-MCNC: 0.75 MG/DL (ref 0.6–1.3)
EOSINOPHIL # BLD AUTO: 0.12 THOUSAND/ÂΜL (ref 0–0.61)
EOSINOPHIL NFR BLD AUTO: 1 % (ref 0–6)
ERYTHROCYTE [DISTWIDTH] IN BLOOD BY AUTOMATED COUNT: 12.7 % (ref 11.6–15.1)
GFR SERPL CREATININE-BSD FRML MDRD: 88 ML/MIN/1.73SQ M
GLUCOSE SERPL-MCNC: 106 MG/DL (ref 65–140)
HCT VFR BLD AUTO: 39.5 % (ref 36.5–49.3)
HGB BLD-MCNC: 13.3 G/DL (ref 12–17)
IMM GRANULOCYTES # BLD AUTO: 0.07 THOUSAND/UL (ref 0–0.2)
IMM GRANULOCYTES NFR BLD AUTO: 1 % (ref 0–2)
LYMPHOCYTES # BLD AUTO: 1.42 THOUSANDS/ÂΜL (ref 0.6–4.47)
LYMPHOCYTES NFR BLD AUTO: 11 % (ref 14–44)
MCH RBC QN AUTO: 32.1 PG (ref 26.8–34.3)
MCHC RBC AUTO-ENTMCNC: 33.7 G/DL (ref 31.4–37.4)
MCV RBC AUTO: 95 FL (ref 82–98)
MONOCYTES # BLD AUTO: 1.16 THOUSAND/ÂΜL (ref 0.17–1.22)
MONOCYTES NFR BLD AUTO: 9 % (ref 4–12)
NEUTROPHILS # BLD AUTO: 9.99 THOUSANDS/ÂΜL (ref 1.85–7.62)
NEUTS SEG NFR BLD AUTO: 77 % (ref 43–75)
NRBC BLD AUTO-RTO: 0 /100 WBCS
PLATELET # BLD AUTO: 154 THOUSANDS/UL (ref 149–390)
PMV BLD AUTO: 10 FL (ref 8.9–12.7)
POTASSIUM SERPL-SCNC: 4.1 MMOL/L (ref 3.5–5.3)
RBC # BLD AUTO: 4.14 MILLION/UL (ref 3.88–5.62)
SODIUM SERPL-SCNC: 134 MMOL/L (ref 135–147)
WBC # BLD AUTO: 12.82 THOUSAND/UL (ref 4.31–10.16)

## 2024-07-30 PROCEDURE — 93005 ELECTROCARDIOGRAM TRACING: CPT

## 2024-07-30 PROCEDURE — 99232 SBSQ HOSP IP/OBS MODERATE 35: CPT | Performed by: INTERNAL MEDICINE

## 2024-07-30 PROCEDURE — 85025 COMPLETE CBC W/AUTO DIFF WBC: CPT | Performed by: NURSE PRACTITIONER

## 2024-07-30 PROCEDURE — 80048 BASIC METABOLIC PNL TOTAL CA: CPT | Performed by: NURSE PRACTITIONER

## 2024-07-30 RX ADMIN — PANTOPRAZOLE SODIUM 40 MG: 40 TABLET, DELAYED RELEASE ORAL at 08:56

## 2024-07-30 RX ADMIN — ASPIRIN 81 MG: 81 TABLET, COATED ORAL at 08:55

## 2024-07-30 RX ADMIN — SODIUM CHLORIDE, SODIUM LACTATE, POTASSIUM CHLORIDE, AND CALCIUM CHLORIDE 50 ML/HR: .6; .31; .03; .02 INJECTION, SOLUTION INTRAVENOUS at 00:05

## 2024-07-30 RX ADMIN — TRAZODONE HYDROCHLORIDE 150 MG: 50 TABLET ORAL at 00:05

## 2024-07-30 RX ADMIN — GABAPENTIN 600 MG: 300 CAPSULE ORAL at 21:50

## 2024-07-30 RX ADMIN — PRAVASTATIN SODIUM 80 MG: 80 TABLET ORAL at 17:12

## 2024-07-30 RX ADMIN — CEFTRIAXONE SODIUM 2000 MG: 10 INJECTION, POWDER, FOR SOLUTION INTRAVENOUS at 21:50

## 2024-07-30 RX ADMIN — GABAPENTIN 600 MG: 300 CAPSULE ORAL at 00:05

## 2024-07-30 RX ADMIN — Medication 1000 UNITS: at 08:55

## 2024-07-30 RX ADMIN — OMEGA-3 FATTY ACIDS CAP 1000 MG 1000 MG: 1000 CAP at 08:55

## 2024-07-30 RX ADMIN — CLOPIDOGREL BISULFATE 75 MG: 75 TABLET ORAL at 08:55

## 2024-07-30 RX ADMIN — TRAZODONE HYDROCHLORIDE 150 MG: 50 TABLET ORAL at 21:50

## 2024-07-30 RX ADMIN — SERTRALINE HYDROCHLORIDE 50 MG: 50 TABLET ORAL at 08:55

## 2024-07-30 RX ADMIN — ENOXAPARIN SODIUM 40 MG: 40 INJECTION SUBCUTANEOUS at 08:55

## 2024-07-30 NOTE — H&P
[facility]  H&P  Name: Otilio Machuca 79 y.o. male I MRN: [id number]  Unit/Bed#: W -01 I Date of Admission: 7/29/2024   Date of Service: 7/29/2024 I Hospital Day: 0      Assessment & Plan   * Pyuria  Assessment & Plan  Sent to ED by PCP.  RLQ pain, fever, nausea, dysuria.   CT AP:   Mild bladder wall thickening  UA: 30-50 WBC, no bacteria. Nitrite negative  Continue ceftriaxone.  Urine culture pending.    SIRS (systemic inflammatory response syndrome) (HCC)  Assessment & Plan  Tachycardia, leukocytosis   Lactate 2  Source: urinary     Pancreatic cyst  Assessment & Plan  10 mm csimple pancreatic cyst in anterior body.  No change from prior study.   Recommend MRI/MRCP in one year     Coronary artery disease of native artery of native heart with stable angina pectoris (HCC)  Assessment & Plan  Without complaints of CP.  Hx multiple PCIs  Continue asa, plavix, toprol         VTE Pharmacologic Prophylaxis: VTE Score: 6 High Risk (Score >/= 5) - Pharmacological DVT Prophylaxis Ordered: enoxaparin (Lovenox). Sequential Compression Devices Ordered.  Code Status: Level 1 - Full Code   Discussion with family: Updated  (significant other) at bedside.    Anticipated Length of Stay: Patient will be admitted on an inpatient basis with an anticipated length of stay of greater than 2 midnights secondary to IV abx.    Total Time Spent on Date of Encounter in care of patient:  mins. This time was spent on one or more of the following: performing physical exam; counseling and coordination of care; obtaining or reviewing history; documenting in the medical record; reviewing/ordering tests, medications or procedures; communicating with other healthcare professionals and discussing with patient's family/caregivers.    Chief Complaint: fever, nausea    History of Present Illness:  Otilio Machuca is a 79 y.o. male with a PMH of CAD, kidney stone, diverticulosis, who presents with  fever, nausea, dysuria, RLQ pain.  Patient was seen at PCP earlier and advised ED evaluation.  Patient meets SIRS.  UA shows pyuria but no bacteria.  CT shows mild bladder wall thickening and non obstructing kidney stone.  Patient was started on ceftriaxone.  Has no prior history of UTI.    Review of Systems:  Review of Systems   Constitutional:  Positive for fever.   Gastrointestinal:  Positive for abdominal distention and nausea. Negative for vomiting.   Genitourinary:  Positive for dysuria.   All other systems reviewed and are negative.      Past Medical and Surgical History:   Past Medical History:   Diagnosis Date    Arthritis     Celiac disease     Colon polyp     Coronary artery disease     Diverticulitis     Diverticulitis of ascending colon 10/27/2022    Diverticulosis     GERD (gastroesophageal reflux disease)     Hyperlipidemia     Hypertension     Kidney stone     Myocardial infarction (HCC) 1990       Past Surgical History:   Procedure Laterality Date    COLONOSCOPY      CORONARY ANGIOPLASTY WITH STENT PLACEMENT      Multiple times    JOINT REPLACEMENT      MI ARTHROPLASTY GLENOHUMERAL JOINT TOTAL SHOULDER Left 7/2/2024    Procedure: ARTHROPLASTY SHOULDER- left anatomic, BICEPS TENODESIS;  Surgeon: Lalitha Ann DO;  Location: [facility] MAIN OR;  Service: Orthopedics    MI ARTHRP KNE CONDYLE&PLATU MEDIAL&LAT COMPARTMENTS Right 04/13/2022    Procedure: ARTHROPLASTY KNEE TOTAL and all associated procedures;  Surgeon: Neeta Chaney MD;  Location: [facility];  Service: Orthopedics    MI ARTHRP KNE CONDYLE&PLATU MEDIAL&LAT COMPARTMENTS Left 09/29/2022    Procedure: ARTHROPLASTY KNEE TOTAL;  Surgeon: Neeta Chaney MD;  Location:  MAIN OR;  Service: Orthopedics       Meds/Allergies:  Prior to Admission medications    Medication Sig Start Date End Date Taking? Authorizing Provider   aspirin (ECOTRIN LOW STRENGTH) 81 mg EC tablet Take 1 tablet (81 mg total) by mouth daily Please do not start taking until  after total joint arthroplasty 4/28/23  Yes Carlton Feng MD   baclofen 10 mg tablet TAKE 1 TABLET BY MOUTH 3 TIMES  DAILY AS NEEDED FOR MUSCLE  SPASM(S) 8/18/23  Yes Candelaria Schuster MD   cholecalciferol (VITAMIN D3) 1,000 units tablet Take 1 tablet (1,000 Units total) by mouth daily 4/20/23  Yes Mihir Reilly MD   clopidogrel (PLAVIX) 75 mg tablet TAKE 1 TABLET BY MOUTH IN THE  MORNING 2/26/24  Yes Giana Turcios MD   ezetimibe (ZETIA) 10 mg tablet TAKE 1 TABLET BY MOUTH DAILY 6/25/24  Yes Ginger Hughes MD   gabapentin (NEURONTIN) 300 mg capsule Take 2 capsules (600 mg total) by mouth daily at bedtime 7/29/24  Yes Hank Priest MD   metoprolol succinate (TOPROL-XL) 50 mg 24 hr tablet TAKE 1 TABLET BY MOUTH DAILY 1/25/24  Yes Carlton Feng MD   omega-3-acid ethyl esters (LOVAZA) 1 g capsule TAKE 2 CAPSULES BY MOUTH TWICE  DAILY 10/26/23  Yes Carlton Feng MD   pantoprazole (PROTONIX) 40 mg tablet TAKE 1 TABLET BY MOUTH EVERY DAY 6/27/24  Yes Ashly Reyez MD   sertraline (ZOLOFT) 50 mg tablet TAKE 1 TABLET BY MOUTH DAILY 6/3/24  Yes Giana Turcios MD   simvastatin (ZOCOR) 40 mg tablet TAKE 1 TABLET BY MOUTH DAILY AT  BEDTIME 6/13/24  Yes Carlton Feng MD   tamsulosin (FLOMAX) 0.4 mg TAKE 1 CAPSULE BY MOUTH DAILY  WITH DINNER 7/26/24  Yes Armond Ocasio DO   traZODone (DESYREL) 150 mg tablet TAKE 1 TABLET BY MOUTH AT BEDTIME 6/27/24  Yes Giana Turcios MD   b complex vitamins capsule Take 1 capsule by mouth daily    Historical Provider, MD   Cholecalciferol (Vitamin D3) 50 MCG (2000 UT) capsule Take 1 tablet by mouth daily  Patient not taking: Reported on 7/29/2024    Historical Provider, MD   ketoconazole (NIZORAL) 2 % shampoo APPLY TOPICALLY 2 TIMES A WEEK  Patient not taking: Reported on 7/29/2024 8/4/22   Candelaria Schuster MD   gabapentin (NEURONTIN) 300 mg capsule TAKE 1 CAPSULE BY MOUTH 3 TIMES  DAILY 10/26/23 7/29/24   "Giana Turcios MD     I have reviewed home medications with a medical source (PCP, Pharmacy, other).    Allergies:   Allergies   Allergen Reactions    Crestor [Rosuvastatin] Myalgia    Ramipril Cough       Social History:  Marital Status: /Civil Union   Occupation:   Patient Pre-hospital Living Situation: Home  Patient Pre-hospital Level of Mobility: walks  Patient Pre-hospital Diet Restrictions:   Substance Use History:   Social History     Substance and Sexual Activity   Alcohol Use Not Currently     Social History     Tobacco Use   Smoking Status Former    Current packs/day: 0.00    Types: Cigarettes    Start date: 1964    Quit date:     Years since quittin.6    Passive exposure: Never   Smokeless Tobacco Never   Tobacco Comments    Per pt, quit over 36 years ago     Social History     Substance and Sexual Activity   Drug Use Never       Family History:  Family History   Problem Relation Age of Onset    Diabetes Mother     Coronary artery disease Father     Thyroid disease Sister        Physical Exam:     Vitals:   Blood Pressure: 110/80 (24)  Pulse: 86 (24)  Temperature: 98.5 °F (36.9 °C) (24)  Temp Source: Oral (24)  Respirations: 14 (24)  Height: 5' 10\" (177.8 cm) (24)  Weight - Scale: 106 kg (233 lb 11 oz) (24)  SpO2: 94 % (24)    Physical Exam  Constitutional:       General: He is not in acute distress.     Appearance: Normal appearance. He is not ill-appearing.   HENT:      Head: Normocephalic and atraumatic.      Right Ear: External ear normal.      Left Ear: External ear normal.      Nose: Nose normal.      Mouth/Throat:      Pharynx: Oropharynx is clear.   Eyes:      Extraocular Movements: Extraocular movements intact.      Conjunctiva/sclera: Conjunctivae normal.   Cardiovascular:      Rate and Rhythm: Normal rate and regular rhythm.      Pulses: Normal pulses.      Heart sounds: Normal heart " sounds.   Pulmonary:      Effort: Pulmonary effort is normal.      Breath sounds: Normal breath sounds.   Abdominal:      General: Bowel sounds are normal. There is no distension.      Palpations: Abdomen is soft.      Tenderness: There is no abdominal tenderness. There is no right CVA tenderness, left CVA tenderness, guarding or rebound.   Musculoskeletal:         General: Normal range of motion.      Cervical back: Normal range of motion.   Skin:     General: Skin is warm.      Capillary Refill: Capillary refill takes less than 2 seconds.   Neurological:      General: No focal deficit present.      Mental Status: He is alert and oriented to person, place, and time.   Psychiatric:         Mood and Affect: Mood normal.         Behavior: Behavior normal.          Additional Data:     Lab Results:  Results from last 7 days   Lab Units 07/29/24 2009   WBC Thousand/uL 18.81*   HEMOGLOBIN g/dL 14.7   HEMATOCRIT % 42.8   PLATELETS Thousands/uL 214   SEGS PCT % 83*   LYMPHO PCT % 9*   MONO PCT % 7   EOS PCT % 0     Results from last 7 days   Lab Units 07/29/24 2009   SODIUM mmol/L 132*   POTASSIUM mmol/L 4.2   CHLORIDE mmol/L 100   CO2 mmol/L 24   BUN mg/dL 14   CREATININE mg/dL 0.80   ANION GAP mmol/L 8   CALCIUM mg/dL 9.1   ALBUMIN g/dL 3.8   TOTAL BILIRUBIN mg/dL 1.33*   ALK PHOS U/L 71   ALT U/L 11   AST U/L 19   GLUCOSE RANDOM mg/dL 99             Lab Results   Component Value Date    HGBA1C 5.2 05/21/2024    HGBA1C 5.5 10/29/2023    HGBA1C 5.5 10/03/2023     Results from last 7 days   Lab Units 07/29/24 2035   LACTIC ACID mmol/L 2.0       Lines/Drains:  Invasive Devices       Peripheral Intravenous Line  Duration             Peripheral IV 07/29/24 Right Antecubital <1 day                        Imaging: Reviewed radiology reports from this admission including: abdominal/pelvic CT  CT abdomen pelvis with contrast   Final Result by Valentino Lai MD (07/29 2147)      No acute inflammatory process identified in the  abdomen or pelvis.      Nonobstructing 3 mm left lower pole intrarenal calculus.      10 mm simple pancreatic cyst in the anterior body. No change from prior study. Preferred imaging modality: abdomen MRI and MRCP with and without IV contrast, or triple phase abdomen CT with IV contrast, or abdomen MRI and MRCP without IV contrast.    Recommend MRI/MRCP in 1 year.      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      Mild bladder wall thickening. Correlate for cystitis.               Workstation performed: [id number]             EKG and Other Studies Reviewed on Admission:   EKG: No EKG obtained.    ** Please Note: This note has been constructed using a voice recognition system. **

## 2024-07-30 NOTE — ASSESSMENT & PLAN NOTE
>>ASSESSMENT AND PLAN FOR SIRS (SYSTEMIC INFLAMMATORY RESPONSE SYNDROME) (HCC) WRITTEN ON 7/29/2024 11:34 PM BY BETH TAVAREZ    Tachycardia, leukocytosis   Lactate 2  Source: urinary

## 2024-07-30 NOTE — PLAN OF CARE
Problem: PAIN - ADULT  Goal: Verbalizes/displays adequate comfort level or baseline comfort level  Description: Interventions:  - Encourage patient to monitor pain and request assistance  - Assess pain using appropriate pain scale  - Administer analgesics based on type and severity of pain and evaluate response  - Implement non-pharmacological measures as appropriate and evaluate response  - Consider cultural and social influences on pain and pain management  - Notify physician/advanced practitioner if interventions unsuccessful or patient reports new pain  Outcome: Progressing     Problem: INFECTION - ADULT  Goal: Absence or prevention of progression during hospitalization  Description: INTERVENTIONS:  - Assess and monitor for signs and symptoms of infection  - Monitor lab/diagnostic results  - Monitor all insertion sites, i.e. indwelling lines, tubes, and drains  - Monitor endotracheal if appropriate and nasal secretions for changes in amount and color  - [place] appropriate cooling/warming therapies per order  - Administer medications as ordered  - Instruct and encourage patient and family to use good hand hygiene technique  - Identify and instruct in appropriate isolation precautions for identified infection/condition  Outcome: Progressing  Goal: Absence of fever/infection during neutropenic period  Description: INTERVENTIONS:  - Monitor WBC    Outcome: Progressing     Problem: SAFETY ADULT  Goal: Patient will remain free of falls  Description: INTERVENTIONS:  - Educate patient/family on patient safety including physical limitations  - Instruct patient to call for assistance with activity   - Consult OT/PT to assist with strengthening/mobility   - Keep Call bell within reach  - Keep bed low and locked with side rails adjusted as appropriate  - Keep care items and personal belongings within reach  - Initiate and maintain comfort rounds  - Make Fall Risk Sign visible to staff  - Apply yellow socks and bracelet  for high fall risk patients  - Consider moving patient to room near nurses station  Outcome: Progressing  Goal: Maintain or return to baseline ADL function  Description: INTERVENTIONS:  -  Assess patient's ability to carry out ADLs; assess patient's baseline for ADL function and identify physical deficits which impact ability to perform ADLs (bathing, care of mouth/teeth, toileting, grooming, dressing, etc.)  - Assess/evaluate cause of self-care deficits   - Assess range of motion  - Assess patient's mobility; develop plan if impaired  - Assess patient's need for assistive devices and provide as appropriate  - Encourage maximum independence but intervene and supervise when necessary  - Involve family in performance of ADLs  - Assess for home care needs following discharge   - Consider OT consult to assist with ADL evaluation and planning for discharge  - Provide patient education as appropriate  Outcome: Progressing  Goal: Maintains/Returns to pre admission functional level  Description: INTERVENTIONS:  - Perform AM-PAC 6 Click Basic Mobility/ Daily Activity assessment daily.  - Set and communicate daily mobility goal to care team and patient/family/caregiver.   - Collaborate with rehabilitation services on mobility goals if consulted  - Out of bed for toileting  - Record patient progress and toleration of activity level   Outcome: Progressing     Problem: DISCHARGE PLANNING  Goal: Discharge to home or other facility with appropriate resources  Description: INTERVENTIONS:  - Identify barriers to discharge w/patient and caregiver  - Arrange for needed discharge resources and transportation as appropriate  - Identify discharge learning needs (meds, wound care, etc.)  - Arrange for interpretive services to assist at discharge as needed  - Refer to Case Management Department for coordinating discharge planning if the patient needs post-hospital services based on physician/advanced practitioner order or complex needs  related to functional status, cognitive ability, or social support system  Outcome: Progressing     Problem: Knowledge Deficit  Goal: Patient/family/caregiver demonstrates understanding of disease process, treatment plan, medications, and discharge instructions  Description: Complete learning assessment and assess knowledge base.  Interventions:  - Provide teaching at level of understanding  - Provide teaching via preferred learning methods  Outcome: Progressing

## 2024-07-30 NOTE — PROGRESS NOTES
[facility]  Progress Note  Name: Otilio Machuca I  MRN: [id number]  Unit/Bed#: W -01 I Date of Admission: 7/29/2024   Date of Service: 7/30/2024 I Hospital Day: 1    Assessment & Plan   * Sepsis secondary to UTI  (HCC)  Assessment & Plan  History of CAD and anxiety sent to the hospital by PCP found to have sepsis secondary to UTI.  Patient initially thought he had diverticulitis  Continue IV fluids and ceftriaxone.  Await urine and blood cultures.    History of arthroplasty of left shoulder  Assessment & Plan  Saw orthopedics yesterday.  Continue immobilization  No evidence of infection    Anxiety  Assessment & Plan  Mood stable continue sertraline    Hyperlipidemia  Assessment & Plan  Continue pravastatin    Coronary artery disease involving native coronary artery  Assessment & Plan  CAD with history of PCI.  No chest pain.    Continue clopidogrel and statin    VTE Pharmacologic Prophylaxis: VTE Score: 6 High Risk (Score >/= 5) - Pharmacological DVT Prophylaxis Ordered: enoxaparin (Lovenox). Sequential Compression Devices Ordered.    Mobility:  Basic Mobility Inpatient Raw Score: 23  JH-HLM Goal: 7: Walk 25 feet or more  JH-HLM Achieved: 6: Walk 10 steps or more  JH-HLM Goal NOT achieved. Continue with multidisciplinary rounding and encourage appropriate mobility to improve upon JH-HLM goals.    Patient Centered Rounds: I have performed bedside rounds with nursing staff today.  Discussions with Specialists or Other Care Team Provider: Case management    Education and Discussions with Family / Patient: Attempted to update  (wife) via phone. Left voicemail.     Time Spent for Care:   This time was spent on one or more of the following: performing physical exam; counseling and coordination of care; obtaining or reviewing history; documenting in the medical record; reviewing/ordering tests, medications or procedures; communicating with other healthcare professionals  "and discussing with patient's family/caregivers.    Current Length of Stay: 1 day(s)  Current Patient Status: Inpatient   Certification Statement: The patient will continue to require additional inpatient hospital stay due to awaiting culture data, afebrile x 24 hours  Discharge Plan: Anticipate discharge in 24-48 hrs to home.    Code Status: Level 1 - Full Code      Subjective:   Patient seen and examined.  Admitted yesterday, temperature 100.1 this morning.  Feeling better    Objective:   Vitals: Blood pressure 103/56, pulse 78, temperature 100 °F (37.8 °C), resp. rate 18, height 5' 10\" (1.778 m), weight 106 kg (233 lb 11 oz), SpO2 93%.    Intake/Output Summary (Last 24 hours) at 7/30/2024 1431  Last data filed at 7/30/2024 1346  Gross per 24 hour   Intake 682 ml   Output 2100 ml   Net -1418 ml       Physical Exam  Vitals reviewed.   Constitutional:       General: He is not in acute distress.  HENT:      Head: Atraumatic.   Cardiovascular:      Rate and Rhythm: Regular rhythm.   Pulmonary:      Effort: Pulmonary effort is normal.      Breath sounds: Decreased breath sounds present. No wheezing.   Abdominal:      General: Bowel sounds are normal.      Palpations: Abdomen is soft.      Tenderness: There is no abdominal tenderness. There is no rebound.   Musculoskeletal:         General: Deformity (Left upper extremity in sling) present. No swelling or tenderness.   Skin:     General: Skin is warm and dry.   Neurological:      General: No focal deficit present.      Mental Status: He is alert.      Cranial Nerves: No cranial nerve deficit.   Psychiatric:         Mood and Affect: Mood normal.       Additional Data:   Labs:  Results from last 7 days   Lab Units 07/30/24  0540 07/29/24 2009   WBC Thousand/uL 12.82* 18.81*   HEMOGLOBIN g/dL 13.3 14.7   PLATELETS Thousands/uL 154 214   MCV fL 95 93     Results from last 7 days   Lab Units 07/30/24  0540 07/29/24 2009   SODIUM mmol/L 134* 132*   POTASSIUM mmol/L 4.1 4.2 "   CHLORIDE mmol/L 102 100   CO2 mmol/L 25 24   ANION GAP mmol/L 7 8   BUN mg/dL 13 14   CREATININE mg/dL 0.75 0.80   CALCIUM mg/dL 9.2 9.1   ALBUMIN g/dL  --  3.8   TOTAL BILIRUBIN mg/dL  --  1.33*   ALK PHOS U/L  --  71   ALT U/L  --  11   AST U/L  --  19   EGFR ml/min/1.73sq m 88 86   GLUCOSE RANDOM mg/dL 106 99                      Results from last 7 days   Lab Units 07/29/24 2035   LACTIC ACID mmol/L 2.0                 * I Have Reviewed All Lab Data Listed Above.    Recent cultures:   Results from last 7 days   Lab Units 07/29/24 2035   BLOOD CULTURE  Received in Microbiology Lab. Culture in Progress.  Received in Microbiology Lab. Culture in Progress.                 Lines/Drains:  Invasive Devices       Peripheral Intravenous Line  Duration             Peripheral IV 07/29/24 Right Antecubital <1 day                          Telemetry:      Imaging:  Imaging Reports Reviewed Today Include:   CT abdomen pelvis with contrast    Result Date: 7/29/2024  Impression: No acute inflammatory process identified in the abdomen or pelvis. Nonobstructing 3 mm left lower pole intrarenal calculus. 10 mm simple pancreatic cyst in the anterior body. No change from prior study. Preferred imaging modality: abdomen MRI and MRCP with and without IV contrast, or triple phase abdomen CT with IV contrast, or abdomen MRI and MRCP without IV contrast. Recommend MRI/MRCP in 1 year. 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 Mild bladder wall thickening. Correlate for cystitis. Workstation performed: [id number]       Scheduled Meds:  Current Facility-Administered Medications   Medication Dose Route Frequency Provider Last Rate    acetaminophen  650 mg Oral Q6H PRN BETH Velez      aspirin  81 mg Oral Daily BETH Velez      cefTRIAXone  2,000 mg Intravenous Q24H BETH Velez      cholecalciferol  1,000 Units Oral Daily BETH Velez      clopidogrel   75 mg Oral QAM BETH Velez      enoxaparin  40 mg Subcutaneous Daily BETH Velez      fish oil  1,000 mg Oral BID BETH Velez      gabapentin  600 mg Oral HS BETH Velez      lactated ringers  50 mL/hr Intravenous Continuous BETH Velez 50 mL/hr (07/30/24 0005)    ondansetron  4 mg Intravenous Q6H PRN BETH Velez      pantoprazole  40 mg Oral Daily BETH Velez      pravastatin  80 mg Oral Daily With Dinner BETH Velez      sertraline  50 mg Oral Daily BETH Velez      traZODone  150 mg Oral HS BETH Velez         Today, Patient Was Seen By: Lázaro Harper DO    ** Please Note: Dictation voice to text software may have been used in the creation of this document. **

## 2024-07-30 NOTE — ASSESSMENT & PLAN NOTE
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  No change from prior study.   Recommend MRI/MRCP in one year

## 2024-07-30 NOTE — INCIDENTAL FINDINGS
The following findings require follow up:  Radiographic finding  Findings:   10 mm simple pancreatic cyst in the anterior body. No change from prior study. Preferred imaging modality: abdomen MRI and MRCP with and without IV contrast, or triple phase abdomen CT with IV contrast, or abdomen MRI and MRCP without IV contrast. Recommend MRI/MRCP in 1 year.   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     Follow up required: As above   Follow up should be done within    Please notify the following clinician to assist with the follow up:   Dr. Candelaria Schuster MD

## 2024-07-30 NOTE — CASE MANAGEMENT
Case Management Assessment & Discharge Planning Note    Patient name Otilio Machuca  Location W /W -01 MRN [id number]  : 1945 Date 2024       Current Admission Date: 2024  Current Admission Diagnosis:Pyuria   Patient Active Problem List    Diagnosis Date Noted Date Diagnosed    Right lower quadrant abdominal pain 2024     SIRS (systemic inflammatory response syndrome) ([facility]) 2024     Pyuria 2024     Pancreatic cyst 2024     Preoperative clearance 2024     Arthritis of left glenohumeral joint 2024     Traumatic tear of right anterior tibialis tendon 2024     Injury of right foot, initial encounter 2024     Pain, joint, ankle and foot, right 2024     History of PTCA with stents 2024     HTN (hypertension) 2024     MI (myocardial infarction) ([facility]) 2024     S/P total knee arthroplasty, bilateral 2024     Coffee ground emesis 2023     Antiplatelet or antithrombotic long-term use 2023     History of colon polyps 2023     Acute confusion 10/29/2023     Hydronephrosis with obstructing calculus 10/29/2023     Emesis 10/29/2023     Dental infection 10/29/2023     Suprapubic discomfort 10/02/2023     Seborrheic dermatitis 2023     H/O hyperlipidemia 2023     BMI 34.0-34.9,adult 2023     Need for influenza vaccination 10/27/2022     Status post total knee replacement, left 2022     Primary osteoarthritis of left knee 2022     Status post total right knee replacement 2022     Primary osteoarthritis of right knee 2022     Encounter for support and coordination of transition of care 2021     Actinic keratosis 2021     Sacroiliitis, not elsewhere classified (HCC) 2020     Primary osteoarthritis of both knees 2020     Hypovitaminosis D 09/10/2019     Aneurysm of thoracic aorta 2019     Coronary artery disease of native artery of  native heart with stable angina pectoris (HCC) 02/27/2019     Hyperlipidemia 02/27/2019       LOS (days): 1  Geometric Mean LOS (GMLOS) (days): 2.7  Days to GMLOS:2.2     OBJECTIVE:    Risk of Unplanned Readmission Score: 10.81         Current admission status: Inpatient       Preferred Pharmacy:   [id number] - [place], [place] - [address]  [address] [place] [address]  Phone: [phone] Fax: [phone]    [facility] - [place], [place] - [address] [facility]  [address]  [address]  [facility] [address]  Phone: [phone] Fax: [phone]    [facility] - [place], [place] - [address]  [address]  [address]  [facility] [address]  Phone: [phone] Fax: [phone]    Primary Care Provider: Candelaria Schuster MD    Primary Insurance: AARP MC REP  Secondary Insurance:     ASSESSMENT:  Active Health Care Proxies       Jorje Machuca Health Care Representative - Spouse   Primary Phone: [phone] (Mobile)                           Readmission Root Cause  30 Day Readmission: No    Patient Information  Admitted from:: Home  Mental Status: Alert  During Assessment patient was accompanied by: Not accompanied during assessment  Assessment information provided by:: Patient  Primary Caregiver: Self  Support Systems: Spouse/significant other, Family members  Home entry access options. Select all that apply.: Stairs  Number of steps to enter home.: 5  Do the steps have railings?: Yes  Type of Current Residence: [facility]  Living Arrangements: Lives w/ Spouse/significant other  Is patient a ?: No    Activities of Daily Living Prior to Admission  Functional Status: Independent  Completes ADLs independently?: Yes  Ambulates independently?: Yes  Does patient use assisted devices?: No  Does patient currently own DME?: No  Does patient have a history of Outpatient Therapy (PT/OT)?: No  Does the patient have a history of Short-Term  Rehab?: No  Does patient have a history of HHC?: No  Does patient currently have HHC?: No         Patient Information Continued  Income Source: Pension/penitentiary  Does patient have prescription coverage?: Yes  Does patient receive dialysis treatments?: No  Does patient have a history of substance abuse?: No         Means of Transportation  Means of Transport to Appts:: Drives Self      Social Determinants of Health (SDOH)      Flowsheet Row Most Recent Value   Housing Stability    In the last 12 months, was there a time when you were not able to pay the mortgage or rent on time? N   In the past 12 months, how many times have you moved where you were living? 0   At any time in the past 12 months, were you homeless or living in a shelter (including now)? N   Transportation Needs    In the past 12 months, has lack of transportation kept you from medical appointments or from getting medications? no   In the past 12 months, has lack of transportation kept you from meetings, work, or from getting things needed for daily living? No   Food Insecurity    Within the past 12 months, you worried that your food would run out before you got the money to buy more. Never true   Within the past 12 months, the food you bought just didn't last and you didn't have money to get more. Never true   Utilities    In the past 12 months has the electric, gas, oil, or water company threatened to shut off services in your home? No            DISCHARGE DETAILS:    Discharge planning discussed with:: Patient  Freedom of Choice: Yes  Comments - Freedom of Choice: CM met with patient at bedside. CM introduced self and CM role. CM verified PCP and pharmacy. Patient is independent, no ADs to ambulate, no O2, no HHC services at this time. Patient anticipates discharge home no needs  CM contacted family/caregiver?: No- see comments (Patient self determinant, prefers to update family himself)  Were Treatment Team discharge recommendations reviewed with  patient/caregiver?: Yes  Did patient/caregiver verbalize understanding of patient care needs?: Yes  Were patient/caregiver advised of the risks associated with not following Treatment Team discharge recommendations?: Yes                   Other Referral/Resources/Interventions Provided:  Referral Comments: Patient anticipates DC home no needs

## 2024-07-30 NOTE — ASSESSMENT & PLAN NOTE
History of CAD and anxiety sent to the hospital by PCP found to have sepsis secondary to UTI.  Patient initially thought he had diverticulitis  Continue IV fluids and ceftriaxone.  Await urine and blood cultures.

## 2024-07-30 NOTE — UTILIZATION REVIEW
Initial Clinical Review    Admission: Date/Time/Statement:   Admission Orders (From admission, onward)       Ordered        07/29/24 2213  INPATIENT ADMISSION  Once                          Orders Placed This Encounter   Procedures    INPATIENT ADMISSION     Standing Status:   Standing     Number of Occurrences:   1     Order Specific Question:   Level of Care     Answer:   Med Surg [16]     Order Specific Question:   Estimated length of stay     Answer:   More than 2 Midnights     Order Specific Question:   Certification     Answer:   I certify that inpatient services are medically necessary for this patient for a duration of greater than two midnights. See H&P and MD Progress Notes for additional information about the patient's course of treatment.     ED Arrival Information       Expected   7/29/2024     Arrival   7/29/2024 18:20    Acuity   Urgent              Means of arrival   Walk-In    Escorted by   Spouse    Service   Hospitalist    Admission type   Emergency              Arrival complaint   Right lower quadrant abdominal pain             Chief Complaint   Patient presents with    Abdominal Pain     RLQ abdominal pain, diaphoresis, low BP, nausea, febrile, history of diverticulitis.       Initial Presentation: 79 y.o. male with PMH of CAD, kidney stone, diverticulosis, pancreatic cyst , s/p L shoulder arthroplasty who presents to ED per advise PCP after office visit  with fever, nausea, dysuria, RLQ pain . On exam, abdomen soft, non tender .  Labs :WBC 18.81 , T bili 1.33,  Na 132.  UA shows pyuria, no bacteria . CT A/P shows mild bladder wall thickening and non obstructing kidney stone . Pt given IV abx, IV Tylenol in ED. Admitted as Inpatient with pyuria, SIRS. Plan- IV Ceftriaxone . F/U urine cx . Labs in am .   Anticipated Length of Stay/Certification Statement: Patient will be admitted on an inpatient basis with an anticipated length of stay of greater than 2 midnights secondary to IV abx.     Date:  7/30   Day 2:   Sepsis 2/2 UTI   T max 100.1 F this am . WBC down to 12. 82. Na up to 134. IVF- LR infusing . No evidence infection L shoulder , LUE in sling . Pt continues IV Ceftriaxone . F/U blood and urine cx . CBC,  CMP, procal in am .     Date: 7/31   Day 3: Has surpassed a 2nd midnight with active treatments and services.  WBC normalized today.  Procal 0.71 today .  IV ceftriaxone continues.  . F/U blood and urine  cx.  Blood cx: no growth to date . Urine clear, yellow.    Na up to 137 . IVF  infusing . CBC, CMP, procal in am .       ED Triage Vitals   Temperature Pulse Respirations Blood Pressure SpO2 Pain Score   07/29/24 1826 07/29/24 1827 07/29/24 1826 07/29/24 1827 07/29/24 1827 07/29/24 2253   98.9 °F (37.2 °C) 91 18 123/66 98 % No Pain     Weight (last 2 days)       Date/Time Weight    07/29/24 2329 106 (233.69)    07/29/24 1827 106 (233.69)            Vital Signs (last 3 days)       Date/Time Temp Pulse Resp BP MAP (mmHg) SpO2 O2 Device Patient Position - Orthostatic VS Mp Coma Scale Score Pain    07/31/24 0700 99 °F (37.2 °C) -- 18 114/62 -- -- None (Room air) Lying -- --    07/30/24 22:18:46 99.8 °F (37.7 °C) 87 18 124/70 88 95 % -- -- -- --    07/30/24 2100 -- -- -- -- -- -- -- -- 15 No Pain    07/30/24 15:03:21 98.3 °F (36.8 °C) 79 16 101/58 72 92 % -- -- -- --    07/30/24 09:15:26 100 °F (37.8 °C) 78 -- -- -- 93 % -- -- -- --    07/30/24 09:14:55 98.9 °F (37.2 °C) 77 -- -- -- 92 % -- -- -- --    07/30/24 0738 -- -- -- -- -- 91 % None (Room air) -- 15 No Pain    07/30/24 07:32:56 100.1 °F (37.8 °C) 80 18 103/56 72 96 % -- -- -- --    07/29/24 2329 98.5 °F (36.9 °C) 86 14 110/80 -- -- -- -- -- --    07/29/24 22:54:45 98.5 °F (36.9 °C) 86 14 110/80 90 94 % None (Room air) Sitting -- --    07/29/24 2253 -- -- -- -- -- -- -- -- -- No Pain    07/29/24 2146 -- -- -- -- -- -- -- -- 15 --    07/29/24 1827 98.9 °F (37.2 °C) 91 16 123/66 89 98 % None (Room air) Sitting -- --    07/29/24 1826 98.9 °F  (37.2 °C) -- 18 -- -- -- -- -- -- --              Pertinent Labs/Diagnostic Test Results:   Radiology:  CT abdomen pelvis with contrast   Final Interpretation by Valentino Lai MD (07/29 2147)      No acute inflammatory process identified in the abdomen or pelvis.      Nonobstructing 3 mm left lower pole intrarenal calculus.      10 mm simple pancreatic cyst in the anterior body. No change from prior study. Preferred imaging modality: abdomen MRI and MRCP with and without IV contrast, or triple phase abdomen CT with IV contrast, or abdomen MRI and MRCP without IV contrast.    Recommend MRI/MRCP in 1 year.      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      Mild bladder wall thickening. Correlate for cystitis.               Workstation performed: [id number]                         Results from last 7 days   Lab Units 07/31/24 0628 07/30/24 0540 07/29/24 2009   WBC Thousand/uL 7.76 12.82* 18.81*   HEMOGLOBIN g/dL 12.4 13.3 14.7   HEMATOCRIT % 36.6 39.5 42.8   PLATELETS Thousands/uL 141* 154 214   TOTAL NEUT ABS Thousands/µL  --  9.99* 15.45*         Results from last 7 days   Lab Units 07/31/24 0628 07/30/24 0540 07/29/24 2009   SODIUM mmol/L 137 134* 132*   POTASSIUM mmol/L 3.7 4.1 4.2   CHLORIDE mmol/L 105 102 100   CO2 mmol/L 28 25 24   ANION GAP mmol/L 4 7 8   BUN mg/dL 9 13 14   CREATININE mg/dL 0.63 0.75 0.80   EGFR ml/min/1.73sq m 95 88 86   CALCIUM mg/dL 9.0 9.2 9.1     Results from last 7 days   Lab Units 07/31/24 0628 07/29/24 2009   AST U/L 13 19   ALT U/L 9 11   ALK PHOS U/L 61 71   TOTAL PROTEIN g/dL 5.9* 6.5   ALBUMIN g/dL 3.2* 3.8   TOTAL BILIRUBIN mg/dL 0.63 1.33*   BILIRUBIN DIRECT mg/dL  --  0.12         Results from last 7 days   Lab Units 07/31/24 0628 07/30/24 0540 07/29/24 2009   GLUCOSE RANDOM mg/dL 100 106 99             Results from last 7 days   Lab Units 07/31/24  0628   PROCALCITONIN ng/ml 0.71*     Results  from last 7 days   Lab Units 07/29/24 2035   LACTIC ACID mmol/L 2.0               Results from last 7 days   Lab Units 07/29/24 2009   LIPASE u/L <6*                 Results from last 7 days   Lab Units 07/29/24 2018   CLARITY UA  Clear   COLOR UA  Yellow   SPEC GRAV UA  1.024   PH UA  7.0   GLUCOSE UA mg/dl Negative   KETONES UA mg/dl Negative   BLOOD UA  Negative   PROTEIN UA mg/dl 30 (1+)*   NITRITE UA  Negative   BILIRUBIN UA  Negative   UROBILINOGEN UA (BE) mg/dl 2.0*   LEUKOCYTES UA  Moderate*   WBC UA /hpf 30-50*   RBC UA /hpf 1-2   BACTERIA UA /hpf None Seen   EPITHELIAL CELLS WET PREP /hpf Occasional                                 Results from last 7 days   Lab Units 07/29/24 2035 07/29/24 2018   BLOOD CULTURE  No Growth at 24 hrs.  No Growth at 24 hrs.  --    URINE CULTURE   --  70,000-79,000 cfu/ml                   ED Treatment-Medication Administration from 07/29/2024 1704 to 07/29/2024 2247         Date/Time Order Dose Route Action     07/29/2024 2043 acetaminophen (Ofirmev) injection 1,000 mg 1,000 mg Intravenous New Bag     07/29/2024 2101 ceftriaxone (ROCEPHIN) 2 g/50 mL in dextrose IVPB 2,000 mg Intravenous New Bag     07/29/2024 2129 iohexol (OMNIPAQUE) 350 MG/ML injection (MULTI-DOSE) 100 mL 100 mL Intravenous Given            Past Medical History:   Diagnosis Date    Arthritis     Celiac disease     Colon polyp     Coronary artery disease     Diverticulitis     Diverticulitis of ascending colon 10/27/2022    Diverticulosis     GERD (gastroesophageal reflux disease)     Hyperlipidemia     Hypertension     Kidney stone     Myocardial infarction (HCC) 1990     Present on Admission:   Coronary artery disease involving native coronary artery   Hyperlipidemia      Admitting Diagnosis: Pancreatic cyst [K86.2]  Cystitis [N30.90]  Left inguinal hernia [K40.90]  Sepsis (HCC) [A41.9]  Age/Sex: 79 y.o. male  Admission Orders:  Scheduled Medications:  aspirin, 81 mg, Oral, Daily  cefTRIAXone, 2,000  mg, Intravenous, Q24H  cholecalciferol, 1,000 Units, Oral, Daily  clopidogrel, 75 mg, Oral, QAM  enoxaparin, 40 mg, Subcutaneous, Daily  fish oil, 1,000 mg, Oral, BID  gabapentin, 600 mg, Oral, HS  pantoprazole, 40 mg, Oral, Daily  pravastatin, 80 mg, Oral, Daily With Dinner  sertraline, 50 mg, Oral, Daily  traZODone, 150 mg, Oral, HS      Continuous IV Infusions:  lactated ringers, 50 mL/hr, Intravenous, Continuous  Start: 07/29/24 2345     sodium chloride 0.9 % infusion  Rate: 75 mL/hr Dose: 75 mL/hr  Freq: Continuous Route: IV  Indications of Use: IV Hydration  Start: 07/31/24 1515 End: 08/01/24 0314    PRN Meds:  acetaminophen, 650 mg, Oral, Q6H PRN  ondansetron, 4 mg, Intravenous, Q6H PRN      Cardiac diet    OOB as nataly   SCD      Network Utilization Review Department  ATTENTION: Please call with any questions or concerns to [phone] and carefully listen to the prompts so that you are directed to the right person. All voicemails are confidential.   For Discharge needs, contact Care Management DC Support Team at [phone] opt. [phone]  Send all requests for admission clinical reviews, approved or denied determinations and any other requests to dedicated fax number below belonging to the campus where the patient is receiving treatment. List of dedicated fax numbers for the Facilities:  FACILITY NAME UR FAX NUMBER   ADMISSION DENIALS (Administrative/Medical Necessity) [phone]   DISCHARGE SUPPORT TEAM (NETWORK) [phone]   PARENT CHILD HEALTH (Maternity/NICU/Pediatrics) [phone]   [facility] [phone]   [facility] [phone]   [facility] [phone]   [facility] [phone]   [facility] [phone]   [facility] [phone]   [facility] [phone]   [facility] [phone]    [facility] [phone]   [facility] [phone]   [facility] [phone]   [facility] [phone]

## 2024-07-30 NOTE — PLAN OF CARE
Problem: PAIN - ADULT  Goal: Verbalizes/displays adequate comfort level or baseline comfort level  Description: Interventions:  - Encourage patient to monitor pain and request assistance  - Assess pain using appropriate pain scale  - Administer analgesics based on type and severity of pain and evaluate response  - Implement non-pharmacological measures as appropriate and evaluate response  - Consider cultural and social influences on pain and pain management  - Notify physician/advanced practitioner if interventions unsuccessful or patient reports new pain  7/29/2024 2324 by Connie Francis RN  Outcome: Progressing  7/29/2024 2324 by Connie Francis RN  Outcome: Progressing     Problem: INFECTION - ADULT  Goal: Absence or prevention of progression during hospitalization  Description: INTERVENTIONS:  - Assess and monitor for signs and symptoms of infection  - Monitor lab/diagnostic results  - Monitor all insertion sites, i.e. indwelling lines, tubes, and drains  - Monitor endotracheal if appropriate and nasal secretions for changes in amount and color  - [place] appropriate cooling/warming therapies per order  - Administer medications as ordered  - Instruct and encourage patient and family to use good hand hygiene technique  - Identify and instruct in appropriate isolation precautions for identified infection/condition  7/29/2024 2324 by Connie Francis RN  Outcome: Progressing  7/29/2024 2324 by Connie Francis RN  Outcome: Progressing  Goal: Absence of fever/infection during neutropenic period  Description: INTERVENTIONS:  - Monitor WBC    7/29/2024 2324 by Connie Francis RN  Outcome: Progressing  7/29/2024 2324 by Connie Francis RN  Outcome: Progressing     Problem: SAFETY ADULT  Goal: Patient will remain free of falls  Description: INTERVENTIONS:  - Educate patient/family on patient safety including physical limitations  - Instruct patient to call for assistance with  activity   - Consult OT/PT to assist with strengthening/mobility   - Keep Call bell within reach  - Keep bed low and locked with side rails adjusted as appropriate  - Keep care items and personal belongings within reach  - Initiate and maintain comfort rounds  - Make Fall Risk Sign visible to staff  - Offer Toileting every  Hours, in advance of need  - Initiate/Maintain alarm  - Obtain necessary fall risk management equipment:   - Apply yellow socks and bracelet for high fall risk patients  - Consider moving patient to room near nurses station  7/29/2024 2324 by Connie Francis RN  Outcome: Progressing  7/29/2024 2324 by Connie Francis RN  Outcome: Progressing  Goal: Maintain or return to baseline ADL function  Description: INTERVENTIONS:  -  Assess patient's ability to carry out ADLs; assess patient's baseline for ADL function and identify physical deficits which impact ability to perform ADLs (bathing, care of mouth/teeth, toileting, grooming, dressing, etc.)  - Assess/evaluate cause of self-care deficits   - Assess range of motion  - Assess patient's mobility; develop plan if impaired  - Assess patient's need for assistive devices and provide as appropriate  - Encourage maximum independence but intervene and supervise when necessary  - Involve family in performance of ADLs  - Assess for home care needs following discharge   - Consider OT consult to assist with ADL evaluation and planning for discharge  - Provide patient education as appropriate  7/29/2024 2324 by Connie Francis RN  Outcome: Progressing  7/29/2024 2324 by Connie Francis RN  Outcome: Progressing  Goal: Maintains/Returns to pre admission functional level  Description: INTERVENTIONS:  - Perform AM-PAC 6 Click Basic Mobility/ Daily Activity assessment daily.  - Set and communicate daily mobility goal to care team and patient/family/caregiver.   - Collaborate with rehabilitation services on mobility goals if consulted  -  Perform Range of Motion 3 times a day.  - Reposition patient every 3 hours.  - Dangle patient 3 times a day  - Stand patient 3 times a day  - Ambulate patient 3 times a day  - Out of bed to chair 3 times a day   - Out of bed for meals 3 times a day  - Out of bed for toileting  - Record patient progress and toleration of activity level   7/29/2024 2324 by Connie Francis RN  Outcome: Progressing  7/29/2024 2324 by Connie Francis RN  Outcome: Progressing     Problem: DISCHARGE PLANNING  Goal: Discharge to home or other facility with appropriate resources  Description: INTERVENTIONS:  - Identify barriers to discharge w/patient and caregiver  - Arrange for needed discharge resources and transportation as appropriate  - Identify discharge learning needs (meds, wound care, etc.)  - Arrange for interpretive services to assist at discharge as needed  - Refer to Case Management Department for coordinating discharge planning if the patient needs post-hospital services based on physician/advanced practitioner order or complex needs related to functional status, cognitive ability, or social support system  7/29/2024 2324 by Connie Francis RN  Outcome: Progressing  7/29/2024 2324 by Connie Francis RN  Outcome: Progressing

## 2024-07-31 PROBLEM — D72.829 LEUKOCYTOSIS: Status: ACTIVE | Noted: 2024-07-31

## 2024-07-31 LAB
ALBUMIN SERPL BCG-MCNC: 3.2 G/DL (ref 3.5–5)
ALP SERPL-CCNC: 61 U/L (ref 34–104)
ALT SERPL W P-5'-P-CCNC: 9 U/L (ref 7–52)
ANION GAP SERPL CALCULATED.3IONS-SCNC: 4 MMOL/L (ref 4–13)
AST SERPL W P-5'-P-CCNC: 13 U/L (ref 13–39)
ATRIAL RATE: 69 BPM
BILIRUB SERPL-MCNC: 0.63 MG/DL (ref 0.2–1)
BUN SERPL-MCNC: 9 MG/DL (ref 5–25)
CALCIUM ALBUM COR SERPL-MCNC: 9.6 MG/DL (ref 8.3–10.1)
CALCIUM SERPL-MCNC: 9 MG/DL (ref 8.4–10.2)
CHLORIDE SERPL-SCNC: 105 MMOL/L (ref 96–108)
CO2 SERPL-SCNC: 28 MMOL/L (ref 21–32)
CREAT SERPL-MCNC: 0.63 MG/DL (ref 0.6–1.3)
ERYTHROCYTE [DISTWIDTH] IN BLOOD BY AUTOMATED COUNT: 12.8 % (ref 11.6–15.1)
GFR SERPL CREATININE-BSD FRML MDRD: 95 ML/MIN/1.73SQ M
GLUCOSE SERPL-MCNC: 100 MG/DL (ref 65–140)
HCT VFR BLD AUTO: 36.6 % (ref 36.5–49.3)
HGB BLD-MCNC: 12.4 G/DL (ref 12–17)
MCH RBC QN AUTO: 31.8 PG (ref 26.8–34.3)
MCHC RBC AUTO-ENTMCNC: 33.9 G/DL (ref 31.4–37.4)
MCV RBC AUTO: 94 FL (ref 82–98)
P AXIS: 18 DEGREES
PLATELET # BLD AUTO: 141 THOUSANDS/UL (ref 149–390)
PMV BLD AUTO: 9.7 FL (ref 8.9–12.7)
POTASSIUM SERPL-SCNC: 3.7 MMOL/L (ref 3.5–5.3)
PR INTERVAL: 206 MS
PROCALCITONIN SERPL-MCNC: 0.71 NG/ML
PROT SERPL-MCNC: 5.9 G/DL (ref 6.4–8.4)
QRS AXIS: -67 DEGREES
QRSD INTERVAL: 158 MS
QT INTERVAL: 410 MS
QTC INTERVAL: 439 MS
RBC # BLD AUTO: 3.9 MILLION/UL (ref 3.88–5.62)
SODIUM SERPL-SCNC: 137 MMOL/L (ref 135–147)
T WAVE AXIS: 11 DEGREES
VENTRICULAR RATE: 69 BPM
WBC # BLD AUTO: 7.76 THOUSAND/UL (ref 4.31–10.16)

## 2024-07-31 PROCEDURE — 84145 PROCALCITONIN (PCT): CPT | Performed by: INTERNAL MEDICINE

## 2024-07-31 PROCEDURE — 93010 ELECTROCARDIOGRAM REPORT: CPT | Performed by: INTERNAL MEDICINE

## 2024-07-31 PROCEDURE — 80053 COMPREHEN METABOLIC PANEL: CPT | Performed by: INTERNAL MEDICINE

## 2024-07-31 PROCEDURE — 85027 COMPLETE CBC AUTOMATED: CPT | Performed by: INTERNAL MEDICINE

## 2024-07-31 PROCEDURE — 99232 SBSQ HOSP IP/OBS MODERATE 35: CPT

## 2024-07-31 RX ORDER — SODIUM CHLORIDE 9 MG/ML
75 INJECTION, SOLUTION INTRAVENOUS CONTINUOUS
Status: DISPENSED | OUTPATIENT
Start: 2024-07-31 | End: 2024-08-01

## 2024-07-31 RX ADMIN — GABAPENTIN 600 MG: 300 CAPSULE ORAL at 22:04

## 2024-07-31 RX ADMIN — ASPIRIN 81 MG: 81 TABLET, COATED ORAL at 10:07

## 2024-07-31 RX ADMIN — SERTRALINE HYDROCHLORIDE 50 MG: 50 TABLET ORAL at 10:07

## 2024-07-31 RX ADMIN — ENOXAPARIN SODIUM 40 MG: 40 INJECTION SUBCUTANEOUS at 10:07

## 2024-07-31 RX ADMIN — SODIUM CHLORIDE, SODIUM LACTATE, POTASSIUM CHLORIDE, AND CALCIUM CHLORIDE 50 ML/HR: .6; .31; .03; .02 INJECTION, SOLUTION INTRAVENOUS at 10:14

## 2024-07-31 RX ADMIN — CEFTRIAXONE SODIUM 2000 MG: 10 INJECTION, POWDER, FOR SOLUTION INTRAVENOUS at 22:04

## 2024-07-31 RX ADMIN — SODIUM CHLORIDE 75 ML/HR: 0.9 INJECTION, SOLUTION INTRAVENOUS at 16:42

## 2024-07-31 RX ADMIN — TRAZODONE HYDROCHLORIDE 150 MG: 50 TABLET ORAL at 22:04

## 2024-07-31 RX ADMIN — PRAVASTATIN SODIUM 80 MG: 80 TABLET ORAL at 16:55

## 2024-07-31 RX ADMIN — PANTOPRAZOLE SODIUM 40 MG: 40 TABLET, DELAYED RELEASE ORAL at 10:07

## 2024-07-31 RX ADMIN — CLOPIDOGREL BISULFATE 75 MG: 75 TABLET ORAL at 10:07

## 2024-07-31 RX ADMIN — Medication 1000 UNITS: at 10:07

## 2024-07-31 NOTE — PROGRESS NOTES
[facility]  Progress Note  Name: Otilio Machuca I  MRN: [id number]  Unit/Bed#: W -01 I Date of Admission: 7/29/2024   Date of Service: 7/31/2024 I Hospital Day: 2    Assessment & Plan   * Sepsis secondary to UTI  (HCC)  Assessment & Plan  History of CAD and anxiety sent to the hospital by PCP found to have sepsis secondary to UTI.  Patient initially thought he had diverticulitis  Endorses dysuria, has remained afebrile, leukocytosis has resolved   Continue IV fluids patient reports poor PO intake   Urine Culture- mixed contaminates   Blood Cultures- no growth at 24 hours   Continue IV Ceftriaxone till cultures result     History of arthroplasty of left shoulder  Assessment & Plan  Saw orthopedics yesterday.  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  No change from prior study.   Recommend MRI/MRCP in one year   Follow up with PCP     Hyperlipidemia  Assessment & Plan  Continue pravastatin               VTE Pharmacologic Prophylaxis: VTE Score: 6 Moderate Risk (Score 3-4) - Pharmacological DVT Prophylaxis Ordered: enoxaparin (Lovenox).    Mobility:   Basic Mobility Inpatient Raw Score: 23  JH-HLM Goal: 7: Walk 25 feet or more  JH-HLM Achieved: 7: Walk 25 feet or more  JH-HLM Goal achieved. Continue to encourage appropriate mobility.    Patient Centered Rounds: I performed bedside rounds with nursing staff today.   Discussions with Specialists or Other Care Team Provider: Case Management    Education and Discussions with Family / Patient: Patient declined call to .     Total Time Spent on Date of Encounter in care of patient:  This time was spent on one or more of the following: performing physical exam; counseling and coordination of care; obtaining or reviewing history; documenting in the medical record; reviewing/ordering tests, medications or procedures; communicating  with other healthcare professionals and discussing with patient's family/caregivers.    Current Length of Stay: 2 day(s)  Current Patient Status: Inpatient   Certification Statement: The patient will continue to require additional inpatient hospital stay due to requiring IV antibiotics, pending culture results  Discharge Plan: Anticipate discharge in 24-48 hrs to home.    Code Status: Level 1 - Full Code    Subjective:   Patient seen and examined at bedside. He endorses dysuria. Denies any fevers or chills. Denies any chest pain, abdominal pain or shortness of breath. He does report that his urine continues to be dark. Also states that he is not drinking fluids well. Will continue with IV fluid hydration.     Objective:     Vitals:   Temp (24hrs), Av.4 °F (37.4 °C), Min:99 °F (37.2 °C), Max:99.8 °F (37.7 °C)    Temp:  [99 °F (37.2 °C)-99.8 °F (37.7 °C)] 99 °F (37.2 °C)  HR:  [87] 87  Resp:  [18] 18  BP: (114-124)/(62-70) 114/62  SpO2:  [95 %] 95 %  Body mass index is 33.53 kg/m².     Input and Output Summary (last 24 hours):     Intake/Output Summary (Last 24 hours) at 2024 1529  Last data filed at 2024 1434  Gross per 24 hour   Intake 3045.83 ml   Output 2300 ml   Net 745.83 ml       Physical Exam:   Physical Exam  Vitals reviewed.   Constitutional:       General: He is not in acute distress.     Appearance: He is not diaphoretic.   Cardiovascular:      Rate and Rhythm: Regular rhythm.      Heart sounds: Murmur heard.   Pulmonary:      Effort: No respiratory distress.      Breath sounds: Normal breath sounds.   Abdominal:      General: There is no distension.      Palpations: Abdomen is soft.   Musculoskeletal:      Right lower leg: No edema.      Left lower leg: No edema.   Neurological:      Mental Status: He is alert and oriented to person, place, and time.   Psychiatric:         Mood and Affect: Mood is not anxious or depressed.          Additional Data:     Labs:  Results from last 7 days   Lab  Units 07/31/24  0628 07/30/24  0540   WBC Thousand/uL 7.76 12.82*   HEMOGLOBIN g/dL 12.4 13.3   HEMATOCRIT % 36.6 39.5   PLATELETS Thousands/uL 141* 154   SEGS PCT %  --  77*   LYMPHO PCT %  --  11*   MONO PCT %  --  9   EOS PCT %  --  1     Results from last 7 days   Lab Units 07/31/24  0628   SODIUM mmol/L 137   POTASSIUM mmol/L 3.7   CHLORIDE mmol/L 105   CO2 mmol/L 28   BUN mg/dL 9   CREATININE mg/dL 0.63   ANION GAP mmol/L 4   CALCIUM mg/dL 9.0   ALBUMIN g/dL 3.2*   TOTAL BILIRUBIN mg/dL 0.63   ALK PHOS U/L 61   ALT U/L 9   AST U/L 13   GLUCOSE RANDOM mg/dL 100                 Results from last 7 days   Lab Units 07/31/24 0628 07/29/24 2035   LACTIC ACID mmol/L  --  2.0   PROCALCITONIN ng/ml 0.71*  --        Lines/Drains:  Invasive Devices       Peripheral Intravenous Line  Duration             Peripheral IV 07/30/24 Right;Ventral (anterior) Forearm <1 day                          Imaging: No pertinent imaging reviewed.    Recent Cultures (last 7 days):   Results from last 7 days   Lab Units 07/29/24 2035 07/29/24 2018   BLOOD CULTURE  No Growth at 24 hrs.  No Growth at 24 hrs.  --    URINE CULTURE   --  70,000-79,000 cfu/ml       Last 24 Hours Medication List:   Current Facility-Administered Medications   Medication Dose Route Frequency Provider Last Rate    acetaminophen  650 mg Oral Q6H PRN BETH Velez      aspirin  81 mg Oral Daily BETH Velez      cefTRIAXone  2,000 mg Intravenous Q24H BETH Velez Stopped (07/30/24 2220)    cholecalciferol  1,000 Units Oral Daily BETH Velez      clopidogrel  75 mg Oral QAM BETH Velez      enoxaparin  40 mg Subcutaneous Daily BETH Velez      fish oil  1,000 mg Oral BID BETH Velez      gabapentin  600 mg Oral HS BETH Velez      lactated ringers  50 mL/hr Intravenous Continuous BETH Velez 50 mL/hr (07/31/24 1014)    ondansetron  4 mg Intravenous Q6H PRN BETH Velez      pantoprazole  40 mg Oral Daily Haydee Man,  BETH      pravastatin  80 mg Oral Daily With Dinner BETH Velez      sertraline  50 mg Oral Daily BETH Velez      sodium chloride  75 mL/hr Intravenous Continuous Rebeca Saldaña PA-C      traZODone  150 mg Oral HS BETH Velez          Today, Patient Was Seen By: Rebeca Saldaña PA-C    **Please Note: This note may have been constructed using a voice recognition system.**

## 2024-07-31 NOTE — PLAN OF CARE
Problem: PAIN - ADULT  Goal: Verbalizes/displays adequate comfort level or baseline comfort level  Description: Interventions:  - Encourage patient to monitor pain and request assistance  - Assess pain using appropriate pain scale  - Administer analgesics based on type and severity of pain and evaluate response  - Implement non-pharmacological measures as appropriate and evaluate response  - Consider cultural and social influences on pain and pain management  - Notify physician/advanced practitioner if interventions unsuccessful or patient reports new pain  Outcome: Progressing     Problem: INFECTION - ADULT  Goal: Absence or prevention of progression during hospitalization  Description: INTERVENTIONS:  - Assess and monitor for signs and symptoms of infection  - Monitor lab/diagnostic results  - Monitor all insertion sites, i.e. indwelling lines, tubes, and drains  - Monitor endotracheal if appropriate and nasal secretions for changes in amount and color  - [place] appropriate cooling/warming therapies per order  - Administer medications as ordered  - Instruct and encourage patient and family to use good hand hygiene technique  - Identify and instruct in appropriate isolation precautions for identified infection/condition  Outcome: Progressing  Goal: Absence of fever/infection during neutropenic period  Description: INTERVENTIONS:  - Monitor WBC    Outcome: Progressing     Problem: SAFETY ADULT  Goal: Patient will remain free of falls  Description: INTERVENTIONS:  - Educate patient/family on patient safety including physical limitations  - Instruct patient to call for assistance with activity   - Consult OT/PT to assist with strengthening/mobility   - Keep Call bell within reach  - Keep bed low and locked with side rails adjusted as appropriate  - Keep care items and personal belongings within reach  - Initiate and maintain comfort rounds  - Make Fall Risk Sign visible to staff  - Offer Toileting every  Hours,  in advance of need  - Initiate/Maintain alarm  - Obtain necessary fall risk management equipment:   - Apply yellow socks and bracelet for high fall risk patients  - Consider moving patient to room near nurses station  Outcome: Progressing  Goal: Maintain or return to baseline ADL function  Description: INTERVENTIONS:  -  Assess patient's ability to carry out ADLs; assess patient's baseline for ADL function and identify physical deficits which impact ability to perform ADLs (bathing, care of mouth/teeth, toileting, grooming, dressing, etc.)  - Assess/evaluate cause of self-care deficits   - Assess range of motion  - Assess patient's mobility; develop plan if impaired  - Assess patient's need for assistive devices and provide as appropriate  - Encourage maximum independence but intervene and supervise when necessary  - Involve family in performance of ADLs  - Assess for home care needs following discharge   - Consider OT consult to assist with ADL evaluation and planning for discharge  - Provide patient education as appropriate  Outcome: Progressing  Goal: Maintains/Returns to pre admission functional level  Description: INTERVENTIONS:  - Perform AM-PAC 6 Click Basic Mobility/ Daily Activity assessment daily.  - Set and communicate daily mobility goal to care team and patient/family/caregiver.   - Collaborate with rehabilitation services on mobility goals if consulted  - Perform Range of Motion  times a day.  - Reposition patient every  hours.  - Dangle patient times a day  - Stand patient  times a day  - Ambulate patient  times a day  - Out of bed to chair  times a day   - Out of bed for meals  times a day  - Out of bed for toileting  - Record patient progress and toleration of activity level   Outcome: Progressing     Problem: DISCHARGE PLANNING  Goal: Discharge to home or other facility with appropriate resources  Description: INTERVENTIONS:  - Identify barriers to discharge w/patient and caregiver  - Arrange for  needed discharge resources and transportation as appropriate  - Identify discharge learning needs (meds, wound care, etc.)  - Arrange for interpretive services to assist at discharge as needed  - Refer to Case Management Department for coordinating discharge planning if the patient needs post-hospital services based on physician/advanced practitioner order or complex needs related to functional status, cognitive ability, or social support system  Outcome: Progressing     Problem: Knowledge Deficit  Goal: Patient/family/caregiver demonstrates understanding of disease process, treatment plan, medications, and discharge instructions  Description: Complete learning assessment and assess knowledge base.  Interventions:  - Provide teaching at level of understanding  - Provide teaching via preferred learning methods  Outcome: Progressing

## 2024-07-31 NOTE — PLAN OF CARE
Problem: PAIN - ADULT  Goal: Verbalizes/displays adequate comfort level or baseline comfort level  Description: Interventions:  - Encourage patient to monitor pain and request assistance  - Assess pain using appropriate pain scale  - Administer analgesics based on type and severity of pain and evaluate response  - Implement non-pharmacological measures as appropriate and evaluate response  - Consider cultural and social influences on pain and pain management  - Notify physician/advanced practitioner if interventions unsuccessful or patient reports new pain  Outcome: Progressing     Problem: INFECTION - ADULT  Goal: Absence or prevention of progression during hospitalization  Description: INTERVENTIONS:  - Assess and monitor for signs and symptoms of infection  - Monitor lab/diagnostic results  - Monitor all insertion sites, i.e. indwelling lines, tubes, and drains  - Monitor endotracheal if appropriate and nasal secretions for changes in amount and color  - [place] appropriate cooling/warming therapies per order  - Administer medications as ordered  - Instruct and encourage patient and family to use good hand hygiene technique  - Identify and instruct in appropriate isolation precautions for identified infection/condition  Outcome: Progressing  Goal: Absence of fever/infection during neutropenic period  Description: INTERVENTIONS:  - Monitor WBC    Outcome: Progressing     Problem: SAFETY ADULT  Goal: Patient will remain free of falls  Description: INTERVENTIONS:  - Educate patient/family on patient safety including physical limitations  - Instruct patient to call for assistance with activity   - Consult OT/PT to assist with strengthening/mobility   - Keep Call bell within reach  - Keep bed low and locked with side rails adjusted as appropriate  - Keep care items and personal belongings within reach  - Initiate and maintain comfort rounds  - Make Fall Risk Sign visible to staff  - Offer Toileting every  Hours,  in advance of need  - Initiate/Maintain alarm  - Obtain necessary fall risk management equipment:   - Apply yellow socks and bracelet for high fall risk patients  - Consider moving patient to room near nurses station  Outcome: Progressing  Goal: Maintain or return to baseline ADL function  Description: INTERVENTIONS:  -  Assess patient's ability to carry out ADLs; assess patient's baseline for ADL function and identify physical deficits which impact ability to perform ADLs (bathing, care of mouth/teeth, toileting, grooming, dressing, etc.)  - Assess/evaluate cause of self-care deficits   - Assess range of motion  - Assess patient's mobility; develop plan if impaired  - Assess patient's need for assistive devices and provide as appropriate  - Encourage maximum independence but intervene and supervise when necessary  - Involve family in performance of ADLs  - Assess for home care needs following discharge   - Consider OT consult to assist with ADL evaluation and planning for discharge  - Provide patient education as appropriate  Outcome: Progressing  Goal: Maintains/Returns to pre admission functional level  Description: INTERVENTIONS:  - Perform AM-PAC 6 Click Basic Mobility/ Daily Activity assessment daily.  - Set and communicate daily mobility goal to care team and patient/family/caregiver.   - Collaborate with rehabilitation services on mobility goals if consulted  - Perform Range of Motion times a day.  - Reposition patient every  hours.  - Dangle patient  times a day  - Stand patient  times a day  - Ambulate patient  times a day  - Out of bed to chair  times a day   - Out of bed for meals times a day  - Out of bed for toileting  - Record patient progress and toleration of activity level   Outcome: Progressing     Problem: DISCHARGE PLANNING  Goal: Discharge to home or other facility with appropriate resources  Description: INTERVENTIONS:  - Identify barriers to discharge w/patient and caregiver  - Arrange for  needed discharge resources and transportation as appropriate  - Identify discharge learning needs (meds, wound care, etc.)  - Arrange for interpretive services to assist at discharge as needed  - Refer to Case Management Department for coordinating discharge planning if the patient needs post-hospital services based on physician/advanced practitioner order or complex needs related to functional status, cognitive ability, or social support system  Outcome: Progressing     Problem: Knowledge Deficit  Goal: Patient/family/caregiver demonstrates understanding of disease process, treatment plan, medications, and discharge instructions  Description: Complete learning assessment and assess knowledge base.  Interventions:  - Provide teaching at level of understanding  - Provide teaching via preferred learning methods  Outcome: Progressing

## 2024-07-31 NOTE — ASSESSMENT & PLAN NOTE
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  No change from prior study.   Recommend MRI/MRCP in one year   Follow up with PCP

## 2024-08-01 ENCOUNTER — TELEPHONE (OUTPATIENT)
Dept: DERMATOLOGY | Facility: CLINIC | Age: 79
End: 2024-08-01

## 2024-08-01 VITALS
RESPIRATION RATE: 16 BRPM | HEIGHT: 70 IN | DIASTOLIC BLOOD PRESSURE: 67 MMHG | SYSTOLIC BLOOD PRESSURE: 133 MMHG | HEART RATE: 59 BPM | TEMPERATURE: 97.9 F | BODY MASS INDEX: 33.46 KG/M2 | WEIGHT: 233.69 LBS | OXYGEN SATURATION: 96 %

## 2024-08-01 LAB
ALBUMIN SERPL BCG-MCNC: 3.2 G/DL (ref 3.5–5)
ALP SERPL-CCNC: 63 U/L (ref 34–104)
ALT SERPL W P-5'-P-CCNC: 12 U/L (ref 7–52)
ANION GAP SERPL CALCULATED.3IONS-SCNC: 5 MMOL/L (ref 4–13)
AST SERPL W P-5'-P-CCNC: 20 U/L (ref 13–39)
BASOPHILS # BLD AUTO: 0.02 THOUSANDS/ÂΜL (ref 0–0.1)
BASOPHILS NFR BLD AUTO: 0 % (ref 0–1)
BILIRUB SERPL-MCNC: 0.4 MG/DL (ref 0.2–1)
BUN SERPL-MCNC: 12 MG/DL (ref 5–25)
CALCIUM ALBUM COR SERPL-MCNC: 9.4 MG/DL (ref 8.3–10.1)
CALCIUM SERPL-MCNC: 8.8 MG/DL (ref 8.4–10.2)
CHLORIDE SERPL-SCNC: 107 MMOL/L (ref 96–108)
CO2 SERPL-SCNC: 24 MMOL/L (ref 21–32)
CREAT SERPL-MCNC: 0.61 MG/DL (ref 0.6–1.3)
EOSINOPHIL # BLD AUTO: 0.28 THOUSAND/ÂΜL (ref 0–0.61)
EOSINOPHIL NFR BLD AUTO: 6 % (ref 0–6)
ERYTHROCYTE [DISTWIDTH] IN BLOOD BY AUTOMATED COUNT: 12.6 % (ref 11.6–15.1)
GFR SERPL CREATININE-BSD FRML MDRD: 96 ML/MIN/1.73SQ M
GLUCOSE SERPL-MCNC: 134 MG/DL (ref 65–140)
HCT VFR BLD AUTO: 35.2 % (ref 36.5–49.3)
HGB BLD-MCNC: 12.1 G/DL (ref 12–17)
IMM GRANULOCYTES # BLD AUTO: 0.01 THOUSAND/UL (ref 0–0.2)
IMM GRANULOCYTES NFR BLD AUTO: 0 % (ref 0–2)
LYMPHOCYTES # BLD AUTO: 1.21 THOUSANDS/ÂΜL (ref 0.6–4.47)
LYMPHOCYTES NFR BLD AUTO: 25 % (ref 14–44)
MCH RBC QN AUTO: 32 PG (ref 26.8–34.3)
MCHC RBC AUTO-ENTMCNC: 34.4 G/DL (ref 31.4–37.4)
MCV RBC AUTO: 93 FL (ref 82–98)
MONOCYTES # BLD AUTO: 0.4 THOUSAND/ÂΜL (ref 0.17–1.22)
MONOCYTES NFR BLD AUTO: 8 % (ref 4–12)
NEUTROPHILS # BLD AUTO: 2.93 THOUSANDS/ÂΜL (ref 1.85–7.62)
NEUTS SEG NFR BLD AUTO: 61 % (ref 43–75)
NRBC BLD AUTO-RTO: 0 /100 WBCS
PLATELET # BLD AUTO: 162 THOUSANDS/UL (ref 149–390)
PMV BLD AUTO: 9.5 FL (ref 8.9–12.7)
POTASSIUM SERPL-SCNC: 3.6 MMOL/L (ref 3.5–5.3)
PROCALCITONIN SERPL-MCNC: 0.41 NG/ML
PROT SERPL-MCNC: 5.7 G/DL (ref 6.4–8.4)
RBC # BLD AUTO: 3.78 MILLION/UL (ref 3.88–5.62)
SODIUM SERPL-SCNC: 136 MMOL/L (ref 135–147)
WBC # BLD AUTO: 4.85 THOUSAND/UL (ref 4.31–10.16)

## 2024-08-01 PROCEDURE — 99239 HOSP IP/OBS DSCHRG MGMT >30: CPT

## 2024-08-01 PROCEDURE — 85025 COMPLETE CBC W/AUTO DIFF WBC: CPT

## 2024-08-01 PROCEDURE — 84145 PROCALCITONIN (PCT): CPT

## 2024-08-01 PROCEDURE — 80053 COMPREHEN METABOLIC PANEL: CPT

## 2024-08-01 RX ORDER — CEPHALEXIN 500 MG/1
500 CAPSULE ORAL EVERY 6 HOURS SCHEDULED
Status: DISCONTINUED | OUTPATIENT
Start: 2024-08-01 | End: 2024-08-01 | Stop reason: HOSPADM

## 2024-08-01 RX ORDER — CEPHALEXIN 500 MG/1
500 CAPSULE ORAL EVERY 6 HOURS SCHEDULED
Qty: 20 CAPSULE | Refills: 0 | Status: SHIPPED | OUTPATIENT
Start: 2024-08-01 | End: 2024-08-06

## 2024-08-01 RX ADMIN — CLOPIDOGREL BISULFATE 75 MG: 75 TABLET ORAL at 09:15

## 2024-08-01 RX ADMIN — PANTOPRAZOLE SODIUM 40 MG: 40 TABLET, DELAYED RELEASE ORAL at 09:15

## 2024-08-01 RX ADMIN — SERTRALINE HYDROCHLORIDE 50 MG: 50 TABLET ORAL at 09:15

## 2024-08-01 RX ADMIN — Medication 1000 UNITS: at 09:15

## 2024-08-01 RX ADMIN — ASPIRIN 81 MG: 81 TABLET, COATED ORAL at 09:15

## 2024-08-01 NOTE — ED PROVIDER NOTES
History  Chief Complaint   Patient presents with    Abdominal Pain     RLQ abdominal pain, diaphoresis, low BP, nausea, febrile, history of diverticulitis.       History provided by:  Patient   used: No    Abdominal Pain  Associated symptoms: chills, dysuria, fever and nausea    Associated symptoms: no chest pain, no cough, no diarrhea, no shortness of breath and no vomiting      79-year-old male presenting to the ER with lower abdominal pain, fevers, nausea, sweaty chills.  History diverticulitis.  No vomiting.  Has some dysuria.  No chest pain or trouble breathing.  No lower back pain.      Prior to Admission Medications   Prescriptions Last Dose Informant Patient Reported? Taking?   Cholecalciferol (Vitamin D3) 50 MCG (2000 UT) capsule Not Taking  Yes No   Sig: Take 1 tablet by mouth daily   Patient not taking: Reported on 7/29/2024   aspirin (ECOTRIN LOW STRENGTH) 81 mg EC tablet 7/29/2024 Self No Yes   Sig: Take 1 tablet (81 mg total) by mouth daily Please do not start taking until after total joint arthroplasty   b complex vitamins capsule  Self Yes No   Sig: Take 1 capsule by mouth daily   baclofen 10 mg tablet 7/28/2024 Self No Yes   Sig: TAKE 1 TABLET BY MOUTH 3 TIMES  DAILY AS NEEDED FOR MUSCLE  SPASM(S)   cholecalciferol (VITAMIN D3) 1,000 units tablet 7/29/2024 Self No Yes   Sig: Take 1 tablet (1,000 Units total) by mouth daily   clopidogrel (PLAVIX) 75 mg tablet 7/29/2024  No Yes   Sig: TAKE 1 TABLET BY MOUTH IN THE  MORNING   ezetimibe (ZETIA) 10 mg tablet 7/28/2024  No Yes   Sig: TAKE 1 TABLET BY MOUTH DAILY   gabapentin (NEURONTIN) 300 mg capsule 7/28/2024  No Yes   Sig: Take 2 capsules (600 mg total) by mouth daily at bedtime   ketoconazole (NIZORAL) 2 % shampoo Not Taking Self No No   Sig: APPLY TOPICALLY 2 TIMES A WEEK   Patient not taking: Reported on 7/29/2024   metoprolol succinate (TOPROL-XL) 50 mg 24 hr tablet 7/29/2024  No Yes   Sig: TAKE 1 TABLET BY MOUTH DAILY    omega-3-acid ethyl esters (LOVAZA) 1 g capsule 7/28/2024 Self No Yes   Sig: TAKE 2 CAPSULES BY MOUTH TWICE  DAILY   pantoprazole (PROTONIX) 40 mg tablet 7/29/2024  No Yes   Sig: TAKE 1 TABLET BY MOUTH EVERY DAY   sertraline (ZOLOFT) 50 mg tablet 7/29/2024  No Yes   Sig: TAKE 1 TABLET BY MOUTH DAILY   simvastatin (ZOCOR) 40 mg tablet 7/28/2024  No Yes   Sig: TAKE 1 TABLET BY MOUTH DAILY AT  BEDTIME   tamsulosin (FLOMAX) 0.4 mg 7/28/2024  No Yes   Sig: TAKE 1 CAPSULE BY MOUTH DAILY  WITH DINNER   traZODone (DESYREL) 150 mg tablet 7/28/2024  No Yes   Sig: TAKE 1 TABLET BY MOUTH AT BEDTIME      Facility-Administered Medications: None       Past Medical History:   Diagnosis Date    Arthritis     Celiac disease     Colon polyp     Coronary artery disease     Diverticulitis     Diverticulitis of ascending colon 10/27/2022    Diverticulosis     GERD (gastroesophageal reflux disease)     Hyperlipidemia     Hypertension     Kidney stone     Myocardial infarction (HCC) 1990       Past Surgical History:   Procedure Laterality Date    COLONOSCOPY      CORONARY ANGIOPLASTY WITH STENT PLACEMENT      Multiple times    JOINT REPLACEMENT      DC ARTHROPLASTY GLENOHUMERAL JOINT TOTAL SHOULDER Left 7/2/2024    Procedure: ARTHROPLASTY SHOULDER- left anatomic, BICEPS TENODESIS;  Surgeon: Lalitha Ann DO;  Location: [facility];  Service: Orthopedics    DC ARTHRP KNE CONDYLE&PLATU MEDIAL&LAT COMPARTMENTS Right 04/13/2022    Procedure: ARTHROPLASTY KNEE TOTAL and all associated procedures;  Surgeon: Neeta Chaney MD;  Location: [facility];  Service: Orthopedics    DC ARTHRP KNE CONDYLE&PLATU MEDIAL&LAT COMPARTMENTS Left 09/29/2022    Procedure: ARTHROPLASTY KNEE TOTAL;  Surgeon: Neeta Chaney MD;  Location: [facility];  Service: Orthopedics       Family History   Problem Relation Age of Onset    Diabetes Mother     Coronary artery disease Father     Thyroid disease Sister      I have reviewed and agree with the history as  documented.    E-Cigarette/Vaping    E-Cigarette Use Never User      E-Cigarette/Vaping Substances    Nicotine No     THC No     CBD No     Flavoring No     Other No     Unknown No      Social History     Tobacco Use    Smoking status: Former     Current packs/day: 0.00     Types: Cigarettes     Start date: 1964     Quit date:      Years since quittin.6     Passive exposure: Never    Smokeless tobacco: Never    Tobacco comments:     Per pt, quit over 36 years ago   Vaping Use    Vaping status: Never Used   Substance Use Topics    Alcohol use: Not Currently    Drug use: Never       Review of Systems   Constitutional:  Positive for chills and fever. Negative for diaphoresis.   Respiratory:  Negative for cough, shortness of breath, wheezing and stridor.    Cardiovascular:  Negative for chest pain, palpitations and leg swelling.   Gastrointestinal:  Positive for abdominal pain and nausea. Negative for blood in stool, diarrhea and vomiting.   Genitourinary:  Positive for dysuria. Negative for frequency and urgency.   Musculoskeletal:  Negative for neck pain and neck stiffness.   Skin:  Negative for pallor and rash.   Neurological:  Negative for dizziness, syncope, weakness, light-headedness and headaches.   All other systems reviewed and are negative.      Physical Exam  Physical Exam  Vitals reviewed.   Constitutional:       Appearance: He is well-developed.   HENT:      Head: Normocephalic and atraumatic.   Eyes:      Pupils: Pupils are equal, round, and reactive to light.   Cardiovascular:      Rate and Rhythm: Normal rate and regular rhythm.      Heart sounds: Normal heart sounds.   Pulmonary:      Effort: Pulmonary effort is normal. No respiratory distress.      Breath sounds: Normal breath sounds.   Abdominal:      General: Bowel sounds are normal.      Palpations: Abdomen is soft.      Tenderness: There is abdominal tenderness in the right lower quadrant and suprapubic area.   Musculoskeletal:       Cervical back: Neck supple.   Skin:     General: Skin is warm and dry.      Capillary Refill: Capillary refill takes less than 2 seconds.   Neurological:      General: No focal deficit present.      Mental Status: He is alert and oriented to person, place, and time.         Vital Signs  ED Triage Vitals   Temperature Pulse Respirations Blood Pressure SpO2   07/29/24 1826 07/29/24 1827 07/29/24 1826 07/29/24 1827 07/29/24 1827   98.9 °F (37.2 °C) 91 18 123/66 98 %      Temp Source Heart Rate Source Patient Position - Orthostatic VS BP Location FiO2 (%)   07/29/24 1827 07/29/24 1827 07/29/24 1827 07/29/24 1827 --   Oral Monitor Sitting Right arm       Pain Score       07/29/24 2253       No Pain           Vitals:    07/30/24 2218 07/31/24 0700 07/31/24 1544 07/31/24 2237   BP: 124/70 114/62  141/86   Pulse: 87      Patient Position - Orthostatic VS:  Lying Sitting Sitting         Visual Acuity      ED Medications  Medications   aspirin (ECOTRIN LOW STRENGTH) EC tablet 81 mg (81 mg Oral Given 7/31/24 1007)   Cholecalciferol (VITAMIN D3) tablet 1,000 Units (1,000 Units Oral Given 7/31/24 1007)   clopidogrel (PLAVIX) tablet 75 mg (75 mg Oral Given 7/31/24 1007)   gabapentin (NEURONTIN) capsule 600 mg (600 mg Oral Given 7/31/24 2204)   fish oil capsule 1,000 mg (1,000 mg Oral Not Given 7/31/24 1747)   pantoprazole (PROTONIX) EC tablet 40 mg (40 mg Oral Given 7/31/24 1007)   sertraline (ZOLOFT) tablet 50 mg (50 mg Oral Given 7/31/24 1007)   traZODone (DESYREL) tablet 150 mg (150 mg Oral Given 7/31/24 2204)   pravastatin (PRAVACHOL) tablet 80 mg (80 mg Oral Given 7/31/24 1655)   cefTRIAXone (ROCEPHIN) 2,000 mg in dextrose 5 % 50 mL IVPB (2,000 mg Intravenous New Bag 7/31/24 2204)   acetaminophen (TYLENOL) tablet 650 mg (has no administration in time range)   enoxaparin (LOVENOX) subcutaneous injection 40 mg (40 mg Subcutaneous Given 7/31/24 1007)   ondansetron (ZOFRAN) injection 4 mg (has no administration in time range)    sodium chloride 0.9 % infusion (75 mL/hr Intravenous New Bag 7/31/24 1642)   acetaminophen (Ofirmev) injection 1,000 mg (0 mg Intravenous Stopped 7/29/24 2100)   ceftriaxone (ROCEPHIN) 2 g/50 mL in dextrose IVPB (0 mg Intravenous Stopped 7/29/24 2147)   iohexol (OMNIPAQUE) 350 MG/ML injection (MULTI-DOSE) 100 mL (100 mL Intravenous Given 7/29/24 2129)       Diagnostic Studies  Results Reviewed       Procedure Component Value Units Date/Time    Blood culture #1 [[id number]] Collected: 07/29/24 2035    Lab Status: Preliminary result Specimen: Blood from Arm, Right Updated: 07/31/24 2301     Blood Culture No Growth at 48 hrs.    Blood culture #2 [[id number]] Collected: 07/29/24 2035    Lab Status: Preliminary result Specimen: Blood from Arm, Right Updated: 07/31/24 2301     Blood Culture No Growth at 48 hrs.    Urine culture [[id number]] Collected: 07/29/24 2018    Lab Status: Final result Specimen: Urine, Clean Catch Updated: 07/30/24 1831     Urine Culture 70,000-79,000 cfu/ml    Lactic acid, plasma (w/reflex if result > 2.0) [[id number]]  (Normal) Collected: 07/29/24 2035    Lab Status: Final result Specimen: Blood from Arm, Right Updated: 07/29/24 2105     LACTIC ACID 2.0 mmol/L     Narrative:      Result may be elevated if tourniquet was used during collection.    Hepatic function panel [[id number]]  (Abnormal) Collected: 07/29/24 2009    Lab Status: Final result Specimen: Blood from Arm, Right Updated: 07/29/24 2055     Total Bilirubin 1.33 mg/dL      Bilirubin, Direct 0.12 mg/dL      Alkaline Phosphatase 71 U/L      AST 19 U/L      ALT 11 U/L      Total Protein 6.5 g/dL      Albumin 3.8 g/dL     Lipase [[id number]]  (Abnormal) Collected: 07/29/24 2009    Lab Status: Final result Specimen: Blood from Arm, Right Updated: 07/29/24 2055     Lipase <6 u/L     Basic metabolic panel [[id number]]  (Abnormal) Collected: 07/29/24 2009    Lab Status: Final result Specimen: Blood from Arm, Right Updated: 07/29/24 2055     Sodium  132 mmol/L      Potassium 4.2 mmol/L      Chloride 100 mmol/L      CO2 24 mmol/L      ANION GAP 8 mmol/L      BUN 14 mg/dL      Creatinine 0.80 mg/dL      Glucose 99 mg/dL      Calcium 9.1 mg/dL      eGFR 86 ml/min/1.73sq m     Narrative:      [employer] guidelines for Chronic Kidney Disease (CKD):     Stage 1 with normal or high GFR (GFR > 90 mL/min/1.73 square meters)    Stage 2 Mild CKD (GFR = 60-89 mL/min/1.73 square meters)    Stage 3A Moderate CKD (GFR = 45-59 mL/min/1.73 square meters)    Stage 3B Moderate CKD (GFR = 30-44 mL/min/1.73 square meters)    Stage 4 Severe CKD (GFR = 15-29 mL/min/1.73 square meters)    Stage 5 End Stage CKD (GFR <15 mL/min/1.73 square meters)  Note: GFR calculation is accurate only with a steady state creatinine    Urine Microscopic [[id number]]  (Abnormal) Collected: 07/29/24 2018    Lab Status: Final result Specimen: Urine, Clean Catch Updated: 07/29/24 2029     RBC, UA 1-2 /hpf      WBC, UA 30-50 /hpf      Epithelial Cells Occasional /hpf      Bacteria, UA None Seen /hpf     UA w Reflex to Microscopic w Reflex to Culture [[id number]]  (Abnormal) Collected: 07/29/24 2018    Lab Status: Final result Specimen: Urine, Clean Catch Updated: 07/29/24 2027     Color, UA Yellow     Clarity, UA Clear     Specific Gravity, UA 1.024     pH, UA 7.0     Leukocytes, UA Moderate     Nitrite, UA Negative     Protein, UA 30 (1+) mg/dl      Glucose, UA Negative mg/dl      Ketones, UA Negative mg/dl      Urobilinogen, UA 2.0 mg/dl      Bilirubin, UA Negative     Occult Blood, UA Negative    CBC and differential [[id number]]  (Abnormal) Collected: 07/29/24 2009    Lab Status: Final result Specimen: Blood from Arm, Right Updated: 07/29/24 2020     WBC 18.81 Thousand/uL      RBC 4.62 Million/uL      Hemoglobin 14.7 g/dL      Hematocrit 42.8 %      MCV 93 fL      MCH 31.8 pg      MCHC 34.3 g/dL      RDW 12.6 %      MPV 9.9 fL      Platelets 214 Thousands/uL      nRBC 0 /100 WBCs       Segmented % 83 %      Immature Grans % 1 %      Lymphocytes % 9 %      Monocytes % 7 %      Eosinophils Relative 0 %      Basophils Relative 0 %      Absolute Neutrophils 15.45 Thousands/µL      Absolute Immature Grans 0.16 Thousand/uL      Absolute Lymphocytes 1.76 Thousands/µL      Absolute Monocytes 1.34 Thousand/µL      Eosinophils Absolute 0.04 Thousand/µL      Basophils Absolute 0.06 Thousands/µL                    CT abdomen pelvis with contrast   Final Result by Valentino Lai MD (07/29 2147)      No acute inflammatory process identified in the abdomen or pelvis.      Nonobstructing 3 mm left lower pole intrarenal calculus.      10 mm simple pancreatic cyst in the anterior body. No change from prior study. Preferred imaging modality: abdomen MRI and MRCP with and without IV contrast, or triple phase abdomen CT with IV contrast, or abdomen MRI and MRCP without IV contrast.    Recommend MRI/MRCP in 1 year.      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      Mild bladder wall thickening. Correlate for cystitis.               Workstation performed: [id number]                    Procedures  Procedures         ED Course                                 SBIRT 20yo+      Flowsheet Row Most Recent Value   Initial Alcohol Screen: US AUDIT-C     1. How often do you have a drink containing alcohol? 0 Filed at: 07/29/2024 1825   2. How many drinks containing alcohol do you have on a typical day you are drinking?  0 Filed at: 07/29/2024 1825   3a. Male UNDER 65: How often do you have five or more drinks on one occasion? 0 Filed at: 07/29/2024 1825   Audit-C Score 0 Filed at: 07/29/2024 1825   ROSEMARIE: How many times in the past year have you...    Used an illegal drug or used a prescription medication for non-medical reasons? Never Filed at: 07/29/2024 1825                      Medical Decision Making  79-year-old with lower abdominal pain and dysuria  and fever, differential is diverticulitis, UTI, cystitis, appendicitis, renal colic, intra-abdominal abscess.    Will check abdominal labs, urine, CT    Patient septic, antibiotics ordered, septic workup ordered, admission to hospital    Amount and/or Complexity of Data Reviewed  Labs: ordered.  Radiology: ordered.    Risk  Prescription drug management.  Decision regarding hospitalization.                 Disposition  Final diagnoses:   Cystitis   Sepsis (HCC)   Pancreatic cyst   Left inguinal hernia     Time reflects when diagnosis was documented in both MDM as applicable and the Disposition within this note       Time User Action Codes Description Comment    7/29/2024 10:12 PM Tadevosyan, Nyla Add [N30.90] Cystitis     7/29/2024 10:12 PM Tadevosyan, Nyla Add [A41.9] Sepsis (HCC)     7/29/2024 10:13 PM Tadevosyan, Nyla Add [K86.2] Pancreatic cyst     7/29/2024 10:13 PM Tadevosyan, Nyla Add [K40.90] Left inguinal hernia           ED Disposition       ED Disposition   Admit    Condition   Stable    Date/Time   Mon Jul 29, 2024 2212    Comment   Case was discussed with migdalia and the patient's admission status was agreed to be Admission Status: inpatient status to the service of Dr. Pérez .               Follow-up Information    None         Current Discharge Medication List        CONTINUE these medications which have NOT CHANGED    Details   aspirin (ECOTRIN LOW STRENGTH) 81 mg EC tablet Take 1 tablet (81 mg total) by mouth daily Please do not start taking until after total joint arthroplasty  Qty: 10 tablet, Refills: 0    Associated Diagnoses: CAD S/P percutaneous coronary angioplasty      baclofen 10 mg tablet TAKE 1 TABLET BY MOUTH 3 TIMES  DAILY AS NEEDED FOR MUSCLE  SPASM(S)  Qty: 270 tablet, Refills: 0    Comments: Please send a replace/new response with 100-Day Supply if appropriate to maximize member benefit. Requesting 1 year supply.  Associated Diagnoses: Myalgia      cholecalciferol (VITAMIN D3) 1,000 units  tablet Take 1 tablet (1,000 Units total) by mouth daily  Qty: 60 tablet, Refills: 2    Associated Diagnoses: Hypovitaminosis D      clopidogrel (PLAVIX) 75 mg tablet TAKE 1 TABLET BY MOUTH IN THE  MORNING  Qty: 100 tablet, Refills: 2    Comments: Please send a replace/new response with 100-Day Supply if appropriate to maximize member benefit. Requesting 1 year supply.  Associated Diagnoses: Atherosclerosis of coronary artery without angina pectoris, unspecified vessel or lesion type, unspecified whether native or transplanted heart      ezetimibe (ZETIA) 10 mg tablet TAKE 1 TABLET BY MOUTH DAILY  Qty: 100 tablet, Refills: 1    Comments: Please send a replace/new response with 100-Day Supply if appropriate to maximize member benefit. Requesting 1 year supply.  Associated Diagnoses: Hyperlipidemia      gabapentin (NEURONTIN) 300 mg capsule Take 2 capsules (600 mg total) by mouth daily at bedtime  Qty: 270 capsule, Refills: 3    Comments: Please send a replace/new response with 100-Day Supply if appropriate to maximize member benefit. Requesting 1 year supply.  Associated Diagnoses: Sacroiliitis, not elsewhere classified (HCC)      metoprolol succinate (TOPROL-XL) 50 mg 24 hr tablet TAKE 1 TABLET BY MOUTH DAILY  Qty: 100 tablet, Refills: 2    Comments: Please send a replace/new response with 100-Day Supply if appropriate to maximize member benefit. Requesting 1 year supply.  Associated Diagnoses: Essential hypertension      omega-3-acid ethyl esters (LOVAZA) 1 g capsule TAKE 2 CAPSULES BY MOUTH TWICE  DAILY  Qty: 400 capsule, Refills: 2    Comments: Please send a replace/new response with 100-Day Supply if appropriate to maximize member benefit. Requesting 1 year supply.  Associated Diagnoses: Mixed hyperlipidemia      pantoprazole (PROTONIX) 40 mg tablet TAKE 1 TABLET BY MOUTH EVERY DAY  Qty: 90 tablet, Refills: 1    Associated Diagnoses: Coffee ground emesis      sertraline (ZOLOFT) 50 mg tablet TAKE 1 TABLET BY  MOUTH DAILY  Qty: 90 tablet, Refills: 1    Comments: Please send a replace/new response with 100-Day Supply if appropriate to maximize member benefit. Requesting 1 year supply.  Associated Diagnoses: Anxiety      simvastatin (ZOCOR) 40 mg tablet TAKE 1 TABLET BY MOUTH DAILY AT  BEDTIME  Qty: 100 tablet, Refills: 1    Comments: Please send a replace/new response with 100-Day Supply if appropriate to maximize member benefit. Requesting 1 year supply.  Associated Diagnoses: Mixed hyperlipidemia      tamsulosin (FLOMAX) 0.4 mg TAKE 1 CAPSULE BY MOUTH DAILY  WITH DINNER  Qty: 100 capsule, Refills: 1    Comments: Please send a replace/new response with 100-Day Supply if appropriate to maximize member benefit. Requesting 1 year supply.  Associated Diagnoses: Benign prostatic hyperplasia with lower urinary tract symptoms, symptom details unspecified      traZODone (DESYREL) 150 mg tablet TAKE 1 TABLET BY MOUTH AT BEDTIME  Qty: 90 tablet, Refills: 1    Associated Diagnoses: Anxiety      b complex vitamins capsule Take 1 capsule by mouth daily      Cholecalciferol (Vitamin D3) 50 MCG (2000 UT) capsule Take 1 tablet by mouth daily      ketoconazole (NIZORAL) 2 % shampoo APPLY TOPICALLY 2 TIMES A WEEK  Qty: 120 mL, Refills: 0    Associated Diagnoses: Actinic keratosis             No discharge procedures on file.    PDMP Review         Value Time User    PDMP Reviewed  Yes 7/2/2024  7:20 AM Narendra Hewitt PA-C            ED Provider  Electronically Signed by             Nyla Mukherjee MD  08/01/24 0700

## 2024-08-01 NOTE — PLAN OF CARE
Problem: PAIN - ADULT  Goal: Verbalizes/displays adequate comfort level or baseline comfort level  Description: Interventions:  - Encourage patient to monitor pain and request assistance  - Assess pain using appropriate pain scale  - Administer analgesics based on type and severity of pain and evaluate response  - Implement non-pharmacological measures as appropriate and evaluate response  - Consider cultural and social influences on pain and pain management  - Notify physician/advanced practitioner if interventions unsuccessful or patient reports new pain  8/1/2024 1122 by Pamela Francois RN  Outcome: Adequate for Discharge  8/1/2024 0913 by Pamela Francois RN  Outcome: Progressing     Problem: INFECTION - ADULT  Goal: Absence or prevention of progression during hospitalization  Description: INTERVENTIONS:  - Assess and monitor for signs and symptoms of infection  - Monitor lab/diagnostic results  - Monitor all insertion sites, i.e. indwelling lines, tubes, and drains  - Monitor endotracheal if appropriate and nasal secretions for changes in amount and color  - [place] appropriate cooling/warming therapies per order  - Administer medications as ordered  - Instruct and encourage patient and family to use good hand hygiene technique  - Identify and instruct in appropriate isolation precautions for identified infection/condition  8/1/2024 1122 by Pamela Francois RN  Outcome: Adequate for Discharge  8/1/2024 0913 by Pamela Francois RN  Outcome: Progressing  Goal: Absence of fever/infection during neutropenic period  Description: INTERVENTIONS:  - Monitor WBC    8/1/2024 1122 by Pamela Francois RN  Outcome: Adequate for Discharge  8/1/2024 0913 by Pamela Francois RN  Outcome: Progressing     Problem: SAFETY ADULT  Goal: Patient will remain free of falls  Description: INTERVENTIONS:  - Educate patient/family on patient safety including physical limitations  - Instruct patient to call for assistance with  activity   - Consult OT/PT to assist with strengthening/mobility   - Keep Call bell within reach  - Keep bed low and locked with side rails adjusted as appropriate  - Keep care items and personal belongings within reach  - Initiate and maintain comfort rounds  - Make Fall Risk Sign visible to staff  - Offer Toileting every Hours, in advance of need  - Initiate/Maintain alarm  - Obtain necessary fall risk management equipment  - Apply yellow socks and bracelet for high fall risk patients  - Consider moving patient to room near nurses station  8/1/2024 1122 by Pamela Francois RN  Outcome: Adequate for Discharge  8/1/2024 0913 by Pamela Francois RN  Outcome: Progressing  Goal: Maintain or return to baseline ADL function  Description: INTERVENTIONS:  -  Assess patient's ability to carry out ADLs; assess patient's baseline for ADL function and identify physical deficits which impact ability to perform ADLs (bathing, care of mouth/teeth, toileting, grooming, dressing, etc.)  - Assess/evaluate cause of self-care deficits   - Assess range of motion  - Assess patient's mobility; develop plan if impaired  - Assess patient's need for assistive devices and provide as appropriate  - Encourage maximum independence but intervene and supervise when necessary  - Involve family in performance of ADLs  - Assess for home care needs following discharge   - Consider OT consult to assist with ADL evaluation and planning for discharge  - Provide patient education as appropriate  8/1/2024 1122 by Pamela Francois RN  Outcome: Adequate for Discharge  8/1/2024 0913 by Pamela Francois RN  Outcome: Progressing  Goal: Maintains/Returns to pre admission functional level  Description: INTERVENTIONS:  - Perform AM-PAC 6 Click Basic Mobility/ Daily Activity assessment daily.  - Set and communicate daily mobility goal to care team and patient/family/caregiver.   - Collaborate with rehabilitation services on mobility goals if consulted  - Perform  Range of Motion  times a day.  - Reposition patient every  hours.  - Dangle patient  times a day  - Stand patient  times a day  - Ambulate patient  times a day  - Out of bed to chair  times a day   - Out of bed for meals  times a day  - Out of bed for toileting  - Record patient progress and toleration of activity level   8/1/2024 1122 by Pamela Francois RN  Outcome: Adequate for Discharge  8/1/2024 0913 by Pamela Francois RN  Outcome: Progressing     Problem: DISCHARGE PLANNING  Goal: Discharge to home or other facility with appropriate resources  Description: INTERVENTIONS:  - Identify barriers to discharge w/patient and caregiver  - Arrange for needed discharge resources and transportation as appropriate  - Identify discharge learning needs (meds, wound care, etc.)  - Arrange for interpretive services to assist at discharge as needed  - Refer to Case Management Department for coordinating discharge planning if the patient needs post-hospital services based on physician/advanced practitioner order or complex needs related to functional status, cognitive ability, or social support system  8/1/2024 1122 by Pamela Francois RN  Outcome: Adequate for Discharge  8/1/2024 0913 by Pamela Francois RN  Outcome: Progressing     Problem: Knowledge Deficit  Goal: Patient/family/caregiver demonstrates understanding of disease process, treatment plan, medications, and discharge instructions  Description: Complete learning assessment and assess knowledge base.  Interventions:  - Provide teaching at level of understanding  - Provide teaching via preferred learning methods  8/1/2024 1122 by Pamela Francois RN  Outcome: Adequate for Discharge  8/1/2024 0913 by Pamela Francois RN  Outcome: Progressing

## 2024-08-01 NOTE — TELEPHONE ENCOUNTER
Bumped from Dr Almazan 10/01/24 [facility] to Dr Almazan 10/08/24 [facility], LMOM with location change (Dr Norah Aldridge Office to Dr Almazan  Office), to call the office to confirm or r/s

## 2024-08-01 NOTE — PLAN OF CARE
Problem: PAIN - ADULT  Goal: Verbalizes/displays adequate comfort level or baseline comfort level  Description: Interventions:  - Encourage patient to monitor pain and request assistance  - Assess pain using appropriate pain scale  - Administer analgesics based on type and severity of pain and evaluate response  - Implement non-pharmacological measures as appropriate and evaluate response  - Consider cultural and social influences on pain and pain management  - Notify physician/advanced practitioner if interventions unsuccessful or patient reports new pain  Outcome: Progressing     Problem: INFECTION - ADULT  Goal: Absence or prevention of progression during hospitalization  Description: INTERVENTIONS:  - Assess and monitor for signs and symptoms of infection  - Monitor lab/diagnostic results  - Monitor all insertion sites, i.e. indwelling lines, tubes, and drains  - Monitor endotracheal if appropriate and nasal secretions for changes in amount and color  - [place] appropriate cooling/warming therapies per order  - Administer medications as ordered  - Instruct and encourage patient and family to use good hand hygiene technique  - Identify and instruct in appropriate isolation precautions for identified infection/condition  Outcome: Progressing  Goal: Absence of fever/infection during neutropenic period  Description: INTERVENTIONS:  - Monitor WBC    Outcome: Progressing     Problem: SAFETY ADULT  Goal: Patient will remain free of falls  Description: INTERVENTIONS:  - Educate patient/family on patient safety including physical limitations  - Instruct patient to call for assistance with activity   - Consult OT/PT to assist with strengthening/mobility   - Keep Call bell within reach  - Keep bed low and locked with side rails adjusted as appropriate  - Keep care items and personal belongings within reach  - Initiate and maintain comfort rounds  - Make Fall Risk Sign visible to staff  - Offer Toileting every  Hours,  in advance of need  - Initiate/Maintain alarm  - Obtain necessary fall risk management equipment:  - Apply yellow socks and bracelet for high fall risk patients  - Consider moving patient to room near nurses station  Outcome: Progressing  Goal: Maintain or return to baseline ADL function  Description: INTERVENTIONS:  -  Assess patient's ability to carry out ADLs; assess patient's baseline for ADL function and identify physical deficits which impact ability to perform ADLs (bathing, care of mouth/teeth, toileting, grooming, dressing, etc.)  - Assess/evaluate cause of self-care deficits   - Assess range of motion  - Assess patient's mobility; develop plan if impaired  - Assess patient's need for assistive devices and provide as appropriate  - Encourage maximum independence but intervene and supervise when necessary  - Involve family in performance of ADLs  - Assess for home care needs following discharge   - Consider OT consult to assist with ADL evaluation and planning for discharge  - Provide patient education as appropriate  Outcome: Progressing  Goal: Maintains/Returns to pre admission functional level  Description: INTERVENTIONS:  - Perform AM-PAC 6 Click Basic Mobility/ Daily Activity assessment daily.  - Set and communicate daily mobility goal to care team and patient/family/caregiver.   - Collaborate with rehabilitation services on mobility goals if consulted  - Perform Range of Motion times a day.  - Reposition patient every hours.  - Dangle patient  times a day  - Stand patient times a day  - Ambulate patient  times a day  - Out of bed to chair times a day   - Out of bed for meals  times a day  - Out of bed for toileting  - Record patient progress and toleration of activity level   Outcome: Progressing     Problem: DISCHARGE PLANNING  Goal: Discharge to home or other facility with appropriate resources  Description: INTERVENTIONS:  - Identify barriers to discharge w/patient and caregiver  - Arrange for  needed discharge resources and transportation as appropriate  - Identify discharge learning needs (meds, wound care, etc.)  - Arrange for interpretive services to assist at discharge as needed  - Refer to Case Management Department for coordinating discharge planning if the patient needs post-hospital services based on physician/advanced practitioner order or complex needs related to functional status, cognitive ability, or social support system  Outcome: Progressing     Problem: Knowledge Deficit  Goal: Patient/family/caregiver demonstrates understanding of disease process, treatment plan, medications, and discharge instructions  Description: Complete learning assessment and assess knowledge base.  Interventions:  - Provide teaching at level of understanding  - Provide teaching via preferred learning methods  Outcome: Progressing

## 2024-08-01 NOTE — PLAN OF CARE
Problem: PAIN - ADULT  Goal: Verbalizes/displays adequate comfort level or baseline comfort level  Description: Interventions:  - Encourage patient to monitor pain and request assistance  - Assess pain using appropriate pain scale  - Administer analgesics based on type and severity of pain and evaluate response  - Implement non-pharmacological measures as appropriate and evaluate response  - Consider cultural and social influences on pain and pain management  - Notify physician/advanced practitioner if interventions unsuccessful or patient reports new pain  Outcome: Progressing     Problem: INFECTION - ADULT  Goal: Absence or prevention of progression during hospitalization  Description: INTERVENTIONS:  - Assess and monitor for signs and symptoms of infection  - Monitor lab/diagnostic results  - Monitor all insertion sites, i.e. indwelling lines, tubes, and drains  - Monitor endotracheal if appropriate and nasal secretions for changes in amount and color  - [place] appropriate cooling/warming therapies per order  - Administer medications as ordered  - Instruct and encourage patient and family to use good hand hygiene technique  - Identify and instruct in appropriate isolation precautions for identified infection/condition  Outcome: Progressing  Goal: Absence of fever/infection during neutropenic period  Description: INTERVENTIONS:  - Monitor WBC    Outcome: Progressing     Problem: SAFETY ADULT  Goal: Patient will remain free of falls  Description: INTERVENTIONS:  - Educate patient/family on patient safety including physical limitations  - Instruct patient to call for assistance with activity   - Consult OT/PT to assist with strengthening/mobility   - Keep Call bell within reach  - Keep bed low and locked with side rails adjusted as appropriate  - Keep care items and personal belongings within reach  - Initiate and maintain comfort rounds  - Make Fall Risk Sign visible to staff  - Offer Toileting every  Hours,  in advance of need  - Initiate/Maintain alarm  - Obtain necessary fall risk management equipment:   - Apply yellow socks and bracelet for high fall risk patients  - Consider moving patient to room near nurses station  Outcome: Progressing  Goal: Maintain or return to baseline ADL function  Description: INTERVENTIONS:  -  Assess patient's ability to carry out ADLs; assess patient's baseline for ADL function and identify physical deficits which impact ability to perform ADLs (bathing, care of mouth/teeth, toileting, grooming, dressing, etc.)  - Assess/evaluate cause of self-care deficits   - Assess range of motion  - Assess patient's mobility; develop plan if impaired  - Assess patient's need for assistive devices and provide as appropriate  - Encourage maximum independence but intervene and supervise when necessary  - Involve family in performance of ADLs  - Assess for home care needs following discharge   - Consider OT consult to assist with ADL evaluation and planning for discharge  - Provide patient education as appropriate  Outcome: Progressing  Goal: Maintains/Returns to pre admission functional level  Description: INTERVENTIONS:  - Perform AM-PAC 6 Click Basic Mobility/ Daily Activity assessment daily.  - Set and communicate daily mobility goal to care team and patient/family/caregiver.   - Collaborate with rehabilitation services on mobility goals if consulted  - Perform Range of Motion  times a day.  - Reposition patient every  hours.  - Dangle patient times a day  - Stand patient  times a day  - Ambulate patient  times a day  - Out of bed to chair  times a day   - Out of bed for meals times a day  - Out of bed for toileting  - Record patient progress and toleration of activity level   Outcome: Progressing     Problem: DISCHARGE PLANNING  Goal: Discharge to home or other facility with appropriate resources  Description: INTERVENTIONS:  - Identify barriers to discharge w/patient and caregiver  - Arrange for  needed discharge resources and transportation as appropriate  - Identify discharge learning needs (meds, wound care, etc.)  - Arrange for interpretive services to assist at discharge as needed  - Refer to Case Management Department for coordinating discharge planning if the patient needs post-hospital services based on physician/advanced practitioner order or complex needs related to functional status, cognitive ability, or social support system  Outcome: Progressing     Problem: Knowledge Deficit  Goal: Patient/family/caregiver demonstrates understanding of disease process, treatment plan, medications, and discharge instructions  Description: Complete learning assessment and assess knowledge base.  Interventions:  - Provide teaching at level of understanding  - Provide teaching via preferred learning methods  Outcome: Progressing

## 2024-08-01 NOTE — DISCHARGE INSTR - AVS FIRST PAGE
Dear Otilio Machuca,     It was our pleasure to care for you here at [facility].  It is our hope that we were always able to meet and exceed the expected standards for your care during your stay.  You were hospitalized due to Sepsis secondary to urinary tract infection .  You were cared for on the South 4th  floor under the service of Rebeca Saldaña PA-C with the [facility] who covers for your primary care physician (PCP), Candelaira Schuster MD, while you were hospitalized.  If you have any questions or concerns related to this hospitalization, you may contact us at .  For follow up, we recommend that you follow up with your primary care physician.  Please review this entire discharge summary as additional general instructions may be provided later as well.  However, at this time we provide for you here, the most important instructions / recommendations at discharge:     Continue 5 day course of PO Keflex       Sincerely,     Rebeca Saldaña PA-C

## 2024-08-01 NOTE — DISCHARGE SUMMARY
[facility]  Discharge- Otilio Machuca 1945, 79 y.o. male MRN: [id number]  Unit/Bed#: W -01 Encounter: [id number]  Primary Care Provider: Candelaria Schuster MD   Date and time admitted to hospital: 7/29/2024  7:56 PM    * Sepsis secondary to UTI  (HCC)  Assessment & Plan  History of CAD and anxiety sent to the hospital by PCP found to have sepsis secondary to UTI.  Patient initially thought he had diverticulitis  Endorses dysuria which has improved since admission  Patient  has remained afebrile, leukocytosis has resolved   Urine Culture- mixed contaminates   Blood Cultures- no growth at 48 hours   D/C IV antibiotics- transition to PO antibiotics upon discharge     History of arthroplasty of left shoulder  Assessment & Plan  Saw orthopedics yesterday.  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  No change from prior study.   Recommend MRI/MRCP in one year   Follow up with PCP     Hyperlipidemia  Assessment & Plan  Continue pravastatin    Coronary artery disease involving native coronary artery  Assessment & Plan  CAD with history of PCI.  No chest pain.    Continue clopidogrel and statin        Medical Problems       Resolved Problems  Date Reviewed: 7/31/2024   None       Discharging Physician / Practitioner: Rebeca aSldaña PA-C  PCP: Candelaria Schuster MD  Admission Date:   Admission Orders (From admission, onward)       Ordered        07/29/24 2213  INPATIENT ADMISSION  Once                          Discharge Date: 08/01/24    Consultations During Hospital Stay:  None     Procedures Performed:   None     Significant Findings / Test Results:   Bladder wall thickening- correlate for cystitis.   Incidental Findings:   10 mm simple pancreatic cyst in the anterior body. No change from prior study. Preferred imaging modality: abdomen MRI and MRCP with and without IV contrast, or  triple phase abdomen CT with IV contrast, or abdomen MRI and MRCP without IV contrast.   Recommend MRI/MRCP in 1 year.   I reviewed the above mentioned incidental findings with the patient and/or family and they expressed understanding.    Test Results Pending at Discharge (will require follow up):   None      Outpatient Tests Requested:  Follow up with Family Physician   MRCP recommended in 1 year for follow up from incidental findings     Complications:  none     Reason for Admission: Sepsis     Hospital Course:   Otilio Machuca is a 79 y.o. male patient who originally presented to the hospital on 7/29/2024 due to RLQ abdominal pain. Patient as a past medical history significant for CAD, hyperlipidemia, anxiety, and recent arthroplasty of left shoulder. He presented to the Emergency Department for right lower abdominal pain, fever, and light headedness. Patient met SIRS criteria while in the ED due to fever, leukocytosis, and tachycardia. UA was obtained which was positive for leukocytes. Sepsis origin was determined to be urinary and patient was started on IV antibiotics. Received 3 days of IV antibiotics- was transitioned to PO antibiotics as blood cultures were negative. Urine Culture grew mixed contaminates, however patient did endorse urinary symptoms and CT imaging was consistent with cystitis. Will continue PO keflex upon discharge. Upon discharge patient denies any fever, chills, abdominal pain, or CVA tenderness. He will complete course of antibiotics and follow up with primary physician upon discharge.     Hospital Course: No notes on file    Please see above list of diagnoses and related plan for additional information.     Condition at Discharge: stable    Discharge Day Visit / Exam:   Subjective:  Patient seen and examined while resting in chair. He has no acute complaints. Reports that urinary symptoms have improved with antibiotics. He denies any fever, chills, shortness of breath or abdominal  "pain at this time. Tolerating PO diet and fluids. Ambulates without any difficulty.     Vitals: Blood Pressure: 133/67 (08/01/24 0747)  Pulse: 59 (08/01/24 0747)  Temperature: 97.9 °F (36.6 °C) (08/01/24 0747)  Temp Source: Oral (07/31/24 2237)  Respirations: 16 (08/01/24 0747)  Height: 5' 10\" (177.8 cm) (07/29/24 2329)  Weight - Scale: 106 kg (233 lb 11 oz) (07/29/24 2329)  SpO2: 96 % (08/01/24 0747)  Exam:   Physical Exam  Vitals reviewed.   Constitutional:       General: He is not in acute distress.     Appearance: He is not diaphoretic.   Cardiovascular:      Rate and Rhythm: Normal rate and regular rhythm.      Heart sounds: No murmur heard.  Pulmonary:      Effort: No respiratory distress.      Breath sounds: Normal breath sounds.   Musculoskeletal:      Right lower leg: No edema.      Left lower leg: No edema.   Neurological:      Mental Status: He is alert and oriented to person, place, and time.          Discussion with Family: Patient declined call to .     Discharge instructions/Information to patient and family:   See after visit summary for information provided to patient and family.      Provisions for Follow-Up Care:  See after visit summary for information related to follow-up care and any pertinent home health orders.      Mobility at time of Discharge:   Basic Mobility Inpatient Raw Score: 23  JH-HLM Goal: 7: Walk 25 feet or more  JH-HLM Achieved: 7: Walk 25 feet or more  HLM Goal achieved. Continue to encourage appropriate mobility.     Disposition:   Home    Planned Readmission: none      Discharge Statement:  I spent 45 minutes discharging the patient. This time was spent on the day of discharge. I had direct contact with the patient on the day of discharge. Greater than 50% of the total time was spent examining patient, answering all patient questions, arranging and discussing plan of care with patient as well as directly providing post-discharge instructions.  Additional time " then spent on discharge activities.    Discharge Medications:  See after visit summary for reconciled discharge medications provided to patient and/or family.      **Please Note: This note may have been constructed using a voice recognition system**

## 2024-08-01 NOTE — ASSESSMENT & PLAN NOTE
History of CAD and anxiety sent to the hospital by PCP found to have sepsis secondary to UTI.  Patient initially thought he had diverticulitis  Endorses dysuria which has improved since admission  Patient  has remained afebrile > 48 hours, leukocytosis has resolved   Procalcitonin- down trending   CT A/P: bladder wall thickening consistent with cystitis   Urine Culture- mixed contaminates   Blood Cultures- no growth at 48 hours   D/C IV antibiotics- transition to PO antibiotics upon discharge

## 2024-08-02 ENCOUNTER — TRANSITIONAL CARE MANAGEMENT (OUTPATIENT)
Dept: INTERNAL MEDICINE CLINIC | Facility: CLINIC | Age: 79
End: 2024-08-02

## 2024-08-02 ENCOUNTER — OFFICE VISIT (OUTPATIENT)
Dept: PHYSICAL THERAPY | Facility: REHABILITATION | Age: 79
End: 2024-08-02
Payer: COMMERCIAL

## 2024-08-02 DIAGNOSIS — G89.29 CHRONIC LEFT SHOULDER PAIN: Primary | ICD-10-CM

## 2024-08-02 DIAGNOSIS — Z96.612 STATUS POST TOTAL SHOULDER ARTHROPLASTY, LEFT: ICD-10-CM

## 2024-08-02 DIAGNOSIS — M25.512 CHRONIC LEFT SHOULDER PAIN: Primary | ICD-10-CM

## 2024-08-02 PROCEDURE — 97140 MANUAL THERAPY 1/> REGIONS: CPT

## 2024-08-02 PROCEDURE — 97112 NEUROMUSCULAR REEDUCATION: CPT

## 2024-08-02 PROCEDURE — 97110 THERAPEUTIC EXERCISES: CPT

## 2024-08-02 NOTE — PROGRESS NOTES
"Daily Note     Today's date: 2024  Patient name: Otilio Machuca  : 1945  MRN: [id number]  Referring provider: Narendra Hewitt PA-C  Dx:   Encounter Diagnosis     ICD-10-CM    1. Chronic left shoulder pain  M25.512     G89.29       2. Status post total shoulder arthroplasty, left  Z96.612                      Subjective: shoulder continues to feel good. He was working on his PROM while recovering from a UTI- he is feeling much better now.       Objective: See treatment diary below      Assessment: Tolerated treatment well. Continued to progress AROM with good tolerance. He continues be pain free with PROM and AROM. Intermittent corrections with S/L AROM tasks as he tends to compensate with fatigue. Continued PT would be beneficial to improve function.          Plan: Continue per plan of care.       Precautions:   Past Medical History:   Diagnosis Date    Arthritis     Celiac disease     Colon polyp     Coronary artery disease     Diverticulitis     Diverticulitis of ascending colon 10/27/2022    Diverticulosis     GERD (gastroesophageal reflux disease)     Hyperlipidemia     Hypertension     Kidney stone     Myocardial infarction (HCC)        * Indicates part of HEP   HEP code: [id number]     Daily Treatment Diary     Manuals      L shoulder PROM done done done done done Done       L elbow PROM done done         Scapular mobility                                 Neuro Re-Ed           Scapular retraction* 2x10 5\" 2x10x5\" 2x10x5\" 3x10x5\"       Scapular depression nv 2x10 2x10        Scapular retraction with ER           Chin tuck           Prone I's    nv 2x10 2x10     Prone T's    nv 2x10 2x10     Prone Y's           Resisted W's           Sustained YWT's           Supine serratus punch           Serratus push up           Plank shoulder taps           Plank to down dog                      Ther Ex           UBE           Gripping* 1'x2          Elbow PROM flx/ext* 10x      " "    Table slides - flexion* nv 15x5\" 20x5\" 20x5\" 20x5\" 20x5\"     Table slides - scaption* nv 15x5\" 20x5\" 20x5\" 20x5\" 20x5\"     Supine shoulder flexion with cane    2x10x5\" 2x10x5\" 2x10x5\"     Shoulder ER with cane nv nv 20x5\" 20x5\" 20x5\" 20x5\"     Shoulder isometrics against wall (NO IR until 3 weeks) nv Flx/abd/ext/ER 10x5\" Flx/abd/ext/ER 15x5\" Flx/abd/ext/ER 15x5\"       Sidelying shoulder ER    nv 2x10 2x10     Sidelying shoulder flexion     nv 2x10     Sidelying shoulder abduction    nv 2x10 2x10     Foam roller up wall           TB rows           TB lat pull down           Band pull aparts           IR/ER isometric walkouts           Active IR/ER           Band wall walks horizontal           Band wall walks vertical           Ball wall circles                      Patient education done                     Ther Activity           1 arm row           1 arm press                      Suitcase carry           90/90 carry           OH carry                                            Modalities           CP L shoulder - seated  10' post 10' post 10' post 10' post 10' post                                   "

## 2024-08-02 NOTE — UTILIZATION REVIEW
NOTIFICATION OF ADMISSION DISCHARGE   This is a Notification of Discharge from [facility]. Please be advised that this patient has been discharge from our facility. Below you will find the admission and discharge date and time including the patient’s disposition.   UTILIZATION REVIEW CONTACT:  Rachel Parisi  Utilization   Network Utilization Review Department  Phone: [phone] x carefully listen to the prompts. All voicemails are confidential.  Email: [email or web address]     ADMISSION INFORMATION  PRESENTATION DATE: 7/29/2024  7:56 PM  OBERVATION ADMISSION DATE: N/A  INPATIENT ADMISSION DATE: 7/29/24 10:14 PM   DISCHARGE DATE: 8/1/2024 11:26 AM   DISPOSITION:Home/Self Care    Network Utilization Review Department  ATTENTION: Please call with any questions or concerns to [phone] and carefully listen to the prompts so that you are directed to the right person. All voicemails are confidential.   For Discharge needs, contact Care Management DC Support Team at [phone] opt. 2  Send all requests for admission clinical reviews, approved or denied determinations and any other requests to dedicated fax number below belonging to the campus where the patient is receiving treatment. List of dedicated fax numbers for the Facilities:  FACILITY NAME UR FAX NUMBER   ADMISSION DENIALS (Administrative/Medical Necessity) [phone]   DISCHARGE SUPPORT TEAM ([facility]) [phone]   PARENT CHILD HEALTH (Maternity/NICU/Pediatrics) [phone]   [facility] [phone]   [facility] [phone]   [facility] [phone]   [facility] [phone]   [facility] [phone]   [facility] [phone]   [facility] [phone]   [facility] [phone]    [facility] [phone]   [facility] [phone]   [facility] [phone]   [facility] [phone]

## 2024-08-03 LAB
BACTERIA BLD CULT: NORMAL
BACTERIA BLD CULT: NORMAL

## 2024-08-06 ENCOUNTER — APPOINTMENT (OUTPATIENT)
Dept: PHYSICAL THERAPY | Facility: REHABILITATION | Age: 79
End: 2024-08-06
Payer: COMMERCIAL

## 2024-08-07 ENCOUNTER — TELEPHONE (OUTPATIENT)
Age: 79
End: 2024-08-07

## 2024-08-07 NOTE — TELEPHONE ENCOUNTER
Ramos, would you be willing to see this pt? Pt had on 7/2/24  ARTHROPLASTY SHOULDER- left anatomic, BICEPS TENODESIS (Left: Shoulder) .  Please see previous msg and Josephine's request for pt to be seen by [facility]. Pt lives in [place]  Thank you. Diamond

## 2024-08-07 NOTE — TELEPHONE ENCOUNTER
Hello,    Please advise if a forced appointment can be accommodated for the patient:    Call back #: [phone]    Insurance: [employer]    Reason for appointment: Patient was recently in the hospital. He has concerns about the scar and surrounding area. States the scar has become larger and oval. Would like to be seen for an eval asap    Requested doctor and/or location: [facility]/any      Thank you.

## 2024-08-08 PROBLEM — Z96.653 S/P TOTAL KNEE ARTHROPLASTY, BILATERAL: Status: ACTIVE | Noted: 2022-04-13

## 2024-08-08 PROBLEM — Z96.653 S/P TOTAL KNEE ARTHROPLASTY, BILATERAL: Status: ACTIVE | Noted: 2022-09-29

## 2024-08-08 PROBLEM — Z95.5 PRESENCE OF CORONARY ARTERY BYPASS GRAFT STENT: Status: ACTIVE | Noted: 2019-02-27

## 2024-08-08 PROBLEM — Z95.1 PRESENCE OF CORONARY ARTERY BYPASS GRAFT STENT: Status: ACTIVE | Noted: 2019-02-27

## 2024-08-12 ENCOUNTER — OFFICE VISIT (OUTPATIENT)
Dept: PHYSICAL THERAPY | Facility: REHABILITATION | Age: 79
End: 2024-08-12
Payer: COMMERCIAL

## 2024-08-12 DIAGNOSIS — G89.29 CHRONIC LEFT SHOULDER PAIN: Primary | ICD-10-CM

## 2024-08-12 DIAGNOSIS — Z96.612 STATUS POST TOTAL SHOULDER ARTHROPLASTY, LEFT: ICD-10-CM

## 2024-08-12 DIAGNOSIS — M25.512 CHRONIC LEFT SHOULDER PAIN: Primary | ICD-10-CM

## 2024-08-12 PROCEDURE — 97112 NEUROMUSCULAR REEDUCATION: CPT | Performed by: PHYSICAL THERAPIST

## 2024-08-12 PROCEDURE — 97140 MANUAL THERAPY 1/> REGIONS: CPT | Performed by: PHYSICAL THERAPIST

## 2024-08-12 PROCEDURE — 97110 THERAPEUTIC EXERCISES: CPT | Performed by: PHYSICAL THERAPIST

## 2024-08-12 NOTE — PROGRESS NOTES
Daily Note     Today's date: 2024  Patient name: Otilio Machuca  : 1945  MRN: [id number]  Referring provider: Narendra Hewitt PA-C  Dx:   Encounter Diagnosis     ICD-10-CM    1. Chronic left shoulder pain  M25.512     G89.29       2. Status post total shoulder arthroplasty, left  Z96.612           Start Time: 1020  Stop Time: 1105  Total time in clinic (min): 45 minutes    Subjective: The patient reports that he has been compliant with exercises at home as he has not been to PT since his last visit on . He notes improved range of motion and no pain. He follows up with Dr. Ann on .      Objective: See treatment diary below      Assessment: Tolerated treatment well. Patient  no reports of pain or discomfort with exercises. He does note some muscle fatigue. His passive range of motion continues to improve. He is most limited with ER. He will be 6 weeks post operative tomorrow.  Checked incision. No drainage, it appears dry and intact. Scab remains (small oval shape) near lateral portion of incision. Some redness noted along scar. Patient has been monitoring it for any changes, he feels that it has gotten smaller and is healing further. Recommended contacting surgeon if there are any changes including drainage, warmth to touch, redness or fever.       Plan: Continue per plan of care.      Precautions:   Past Medical History:   Diagnosis Date    Arthritis     Celiac disease     Colon polyp     Coronary artery disease     Diverticulitis     Diverticulitis of ascending colon 10/27/2022    Diverticulosis     GERD (gastroesophageal reflux disease)     Hyperlipidemia     Hypertension     Kidney stone     Myocardial infarction (HCC)        * Indicates part of HEP   HEP code: [id number]     Daily Treatment Diary     Manuals     L shoulder PROM done done done done done Done   done    L elbow PROM done done         Scapular mobility                                 Neuro  "Re-Ed           Scapular retraction* 2x10 5\" 2x10x5\" 2x10x5\" 3x10x5\"       Scapular depression nv 2x10 2x10        Scapular retraction with ER           Chin tuck           Prone I's    nv 2x10 2x10 2x10    Prone T's    nv 2x10 2x10 2x10    Prone Y's           Resisted W's           Sustained YWT's           Supine serratus punch           Serratus push up           Plank shoulder taps           Plank to down dog                      Ther Ex           UBE           Gripping* 1'x2          Elbow PROM flx/ext* 10x          Table slides - flexion* nv 15x5\" 20x5\" 20x5\" 20x5\" 20x5\" 20\"x5    Table slides - scaption* nv 15x5\" 20x5\" 20x5\" 20x5\" 20x5\" 20\"x5    Supine shoulder flexion with cane    2x10x5\" 2x10x5\" 2x10x5\" 2x10x5\"    Shoulder ER with cane nv nv 20x5\" 20x5\" 20x5\" 20x5\" 20x5\"    Shoulder isometrics against wall (NO IR until 3 weeks) nv Flx/abd/ext/ER 10x5\" Flx/abd/ext/ER 15x5\" Flx/abd/ext/ER 15x5\"       Sidelying shoulder ER    nv 2x10 2x10 2x10    Sidelying shoulder flexion     nv 2x10 2x10    Sidelying shoulder abduction    nv 2x10 2x10 2x10    Foam roller up wall           TB rows           TB lat pull down           Band pull aparts           IR/ER isometric walkouts           Active IR/ER           Band wall walks horizontal           Band wall walks vertical           Ball wall circles                      Patient education done                     Ther Activity           1 arm row           1 arm press                      Suitcase carry           90/90 carry           OH carry                                            Modalities           CP L shoulder - seated  10' post 10' post 10' post 10' post 10' post 5' post                                    "

## 2024-08-15 ENCOUNTER — OFFICE VISIT (OUTPATIENT)
Dept: PHYSICAL THERAPY | Facility: REHABILITATION | Age: 79
End: 2024-08-15
Payer: COMMERCIAL

## 2024-08-15 DIAGNOSIS — G89.29 CHRONIC LEFT SHOULDER PAIN: Primary | ICD-10-CM

## 2024-08-15 DIAGNOSIS — M25.512 CHRONIC LEFT SHOULDER PAIN: Primary | ICD-10-CM

## 2024-08-15 DIAGNOSIS — Z96.612 STATUS POST TOTAL SHOULDER ARTHROPLASTY, LEFT: ICD-10-CM

## 2024-08-15 PROCEDURE — 97140 MANUAL THERAPY 1/> REGIONS: CPT

## 2024-08-15 PROCEDURE — 97112 NEUROMUSCULAR REEDUCATION: CPT

## 2024-08-15 PROCEDURE — 97110 THERAPEUTIC EXERCISES: CPT

## 2024-08-15 NOTE — PROGRESS NOTES
"Daily Note     Today's date: 8/15/2024  Patient name: Otilio Machuca  : 1945  MRN: [id number]  Referring provider: Narendra Hewitt PA-C  Dx:   Encounter Diagnosis     ICD-10-CM    1. Chronic left shoulder pain  M25.512     G89.29       2. Status post total shoulder arthroplasty, left  Z96.612           Start Time: 935  Stop Time: 1018  Total time in clinic (min): 43 minutes    Subjective: Patient reports shoulder as \"alright\" at start of session. He is to follow up with MD on Monday.       Objective: See treatment diary below      Assessment: Tolerated treatment well. Patient demonstrated fatigue post treatment, exhibited good technique with therapeutic exercises, and would benefit from continued PT Patient's incision appearing redder around incision and red around scab area. No drainage. Patient instructed to call MD today in regards to incision with patient reporting understanding. Good tolerance to additional TE/increased reps today, no shoulder pain noted only reports of muscle fatigue.      Plan: Continue per plan of care.  Progress treatment as tolerated.       Precautions:   Past Medical History:   Diagnosis Date    Arthritis     Celiac disease     Colon polyp     Coronary artery disease     Diverticulitis     Diverticulitis of ascending colon 10/27/2022    Diverticulosis     GERD (gastroesophageal reflux disease)     Hyperlipidemia     Hypertension     Kidney stone     Myocardial infarction (HCC)        * Indicates part of HEP   HEP code: [id number]     Daily Treatment Diary     Manuals 7/11 7/16 7/18 7/22 7/26 8/2 8/12 8/15   L shoulder PROM done done done done done Done   done done   L elbow PROM done done         Scapular mobility                                 Neuro Re-Ed           Scapular retraction* 2x10 5\" 2x10x5\" 2x10x5\" 3x10x5\"       Scapular depression nv 2x10 2x10        Scapular retraction with ER           Chin tuck           Prone I's    nv 2x10 2x10 2x10 3x10   Prone T's    nv " "2x10 2x10 2x10 3x10   Prone Y's           Resisted W's           Sustained YWT's           Supine serratus punch           Serratus push up           Plank shoulder taps           Plank to down dog                      Ther Ex           UBE           Pulleys           Gripping* 1'x2       2' ea flex/scap   Elbow PROM flx/ext* 10x          Table slides - flexion* nv 15x5\" 20x5\" 20x5\" 20x5\" 20x5\" 20\"x5    Table slides - scaption* nv 15x5\" 20x5\" 20x5\" 20x5\" 20x5\" 20\"x5    Supine shoulder flexion with cane    2x10x5\" 2x10x5\" 2x10x5\" 2x10x5\" 2x10x5''   Shoulder ER with cane nv nv 20x5\" 20x5\" 20x5\" 20x5\" 20x5\" 20x5''   Shoulder isometrics against wall (NO IR until 3 weeks) nv Flx/abd/ext/ER 10x5\" Flx/abd/ext/ER 15x5\" Flx/abd/ext/ER 15x5\"       Sidelying shoulder ER    nv 2x10 2x10 2x10 3x10   Sidelying shoulder flexion     nv 2x10 2x10 3x10   Sidelying shoulder abduction    nv 2x10 2x10 2x10 3x10   Foam roller up wall           TB rows        Yellow 3x10   TB lat pull down           Band pull aparts           IR/ER isometric walkouts           Active IR/ER           Band wall walks horizontal           Band wall walks vertical           Ball wall circles                      Patient education done                     Ther Activity           1 arm row           1 arm press                      Suitcase carry           90/90 carry           OH carry                                            Modalities           CP L shoulder - seated  10' post 10' post 10' post 10' post 10' post 5' post                                      "

## 2024-08-19 ENCOUNTER — EVALUATION (OUTPATIENT)
Dept: PHYSICAL THERAPY | Facility: REHABILITATION | Age: 79
End: 2024-08-19
Payer: COMMERCIAL

## 2024-08-19 ENCOUNTER — OFFICE VISIT (OUTPATIENT)
Dept: OBGYN CLINIC | Facility: MEDICAL CENTER | Age: 79
End: 2024-08-19

## 2024-08-19 ENCOUNTER — APPOINTMENT (OUTPATIENT)
Dept: RADIOLOGY | Facility: MEDICAL CENTER | Age: 79
End: 2024-08-19
Payer: COMMERCIAL

## 2024-08-19 VITALS
HEART RATE: 67 BPM | BODY MASS INDEX: 33.36 KG/M2 | SYSTOLIC BLOOD PRESSURE: 129 MMHG | WEIGHT: 233 LBS | DIASTOLIC BLOOD PRESSURE: 78 MMHG | HEIGHT: 70 IN

## 2024-08-19 DIAGNOSIS — Z96.612 STATUS POST TOTAL SHOULDER ARTHROPLASTY, LEFT: ICD-10-CM

## 2024-08-19 DIAGNOSIS — G89.29 CHRONIC LEFT SHOULDER PAIN: Primary | ICD-10-CM

## 2024-08-19 DIAGNOSIS — M25.512 CHRONIC LEFT SHOULDER PAIN: Primary | ICD-10-CM

## 2024-08-19 DIAGNOSIS — Z96.612 STATUS POST TOTAL SHOULDER ARTHROPLASTY, LEFT: Primary | ICD-10-CM

## 2024-08-19 PROCEDURE — 73030 X-RAY EXAM OF SHOULDER: CPT

## 2024-08-19 PROCEDURE — 99024 POSTOP FOLLOW-UP VISIT: CPT | Performed by: ORTHOPAEDIC SURGERY

## 2024-08-19 PROCEDURE — 97112 NEUROMUSCULAR REEDUCATION: CPT | Performed by: PHYSICAL THERAPIST

## 2024-08-19 PROCEDURE — 97110 THERAPEUTIC EXERCISES: CPT | Performed by: PHYSICAL THERAPIST

## 2024-08-19 NOTE — PROGRESS NOTES
AAOx4, respirations even and unlabored, skin warm and dry, no apparent distress noted. Patient denies any questions upon discharge    [facility] [place]  [address]  [facility] [address]       Otilio Machuca  [id number]  1945    ORTHOPAEDIC SURGERY OUTPATIENT NOTE  8/19/2024      HISTORY:  79 y.o. male presents for postop visit 6-week status post left anatomic total shoulder arthroplasty.  We have been monitoring his wound.  He is recently mid to the hospital for COVID and UTI.  He reports no pain in the shoulder.    Past Medical History:   Diagnosis Date    Arthritis     Celiac disease     Colon polyp     Coronary artery disease     Diverticulitis     Diverticulitis of ascending colon 10/27/2022    Diverticulosis     GERD (gastroesophageal reflux disease)     Hyperlipidemia     Hypertension     Kidney stone     Myocardial infarction (HCC) 1990       Past Surgical History:   Procedure Laterality Date    COLONOSCOPY      CORONARY ANGIOPLASTY WITH STENT PLACEMENT      Multiple times    JOINT REPLACEMENT      SD ARTHROPLASTY GLENOHUMERAL JOINT TOTAL SHOULDER Left 7/2/2024    Procedure: ARTHROPLASTY SHOULDER- left anatomic, BICEPS TENODESIS;  Surgeon: Lalitha Ann DO;  Location: [facility]IN [facility];  Service: Orthopedics    SD ARTHRP KNE CONDYLE&PLATU MEDIAL&LAT COMPARTMENTS Right 04/13/2022    Procedure: ARTHROPLASTY KNEE TOTAL and all associated procedures;  Surgeon: Neeta Chaney MD;  Location: [facility];  Service: Orthopedics    SD ARTHRP KNE CONDYLE&PLATU MEDIAL&LAT COMPARTMENTS Left 09/29/2022    Procedure: ARTHROPLASTY KNEE TOTAL;  Surgeon: Neeta Chaney MD;  Location:  MAIN OR;  Service: Orthopedics       Social History     Socioeconomic History    Marital status: /Civil Union     Spouse name: Not on file    Number of children: 3    Years of education: Not on file    Highest education level: Not on file   Occupational History    Not on file   Tobacco Use    Smoking status: Former     Current packs/day: 0.00     Types: Cigarettes     Start date: 01/1964     Quit  date: 1985     Years since quittin.6     Passive exposure: Never    Smokeless tobacco: Never    Tobacco comments:     Per pt, quit over 36 years ago   Vaping Use    Vaping status: Never Used   Substance and Sexual Activity    Alcohol use: Not Currently    Drug use: Never    Sexual activity: Yes     Partners: Female     Birth control/protection: None   Other Topics Concern    Not on file   Social History Narrative    Not on file     Social Determinants of Health     Financial Resource Strain: Low Risk  (2023)    Overall Financial Resource Strain (CARDIA)     Difficulty of Paying Living Expenses: Not hard at all   Food Insecurity: No Food Insecurity (2024)    Hunger Vital Sign     Worried About Running Out of Food in the Last Year: Never true     Ran Out of Food in the Last Year: Never true   Transportation Needs: No Transportation Needs (2024)    PRAPARE - Transportation     Lack of Transportation (Medical): No     Lack of Transportation (Non-Medical): No   Physical Activity: Sufficiently Active (9/10/2019)    Exercise Vital Sign     Days of Exercise per Week: 3 days     Minutes of Exercise per Session: 60 min   Stress: Stress Concern Present (9/10/2019)    [employer] [place] [employer] - Occupational Stress Questionnaire     Feeling of Stress : To some extent   Social Connections: Moderately Isolated (9/10/2019)    Social Connection and Isolation Panel [NHANES]     Frequency of Communication with Friends and Family: Twice a week     Frequency of Social Gatherings with Friends and Family: Once a week     Attends [employer] Services: Never     Active Member of Clubs or Organizations: No     Attends Club or Organization Meetings: Never     Marital Status:    Intimate Partner Violence: Not At Risk (9/10/2019)    Humiliation, Afraid, Rape, and Kick questionnaire     Fear of Current or Ex-Partner: No     Emotionally Abused: No     Physically Abused: No     Sexually Abused: No   Housing  Stability: Low Risk  (7/30/2024)    Housing Stability Vital Sign     Unable to Pay for Housing in the Last Year: No     Number of Times Moved in the Last Year: 0     Homeless in the Last Year: No       Family History   Problem Relation Age of Onset    Diabetes Mother     Coronary artery disease Father     Thyroid disease Sister         Patient's Medications   New Prescriptions    No medications on file   Previous Medications    ASPIRIN (ECOTRIN LOW STRENGTH) 81 MG EC TABLET    Take 1 tablet (81 mg total) by mouth daily Please do not start taking until after total joint arthroplasty    B COMPLEX VITAMINS CAPSULE    Take 1 capsule by mouth daily    BACLOFEN 10 MG TABLET    TAKE 1 TABLET BY MOUTH 3 TIMES  DAILY AS NEEDED FOR MUSCLE  SPASM(S)    CHOLECALCIFEROL (VITAMIN D3) 1,000 UNITS TABLET    Take 1 tablet (1,000 Units total) by mouth daily    CLOPIDOGREL (PLAVIX) 75 MG TABLET    TAKE 1 TABLET BY MOUTH IN THE  MORNING    EZETIMIBE (ZETIA) 10 MG TABLET    TAKE 1 TABLET BY MOUTH DAILY    GABAPENTIN (NEURONTIN) 300 MG CAPSULE    Take 2 capsules (600 mg total) by mouth daily at bedtime    METOPROLOL SUCCINATE (TOPROL-XL) 50 MG 24 HR TABLET    TAKE 1 TABLET BY MOUTH DAILY    OMEGA-3-ACID ETHYL ESTERS (LOVAZA) 1 G CAPSULE    TAKE 2 CAPSULES BY MOUTH TWICE  DAILY    PANTOPRAZOLE (PROTONIX) 40 MG TABLET    TAKE 1 TABLET BY MOUTH EVERY DAY    SERTRALINE (ZOLOFT) 50 MG TABLET    TAKE 1 TABLET BY MOUTH DAILY    SIMVASTATIN (ZOCOR) 40 MG TABLET    TAKE 1 TABLET BY MOUTH DAILY AT  BEDTIME    TAMSULOSIN (FLOMAX) 0.4 MG    TAKE 1 CAPSULE BY MOUTH DAILY  WITH DINNER    TRAZODONE (DESYREL) 150 MG TABLET    TAKE 1 TABLET BY MOUTH AT BEDTIME   Modified Medications    No medications on file   Discontinued Medications    No medications on file       Allergies   Allergen Reactions    Crestor [Rosuvastatin] Myalgia    Ramipril Cough        /78 (BP Location: Right arm, Patient Position: Sitting, Cuff Size: Standard)   Pulse 67   Ht  "5' 10\" (1.778 m)   Wt 106 kg (233 lb)   BMI 33.43 kg/m²      REVIEW OF SYSTEMS:  Constitutional: Negative.    HEENT: Negative.    Respiratory: Negative.    Skin: Negative.    Neurological: Negative.    Psychiatric/Behavioral: Negative.  Musculoskeletal: Negative except for that mentioned in the HPI.    /78 (BP Location: Right arm, Patient Position: Sitting, Cuff Size: Standard)   Pulse 67   Ht 5' 10\" (1.778 m)   Wt 106 kg (233 lb)   BMI 33.43 kg/m²   Gen: No acute distress, resting comfortably in bed  HEENT: Eyes clear, moist mucus membranes, hearing intact  Respiratory: No audible wheezing or stridor  Cardiovascular: Well Perfused peripherally, 2+ distal pulse  Abdomen: nondistended, no peritoneal signs     PHYSICAL EXAM:    Left shoulder:  Incision is healing.  The distal 7 mm incision there is tissue which is improved from previous clinical images.  No drainage  Range of motion:  Forward flexion 160  External rotation 40  Internal rotation sacrum  Abduction 90    IMAGING: X-rays of the left shoulder taken the office today.  These reviewed demonstrate intact total shoulder arthroplasty in stable positioning    ASSESSMENT AND PLAN:  79 y.o. male 6-week status post left total shoulder arthroplasty.  His incision continues to improve and heal over the distal portion.  He will continue to keep this clean and dry.  He may shower but do not soak or scrub this.  No ointments or creams.  He may dab it with an alcohol swab to dry the incision.  He will continue with therapy per the protocol.  Will see him back in 3 weeks for a wound check.    Scribe Attestation      I,:  Narendra Hewitt PA-C am acting as a scribe while in the presence of the attending physician.:       I,:  Lalitha Ann personally performed the services described in this documentation    as scribed in my presence.:             "

## 2024-08-22 ENCOUNTER — OFFICE VISIT (OUTPATIENT)
Dept: PHYSICAL THERAPY | Facility: REHABILITATION | Age: 79
End: 2024-08-22
Payer: COMMERCIAL

## 2024-08-22 DIAGNOSIS — Z96.612 STATUS POST TOTAL SHOULDER ARTHROPLASTY, LEFT: ICD-10-CM

## 2024-08-22 DIAGNOSIS — G89.29 CHRONIC LEFT SHOULDER PAIN: Primary | ICD-10-CM

## 2024-08-22 DIAGNOSIS — M25.512 CHRONIC LEFT SHOULDER PAIN: Primary | ICD-10-CM

## 2024-08-22 PROCEDURE — 97112 NEUROMUSCULAR REEDUCATION: CPT | Performed by: PHYSICAL THERAPIST

## 2024-08-22 PROCEDURE — 97110 THERAPEUTIC EXERCISES: CPT | Performed by: PHYSICAL THERAPIST

## 2024-08-22 NOTE — PROGRESS NOTES
"Daily Note     Today's date: 2024  Patient name: Otilio Machuca  : 1945  MRN: [id number]  Referring provider: Narendra Hewitt PA-C  Dx:   Encounter Diagnosis     ICD-10-CM    1. Chronic left shoulder pain  M25.512     G89.29       2. Status post total shoulder arthroplasty, left  Z96.612           Start Time: 928  Stop Time: 1020  Total time in clinic (min): 52 minutes    Subjective: Otilio reports he's doing well at start of session.       Objective: See treatment diary below      Assessment: Tolerated treatment well. Patient demonstrated appropriate level of challenge and muscular fatigue throughout session and noted good status at end of visit. Patient demonstrated fatigue post treatment, exhibited good technique with therapeutic exercises, and would benefit from continued PT.      Plan: Continue per plan of care.  Progress treatment as tolerated.       Precautions:   Past Medical History:   Diagnosis Date    Arthritis     Celiac disease     Colon polyp     Coronary artery disease     Diverticulitis     Diverticulitis of ascending colon 10/27/2022    Diverticulosis     GERD (gastroesophageal reflux disease)     Hyperlipidemia     Hypertension     Kidney stone     Myocardial infarction (HCC)        * Indicates part of HEP   HEP code: [id number]     Daily Treatment Diary     Manuals 8/19 8/22  7/22 7/26 8/2 8/12 8/15   L shoulder PROM    done done Done   done done   L elbow PROM           Scapular mobility                                 Neuro Re-Ed           Scapular retraction*    3x10x5\"       Scapular depression           Scapular retraction with ER           Chin tuck           Prone I's 3x10 2# 3x10 2#  nv 2x10 2x10 2x10 3x10   Prone T's 3x10 3x10  nv 2x10 2x10 2x10 3x10   Prone Y's           Resisted W's           Sustained YWT's           Supine serratus punch           Serratus push up           Plank shoulder taps           Plank to down dog                      Ther Ex           UBE " "          Pulleys - flexion/scaption 3'/3' 3'/3'         Gripping*        2' ea flex/scap   Elbow PROM flx/ext*           Table slides - flexion*    20x5\" 20x5\" 20x5\" 20\"x5    Table slides - scaption*    20x5\" 20x5\" 20x5\" 20\"x5    Supine shoulder flexion with cane    2x10x5\" 2x10x5\" 2x10x5\" 2x10x5\" 2x10x5''   Shoulder ER with cane 20x5\"  20x5\"  20x5\" 20x5\" 20x5\" 20x5\" 20x5''   Shoulder isometrics against wall (NO IR until 3 weeks)    Flx/abd/ext/ER 15x5\"       Sidelying shoulder ER 3x10 3x10 1#  nv 2x10 2x10 2x10 3x10   Sidelying shoulder flexion 3x10 3x12   nv 2x10 2x10 3x10   Sidelying shoulder abduction 3x10 3x12  nv 2x10 2x10 2x10 3x10   Supine ABCs 2x bw 2x 2#         Foam roller up wall           TB rows Yellow stroop 3x10 Yellow stroop 3x10      Yellow 3x10   TB lat pull down  Yellow stroop 3x10         Band pull aparts           IR/ER isometric walkouts           Active IR/ER Purple stroop 2x10 Yellow stroop IR 3x10, purple stroop ER 3x10         Band wall walks horizontal           Band wall walks vertical           Ball wall circles                      Patient education                      Ther Activity           1 arm row           1 arm press                      Suitcase carry           90/90 carry           OH carry                                            Modalities           CP L shoulder - seated 10' post 7' post  10' post 10' post 10' post 5' post                        1:1 from 9:28 AM - 10:06 AM    "

## 2024-08-26 ENCOUNTER — OFFICE VISIT (OUTPATIENT)
Dept: PHYSICAL THERAPY | Facility: REHABILITATION | Age: 79
End: 2024-08-26
Payer: COMMERCIAL

## 2024-08-26 DIAGNOSIS — Z96.612 STATUS POST TOTAL SHOULDER ARTHROPLASTY, LEFT: ICD-10-CM

## 2024-08-26 DIAGNOSIS — M25.512 CHRONIC LEFT SHOULDER PAIN: Primary | ICD-10-CM

## 2024-08-26 DIAGNOSIS — G89.29 CHRONIC LEFT SHOULDER PAIN: Primary | ICD-10-CM

## 2024-08-26 PROCEDURE — 97112 NEUROMUSCULAR REEDUCATION: CPT | Performed by: PHYSICAL THERAPIST

## 2024-08-26 PROCEDURE — 97110 THERAPEUTIC EXERCISES: CPT | Performed by: PHYSICAL THERAPIST

## 2024-08-26 NOTE — PROGRESS NOTES
Daily Note     Today's date: 2024  Patient name: Otilio Machuca  : 1945  MRN: [id number]  Referring provider: Narendra Hewitt PA-C  Dx:   Encounter Diagnosis     ICD-10-CM    1. Chronic left shoulder pain  M25.512     G89.29       2. Status post total shoulder arthroplasty, left  Z96.612           Start Time: 1000  Stop Time: 1048  Total time in clinic (min): 48 minutes    Subjective: Otilio offers no new complaints at start of session.       Objective: See treatment diary below      Assessment: Tolerated treatment well. Challenged with progressions as indicated below. Introduced standing scaption with strap; initially noted compensatory shoulder shrug with shoulder elevation but improved with increased reps. Patient demonstrated appropriate level of challenge and muscular fatigue throughout session and noted good status at end of visit. Patient demonstrated fatigue post treatment, exhibited good technique with therapeutic exercises, and would benefit from continued PT.      Plan: Continue per plan of care.  Progress treatment as tolerated.       Precautions:   Past Medical History:   Diagnosis Date    Arthritis     Celiac disease     Colon polyp     Coronary artery disease     Diverticulitis     Diverticulitis of ascending colon 10/27/2022    Diverticulosis     GERD (gastroesophageal reflux disease)     Hyperlipidemia     Hypertension     Kidney stone     Myocardial infarction (HCC)        * Indicates part of HEP   HEP code: [id number]     Daily Treatment Diary     Manuals 8/19 8/22 8/26  7/26 8/2 8/12 8/15   L shoulder PROM     done Done   done done   L elbow PROM           Scapular mobility                                 Neuro Re-Ed           Scapular retraction*           Scapular depression           Scapular retraction with ER           Chin tuck           Prone I's 3x10 2# 3x10 2# 3x10 2#  2x10 2x10 2x10 3x10   Prone T's 3x10 3x10 3x10  2x10 2x10 2x10 3x10   Prone Y's           Resisted  "W's           Sustained YWT's           Supine serratus punch           Serratus push up           Plank shoulder taps           Plank to down dog                      Ther Ex           UBE bwd/fwd - scapular endurance   2'/2'        Pulleys - flexion/scaption 3'/3' 3'/3' 3'/3'        Gripping*        2' ea flex/scap   Elbow PROM flx/ext*           Table slides - flexion*     20x5\" 20x5\" 20\"x5    Table slides - scaption*     20x5\" 20x5\" 20\"x5    Supine shoulder flexion with cane   20x10\"  2x10x5\" 2x10x5\" 2x10x5\" 2x10x5''   Shoulder ER with cane 20x5\"  20x5\" 20x5\"  20x5\" 20x5\" 20x5\" 20x5''   Sidelying shoulder ER 3x10 3x10 1# 3x10 1#  2x10 2x10 2x10 3x10   Sidelying shoulder flexion 3x10 3x12 2x12, 1x10 1#  nv 2x10 2x10 3x10   Sidelying shoulder abduction 3x10 3x12 3x10 1#  2x10 2x10 2x10 3x10   Supine ABCs 2x bw 2x 2# 2x 3#        Foam roller up wall           TB rows Yellow stroop 3x10 Yellow stroop 3x10 Yellow stroop 3x10     Yellow 3x10   TB lat pull down  Yellow stroop 3x10 Yellow stroop 3x10        Band pull aparts           IR/ER isometric walkouts           Active IR/ER Purple stroop 2x10 Yellow stroop IR 3x10, purple stroop ER 3x10 Yellow stroop IR 3x10, purple stroop ER 3x10        Band wall walks horizontal           Band wall walks vertical           Ball wall circles                      Patient education                      Ther Activity           Standing scaption   3x10        1 arm row           1 arm press                      Suitcase carry           90/90 carry           OH carry                                            Modalities           CP L shoulder - seated 10' post 7' post   10' post 10' post 5' post                          "

## 2024-08-29 ENCOUNTER — OFFICE VISIT (OUTPATIENT)
Dept: PHYSICAL THERAPY | Facility: REHABILITATION | Age: 79
End: 2024-08-29
Payer: COMMERCIAL

## 2024-08-29 DIAGNOSIS — G89.29 CHRONIC LEFT SHOULDER PAIN: Primary | ICD-10-CM

## 2024-08-29 DIAGNOSIS — M25.512 CHRONIC LEFT SHOULDER PAIN: Primary | ICD-10-CM

## 2024-08-29 DIAGNOSIS — Z96.612 STATUS POST TOTAL SHOULDER ARTHROPLASTY, LEFT: ICD-10-CM

## 2024-08-29 PROCEDURE — 97112 NEUROMUSCULAR REEDUCATION: CPT | Performed by: PHYSICAL THERAPIST

## 2024-08-29 PROCEDURE — 97110 THERAPEUTIC EXERCISES: CPT | Performed by: PHYSICAL THERAPIST

## 2024-08-29 NOTE — PROGRESS NOTES
Daily Note     Today's date: 2024  Patient name: Otilio Machuca  : 1945  MRN: [id number]  Referring provider: Narendra Hewitt PA-C  Dx:   Encounter Diagnosis     ICD-10-CM    1. Chronic left shoulder pain  M25.512     G89.29       2. Status post total shoulder arthroplasty, left  Z96.612           Start Time: 1620  Stop Time: 1710  Total time in clinic (min): 50 minutes    Subjective: Otilio reports his L shoulder is feeling better than his R shoulder.       Objective: See treatment diary below      Assessment: Tolerated treatment well. Challenged with progressions as indicated below. Patient demonstrated appropriate level of challenge and muscular fatigue throughout session and noted good status at end of visit. Patient demonstrated fatigue post treatment, exhibited good technique with therapeutic exercises, and would benefit from continued PT      Plan: Continue per plan of care.  Progress treatment as tolerated.       Precautions:   Past Medical History:   Diagnosis Date    Arthritis     Celiac disease     Colon polyp     Coronary artery disease     Diverticulitis     Diverticulitis of ascending colon 10/27/2022    Diverticulosis     GERD (gastroesophageal reflux disease)     Hyperlipidemia     Hypertension     Kidney stone     Myocardial infarction (HCC)        * Indicates part of HEP   HEP code: [id number]     Daily Treatment Diary     Manuals 8/19 8/22 8/26 8/29  8/2 8/12 8/15   L shoulder PROM      Done   done done   L elbow PROM           Scapular mobility                                 Neuro Re-Ed           Scapular retraction*           Scapular depression           Scapular retraction with ER           Chin tuck           Prone I's 3x10 2# 3x10 2# 3x10 2# 3x12 2#  2x10 2x10 3x10   Prone T's 3x10 3x10 3x10 3x10 1#  2x10 2x10 3x10   Prone Y's           Resisted W's           Sustained YWT's           Supine serratus punch           Serratus push up           Plank shoulder taps          "  Plank to down dog                      Ther Ex           UBE bwd/fwd - scapular endurance   2'/2' nv       Pulleys - flexion/scaption 3'/3' 3'/3' 3'/3' 3'/3'       Gripping*        2' ea flex/scap   Elbow PROM flx/ext*           Table slides - flexion*      20x5\" 20\"x5    Table slides - scaption*      20x5\" 20\"x5    Supine shoulder flexion with cane   20x10\" 20x10\"  2x10x5\" 2x10x5\" 2x10x5''   Shoulder ER with cane 20x5\"  20x5\" 20x5\" 20x5\"  20x5\" 20x5\" 20x5''   Sidelying shoulder ER 3x10 3x10 1# 3x10 1# 3x12 1#  2x10 2x10 3x10   Sidelying shoulder flexion 3x10 3x12 2x12, 1x10 1# 3x10 1#  2x10 2x10 3x10   Sidelying shoulder abduction 3x10 3x12 3x10 1# 3x10 1#  2x10 2x10 3x10   Supine ABCs 2x bw 2x 2# 2x 3#        Foam roller up wall           TB rows Yellow stroop 3x10 Yellow stroop 3x10 Yellow stroop 3x10 Yellow stroop 3x12    Yellow 3x10   TB lat pull down  Yellow stroop 3x10 Yellow stroop 3x10 Yellow stroop 3x12       Band pull aparts           IR/ER isometric walkouts           Active IR/ER Purple stroop 2x10 Yellow stroop IR 3x10, purple stroop ER 3x10 Yellow stroop IR 3x10, purple stroop ER 3x10 Yellow stroop IR 3x10, purple stroop ER 3x10       Band wall walks horizontal           Band wall walks vertical           Ball wall circles                      Patient education                      Ther Activity           Standing scaption   3x10 3x10       1 arm row           1 arm press                      Suitcase carry           90/90 carry           OH carry                                            Modalities           CP L shoulder - seated 10' post 7' post    10' post 5' post                            "

## 2024-09-04 ENCOUNTER — OFFICE VISIT (OUTPATIENT)
Dept: PHYSICAL THERAPY | Facility: REHABILITATION | Age: 79
End: 2024-09-04
Payer: COMMERCIAL

## 2024-09-04 DIAGNOSIS — Z96.612 STATUS POST TOTAL SHOULDER ARTHROPLASTY, LEFT: ICD-10-CM

## 2024-09-04 DIAGNOSIS — G89.29 CHRONIC LEFT SHOULDER PAIN: Primary | ICD-10-CM

## 2024-09-04 DIAGNOSIS — M25.512 CHRONIC LEFT SHOULDER PAIN: Primary | ICD-10-CM

## 2024-09-04 PROCEDURE — 97112 NEUROMUSCULAR REEDUCATION: CPT | Performed by: PHYSICAL THERAPIST

## 2024-09-04 PROCEDURE — 97110 THERAPEUTIC EXERCISES: CPT | Performed by: PHYSICAL THERAPIST

## 2024-09-04 NOTE — PROGRESS NOTES
Daily Note     Today's date: 2024  Patient name: Otilio Machuca  : 1945  MRN: [id number]  Referring provider: Narendra Hewitt PA-C  Dx:   Encounter Diagnosis     ICD-10-CM    1. Chronic left shoulder pain  M25.512     G89.29       2. Status post total shoulder arthroplasty, left  Z96.612           Start Time: 1430  Stop Time: 1526  Total time in clinic (min): 56 minutes    Subjective: Otilio reports he was able to trim the hedges without L shoulder trouble.       Objective: See treatment diary below      Assessment: Tolerated treatment well. Challenged with progressions in weight as indicated below. Patient demonstrated appropriate level of challenge and muscular fatigue throughout session and noted good status at end of visit. Patient demonstrated fatigue post treatment, exhibited good technique with therapeutic exercises, and would benefit from continued PT.      Plan: Continue per plan of care.  Progress treatment as tolerated.       Precautions:   Past Medical History:   Diagnosis Date    Arthritis     Celiac disease     Colon polyp     Coronary artery disease     Diverticulitis     Diverticulitis of ascending colon 10/27/2022    Diverticulosis     GERD (gastroesophageal reflux disease)     Hyperlipidemia     Hypertension     Kidney stone     Myocardial infarction (HCC)        * Indicates part of HEP   HEP code: [id number]     Daily Treatment Diary     Manuals 8/19 8/22 8/26 8/29 9/4  8/12 8/15   L shoulder PROM       done done   L elbow PROM           Scapular mobility                                 Neuro Re-Ed           Scapular retraction*           Scapular depression           Scapular retraction with ER           Chin tuck           Prone I's 3x10 2# 3x10 2# 3x10 2# 3x12 2# 3x12 2#  2x10 3x10   Prone T's 3x10 3x10 3x10 3x10 1# 3x10 1#  2x10 3x10   Prone Y's           Resisted W's           Sustained YWT's           Supine serratus punch           Serratus push up           Plank  "shoulder taps           Plank to down dog                      Ther Ex           UBE bwd/fwd - scapular endurance   2'/2' nv 2.5'/2.5' 20W      Pulleys - flexion/scaption 3'/3' 3'/3' 3'/3' 3'/3' 3'/3'      Gripping*        2' ea flex/scap   Elbow PROM flx/ext*           Table slides - flexion*       20\"x5    Table slides - scaption*       20\"x5    Supine shoulder flexion with cane   20x10\" 20x10\" 20x10\"  2x10x5\" 2x10x5''   Shoulder ER with cane 20x5\"  20x5\" 20x5\" 20x5\" 20x5\"  20x5\" 20x5''   Sidelying shoulder ER 3x10 3x10 1# 3x10 1# 3x12 1# 3x10 2#  2x10 3x10   Sidelying shoulder flexion 3x10 3x12 2x12, 1x10 1# 3x10 1# 3x10 1#  2x10 3x10   Sidelying shoulder abduction 3x10 3x12 3x10 1# 3x10 1# 3x10 2#  2x10 3x10   Supine ABCs 2x bw 2x 2# 2x 3#        Foam roller up wall           TB rows Yellow stroop 3x10 Yellow stroop 3x10 Yellow stroop 3x10 Yellow stroop 3x12    Yellow 3x10   TB lat pull down  Yellow stroop 3x10 Yellow stroop 3x10 Yellow stroop 3x12       Band pull aparts           IR/ER isometric walkouts           Active IR/ER Purple stroop 2x10 Yellow stroop IR 3x10, purple stroop ER 3x10 Yellow stroop IR 3x10, purple stroop ER 3x10 Yellow stroop IR 3x10, purple stroop ER 3x10       Band wall walks horizontal           Band wall walks vertical           Ball wall circles                      Patient education                      Ther Activity           Standing scaption   3x10 3x10 3x10 1#      1 arm row           1 arm press                      Suitcase carry           90/90 carry           OH carry                                            Modalities           CP L shoulder - seated 10' post 7' post   5' post  5' post                              "

## 2024-09-06 ENCOUNTER — OFFICE VISIT (OUTPATIENT)
Dept: PHYSICAL THERAPY | Facility: REHABILITATION | Age: 79
End: 2024-09-06
Payer: COMMERCIAL

## 2024-09-06 DIAGNOSIS — M25.512 CHRONIC LEFT SHOULDER PAIN: Primary | ICD-10-CM

## 2024-09-06 DIAGNOSIS — G89.29 CHRONIC LEFT SHOULDER PAIN: Primary | ICD-10-CM

## 2024-09-06 DIAGNOSIS — Z96.612 STATUS POST TOTAL SHOULDER ARTHROPLASTY, LEFT: ICD-10-CM

## 2024-09-06 PROCEDURE — 97110 THERAPEUTIC EXERCISES: CPT

## 2024-09-06 PROCEDURE — 97112 NEUROMUSCULAR REEDUCATION: CPT

## 2024-09-06 NOTE — PROGRESS NOTES
Daily Note     Today's date: 2024  Patient name: Otilio Machuca  : 1945  MRN: [id number]  Referring provider: Narendra Hewitt PA-C  Dx:   Encounter Diagnosis     ICD-10-CM    1. Chronic left shoulder pain  M25.512     G89.29       2. Status post total shoulder arthroplasty, left  Z96.612           Start Time: 1015  Stop Time: 1100  Total time in clinic (min): 45 minutes    Subjective: Patient reports shoulder has been good as of late, no complaints noted after previous session.      Objective: See treatment diary below      Assessment: Tolerated treatment well. Patient demonstrated fatigue post treatment, exhibited good technique with therapeutic exercises, and would benefit from continued PT Good tolerance to all TE performed, no shoulder pain noted, only reports of muscle fatigue especially with prone TE.       Plan: Continue per plan of care.  Progress treatment as tolerated.       Precautions:   Past Medical History:   Diagnosis Date    Arthritis     Celiac disease     Colon polyp     Coronary artery disease     Diverticulitis     Diverticulitis of ascending colon 10/27/2022    Diverticulosis     GERD (gastroesophageal reflux disease)     Hyperlipidemia     Hypertension     Kidney stone     Myocardial infarction (HCC)        * Indicates part of HEP   HEP code: [id number]     Daily Treatment Diary     Manuals 8/19 8/22 8/26 8/29 9/4 9/6  8/15   L shoulder PROM        done   L elbow PROM           Scapular mobility                                 Neuro Re-Ed           Scapular retraction*           Scapular depression           Scapular retraction with ER           Chin tuck           Prone I's 3x10 2# 3x10 2# 3x10 2# 3x12 2# 3x12 2# 3x12 2#  3x10   Prone T's 3x10 3x10 3x10 3x10 1# 3x10 1# 3x10 1#  3x10   Prone Y's           Resisted W's           Sustained YWT's           Supine serratus punch           Serratus push up           Plank shoulder taps           Plank to down dog                     "  Ther Ex           UBE bwd/fwd - scapular endurance   2'/2' nv 2.5'/2.5' 20W 2.5'/ 2.5 20 W     Pulleys - flexion/scaption 3'/3' 3'/3' 3'/3' 3'/3' 3'/3'      Gripping*        2' ea flex/scap   Elbow PROM flx/ext*           Table slides - flexion*           Table slides - scaption*           Supine shoulder flexion with cane   20x10\" 20x10\" 20x10\"   2x10x5''   Shoulder ER with cane 20x5\"  20x5\" 20x5\" 20x5\" 20x5\"   20x5''   Sidelying shoulder ER 3x10 3x10 1# 3x10 1# 3x12 1# 3x10 2# 3x10 2#  3x10   Sidelying shoulder flexion 3x10 3x12 2x12, 1x10 1# 3x10 1# 3x10 1# 3x10 1#  3x10   Sidelying shoulder abduction 3x10 3x12 3x10 1# 3x10 1# 3x10 2# 3x10 2#  3x10   Supine ABCs 2x bw 2x 2# 2x 3#        Foam roller up wall      Wall slides 20x5''     TB rows Yellow stroop 3x10 Yellow stroop 3x10 Yellow stroop 3x10 Yellow stroop 3x12  Yellow stroop 3x15  Yellow 3x10   TB lat pull down  Yellow stroop 3x10 Yellow stroop 3x10 Yellow stroop 3x12  Yellow stroop 3x15     Band pull aparts           IR/ER isometric walkouts           Active IR/ER Purple stroop 2x10 Yellow stroop IR 3x10, purple stroop ER 3x10 Yellow stroop IR 3x10, purple stroop ER 3x10 Yellow stroop IR 3x10, purple stroop ER 3x10  Yellow Stroop IR 3x12     Band wall walks horizontal           Band wall walks vertical           Ball wall circles                      Patient education                      Ther Activity           Standing scaption   3x10 3x10 3x10 1# 3x12 1#     1 arm row           1 arm press                      Suitcase carry           90/90 carry           OH carry                                            Modalities           CP L shoulder - seated 10' post 7' post   5' post 5' post                                 "

## 2024-09-07 PROBLEM — A41.9 SEPSIS SECONDARY TO UTI  (HCC): Status: RESOLVED | Noted: 2024-07-29 | Resolved: 2024-09-07

## 2024-09-07 PROBLEM — N39.0 SEPSIS SECONDARY TO UTI  (HCC): Status: RESOLVED | Noted: 2024-07-29 | Resolved: 2024-09-07

## 2024-09-09 ENCOUNTER — OFFICE VISIT (OUTPATIENT)
Dept: PHYSICAL THERAPY | Facility: REHABILITATION | Age: 79
End: 2024-09-09
Payer: COMMERCIAL

## 2024-09-09 DIAGNOSIS — Z96.612 STATUS POST TOTAL SHOULDER ARTHROPLASTY, LEFT: ICD-10-CM

## 2024-09-09 DIAGNOSIS — M25.512 CHRONIC LEFT SHOULDER PAIN: Primary | ICD-10-CM

## 2024-09-09 DIAGNOSIS — G89.29 CHRONIC LEFT SHOULDER PAIN: Primary | ICD-10-CM

## 2024-09-09 PROCEDURE — 97112 NEUROMUSCULAR REEDUCATION: CPT | Performed by: PHYSICAL THERAPIST

## 2024-09-09 PROCEDURE — 97110 THERAPEUTIC EXERCISES: CPT | Performed by: PHYSICAL THERAPIST

## 2024-09-09 NOTE — PROGRESS NOTES
Daily Note     Today's date: 2024  Patient name: Otilio Machuca  : 1945  MRN: [id number]  Referring provider: Narendra Hewitt PA-C  Dx:   Encounter Diagnosis     ICD-10-CM    1. Chronic left shoulder pain  M25.512     G89.29       2. Status post total shoulder arthroplasty, left  Z96.612           Start Time: 1004  Stop Time: 1048  Total time in clinic (min): 44 minutes    Subjective: Otilio reports he's doing very well at start of session.       Objective: See treatment diary below      Assessment: Tolerated treatment well. Easily fatigued with ball wall circles due to decreased muscular endurance. Patient demonstrated appropriate level of challenge and muscular fatigue throughout session and noted good status at end of visit. Patient demonstrated fatigue post treatment, exhibited good technique with therapeutic exercises, and would benefit from continued PT.      Plan: Continue per plan of care.  Progress treatment as tolerated.       Precautions:   Past Medical History:   Diagnosis Date    Arthritis     Celiac disease     Colon polyp     Coronary artery disease     Diverticulitis     Diverticulitis of ascending colon 10/27/2022    Diverticulosis     GERD (gastroesophageal reflux disease)     Hyperlipidemia     Hypertension     Kidney stone     Myocardial infarction (HCC)        * Indicates part of HEP   HEP code: [id number]     Daily Treatment Diary     Manuals     L shoulder PROM           L elbow PROM           Scapular mobility                                 Neuro Re-Ed           Scapular retraction*           Scapular depression           Scapular retraction with ER           Chin tuck           Prone I's 3x10 2# 3x10 2# 3x10 2# 3x12 2# 3x12 2# 3x12 2# 3x10 3#    Prone T's 3x10 3x10 3x10 3x10 1# 3x10 1# 3x10 1# 3x10 2#    Prone Y's           Resisted W's           Sustained YWT's           Supine serratus punch           Serratus push up           Plank  "shoulder taps           Plank to down dog                      Ther Ex           UBE bwd/fwd - scapular endurance   2'/2' nv 2.5'/2.5' 20W 2.5'/ 2.5 20 W 2.5'/2.5' 20W    Pulleys - flexion/scaption 3'/3' 3'/3' 3'/3' 3'/3' 3'/3'      Gripping*           Elbow PROM flx/ext*           Table slides - flexion*           Table slides - scaption*           Supine shoulder flexion with cane   20x10\" 20x10\" 20x10\"      Shoulder ER with cane 20x5\"  20x5\" 20x5\" 20x5\" 20x5\"      Sidelying shoulder ER 3x10 3x10 1# 3x10 1# 3x12 1# 3x10 2# 3x10 2#     Sidelying shoulder flexion 3x10 3x12 2x12, 1x10 1# 3x10 1# 3x10 1# 3x10 1#     Sidelying shoulder abduction 3x10 3x12 3x10 1# 3x10 1# 3x10 2# 3x10 2#     Supine ABCs 2x bw 2x 2# 2x 3#        Foam roller up wall      Wall slides 20x5'' 20x5\"    TB rows Yellow stroop 3x10 Yellow stroop 3x10 Yellow stroop 3x10 Yellow stroop 3x12  Yellow stroop 3x15 Red stroop 3x10    TB lat pull down  Yellow stroop 3x10 Yellow stroop 3x10 Yellow stroop 3x12  Yellow stroop 3x15 Yellow stroop 3x15    Band pull aparts           IR/ER isometric walkouts           Active IR/ER Purple stroop 2x10 Yellow stroop IR 3x10, purple stroop ER 3x10 Yellow stroop IR 3x10, purple stroop ER 3x10 Yellow stroop IR 3x10, purple stroop ER 3x10  Yellow Stroop IR 3x12 Yellow stroop 3x15 IR, 3x10 ER    Band wall walks horizontal           Band wall walks vertical           Ball wall circles       20x3 cw/ccw               Patient education                      Ther Activity           Standing scaption   3x10 3x10 3x10 1# 3x12 1# 3x10 3#    1 arm row           1 arm press                      Suitcase carry           90/90 carry           OH carry                                            Modalities           CP L shoulder - seated 10' post 7' post   5' post 5' post 6' post                                  "

## 2024-09-12 ENCOUNTER — OFFICE VISIT (OUTPATIENT)
Dept: PHYSICAL THERAPY | Facility: REHABILITATION | Age: 79
End: 2024-09-12
Payer: COMMERCIAL

## 2024-09-12 DIAGNOSIS — G89.29 CHRONIC LEFT SHOULDER PAIN: Primary | ICD-10-CM

## 2024-09-12 DIAGNOSIS — M25.512 CHRONIC LEFT SHOULDER PAIN: Primary | ICD-10-CM

## 2024-09-12 DIAGNOSIS — Z96.612 STATUS POST TOTAL SHOULDER ARTHROPLASTY, LEFT: ICD-10-CM

## 2024-09-12 PROCEDURE — 97110 THERAPEUTIC EXERCISES: CPT | Performed by: PHYSICAL THERAPIST

## 2024-09-12 PROCEDURE — 97112 NEUROMUSCULAR REEDUCATION: CPT | Performed by: PHYSICAL THERAPIST

## 2024-09-12 NOTE — PROGRESS NOTES
Daily Note     Today's date: 2024  Patient name: Otilio Machuca  : 1945  MRN: [id number]  Referring provider: Narendra Hewitt PA-C  Dx:   Encounter Diagnosis     ICD-10-CM    1. Chronic left shoulder pain  M25.512     G89.29       2. Status post total shoulder arthroplasty, left  Z96.612           Start Time: 1107  Stop Time: 1145  Total time in clinic (min): 38 minutes    Subjective: Otilio reports his shoulder has been feeling good. No new complaints.       Objective: See treatment diary below      Assessment: Tolerated treatment well. Patient demonstrated appropriate level of challenge and muscular fatigue throughout session and noted good status at end of visit. Updated and reviewed HEP with patient; patient verbalized and demonstrated understanding of all exercises. Patient demonstrated fatigue post treatment, exhibited good technique with therapeutic exercises, and would benefit from continued PT.      Plan: Continue per plan of care.  Progress treatment as tolerated.       Precautions:   Past Medical History:   Diagnosis Date    Arthritis     Celiac disease     Colon polyp     Coronary artery disease     Diverticulitis     Diverticulitis of ascending colon 10/27/2022    Diverticulosis     GERD (gastroesophageal reflux disease)     Hyperlipidemia     Hypertension     Kidney stone     Myocardial infarction (HCC)        * Indicates part of HEP   HEP code: [id number]     Daily Treatment Diary     Manuals    L shoulder PROM           L elbow PROM           Scapular mobility                                 Neuro Re-Ed           Scapular retraction           Scapular depression           Scapular retraction with ER           Chin tuck           Prone I's* 3x10 2# 3x10 2# 3x10 2# 3x12 2# 3x12 2# 3x12 2# 3x10 3# 3x10 3#   Prone T's* 3x10 3x10 3x10 3x10 1# 3x10 1# 3x10 1# 3x10 2# 3x10 2#   Prone Y's           Resisted W's           Sustained YWT's          "  Supine serratus punch           Serratus push up           Plank shoulder taps           Plank to down dog                      Ther Ex           UBE bwd/fwd - scapular endurance   2'/2' nv 2.5'/2.5' 20W 2.5'/ 2.5 20 W 2.5'/2.5' 20W 2.5'/2.5' 20W   Pulleys - flexion/scaption 3'/3' 3'/3' 3'/3' 3'/3' 3'/3'      Supine shoulder flexion with cane   20x10\" 20x10\" 20x10\"      Shoulder ER with cane 20x5\"  20x5\" 20x5\" 20x5\" 20x5\"      Sidelying shoulder ER* 3x10 3x10 1# 3x10 1# 3x12 1# 3x10 2# 3x10 2#     Sidelying shoulder flexion* 3x10 3x12 2x12, 1x10 1# 3x10 1# 3x10 1# 3x10 1#     Sidelying shoulder abduction* 3x10 3x12 3x10 1# 3x10 1# 3x10 2# 3x10 2#     Supine ABCs 2x bw 2x 2# 2x 3#        Foam roller up wall      Wall slides 20x5'' 20x5\" 20x5\"   TB rows Yellow stroop 3x10 Yellow stroop 3x10 Yellow stroop 3x10 Yellow stroop 3x12  Yellow stroop 3x15 Red stroop 3x10 Red stroop 3x10   TB lat pull down  Yellow stroop 3x10 Yellow stroop 3x10 Yellow stroop 3x12  Yellow stroop 3x15 Yellow stroop 3x15 Yellow stroop 3x15   Band pull aparts           IR/ER isometric walkouts           Active IR/ER Purple stroop 2x10 Yellow stroop IR 3x10, purple stroop ER 3x10 Yellow stroop IR 3x10, purple stroop ER 3x10 Yellow stroop IR 3x10, purple stroop ER 3x10  Yellow Stroop IR 3x12 Yellow stroop 3x15 IR, 3x10 ER Yellow stroop 3x15 IR, 3x10 ER   Band wall walks horizontal           Band wall walks vertical           Ball wall circles*       20x3 cw/ccw 20x3 cw/ccw              Patient education                      Ther Activity           Standing scaption   3x10 3x10 3x10 1# 3x12 1# 3x10 3# 3x10 3#   1 arm row           1 arm press                      Suitcase carry           90/90 carry           OH carry                                            Modalities           CP L shoulder - seated 10' post 7' post   5' post 5' post 6' post np                                   "

## 2024-09-17 ENCOUNTER — HOSPITAL ENCOUNTER (OUTPATIENT)
Dept: RADIOLOGY | Facility: HOSPITAL | Age: 79
Discharge: HOME | End: 2024-09-17
Attending: ORTHOPAEDIC SURGERY
Payer: COMMERCIAL

## 2024-09-17 ENCOUNTER — OFFICE VISIT (OUTPATIENT)
Dept: OBGYN CLINIC | Facility: CLINIC | Age: 79
End: 2024-09-17
Payer: COMMERCIAL

## 2024-09-17 VITALS — HEIGHT: 70 IN | WEIGHT: 233 LBS | BODY MASS INDEX: 33.36 KG/M2

## 2024-09-17 DIAGNOSIS — Z96.651 S/P TOTAL KNEE REPLACEMENT, RIGHT: ICD-10-CM

## 2024-09-17 DIAGNOSIS — Z96.652 S/P TOTAL KNEE REPLACEMENT, LEFT: Primary | ICD-10-CM

## 2024-09-17 DIAGNOSIS — Z96.652 S/P TOTAL KNEE REPLACEMENT, LEFT: ICD-10-CM

## 2024-09-17 PROCEDURE — 99213 OFFICE O/P EST LOW 20 MIN: CPT | Performed by: ORTHOPAEDIC SURGERY

## 2024-09-17 PROCEDURE — 73562 X-RAY EXAM OF KNEE 3: CPT

## 2024-09-17 NOTE — PROGRESS NOTES
Assessment:   Diagnosis ICD-10-CM Associated Orders   1. S/P total knee replacement, left  Z96.652 XR knee 3 vw left non injury      2. S/P total knee replacement, right  Z96.651 XR knee 3 vw right non injury          Plan:  New xrays of the bilateral knees were obtained today and reviewed   On exam, he has full ROM with no instability  Reminder to take antibiotics prior to any dental cleanings or procedures.   He cleared for all activities     To do next visit:  Return in about 1 year (around 9/17/2025) for bilateral knees.    The above stated was discussed in layman's terms and the patient expressed understanding.  All questions were answered to the patient's satisfaction.       Scribe Attestation      I,:  Mayi Mike am acting as a scribe while in the presence of the attending physician.:       I,:  Neeta Chaney MD personally performed the services described in this documentation    as scribed in my presence.:               Subjective:   Otilio Machuca is a 79 y.o. male who presents today for his bilateral total knee arthroplasty. He is 2 years s/p right total knee arthroplasty, 9/29/2022 and 2 1/2 years s/p left total knee arthroplasty, 4/13/2022.   Patient reports that he has been able to get back to all of his activities of daily living. He has no pain in the knees. His knees feels stable, with no numbness or tingling.     Review of systems negative unless otherwise specified in HPI  Review of Systems   Constitutional:  Negative for chills, fever and unexpected weight change.   HENT:  Negative for hearing loss, nosebleeds and sore throat.    Eyes:  Negative for pain, redness and visual disturbance.   Respiratory:  Negative for cough, shortness of breath and wheezing.    Cardiovascular:  Negative for chest pain, palpitations and leg swelling.   Gastrointestinal:  Negative for abdominal pain, nausea and vomiting.   Endocrine: Negative for polydipsia and polyuria.   Genitourinary:  Negative for  dysuria and hematuria.   Skin:  Negative for rash and wound.   Neurological:  Negative for dizziness, light-headedness and headaches.   Psychiatric/Behavioral:  Negative for decreased concentration, dysphoric mood and suicidal ideas. The patient is not nervous/anxious.        Past Medical History:   Diagnosis Date    Arthritis     Celiac disease     Colon polyp     Coronary artery disease     Diverticulitis     Diverticulitis of ascending colon 10/27/2022    Diverticulosis     GERD (gastroesophageal reflux disease)     Hyperlipidemia     Hypertension     Kidney stone     Myocardial infarction (HCC)        Past Surgical History:   Procedure Laterality Date    COLONOSCOPY      CORONARY ANGIOPLASTY WITH STENT PLACEMENT      Multiple times    JOINT REPLACEMENT      [place] ARTHROPLASTY GLENOHUMERAL JOINT TOTAL SHOULDER Left 2024    Procedure: ARTHROPLASTY SHOULDER- left anatomic, BICEPS TENODESIS;  Surgeon: Lalitha Ann DO;  Location: [facility];  Service: Orthopedics    [place] ARTHRP KNE CONDYLE&PLATU MEDIAL&LAT COMPARTMENTS Right 2022    Procedure: ARTHROPLASTY KNEE TOTAL and all associated procedures;  Surgeon: Neeta Chaney MD;  Location: [facility];  Service: Orthopedics    [place] ARTHRP KNE CONDYLE&PLATU MEDIAL&LAT COMPARTMENTS Left 2022    Procedure: ARTHROPLASTY KNEE TOTAL;  Surgeon: Neeta Chaney MD;  Location: [facility];  Service: Orthopedics       Family History   Problem Relation Age of Onset    Diabetes Mother     Coronary artery disease Father     Thyroid disease Sister        Social History     Occupational History    Not on file   Tobacco Use    Smoking status: Former     Current packs/day: 0.00     Types: Cigarettes     Start date: 1964     Quit date:      Years since quittin.7     Passive exposure: Never    Smokeless tobacco: Never    Tobacco comments:     Per pt, quit over 36 years ago   Vaping Use    Vaping status: Never Used   Substance and Sexual Activity    Alcohol  use: Not Currently    Drug use: Never    Sexual activity: Yes     Partners: Female     Birth control/protection: None         Current Outpatient Medications:     aspirin (ECOTRIN LOW STRENGTH) 81 mg EC tablet, Take 1 tablet (81 mg total) by mouth daily Please do not start taking until after total joint arthroplasty, Disp: 10 tablet, Rfl: 0    b complex vitamins capsule, Take 1 capsule by mouth daily, Disp: , Rfl:     baclofen 10 mg tablet, TAKE 1 TABLET BY MOUTH 3 TIMES  DAILY AS NEEDED FOR MUSCLE  SPASM(S), Disp: 270 tablet, Rfl: 0    cholecalciferol (VITAMIN D3) 1,000 units tablet, Take 1 tablet (1,000 Units total) by mouth daily, Disp: 60 tablet, Rfl: 2    clopidogrel (PLAVIX) 75 mg tablet, TAKE 1 TABLET BY MOUTH IN THE  MORNING, Disp: 100 tablet, Rfl: 2    ezetimibe (ZETIA) 10 mg tablet, TAKE 1 TABLET BY MOUTH DAILY, Disp: 100 tablet, Rfl: 1    gabapentin (NEURONTIN) 300 mg capsule, Take 2 capsules (600 mg total) by mouth daily at bedtime, Disp: 270 capsule, Rfl: 3    metoprolol succinate (TOPROL-XL) 50 mg 24 hr tablet, TAKE 1 TABLET BY MOUTH DAILY, Disp: 100 tablet, Rfl: 2    omega-3-acid ethyl esters (LOVAZA) 1 g capsule, TAKE 2 CAPSULES BY MOUTH TWICE  DAILY, Disp: 400 capsule, Rfl: 2    pantoprazole (PROTONIX) 40 mg tablet, TAKE 1 TABLET BY MOUTH EVERY DAY, Disp: 90 tablet, Rfl: 1    sertraline (ZOLOFT) 50 mg tablet, TAKE 1 TABLET BY MOUTH DAILY, Disp: 90 tablet, Rfl: 1    simvastatin (ZOCOR) 40 mg tablet, TAKE 1 TABLET BY MOUTH DAILY AT  BEDTIME, Disp: 100 tablet, Rfl: 1    tamsulosin (FLOMAX) 0.4 mg, TAKE 1 CAPSULE BY MOUTH DAILY  WITH DINNER, Disp: 100 capsule, Rfl: 1    traZODone (DESYREL) 150 mg tablet, TAKE 1 TABLET BY MOUTH AT BEDTIME, Disp: 90 tablet, Rfl: 1    Allergies   Allergen Reactions    Crestor [Rosuvastatin] Myalgia    Ramipril Cough            Vitals:       Body mass index is 33.43 kg/m².  Wt Readings from Last 3 Encounters:   09/17/24 106 kg (233 lb)   08/19/24 106 kg (233 lb)   07/29/24  "106 kg (233 lb 11 oz)       Objective:                    Right Knee Exam     Range of Motion   Extension:  0   Flexion:  130     Tests   Varus: negative Valgus: negative  Drawer:  Anterior - negative      Pivot shift: positive    Other   Erythema: absent  Sensation: normal  Pulse: present  Swelling: none  Effusion: no effusion present    Comments:  Calf is soft and non tender      Left Knee Exam     Range of Motion   Extension:  0   Flexion:  130     Tests   Varus: negative Valgus: negative  Drawer:  Anterior - negative       Pivot shift: positive    Other   Erythema: absent  Sensation: normal  Pulse: present  Swelling: none  Effusion: no effusion present    Comments:  Calf is soft and non tender            Diagnostics, reviewed and taken today if performed as documented:    The attending physician has personally reviewed the pertinent films in PACS and interpretation is as follows:  Xrays of the left knee 3 views: prothesis is in good anatomic position with no signs of loosening, fracture or dislocation.   Xrays of the right knee 3 views: prothesis is in good anatomic position with no signs of loosening, fracture or dislocation.     Procedures, if performed today:    Procedures    None performed      Portions of the record may have been created with voice recognition software.  Occasional wrong word or \"sound a like\" substitutions may have occurred due to the inherent limitations of voice recognition software.  Read the chart carefully and recognize, using context, where substitutions have occurred.  "

## 2024-09-17 NOTE — PATIENT INSTRUCTIONS
[facility]                                                                                               Dr. Neeta Chaney                                                                                                            ANTIBIOTIC USE FOR JOINT REPLACEMENT PATIENTS  You have had surgery to replace one of your joints with a metal prosthesis. Going forward, you should take oral antibiotics before any dental work, including routine cleanings. These procedures are a potential source of injection. If you develop an infection, it could spread to your operative joint and cause complications.  Please show the following guidelines to your medical doctor and dentist, so he/she can prescribe the appropriate medications.  The following information is recommended by the American [employer], Dental, and Orthopedic Associations:  STANDARD GENERAL PROPHYLAXIS  antibiotic prophylaxis recommended lifetime  Recommend refraining from dental procedures until 3 months post operatively  Amoxicillin for Adults:    500mg - Take 4 capsules (2 grams) orally one hour before the procedure  If allergic to Penicillin  Clindamycin for Adults:   300mg - Take 2 capsules (600 mg) orally one hour before the procedure  Azithromycin for Adults:  250mg - Take 2 capsules (500 mg) orally one hour before the procedure  Cephalexin for Adults:     500mg - Take 4 capsules (2 grams) orally one hour before the procedure  Note: Cephalosporins should not be used if you have ever developed an immediate hypersensitivity reaction (i.e., hives, swelling, severe itching, difficulty breathing) to Penicillin  Please feel free to contact our office at [phone] with questions or concerns.

## 2024-09-21 DIAGNOSIS — E78.2 MIXED HYPERLIPIDEMIA: ICD-10-CM

## 2024-09-23 RX ORDER — OMEGA-3-ACID ETHYL ESTERS 1 G/1
2 CAPSULE, LIQUID FILLED ORAL 2 TIMES DAILY
Qty: 400 CAPSULE | Refills: 1 | Status: SHIPPED | OUTPATIENT
Start: 2024-09-23

## 2024-10-07 DIAGNOSIS — I10 ESSENTIAL HYPERTENSION: ICD-10-CM

## 2024-10-08 ENCOUNTER — CONSULT (OUTPATIENT)
Dept: DERMATOLOGY | Facility: CLINIC | Age: 79
End: 2024-10-08
Payer: COMMERCIAL

## 2024-10-08 VITALS — BODY MASS INDEX: 32.43 KG/M2 | TEMPERATURE: 97 F | WEIGHT: 226 LBS

## 2024-10-08 DIAGNOSIS — L21.9 SEBORRHEIC DERMATITIS: ICD-10-CM

## 2024-10-08 PROCEDURE — 99204 OFFICE O/P NEW MOD 45 MIN: CPT | Performed by: DERMATOLOGY

## 2024-10-08 RX ORDER — METOPROLOL SUCCINATE 50 MG/1
TABLET, EXTENDED RELEASE ORAL
Qty: 100 TABLET | Refills: 1 | Status: SHIPPED | OUTPATIENT
Start: 2024-10-08

## 2024-10-08 RX ORDER — FLUOCINONIDE TOPICAL SOLUTION USP, 0.05% 0.5 MG/ML
SOLUTION TOPICAL 2 TIMES DAILY
Qty: 60 ML | Refills: 1 | Status: SHIPPED | OUTPATIENT
Start: 2024-10-08

## 2024-10-08 RX ORDER — CLOTRIMAZOLE 1 %
CREAM (GRAM) TOPICAL 2 TIMES DAILY
Qty: 12 G | Refills: 0 | Status: CANCELLED | OUTPATIENT
Start: 2024-10-08

## 2024-10-08 RX ORDER — KETOCONAZOLE 20 MG/G
CREAM TOPICAL DAILY
Qty: 30 G | Refills: 1 | Status: SHIPPED | OUTPATIENT
Start: 2024-10-08

## 2024-10-08 RX ORDER — KETOCONAZOLE 20 MG/ML
SHAMPOO TOPICAL
Qty: 100 ML | Refills: 10 | Status: SHIPPED | OUTPATIENT
Start: 2024-10-08

## 2024-10-08 NOTE — PROGRESS NOTES
"[facility] Note     Patient Name: Otilio Machuca  Encounter Date: 10/8/24     Have you been cared for by a [facility] Dermatologist in the last 3 years and, if so, which description applies to you?    NO.   I am considered a \"new\" patient and must complete all patient intake questions. I am MALE/not capable of bearing children.    REVIEW OF SYSTEMS:  Have you recently had or currently have any of the following? Recent fever or chills? No  Any non-healing wound? No   PAST MEDICAL HISTORY:  Have you personally ever had or currently have any of the following?  If \"YES,\" then please provide more detail. Skin cancer (such as Melanoma, Basal Cell Carcinoma, Squamous Cell Carcinoma?  No  Tuberculosis, HIV/AIDS, Hepatitis B or C: No  Radiation Treatment No   HISTORY OF IMMUNOSUPPRESSION:   Do you have a history of any of the following:  Systemic Immunosuppression such as Diabetes, Biologic or Immunotherapy, Chemotherapy, Organ Transplantation, Bone Marrow Transplantation or Prednisone?  No     Answering \"YES\" requires the addition of the dotphrase \"IMMUNOSUPPRESSED\" as the first diagnosis of the patient's visit.   FAMILY HISTORY:  Any \"first degree relatives\" (parent, brother, sister, or child) with the following?    Skin Cancer, Pancreatic or Other Cancer? No   PATIENT EXPERIENCE:    Do you want the Dermatologist to perform a COMPLETE skin exam today including a clinical examination under the \"bra and underwear\" areas?  NO  If necessary, do we have your permission to call and leave a detailed message on your Preferred Phone number that includes your specific medical information?  Yes      Allergies   Allergen Reactions    Crestor [Rosuvastatin] Myalgia    Ramipril Cough      Current Outpatient Medications:     aspirin (ECOTRIN LOW STRENGTH) 81 mg EC tablet, Take 1 tablet (81 mg total) by mouth daily Please do not start taking until after total joint arthroplasty, Disp: 10 tablet, Rfl: 0    b complex " vitamins capsule, Take 1 capsule by mouth daily, Disp: , Rfl:     baclofen 10 mg tablet, TAKE 1 TABLET BY MOUTH 3 TIMES  DAILY AS NEEDED FOR MUSCLE  SPASM(S), Disp: 270 tablet, Rfl: 0    cholecalciferol (VITAMIN D3) 1,000 units tablet, Take 1 tablet (1,000 Units total) by mouth daily, Disp: 60 tablet, Rfl: 2    clopidogrel (PLAVIX) 75 mg tablet, TAKE 1 TABLET BY MOUTH IN THE  MORNING, Disp: 100 tablet, Rfl: 2    ezetimibe (ZETIA) 10 mg tablet, TAKE 1 TABLET BY MOUTH DAILY, Disp: 100 tablet, Rfl: 1    gabapentin (NEURONTIN) 300 mg capsule, Take 2 capsules (600 mg total) by mouth daily at bedtime, Disp: 270 capsule, Rfl: 3    metoprolol succinate (TOPROL-XL) 50 mg 24 hr tablet, TAKE 1 TABLET BY MOUTH DAILY, Disp: 100 tablet, Rfl: 1    omega-3-acid ethyl esters (LOVAZA) 1 g capsule, TAKE 2 CAPSULES BY MOUTH TWICE  DAILY, Disp: 400 capsule, Rfl: 1    pantoprazole (PROTONIX) 40 mg tablet, TAKE 1 TABLET BY MOUTH EVERY DAY, Disp: 90 tablet, Rfl: 1    sertraline (ZOLOFT) 50 mg tablet, TAKE 1 TABLET BY MOUTH DAILY, Disp: 90 tablet, Rfl: 1    simvastatin (ZOCOR) 40 mg tablet, TAKE 1 TABLET BY MOUTH DAILY AT  BEDTIME, Disp: 100 tablet, Rfl: 1    tamsulosin (FLOMAX) 0.4 mg, TAKE 1 CAPSULE BY MOUTH DAILY  WITH DINNER, Disp: 100 capsule, Rfl: 1    traZODone (DESYREL) 150 mg tablet, TAKE 1 TABLET BY MOUTH AT BEDTIME, Disp: 90 tablet, Rfl: 1          Whom besides the patient is providing clinical information about today's encounter?   NO ADDITIONAL HISTORIAN (patient alone provided history)    Physical Exam and Assessment/Plan by Diagnosis:    SEBORRHEIC DERMATITIS     Physical Exam:  Anatomic Location Affected &  Morphological Description: fine scale over bilateral eyebrows    Additional History of Present Condition:    Present for years, tried salicylic acid OTC cream with some improvement. Used ketoconazole shampoo in the past infrequently. Notes small scaling over scalp and eyebrows. Denies itch     Duration: a couple  years  Attempted treatments: Salicylic acid OTC cream    Assessment and Plan:  History and physical exam most consistent with seborrheic dermatitis. The chronic nature of this condition and association with normal skin yeast were reviewed. Educated that the goal of therapy is to control symptoms, but this is not typically something we cure and intermittent flares can be expected. Based on a thorough discussion of this condition and the management approach to it (including a comprehensive discussion of the known risks, side effects and potential benefits of treatment), the patient (family) agrees to implement the following specific plan:           SCALP  - Start ketoconazole 2% shampoo 2x/week to damp scalp, let sit 5-10 minutes then rinse (may use normal shampoo/conditioner thereafter as desired).   - Start Fluocinonide 0.05% solution. Apply the steroid solution 1-2 times daily to dry scalp and leave it on. No need to rinse this off. Use as needed for redness, scale and itching. After 2 weeks of use, stop and reassess, can restart if needed.     SKIN  Then, once the rash subsides, use ketoconazole cream daily as a maintenance. This is NOT a steroid, is safe for long-term everyday use. If you use this daily this will keep the rash on your face under control and help prevent flares of the flaking. May alternate with OTC salicylic acid cream that is currently being used.     Scribe Attestation      I,:  Nena Mcpherson am acting as a scribe while in the presence of the attending physician.:       I,:  Ja Almazan MD personally performed the services described in this documentation    as scribed in my presence.:            Maria E Warner MD   PGY-2 Dermatology Resident

## 2024-10-19 DIAGNOSIS — F41.9 ANXIETY: ICD-10-CM

## 2024-10-21 ENCOUNTER — OFFICE VISIT (OUTPATIENT)
Dept: CARDIOLOGY CLINIC | Facility: CLINIC | Age: 79
End: 2024-10-21
Payer: COMMERCIAL

## 2024-10-21 VITALS
HEART RATE: 66 BPM | DIASTOLIC BLOOD PRESSURE: 90 MMHG | WEIGHT: 228.8 LBS | TEMPERATURE: 97.4 F | SYSTOLIC BLOOD PRESSURE: 132 MMHG | OXYGEN SATURATION: 96 % | BODY MASS INDEX: 32.75 KG/M2 | HEIGHT: 70 IN

## 2024-10-21 DIAGNOSIS — E66.811 CLASS 1 OBESITY DUE TO EXCESS CALORIES WITH SERIOUS COMORBIDITY AND BODY MASS INDEX (BMI) OF 31.0 TO 31.9 IN ADULT: ICD-10-CM

## 2024-10-21 DIAGNOSIS — Z98.61 CAD S/P PERCUTANEOUS CORONARY ANGIOPLASTY: ICD-10-CM

## 2024-10-21 DIAGNOSIS — I71.21 ANEURYSM OF ASCENDING AORTA WITHOUT RUPTURE (HCC): Primary | ICD-10-CM

## 2024-10-21 DIAGNOSIS — I10 ESSENTIAL HYPERTENSION: ICD-10-CM

## 2024-10-21 DIAGNOSIS — I35.0 NONRHEUMATIC AORTIC VALVE STENOSIS: ICD-10-CM

## 2024-10-21 DIAGNOSIS — I25.10 CAD S/P PERCUTANEOUS CORONARY ANGIOPLASTY: ICD-10-CM

## 2024-10-21 DIAGNOSIS — E66.09 CLASS 1 OBESITY DUE TO EXCESS CALORIES WITH SERIOUS COMORBIDITY AND BODY MASS INDEX (BMI) OF 31.0 TO 31.9 IN ADULT: ICD-10-CM

## 2024-10-21 DIAGNOSIS — E78.2 MIXED HYPERLIPIDEMIA: ICD-10-CM

## 2024-10-21 PROCEDURE — 99214 OFFICE O/P EST MOD 30 MIN: CPT | Performed by: INTERNAL MEDICINE

## 2024-10-21 NOTE — PROGRESS NOTES
[facility] [place]  [address] [address]  [phone]                                              Cardiology Office Follow up  Otilio Machuca, 79 y.o. male  YOB: 1945  MRN: [id number] Encounter: [id number]      PCP - Candelaria Schuster MD  Referring Provider - No ref. provider found    No chief complaint on file.      Assessment  CAD s/p multiple PCIs  Originally POBA in early 1990s, LAD & RCA stent in 2003, and then multiple repeat stents to RCA for multiple episodes of in-stent stenosis, last stent in 2010  Mixed hyperlipidemia  Dilated aortic root, 4.5 cm  Aortic stenosis, mild  Echo 04/05/2022 showed LVEF 65%, grade 1 diastolic dysfunction, mildly dilated RV, moderate related LA/RD, mild aortic stenosis, mildly dilated aortic root  RBBB  Obesity, Body mass index is 32.83 kg/m².     Prior cardiac testing (in [place])  Nuclear Rx stress - 2/14/2019 ([place], [place]) - moderate, predominantly fixed defect of entire inferior/inferolateral wall - some apical inferior reversibility - possible diaphragmatic attenuation +/- small ischemia, LVEF 81%  PCI -  LAD - Cypher LAD stent 2/03  RCA - stents x6 (2/2003)   KRYSTAL to dRCA (8/2006)   DESx3 to RCA (restenosis) (2010).   [facility] - 9/2010 -   patent LAD stent, D1 WNL, LCx WNL, OM1 - non obsructive, pRA 99%, mRCA in-stent stenosis, dRCA 90% in-stent stenosis, RPDA & RPLB - non-obstructive. S/p PCI with KRYSTAL to pRCA, mRCA, dRCA    Plan  CAD s/p multiple PCI  Overview  Multiple prior PCIs with stents to LAD and extensive stenting to RCA, but last stenting was in 2010  Reports being free of chest pain or shortness of breath   Nuclear Lexiscan stress test 5/2023: Predominantly fixed inferior defect, some reversibility in apical inferior wall.  Consider inferoapical ischemia , LVEF 60%  He did not have any symptoms after this and did not follow up scheduled over the last year and as a result has remained on the  same medical therapy  Stress test report appears to be similar to report from 2019 in [place]  5/2024: No major chest pain or any significant or acute worsening shortness of breath.  Remains compliant with medical therapy  10/2024: Remains free of chest pain, shortness of breath.  Compliant with aspirin, statin  Impression  Stable CAD, ?mild inferoapical ischemia, but no clear anginal symptoms  Plan  Continue aspirin 81 mg daily  Can discontinue Plavix at this point  Continue metoprolol succinate 50 mg daily  Continue simvastatin 40 mg daily (LDL at goal of < 70)    Aortic stenosis, mild  11/2022 TTE - mild aortic stenosis  6/2024 TTE -moderately thickened and calcified leaflets, mild aortic stenosis  He remains free of shortness of breath or chest pain  Continue to monitor for exertional symptoms  Follow-up on echocardiogram in 1 to 2 years      Hyperlipidemia    Triglyceride levels again elevated, slightly up from prior  cholesterol levels otherwise well-controlled   Tolerating simvastatin 40, ezetimibe 10, Lovaza 2 g bid  Continue same  Further dietary modifications to lower triglyceride levels recommended   Monitor    Aortic root dilation, Aortic stenosis  Date Modality Aortic root Ascending aorta Other findings   4/5/22 TTE 4.5 cm  Mild aortic stenosis   11/15/22 TTE 4.0 cm  Mild aortic stenosis   11/15/22 CT C/A  4.5 cm Fusiform ectasis of ascending TAA   1/2024 CT chest wo  4.4 cm Stable fusiform TAA ectasia   6/2024 TTE 3.8 cm 3.9 cm EF 70%     Ascending aorta has been dilated at 4.4 to 4.5 cm on CT, but not well-visualized on echocardiogram in advance of been quite variable  Will plan to follow-up on CT chest in 6 months/prior to next office   optimize blood pressure control, lipid levels    RBBB  Asymptomatic  Appears to be new, was not previously reported on ECG at cardiologist in [place]  Remains asymptomatic  Possibly age-related    No results found for this visit on 10/21/24.      Orders Placed  This Encounter   Procedures    CT chest wo contrast       Return in about 6 months (around 4/21/2025), or if symptoms worsen or fail to improve.      History of Present Illness   79 y.o. male comes in as a new patient for preoperative consultation prior to upcoming knee replacement surgery in a few weeks.    He is originally from [place] [facility], and moved to this area a few years ago.  He has not had any major issues with chest pain, shortness of breath, palpitations, dizziness, near-syncope or syncope recently.  He does have an extensive cardiac history and has had several stents dating back to early 1990s.  Most of his stenting took place between 2003 to 2010, where he has had multiple episodes of repeat InStent stenosis to RCA needing stenting.  But over the last decade he has been doing well from cardiac standpoint.  He still needs to go to [place] for other things and has a result has been following with his cardiologist in [place], but was asked to set up care closer to home as well.  As a result he is now here today to establish care with me.    Interval history - 5/23/2022  He comes back for follow-up after 2 months to discuss the results of his echocardiogram.  He has no active complains of chest pain or shortness of breath.  He continues to do well without any major problems.  He tried to make the switch from simvastatin to rosuvastatin, but reports having had a lot of side effects with it and as a result is now back on his old simvastatin.    Interval history - 11/2/2022  He comes back for follow-up after about 6 months.  He is overall doing well and has not had any major issues with chest pain, shortness of breath, palpitations or dizziness.  He did undergo left total knee replacement about a month ago and did not have any cardiac issues around this.  He does report new ongoing issues with left leg swelling, but denies any shortness of breath, orthopnea or PND.    Interval history - 4/28/2023  He  comes back for follow-up after about 6 months.  In that [place] he has been doing well overall and has not any major issues with chest pain, shortness of breath, palpitations or dizziness.  He remains active, but is limited by musculoskeletal issues.  He remains compliant with medical therapy and is otherwise doing well    Interval history - 5/22/2024  He returns for preoperative consultation after about 13 months.  He did the nuclear stress test last year and was scheduled to follow-up in September 2023.  His nuclear stress test showed some abnormalities and I sent him a message about it, and asked him to let us know if he was having any symptoms and to make an earlier visit.  He subsequently did not follow up as he was not having any symptoms and is now here for preoperative cardiac evaluation prior to upcoming shoulder surgery.  He continues to do well otherwise and remains free of any major chest pain or shortness of breath.  He states that he walks for an hour daily as well as does 1 flight of stairs without any chest pain.    Interval history - 10/21/2024  He returns for follow-up after 5 months. His shoulder surgery was without any siddhartha-operative cardiac complications. He remains active, and is without any symptoms at present.    Historical Information   Past Medical History:   Diagnosis Date    Arthritis     Celiac disease     Colon polyp     Coronary artery disease     Diverticulitis     Diverticulitis of ascending colon 10/27/2022    Diverticulosis     GERD (gastroesophageal reflux disease)     Hyperlipidemia     Hypertension     Kidney stone     Myocardial infarction (HCC) 1990     Past Surgical History:   Procedure Laterality Date    COLONOSCOPY      CORONARY ANGIOPLASTY WITH STENT PLACEMENT      Multiple times    JOINT REPLACEMENT      UT ARTHROPLASTY GLENOHUMERAL JOINT TOTAL SHOULDER Left 7/2/2024    Procedure: ARTHROPLASTY SHOULDER- left anatomic, BICEPS TENODESIS;  Surgeon: Lalitha Ann DO;  Location:  [facility];  Service: Orthopedics    WI ARTHRP KNE CONDYLE&PLATU MEDIAL&LAT COMPARTMENTS Right 04/13/2022    Procedure: ARTHROPLASTY KNEE TOTAL and all associated procedures;  Surgeon: Neeta Chaney MD;  Location: [facility];  Service: Orthopedics    WI ARTHRP KNE CONDYLE&PLATU MEDIAL&LAT COMPARTMENTS Left 09/29/2022    Procedure: ARTHROPLASTY KNEE TOTAL;  Surgeon: Neeta Chaney MD;  Location:  MAIN OR;  Service: Orthopedics     Family History   Problem Relation Age of Onset    Diabetes Mother     Coronary artery disease Father     Thyroid disease Sister      Current Outpatient Medications on File Prior to Visit   Medication Sig Dispense Refill    aspirin (ECOTRIN LOW STRENGTH) 81 mg EC tablet Take 1 tablet (81 mg total) by mouth daily Please do not start taking until after total joint arthroplasty 10 tablet 0    b complex vitamins capsule Take 1 capsule by mouth daily      baclofen 10 mg tablet TAKE 1 TABLET BY MOUTH 3 TIMES  DAILY AS NEEDED FOR MUSCLE  SPASM(S) 270 tablet 0    cholecalciferol (VITAMIN D3) 1,000 units tablet Take 1 tablet (1,000 Units total) by mouth daily 60 tablet 2    fluocinonide (LIDEX) 0.05 % external solution Apply topically 2 (two) times a day 60 mL 1    gabapentin (NEURONTIN) 300 mg capsule Take 2 capsules (600 mg total) by mouth daily at bedtime 270 capsule 3    ketoconazole (NIZORAL) 2 % cream Apply topically daily 30 g 1    ketoconazole (NIZORAL) 2 % shampoo Use as shampoo at least 3 times per week to scalp. Lather into scalp and let sit for 5 minutes before rinsing. 100 mL 10    metoprolol succinate (TOPROL-XL) 50 mg 24 hr tablet TAKE 1 TABLET BY MOUTH DAILY 100 tablet 1    omega-3-acid ethyl esters (LOVAZA) 1 g capsule TAKE 2 CAPSULES BY MOUTH TWICE  DAILY 400 capsule 1    pantoprazole (PROTONIX) 40 mg tablet TAKE 1 TABLET BY MOUTH EVERY DAY 90 tablet 1    sertraline (ZOLOFT) 50 mg tablet TAKE 1 TABLET BY MOUTH DAILY 90 tablet 3    tamsulosin (FLOMAX) 0.4 mg TAKE 1 CAPSULE  BY MOUTH DAILY  WITH DINNER 100 capsule 1    traZODone (DESYREL) 150 mg tablet TAKE 1 TABLET BY MOUTH AT BEDTIME 90 tablet 1     No current facility-administered medications on file prior to visit.     Allergies   Allergen Reactions    Crestor [Rosuvastatin] Myalgia    Ramipril Cough     Social History     Socioeconomic History    Marital status: /Civil Union     Spouse name: Not on file    Number of children: 3    Years of education: Not on file    Highest education level: Not on file   Occupational History    Not on file   Tobacco Use    Smoking status: Former     Current packs/day: 0.00     Types: Cigarettes     Start date: 1964     Quit date:      Years since quittin.8     Passive exposure: Never    Smokeless tobacco: Never    Tobacco comments:     Per pt, quit over 36 years ago   Vaping Use    Vaping status: Never Used   Substance and Sexual Activity    Alcohol use: Not Currently    Drug use: Never    Sexual activity: Yes     Partners: Female     Birth control/protection: None   Other Topics Concern    Not on file   Social History Narrative    Not on file     Social Determinants of Health     Financial Resource Strain: Low Risk  (2023)    Overall Financial Resource Strain (CARDIA)     Difficulty of Paying Living Expenses: Not hard at all   Food Insecurity: No Food Insecurity (2024)    Nursing - Inadequate Food Risk Classification     Worried About Running Out of Food in the Last Year: Never true     Ran Out of Food in the Last Year: Never true     Ran Out of Food in the Last Year: Not on file   Transportation Needs: No Transportation Needs (2024)    PRAPARE - Transportation     Lack of Transportation (Medical): No     Lack of Transportation (Non-Medical): No   Physical Activity: Sufficiently Active (9/10/2019)    Exercise Vital Sign     Days of Exercise per Week: 3 days     Minutes of Exercise per Session: 60 min   Stress: Stress Concern Present (9/10/2019)    [employer]  "of Occupational Health - Occupational Stress Questionnaire     Feeling of Stress : To some extent   Social Connections: Moderately Isolated (9/10/2019)    Social Connection and Isolation Panel [NHANES]     Frequency of Communication with Friends and Family: Twice a week     Frequency of Social Gatherings with Friends and Family: Once a week     Attends Christian Services: Never     Active Member of Clubs or Organizations: No     Attends Club or Organization Meetings: Never     Marital Status:    Intimate Partner Violence: Not At Risk (9/10/2019)    Humiliation, Afraid, Rape, and Kick questionnaire     Fear of Current or Ex-Partner: No     Emotionally Abused: No     Physically Abused: No     Sexually Abused: No   Housing Stability: Low Risk  (7/30/2024)    Housing Stability Vital Sign     Unable to Pay for Housing in the Last Year: No     Number of Times Moved in the Last Year: 0     Homeless in the Last Year: No        Review of Systems   All other systems reviewed and are negative.      Vitals:  Vitals:    10/21/24 1455   BP: 132/90   BP Location: Left arm   Patient Position: Sitting   Cuff Size: Adult   Pulse: 66   Temp: (!) 97.4 °F (36.3 °C)   TempSrc: Tympanic   SpO2: 96%   Weight: 104 kg (228 lb 12.8 oz)   Height: 5' 10\" (1.778 m)     BMI - Body mass index is 32.83 kg/m².  Wt Readings from Last 7 Encounters:   10/21/24 104 kg (228 lb 12.8 oz)   10/08/24 103 kg (226 lb)   09/17/24 106 kg (233 lb)   08/19/24 106 kg (233 lb)   07/29/24 106 kg (233 lb 11 oz)   07/29/24 106 kg (233 lb)   07/29/24 106 kg (233 lb)       Physical Exam  Vitals and nursing note reviewed.   Constitutional:       General: He is not in acute distress.     Appearance: Normal appearance. He is well-developed. He is not ill-appearing or diaphoretic.   HENT:      Head: Normocephalic and atraumatic.      Nose: No congestion.   Eyes:      General: No scleral icterus.     Conjunctiva/sclera: Conjunctivae normal.   Neck:      Vascular: No " "carotid bruit or JVD.   Cardiovascular:      Rate and Rhythm: Normal rate and regular rhythm. Occasional Extrasystoles are present.     Heart sounds: Murmur heard.      Crescendo decrescendo systolic murmur is present with a grade of 3/6.      No friction rub. No gallop.   Pulmonary:      Effort: Pulmonary effort is normal. No respiratory distress.      Breath sounds: Normal breath sounds. No wheezing or rales.   Chest:      Chest wall: No tenderness.   Abdominal:      General: There is no distension.      Palpations: Abdomen is soft.      Tenderness: There is no abdominal tenderness.   Musculoskeletal:         General: No swelling, tenderness or deformity.      Cervical back: Neck supple. No muscular tenderness.      Right lower leg: No edema.      Left lower leg: No edema.      Comments: Left shoulder surgical scar noted   Skin:     General: Skin is warm.   Neurological:      General: No focal deficit present.      Mental Status: He is alert and oriented to person, place, and time. Mental status is at baseline.      Gait: Gait abnormal.   Psychiatric:         Mood and Affect: Mood normal.         Behavior: Behavior normal.         Thought Content: Thought content normal.           Labs:  CBC:   Lab Results   Component Value Date    WBC 4.85 08/01/2024    RBC 3.78 (L) 08/01/2024    HGB 12.1 08/01/2024    HCT 35.2 (L) 08/01/2024    MCV 93 08/01/2024     08/01/2024    RDW 12.6 08/01/2024       CMP:   Lab Results   Component Value Date    K 3.6 08/01/2024     08/01/2024    CO2 24 08/01/2024    BUN 12 08/01/2024    CREATININE 0.61 08/01/2024    EGFR 96 08/01/2024    CALCIUM 8.8 08/01/2024    AST 20 08/01/2024    ALT 12 08/01/2024    ALKPHOS 63 08/01/2024       Magnesium:  No results found for: \"MG\"    Lipid Profile:   Lab Results   Component Value Date    HDL 36 (L) 06/18/2024    TRIG 201 (H) 06/18/2024    LDLCALC 48 06/18/2024       Thyroid Studies:   Lab Results   Component Value Date    OGT5XKUUCJKE " "1.532 10/29/2023       A1c:  No components found for: \"HGA1C\"    INR:  Lab Results   Component Value Date    INR 1.01 06/18/2024    INR 1.01 05/21/2024    INR 1.03 10/29/2023   5    Imaging: No results found.    Cardiac testing:   No results found for this or any previous visit.    No results found for this or any previous visit.    No results found for this or any previous visit.    No results found for this or any previous visit.      XR knee 3 vw left non injury  Narrative: LEFT KNEE    INDICATION:   Presence of left artificial knee joint.     COMPARISON: 9/20/2023    VIEWS:  XR KNEE 3 VW LEFT NON INJURY     FINDINGS:    The patient is post left knee arthroplasty, as before    There remains normal anatomic alignment at the joint space    There is no periprosthetic lucency    There is no fracture dislocation osteoblastic or osteolytic change.    There is mild enthesopathy off the ventral of the patella superiorly. There is mild calcification in the quadriceps tendon    There is atherosclerotic calcification in the dorsal soft tissues.  Impression: No acute osseous abnormality, nor change in appearance from 9/20/2023 in this patient post left knee arthroplasty..    Electronically signed: 09/17/2024 09:28 PM Lc Ortega MD  XR knee 3 vw right non injury  Narrative: RIGHT KNEE    INDICATION:   Presence of right artificial knee joint.       COMPARISON: 9/20/2023    VIEWS:  XR KNEE 3 VW RIGHT NON INJURY     FINDINGS:    The patient is post right knee arthroplasty, as before.    There remains normal anatomic alignment at the joint space    There is no periprosthetic lucency    There is no fracture, osteoblastic or osteolytic change.    There is enthesopathy off the ventral surface of the patella. There are calcifications in the suprapatellar space along with a possible small effusion, similar in appearance to the prior study.  Impression: No acute osseous abnormality, nor change in appearance from 9/20/2023 in this " patient post right knee arthroplasty..    Electronically signed: 09/17/2024 09:24 PM Lc Ortega MD

## 2024-10-27 DIAGNOSIS — E78.2 MIXED HYPERLIPIDEMIA: ICD-10-CM

## 2024-10-27 DIAGNOSIS — E78.5 HYPERLIPIDEMIA: ICD-10-CM

## 2024-10-29 RX ORDER — EZETIMIBE 10 MG/1
10 TABLET ORAL DAILY
Qty: 100 TABLET | Refills: 2 | Status: SHIPPED | OUTPATIENT
Start: 2024-10-29

## 2024-10-29 RX ORDER — SIMVASTATIN 40 MG
40 TABLET ORAL
Qty: 100 TABLET | Refills: 1 | Status: SHIPPED | OUTPATIENT
Start: 2024-10-29

## 2024-12-05 ENCOUNTER — TELEPHONE (OUTPATIENT)
Age: 79
End: 2024-12-05

## 2024-12-05 NOTE — TELEPHONE ENCOUNTER
Spoke to . Wife was found to have a incidental finding of a pancreatic mass while being evaluated for syncope.  CEA elevated.  Scheduled for a biopsy tomorrow.  Will schedule an appt with Dr Reinaldo Garcia-Surg Onc.

## 2024-12-05 NOTE — TELEPHONE ENCOUNTER
Pt called wanting to speak with Dr. Schuster to speak about his wife; I advised him he wasn't in office today. Pt would like to have him call him back, his wife was sent to [facility] by the ambulance stating they found a tumor on her pancreas and he would like to start finding an oncologist or a provider that specializes with this. Please advise, TY.

## 2024-12-13 DIAGNOSIS — N40.1 BENIGN PROSTATIC HYPERPLASIA WITH LOWER URINARY TRACT SYMPTOMS, SYMPTOM DETAILS UNSPECIFIED: ICD-10-CM

## 2024-12-13 RX ORDER — TAMSULOSIN HYDROCHLORIDE 0.4 MG/1
0.4 CAPSULE ORAL
Qty: 100 CAPSULE | Refills: 2 | Status: SHIPPED | OUTPATIENT
Start: 2024-12-13

## 2024-12-22 DIAGNOSIS — F41.9 ANXIETY: ICD-10-CM

## 2024-12-24 RX ORDER — TRAZODONE HYDROCHLORIDE 150 MG/1
150 TABLET ORAL
Qty: 90 TABLET | Refills: 1 | Status: SHIPPED | OUTPATIENT
Start: 2024-12-24

## 2025-01-03 ENCOUNTER — HOSPITAL ENCOUNTER (OUTPATIENT)
Dept: NON INVASIVE DIAGNOSTICS | Facility: HOSPITAL | Age: 80
Discharge: HOME | End: 2025-01-03
Payer: COMMERCIAL

## 2025-01-03 ENCOUNTER — RESULTS FOLLOW-UP (OUTPATIENT)
Dept: CARDIOLOGY CLINIC | Facility: CLINIC | Age: 80
End: 2025-01-03

## 2025-01-03 VITALS
SYSTOLIC BLOOD PRESSURE: 132 MMHG | WEIGHT: 228 LBS | HEIGHT: 70 IN | DIASTOLIC BLOOD PRESSURE: 90 MMHG | HEART RATE: 60 BPM | BODY MASS INDEX: 32.64 KG/M2

## 2025-01-03 DIAGNOSIS — I35.0 NONRHEUMATIC AORTIC VALVE STENOSIS: ICD-10-CM

## 2025-01-03 DIAGNOSIS — I71.21 ANEURYSM OF ASCENDING AORTA WITHOUT RUPTURE (HCC): ICD-10-CM

## 2025-01-03 LAB
AORTIC ROOT: 3.9 CM
AORTIC VALVE MEAN VELOCITY: 13.1 M/S
APICAL FOUR CHAMBER EJECTION FRACTION: 58 %
ASCENDING AORTA: 3.9 CM
AV AREA BY CONTINUOUS VTI: 2.5 CM2
AV AREA PEAK VELOCITY: 2.5 CM2
AV LVOT MEAN GRADIENT: 3 MMHG
AV LVOT PEAK GRADIENT: 5 MMHG
AV MEAN GRADIENT: 8 MMHG
AV PEAK GRADIENT: 16 MMHG
AV VALVE AREA: 2.48 CM2
AV VELOCITY RATIO: 0.55
BSA FOR ECHO PROCEDURE: 2.21 M2
DOP CALC AO PEAK VEL: 2.03 M/S
DOP CALC AO VTI: 46.46 CM
DOP CALC LVOT AREA: 4.52 CM2
DOP CALC LVOT CARDIAC INDEX: 3.08 L/MIN/M2
DOP CALC LVOT CARDIAC OUTPUT: 6.8 L/MIN
DOP CALC LVOT DIAMETER: 2.4 CM
DOP CALC LVOT PEAK VEL VTI: 25.5 CM
DOP CALC LVOT PEAK VEL: 1.11 M/S
DOP CALC LVOT STROKE INDEX: 54.3 ML/M2
DOP CALC LVOT STROKE VOLUME: 115.3
E WAVE DECELERATION TIME: 311 MS
E/A RATIO: 0.68
FRACTIONAL SHORTENING: 26 (ref 28–44)
INTERVENTRICULAR SEPTUM IN DIASTOLE (PARASTERNAL SHORT AXIS VIEW): 1.7 CM
INTERVENTRICULAR SEPTUM: 1.7 CM (ref 0.6–1.1)
LAAS-AP2: 28.5 CM2
LAAS-AP4: 30.6 CM2
LEFT ATRIUM SIZE: 4.5 CM
LEFT ATRIUM VOLUME (MOD BIPLANE): 101 ML
LEFT ATRIUM VOLUME INDEX (MOD BIPLANE): 45.7 ML/M2
LEFT INTERNAL DIMENSION IN SYSTOLE: 3.1 CM (ref 2.1–4)
LEFT VENTRICULAR INTERNAL DIMENSION IN DIASTOLE: 4.2 CM (ref 3.5–6)
LEFT VENTRICULAR POSTERIOR WALL IN END DIASTOLE: 1 CM
LEFT VENTRICULAR STROKE VOLUME: 39 ML
LVSV (TEICH): 39 ML
MV E'TISSUE VEL-SEP: 6 CM/S
MV PEAK A VEL: 0.9 M/S
MV PEAK E VEL: 61 CM/S
MV STENOSIS PRESSURE HALF TIME: 90 MS
MV VALVE AREA P 1/2 METHOD: 2.44
RA PRESSURE ESTIMATED: 3 MMHG
RIGHT ATRIUM AREA SYSTOLE A4C: 19.7 CM2
RIGHT VENTRICLE ID DIMENSION: 3.8 CM
RV PSP: 20 MMHG
SINOTUBULAR JUNCTION: 3.3 CM
SL CV LEFT ATRIUM LENGTH A2C: 6.9 CM
SL CV LV EF: 60
SL CV PED ECHO LEFT VENTRICLE DIASTOLIC VOLUME (MOD BIPLANE) 2D: 78 ML
SL CV PED ECHO LEFT VENTRICLE SYSTOLIC VOLUME (MOD BIPLANE) 2D: 39 ML
SL CV SINUS OF VALSALVA 2D: 4 CM
STJ: 3.3 CM
TR MAX PG: 17 MMHG
TR PEAK VELOCITY: 2.1 M/S
TRICUSPID ANNULAR PLANE SYSTOLIC EXCURSION: 2.6 CM
TRICUSPID VALVE PEAK REGURGITATION VELOCITY: 2.07 M/S

## 2025-01-03 PROCEDURE — 93306 TTE W/DOPPLER COMPLETE: CPT

## 2025-01-03 PROCEDURE — 93306 TTE W/DOPPLER COMPLETE: CPT | Performed by: INTERNAL MEDICINE

## 2025-01-21 ENCOUNTER — HOSPITAL ENCOUNTER (OUTPATIENT)
Dept: CT IMAGING | Facility: HOSPITAL | Age: 80
Discharge: HOME | End: 2025-01-21
Attending: INTERNAL MEDICINE
Payer: COMMERCIAL

## 2025-01-21 DIAGNOSIS — I71.21 ANEURYSM OF ASCENDING AORTA WITHOUT RUPTURE (HCC): ICD-10-CM

## 2025-01-21 PROCEDURE — 71250 CT THORAX DX C-: CPT

## 2025-01-22 ENCOUNTER — OFFICE VISIT (OUTPATIENT)
Dept: INTERNAL MEDICINE CLINIC | Facility: CLINIC | Age: 80
End: 2025-01-22
Payer: COMMERCIAL

## 2025-01-22 VITALS
DIASTOLIC BLOOD PRESSURE: 82 MMHG | HEIGHT: 70 IN | WEIGHT: 244 LBS | SYSTOLIC BLOOD PRESSURE: 136 MMHG | OXYGEN SATURATION: 97 % | RESPIRATION RATE: 16 BRPM | HEART RATE: 65 BPM | TEMPERATURE: 97.7 F | BODY MASS INDEX: 34.93 KG/M2

## 2025-01-22 DIAGNOSIS — I10 PRIMARY HYPERTENSION: ICD-10-CM

## 2025-01-22 DIAGNOSIS — Z00.00 MEDICARE ANNUAL WELLNESS VISIT, SUBSEQUENT: Primary | ICD-10-CM

## 2025-01-22 DIAGNOSIS — R42 DIZZINESS: ICD-10-CM

## 2025-01-22 PROCEDURE — G0439 PPPS, SUBSEQ VISIT: HCPCS | Performed by: HOSPITALIST

## 2025-01-22 NOTE — PROGRESS NOTES
Name: Otilio Machuca      : 1945      MRN: [id number]  Encounter Provider: Candelaria Schuster MD  Encounter Date: 2025   Encounter department: [facility] INTERNAL MEDICINE [facility] Plan  Medicare annual wellness visit, subsequent         Dizziness    Complaining of dizziness over the past few weeks with tendency to lean towards the left and getting up.  No weakness noted.  Clinical exam is benign however will order a CT of the brain given his cardiac history.    Primary hypertension       Preventive health issues were discussed with patient, and age appropriate screening tests were ordered as noted in patient's After Visit Summary. Personalized health advice and appropriate referrals for health education or preventive services given if needed, as noted in patient's After Visit Summary.    History of Present Illness     HPI   Patient Care Team:  Candelaria Schuster MD as PCP - General (Internal Medicine)  Candelaria Schuster MD as PCP - PCP-[facility] ([facility])    Review of Systems  Medical History Reviewed by provider this encounter: Complaining of dizziness on getting up with tendency to lean towards the left.  No weakness, nausea vomiting or unstable gait  Annual Wellness Visit Questionnaire       Health Risk Assessment:   Patient rates overall health as very good. Patient feels that their physical health rating is slightly better. Patient is satisfied with their life. Eyesight was rated as slightly worse. Hearing was rated as same. Patient feels that their emotional and mental health rating is same. Patients states they are never, rarely angry. Patient states they are never, rarely unusually tired/fatigued. Pain experienced in the last 7 days has been none. Patient states that he has experienced no weight loss or gain in last 6 months.     Depression Screening:   PHQ-2 Score: 0      Fall Risk Screening:   In the past year, patient has experienced: no history of falling in past year       Home Safety:  Patient does not have trouble with stairs inside or outside of their home. Patient has working smoke alarms and has working carbon monoxide detector. Home safety hazards include: none.     Nutrition:   Current diet is Regular. Gluten free    Medications:   Patient is not currently taking any over-the-counter supplements. Patient is able to manage medications.     Activities of Daily Living (ADLs)/Instrumental Activities of Daily Living (IADLs):   Walk and transfer into and out of bed and chair?: Yes  Dress and groom yourself?: Yes    Bathe or shower yourself?: Yes    Feed yourself? Yes  Do your laundry/housekeeping?: Yes  Manage your money, pay your bills and track your expenses?: Yes  Make your own meals?: Yes    Do your own shopping?: Yes    Previous Hospitalizations:   Any hospitalizations or ED visits within the last 12 months?: Yes    How many hospitalizations have you had in the last year?: 1-2    PREVENTIVE SCREENINGS      Cardiovascular Screening:    General: Screening Not Indicated and History Lipid Disorder      Diabetes Screening:     General: Screening Current      Colorectal Cancer Screening:     General: Screening Current      Prostate Cancer Screening:    General: Screening Not Indicated      Abdominal Aortic Aneurysm (AAA) Screening:    Risk factors include: tobacco use        Lung Cancer Screening:     General: Screening Not Indicated      Hepatitis C Screening:    General: Screening Current    Screening, Brief Intervention, and Referral to Treatment (SBIRT)    Screening      AUDIT-C Screenin) How often did you have a drink containing alcohol in the past year? never  2) How many drinks did you have on a typical day when you were drinking in the past year? 0  3) How often did you have 6 or more drinks on one occasion in the past year? never    AUDIT-C Score: 0  Interpretation: Score 0-3 (male): Negative screen for alcohol misuse    Single Item Drug Screening:  How often have  "you used an illegal drug (including marijuana) or a prescription medication for non-medical reasons in the past year? never    Single Item Drug Screen Score: 0  Interpretation: Negative screen for possible drug use disorder    SDOH Risk Assessment  Social determinants of health (SDOH) risk assesment tool was completed. The tool at a minimum covered housing stability, food insecurity, transportation needs, and utility difficulty. Patient had at risk responses for the following SDOH domains: food insecurity.     Social Drivers of Health     Financial Resource Strain: Low Risk  (4/14/2023)    Overall Financial Resource Strain (CARDIA)    • Difficulty of Paying Living Expenses: Not hard at all   Food Insecurity: No Food Insecurity (1/22/2025)    Hunger Vital Sign    • Worried About Running Out of Food in the Last Year: Never true    • Ran Out of Food in the Last Year: Never true   Transportation Needs: No Transportation Needs (1/22/2025)    PRAPARE - Transportation    • Lack of Transportation (Medical): No    • Lack of Transportation (Non-Medical): No   Housing Stability: Low Risk  (1/22/2025)    Housing Stability Vital Sign    • Unable to Pay for Housing in the Last Year: No    • Number of Times Moved in the Last Year: 0    • Homeless in the Last Year: No   Utilities: Not At Risk (1/22/2025)    [facility] Utilities    • Threatened with loss of utilities: No     No results found.    Objective   /82 (BP Location: Left arm, Patient Position: Sitting, Cuff Size: Adult)   Pulse 65   Temp 97.7 °F (36.5 °C) (Tympanic)   Resp 16   Ht 5' 10\" (1.778 m)   Wt 111 kg (244 lb)   SpO2 97%   BMI 35.01 kg/m²     Physical Exam  General Appearance:    Alert, cooperative, no distress   Head:    Normocephalic, without obvious abnormality, atraumatic   Eyes:    PERRL, conjunctiva/corneas clear, EOM's intact, fundi     benign, both eyes        Neck:   Supple, no adenopathy, no JVD   Back:     Symmetric, no curvature, ROM normal, no CVA " tenderness   Lungs:     Clear to auscultation bilaterally, no wheezing or rhonchi   Heart:    Regular rate and rhythm, S1 and S2 normal, no murmur, rub   or gallop   Abdomen:     Soft, non-tender, bowel sounds active    Extremities:   Extremities normal, atraumatic, no cyanosis or edema   Psych:   Normal Affect   Neurologic:   CNII-XII intact. Normal strength, sensation and reflexes       Throughout

## 2025-01-22 NOTE — PATIENT INSTRUCTIONS
Medicare Preventive Visit Patient Instructions  Thank you for completing your Welcome to Medicare Visit or Medicare Annual Wellness Visit today. Your next wellness visit will be due in one year (1/23/2026).  The screening/preventive services that you may require over the next 5-10 years are detailed below. Some tests may not apply to you based off risk factors and/or age. Screening tests ordered at today's visit but not completed yet may show as past due. Also, please note that scanned in results may not display below.  Preventive Screenings:  Service Recommendations Previous Testing/Comments   Colorectal Cancer Screening  Colonoscopy    Fecal Occult Blood Test (FOBT)/Fecal Immunochemical Test (FIT)  Fecal DNA/Cologuard Test  Flexible Sigmoidoscopy Age: 45-75 years old   Colonoscopy: every 10 years (May be performed more frequently if at higher risk)  OR  FOBT/FIT: every 1 year  OR  Cologuard: every 3 years  OR  Sigmoidoscopy: every 5 years  Screening may be recommended earlier than age 45 if at higher risk for colorectal cancer. Also, an individualized decision between you and your healthcare provider will decide whether screening between the ages of 76-85 would be appropriate. Colonoscopy: 01/12/2024  FOBT/FIT: Not on file  Cologuard: Not on file  Sigmoidoscopy: Not on file    Screening Current     Prostate Cancer Screening Individualized decision between patient and health care provider in men between ages of 55-69   Medicare will cover every 12 months beginning on the day after your 50th birthday PSA: 0.48 ng/mL     Screening Not Indicated     Hepatitis C Screening Once for adults born between 1945 and 1965  More frequently in patients at high risk for Hepatitis C Hep C Antibody: 01/27/2020    Screening Current   Diabetes Screening 1-2 times per year if you're at risk for diabetes or have pre-diabetes Fasting glucose: 89 mg/dL (6/18/2024)  A1C: 5.2 % (5/21/2024)  Screening Current   Cholesterol Screening Once  every 5 years if you don't have a lipid disorder. May order more often based on risk factors. Lipid panel: 06/18/2024  Screening Not Indicated  History Lipid Disorder      Other Preventive Screenings Covered by Medicare:  Abdominal Aortic Aneurysm (AAA) Screening: covered once if your at risk. You're considered to be at risk if you have a family history of AAA or a male between the age of 65-75 who smoking at least 100 cigarettes in your lifetime.  Lung Cancer Screening: covers low dose CT scan once per year if you meet all of the following conditions: (1) Age 55-77; (2) No signs or symptoms of lung cancer; (3) Current smoker or have quit smoking within the last 15 years; (4) You have a tobacco smoking history of at least 20 pack years (packs per day x number of years you smoked); (5) You get a written order from a healthcare provider.  Glaucoma Screening: covered annually if you're considered high risk: (1) You have diabetes OR (2) Family history of glaucoma OR (3)  aged 50 and older OR (4)  American aged 65 and older  Osteoporosis Screening: covered every 2 years if you meet one of the following conditions: (1) Have a vertebral abnormality; (2) On glucocorticoid therapy for more than 3 months; (3) Have primary hyperparathyroidism; (4) On osteoporosis medications and need to assess response to drug therapy.  HIV Screening: covered annually if you're between the age of 15-65. Also covered annually if you are younger than 15 and older than 65 with risk factors for HIV infection. For pregnant patients, it is covered up to 3 times per pregnancy.    Immunizations:  Immunization Recommendations   Influenza Vaccine Annual influenza vaccination during flu season is recommended for all persons aged >= 6 months who do not have contraindications   Pneumococcal Vaccine   * Pneumococcal conjugate vaccine = PCV13 (Prevnar 13), PCV15 (Vaxneuvance), PCV20 (Prevnar 20)  * Pneumococcal polysaccharide vaccine  = PPSV23 (Pneumovax) Adults 19-63 yo with certain risk factors or if 65+ yo  If never received any pneumonia vaccine: recommend Prevnar 20 (PCV20)  Give PCV20 if previously received 1 dose of PCV13 or PPSV23   Hepatitis B Vaccine 3 dose series if at intermediate or high risk (ex: diabetes, end stage renal disease, liver disease)   Respiratory syncytial virus (RSV) Vaccine - COVERED BY MEDICARE PART D  * RSVPreF3 (Arexvy) CDC recommends that adults 60 years of age and older may receive a single dose of RSV vaccine using shared clinical decision-making (SCDM)   Tetanus (Td) Vaccine - COST NOT COVERED BY MEDICARE PART B Following completion of primary series, a booster dose should be given every 10 years to maintain immunity against tetanus. Td may also be given as tetanus wound prophylaxis.   Tdap Vaccine - COST NOT COVERED BY MEDICARE PART B Recommended at least once for all adults. For pregnant patients, recommended with each pregnancy.   Shingles Vaccine (Shingrix) - COST NOT COVERED BY MEDICARE PART B  2 shot series recommended in those 19 years and older who have or will have weakened immune systems or those 50 years and older     Health Maintenance Due:      Topic Date Due   • Hepatitis C Screening  Completed   • Colorectal Cancer Screening  Discontinued     Immunizations Due:  There are no preventive care reminders to display for this patient.  Advance Directives   What are advance directives?  Advance directives are legal documents that state your wishes and plans for medical care. These plans are made ahead of time in case you lose your ability to make decisions for yourself. Advance directives can apply to any medical decision, such as the treatments you want, and if you want to donate organs.   What are the types of advance directives?  There are many types of advance directives, and each state has rules about how to use them. You may choose a combination of any of the following:  Living will:  This is a  written record of the treatment you want. You can also choose which treatments you do not want, which to limit, and which to stop at a certain time. This includes surgery, medicine, IV fluid, and tube feedings.   Durable power of  for healthcare (DPAHC):  This is a written record that states who you want to make healthcare choices for you when you are unable to make them for yourself. This person, called a proxy, is usually a family member or a friend. You may choose more than 1 proxy.  Do not resuscitate (DNR) order:  A DNR order is used in case your heart stops beating or you stop breathing. It is a request not to have certain forms of treatment, such as CPR. A DNR order may be included in other types of advance directives.  Medical directive:  This covers the care that you want if you are in a coma, near death, or unable to make decisions for yourself. You can list the treatments you want for each condition. Treatment may include pain medicine, surgery, blood transfusions, dialysis, IV or tube feedings, and a ventilator (breathing machine).  Values history:  This document has questions about your views, beliefs, and how you feel and think about life. This information can help others choose the care that you would choose.  Why are advance directives important?  An advance directive helps you control your care. Although spoken wishes may be used, it is better to have your wishes written down. Spoken wishes can be misunderstood, or not followed. Treatments may be given even if you do not want them. An advance directive may make it easier for your family to make difficult choices about your care.   Weight Management   Why it is important to manage your weight:  Being overweight increases your risk of health conditions such as heart disease, high blood pressure, type 2 diabetes, and certain types of cancer. It can also increase your risk for osteoarthritis, sleep apnea, and other respiratory problems. Aim for  a slow, steady weight loss. Even a small amount of weight loss can lower your risk of health problems.  How to lose weight safely:  A safe and healthy way to lose weight is to eat fewer calories and get regular exercise. You can lose up about 1 pound a week by decreasing the number of calories you eat by 500 calories each day.   Healthy meal plan for weight management:  A healthy meal plan includes a variety of foods, contains fewer calories, and helps you stay healthy. A healthy meal plan includes the following:  Eat whole-grain foods more often.  A healthy meal plan should contain fiber. Fiber is the part of grains, fruits, and vegetables that is not broken down by your body. Whole-grain foods are healthy and provide extra fiber in your diet. Some examples of whole-grain foods are whole-wheat breads and pastas, oatmeal, brown rice, and bulgur.  Eat a variety of vegetables every day.  Include dark, leafy greens such as spinach, kale, garrett greens, and mustard greens. Eat yellow and orange vegetables such as carrots, sweet potatoes, and winter squash.   Eat a variety of fruits every day.  Choose fresh or canned fruit (canned in its own juice or light syrup) instead of juice. Fruit juice has very little or no fiber.  Eat low-fat dairy foods.  Drink fat-free (skim) milk or 1% milk. Eat fat-free yogurt and low-fat cottage cheese. Try low-fat cheeses such as mozzarella and other reduced-fat cheeses.  Choose meat and other protein foods that are low in fat.  Choose beans or other legumes such as split peas or lentils. Choose fish, skinless poultry (chicken or turkey), or lean cuts of red meat (beef or pork). Before you cook meat or poultry, cut off any visible fat.   Use less fat and oil.  Try baking foods instead of frying them. Add less fat, such as margarine, sour cream, regular salad dressing and mayonnaise to foods. Eat fewer high-fat foods. Some examples of high-fat foods include french fries, doughnuts, ice  cream, and cakes.  Eat fewer sweets.  Limit foods and drinks that are high in sugar. This includes candy, cookies, regular soda, and sweetened drinks.  Exercise:  Exercise at least 30 minutes per day on most days of the week. Some examples of exercise include walking, biking, dancing, and swimming. You can also fit in more physical activity by taking the stairs instead of the elevator or parking farther away from stores. Ask your healthcare provider about the best exercise plan for you.      © Copyright [employer] 2018 Information is for End User's use only and may not be sold, redistributed or otherwise used for commercial purposes. All illustrations and images included in CareNotes® are the copyrighted property of [employer]., Inc[employer] or tagga

## 2025-01-23 ENCOUNTER — PATIENT MESSAGE (OUTPATIENT)
Dept: INTERNAL MEDICINE CLINIC | Facility: CLINIC | Age: 80
End: 2025-01-23

## 2025-01-23 DIAGNOSIS — R42 VERTIGO: Primary | ICD-10-CM

## 2025-01-23 RX ORDER — MECLIZINE HCL 12.5 MG 12.5 MG/1
12.5 TABLET ORAL 2 TIMES DAILY
Qty: 10 TABLET | Refills: 0 | Status: SHIPPED | OUTPATIENT
Start: 2025-01-23 | End: 2025-01-28

## 2025-01-26 DIAGNOSIS — K92.0 COFFEE GROUND EMESIS: ICD-10-CM

## 2025-01-27 RX ORDER — PANTOPRAZOLE SODIUM 40 MG/1
40 TABLET, DELAYED RELEASE ORAL DAILY
Qty: 90 TABLET | Refills: 1 | Status: SHIPPED | OUTPATIENT
Start: 2025-01-27

## 2025-01-29 ENCOUNTER — APPOINTMENT (OUTPATIENT)
Dept: LAB | Facility: HOSPITAL | Age: 80
End: 2025-01-29
Attending: HOSPITALIST
Payer: COMMERCIAL

## 2025-01-29 ENCOUNTER — HOSPITAL ENCOUNTER (OUTPATIENT)
Dept: CT IMAGING | Facility: HOSPITAL | Age: 80
Discharge: HOME | End: 2025-01-29
Attending: HOSPITALIST
Payer: COMMERCIAL

## 2025-01-29 DIAGNOSIS — I10 PRIMARY HYPERTENSION: ICD-10-CM

## 2025-01-29 DIAGNOSIS — R42 DIZZINESS: ICD-10-CM

## 2025-01-29 LAB
BUN SERPL-MCNC: 22 MG/DL (ref 5–25)
CREAT SERPL-MCNC: 0.91 MG/DL (ref 0.6–1.3)
GFR SERPL CREATININE-BSD FRML MDRD: 81 ML/MIN/1.73SQ M

## 2025-01-29 PROCEDURE — 84520 ASSAY OF UREA NITROGEN: CPT

## 2025-01-29 PROCEDURE — 36415 COLL VENOUS BLD VENIPUNCTURE: CPT

## 2025-01-29 PROCEDURE — 70470 CT HEAD/BRAIN W/O & W/DYE: CPT

## 2025-01-29 PROCEDURE — 82565 ASSAY OF CREATININE: CPT

## 2025-01-29 RX ADMIN — IOHEXOL 75 ML: 350 INJECTION, SOLUTION INTRAVENOUS at 12:22

## 2025-01-30 ENCOUNTER — TELEPHONE (OUTPATIENT)
Dept: INTERNAL MEDICINE CLINIC | Facility: CLINIC | Age: 80
End: 2025-01-30

## 2025-01-30 ENCOUNTER — RESULTS FOLLOW-UP (OUTPATIENT)
Dept: CARDIOLOGY CLINIC | Facility: MEDICAL CENTER | Age: 80
End: 2025-01-30

## 2025-01-30 NOTE — TELEPHONE ENCOUNTER
Called the patient at 10:00am on [id number]  Reviewed the [facility]     CT head w wo contrast  Result Date: 1/30/2025  Impression: Diminished  attenuation involving white matter described above is a nonspecific finding most often due to chronic small vessel white matter ischemic disease; the degree of which less than typical for age. Tavares Gomes M.D., FACR Senior Member, [employer] Society of [employer]orad[employer] Workstation performed: [id number]     Called the patient to review that the findings were non specific.      Pt currently endorsing that the symptoms of dizziness over the past few weeks with tendency to lean towards the left and getting up  have resolved with the meclozine. Pt is on his last dose today.    Pt told if the symptoms come back after completing the course of meclozine to call the office and we will order an MRI

## 2025-02-25 DIAGNOSIS — E78.2 MIXED HYPERLIPIDEMIA: ICD-10-CM

## 2025-02-26 RX ORDER — OMEGA-3-ACID ETHYL ESTERS 1 G/1
2 CAPSULE, LIQUID FILLED ORAL 2 TIMES DAILY
Qty: 400 CAPSULE | Refills: 1 | Status: SHIPPED | OUTPATIENT
Start: 2025-02-26

## 2025-04-10 NOTE — ADDENDUM NOTE
Addended by: MAUREEN SHARP on: 1/23/2025 02:19 PM     Modules accepted: Orders    
All other review of systems negative, except as noted in HPI

## 2025-04-11 ENCOUNTER — TELEPHONE (OUTPATIENT)
Age: 80
End: 2025-04-11

## 2025-04-11 NOTE — TELEPHONE ENCOUNTER
Caller: Otilio Machuca  Established Doctor: Ping  Call Back Number: [phone]    Does this patient require an office visit for Cardiac Clearance for upcomming surgery or can clearance be given without an office visit?       Date of Last Office Visit: 10/21/2024    Date Of Surgery: 04/24/2025  Surgical Procedure: Eye Lift cosmetic   Name of Facility: [facility]   Name of Surgeon: Dr Roa   Phone Number for Surgical Facility (If the caller does not have this info, please put N/A): [phone]  Fax Number for Surgical Facility (If the caller does not have this info, please put N/A: [phone]    Other Comments:       Thank You

## 2025-04-11 NOTE — TELEPHONE ENCOUNTER
Patient Otilio [phone] established patient of Dr. Feng, contacting [facility] to provide a different fax number for the clearance letter.    Fax #: [phone]     Thank you.

## 2025-04-11 NOTE — TELEPHONE ENCOUNTER
Caller: Otilio Machuca    Doctor: Dr. Feng    Reason for call: Pt states he has no chest pain or shortness of breath and asks to be cleared for the surgery.    Call back#: NA

## 2025-04-11 NOTE — TELEPHONE ENCOUNTER
Called pt to see if he was experiencing any chest pain or shortness of breath symptoms. Left message that Dr. Feng can clear him for eye surgery if he isn't experiencing symptoms. Left office call back number for pt to get back to us. Also offered to reschedule his appt with Dr. Feng to later date if needed since it coincides with the date of his eye surgery.

## 2025-04-12 DIAGNOSIS — I10 ESSENTIAL HYPERTENSION: ICD-10-CM

## 2025-04-14 RX ORDER — METOPROLOL SUCCINATE 50 MG/1
50 TABLET, EXTENDED RELEASE ORAL DAILY
Qty: 100 TABLET | Refills: 1 | Status: SHIPPED | OUTPATIENT
Start: 2025-04-14

## 2025-04-15 NOTE — TELEPHONE ENCOUNTER
Caller: Patient    Doctor: Dr. Feng    Call back #: [phone]    Reason for call: Patient asked about his cardiac clearance from Dr. Feng for his procedure on April 24th. I told patient that Dr. Feng has to sign the cardiac clearance letter and then he will be cleared for surgery. Patient understood and is up to date on where the cardiac clearance letter is in the process.

## 2025-04-17 ENCOUNTER — CONSULT (OUTPATIENT)
Dept: INTERNAL MEDICINE CLINIC | Facility: CLINIC | Age: 80
End: 2025-04-17
Payer: COMMERCIAL

## 2025-04-17 VITALS
DIASTOLIC BLOOD PRESSURE: 78 MMHG | TEMPERATURE: 97.8 F | BODY MASS INDEX: 34.5 KG/M2 | SYSTOLIC BLOOD PRESSURE: 120 MMHG | RESPIRATION RATE: 14 BRPM | WEIGHT: 241 LBS | HEIGHT: 70 IN | HEART RATE: 68 BPM | OXYGEN SATURATION: 97 %

## 2025-04-17 DIAGNOSIS — Z01.818 PRE-OP EXAMINATION: Primary | ICD-10-CM

## 2025-04-17 PROCEDURE — 99213 OFFICE O/P EST LOW 20 MIN: CPT | Performed by: INTERNAL MEDICINE

## 2025-04-17 NOTE — PROGRESS NOTES
Pre-operative Clearance  Name: Otilio Machuca      : 1945      MRN: [id number]  Encounter Provider: West Banda DO  Encounter Date: 2025   Encounter department: [facility]    :  Assessment & Plan  Pre-op examination  - Revised cardiac index risk class 2  - he is holding aspirin and lovaza prior to surgery  - recommend resuming these medications as soon as possible per his surgeon  - patient is medically optimized for low risk surgery           Pre-operative Clearance:     Revised Cardiac Risk Index:  RCI RISK CLASS II (1 risk factor, risk of major cardiac complications approximately 1.3%)    Clearance:  Patient is medically optimized (CLEARED) for proposed surgery without any additional cardiac testing.      Medication Instructions:   - Avoid aspirin containing medications or non-steroidal anti-inflammatory drugs one week preceding surgery    - Alpha Adrenergic Antagonist (ie, trazodone, viibryd, trintellix): Continue to take this medication on your normal schedule.  - Alpha-1 Adrenergic Blocker (ie, tamsulosin, doxazosin, alfusozin): Continue to take this medication on your normal schedule.  - Antidepressants: Continue to take this medication on your normal schedule.  - Antiepileptic meds: Continue to take this medication on your normal schedule.  - Beta blockers:  Continue to take this medication on your normal schedule.  - Hyperlipidemia meds: Continue to take this medication on your normal schedule.       History of Present Illness     Pre-Op Examination     Surgery: eyelid lift   Anticipated Date of Surgery: 2025     Otilio Machuca presents today for pre-op visit. He is feeling well. No SOB or chest pain with exertion.      Previous history of bleeding disorders or clots?: No    Prolonged steroid use in the last 6 months?: No      Assessment of Cardiac Risk:   - Unstable or severe angina or MI in the last 6 weeks or history of stent placement in  the last year?: No    - Decompensated heart failure (e.g. New onset heart failure, NYHA  Class IV heart failure, or worsening existing heart failure)?: No    - Significant arrhythmias such as high grade AV block, symptomatic ventricular arrhythmia, newly recognized ventricular tachycardia, supraventricular tachycardia with resting heart rate >100, or symptomatic bradycardia?: No      Pre-operative Risk Factors:  - Elevated-risk surgery: No    - History of cerebrovascular disease: No    - History of ischemic heart disease: Yes    - History of congestive heart failure: No    - Pre-operative treatment with insulin: No    - Pre-operative creatinine >2 mg/dL: No      Medications of Perioperative Concern:    Anti-platelet (aspirin, clopidogrel, ticagrelor, prasugrel, cilostazol, dipyridamole) and Beta blockers    Review of Systems  Past Medical History   Past Medical History:   Diagnosis Date    Arthritis     Celiac disease     Colon polyp     Coronary artery disease     Diverticulitis     Diverticulitis of ascending colon 10/27/2022    Diverticulosis     GERD (gastroesophageal reflux disease)     Hyperlipidemia     Hypertension     Kidney stone     Myocardial infarction (HCC) 1990     Past Surgical History:   Procedure Laterality Date    COLONOSCOPY      CORONARY ANGIOPLASTY WITH STENT PLACEMENT      Multiple times    JOINT REPLACEMENT      IN ARTHROPLASTY GLENOHUMERAL JOINT TOTAL SHOULDER Left 7/2/2024    Procedure: ARTHROPLASTY SHOULDER- left anatomic, BICEPS TENODESIS;  Surgeon: Lalitha Ann DO;  Location: [facility];  Service: Orthopedics    IN ARTHRP KNE CONDYLE&PLATU MEDIAL&LAT COMPARTMENTS Right 04/13/2022    Procedure: ARTHROPLASTY KNEE TOTAL and all associated procedures;  Surgeon: Neeta Chaney MD;  Location: [facility];  Service: Orthopedics    IN ARTHRP KNE CONDYLE&PLATU MEDIAL&LAT COMPARTMENTS Left 09/29/2022    Procedure: ARTHROPLASTY KNEE TOTAL;  Surgeon: Neeta Chaney MD;  Location: [facility];   Service: Orthopedics     Family History   Problem Relation Age of Onset    Diabetes Mother     Coronary artery disease Father     Thyroid disease Sister      Social History     Tobacco Use    Smoking status: Former     Current packs/day: 0.00     Types: Cigarettes     Start date: 1964     Quit date:      Years since quittin.3     Passive exposure: Never    Smokeless tobacco: Never    Tobacco comments:     Per pt, quit over 36 years ago   Vaping Use    Vaping status: Never Used   Substance and Sexual Activity    Alcohol use: Not Currently    Drug use: Never    Sexual activity: Yes     Partners: Female     Birth control/protection: None     Current Outpatient Medications on File Prior to Visit   Medication Sig    aspirin (ECOTRIN LOW STRENGTH) 81 mg EC tablet Take 1 tablet (81 mg total) by mouth daily Please do not start taking until after total joint arthroplasty    b complex vitamins capsule Take 1 capsule by mouth daily    baclofen 10 mg tablet TAKE 1 TABLET BY MOUTH 3 TIMES  DAILY AS NEEDED FOR MUSCLE  SPASM(S)    cholecalciferol (VITAMIN D3) 1,000 units tablet Take 1 tablet (1,000 Units total) by mouth daily    ezetimibe (ZETIA) 10 mg tablet TAKE 1 TABLET BY MOUTH DAILY    gabapentin (NEURONTIN) 300 mg capsule Take 2 capsules (600 mg total) by mouth daily at bedtime    ketoconazole (NIZORAL) 2 % cream Apply topically daily    ketoconazole (NIZORAL) 2 % shampoo Use as shampoo at least 3 times per week to scalp. Lather into scalp and let sit for 5 minutes before rinsing.    metoprolol succinate (TOPROL-XL) 50 mg 24 hr tablet TAKE 1 TABLET BY MOUTH DAILY    omega-3-acid ethyl esters (LOVAZA) 1 g capsule TAKE 2 CAPSULES BY MOUTH TWICE  DAILY    pantoprazole (PROTONIX) 40 mg tablet TAKE 1 TABLET BY MOUTH EVERY DAY    sertraline (ZOLOFT) 50 mg tablet TAKE 1 TABLET BY MOUTH DAILY    simvastatin (ZOCOR) 40 mg tablet TAKE 1 TABLET BY MOUTH DAILY AT  BEDTIME    tamsulosin (FLOMAX) 0.4 mg TAKE 1 CAPSULE BY  "MOUTH DAILY  WITH DINNER    traZODone (DESYREL) 150 mg tablet TAKE 1 TABLET BY MOUTH AT BEDTIME    fluocinonide (LIDEX) 0.05 % external solution Apply topically 2 (two) times a day (Patient not taking: Reported on 4/17/2025)    meclizine (ANTIVERT) 12.5 MG tablet Take 1 tablet (12.5 mg total) by mouth 2 (two) times a day for 5 days     Allergies   Allergen Reactions    Crestor [Rosuvastatin] Myalgia    Ramipril Cough     Objective   /78 (BP Location: Right arm, Patient Position: Sitting, Cuff Size: Large)   Pulse 68   Temp 97.8 °F (36.6 °C) (Tympanic)   Resp 14   Ht 5' 10\" (1.778 m)   Wt 109 kg (241 lb)   SpO2 97%   BMI 34.58 kg/m²     Physical Exam  Constitutional:       General: He is not in acute distress.     Appearance: He is obese. He is not ill-appearing.   HENT:      Mouth/Throat:      Mouth: Mucous membranes are moist.      Pharynx: Oropharynx is clear.   Cardiovascular:      Rate and Rhythm: Normal rate and regular rhythm.      Heart sounds: No murmur heard.  Pulmonary:      Effort: Pulmonary effort is normal. No respiratory distress.      Breath sounds: Normal breath sounds.   Musculoskeletal:      Right lower leg: No edema.      Left lower leg: No edema.   Skin:     General: Skin is warm and dry.   Neurological:      Mental Status: He is alert.           West Banda, DO  "

## 2025-04-21 ENCOUNTER — TELEPHONE (OUTPATIENT)
Age: 80
End: 2025-04-21

## 2025-04-21 NOTE — TELEPHONE ENCOUNTER
Mp from [facility] called asking that pre op clearance paperwork be faxed for upcoming procedure.  Please fax to [phone].

## 2025-05-02 DIAGNOSIS — E78.2 MIXED HYPERLIPIDEMIA: ICD-10-CM

## 2025-05-05 RX ORDER — SIMVASTATIN 40 MG
40 TABLET ORAL
Qty: 100 TABLET | Refills: 1 | Status: SHIPPED | OUTPATIENT
Start: 2025-05-05

## 2025-05-31 DIAGNOSIS — E78.2 MIXED HYPERLIPIDEMIA: ICD-10-CM

## 2025-06-01 RX ORDER — OMEGA-3-ACID ETHYL ESTERS 1 G/1
2 CAPSULE, LIQUID FILLED ORAL 2 TIMES DAILY
Qty: 400 CAPSULE | Refills: 1 | Status: SHIPPED | OUTPATIENT
Start: 2025-06-01

## 2025-06-15 DIAGNOSIS — F41.9 ANXIETY: ICD-10-CM

## 2025-06-15 RX ORDER — TRAZODONE HYDROCHLORIDE 150 MG/1
150 TABLET ORAL
Qty: 90 TABLET | Refills: 1 | Status: SHIPPED | OUTPATIENT
Start: 2025-06-15

## 2025-06-20 NOTE — ASSESSMENT & PLAN NOTE
Sent to ED by PCP.  RLQ pain, fever, nausea, dysuria.   CT AP:   Mild bladder wall thickening  UA: 30-50 WBC, no bacteria. Nitrite negative  Continue ceftriaxone.  Urine culture pending.   Admission

## 2025-08-07 DIAGNOSIS — I10 ESSENTIAL HYPERTENSION: ICD-10-CM

## 2025-08-08 RX ORDER — METOPROLOL SUCCINATE 50 MG/1
50 TABLET, EXTENDED RELEASE ORAL DAILY
Qty: 100 TABLET | Refills: 0 | Status: SHIPPED | OUTPATIENT
Start: 2025-08-08

## 2025-08-11 ENCOUNTER — OFFICE VISIT (OUTPATIENT)
Dept: INTERNAL MEDICINE CLINIC | Facility: CLINIC | Age: 80
End: 2025-08-11
Payer: COMMERCIAL

## 2025-08-13 ENCOUNTER — APPOINTMENT (OUTPATIENT)
Dept: LAB | Facility: CLINIC | Age: 80
End: 2025-08-13
Payer: COMMERCIAL

## 2025-08-13 DIAGNOSIS — I21.A9 OTHER MYOCARDIAL INFARCTION TYPE (HCC): ICD-10-CM

## 2025-08-13 DIAGNOSIS — I25.10 CORONARY ARTERY DISEASE INVOLVING NATIVE CORONARY ARTERY OF NATIVE HEART WITHOUT ANGINA PECTORIS: ICD-10-CM

## 2025-08-13 DIAGNOSIS — E78.2 MIXED DYSLIPIDEMIA: ICD-10-CM

## 2025-08-13 LAB
ANION GAP SERPL CALCULATED.3IONS-SCNC: 4 MMOL/L (ref 4–13)
BUN SERPL-MCNC: 22 MG/DL (ref 5–25)
CALCIUM SERPL-MCNC: 9.6 MG/DL (ref 8.4–10.2)
CHLORIDE SERPL-SCNC: 105 MMOL/L (ref 96–108)
CHOLEST SERPL-MCNC: 147 MG/DL (ref ?–200)
CO2 SERPL-SCNC: 30 MMOL/L (ref 21–32)
CREAT SERPL-MCNC: 0.91 MG/DL (ref 0.6–1.3)
EST. AVERAGE GLUCOSE BLD GHB EST-MCNC: 111 MG/DL
GFR SERPL CREATININE-BSD FRML MDRD: 80 ML/MIN/1.73SQ M
GLUCOSE P FAST SERPL-MCNC: 101 MG/DL (ref 65–99)
HBA1C MFR BLD: 5.5 %
HDLC SERPL-MCNC: 27 MG/DL
NONHDLC SERPL-MCNC: 120 MG/DL
POTASSIUM SERPL-SCNC: 4.5 MMOL/L (ref 3.5–5.3)
SODIUM SERPL-SCNC: 139 MMOL/L (ref 135–147)
TRIGL SERPL-MCNC: 632 MG/DL (ref ?–150)

## 2025-08-13 PROCEDURE — 36415 COLL VENOUS BLD VENIPUNCTURE: CPT

## 2025-08-13 PROCEDURE — 83036 HEMOGLOBIN GLYCOSYLATED A1C: CPT

## 2025-08-13 PROCEDURE — 80048 BASIC METABOLIC PNL TOTAL CA: CPT

## 2025-08-13 PROCEDURE — 80061 LIPID PANEL: CPT

## 2025-08-18 ENCOUNTER — HOSPITAL ENCOUNTER (OUTPATIENT)
Dept: RADIOLOGY | Facility: HOSPITAL | Age: 80
Discharge: HOME | End: 2025-08-18
Payer: COMMERCIAL

## 2025-08-18 DIAGNOSIS — R42 DIZZINESS: ICD-10-CM

## 2025-08-18 DIAGNOSIS — H55.00 NYSTAGMUS: ICD-10-CM

## 2025-08-18 PROCEDURE — 70553 MRI BRAIN STEM W/O & W/DYE: CPT

## 2025-08-18 PROCEDURE — A9585 GADOBUTROL INJECTION: HCPCS

## 2025-08-18 RX ORDER — GADOBUTROL 604.72 MG/ML
10 INJECTION INTRAVENOUS
Status: COMPLETED | OUTPATIENT
Start: 2025-08-18 | End: 2025-08-18

## 2025-08-18 RX ADMIN — GADOBUTROL 10 ML: 604.72 INJECTION INTRAVENOUS at 07:17

## (undated) DEVICE — FIBERTAPE 2MM X 7IN AR-7237-7

## (undated) DEVICE — NEPTUNE E-SEP SMOKE EVACUATION PENCIL, COATED, 70MM BLADE, PUSH BUTTON SWITCH: Brand: NEPTUNE E-SEP

## (undated) DEVICE — GUIDE PIN 2.5 X 220MM AEQUALIS PERFORM PLUS

## (undated) DEVICE — 3 BONE CEMENT MIXER: Brand: MIXEVAC

## (undated) DEVICE — DISPOSABLE EQUIPMENT COVER: Brand: SMALL TOWEL DRAPE

## (undated) DEVICE — COBAN 4 IN STERILE

## (undated) DEVICE — CHLORAPREP HI-LITE 10.5ML ORANGE

## (undated) DEVICE — HOOD: Brand: FLYTE, SURGICOOL

## (undated) DEVICE — PACK MAJOR ORTHO W/SPLITS PBDS

## (undated) DEVICE — BETHLEHEM UNIV TOTAL KNEE, KIT: Brand: CARDINAL HEALTH

## (undated) DEVICE — GLOVE SRG BIOGEL 8

## (undated) DEVICE — PENCIL ELECTROSURG E-Z CLEAN -0035H

## (undated) DEVICE — TOWEL SET X-RAY

## (undated) DEVICE — PROXIMATE SKIN STAPLERS (35 WIDE) CONTAINS 35 STAINLESS STEEL STAPLES (FIXED HEAD): Brand: PROXIMATE

## (undated) DEVICE — BASIC DOUBLE BASIN 2-LF: Brand: MEDLINE INDUSTRIES, INC.

## (undated) DEVICE — COOL TEMP PAD

## (undated) DEVICE — SUT FIBERWIRE #2 1/2 CIRCLE T-5 38IN AR-7200

## (undated) DEVICE — SUT ETHIBOND 0 CT-2 30 IN X412H

## (undated) DEVICE — TRAY FOLEY 16FR URIMETER SURESTEP

## (undated) DEVICE — SPINNING CEMENT MIXING BOWL

## (undated) DEVICE — 3M™ IOBAN™ 2 ANTIMICROBIAL INCISE DRAPE 6650EZ: Brand: IOBAN™ 2

## (undated) DEVICE — HEAVY DUTY TABLE COVER: Brand: CONVERTORS

## (undated) DEVICE — ABDOMINAL PAD: Brand: DERMACEA

## (undated) DEVICE — NEEDLE 18 G X 1 1/2 SAFETY

## (undated) DEVICE — PADDING CAST 4 IN  COTTON STRL

## (undated) DEVICE — CAPIT KNEE ATTUNE RP CEMENT - DEPUY

## (undated) DEVICE — DUAL CUT SAGITTAL BLADE

## (undated) DEVICE — CUFF TOURNIQUET 34 X 4 IN QUICK CONNECT DISP 1BLA

## (undated) DEVICE — THE SIMPULSE SOLO SYSTEM WITH ULTREX RETRACTABLE SPLASH SHIELD TIP: Brand: SIMPULSE SOLO

## (undated) DEVICE — SUT VICRYL 1 CT-1 27 IN J261H

## (undated) DEVICE — SPONGE PVP SCRUB WING STERILE

## (undated) DEVICE — CHLORAPREP HI-LITE 26ML ORANGE

## (undated) DEVICE — GLOVE INDICATOR PI UNDERGLOVE SZ 8.5 BLUE

## (undated) DEVICE — RECIPROCATING BLADE, DOUBLE SIDED, OFFSET  (70.0 X 0.8 X 12.5MM)

## (undated) DEVICE — MAYO STAND COVER: Brand: CONVERTORS

## (undated) DEVICE — SUT MONOCRYL 2-0 SH 27 IN Y417H

## (undated) DEVICE — ADHESIVE SKIN HIGH VISCOSITY EXOFIN 1ML

## (undated) DEVICE — GAUZE SPONGES,16 PLY: Brand: CURITY

## (undated) DEVICE — INTENDED FOR TISSUE SEPARATION, AND OTHER PROCEDURES THAT REQUIRE A SHARP SURGICAL BLADE TO PUNCTURE OR CUT.: Brand: BARD-PARKER ® CARBON RIB-BACK BLADES

## (undated) DEVICE — ELECTRODE BALL E-Z CLEAN 5 IN -0009

## (undated) DEVICE — INTENT TO BE USED WITH SUTURE MATERIAL FOR TISSUE CLOSURE: Brand: RICHARD-ALLAN®  NEEDLE 1/2 CIRCLE REVERSE CUTTING

## (undated) DEVICE — SILVER-COATED ANTIMICROBIAL BARRIER DRESSING: Brand: ACTICOAT   4" X 8"

## (undated) DEVICE — DRESSING MEPILEX AG BORDER POST-OP 4 X 8 IN

## (undated) DEVICE — GROUNDING PAD UNIVERSAL SLW

## (undated) DEVICE — GLOVE INDICATOR PI UNDERGLOVE SZ 8 BLUE

## (undated) DEVICE — IMPERVIOUS STOCKINETTE: Brand: DEROYAL

## (undated) DEVICE — U-DRAPE: Brand: CONVERTORS

## (undated) DEVICE — 2108 SERIES SAGITTAL BLADE (18.6 X 0.64 X 61.1MM)

## (undated) DEVICE — GLOVE SRG BIOGEL 8.5

## (undated) DEVICE — DRAPE C-ARM X-RAY

## (undated) DEVICE — SUT NICELOOP GREEN SZ 5 BRD PRFE IMPREG POLY

## (undated) DEVICE — SUT VICRYL 2-0 CT-1 27 IN J259H

## (undated) DEVICE — ACE WRAP 6 IN UNSTERILE

## (undated) DEVICE — X-RAY DETECTABLE SPONGES,16 PLY: Brand: VISTEC

## (undated) DEVICE — FAN SPRAY KIT: Brand: PULSAVAC®

## (undated) DEVICE — SUT STRATAFIX SPIRAL MONOCRYL PLUS 3-0 PS-2 45CM SXMP1B107

## (undated) DEVICE — ACE WRAP 6 IN STERILE

## (undated) DEVICE — GLOVE SRG BIOGEL 7.5

## (undated) DEVICE — LIGHT HANDLE COVER SLEEVE DISP BLUE STELLAR

## (undated) DEVICE — SUT NICELOOP WHITE SZ 5 BRD PRFE IMPREG POLY

## (undated) DEVICE — CAPIT UPCHARGE PERFORM PLUS GLENOID

## (undated) DEVICE — GUIDE PIN 3 X 75MM STRL

## (undated) DEVICE — CUFF TOURNIQUET 30 X 4 IN QUICK CONNECT DISP 1BLA